# Patient Record
Sex: MALE | Race: WHITE | NOT HISPANIC OR LATINO | URBAN - METROPOLITAN AREA
[De-identification: names, ages, dates, MRNs, and addresses within clinical notes are randomized per-mention and may not be internally consistent; named-entity substitution may affect disease eponyms.]

---

## 2019-03-07 ENCOUNTER — INPATIENT (INPATIENT)
Facility: HOSPITAL | Age: 79
LOS: 11 days | Discharge: NURSING, REHAB OR LONG-TERM CARE | End: 2019-03-19
Attending: HOSPITALIST | Admitting: HOSPITALIST

## 2019-03-07 VITALS
SYSTOLIC BLOOD PRESSURE: 95 MMHG | OXYGEN SATURATION: 98 % | TEMPERATURE: 98 F | DIASTOLIC BLOOD PRESSURE: 52 MMHG | HEART RATE: 86 BPM | RESPIRATION RATE: 20 BRPM | WEIGHT: 210.1 LBS

## 2019-03-07 DIAGNOSIS — Z87.09 PERSONAL HISTORY OF OTHER DISEASES OF THE RESPIRATORY SYSTEM: Chronic | ICD-10-CM

## 2019-03-07 LAB
ALBUMIN SERPL ELPH-MCNC: 3.3 G/DL — LOW (ref 3.5–5.2)
ALP SERPL-CCNC: 53 U/L — SIGNIFICANT CHANGE UP (ref 30–115)
ALT FLD-CCNC: 13 U/L — SIGNIFICANT CHANGE UP (ref 0–41)
ANION GAP SERPL CALC-SCNC: 10 MMOL/L — SIGNIFICANT CHANGE UP (ref 7–14)
APTT BLD: 30.7 SEC — SIGNIFICANT CHANGE UP (ref 27–39.2)
AST SERPL-CCNC: 15 U/L — SIGNIFICANT CHANGE UP (ref 0–41)
BASOPHILS # BLD AUTO: 0.02 K/UL — SIGNIFICANT CHANGE UP (ref 0–0.2)
BASOPHILS NFR BLD AUTO: 0.2 % — SIGNIFICANT CHANGE UP (ref 0–1)
BILIRUB SERPL-MCNC: 0.2 MG/DL — SIGNIFICANT CHANGE UP (ref 0.2–1.2)
BUN SERPL-MCNC: 59 MG/DL — HIGH (ref 10–20)
CALCIUM SERPL-MCNC: 8.6 MG/DL — SIGNIFICANT CHANGE UP (ref 8.5–10.1)
CHLORIDE SERPL-SCNC: 104 MMOL/L — SIGNIFICANT CHANGE UP (ref 98–110)
CO2 SERPL-SCNC: 25 MMOL/L — SIGNIFICANT CHANGE UP (ref 17–32)
CREAT SERPL-MCNC: 2.5 MG/DL — HIGH (ref 0.7–1.5)
EOSINOPHIL # BLD AUTO: 0.15 K/UL — SIGNIFICANT CHANGE UP (ref 0–0.7)
EOSINOPHIL NFR BLD AUTO: 1.2 % — SIGNIFICANT CHANGE UP (ref 0–8)
GLUCOSE SERPL-MCNC: 65 MG/DL — LOW (ref 70–99)
HCT VFR BLD CALC: 36.2 % — LOW (ref 42–52)
HGB BLD-MCNC: 12.2 G/DL — LOW (ref 14–18)
IMM GRANULOCYTES NFR BLD AUTO: 0.4 % — HIGH (ref 0.1–0.3)
INR BLD: 1.12 RATIO — SIGNIFICANT CHANGE UP (ref 0.65–1.3)
LYMPHOCYTES # BLD AUTO: 23.2 % — SIGNIFICANT CHANGE UP (ref 20.5–51.1)
LYMPHOCYTES # BLD AUTO: 3.02 K/UL — SIGNIFICANT CHANGE UP (ref 1.2–3.4)
MCHC RBC-ENTMCNC: 29.6 PG — SIGNIFICANT CHANGE UP (ref 27–31)
MCHC RBC-ENTMCNC: 33.7 G/DL — SIGNIFICANT CHANGE UP (ref 32–37)
MCV RBC AUTO: 87.9 FL — SIGNIFICANT CHANGE UP (ref 80–94)
MONOCYTES # BLD AUTO: 1.04 K/UL — HIGH (ref 0.1–0.6)
MONOCYTES NFR BLD AUTO: 8 % — SIGNIFICANT CHANGE UP (ref 1.7–9.3)
NEUTROPHILS # BLD AUTO: 8.76 K/UL — HIGH (ref 1.4–6.5)
NEUTROPHILS NFR BLD AUTO: 67 % — SIGNIFICANT CHANGE UP (ref 42.2–75.2)
NRBC # BLD: 0 /100 WBCS — SIGNIFICANT CHANGE UP (ref 0–0)
NT-PROBNP SERPL-SCNC: 4829 PG/ML — HIGH (ref 0–300)
PLATELET # BLD AUTO: 276 K/UL — SIGNIFICANT CHANGE UP (ref 130–400)
POTASSIUM SERPL-MCNC: 4.7 MMOL/L — SIGNIFICANT CHANGE UP (ref 3.5–5)
POTASSIUM SERPL-SCNC: 4.7 MMOL/L — SIGNIFICANT CHANGE UP (ref 3.5–5)
PROT SERPL-MCNC: 5.7 G/DL — LOW (ref 6–8)
PROTHROM AB SERPL-ACNC: 12.9 SEC — HIGH (ref 9.95–12.87)
RBC # BLD: 4.12 M/UL — LOW (ref 4.7–6.1)
RBC # FLD: 13.2 % — SIGNIFICANT CHANGE UP (ref 11.5–14.5)
SODIUM SERPL-SCNC: 139 MMOL/L — SIGNIFICANT CHANGE UP (ref 135–146)
TROPONIN T SERPL-MCNC: 0.03 NG/ML — CRITICAL HIGH
WBC # BLD: 13.04 K/UL — HIGH (ref 4.8–10.8)
WBC # FLD AUTO: 13.04 K/UL — HIGH (ref 4.8–10.8)

## 2019-03-07 NOTE — ED ADULT TRIAGE NOTE - CHIEF COMPLAINT QUOTE
pt c/o SOB for weeks, hx copd, 98%SAT on RA, appears sob, denies CP, dry cough, put on oral steroids by pcp.

## 2019-03-08 DIAGNOSIS — J44.9 CHRONIC OBSTRUCTIVE PULMONARY DISEASE, UNSPECIFIED: ICD-10-CM

## 2019-03-08 DIAGNOSIS — I50.9 HEART FAILURE, UNSPECIFIED: ICD-10-CM

## 2019-03-08 DIAGNOSIS — J18.9 PNEUMONIA, UNSPECIFIED ORGANISM: ICD-10-CM

## 2019-03-08 DIAGNOSIS — I48.91 UNSPECIFIED ATRIAL FIBRILLATION: ICD-10-CM

## 2019-03-08 DIAGNOSIS — I10 ESSENTIAL (PRIMARY) HYPERTENSION: ICD-10-CM

## 2019-03-08 DIAGNOSIS — E78.00 PURE HYPERCHOLESTEROLEMIA, UNSPECIFIED: ICD-10-CM

## 2019-03-08 LAB
BASE EXCESS BLDV CALC-SCNC: -3 MMOL/L — LOW (ref -2–2)
CA-I SERPL-SCNC: 1.13 MMOL/L — SIGNIFICANT CHANGE UP (ref 1.12–1.3)
GAS PNL BLDV: 135 MMOL/L — LOW (ref 136–145)
GAS PNL BLDV: SIGNIFICANT CHANGE UP
HCO3 BLDV-SCNC: 23 MMOL/L — SIGNIFICANT CHANGE UP (ref 22–29)
HCT VFR BLDA CALC: 68.8 % — HIGH (ref 34–44)
HGB BLD CALC-MCNC: 22.4 G/DL — CRITICAL HIGH (ref 14–18)
HOROWITZ INDEX BLDV+IHG-RTO: 21 — SIGNIFICANT CHANGE UP
LACTATE BLDV-MCNC: 1.4 MMOL/L — SIGNIFICANT CHANGE UP (ref 0.5–1.6)
NT-PROBNP SERPL-SCNC: 4737 PG/ML — HIGH (ref 0–300)
PCO2 BLDV: 44 MMHG — SIGNIFICANT CHANGE UP (ref 41–51)
PH BLDV: 7.33 — SIGNIFICANT CHANGE UP (ref 7.26–7.43)
PO2 BLDV: 17 MMHG — LOW (ref 20–40)
POTASSIUM BLDV-SCNC: 3.9 MMOL/L — SIGNIFICANT CHANGE UP (ref 3.3–5.6)
SAO2 % BLDV: 29 % — SIGNIFICANT CHANGE UP
TROPONIN T SERPL-MCNC: <0.01 NG/ML — SIGNIFICANT CHANGE UP

## 2019-03-08 RX ORDER — SPIRONOLACTONE 25 MG/1
25 TABLET, FILM COATED ORAL DAILY
Qty: 0 | Refills: 0 | Status: DISCONTINUED | OUTPATIENT
Start: 2019-03-08 | End: 2019-03-08

## 2019-03-08 RX ORDER — AZITHROMYCIN 500 MG/1
500 TABLET, FILM COATED ORAL ONCE
Qty: 0 | Refills: 0 | Status: COMPLETED | OUTPATIENT
Start: 2019-03-08 | End: 2019-03-08

## 2019-03-08 RX ORDER — CEFTRIAXONE 500 MG/1
1 INJECTION, POWDER, FOR SOLUTION INTRAMUSCULAR; INTRAVENOUS EVERY 24 HOURS
Qty: 0 | Refills: 0 | Status: DISCONTINUED | OUTPATIENT
Start: 2019-03-08 | End: 2019-03-09

## 2019-03-08 RX ORDER — ONDANSETRON 8 MG/1
4 TABLET, FILM COATED ORAL EVERY 6 HOURS
Qty: 0 | Refills: 0 | Status: DISCONTINUED | OUTPATIENT
Start: 2019-03-08 | End: 2019-03-09

## 2019-03-08 RX ORDER — CEFTRIAXONE 500 MG/1
1 INJECTION, POWDER, FOR SOLUTION INTRAMUSCULAR; INTRAVENOUS ONCE
Qty: 0 | Refills: 0 | Status: COMPLETED | OUTPATIENT
Start: 2019-03-08 | End: 2019-03-08

## 2019-03-08 RX ORDER — SODIUM CHLORIDE 9 MG/ML
500 INJECTION INTRAMUSCULAR; INTRAVENOUS; SUBCUTANEOUS ONCE
Qty: 0 | Refills: 0 | Status: COMPLETED | OUTPATIENT
Start: 2019-03-08 | End: 2019-03-08

## 2019-03-08 RX ORDER — PANTOPRAZOLE SODIUM 20 MG/1
40 TABLET, DELAYED RELEASE ORAL
Qty: 0 | Refills: 0 | Status: DISCONTINUED | OUTPATIENT
Start: 2019-03-08 | End: 2019-03-11

## 2019-03-08 RX ORDER — LOSARTAN POTASSIUM 100 MG/1
100 TABLET, FILM COATED ORAL DAILY
Qty: 0 | Refills: 0 | Status: DISCONTINUED | OUTPATIENT
Start: 2019-03-08 | End: 2019-03-08

## 2019-03-08 RX ORDER — HEPARIN SODIUM 5000 [USP'U]/ML
5000 INJECTION INTRAVENOUS; SUBCUTANEOUS THREE TIMES A DAY
Qty: 0 | Refills: 0 | Status: DISCONTINUED | OUTPATIENT
Start: 2019-03-08 | End: 2019-03-19

## 2019-03-08 RX ORDER — AZITHROMYCIN 500 MG/1
500 TABLET, FILM COATED ORAL EVERY 24 HOURS
Qty: 0 | Refills: 0 | Status: DISCONTINUED | OUTPATIENT
Start: 2019-03-08 | End: 2019-03-11

## 2019-03-08 RX ORDER — IPRATROPIUM/ALBUTEROL SULFATE 18-103MCG
3 AEROSOL WITH ADAPTER (GRAM) INHALATION EVERY 6 HOURS
Qty: 0 | Refills: 0 | Status: DISCONTINUED | OUTPATIENT
Start: 2019-03-08 | End: 2019-03-12

## 2019-03-08 RX ORDER — HYDROCHLOROTHIAZIDE 25 MG
50 TABLET ORAL DAILY
Qty: 0 | Refills: 0 | Status: DISCONTINUED | OUTPATIENT
Start: 2019-03-08 | End: 2019-03-08

## 2019-03-08 RX ORDER — AMLODIPINE BESYLATE 2.5 MG/1
5 TABLET ORAL DAILY
Qty: 0 | Refills: 0 | Status: DISCONTINUED | OUTPATIENT
Start: 2019-03-08 | End: 2019-03-08

## 2019-03-08 RX ADMIN — PANTOPRAZOLE SODIUM 40 MILLIGRAM(S): 20 TABLET, DELAYED RELEASE ORAL at 10:05

## 2019-03-08 RX ADMIN — SODIUM CHLORIDE 500 MILLILITER(S): 9 INJECTION INTRAMUSCULAR; INTRAVENOUS; SUBCUTANEOUS at 01:01

## 2019-03-08 RX ADMIN — Medication 40 MILLIGRAM(S): at 22:16

## 2019-03-08 RX ADMIN — Medication 40 MILLIGRAM(S): at 15:32

## 2019-03-08 RX ADMIN — Medication 30 MILLILITER(S): at 18:35

## 2019-03-08 RX ADMIN — AZITHROMYCIN 255 MILLIGRAM(S): 500 TABLET, FILM COATED ORAL at 01:01

## 2019-03-08 RX ADMIN — ONDANSETRON 4 MILLIGRAM(S): 8 TABLET, FILM COATED ORAL at 19:42

## 2019-03-08 RX ADMIN — CEFTRIAXONE 100 GRAM(S): 500 INJECTION, POWDER, FOR SOLUTION INTRAMUSCULAR; INTRAVENOUS at 22:17

## 2019-03-08 RX ADMIN — HEPARIN SODIUM 5000 UNIT(S): 5000 INJECTION INTRAVENOUS; SUBCUTANEOUS at 22:16

## 2019-03-08 RX ADMIN — Medication 3 MILLILITER(S): at 07:59

## 2019-03-08 RX ADMIN — HEPARIN SODIUM 5000 UNIT(S): 5000 INJECTION INTRAVENOUS; SUBCUTANEOUS at 15:32

## 2019-03-08 RX ADMIN — AZITHROMYCIN 255 MILLIGRAM(S): 500 TABLET, FILM COATED ORAL at 22:16

## 2019-03-08 RX ADMIN — CEFTRIAXONE 100 GRAM(S): 500 INJECTION, POWDER, FOR SOLUTION INTRAMUSCULAR; INTRAVENOUS at 01:43

## 2019-03-08 RX ADMIN — Medication 3 MILLILITER(S): at 13:30

## 2019-03-08 NOTE — CONSULT NOTE ADULT - ASSESSMENT
Patient with history copd. Now sob   . No overt chf now. He has CKD I/VI, No afib now Need check cxr. Echo. Hold hctz and arb if bp low.

## 2019-03-08 NOTE — ED PROVIDER NOTE - PHYSICAL EXAMINATION
CONSTITUTIONAL: Well-appearing; well-nourished; in no apparent distress.   EYES: PERRL; EOM intact.   CARDIOVASCULAR: Normal S1, S2; no murmurs, rubs, or gallops.   RESPIRATORY: + rales to b/l base of lung. no wheezing and rhonchi. RR - 16. no accessory muscles use.   GI/: Normal bowel sounds; non-distended; non-tender; no palpable organomegaly.   MS: No evidence of trauma or deformity. 1+ pitting edema b/l LE below knee   SKIN: Normal for age and race; warm; dry; good turgor; no apparent lesions or exudate.   NEURO/PSYCH: A & O x 4; grossly unremarkable.

## 2019-03-08 NOTE — PROGRESS NOTE ADULT - SUBJECTIVE AND OBJECTIVE BOX
Hospitalist on call    Informed by RN, patient c/o indigestion after eating.    Plan:   Maalox prn ordered

## 2019-03-08 NOTE — H&P ADULT - NSHPREVIEWOFSYSTEMS_GEN_ALL_CORE
· Review of Systems: Constitutional: no fever, chills, no recent weight loss, change in appetite or malaise  	Eyes: no redness/discharge/pain/vision changes  	ENT: no rhinorrhea/ear pain/sore throat  	Cardiac: No chest pain or edema.  	Respiratory: see HPI  	GI: No nausea, vomiting, diarrhea or abdominal pain.  	: No dysuria, frequency, urgency or hematuria  	MS: no pain to back or extremities, no loss of ROM, no weakness  	Neuro: No headache or weakness. No LOC.  	Skin: No skin rash.  	Endocrine: No history of thyroid disease or diabetes.  Except as documented in the HPI, all other systems are negative.

## 2019-03-08 NOTE — CHART NOTE - NSCHARTNOTEFT_GEN_A_CORE
Asked to see patient because current blood pressure is 83/54. Seen at bedside, interviewed and examined. He is fully awake and oriented and comfortable saying he gets shortness of breath only with exertion. Currently not short of breat and no chest pain, lightheadedness or any complaints. States he is urinating normally. Radial pulses are strong bilaterally. Patient is now getting IV fluids in the form of IV antibiotics (for COPD exacerbation?), will get repeat vital signs soon after they are infused. Of note patient was seen by intensivist yesterday for hypotension, it did respond to their intervention. Asked to see patient because current blood pressure is 83/54. Seen at bedside, interviewed and examined. He is fully awake and oriented and comfortable saying he gets shortness of breath only with exertion. Currently not short of breat and no chest pain, lightheadedness or any complaints. States he is urinating normally. Radial pulses are strong bilaterally. Patient is now getting IV fluids in the form of IV antibiotics (opacity seen on CXR), will get repeat vital signs soon after they are infused. Of note patient was seen by intensivist yesterday for hypotension, it did respond to their intervention. Asked to see patient because current blood pressure is 83/54. Seen at bedside, interviewed and examined. He is fully awake and oriented and comfortable saying he gets shortness of breath only with exertion. Currently not short of breat and no chest pain, lightheadedness or any complaints. States he is urinating normally. Radial pulses are strong bilaterally. Patient is now getting IV fluids in the form of IV antibiotics (opacity seen on CXR), will get repeat vital signs soon after they are infused. Of note patient was seen by intensivist yesterday for hypotension, it did respond to their intervention. Will consider using cefepime

## 2019-03-08 NOTE — ED PROVIDER NOTE - CLINICAL SUMMARY MEDICAL DECISION MAKING FREE TEXT BOX
pt here w new onset a fib. possible left sided pna vs effusion. mild pulm congestion. will admit to tele. pt was evaluated by ICU- Dr. Almanza- not a candidate for 'hard telemetry'. pt admitted in stable conditiion

## 2019-03-08 NOTE — H&P ADULT - HISTORY OF PRESENT ILLNESS
· HPI Objective Statement: 79 yo male hx of HTN/HLD/COPD present c/o coughing and SOB worsening over the past few weeks. reported he was seen by PMD and given prednisone and inhaler. patient reported worsening SOB over the past few days so he came to ED for evaluation. + productive yellow sputum. SOB worsen with exertion. Denies fever/chill/HA/dizziness/chest pain/palpitation/abd pain/n/v/d/ black stool/bloody stool/urinary sxs

## 2019-03-08 NOTE — ED PROVIDER NOTE - OBJECTIVE STATEMENT
77 yo male hx of HTN/HLD/COPD present c/o coughing and SOB worsening over the past few weeks. reported he was seen by PMD and given prednisone and inhaler. patient reported worsening SOB over the past few days so he came to ED for evaluation. + productive yellow sputum. SOB worsen with exertion.  Denies fever/chill/HA/dizziness/chest pain/palpitation/abd pain/n/v/d/ black stool/bloody stool/urinary sxs

## 2019-03-08 NOTE — H&P ADULT - NSHPLABSRESULTS_GEN_ALL_CORE
12.2   13.04 )-----------( 276      ( 07 Mar 2019 22:20 )             36.2     03-07    139  |  104  |  59<H>  ----------------------------<  65<L>  4.7   |  25  |  2.5<H>    Ca    8.6      07 Mar 2019 22:20    TPro  5.7<L>  /  Alb  3.3<L>  /  TBili  0.2  /  DBili  x   /  AST  15  /  ALT  13  /  AlkPhos  53  03-07            PT/INR - ( 07 Mar 2019 22:20 )   PT: 12.90 sec;   INR: 1.12 ratio         PTT - ( 07 Mar 2019 22:20 )  PTT:30.7 sec  Lactate Trend    CARDIAC MARKERS ( 07 Mar 2019 22:20 )  x     / 0.03 ng/mL / x     / x     / x          CAPILLARY BLOOD GLUCOSE

## 2019-03-08 NOTE — ED PROVIDER NOTE - NS ED ROS FT
Constitutional: no fever, chills, no recent weight loss, change in appetite or malaise  Eyes: no redness/discharge/pain/vision changes  ENT: no rhinorrhea/ear pain/sore throat  Cardiac: No chest pain or edema.  Respiratory: see HPI  GI: No nausea, vomiting, diarrhea or abdominal pain.  : No dysuria, frequency, urgency or hematuria  MS: no pain to back or extremities, no loss of ROM, no weakness  Neuro: No headache or weakness. No LOC.  Skin: No skin rash.  Endocrine: No history of thyroid disease or diabetes.  Except as documented in the HPI, all other systems are negative.

## 2019-03-08 NOTE — H&P ADULT - ASSESSMENT
Patient is a 78y old  Male who presents with a chief complaint of hypotension, new onset afib (08 Mar 2019 00:54)                                                                                                                                                                                                                                                                                       HEALTH ISSUES - PROBLEM Dx:  High cholesterol: High cholesterol  Chronic obstructive pulmonary disease, unspecified COPD type: Chronic obstructive pulmonary disease, unspecified COPD type  Hypertension, unspecified type: Hypertension, unspecified type

## 2019-03-08 NOTE — ED PROVIDER NOTE - PROGRESS NOTE DETAILS
dr. story from icu consulted Dr Jackson evaluated patient and approve for low risk tele dr. Jackson from icu consulted

## 2019-03-08 NOTE — CONSULT NOTE ADULT - ASSESSMENT
A/P:     1. CHF - likely systolic   - repeat 2 d echo   - not overloaded on exam   - bp low - but pt mentating well   - can give 200 cc blous   - serial trops   - new onset afib - normal sinus rythim on my exam - repeat ekg  - monitor BP   - can be monitored in tele   - DVT ppx   COPD - nebs for now

## 2019-03-08 NOTE — ED PROVIDER NOTE - ATTENDING CONTRIBUTION TO CARE
I personally evaluated the patient. I reviewed the Resident’s or Physician Assistant’s note (as assigned above), and agree with the findings and plan except as documented in my note.    79 y/o M with hx of HTN presents to the ed w cc of sob associated with ortopnea and LE edema over the past week. No CP. No fevers. No abd pain.     CONSTITUTIONAL: Well-developed; well-nourished; in no acute distress. Sitting up and providing appropriate history and physical examination  SKIN: skin exam is warm and dry, no acute rash.  HEAD: Normocephalic; atraumatic.  EYES: PERRL, 3 mm bilateral, no nystagmus, EOM intact; conjunctiva and sclera clear.  ENT: No nasal discharge; airway clear.  NECK: Supple; non tender.+ full passive ROM in all directions. No JVD  CARD: irregular pulse- new onset a fib   RESP: b/l rales   ABD: soft; non-distended; non-tender. No Rebound, No Gaurding, No signs of peritnitis, No CVA tenderness  EXT: Normal ROM. No clubbing, cyanosis or edema. Dp and Pt Pulses intact. Cap refill less than 3 seconds  NEURO: CN 2-12 intact, normal finger to nose, normal romberg, stable gait, no sensory or motor deficits, Alert, oriented, grossly unremarkable. No Focal deficits. GCS 15. NIH 0  PSYCH: Cooperative, appropriate.

## 2019-03-08 NOTE — ED PROVIDER NOTE - CARE PLAN
Principal Discharge DX:	Atrial fibrillation, new onset  Secondary Diagnosis:	CHF (congestive heart failure)  Secondary Diagnosis:	Pneumonia

## 2019-03-08 NOTE — CONSULT NOTE ADULT - SUBJECTIVE AND OBJECTIVE BOX
CARDIOLOGY CONSULT NOTE     CHIEF COMPLAINT/REASON FOR CONSULT:    HPI:  · HPI Objective Statement: 77 yo male hx of HTN/HLD/COPD present c/o coughing and SOB worsening over the past few weeks. reported he was seen by PMD and given prednisone and inhaler. patient reported worsening SOB over the past few days so he came to ED for evaluation. + productive yellow sputum. SOB worsen with exertion.     PAST MEDICAL & SURGICAL HISTORY:  High cholesterol  COPD (chronic obstructive pulmonary disease)  HTN (hypertension)  H/O tonsillitis      Cardiac Risks:   [ x]HTN, [ ] DM, [ ] Smoking, [ ] FH,  [ x] Lipids        MEDICATIONS:  MEDICATIONS  (STANDING):  ALBUTerol/ipratropium for Nebulization 3 milliLiter(s) Nebulizer every 6 hours  amLODIPine   Tablet 5 milliGRAM(s) Oral daily  azithromycin  IVPB 500 milliGRAM(s) IV Intermittent every 24 hours  cefTRIAXone   IVPB 1 Gram(s) IV Intermittent every 24 hours  heparin  Injectable 5000 Unit(s) SubCutaneous three times a day  hydrochlorothiazide 50 milliGRAM(s) Oral daily  losartan 100 milliGRAM(s) Oral daily  methylPREDNISolone sodium succinate Injectable 40 milliGRAM(s) IV Push every 8 hours  pantoprazole    Tablet 40 milliGRAM(s) Oral before breakfast      FAMILY HISTORY:  No pertinent family history in first degree relatives      SOCIAL HISTORY:      [ ] Marital status  Single  Allergies    No Known Allergies      	    REVIEW OF SYSTEMS:  CONSTITUTIONAL: No fever, weight loss, or fatigue  EYES: No eye pain, visual disturbances, or discharge  ENMT:  No difficulty hearing, tinnitus, vertigo; No sinus or throat pain  NECK: No pain or stiffness  RESPIRATORY: See above  CARDIOVASCULAR: No chest pain, palpitations, passing out, dizziness, or leg swelling  GASTROINTESTINAL: No abdominal or epigastric pain. No nausea, vomiting, or hematemesis; No diarrhea or constipation. No melena or hematochezia.  GENITOURINARY: No dysuria, frequency, hematuria, or incontinence  NEUROLOGICAL: No headaches, memory loss, loss of strength, numbness, or tremors  SKIN: No itching, burning, rashes, or lesions   	      PHYSICAL EXAM:  T(C): 35.9 (03-08-19 @ 05:15), Max: 37.4 (03-08-19 @ 00:20)  HR: 88 (03-08-19 @ 05:15) (71 - 97)  BP: 94/51 (03-08-19 @ 05:15) (82/52 - 127/60)  RR: 16 (03-08-19 @ 05:15) (16 - 20)  SpO2: 99% (03-08-19 @ 01:40) (97% - 99%)  Wt(kg): --  I&O's Summary    07 Mar 2019 07:01  -  08 Mar 2019 07:00  --------------------------------------------------------  IN: 0 mL / OUT: 450 mL / NET: -450 mL        Appearance: Normal	  Psychiatry: A & O x 3, Mood & affect appropriate  HEENT:   Normal oral mucosa, PERRL, EOMI	  Lymphatic: No lymphadenopathy  Cardiovascular: Normal S1 S2,RRR, No JVD, No murmurs  Respiratory: Lungs clear to auscultation  Decreased bs	  Gastrointestinal:  Soft, Non-tender, + BS	  Skin: No rashes, No ecchymoses, No cyanosis	  Neurologic: Non-focal  Extremities: Normal range of motion, No clubbing, cyanosis or edema  Vascular: Peripheral pulses palpable 2+ bilaterally      ECG:  	Not available    	  LABS:	 	    CARDIAC MARKERS:          Serum Pro-Brain Natriuretic Peptide: 4829 pg/mL (03-07 @ 22:20)                            12.2   13.04 )-----------( 276      ( 07 Mar 2019 22:20 )             36.2     03-07    139  |  104  |  59<H>  ----------------------------<  65<L>  4.7   |  25  |  2.5<H>    Ca    8.6      07 Mar 2019 22:20    TPro  5.7<L>  /  Alb  3.3<L>  /  TBili  0.2  /  DBili  x   /  AST  15  /  ALT  13  /  AlkPhos  53  03-07    PT/INR - ( 07 Mar 2019 22:20 )   PT: 12.90 sec;   INR: 1.12 ratio         PTT - ( 07 Mar 2019 22:20 )  PTT:30.7 sec  proBNP: Serum Pro-Brain Natriuretic Peptide: 4829 pg/mL (03-07 @ 22:20)

## 2019-03-08 NOTE — CONSULT NOTE ADULT - SUBJECTIVE AND OBJECTIVE BOX
Patient is a 78y old  Male who presents with a chief complaint of sob x 3 days.      REVIEW OF SYSTEMS  General: feels weak   Skin/Breast: no rash 	  Ophthalmologic: no blurry vision 	  ENMT:	no blurry vision   Respiratory and Thorax: no sob	  Cardiovascular:	 no chest pain   Gastrointestinal:	no dysuria  Genitourinary:	no diarhea     Allergies  No Known Allergies    T(F): 99.4 (03-08-19 @ 00:20), Max: 99.4 (03-08-19 @ 00:20)  HR: 96 (03-08-19 @ 00:20)  BP: 97/51 (03-08-19 @ 00:20)  RR: 20 (03-08-19 @ 00:20)  SpO2: 98% (03-08-19 @ 00:20) (97% - 98%)      PHYSICAL EXAM:  GENERAL: NAD, well-groomed, well-developed  HEAD:  Atraumatic, Normocephalic  EYES: EOMI, PERRLA, conjunctiva and sclera clear  ENMT: No tonsillar erythema, exudates, or enlargement; Moist mucous membranes, Good dentition, No lesions  NECK: Supple, No JVD, Normal thyroid  NERVOUS SYSTEM:  Alert & Oriented X3, Good concentration; Motor Strength 5/5 B/L upper and lower extremities; DTRs 2+ intact and symmetric  CHEST/LUNG: Clear to percussion bilaterally; No rales, rhonchi, wheezing, or rubs  HEART: Regular rate and rhythm; No murmurs, rubs, or gallops  ABDOMEN: Soft, Nontender, Nondistended; Bowel sounds present  EXTREMITIES:  2+ edema     labs  03-07    139  |  104  |  59<H>  ----------------------------<  65<L>  4.7   |  25  |  2.5<H>    Ca    8.6      07 Mar 2019 22:20    TPro  5.7<L>  /  Alb  3.3<L>  /  TBili  0.2  /  DBili  x   /  AST  15  /  ALT  13  /  AlkPhos  53  03-07                          12.2   13.04 )-----------( 276      ( 07 Mar 2019 22:20 )             36.2         PT/INR - ( 07 Mar 2019 22:20 )   PT: 12.90 sec;   INR: 1.12 ratio         PTT - ( 07 Mar 2019 22:20 )  PTT:30.7 sec      Social hx : no smoking , or eton abuse      radiology    azithromycin  IVPB 500 milliGRAM(s) IV Intermittent Once  cefTRIAXone   IVPB 1 Gram(s) IV Intermittent Once  sodium chloride 0.9% Bolus 500 milliLiter(s) IV Bolus once

## 2019-03-08 NOTE — H&P ADULT - PROBLEM SELECTOR PLAN 2
diuretics/ antihypertensivesfluid restrictions CHF, unspecified  diuretics/ antihypertensivesfluid restrictions

## 2019-03-09 LAB
ANION GAP SERPL CALC-SCNC: 9 MMOL/L — SIGNIFICANT CHANGE UP (ref 7–14)
BUN SERPL-MCNC: 62 MG/DL — CRITICAL HIGH (ref 10–20)
CALCIUM SERPL-MCNC: 8.3 MG/DL — LOW (ref 8.5–10.1)
CHLORIDE SERPL-SCNC: 107 MMOL/L — SIGNIFICANT CHANGE UP (ref 98–110)
CO2 SERPL-SCNC: 24 MMOL/L — SIGNIFICANT CHANGE UP (ref 17–32)
CREAT SERPL-MCNC: 1.7 MG/DL — HIGH (ref 0.7–1.5)
GLUCOSE SERPL-MCNC: 166 MG/DL — HIGH (ref 70–99)
HCT VFR BLD CALC: 34.8 % — LOW (ref 42–52)
HCT VFR BLD CALC: 36.9 % — LOW (ref 42–52)
HGB BLD-MCNC: 11.5 G/DL — LOW (ref 14–18)
HGB BLD-MCNC: 12.5 G/DL — LOW (ref 14–18)
LACTATE SERPL-SCNC: 1.2 MMOL/L — SIGNIFICANT CHANGE UP (ref 0.5–2.2)
MAGNESIUM SERPL-MCNC: 1.9 MG/DL — SIGNIFICANT CHANGE UP (ref 1.8–2.4)
MCHC RBC-ENTMCNC: 28.9 PG — SIGNIFICANT CHANGE UP (ref 27–31)
MCHC RBC-ENTMCNC: 29.3 PG — SIGNIFICANT CHANGE UP (ref 27–31)
MCHC RBC-ENTMCNC: 33 G/DL — SIGNIFICANT CHANGE UP (ref 32–37)
MCHC RBC-ENTMCNC: 33.9 G/DL — SIGNIFICANT CHANGE UP (ref 32–37)
MCV RBC AUTO: 86.6 FL — SIGNIFICANT CHANGE UP (ref 80–94)
MCV RBC AUTO: 87.4 FL — SIGNIFICANT CHANGE UP (ref 80–94)
NRBC # BLD: 0 /100 WBCS — SIGNIFICANT CHANGE UP (ref 0–0)
NRBC # BLD: 0 /100 WBCS — SIGNIFICANT CHANGE UP (ref 0–0)
PHOSPHATE SERPL-MCNC: 3.3 MG/DL — SIGNIFICANT CHANGE UP (ref 2.1–4.9)
PLATELET # BLD AUTO: 237 K/UL — SIGNIFICANT CHANGE UP (ref 130–400)
PLATELET # BLD AUTO: 302 K/UL — SIGNIFICANT CHANGE UP (ref 130–400)
POTASSIUM SERPL-MCNC: 5.5 MMOL/L — HIGH (ref 3.5–5)
POTASSIUM SERPL-SCNC: 5.5 MMOL/L — HIGH (ref 3.5–5)
RBC # BLD: 3.98 M/UL — LOW (ref 4.7–6.1)
RBC # BLD: 4.26 M/UL — LOW (ref 4.7–6.1)
RBC # FLD: 13.2 % — SIGNIFICANT CHANGE UP (ref 11.5–14.5)
RBC # FLD: 13.2 % — SIGNIFICANT CHANGE UP (ref 11.5–14.5)
SODIUM SERPL-SCNC: 140 MMOL/L — SIGNIFICANT CHANGE UP (ref 135–146)
T3 SERPL-MCNC: 82 NG/DL — SIGNIFICANT CHANGE UP (ref 80–200)
T4 AB SER-ACNC: 6.1 UG/DL — SIGNIFICANT CHANGE UP (ref 4.6–12)
TSH SERPL-MCNC: 1.13 UIU/ML — SIGNIFICANT CHANGE UP (ref 0.27–4.2)
WBC # BLD: 15.39 K/UL — HIGH (ref 4.8–10.8)
WBC # BLD: 9.55 K/UL — SIGNIFICANT CHANGE UP (ref 4.8–10.8)
WBC # FLD AUTO: 15.39 K/UL — HIGH (ref 4.8–10.8)
WBC # FLD AUTO: 9.55 K/UL — SIGNIFICANT CHANGE UP (ref 4.8–10.8)

## 2019-03-09 RX ORDER — CEFEPIME 1 G/1
1000 INJECTION, POWDER, FOR SOLUTION INTRAMUSCULAR; INTRAVENOUS EVERY 12 HOURS
Qty: 0 | Refills: 0 | Status: DISCONTINUED | OUTPATIENT
Start: 2019-03-09 | End: 2019-03-10

## 2019-03-09 RX ORDER — IOHEXOL 300 MG/ML
30 INJECTION, SOLUTION INTRAVENOUS ONCE
Qty: 0 | Refills: 0 | Status: COMPLETED | OUTPATIENT
Start: 2019-03-09 | End: 2019-03-09

## 2019-03-09 RX ORDER — METOCLOPRAMIDE HCL 10 MG
10 TABLET ORAL ONCE
Qty: 0 | Refills: 0 | Status: COMPLETED | OUTPATIENT
Start: 2019-03-09 | End: 2019-03-09

## 2019-03-09 RX ORDER — ONDANSETRON 8 MG/1
8 TABLET, FILM COATED ORAL EVERY 8 HOURS
Qty: 0 | Refills: 0 | Status: DISCONTINUED | OUTPATIENT
Start: 2019-03-09 | End: 2019-03-19

## 2019-03-09 RX ADMIN — Medication 40 MILLIGRAM(S): at 05:46

## 2019-03-09 RX ADMIN — Medication 10 MILLIGRAM(S): at 14:25

## 2019-03-09 RX ADMIN — CEFEPIME 100 MILLIGRAM(S): 1 INJECTION, POWDER, FOR SOLUTION INTRAMUSCULAR; INTRAVENOUS at 05:53

## 2019-03-09 RX ADMIN — PANTOPRAZOLE SODIUM 40 MILLIGRAM(S): 20 TABLET, DELAYED RELEASE ORAL at 05:46

## 2019-03-09 RX ADMIN — Medication 3 MILLILITER(S): at 08:33

## 2019-03-09 RX ADMIN — Medication 3 MILLILITER(S): at 14:38

## 2019-03-09 RX ADMIN — ONDANSETRON 8 MILLIGRAM(S): 8 TABLET, FILM COATED ORAL at 17:37

## 2019-03-09 RX ADMIN — HEPARIN SODIUM 5000 UNIT(S): 5000 INJECTION INTRAVENOUS; SUBCUTANEOUS at 05:46

## 2019-03-09 RX ADMIN — AZITHROMYCIN 255 MILLIGRAM(S): 500 TABLET, FILM COATED ORAL at 21:27

## 2019-03-09 RX ADMIN — HEPARIN SODIUM 5000 UNIT(S): 5000 INJECTION INTRAVENOUS; SUBCUTANEOUS at 21:27

## 2019-03-09 RX ADMIN — ONDANSETRON 4 MILLIGRAM(S): 8 TABLET, FILM COATED ORAL at 12:28

## 2019-03-09 RX ADMIN — CEFEPIME 100 MILLIGRAM(S): 1 INJECTION, POWDER, FOR SOLUTION INTRAMUSCULAR; INTRAVENOUS at 17:42

## 2019-03-09 RX ADMIN — HEPARIN SODIUM 5000 UNIT(S): 5000 INJECTION INTRAVENOUS; SUBCUTANEOUS at 14:26

## 2019-03-09 RX ADMIN — IOHEXOL 30 MILLILITER(S): 300 INJECTION, SOLUTION INTRAVENOUS at 17:30

## 2019-03-09 NOTE — PROGRESS NOTE ADULT - SUBJECTIVE AND OBJECTIVE BOX
Patient is a 78y old  Male who presents with a chief complaint of hypotension, new onset afib (08 Mar 2019 00:54)      SUBJECTIVE / OVERNIGHT EVENTS:  no cp, sob, fever  vomiting, nausea yesterday, reflux, burning nonradaiting    MEDICATIONS  (STANDING):  ALBUTerol/ipratropium for Nebulization 3 milliLiter(s) Nebulizer every 6 hours  azithromycin  IVPB 500 milliGRAM(s) IV Intermittent every 24 hours  cefepime   IVPB 1000 milliGRAM(s) IV Intermittent every 12 hours  heparin  Injectable 5000 Unit(s) SubCutaneous three times a day  metoclopramide Injectable 10 milliGRAM(s) IV Push once  pantoprazole    Tablet 40 milliGRAM(s) Oral before breakfast    MEDICATIONS  (PRN):  ondansetron Injectable 4 milliGRAM(s) IV Push every 6 hours PRN Nausea and/or Vomiting        CAPILLARY BLOOD GLUCOSE        I&O's Summary    08 Mar 2019 07:01  -  09 Mar 2019 07:00  --------------------------------------------------------  IN: 0 mL / OUT: 450 mL / NET: -450 mL    09 Mar 2019 06:01  -  09 Mar 2019 11:03  --------------------------------------------------------  IN: 0 mL / OUT: 200 mL / NET: -200 mL        PHYSICAL EXAM:  GENERAL: nad, a+o x3  EYES:  NECK:   CHEST/LUNG: minimal exp wheeze  HEART: rrr no m/g/r  ABDOMEN: soft nt nd  EXTREMITIES:    PSYCH:   NEUROLOGY:   SKIN:    LABS:                        11.5   9.55  )-----------( 237      ( 09 Mar 2019 07:00 )             34.8     03-09    140  |  107  |  62<HH>  ----------------------------<  166<H>  5.5<H>   |  24  |  1.7<H>    Ca    8.3<L>      09 Mar 2019 07:00  Phos  3.3     03-09  Mg     1.9     03-09    TPro  5.7<L>  /  Alb  3.3<L>  /  TBili  0.2  /  DBili  x   /  AST  15  /  ALT  13  /  AlkPhos  53  03-07    PT/INR - ( 07 Mar 2019 22:20 )   PT: 12.90 sec;   INR: 1.12 ratio         PTT - ( 07 Mar 2019 22:20 )  PTT:30.7 sec  CARDIAC MARKERS ( 08 Mar 2019 12:12 )  x     / <0.01 ng/mL / x     / x     / x      CARDIAC MARKERS ( 07 Mar 2019 22:20 )  x     / 0.03 ng/mL / x     / x     / x              RADIOLOGY & ADDITIONAL TESTS:    Imaging Personally Reviewed:  Yes    Consultant(s) Notes Reviewed:      Care Discussed with Consultants/Other Providers:    Assessment and Plan   PAST MEDICAL & SURGICAL HISTORY:  High cholesterol  COPD (chronic obstructive pulmonary disease)  HTN (hypertension)  H/O tonsillitis

## 2019-03-09 NOTE — CHART NOTE - NSCHARTNOTEFT_GEN_A_CORE
Blood pressure is better but for now will switch from Rocephin to cefepime pending daytime staff review

## 2019-03-09 NOTE — PROGRESS NOTE ADULT - ASSESSMENT
78M PMHx COPD, HTN, HLD, first degree AVB here with sob, hypotension. L base opacity on cxr.    1. Shortness of breath  suspect due to pna at this point given cxr findings  cont cefepime, azithro  pt with some GI symptoms, check urine legionella  keep o2 >88  d/c steroids  +jvp on exam, f/u tte  2. Hypotension  unclear etiology  sepsis vs. cardiogenic  f/u tte  mental status stable  3. First degree AVB  stable on tele ok to d/c  avoid AV anay blockers  4. COPD  no evidence of exacerbation  duonebs q6  5. HTN  hypotensive  6. HLD  outpt f/u  7. DVT ppx  subq hep    #Progress Note Handoff:  Pending (specify):  Consults_________, Tests________, Test Results_______, Other_________  Family discussion:  Disposition: Home___/SNF___/Other________/Unknown at this time____x____

## 2019-03-10 LAB
ALBUMIN SERPL ELPH-MCNC: 3.4 G/DL — LOW (ref 3.5–5.2)
ALP SERPL-CCNC: 38 U/L — SIGNIFICANT CHANGE UP (ref 30–115)
ALT FLD-CCNC: 12 U/L — SIGNIFICANT CHANGE UP (ref 0–41)
ANION GAP SERPL CALC-SCNC: 12 MMOL/L — SIGNIFICANT CHANGE UP (ref 7–14)
AST SERPL-CCNC: 14 U/L — SIGNIFICANT CHANGE UP (ref 0–41)
BILIRUB SERPL-MCNC: 0.4 MG/DL — SIGNIFICANT CHANGE UP (ref 0.2–1.2)
BUN SERPL-MCNC: 58 MG/DL — HIGH (ref 10–20)
CALCIUM SERPL-MCNC: 9 MG/DL — SIGNIFICANT CHANGE UP (ref 8.5–10.1)
CHLORIDE SERPL-SCNC: 99 MMOL/L — SIGNIFICANT CHANGE UP (ref 98–110)
CO2 SERPL-SCNC: 28 MMOL/L — SIGNIFICANT CHANGE UP (ref 17–32)
CREAT ?TM UR-MCNC: 90 MG/DL — SIGNIFICANT CHANGE UP
CREAT SERPL-MCNC: 1.4 MG/DL — SIGNIFICANT CHANGE UP (ref 0.7–1.5)
GLUCOSE SERPL-MCNC: 135 MG/DL — HIGH (ref 70–99)
HCT VFR BLD CALC: 34.6 % — LOW (ref 42–52)
HGB BLD-MCNC: 11.8 G/DL — LOW (ref 14–18)
MAGNESIUM SERPL-MCNC: 1.9 MG/DL — SIGNIFICANT CHANGE UP (ref 1.8–2.4)
MCHC RBC-ENTMCNC: 29.8 PG — SIGNIFICANT CHANGE UP (ref 27–31)
MCHC RBC-ENTMCNC: 34.1 G/DL — SIGNIFICANT CHANGE UP (ref 32–37)
MCV RBC AUTO: 87.4 FL — SIGNIFICANT CHANGE UP (ref 80–94)
NRBC # BLD: 0 /100 WBCS — SIGNIFICANT CHANGE UP (ref 0–0)
PHOSPHATE SERPL-MCNC: 3.2 MG/DL — SIGNIFICANT CHANGE UP (ref 2.1–4.9)
PLATELET # BLD AUTO: 295 K/UL — SIGNIFICANT CHANGE UP (ref 130–400)
POTASSIUM SERPL-MCNC: 5.1 MMOL/L — HIGH (ref 3.5–5)
POTASSIUM SERPL-SCNC: 5.1 MMOL/L — HIGH (ref 3.5–5)
PROT SERPL-MCNC: 5.7 G/DL — LOW (ref 6–8)
RBC # BLD: 3.96 M/UL — LOW (ref 4.7–6.1)
RBC # FLD: 13.1 % — SIGNIFICANT CHANGE UP (ref 11.5–14.5)
SODIUM SERPL-SCNC: 139 MMOL/L — SIGNIFICANT CHANGE UP (ref 135–146)
WBC # BLD: 18.34 K/UL — HIGH (ref 4.8–10.8)
WBC # FLD AUTO: 18.34 K/UL — HIGH (ref 4.8–10.8)

## 2019-03-10 RX ORDER — BENZOCAINE AND MENTHOL 5; 1 G/100ML; G/100ML
1 LIQUID ORAL
Qty: 0 | Refills: 0 | Status: DISCONTINUED | OUTPATIENT
Start: 2019-03-10 | End: 2019-03-19

## 2019-03-10 RX ORDER — PIPERACILLIN AND TAZOBACTAM 4; .5 G/20ML; G/20ML
3.38 INJECTION, POWDER, LYOPHILIZED, FOR SOLUTION INTRAVENOUS EVERY 8 HOURS
Qty: 0 | Refills: 0 | Status: DISCONTINUED | OUTPATIENT
Start: 2019-03-10 | End: 2019-03-12

## 2019-03-10 RX ADMIN — ONDANSETRON 8 MILLIGRAM(S): 8 TABLET, FILM COATED ORAL at 05:31

## 2019-03-10 RX ADMIN — HEPARIN SODIUM 5000 UNIT(S): 5000 INJECTION INTRAVENOUS; SUBCUTANEOUS at 21:36

## 2019-03-10 RX ADMIN — PIPERACILLIN AND TAZOBACTAM 25 GRAM(S): 4; .5 INJECTION, POWDER, LYOPHILIZED, FOR SOLUTION INTRAVENOUS at 13:51

## 2019-03-10 RX ADMIN — PANTOPRAZOLE SODIUM 40 MILLIGRAM(S): 20 TABLET, DELAYED RELEASE ORAL at 05:32

## 2019-03-10 RX ADMIN — CEFEPIME 100 MILLIGRAM(S): 1 INJECTION, POWDER, FOR SOLUTION INTRAMUSCULAR; INTRAVENOUS at 05:32

## 2019-03-10 RX ADMIN — PIPERACILLIN AND TAZOBACTAM 25 GRAM(S): 4; .5 INJECTION, POWDER, LYOPHILIZED, FOR SOLUTION INTRAVENOUS at 21:35

## 2019-03-10 RX ADMIN — Medication 3 MILLILITER(S): at 13:37

## 2019-03-10 RX ADMIN — AZITHROMYCIN 255 MILLIGRAM(S): 500 TABLET, FILM COATED ORAL at 21:35

## 2019-03-10 RX ADMIN — Medication 3 MILLILITER(S): at 08:24

## 2019-03-10 RX ADMIN — BENZOCAINE AND MENTHOL 1 LOZENGE: 5; 1 LIQUID ORAL at 22:44

## 2019-03-10 RX ADMIN — ONDANSETRON 8 MILLIGRAM(S): 8 TABLET, FILM COATED ORAL at 17:49

## 2019-03-10 RX ADMIN — HEPARIN SODIUM 5000 UNIT(S): 5000 INJECTION INTRAVENOUS; SUBCUTANEOUS at 05:30

## 2019-03-10 NOTE — PROGRESS NOTE ADULT - ASSESSMENT
78M PMHx COPD, HTN, HLD, first degree AVB here with R basilar pneumonia, suspect aspiration. PAULA, now resolved. Now with coffee ground emesis.    1. Pneumonia, suspect gram neg  ct demonstrate R base opacity likely pna  change to zosyn to cover anaerobe  azithro pending urine legionella  hypotension resolved  # Vomiting  concern for coffee ground in appearance  cbc stable however  ct abd without acute pathology  improved from yesterday, will monitor  zofran prn  start clear liquid diet  2. PAULA  resolved  suspected prerenal  3. First degree AVB  avoid AV anay blockers  4. COPD  no evidence of exacerbation  duonebs q6  5. HTN  hold bp meds  6. HLD  outpt f/u  7. DVT ppx  subq hep    #Progress Note Handoff:  Pending (specify):  Consults_________, Tests________, Test Results_______, Other_________  Family discussion:  Disposition: Home___/SNF___/Other________/Unknown at this time____x____

## 2019-03-10 NOTE — PROGRESS NOTE ADULT - SUBJECTIVE AND OBJECTIVE BOX
Patient is a 78y old  Male who presents with a chief complaint of hypotension, new onset afib (08 Mar 2019 00:54)      SUBJECTIVE / OVERNIGHT EVENTS:  no cp, sob, abd pain, fever  no abd pain, + nausea, vomiting, coffee ground    MEDICATIONS  (STANDING):  ALBUTerol/ipratropium for Nebulization 3 milliLiter(s) Nebulizer every 6 hours  azithromycin  IVPB 500 milliGRAM(s) IV Intermittent every 24 hours  heparin  Injectable 5000 Unit(s) SubCutaneous three times a day  pantoprazole    Tablet 40 milliGRAM(s) Oral before breakfast  piperacillin/tazobactam IVPB. 3.375 Gram(s) IV Intermittent every 8 hours    MEDICATIONS  (PRN):  ondansetron Injectable 8 milliGRAM(s) IV Push every 8 hours PRN Nausea and/or Vomiting        CAPILLARY BLOOD GLUCOSE        I&O's Summary    09 Mar 2019 06:01  -  10 Mar 2019 07:00  --------------------------------------------------------  IN: 300 mL / OUT: 500 mL / NET: -200 mL        PHYSICAL EXAM:  GENERAL: nad, a+o x3  EYES:  NECK:   CHEST/LUNG: base crackles  HEART: rrr no m/g/r  ABDOMEN: soft nt nd  EXTREMITIES:    PSYCH:   NEUROLOGY:   SKIN:    LABS:                        11.8   18.34 )-----------( 295      ( 10 Mar 2019 06:29 )             34.6     03-10    139  |  99  |  58<H>  ----------------------------<  135<H>  5.1<H>   |  28  |  1.4    Ca    9.0      10 Mar 2019 06:29  Phos  3.2     03-10  Mg     1.9     03-10    TPro  5.7<L>  /  Alb  3.4<L>  /  TBili  0.4  /  DBili  x   /  AST  14  /  ALT  12  /  AlkPhos  38  03-10      CARDIAC MARKERS ( 08 Mar 2019 12:12 )  x     / <0.01 ng/mL / x     / x     / x              RADIOLOGY & ADDITIONAL TESTS:    Imaging Personally Reviewed:  Yes    Consultant(s) Notes Reviewed:      Care Discussed with Consultants/Other Providers:    Assessment and Plan   PAST MEDICAL & SURGICAL HISTORY:  High cholesterol  COPD (chronic obstructive pulmonary disease)  HTN (hypertension)  H/O tonsillitis

## 2019-03-11 LAB
ANION GAP SERPL CALC-SCNC: 10 MMOL/L — SIGNIFICANT CHANGE UP (ref 7–14)
BUN SERPL-MCNC: 43 MG/DL — HIGH (ref 10–20)
CALCIUM SERPL-MCNC: 8.5 MG/DL — SIGNIFICANT CHANGE UP (ref 8.5–10.1)
CHLORIDE SERPL-SCNC: 95 MMOL/L — LOW (ref 98–110)
CO2 SERPL-SCNC: 31 MMOL/L — SIGNIFICANT CHANGE UP (ref 17–32)
CREAT SERPL-MCNC: 1.7 MG/DL — HIGH (ref 0.7–1.5)
GLUCOSE SERPL-MCNC: 178 MG/DL — HIGH (ref 70–99)
HCT VFR BLD CALC: 40.1 % — LOW (ref 42–52)
HGB BLD-MCNC: 13.2 G/DL — LOW (ref 14–18)
LEGIONELLA AG UR QL: NEGATIVE — SIGNIFICANT CHANGE UP
MAGNESIUM SERPL-MCNC: 1.8 MG/DL — SIGNIFICANT CHANGE UP (ref 1.8–2.4)
MCHC RBC-ENTMCNC: 29 PG — SIGNIFICANT CHANGE UP (ref 27–31)
MCHC RBC-ENTMCNC: 32.9 G/DL — SIGNIFICANT CHANGE UP (ref 32–37)
MCV RBC AUTO: 88.1 FL — SIGNIFICANT CHANGE UP (ref 80–94)
NRBC # BLD: 0 /100 WBCS — SIGNIFICANT CHANGE UP (ref 0–0)
PHOSPHATE SERPL-MCNC: 4.3 MG/DL — SIGNIFICANT CHANGE UP (ref 2.1–4.9)
PLATELET # BLD AUTO: 347 K/UL — SIGNIFICANT CHANGE UP (ref 130–400)
POTASSIUM SERPL-MCNC: 4.9 MMOL/L — SIGNIFICANT CHANGE UP (ref 3.5–5)
POTASSIUM SERPL-SCNC: 4.9 MMOL/L — SIGNIFICANT CHANGE UP (ref 3.5–5)
RBC # BLD: 4.55 M/UL — LOW (ref 4.7–6.1)
RBC # FLD: 13 % — SIGNIFICANT CHANGE UP (ref 11.5–14.5)
SODIUM SERPL-SCNC: 136 MMOL/L — SIGNIFICANT CHANGE UP (ref 135–146)
WBC # BLD: 22.33 K/UL — HIGH (ref 4.8–10.8)
WBC # FLD AUTO: 22.33 K/UL — HIGH (ref 4.8–10.8)

## 2019-03-11 RX ORDER — PANTOPRAZOLE SODIUM 20 MG/1
8 TABLET, DELAYED RELEASE ORAL
Qty: 80 | Refills: 0 | Status: DISCONTINUED | OUTPATIENT
Start: 2019-03-11 | End: 2019-03-15

## 2019-03-11 RX ADMIN — PIPERACILLIN AND TAZOBACTAM 25 GRAM(S): 4; .5 INJECTION, POWDER, LYOPHILIZED, FOR SOLUTION INTRAVENOUS at 16:42

## 2019-03-11 RX ADMIN — Medication 3 MILLILITER(S): at 07:55

## 2019-03-11 RX ADMIN — PANTOPRAZOLE SODIUM 10 MG/HR: 20 TABLET, DELAYED RELEASE ORAL at 15:20

## 2019-03-11 RX ADMIN — Medication 3 MILLILITER(S): at 13:42

## 2019-03-11 RX ADMIN — PIPERACILLIN AND TAZOBACTAM 25 GRAM(S): 4; .5 INJECTION, POWDER, LYOPHILIZED, FOR SOLUTION INTRAVENOUS at 21:04

## 2019-03-11 RX ADMIN — HEPARIN SODIUM 5000 UNIT(S): 5000 INJECTION INTRAVENOUS; SUBCUTANEOUS at 21:06

## 2019-03-11 RX ADMIN — PIPERACILLIN AND TAZOBACTAM 25 GRAM(S): 4; .5 INJECTION, POWDER, LYOPHILIZED, FOR SOLUTION INTRAVENOUS at 06:05

## 2019-03-11 RX ADMIN — Medication 3 MILLILITER(S): at 19:51

## 2019-03-11 NOTE — PROGRESS NOTE ADULT - ASSESSMENT
78M PMHx COPD, HTN, HLD, first degree AVB here with R basilar pneumonia, suspect aspiration. PAULA, now resolved. Now with coffee ground emesis.    # Upper GI bleed  protonix gtt  appreciate GI input, plan for eventual egd  pulm consult to clear for egd per gi  cbc stable, trend daily  1. Pneumonia, suspect gram neg  zosyn day 2/7  wbc elevation noted, trend  d/c azithro, urine legionella neg  2. PAULA  resolved  suspected prerenal  3. First degree AVB  avoid AV anay blockers  4. COPD  no evidence of exacerbation  duonebs q6  5. HTN  hold bp meds  6. HLD  outpt f/u  7. DVT ppx  subq hep    #Progress Note Handoff:  Pending (specify):  Consults_________, Tests________, Test Results_______, Other_________  Family discussion:  Disposition: Home___/SNF___/Other________/Unknown at this time____x____ 78M PMHx COPD, HTN, HLD, first degree AVB here with R basilar pneumonia, suspect aspiration. PAULA, now resolved. Now with coffee ground emesis.    # Upper GI bleed  protonix gtt  appreciate GI input, plan for eventual egd  pulm consult to clear for egd per gi  cbc stable, trend daily  1. Pneumonia, suspect gram neg  zosyn day 2/7  wbc elevation noted, trend  d/c azithro, urine legionella neg  2. PAULA  suspected prerenal  trend scr  3. First degree AVB  avoid AV anay blockers  4. COPD  no evidence of exacerbation  duonebs q6  5. HTN  hold bp meds  6. HLD  outpt f/u  7. DVT ppx  subq hep    #Progress Note Handoff:  Pending (specify):  Consults_________, Tests________, Test Results_______, Other_________  Family discussion:  Disposition: Home___/SNF___/Other________/Unknown at this time____x____ 78M PMHx COPD, HTN, HLD, first degree AVB here with R basilar pneumonia, UNSPECIFIED CHF, suspect aspiration. PAULA, now resolved. Now with coffee ground emesis.    # Upper GI bleed  protonix gtt  appreciate GI input, plan for eventual egd  pulm consult to clear for egd per gi  cbc stable, trend daily  1. Pneumonia, suspect gram neg  zosyn day 2/7  wbc elevation noted, trend  d/c azithro, urine legionella neg  2. PAULA  suspected prerenal  trend scr  3. First degree AVB  avoid AV anay blockers  4. COPD  no evidence of exacerbation  duonebs q6  5. HTN  hold bp meds  6. HLD  outpt f/u  7. DVT ppx  subq hep  8. CHF, unspecified  pending tte    #Progress Note Handoff:  Pending (specify):  Consults_________, Tests________, Test Results_______, Other_________  Family discussion:  Disposition: Home___/SNF___/Other________/Unknown at this time____x____

## 2019-03-11 NOTE — PROVIDER CONTACT NOTE (OTHER) - SITUATION
pt had assisted fall to floor by family members pt denies pain , no sign of distress
Pt has wet lung sounds, PO 82% on 2 lpm, pt 92 % on 4 lpm

## 2019-03-11 NOTE — CONSULT NOTE ADULT - ASSESSMENT
78yMale pmh HTN, HLD, COPD not on home O2 presents for shortness of breath worsening over the past few weeks and new onset A-Fib, patient has +nausea and +coffee ground emesis-multiple episodes all weekend as per patient and nursing team. Patient denies abdominal pain, diarrhea, constipation, blood in stool.    Problem 1-Cofee Ground Emesis   Rec  -Maintain hemodynamic stability  -PPI drip  -Maintain Hemodynamic stability  -Active type and screen  -Will discuss with attending today  -CBC monitor H and H  -Patient will likely need EGD    Problem 2-Bilateral pleural effusions with right lower lobe reticulonodular densities, may be secondary to an infectious etiology.  Rec  -Care as per primary team patient needs lungs to be optimized prior to EGD

## 2019-03-11 NOTE — CONSULT NOTE ADULT - SUBJECTIVE AND OBJECTIVE BOX
Chief complaint/Reason for consult: Coffee Ground Emesis     HPI: HPI Objective Statement: 77 yo male hx of HTN/HLD/COPD present c/o coughing and SOB worsening over the past few weeks. reported he was seen by PMD and given prednisone and inhaler. Patient reported worsening SOB over the past few days so he came to ED for evaluation. + productive yellow sputum. SOB worsen with exertion. Denies fever/chill/HA/dizziness/chest pain/palpitation/abd pain/n/v/d/ black stool/bloody stool/urinary sxs (08 Mar 2019 06:08)    GI Updates- 78yMale pmh HTN, HLD, COPD not on home O2 presents for shortness of breath worsening over the past few weeks and new onset A-Fib, patient has +nausea and +coffee ground emesis-multiple episodes all weekend as per patient and nursing team. Patient denies abdominal pain, diarrhea, constipation, black stools, or blood in stool. Patient had a colonoscopy "a long time ago" reportedly normal.    PMHX/PSHX:  PAST MEDICAL & SURGICAL HISTORY:  High cholesterol  COPD (chronic obstructive pulmonary disease)  HTN (hypertension)  H/O tonsillitis      Family history:  FAMILY HISTORY:  No pertinent family history in first degree relatives    No GI cancers in first or second degree relatives    Social History: No smoking. No alcohol. No illegal drug use.    Allergies:  No Known Allergies      MEDICATIONS:MEDICATIONS  (STANDING):  ALBUTerol/ipratropium for Nebulization 3 milliLiter(s) Nebulizer every 6 hours  azithromycin  IVPB 500 milliGRAM(s) IV Intermittent every 24 hours  heparin  Injectable 5000 Unit(s) SubCutaneous three times a day  pantoprazole    Tablet 40 milliGRAM(s) Oral before breakfast  piperacillin/tazobactam IVPB. 3.375 Gram(s) IV Intermittent every 8 hours    MEDICATIONS  (PRN):  benzocaine 15 mG/menthol 3.6 mG (Sugar-Free) Lozenge 1 Lozenge Oral every 2 hours PRN Sore Throat  ondansetron Injectable 8 milliGRAM(s) IV Push every 8 hours PRN Nausea and/or Vomiting        REVIEW OF SYSTEMS  General:  No weight loss, fevers, or chills.  Eyes:  No reported pain or visual changes  ENT:  No sore throat or runny nose.  NECK: No stiffness or lymphadenopathy  CV:  No chest pain or palpitations.  Resp:  No shortness of breath, cough, wheezing or hemoptysis  GI:  No abdominal pain, +nausea, +vomiting, No dysphagia, diarrhea or constipation. No rectal bleeding, melena, or hematemesis.  Neuro:  No tingling, numbness       VITALS:   T(F): 96.6 (03-11-19 @ 05:46), Max: 97.5 (03-10-19 @ 14:30)  HR: 94 (03-11-19 @ 05:46) (91 - 95)  BP: 111/59 (03-11-19 @ 05:46) (111/59 - 115/56)  RR: 16 (03-11-19 @ 05:46) (16 - 16)  SpO2: 90% (03-10-19 @ 21:01) (90% - 92%)    PHYSICAL EXAM:  GENERAL: AAOx3, no acute distress.  HEAD:  Atraumatic, Normocephalic  EYES: conjunctiva and sclera clear  NECK: Supple, No thyromegaly   CHEST/LUNG: +Rhonchi b/l  HEART: Regular rate and rhythm; normal S1, S2, No murmurs.  ABDOMEN: Soft, nontender, nondistended; Bowel sounds present, no abdominal bruit, masses, or hepatosplenomegaly +umbilical hernia  NEUROLOGY: No asterixis or tremor  SKIN: Intact, no jaundice  Rectal Exam- Patient Refused        LABS:  03-10    139  |  99  |  58<H>  ----------------------------<  135<H>  5.1<H>   |  28  |  1.4    Ca    9.0      10 Mar 2019 06:29  Phos  3.2     03-10  Mg     1.9     03-10    TPro  5.7<L>  /  Alb  3.4<L>  /  TBili  0.4  /  DBili  x   /  AST  14  /  ALT  12  /  AlkPhos  38  03-10                          11.8   18.34 )-----------( 295      ( 10 Mar 2019 06:29 )             34.6     LIVER FUNCTIONS - ( 10 Mar 2019 06:29 )  Alb: 3.4 g/dL / Pro: 5.7 g/dL / ALK PHOS: 38 U/L / ALT: 12 U/L / AST: 14 U/L / GGT: x             IMAGING  < from: CT Abdomen and Pelvis w/ Oral Cont (03.09.19 @ 20:29) >    EXAM:  CT ABDOMEN AND PELVIS OC            PROCEDURE DATE:  03/09/2019            INTERPRETATION:  CLINICAL HISTORY/REASON FOR EXAM: Abdominal pain.    TECHNIQUE: Contiguous axial CT images were obtained from the lower chest   to the pubic symphysis without intravenous contrast. Oral contrast was   administered. Reformatted images in the coronal and sagittal planes were   acquired.    COMPARISON: None.      FINDINGS:    LOWER CHEST: Bilateral pleural effusion with overlying compressive   atelectasis. Right lower lobe reticular nodular densities (series 4,   image 15).    HEPATOBILIARY: Unremarkable.    SPLEEN: Unremarkable.    PANCREAS: Unremarkable.    ADRENAL GLANDS: Unremarkable.    KIDNEYS: No hydronephrosis or nephrolithiasis.    ABDOMINOPELVIC NODES: No lymphadenopathy.    PELVIC ORGANS: Unremarkable.    PERITONEUM/MESENTERY/BOWEL: No bowel obstruction. No ascites or   pneumoperitoneum. Normal appendix.    BONES/SOFT TISSUES: Degenerative changes of the spine. Fat-containing   umbilical hernia. Nonobstructive bowel within a left inguinal hernia.      IMPRESSION:  1.  Bilateral pleural effusions with right lower lobe reticulonodular   densities, may be secondary to an infectious etiology.  2.  No evidence of acute abdominopelvic pathology.              PRASAD FERNÁNDEZ M.D., RESIDENT RADIOLOGIST  This document has been electronically signed.  ROSETTE BAH M.D., ATTENDING RADIOLOGIST  This document has been electronically signed. Mar  9 2019  9:26PM    < end of copied text >

## 2019-03-11 NOTE — PROGRESS NOTE ADULT - SUBJECTIVE AND OBJECTIVE BOX
Patient is a 78y old  Male who presents with a chief complaint of hypotension, new onset afib (08 Mar 2019 00:54)      SUBJECTIVE / OVERNIGHT EVENTS:  no cp, sob, fever  nausea, vomiting, coffee ground, no abd pain    MEDICATIONS  (STANDING):  ALBUTerol/ipratropium for Nebulization 3 milliLiter(s) Nebulizer every 6 hours  heparin  Injectable 5000 Unit(s) SubCutaneous three times a day  pantoprazole Infusion 8 mG/Hr (10 mL/Hr) IV Continuous <Continuous>  piperacillin/tazobactam IVPB. 3.375 Gram(s) IV Intermittent every 8 hours    MEDICATIONS  (PRN):  benzocaine 15 mG/menthol 3.6 mG (Sugar-Free) Lozenge 1 Lozenge Oral every 2 hours PRN Sore Throat  ondansetron Injectable 8 milliGRAM(s) IV Push every 8 hours PRN Nausea and/or Vomiting        CAPILLARY BLOOD GLUCOSE        I&O's Summary    10 Mar 2019 07:01  -  11 Mar 2019 07:00  --------------------------------------------------------  IN: 0 mL / OUT: 460 mL / NET: -460 mL        PHYSICAL EXAM:  GENERAL: nad, a+o x3  EYES:  NECK:   CHEST/LUNG: ctab no w/r/r  HEART: rrr no m/g/r  ABDOMEN: soft nt nd  EXTREMITIES:    PSYCH:   NEUROLOGY:   SKIN:    LABS:                        13.2   22.33 )-----------( 347      ( 11 Mar 2019 12:46 )             40.1     03-11    136  |  95<L>  |  43<H>  ----------------------------<  178<H>  4.9   |  31  |  1.7<H>    Ca    8.5      11 Mar 2019 12:46  Phos  4.3     03-11  Mg     1.8     03-11    TPro  5.7<L>  /  Alb  3.4<L>  /  TBili  0.4  /  DBili  x   /  AST  14  /  ALT  12  /  AlkPhos  38  03-10              RADIOLOGY & ADDITIONAL TESTS:    Imaging Personally Reviewed:  Yes    Consultant(s) Notes Reviewed:      Care Discussed with Consultants/Other Providers:    Assessment and Plan   PAST MEDICAL & SURGICAL HISTORY:  High cholesterol  COPD (chronic obstructive pulmonary disease)  HTN (hypertension)  H/O tonsillitis

## 2019-03-12 LAB
ALLERGY+IMMUNOLOGY DIAG STUDY NOTE: SIGNIFICANT CHANGE UP
ANION GAP SERPL CALC-SCNC: 14 MMOL/L — SIGNIFICANT CHANGE UP (ref 7–14)
ANION GAP SERPL CALC-SCNC: 16 MMOL/L — HIGH (ref 7–14)
BUN SERPL-MCNC: 70 MG/DL — CRITICAL HIGH (ref 10–20)
BUN SERPL-MCNC: 76 MG/DL — CRITICAL HIGH (ref 10–20)
CALCIUM SERPL-MCNC: 8 MG/DL — LOW (ref 8.5–10.1)
CALCIUM SERPL-MCNC: 8.2 MG/DL — LOW (ref 8.5–10.1)
CHLORIDE SERPL-SCNC: 94 MMOL/L — LOW (ref 98–110)
CHLORIDE SERPL-SCNC: 98 MMOL/L — SIGNIFICANT CHANGE UP (ref 98–110)
CO2 SERPL-SCNC: 20 MMOL/L — SIGNIFICANT CHANGE UP (ref 17–32)
CO2 SERPL-SCNC: 24 MMOL/L — SIGNIFICANT CHANGE UP (ref 17–32)
CREAT SERPL-MCNC: 2.4 MG/DL — HIGH (ref 0.7–1.5)
CREAT SERPL-MCNC: 2.8 MG/DL — HIGH (ref 0.7–1.5)
GLUCOSE SERPL-MCNC: 178 MG/DL — HIGH (ref 70–99)
GLUCOSE SERPL-MCNC: 196 MG/DL — HIGH (ref 70–99)
HCT VFR BLD CALC: 38.2 % — LOW (ref 42–52)
HGB BLD-MCNC: 12.5 G/DL — LOW (ref 14–18)
MCHC RBC-ENTMCNC: 28.9 PG — SIGNIFICANT CHANGE UP (ref 27–31)
MCHC RBC-ENTMCNC: 32.7 G/DL — SIGNIFICANT CHANGE UP (ref 32–37)
MCV RBC AUTO: 88.2 FL — SIGNIFICANT CHANGE UP (ref 80–94)
NRBC # BLD: 0 /100 WBCS — SIGNIFICANT CHANGE UP (ref 0–0)
PLATELET # BLD AUTO: 301 K/UL — SIGNIFICANT CHANGE UP (ref 130–400)
POTASSIUM SERPL-MCNC: 4.2 MMOL/L — SIGNIFICANT CHANGE UP (ref 3.5–5)
POTASSIUM SERPL-MCNC: 4.2 MMOL/L — SIGNIFICANT CHANGE UP (ref 3.5–5)
POTASSIUM SERPL-SCNC: 4.2 MMOL/L — SIGNIFICANT CHANGE UP (ref 3.5–5)
POTASSIUM SERPL-SCNC: 4.2 MMOL/L — SIGNIFICANT CHANGE UP (ref 3.5–5)
RBC # BLD: 4.33 M/UL — LOW (ref 4.7–6.1)
RBC # FLD: 13 % — SIGNIFICANT CHANGE UP (ref 11.5–14.5)
SODIUM SERPL-SCNC: 130 MMOL/L — LOW (ref 135–146)
SODIUM SERPL-SCNC: 136 MMOL/L — SIGNIFICANT CHANGE UP (ref 135–146)
TYPE + AB SCN PNL BLD: SIGNIFICANT CHANGE UP
WBC # BLD: 18.71 K/UL — HIGH (ref 4.8–10.8)
WBC # FLD AUTO: 18.71 K/UL — HIGH (ref 4.8–10.8)

## 2019-03-12 RX ORDER — FUROSEMIDE 40 MG
40 TABLET ORAL DAILY
Qty: 0 | Refills: 0 | Status: DISCONTINUED | OUTPATIENT
Start: 2019-03-12 | End: 2019-03-12

## 2019-03-12 RX ORDER — FUROSEMIDE 40 MG
40 TABLET ORAL
Qty: 0 | Refills: 0 | Status: DISCONTINUED | OUTPATIENT
Start: 2019-03-12 | End: 2019-03-16

## 2019-03-12 RX ORDER — SODIUM CHLORIDE 9 MG/ML
1000 INJECTION INTRAMUSCULAR; INTRAVENOUS; SUBCUTANEOUS
Qty: 0 | Refills: 0 | Status: DISCONTINUED | OUTPATIENT
Start: 2019-03-12 | End: 2019-03-12

## 2019-03-12 RX ORDER — PIPERACILLIN AND TAZOBACTAM 4; .5 G/20ML; G/20ML
2.25 INJECTION, POWDER, LYOPHILIZED, FOR SOLUTION INTRAVENOUS EVERY 8 HOURS
Qty: 0 | Refills: 0 | Status: DISCONTINUED | OUTPATIENT
Start: 2019-03-12 | End: 2019-03-15

## 2019-03-12 RX ORDER — IPRATROPIUM/ALBUTEROL SULFATE 18-103MCG
3 AEROSOL WITH ADAPTER (GRAM) INHALATION EVERY 4 HOURS
Qty: 0 | Refills: 0 | Status: DISCONTINUED | OUTPATIENT
Start: 2019-03-12 | End: 2019-03-19

## 2019-03-12 RX ADMIN — HEPARIN SODIUM 5000 UNIT(S): 5000 INJECTION INTRAVENOUS; SUBCUTANEOUS at 15:48

## 2019-03-12 RX ADMIN — PANTOPRAZOLE SODIUM 10 MG/HR: 20 TABLET, DELAYED RELEASE ORAL at 00:20

## 2019-03-12 RX ADMIN — Medication 3 MILLILITER(S): at 15:53

## 2019-03-12 RX ADMIN — PIPERACILLIN AND TAZOBACTAM 25 GRAM(S): 4; .5 INJECTION, POWDER, LYOPHILIZED, FOR SOLUTION INTRAVENOUS at 21:49

## 2019-03-12 RX ADMIN — Medication 3 MILLILITER(S): at 19:56

## 2019-03-12 RX ADMIN — ONDANSETRON 8 MILLIGRAM(S): 8 TABLET, FILM COATED ORAL at 03:08

## 2019-03-12 RX ADMIN — PANTOPRAZOLE SODIUM 10 MG/HR: 20 TABLET, DELAYED RELEASE ORAL at 10:23

## 2019-03-12 RX ADMIN — PANTOPRAZOLE SODIUM 10 MG/HR: 20 TABLET, DELAYED RELEASE ORAL at 21:49

## 2019-03-12 RX ADMIN — Medication 3 MILLILITER(S): at 11:57

## 2019-03-12 RX ADMIN — HEPARIN SODIUM 5000 UNIT(S): 5000 INJECTION INTRAVENOUS; SUBCUTANEOUS at 21:53

## 2019-03-12 RX ADMIN — Medication 3 MILLILITER(S): at 08:05

## 2019-03-12 RX ADMIN — PIPERACILLIN AND TAZOBACTAM 25 GRAM(S): 4; .5 INJECTION, POWDER, LYOPHILIZED, FOR SOLUTION INTRAVENOUS at 06:07

## 2019-03-12 RX ADMIN — Medication 40 MILLIGRAM(S): at 03:20

## 2019-03-12 NOTE — PROGRESS NOTE ADULT - SUBJECTIVE AND OBJECTIVE BOX
78yMale  Being followed for Coffee Ground Emesis   Interval history: Patient notes improvement in emesis. 3 episodes of emesis yesterday and a small episode today. Patient denies abdominal pain, diarrhea, constipation, blood in stool.       PMHX/PSHX:  PAST MEDICAL & SURGICAL HISTORY:  High cholesterol  COPD (chronic obstructive pulmonary disease)  HTN (hypertension)  H/O tonsillitis          Social History: No smoking. No alcohol. No illegal drug use.          MEDICATIONS:  MEDICATIONS  (STANDING):  ALBUTerol/ipratropium for Nebulization 3 milliLiter(s) Nebulizer every 4 hours  furosemide   Injectable 40 milliGRAM(s) IV Push two times a day  heparin  Injectable 5000 Unit(s) SubCutaneous three times a day  pantoprazole Infusion 8 mG/Hr (10 mL/Hr) IV Continuous <Continuous>  piperacillin/tazobactam IVPB. 3.375 Gram(s) IV Intermittent every 8 hours    MEDICATIONS  (PRN):  benzocaine 15 mG/menthol 3.6 mG (Sugar-Free) Lozenge 1 Lozenge Oral every 2 hours PRN Sore Throat  ondansetron Injectable 8 milliGRAM(s) IV Push every 8 hours PRN Nausea and/or Vomiting      Allergies:    No Known Allergies    Intolerances          REVIEW OF SYSTEMS:  General:  No weight loss, fevers, or chills.  Eyes:  No reported pain or visual changes  ENT:  No sore throat or runny nose.  NECK: No stiffness   CV:  No chest pain or palpitations.  Resp:  + shortness of breath, +cough  GI:  No abdominal pain, +nausea, +vomiting, No dysphagia, diarrhea or constipation. No rectal bleeding, melena, +hematemesis.  Neuro:  No tingling, numbness         VITAL SIGNS:   T(F): 98.2 (03-12-19 @ 05:00), Max: 99.9 (03-11-19 @ 22:20)  HR: 105 (03-12-19 @ 10:21) (98 - 113)  BP: 115/65 (03-12-19 @ 05:00) (94/55 - 121/66)  RR: 18 (03-12-19 @ 05:00) (16 - 20)  SpO2: 93% (03-12-19 @ 10:21) (82% - 93%)    PHYSICAL EXAM:  GENERAL: AAOx3, no acute distress.  HEAD:  Atraumatic, Normocephalic  EYES: conjunctiva and sclera clear  NECK: Supple, no JVD or thyromegaly  CHEST/LUNG: +Rhonchi B/L  HEART: Regular rate and rhythm; normal S1, S2, No murmurs.  ABDOMEN: Soft, nontender, nondistended; Bowel sounds present, no abdominal bruit, masses, or hepatosplenomegaly  NEUROLOGY: No asterixis or tremor.   SKIN: Intact, no jaundice            LABS:                        12.5   18.71 )-----------( 301      ( 12 Mar 2019 06:54 )             38.2     03-12    136  |  98  |  70<HH>  ----------------------------<  178<H>  4.2   |  24  |  2.4<H>    Ca    8.2<L>      12 Mar 2019 06:54  Phos  4.3     03-11  Mg     1.8     03-11            IMAGING:  < from: CT Abdomen and Pelvis w/ Oral Cont (03.09.19 @ 20:29) >    EXAM:  CT ABDOMEN AND PELVIS OC            PROCEDURE DATE:  03/09/2019            INTERPRETATION:  CLINICAL HISTORY/REASON FOR EXAM: Abdominal pain.    TECHNIQUE: Contiguous axial CT images were obtained from the lower chest   to the pubic symphysis without intravenous contrast. Oral contrast was   administered. Reformatted images in the coronal and sagittal planes were   acquired.    COMPARISON: None.      FINDINGS:    LOWER CHEST: Bilateral pleural effusion with overlying compressive   atelectasis. Right lower lobe reticular nodular densities (series 4,   image 15).    HEPATOBILIARY: Unremarkable.    SPLEEN: Unremarkable.    PANCREAS: Unremarkable.    ADRENAL GLANDS: Unremarkable.    KIDNEYS: No hydronephrosis or nephrolithiasis.    ABDOMINOPELVIC NODES: No lymphadenopathy.    PELVIC ORGANS: Unremarkable.    PERITONEUM/MESENTERY/BOWEL: No bowel obstruction. No ascites or   pneumoperitoneum. Normal appendix.    BONES/SOFT TISSUES: Degenerative changes of the spine. Fat-containing   umbilical hernia. Nonobstructive bowel within a left inguinal hernia.      IMPRESSION:  1.  Bilateral pleural effusions with right lower lobe reticulonodular   densities, may be secondary to an infectious etiology.  2.  No evidence of acute abdominopelvic pathology.              PRASAD FERNÁNDEZ M.D., RESIDENT RADIOLOGIST  This document has been electronically signed.  ROSETTE BAH M.D., ATTENDING RADIOLOGIST  This document has been electronically signed. Mar  9 2019  9:26PM    < end of copied text >

## 2019-03-12 NOTE — PROGRESS NOTE ADULT - SUBJECTIVE AND OBJECTIVE BOX
Patient is a 78y old  Male who presents with a chief complaint of hypotension, new onset afib (08 Mar 2019 00:54)      SUBJECTIVE / OVERNIGHT EVENTS:  no cp, sob, abd pain, fever  nausea/ vomiting improving, coffee ground, no abd pain, distention    MEDICATIONS  (STANDING):  ALBUTerol/ipratropium for Nebulization 3 milliLiter(s) Nebulizer every 4 hours  furosemide   Injectable 40 milliGRAM(s) IV Push two times a day  heparin  Injectable 5000 Unit(s) SubCutaneous three times a day  pantoprazole Infusion 8 mG/Hr (10 mL/Hr) IV Continuous <Continuous>  piperacillin/tazobactam IVPB. 3.375 Gram(s) IV Intermittent every 8 hours    MEDICATIONS  (PRN):  benzocaine 15 mG/menthol 3.6 mG (Sugar-Free) Lozenge 1 Lozenge Oral every 2 hours PRN Sore Throat  ondansetron Injectable 8 milliGRAM(s) IV Push every 8 hours PRN Nausea and/or Vomiting        CAPILLARY BLOOD GLUCOSE        I&O's Summary      PHYSICAL EXAM:  GENERAL: nad, a+o x3  EYES:  NECK:   CHEST/LUNG: base exp wheeze/ rhonchi  HEART: rrr no m/g/r  ABDOMEN: soft nt nd  EXTREMITIES:    PSYCH:   NEUROLOGY:   SKIN:    LABS:                        12.5   18.71 )-----------( 301      ( 12 Mar 2019 06:54 )             38.2     03-12    136  |  98  |  70<HH>  ----------------------------<  178<H>  4.2   |  24  |  2.4<H>    Ca    8.2<L>      12 Mar 2019 06:54  Phos  4.3     03-11  Mg     1.8     03-11                RADIOLOGY & ADDITIONAL TESTS:    Imaging Personally Reviewed:  Yes    Consultant(s) Notes Reviewed:      Care Discussed with Consultants/Other Providers:    Assessment and Plan   PAST MEDICAL & SURGICAL HISTORY:  High cholesterol  COPD (chronic obstructive pulmonary disease)  HTN (hypertension)  H/O tonsillitis

## 2019-03-12 NOTE — PHYSICAL THERAPY INITIAL EVALUATION ADULT - GENERAL OBSERVATIONS, REHAB EVAL
09:30-09:54 Chart reviewed. Pt encountered semireclined in bed, may be seen by Physical Therapist as confirmed with Nurse. Patient denied unusual pain at rest and ready to get up now but "feels weak"; +O2 via NC; +IV

## 2019-03-12 NOTE — PHYSICAL THERAPY INITIAL EVALUATION ADULT - IMPAIRED TRANSFERS: SIT/STAND, REHAB EVAL
impaired postural control/decreased endurance/narrow base of support/impaired balance/decreased strength

## 2019-03-12 NOTE — PROGRESS NOTE ADULT - ASSESSMENT
78M PMHx COPD, HTN, HLD, first degree AVB here with R basilar pneumonia, HFpEF, suspect aspiration. PAULA, now resolved. Now with coffee ground emesis.    1. Upper GI bleed  protonix gtt  coffee ground emesis improving  plan for egd after pulm consult  2. Pneumonia, suspect aspiration  zosyn day 3/7  PAULA noted, feurea with pre-renal, no evidence of drug induced nephropathy at this time  # HFpEF, acute on chronic  bl pleural effusions concern for volume overload  tte with diastolic dysfunction  lasix 40 iv bid  strict i/o  3. PAULA  worsening now  feurea with prerenal  suspect cardiorenal at this point, lasix as above  4. First degree AVB  avoid AV anay blockers  5. COPD  wheezing, suspect cardiac etiology will monitor closely  duonebs q4  6. HTN  hold bp meds  7. HLD  outpt f/u  8. DVT ppx  subq hep      #Progress Note Handoff:  Pending (specify):  Consults_________, Tests________, Test Results_______, Other_________  Family discussion:  Disposition: Home___/SNF___/Other________/Unknown at this time____x____ 78M PMHx COPD, HTN, HLD, first degree AVB here with SEPSIS ON ADMISSION (tachycardic 96 and leukocytosis 13.04), now resolved, due to R basilar pneumonia, HFpEF, suspect aspiration. PAULA, now resolved. Now with coffee ground emesis.    1. Upper GI bleed  protonix gtt  coffee ground emesis improving  plan for egd after pulm consult  2. Pneumonia, suspect aspiration  zosyn day 3/7  PAULA noted, feurea with pre-renal, no evidence of drug induced nephropathy at this time  # HFpEF, acute on chronic  bl pleural effusions concern for volume overload  tte with diastolic dysfunction  lasix 40 iv bid  strict i/o  3. PAULA  worsening now  feurea with prerenal  suspect cardiorenal at this point, lasix as above  4. First degree AVB  avoid AV anay blockers  5. COPD  wheezing, suspect cardiac etiology will monitor closely  duonebs q4  6. HTN  hold bp meds  7. HLD  outpt f/u  8. DVT ppx  subq hep      #Progress Note Handoff:  Pending (specify):  Consults_________, Tests________, Test Results_______, Other_________  Family discussion:  Disposition: Home___/SNF___/Other________/Unknown at this time____x____

## 2019-03-12 NOTE — PHYSICAL THERAPY INITIAL EVALUATION ADULT - GAIT DEVIATIONS NOTED, PT EVAL
stooped posture, dec heel strike/pushoff/decreased boris/decreased step length/decreased weight-shifting ability

## 2019-03-13 LAB
ANION GAP SERPL CALC-SCNC: 14 MMOL/L — SIGNIFICANT CHANGE UP (ref 7–14)
BUN SERPL-MCNC: 76 MG/DL — CRITICAL HIGH (ref 10–20)
CALCIUM SERPL-MCNC: 7.9 MG/DL — LOW (ref 8.5–10.1)
CHLORIDE SERPL-SCNC: 93 MMOL/L — LOW (ref 98–110)
CO2 SERPL-SCNC: 24 MMOL/L — SIGNIFICANT CHANGE UP (ref 17–32)
CREAT SERPL-MCNC: 2.4 MG/DL — HIGH (ref 0.7–1.5)
CULTURE RESULTS: SIGNIFICANT CHANGE UP
CULTURE RESULTS: SIGNIFICANT CHANGE UP
GLUCOSE SERPL-MCNC: 126 MG/DL — HIGH (ref 70–99)
HCT VFR BLD CALC: 36 % — LOW (ref 42–52)
HGB BLD-MCNC: 12.4 G/DL — LOW (ref 14–18)
MAGNESIUM SERPL-MCNC: 1.9 MG/DL — SIGNIFICANT CHANGE UP (ref 1.8–2.4)
MCHC RBC-ENTMCNC: 29.6 PG — SIGNIFICANT CHANGE UP (ref 27–31)
MCHC RBC-ENTMCNC: 34.4 G/DL — SIGNIFICANT CHANGE UP (ref 32–37)
MCV RBC AUTO: 85.9 FL — SIGNIFICANT CHANGE UP (ref 80–94)
NRBC # BLD: 0 /100 WBCS — SIGNIFICANT CHANGE UP (ref 0–0)
PHOSPHATE SERPL-MCNC: 5.3 MG/DL — HIGH (ref 2.1–4.9)
PLATELET # BLD AUTO: 293 K/UL — SIGNIFICANT CHANGE UP (ref 130–400)
POTASSIUM SERPL-MCNC: 4.1 MMOL/L — SIGNIFICANT CHANGE UP (ref 3.5–5)
POTASSIUM SERPL-SCNC: 4.1 MMOL/L — SIGNIFICANT CHANGE UP (ref 3.5–5)
RBC # BLD: 4.19 M/UL — LOW (ref 4.7–6.1)
RBC # FLD: 12.8 % — SIGNIFICANT CHANGE UP (ref 11.5–14.5)
SODIUM SERPL-SCNC: 131 MMOL/L — LOW (ref 135–146)
SPECIMEN SOURCE: SIGNIFICANT CHANGE UP
SPECIMEN SOURCE: SIGNIFICANT CHANGE UP
WBC # BLD: 14.58 K/UL — HIGH (ref 4.8–10.8)
WBC # FLD AUTO: 14.58 K/UL — HIGH (ref 4.8–10.8)

## 2019-03-13 RX ADMIN — HEPARIN SODIUM 5000 UNIT(S): 5000 INJECTION INTRAVENOUS; SUBCUTANEOUS at 14:05

## 2019-03-13 RX ADMIN — ONDANSETRON 8 MILLIGRAM(S): 8 TABLET, FILM COATED ORAL at 08:46

## 2019-03-13 RX ADMIN — Medication 3 MILLILITER(S): at 19:38

## 2019-03-13 RX ADMIN — HEPARIN SODIUM 5000 UNIT(S): 5000 INJECTION INTRAVENOUS; SUBCUTANEOUS at 06:25

## 2019-03-13 RX ADMIN — PANTOPRAZOLE SODIUM 10 MG/HR: 20 TABLET, DELAYED RELEASE ORAL at 22:12

## 2019-03-13 RX ADMIN — PIPERACILLIN AND TAZOBACTAM 25 GRAM(S): 4; .5 INJECTION, POWDER, LYOPHILIZED, FOR SOLUTION INTRAVENOUS at 14:04

## 2019-03-13 RX ADMIN — PIPERACILLIN AND TAZOBACTAM 25 GRAM(S): 4; .5 INJECTION, POWDER, LYOPHILIZED, FOR SOLUTION INTRAVENOUS at 22:10

## 2019-03-13 RX ADMIN — Medication 60 MILLIGRAM(S): at 17:55

## 2019-03-13 RX ADMIN — PANTOPRAZOLE SODIUM 10 MG/HR: 20 TABLET, DELAYED RELEASE ORAL at 08:40

## 2019-03-13 RX ADMIN — Medication 3 MILLILITER(S): at 16:00

## 2019-03-13 RX ADMIN — Medication 40 MILLIGRAM(S): at 17:54

## 2019-03-13 RX ADMIN — Medication 3 MILLILITER(S): at 08:26

## 2019-03-13 RX ADMIN — PIPERACILLIN AND TAZOBACTAM 25 GRAM(S): 4; .5 INJECTION, POWDER, LYOPHILIZED, FOR SOLUTION INTRAVENOUS at 06:27

## 2019-03-13 RX ADMIN — HEPARIN SODIUM 5000 UNIT(S): 5000 INJECTION INTRAVENOUS; SUBCUTANEOUS at 22:11

## 2019-03-13 RX ADMIN — Medication 40 MILLIGRAM(S): at 06:25

## 2019-03-13 RX ADMIN — Medication 60 MILLIGRAM(S): at 11:18

## 2019-03-13 NOTE — PROGRESS NOTE ADULT - ASSESSMENT
78yMale pmh HTN, HLD, COPD not on home O2 presents for shortness of breath worsening over the past few weeks and new onset A-Fib, patient has +nausea and +coffee ground emesis-multiple episodes all weekend as per patient and nursing team. Patient denies abdominal pain, diarrhea, constipation, blood in stool.    Problem 1-Cofee Ground Emesis resolved for now  Rec  -Maintain hemodynamic stability  -PPI drip  -Maintain Hemodynamic stability  -Active type and screen  -Will discuss with attending today  -CBC monitor H and H    Problem 2-Bilateral pleural effusions with right lower lobe reticulonodular densities, may be secondary to an infectious etiology.  Rec  -Care as per primary team patient needs lungs to be optimized prior to EGD

## 2019-03-13 NOTE — CONSULT NOTE ADULT - ASSESSMENT
· Assessment		  78M PMHx COPD, HTN, HLD, first degree AVB presents for evaluation of SOB, cough. Found to be septic on admisison (tachycardic 96 and leukocytosis 13.04), since resolved, due to R basilar pneumonia,  Course also complicated by PAULA and coffee ground emesis, which has resolved with PPI drip. Pt's respiratory status impeding him from getting EGD right now.  Pt also with gaseous distention with recent BM/flatus yesterday    Case D/W Dr. Verde: · Assessment		  78M PMHx COPD, HTN, HLD, first degree AVB presents for evaluation of SOB, cough. Found to be septic on admisison (tachycardic 96 and leukocytosis 13.04), since resolved, due to R basilar pneumonia,  Course also complicated by PAULA and coffee ground emesis, which has resolved with PPI drip. Pt's respiratory status impeding him from getting EGD right now.  Pt also with gaseous distention with recent BM/flatus yesterday    Case D/W Dr. Verde:  recommend NGt placememt for decompression; repeat KUB in AM; will follow

## 2019-03-13 NOTE — PROGRESS NOTE ADULT - SUBJECTIVE AND OBJECTIVE BOX
78yMale  Being followed for Coffee Ground Emesis   Interval history: Patient feels short of breath, more than when he came in. Patient states emesis has resolved for now. Patient currently denies nausea, vomiting, hematemesis, melena, blood in stool, diarrhea, constipation.      PMHX/PSHX: PAST MEDICAL & SURGICAL HISTORY:  High cholesterol  COPD (chronic obstructive pulmonary disease)  HTN (hypertension)  H/O tonsillitis        Social History: No smoking. No alcohol. No illegal drug use.          MEDICATIONS:  MEDICATIONS  (STANDING):  ALBUTerol/ipratropium for Nebulization 3 milliLiter(s) Nebulizer every 4 hours  furosemide   Injectable 40 milliGRAM(s) IV Push two times a day  heparin  Injectable 5000 Unit(s) SubCutaneous three times a day  methylPREDNISolone sodium succinate Injectable 60 milliGRAM(s) IV Push two times a day  pantoprazole Infusion 8 mG/Hr (10 mL/Hr) IV Continuous <Continuous>  piperacillin/tazobactam IVPB. 2.25 Gram(s) IV Intermittent every 8 hours    MEDICATIONS  (PRN):  benzocaine 15 mG/menthol 3.6 mG (Sugar-Free) Lozenge 1 Lozenge Oral every 2 hours PRN Sore Throat  ondansetron Injectable 8 milliGRAM(s) IV Push every 8 hours PRN Nausea and/or Vomiting        Allergies:    No Known Allergies    Intolerances          REVIEW OF SYSTEMS:  General:  No fevers, or chills.  Eyes:  No reported pain or visual changes  ENT:  No sore throat or runny nose.  NECK: No stiffness   CV:  No chest pain or palpitations.  Resp:  + shortness of breath, + cough  GI:  No abdominal pain, nausea, vomiting, dysphagia, diarrhea or constipation. No rectal bleeding, melena, or hematemesis.  Neuro:  No tingling, numbness       VITAL SIGNS:   T(F): 97.7 (03-13-19 @ 05:00), Max: 97.7 (03-13-19 @ 05:00)  HR: 110 (03-13-19 @ 08:28) (53 - 560)  BP: 102/59 (03-13-19 @ 05:00) (96/57 - 131/66)  RR: 24 (03-13-19 @ 08:28) (18 - 24)  SpO2: 93% (03-13-19 @ 10:22) (92% - 94%)    PHYSICAL EXAM:  GENERAL: AAOx3, no acute distress.  HEAD:  Atraumatic, Normocephalic  EYES: conjunctiva and sclera clear  NECK: Supple, no JVD or thyromegaly  CHEST/LUNG: +Rales B/L  HEART: Regular rate and rhythm; normal S1, S2, No murmurs.  ABDOMEN: Soft, nontender, nondistended; Bowel sounds present, no abdominal bruit, masses, or hepatosplenomegaly  NEUROLOGY: No asterixis or tremor.   SKIN: Intact, no jaundice            LABS:                        12.4   14.58 )-----------( 293      ( 13 Mar 2019 06:42 )             36.0     03-13    131<L>  |  93<L>  |  76<HH>  ----------------------------<  126<H>  4.1   |  24  |  2.4<H>    Ca    7.9<L>      13 Mar 2019 06:42  Phos  5.3     03-13  Mg     1.9     03-13            IMAGING:  < from: CT Abdomen and Pelvis w/ Oral Cont (03.09.19 @ 20:29) >    EXAM:  CT ABDOMEN AND PELVIS OC            PROCEDURE DATE:  03/09/2019            INTERPRETATION:  CLINICAL HISTORY/REASON FOR EXAM: Abdominal pain.    TECHNIQUE: Contiguous axial CT images were obtained from the lower chest   to the pubic symphysis without intravenous contrast. Oral contrast was   administered. Reformatted images in the coronal and sagittal planes were   acquired.    COMPARISON: None.      FINDINGS:    LOWER CHEST: Bilateral pleural effusion with overlying compressive   atelectasis. Right lower lobe reticular nodular densities (series 4,   image 15).    HEPATOBILIARY: Unremarkable.    SPLEEN: Unremarkable.    PANCREAS: Unremarkable.    ADRENAL GLANDS: Unremarkable.    KIDNEYS: No hydronephrosis or nephrolithiasis.    ABDOMINOPELVIC NODES: No lymphadenopathy.    PELVIC ORGANS: Unremarkable.    PERITONEUM/MESENTERY/BOWEL: No bowel obstruction. No ascites or   pneumoperitoneum. Normal appendix.    BONES/SOFT TISSUES: Degenerative changes of the spine. Fat-containing   umbilical hernia. Nonobstructive bowel within a left inguinal hernia.      IMPRESSION:  1.  Bilateral pleural effusions with right lower lobe reticulonodular   densities, may be secondary to an infectious etiology.  2.  No evidence of acute abdominopelvic pathology.              PRASAD FERNÁNDEZ M.D., RESIDENT RADIOLOGIST  This document has been electronically signed.  ROSETTE BAH M.D., ATTENDING RADIOLOGIST  This document has been electronically signed. Mar  9 2019  9:26PM    < end of copied text >

## 2019-03-13 NOTE — PROGRESS NOTE ADULT - ASSESSMENT
78M PMHx COPD, HTN, HLD, first degree AVB presents for evaluation of SOB, cough. Found to be septic on admisison (tachycardic 96 and leukocytosis 13.04), since resolved, due to R basilar pneumonia, HFpEF. Course complicated by PAULA and coffee ground emesis.    Coffee ground emesis  -likely due to UGIB  -c/w protonix  -likely EGD     Pneumonia  -suspected gram negative pneumonia  -c/w zosyn day 4/7    PAULA  -not improving  -will decrease lasix dose  -since abdomen distended will check bladder sono  -if no improvement will obtain renal sono and nephro consult    HFpEF, acute on chronic  -c/w lasix  bl pleural effusions concern for volume overload  tte with diastolic dysfunction  lasix 40 iv bid  strict i/o  3. PAULA  worsening now  feurea with prerenal  suspect cardiorenal at this point, lasix as above  4. First degree AVB  avoid AV anay blockers  5. COPD  wheezing, suspect cardiac etiology will monitor closely  duonebs q4  6. HTN  hold bp meds  7. HLD  outpt f/u  8. DVT ppx  subq hep      #Progress Note Handoff:  Pending (specify):  Consults_________, Tests________, Test Results_______, Other_________  Family discussion:  Disposition: Home___/SNF___/Other________/Unknown at this time____x____ 78M PMHx COPD, HTN, HLD, first degree AVB presents for evaluation of SOB, cough. Found to be septic on admisison (tachycardic 96 and leukocytosis 13.04), since resolved, due to R basilar pneumonia, HFpEF. Course complicated by PAULA and coffee ground emesis.    Coffee ground emesis  -likely due to UGIB  -c/w protonix  -likely EGD     Pneumonia  -suspected gram negative pneumonia  -c/w zosyn day 4/7    PAULA  -not improving  -may be cardio renal  -will decrease lasix dose in AM (decreased from 80 mg yesterday) if no improvement  -since abdomen distended will check bladder sono  -if no improvement will obtain renal sono and nephro consult    HFpEF, acute on chronic  -c/w lasix  -strict i/o    First degree AVB  -asymptomatic    COPD  -c/w nebs  -pulmonary consult appreciated, solumedrol, symbicort started  -duonebs q4    HTN  -now borderline low  -hold bp meds    DVT ppx  Full Code  From home    Progress Note Handoff:  Pending (specify):  EGD  Family discussion:  Disposition: Home___/SNF___/Other________/Unknown at this time____x____ 78M PMHx COPD, HTN, HLD, first degree AVB presents for evaluation of SOB, cough. Found to be septic on admisison (tachycardic 96 and leukocytosis 13.04), since resolved, due to R basilar pneumonia, HFpEF. Course complicated by PAULA and coffee ground emesis.    Coffee ground emesis  -likely due to UGIB  -c/w protonix  -likely EGD     Pneumonia  -suspected gram negative pneumonia  -c/w zosyn day 4/7    PAULA  -not improving  -may be cardio renal  -will decrease lasix dose in AM (decreased from 80 mg yesterday) if no improvement  -since abdomen distended will check bladder sono  -if no improvement will obtain renal sono and nephro consult    Hyponatremia  -check urine studies, osm    HFpEF, acute on chronic  -c/w lasix  -strict i/o    First degree AVB  -asymptomatic    COPD  -c/w nebs  -pulmonary consult appreciated, solumedrol, symbicort started  -duonebs q4    HTN  -now borderline low  -hold bp meds    DVT ppx  Full Code  From home    Progress Note Handoff:  Pending (specify):  EGD  Family discussion:  Disposition: Home___/SNF___/Other________/Unknown at this time____x____

## 2019-03-13 NOTE — CHART NOTE - NSCHARTNOTEFT_GEN_A_CORE
KUB reviewed-large gastric distenstion-appears to have dilated stomach on CT abdomen. Case discussed with surgery, awaiting consult.

## 2019-03-13 NOTE — CONSULT NOTE ADULT - ATTENDING COMMENTS
Pt seen and examined with JOANA Shaw.  Pt is a 77 yo M being treated for "sepsis" and pneumonia with two antibiotics.  He states that he is mildly SOB and on exam he has abundant coarse rhonchi bilaterally.  The patient has a history of COPD and developed new onset AF while here but is not on anticoagulation or anti PLT agents.  We were asked to see him for coffee ground emesis.  His last BM was three days ago and was brown.  He denies melena or hematochezia.  He is normotensive and non tachycardic.  I suspect he has a stress ulcer or gastritis but he has never had an egd, and his last colonoscopy was many years ago.  I would ordinarily recommend egd to evaluate for the source of his C/G emesis but he poses a significant risk for the sedation required for this procedure.  I would attempt to maximize his respiratory status and improve his exam.  Please call a pulmonary consuult to assist with this.  If we cannot improve this, then I would recommend an GI series when stable to evaluate for a gastric mass or ulcer.  In the interim I would recommend pantoprazole drip and transfuse for a Hb of <8.
above noted abdomen soft tender upper abdomen  xrays noted will place ng tube aracelis wass fully examined by me and agree with above

## 2019-03-13 NOTE — CONSULT NOTE ADULT - SUBJECTIVE AND OBJECTIVE BOX
Patient is a 78y old  Male who presents with a chief complaint of hypotension, new onset afib (08 Mar 2019 00:54)      HPI:  · HPI Objective Statement: 79 yo male hx of HTN/HLD/COPD present c/o coughing and SOB worsening over the past few weeks. reported he was seen by PMD and given prednisone and inhaler. patient reported worsening SOB over the past few days so he came to ED for evaluation. + productive yellow sputum. SOB worsen with exertion. Denies fever/chill/HA/dizziness/chest pain/palpitation/abd pain/n/v/d/ black stool/bloody stool/urinary sxs (08 Mar 2019 06:08)  pending EGD     PAST MEDICAL & SURGICAL HISTORY:  High cholesterol  COPD (chronic obstructive pulmonary disease)  HTN (hypertension)  H/O tonsillitis    Allergies    No Known Allergies    Intolerances      Family history : no cardiovscular family history   Home Medications:    Occupation:  AlcfgAdvanceol: Denied  Smoking: ex  Drug Use: Denied  Marital Status:         ROS: as in HPI; All other systems reviewed are negative    ICU Vital Signs Last 24 Hrs  T(C): 36.5 (13 Mar 2019 05:00), Max: 36.5 (13 Mar 2019 05:00)  T(F): 97.7 (13 Mar 2019 05:00), Max: 97.7 (13 Mar 2019 05:00)  HR: 110 (13 Mar 2019 08:28) (53 - 560)  BP: 102/59 (13 Mar 2019 05:00) (96/57 - 131/66)  BP(mean): --  ABP: --  ABP(mean): --  RR: 24 (13 Mar 2019 08:28) (18 - 24)  SpO2: 92% (13 Mar 2019 08:28) (92% - 94%)        Physical Examination:    General: No acute distress.  Alert, oriented, interactive, nonfocal    HEENT: Pupils equal, reactive to light.  Symmetric.    PULM: Clear to auscultation bilaterally, no significant sputum production    CVS: Regular rate and rhythm, no murmurs, rubs, or gallops    ABD: Soft, nondistended, nontender, normoactive bowel sounds, no masses    EXT: No edema, nontender, no clubbing     SKIN: Warm and well perfused, no rashes noted.    Neurology : no motor or sensory deficit     Musculoskeletal : move all extremety     Lymphatic system: no Palpable node               I&O's Detail    12 Mar 2019 07:01  -  13 Mar 2019 07:00  --------------------------------------------------------  IN:    Oral Fluid: 960 mL  Total IN: 960 mL    OUT:    Voided: 150 mL  Total OUT: 150 mL    Total NET: 810 mL      13 Mar 2019 07:01  -  13 Mar 2019 09:49  --------------------------------------------------------  IN:  Total IN: 0 mL    OUT:    Voided: 400 mL  Total OUT: 400 mL    Total NET: -400 mL            LABS:                        12.4   14.58 )-----------( 293      ( 13 Mar 2019 06:42 )             36.0     13 Mar 2019 06:42    131    |  93     |  76     ----------------------------<  126    4.1     |  24     |  2.4      Ca    7.9        13 Mar 2019 06:42  Phos  5.3       13 Mar 2019 06:42  Mg     1.9       13 Mar 2019 06:42            CAPILLARY BLOOD GLUCOSE            Culture        MEDICATIONS  (STANDING):  ALBUTerol/ipratropium for Nebulization 3 milliLiter(s) Nebulizer every 4 hours  furosemide   Injectable 40 milliGRAM(s) IV Push two times a day  heparin  Injectable 5000 Unit(s) SubCutaneous three times a day  pantoprazole Infusion 8 mG/Hr (10 mL/Hr) IV Continuous <Continuous>  piperacillin/tazobactam IVPB. 2.25 Gram(s) IV Intermittent every 8 hours    MEDICATIONS  (PRN):  benzocaine 15 mG/menthol 3.6 mG (Sugar-Free) Lozenge 1 Lozenge Oral every 2 hours PRN Sore Throat  ondansetron Injectable 8 milliGRAM(s) IV Push every 8 hours PRN Nausea and/or Vomiting        RADIOLOGY: ***     CXR:  TLC:  OG:  ET tube:          ECHO:      IMPRESSION:        PLAN:    CNS:    HEENT:    PULMONARY:    CARDIOVASCULAR:    GI: GI prophylaxis.  Feeding     RENAL:    INFECTIOUS DISEASE:    HEMATOLOGICAL:  DVT prophylaxis.    ENDOCRINE:  Follow up FS.  Insulin protocol if needed.    MUSCULOSKELETAL:        CRITICAL CARE TIME SPENT: *** Patient is a 78y old  Male who presents with a chief complaint of hypotension, new onset afib (08 Mar 2019 00:54)      HPI:  · HPI Objective Statement: 79 yo male hx of HTN/HLD/COPD present c/o coughing and SOB worsening over the past few weeks. reported he was seen by PMD and given prednisone and inhaler. patient reported worsening SOB over the past few days so he came to ED for evaluation. + productive yellow sputum. SOB worsen with exertion. Denies fever/chill/HA/dizziness/chest pain/palpitation/abd pain/n/v/d/ black stool/bloody stool/urinary sxs (08 Mar 2019 06:08)  pending EGD    still wheezing , cough     PAST MEDICAL & SURGICAL HISTORY:  High cholesterol  COPD (chronic obstructive pulmonary disease)  HTN (hypertension)  H/O tonsillitis    Allergies    No Known Allergies    Intolerances      Family history : no cardiovscular family history   Home Medications:    Occupation:  Al2NDNATUREol: Denied  Smoking: ex  Drug Use: Denied  Marital Status:         ROS: as in HPI; All other systems reviewed are negative    ICU Vital Signs Last 24 Hrs  T(C): 36.5 (13 Mar 2019 05:00), Max: 36.5 (13 Mar 2019 05:00)  T(F): 97.7 (13 Mar 2019 05:00), Max: 97.7 (13 Mar 2019 05:00)  HR: 110 (13 Mar 2019 08:28) (53 - 560)  BP: 102/59 (13 Mar 2019 05:00) (96/57 - 131/66)  BP(mean): --  ABP: --  ABP(mean): --  RR: 24 (13 Mar 2019 08:28) (18 - 24)  SpO2: 92% (13 Mar 2019 08:28) (92% - 94%)        Physical Examination:    General: No acute distress.  Alert, oriented, interactive, nonfocal    HEENT: Pupils equal, reactive to light.  Symmetric.    PULM: b/l wheeze     CVS: Regular rate and rhythm, no murmurs, rubs, or gallops    ABD: Soft, nondistended, nontender, normoactive bowel sounds, no masses    EXT: No edema, nontender, no clubbing     SKIN: Warm and well perfused, no rashes noted.    Neurology : no motor or sensory deficit     Musculoskeletal : move all extremety     Lymphatic system: no Palpable node               I&O's Detail    12 Mar 2019 07:01  -  13 Mar 2019 07:00  --------------------------------------------------------  IN:    Oral Fluid: 960 mL  Total IN: 960 mL    OUT:    Voided: 150 mL  Total OUT: 150 mL    Total NET: 810 mL      13 Mar 2019 07:01  -  13 Mar 2019 09:49  --------------------------------------------------------  IN:  Total IN: 0 mL    OUT:    Voided: 400 mL  Total OUT: 400 mL    Total NET: -400 mL            LABS:                        12.4   14.58 )-----------( 293      ( 13 Mar 2019 06:42 )             36.0     13 Mar 2019 06:42    131    |  93     |  76     ----------------------------<  126    4.1     |  24     |  2.4      Ca    7.9        13 Mar 2019 06:42  Phos  5.3       13 Mar 2019 06:42  Mg     1.9       13 Mar 2019 06:42            CAPILLARY BLOOD GLUCOSE            Culture        MEDICATIONS  (STANDING):  ALBUTerol/ipratropium for Nebulization 3 milliLiter(s) Nebulizer every 4 hours  furosemide   Injectable 40 milliGRAM(s) IV Push two times a day  heparin  Injectable 5000 Unit(s) SubCutaneous three times a day  pantoprazole Infusion 8 mG/Hr (10 mL/Hr) IV Continuous <Continuous>  piperacillin/tazobactam IVPB. 2.25 Gram(s) IV Intermittent every 8 hours    MEDICATIONS  (PRN):  benzocaine 15 mG/menthol 3.6 mG (Sugar-Free) Lozenge 1 Lozenge Oral every 2 hours PRN Sore Throat  ondansetron Injectable 8 milliGRAM(s) IV Push every 8 hours PRN Nausea and/or Vomiting        RADIOLOGY: ***     CXR: 3/11 left effusion   b/l lower opacity   TLC:  OG:  ET tube:          ECHO:

## 2019-03-13 NOTE — PROGRESS NOTE ADULT - SUBJECTIVE AND OBJECTIVE BOX
CHIEF COMPLAINT:    Patient is a 78y old  Male who presents with a chief complaint of cough, SOB. Found to have sepsis 2/2 right basilar pneumonia, PAULA, coffee ground emesis    INTERVAL HPI/OVERNIGHT EVENTS:    Patient seen and examined at bedside. No acute overnight events occurred.    ROS: No SOB. Reports productive cough, abdominal distension. No abdominal Pain. All other systems are negative.    Vital Signs:    T(F): 97.7 (03-13-19 @ 05:00), Max: 97.7 (03-13-19 @ 05:00)  HR: 110 (03-13-19 @ 08:28) (53 - 560)  BP: 102/59 (03-13-19 @ 05:00) (96/57 - 131/66)  RR: 24 (03-13-19 @ 08:28) (18 - 24)  SpO2: 93% (03-13-19 @ 10:22) (92% - 94%)  I&O's Summary    12 Mar 2019 07:01  -  13 Mar 2019 07:00  --------------------------------------------------------  IN: 960 mL / OUT: 150 mL / NET: 810 mL    13 Mar 2019 07:01  -  13 Mar 2019 10:33  --------------------------------------------------------  IN: 0 mL / OUT: 400 mL / NET: -400 mL      PHYSICAL EXAM:  GENERAL:  NAD  SKIN: No rashes or lesions  HEENT: Atraumatic. Normocephalic. Anicteric  NECK:  No JVD.   PULMONARY: Harsh rhonchi b/l,   CVS: Normal S1, S2. Regular rate and rhythm. No murmurs.  ABDOMEN/GI: Soft, Nontender, +distended; Bowel sounds are present  EXTREMITIES:  No edema B/L LE.  NEUROLOGIC:  No motor deficit.  PSYCH: Alert & oriented x 3, normal affect    Consultant(s) Notes Reviewed:  [x ] YES  [ ] NO  Care Discussed with Consultants/Other Providers [ x] YES  [ ] NO-pulmonary    LABS:                        12.4   14.58 )-----------( 293      ( 13 Mar 2019 06:42 )             36.0     03-13    131<L>  |  93<L>  |  76<HH>  ----------------------------<  126<H>  4.1   |  24  |  2.4<H>    Ca    7.9<L>      13 Mar 2019 06:42  Phos  5.3     03-13  Mg     1.9     03-13        Serum Pro-Brain Natriuretic Peptide: 4737 pg/mL (03-08-19 @ 12:12)  Serum Pro-Brain Natriuretic Peptide: 4829 pg/mL (03-07-19 @ 22:20)    RADIOLOGY & ADDITIONAL TESTS:  Imaging or report Personally Reviewed:  [ x] YES  [ ] NO  right pleural effusion    Medications:  Standing  ALBUTerol/ipratropium for Nebulization 3 milliLiter(s) Nebulizer every 4 hours  furosemide   Injectable 40 milliGRAM(s) IV Push two times a day  heparin  Injectable 5000 Unit(s) SubCutaneous three times a day  pantoprazole Infusion 8 mG/Hr IV Continuous <Continuous>  piperacillin/tazobactam IVPB. 2.25 Gram(s) IV Intermittent every 8 hours    PRN Meds  benzocaine 15 mG/menthol 3.6 mG (Sugar-Free) Lozenge 1 Lozenge Oral every 2 hours PRN  ondansetron Injectable 8 milliGRAM(s) IV Push every 8 hours PRN

## 2019-03-13 NOTE — CONSULT NOTE ADULT - SUBJECTIVE AND OBJECTIVE BOX
NIMO SARMIENTO [id number]  78y Male  5d    HPI:  · HPI Objective Statement: 79 yo male hx of HTN/HLD/COPD present c/o coughing and SOB worsening over the past few weeks. reported he was seen by PMD and given prednisone and inhaler. patient reported worsening SOB over the past few days so he came to ED for evaluation. + productive yellow sputum. SOB worsen with exertion. Denies fever/chill/HA/dizziness/chest pain/palpitation/abd pain/n/v/d/ black stool/bloody stool/urinary sxs (08 Mar 2019 06:08). On the following day, pat states he developed nausea and vomitting (hematemesis), which resolved yesterday (has been on PPI drip.  Also, had some burping the past few days which is resolving.  Surgical consult called because he still has some upper abdominal distention and discomfort, and KUB shows a gas filled stomach (no ileus/SBO).  His last BM and flatus was yesterday; none today (but he feels like he has to go)      PAST MEDICAL & SURGICAL HISTORY:  High cholesterol  COPD (chronic obstructive pulmonary disease)  HTN (hypertension)  H/O tonsillitis  denies abdominal surgeries      MEDICATIONS  (STANDING):  ALBUTerol/ipratropium for Nebulization 3 milliLiter(s) Nebulizer every 4 hours  furosemide   Injectable 40 milliGRAM(s) IV Push two times a day  heparin  Injectable 5000 Unit(s) SubCutaneous three times a day  methylPREDNISolone sodium succinate Injectable 60 milliGRAM(s) IV Push two times a day  pantoprazole Infusion 8 mG/Hr (10 mL/Hr) IV Continuous <Continuous>  piperacillin/tazobactam IVPB. 2.25 Gram(s) IV Intermittent every 8 hours    MEDICATIONS  (PRN):  benzocaine 15 mG/menthol 3.6 mG (Sugar-Free) Lozenge 1 Lozenge Oral every 2 hours PRN Sore Throat  ondansetron Injectable 8 milliGRAM(s) IV Push every 8 hours PRN Nausea and/or Vomiting      Allergies    No Known Allergies    Intolerances        Vital Signs Last 24 Hrs  T(C): 36.4 (13 Mar 2019 13:40), Max: 36.5 (13 Mar 2019 05:00)  T(F): 97.6 (13 Mar 2019 13:40), Max: 97.7 (13 Mar 2019 05:00)  HR: 98 (13 Mar 2019 13:40) (53 - 110)  BP: 136/72 (13 Mar 2019 13:40) (102/59 - 136/72)  BP(mean): --  RR: 18 (13 Mar 2019 13:40) (18 - 24)  SpO2: 93% (13 Mar 2019 10:22) (92% - 94%)    PHYSICAL EXAM:  GENERAL: NAD, well-appearing; NC in place  ABDOMEN: Soft, distended upper abdomen with high pitched BS and mild tympany; nontender;       LABS:                          12.4   14.58 )-----------( 293      ( 13 Mar 2019 06:42 )             36.0         03-13    131<L>  |  93<L>  |  76<HH>  ----------------------------<  126<H>  4.1   |  24  |  2.4<H>      Calcium, Total Serum: 7.9 mg/dL (03-13-19 @ 06:42)      Serum Pro-Brain Natriuretic Peptide: 4737 pg/mL (03-08-19 @ 12:12)  Serum Pro-Brain Natriuretic Peptide: 4829 pg/mL (03-07-19 @ 22:20)    RADIOLOGY & ADDITIONAL STUDIES:    Xray Kidney Ureter Bladder (03.13.19 @ 14:57) >  Findings/  impression: Gaseous distended stomach. No pneumoperitoneum... Lumbar and   lower thoracic spine degenerative changes.     CT Abdomen and Pelvis w/ Oral Cont (03.09.19 @ 20:29) >    IMPRESSION:  1.  Bilateral pleural effusions with right lower lobe reticulonodular   densities, may be secondary to an infectious etiology.  2.  No evidence of acute abdominopelvic pathology. NIMO SARMIENTO [id number]  78y Male  5d    HPI:  · HPI Objective Statement: 79 yo male hx of HTN/HLD/COPD present c/o coughing and SOB worsening over the past few weeks. reported he was seen by PMD and given prednisone and inhaler. patient reported worsening SOB over the past few days so he came to ED for evaluation. + productive yellow sputum. SOB worsen with exertion. Denies fever/chill/HA/dizziness/chest pain/palpitation/abd pain/n/v/d/ black stool/bloody stool/urinary sxs (08 Mar 2019 06:08). On the following day, pat states he developed nausea and vomitting (hematemesis), which resolved yesterday (has been on PPI drip.  Also, had some burping the past few days which is resolving.  Surgical consult called because he still has some upper abdominal distention and discomfort, and KUB shows a gas filled stomach (no ileus/SBO).  His last BM and flatus was yesterday; none today (but he feels like he has to go)      PAST MEDICAL & SURGICAL HISTORY:  High cholesterol  COPD (chronic obstructive pulmonary disease)  HTN (hypertension)  H/O tonsillitis  umbilical hernia  denies abdominal surgeries      MEDICATIONS  (STANDING):  ALBUTerol/ipratropium for Nebulization 3 milliLiter(s) Nebulizer every 4 hours  furosemide   Injectable 40 milliGRAM(s) IV Push two times a day  heparin  Injectable 5000 Unit(s) SubCutaneous three times a day  methylPREDNISolone sodium succinate Injectable 60 milliGRAM(s) IV Push two times a day  pantoprazole Infusion 8 mG/Hr (10 mL/Hr) IV Continuous <Continuous>  piperacillin/tazobactam IVPB. 2.25 Gram(s) IV Intermittent every 8 hours    MEDICATIONS  (PRN):  benzocaine 15 mG/menthol 3.6 mG (Sugar-Free) Lozenge 1 Lozenge Oral every 2 hours PRN Sore Throat  ondansetron Injectable 8 milliGRAM(s) IV Push every 8 hours PRN Nausea and/or Vomiting      Allergies    No Known Allergies    Intolerances        Vital Signs Last 24 Hrs  T(C): 36.4 (13 Mar 2019 13:40), Max: 36.5 (13 Mar 2019 05:00)  T(F): 97.6 (13 Mar 2019 13:40), Max: 97.7 (13 Mar 2019 05:00)  HR: 98 (13 Mar 2019 13:40) (53 - 110)  BP: 136/72 (13 Mar 2019 13:40) (102/59 - 136/72)  BP(mean): --  RR: 18 (13 Mar 2019 13:40) (18 - 24)  SpO2: 93% (13 Mar 2019 10:22) (92% - 94%)    PHYSICAL EXAM:  GENERAL: NAD, well-appearing; NC in place  ABDOMEN: Soft, distended upper abdomen with high pitched BS and mild tympany; nontender; umbilical hernia (soft, nontender, able to reduce but doesn't remain reduced)      LABS:                          12.4   14.58 )-----------( 293      ( 13 Mar 2019 06:42 )             36.0         03-13    131<L>  |  93<L>  |  76<HH>  ----------------------------<  126<H>  4.1   |  24  |  2.4<H>      Calcium, Total Serum: 7.9 mg/dL (03-13-19 @ 06:42)      Serum Pro-Brain Natriuretic Peptide: 4737 pg/mL (03-08-19 @ 12:12)  Serum Pro-Brain Natriuretic Peptide: 4829 pg/mL (03-07-19 @ 22:20)    RADIOLOGY & ADDITIONAL STUDIES:    Xray Kidney Ureter Bladder (03.13.19 @ 14:57) >  Findings/  impression: Gaseous distended stomach. No pneumoperitoneum... Lumbar and   lower thoracic spine degenerative changes.     CT Abdomen and Pelvis w/ Oral Cont (03.09.19 @ 20:29) >    IMPRESSION:  1.  Bilateral pleural effusions with right lower lobe reticulonodular   densities, may be secondary to an infectious etiology.  2.  No evidence of acute abdominopelvic pathology.

## 2019-03-14 LAB
ALBUMIN SERPL ELPH-MCNC: 2.7 G/DL — LOW (ref 3.5–5.2)
ALP SERPL-CCNC: 42 U/L — SIGNIFICANT CHANGE UP (ref 30–115)
ALT FLD-CCNC: 10 U/L — SIGNIFICANT CHANGE UP (ref 0–41)
ANION GAP SERPL CALC-SCNC: 16 MMOL/L — HIGH (ref 7–14)
AST SERPL-CCNC: 14 U/L — SIGNIFICANT CHANGE UP (ref 0–41)
BASOPHILS # BLD AUTO: 0.02 K/UL — SIGNIFICANT CHANGE UP (ref 0–0.2)
BASOPHILS NFR BLD AUTO: 0.2 % — SIGNIFICANT CHANGE UP (ref 0–1)
BILIRUB SERPL-MCNC: 0.4 MG/DL — SIGNIFICANT CHANGE UP (ref 0.2–1.2)
BUN SERPL-MCNC: 69 MG/DL — CRITICAL HIGH (ref 10–20)
CALCIUM SERPL-MCNC: 8.1 MG/DL — LOW (ref 8.5–10.1)
CHLORIDE SERPL-SCNC: 94 MMOL/L — LOW (ref 98–110)
CO2 SERPL-SCNC: 23 MMOL/L — SIGNIFICANT CHANGE UP (ref 17–32)
CREAT SERPL-MCNC: 2.3 MG/DL — HIGH (ref 0.7–1.5)
EOSINOPHIL # BLD AUTO: 0 K/UL — SIGNIFICANT CHANGE UP (ref 0–0.7)
EOSINOPHIL NFR BLD AUTO: 0 % — SIGNIFICANT CHANGE UP (ref 0–8)
GLUCOSE SERPL-MCNC: 165 MG/DL — HIGH (ref 70–99)
HCT VFR BLD CALC: 31.4 % — LOW (ref 42–52)
HGB BLD-MCNC: 10.9 G/DL — LOW (ref 14–18)
IMM GRANULOCYTES NFR BLD AUTO: 0.6 % — HIGH (ref 0.1–0.3)
LYMPHOCYTES # BLD AUTO: 0.87 K/UL — LOW (ref 1.2–3.4)
LYMPHOCYTES # BLD AUTO: 10.3 % — LOW (ref 20.5–51.1)
MCHC RBC-ENTMCNC: 29.2 PG — SIGNIFICANT CHANGE UP (ref 27–31)
MCHC RBC-ENTMCNC: 34.7 G/DL — SIGNIFICANT CHANGE UP (ref 32–37)
MCV RBC AUTO: 84.2 FL — SIGNIFICANT CHANGE UP (ref 80–94)
MONOCYTES # BLD AUTO: 0.37 K/UL — SIGNIFICANT CHANGE UP (ref 0.1–0.6)
MONOCYTES NFR BLD AUTO: 4.4 % — SIGNIFICANT CHANGE UP (ref 1.7–9.3)
NEUTROPHILS # BLD AUTO: 7.16 K/UL — HIGH (ref 1.4–6.5)
NEUTROPHILS NFR BLD AUTO: 84.5 % — HIGH (ref 42.2–75.2)
NRBC # BLD: 0 /100 WBCS — SIGNIFICANT CHANGE UP (ref 0–0)
PLATELET # BLD AUTO: 277 K/UL — SIGNIFICANT CHANGE UP (ref 130–400)
POTASSIUM SERPL-MCNC: 4 MMOL/L — SIGNIFICANT CHANGE UP (ref 3.5–5)
POTASSIUM SERPL-SCNC: 4 MMOL/L — SIGNIFICANT CHANGE UP (ref 3.5–5)
PROT SERPL-MCNC: 5.1 G/DL — LOW (ref 6–8)
RBC # BLD: 3.73 M/UL — LOW (ref 4.7–6.1)
RBC # FLD: 12.6 % — SIGNIFICANT CHANGE UP (ref 11.5–14.5)
SODIUM SERPL-SCNC: 133 MMOL/L — LOW (ref 135–146)
WBC # BLD: 8.47 K/UL — SIGNIFICANT CHANGE UP (ref 4.8–10.8)
WBC # FLD AUTO: 8.47 K/UL — SIGNIFICANT CHANGE UP (ref 4.8–10.8)

## 2019-03-14 RX ORDER — BUDESONIDE AND FORMOTEROL FUMARATE DIHYDRATE 160; 4.5 UG/1; UG/1
2 AEROSOL RESPIRATORY (INHALATION)
Qty: 0 | Refills: 0 | Status: DISCONTINUED | OUTPATIENT
Start: 2019-03-14 | End: 2019-03-19

## 2019-03-14 RX ORDER — BENZOCAINE 10 %
1 GEL (GRAM) MUCOUS MEMBRANE EVERY 4 HOURS
Qty: 0 | Refills: 0 | Status: DISCONTINUED | OUTPATIENT
Start: 2019-03-14 | End: 2019-03-15

## 2019-03-14 RX ADMIN — Medication 40 MILLIGRAM(S): at 18:17

## 2019-03-14 RX ADMIN — Medication 1 SPRAY(S): at 11:31

## 2019-03-14 RX ADMIN — PIPERACILLIN AND TAZOBACTAM 25 GRAM(S): 4; .5 INJECTION, POWDER, LYOPHILIZED, FOR SOLUTION INTRAVENOUS at 21:33

## 2019-03-14 RX ADMIN — PIPERACILLIN AND TAZOBACTAM 25 GRAM(S): 4; .5 INJECTION, POWDER, LYOPHILIZED, FOR SOLUTION INTRAVENOUS at 05:03

## 2019-03-14 RX ADMIN — Medication 60 MILLIGRAM(S): at 05:04

## 2019-03-14 RX ADMIN — Medication 40 MILLIGRAM(S): at 05:06

## 2019-03-14 RX ADMIN — Medication 3 MILLILITER(S): at 15:52

## 2019-03-14 RX ADMIN — HEPARIN SODIUM 5000 UNIT(S): 5000 INJECTION INTRAVENOUS; SUBCUTANEOUS at 14:27

## 2019-03-14 RX ADMIN — HEPARIN SODIUM 5000 UNIT(S): 5000 INJECTION INTRAVENOUS; SUBCUTANEOUS at 05:03

## 2019-03-14 RX ADMIN — Medication 60 MILLIGRAM(S): at 19:42

## 2019-03-14 RX ADMIN — PANTOPRAZOLE SODIUM 10 MG/HR: 20 TABLET, DELAYED RELEASE ORAL at 06:08

## 2019-03-14 RX ADMIN — PIPERACILLIN AND TAZOBACTAM 25 GRAM(S): 4; .5 INJECTION, POWDER, LYOPHILIZED, FOR SOLUTION INTRAVENOUS at 14:27

## 2019-03-14 RX ADMIN — PANTOPRAZOLE SODIUM 10 MG/HR: 20 TABLET, DELAYED RELEASE ORAL at 18:16

## 2019-03-14 NOTE — CHART NOTE - NSCHARTNOTEFT_GEN_A_CORE
CXR post insertion of NGT shows NGT tip at EG junction.  Patient with about 200 mL output since insertioned earlier.     NGT advanced to 65 marking and re-secured.    Will repeat CXR to confirm positioning after NGT advanced. CXR post insertion of NGT shows NGT tip at EG junction.  Patient with about 200 mL output since insertion earlier.     NGT advanced to 65 marking and re-secured.    Will repeat CXR to confirm positioning after NGT advanced.

## 2019-03-14 NOTE — PROGRESS NOTE ADULT - ATTENDING COMMENTS
Pt seen and examined with JOANA Shaw.  Pt had coffee ground emesis since resolved.  His poor pulmonary status has prevented us from performing egd therefore he was treated empirically with a pantoprazole drip which he has responded to.  He current Hb is 12.3, his PLT is 293K. INR is 1.1.  He is tolerating a diet and his abdominal exam is benign.  His pulmonary exam still has coarse rhonchi bilaterally and pulmonary has seen the patient and advised further treatment.  He is currently admitted with COPD and PNA and is being treated dual antibiotics.  Will continue conservative treatment and if and when his lung exam improves consider egd.
Pt seen and examined with JOANA Shaw.  Pulmonary exam still has coarse rhonchi bilaterally making endoscopy with conscious sedation a high risk procedure at present.  Fortunately patient appears to be responding to pantoprazole drip since he had one small amount of coffee ground emesis today and his Hb went up from 11.8 to 12.5 without transfusion.  Pulmonary has been consulted but has not seen patient as yet.  Pt has pneumonia, pleural effusions and "sepsis".
above noted abdomen soft mild tenderness/ tenderness for ng tube placement

## 2019-03-14 NOTE — CHART NOTE - NSCHARTNOTEFT_GEN_A_CORE
Asked by Dr. Verde to place NGT in patient for gastric distention.  Patient explained the plan of NGT and agrees with placement.  I placed a Childress sump NGT without incident into right nare and auscultated air sounds over stomach area with Tomey syringe through NGT and had some return of gastric contents in tube.  NGT secured in place with NGT nose tape.    Will order a stat CXR to confirm positioning of NGT prior to administrating any medications via NGT.  Will order Hurricaine spray every 4 hours PRN throat irritation.   Case d/w Dr. Verde.

## 2019-03-14 NOTE — PROGRESS NOTE ADULT - SUBJECTIVE AND OBJECTIVE BOX
78yMale  Being followed for Coffee Ground Emesis   Interval history: Patient doing well patient notes nausea and vomiting have subsided. Patient still feels short of breath. Patient has an NG tube placed with no output at bedside. Patient notes he is passing flatus.      PMHX/PSHX:  PAST MEDICAL & SURGICAL HISTORY:  High cholesterol  COPD (chronic obstructive pulmonary disease)  HTN (hypertension)  H/O tonsillitis          Social History: No smoking. No alcohol. No illegal drug use.          MEDICATIONS:  MEDICATIONS  (STANDING):  ALBUTerol/ipratropium for Nebulization 3 milliLiter(s) Nebulizer every 4 hours  buDESOnide  80 MICROgram(s)/formoterol 4.5 MICROgram(s) Inhaler 2 Puff(s) Inhalation two times a day  furosemide   Injectable 40 milliGRAM(s) IV Push two times a day  heparin  Injectable 5000 Unit(s) SubCutaneous three times a day  methylPREDNISolone sodium succinate Injectable 60 milliGRAM(s) IV Push two times a day  pantoprazole Infusion 8 mG/Hr (10 mL/Hr) IV Continuous <Continuous>  piperacillin/tazobactam IVPB. 2.25 Gram(s) IV Intermittent every 8 hours    MEDICATIONS  (PRN):  benzocaine 15 mG/menthol 3.6 mG (Sugar-Free) Lozenge 1 Lozenge Oral every 2 hours PRN Sore Throat  benzocaine 20% Spray 1 Spray(s) Topical every 4 hours PRN throat irritation from NGT  ondansetron Injectable 8 milliGRAM(s) IV Push every 8 hours PRN Nausea and/or Vomiting        Allergies:    No Known Allergies    Intolerances        REVIEW OF SYSTEMS:  General:  No weight loss, fevers, or chills.  Eyes:  No reported pain or visual changes  ENT:  No sore throat or runny nose.  NECK: No stiffness   CV:  No chest pain or palpitations.  Resp:  +shortness of breath, +cough  GI:  No abdominal pain, nausea, vomiting, dysphagia, diarrhea or constipation. No rectal bleeding, melena, or hematemesis.  Muscle:  No aches or weakness  Neuro:  No tingling, numbness           VITAL SIGNS:   T(F): 97.1 (03-14-19 @ 05:05), Max: 97.6 (03-13-19 @ 13:40)  HR: 85 (03-14-19 @ 05:05) (85 - 98)  BP: 117/65 (03-14-19 @ 05:05) (99/55 - 136/72)  RR: 16 (03-14-19 @ 05:05) (16 - 18)  SpO2: --    PHYSICAL EXAM:  GENERAL: AAOx3, no acute distress. +NG Tube no output at bedside   HEAD:  Atraumatic, Normocephalic  EYES: conjunctiva and sclera clear  NECK: Supple, no JVD or thyromegaly  CHEST/LUNG: Clear to auscultation bilaterally; No wheeze, rhonchi, or rales  HEART: Regular rate and rhythm; normal S1, S2, No murmurs.  ABDOMEN: Soft, nontender, +distended; Bowel sounds present, no abdominal bruit, masses, or hepatosplenomegaly  NEUROLOGY: No asterixis or tremor.   SKIN: Intact, no jaundice            LABS:                        10.9   8.47  )-----------( 277      ( 14 Mar 2019 06:39 )             31.4     03-14    133<L>  |  94<L>  |  69<HH>  ----------------------------<  165<H>  4.0   |  23  |  2.3<H>    Ca    8.1<L>      14 Mar 2019 06:39  Phos  5.3     03-13  Mg     1.9     03-13    TPro  5.1<L>  /  Alb  2.7<L>  /  TBili  0.4  /  DBili  x   /  AST  14  /  ALT  10  /  AlkPhos  42  03-14    LIVER FUNCTIONS - ( 14 Mar 2019 06:39 )  Alb: 2.7 g/dL / Pro: 5.1 g/dL / ALK PHOS: 42 U/L / ALT: 10 U/L / AST: 14 U/L / GGT: x               IMAGING:  < from: Xray Kidney Ureter Bladder (03.13.19 @ 14:57) >  EXAM:  XR KUB 1 VIEW            PROCEDURE DATE:  03/13/2019            INTERPRETATION:  Clinical History / Reason for exam: Abdominal distention.    2 views.    Correlation: CT abdomen and pelvis March 9, 2019.    Findings/  impression: Gaseous distended stomach. No pneumoperitoneum... Lumbar and   lower thoracic spine degenerative changes.                  BRANDON CHICAS M.D., ATTENDING RADIOLOGIST  This document has been electronically signed. Mar 13 2019  3:05PM        < end of copied text >      < from: CT Abdomen and Pelvis w/ Oral Cont (03.09.19 @ 20:29) >    EXAM:  CT ABDOMEN AND PELVIS OC            PROCEDURE DATE:  03/09/2019            INTERPRETATION:  CLINICAL HISTORY/REASON FOR EXAM: Abdominal pain.    TECHNIQUE: Contiguous axial CT images were obtained from the lower chest   to the pubic symphysis without intravenous contrast. Oral contrast was   administered. Reformatted images in the coronal and sagittal planes were   acquired.    COMPARISON: None.      FINDINGS:    LOWER CHEST: Bilateral pleural effusion with overlying compressive   atelectasis. Right lower lobe reticular nodular densities (series 4,   image 15).    HEPATOBILIARY: Unremarkable.    SPLEEN: Unremarkable.    PANCREAS: Unremarkable.    ADRENAL GLANDS: Unremarkable.    KIDNEYS: No hydronephrosis or nephrolithiasis.    ABDOMINOPELVIC NODES: No lymphadenopathy.    PELVIC ORGANS: Unremarkable.    PERITONEUM/MESENTERY/BOWEL: No bowel obstruction. No ascites or   pneumoperitoneum. Normal appendix.    BONES/SOFT TISSUES: Degenerative changes of the spine. Fat-containing   umbilical hernia. Nonobstructive bowel within a left inguinal hernia.      IMPRESSION:  1.  Bilateral pleural effusions with right lower lobe reticulonodular   densities, may be secondary to an infectious etiology.  2.  No evidence of acute abdominopelvic pathology.              PRASAD FERNÁNDEZ M.D., RESIDENT RADIOLOGIST  This document has been electronically signed.  ROSETTE BAH M.D., ATTENDING RADIOLOGIST  This document has been electronically signed. Mar  9 2019  9:26PM    < end of copied text > 78yMale  Being followed for Coffee Ground Emesis   Interval history: Patient doing well patient notes nausea and vomiting have subsided. Patient still feels short of breath. Patient has an NG tube placed with no output at bedside. Patient notes he is passing flatus.      PMHX/PSHX:  PAST MEDICAL & SURGICAL HISTORY:  High cholesterol  COPD (chronic obstructive pulmonary disease)  HTN (hypertension)  H/O tonsillitis          Social History: No smoking. No alcohol. No illegal drug use.          MEDICATIONS:  MEDICATIONS  (STANDING):  ALBUTerol/ipratropium for Nebulization 3 milliLiter(s) Nebulizer every 4 hours  buDESOnide  80 MICROgram(s)/formoterol 4.5 MICROgram(s) Inhaler 2 Puff(s) Inhalation two times a day  furosemide   Injectable 40 milliGRAM(s) IV Push two times a day  heparin  Injectable 5000 Unit(s) SubCutaneous three times a day  methylPREDNISolone sodium succinate Injectable 60 milliGRAM(s) IV Push two times a day  pantoprazole Infusion 8 mG/Hr (10 mL/Hr) IV Continuous <Continuous>  piperacillin/tazobactam IVPB. 2.25 Gram(s) IV Intermittent every 8 hours    MEDICATIONS  (PRN):  benzocaine 15 mG/menthol 3.6 mG (Sugar-Free) Lozenge 1 Lozenge Oral every 2 hours PRN Sore Throat  benzocaine 20% Spray 1 Spray(s) Topical every 4 hours PRN throat irritation from NGT  ondansetron Injectable 8 milliGRAM(s) IV Push every 8 hours PRN Nausea and/or Vomiting        Allergies:    No Known Allergies    Intolerances        REVIEW OF SYSTEMS:  General:  No weight loss, fevers, or chills.  Eyes:  No reported pain or visual changes  ENT:  No sore throat or runny nose.  NECK: No stiffness   CV:  No chest pain or palpitations.  Resp:  +shortness of breath, +cough  GI:  No abdominal pain, nausea, vomiting, dysphagia, diarrhea or constipation. No rectal bleeding, melena, or hematemesis.  Muscle:  No aches or weakness  Neuro:  No tingling, numbness           VITAL SIGNS:   T(F): 97.1 (03-14-19 @ 05:05), Max: 97.6 (03-13-19 @ 13:40)  HR: 85 (03-14-19 @ 05:05) (85 - 98)  BP: 117/65 (03-14-19 @ 05:05) (99/55 - 136/72)  RR: 16 (03-14-19 @ 05:05) (16 - 18)  SpO2: --    PHYSICAL EXAM:  GENERAL: AAOx3, no acute distress. +NG Tube no output at bedside   HEAD:  Atraumatic, Normocephalic  EYES: conjunctiva and sclera clear  NECK: Supple, no JVD or thyromegaly  CHEST/LUNG: +rhonchi B/L  HEART: Regular rate and rhythm; normal S1, S2, No murmurs.  ABDOMEN: Soft, nontender, +distended; Bowel sounds present, no abdominal bruit, masses, or hepatosplenomegaly  NEUROLOGY: No asterixis or tremor.   SKIN: Intact, no jaundice            LABS:                        10.9   8.47  )-----------( 277      ( 14 Mar 2019 06:39 )             31.4     03-14    133<L>  |  94<L>  |  69<HH>  ----------------------------<  165<H>  4.0   |  23  |  2.3<H>    Ca    8.1<L>      14 Mar 2019 06:39  Phos  5.3     03-13  Mg     1.9     03-13    TPro  5.1<L>  /  Alb  2.7<L>  /  TBili  0.4  /  DBili  x   /  AST  14  /  ALT  10  /  AlkPhos  42  03-14    LIVER FUNCTIONS - ( 14 Mar 2019 06:39 )  Alb: 2.7 g/dL / Pro: 5.1 g/dL / ALK PHOS: 42 U/L / ALT: 10 U/L / AST: 14 U/L / GGT: x               IMAGING:  < from: Xray Kidney Ureter Bladder (03.13.19 @ 14:57) >  EXAM:  XR KUB 1 VIEW            PROCEDURE DATE:  03/13/2019            INTERPRETATION:  Clinical History / Reason for exam: Abdominal distention.    2 views.    Correlation: CT abdomen and pelvis March 9, 2019.    Findings/  impression: Gaseous distended stomach. No pneumoperitoneum... Lumbar and   lower thoracic spine degenerative changes.                  BRANDON CHICAS M.D., ATTENDING RADIOLOGIST  This document has been electronically signed. Mar 13 2019  3:05PM        < end of copied text >      < from: CT Abdomen and Pelvis w/ Oral Cont (03.09.19 @ 20:29) >    EXAM:  CT ABDOMEN AND PELVIS OC            PROCEDURE DATE:  03/09/2019            INTERPRETATION:  CLINICAL HISTORY/REASON FOR EXAM: Abdominal pain.    TECHNIQUE: Contiguous axial CT images were obtained from the lower chest   to the pubic symphysis without intravenous contrast. Oral contrast was   administered. Reformatted images in the coronal and sagittal planes were   acquired.    COMPARISON: None.      FINDINGS:    LOWER CHEST: Bilateral pleural effusion with overlying compressive   atelectasis. Right lower lobe reticular nodular densities (series 4,   image 15).    HEPATOBILIARY: Unremarkable.    SPLEEN: Unremarkable.    PANCREAS: Unremarkable.    ADRENAL GLANDS: Unremarkable.    KIDNEYS: No hydronephrosis or nephrolithiasis.    ABDOMINOPELVIC NODES: No lymphadenopathy.    PELVIC ORGANS: Unremarkable.    PERITONEUM/MESENTERY/BOWEL: No bowel obstruction. No ascites or   pneumoperitoneum. Normal appendix.    BONES/SOFT TISSUES: Degenerative changes of the spine. Fat-containing   umbilical hernia. Nonobstructive bowel within a left inguinal hernia.      IMPRESSION:  1.  Bilateral pleural effusions with right lower lobe reticulonodular   densities, may be secondary to an infectious etiology.  2.  No evidence of acute abdominopelvic pathology.              PRASAD FERNÁNDEZ M.D., RESIDENT RADIOLOGIST  This document has been electronically signed.  ROSETTE BAH M.D., ATTENDING RADIOLOGIST  This document has been electronically signed. Mar  9 2019  9:26PM    < end of copied text > 78yMale  Being followed for Coffee Ground Emesis   Interval history: Patient doing well patient notes nausea and vomiting have subsided. Patient still feels short of breath. Patient has an NG tube placed with 100ccs output at bedside. Patient notes he is passing flatus and having bowel movements.       PMHX/PSHX:  PAST MEDICAL & SURGICAL HISTORY:  High cholesterol  COPD (chronic obstructive pulmonary disease)  HTN (hypertension)  H/O tonsillitis          Social History: No smoking. No alcohol. No illegal drug use.          MEDICATIONS:  MEDICATIONS  (STANDING):  ALBUTerol/ipratropium for Nebulization 3 milliLiter(s) Nebulizer every 4 hours  buDESOnide  80 MICROgram(s)/formoterol 4.5 MICROgram(s) Inhaler 2 Puff(s) Inhalation two times a day  furosemide   Injectable 40 milliGRAM(s) IV Push two times a day  heparin  Injectable 5000 Unit(s) SubCutaneous three times a day  methylPREDNISolone sodium succinate Injectable 60 milliGRAM(s) IV Push two times a day  pantoprazole Infusion 8 mG/Hr (10 mL/Hr) IV Continuous <Continuous>  piperacillin/tazobactam IVPB. 2.25 Gram(s) IV Intermittent every 8 hours    MEDICATIONS  (PRN):  benzocaine 15 mG/menthol 3.6 mG (Sugar-Free) Lozenge 1 Lozenge Oral every 2 hours PRN Sore Throat  benzocaine 20% Spray 1 Spray(s) Topical every 4 hours PRN throat irritation from NGT  ondansetron Injectable 8 milliGRAM(s) IV Push every 8 hours PRN Nausea and/or Vomiting        Allergies:    No Known Allergies    Intolerances        REVIEW OF SYSTEMS:  General:  No weight loss, fevers, or chills.  Eyes:  No reported pain or visual changes  ENT:  No sore throat or runny nose.  NECK: No stiffness   CV:  No chest pain or palpitations.  Resp:  +shortness of breath, +cough  GI:  No abdominal pain, nausea, vomiting, dysphagia, diarrhea or constipation. No rectal bleeding, melena, or hematemesis.  Muscle:  No aches or weakness  Neuro:  No tingling, numbness           VITAL SIGNS:   T(F): 97.1 (03-14-19 @ 05:05), Max: 97.6 (03-13-19 @ 13:40)  HR: 85 (03-14-19 @ 05:05) (85 - 98)  BP: 117/65 (03-14-19 @ 05:05) (99/55 - 136/72)  RR: 16 (03-14-19 @ 05:05) (16 - 18)  SpO2: --    PHYSICAL EXAM:  GENERAL: AAOx3, no acute distress. +NG Tube 100ccs output at bedside   HEAD:  Atraumatic, Normocephalic  EYES: conjunctiva and sclera clear  NECK: Supple, no JVD or thyromegaly  CHEST/LUNG: +rhonchi B/L  HEART: Regular rate and rhythm; normal S1, S2, No murmurs.  ABDOMEN: Soft, nontender, +distended; Bowel sounds present, no abdominal bruit, masses, or hepatosplenomegaly  NEUROLOGY: No asterixis or tremor.   SKIN: Intact, no jaundice            LABS:                        10.9   8.47  )-----------( 277      ( 14 Mar 2019 06:39 )             31.4     03-14    133<L>  |  94<L>  |  69<HH>  ----------------------------<  165<H>  4.0   |  23  |  2.3<H>    Ca    8.1<L>      14 Mar 2019 06:39  Phos  5.3     03-13  Mg     1.9     03-13    TPro  5.1<L>  /  Alb  2.7<L>  /  TBili  0.4  /  DBili  x   /  AST  14  /  ALT  10  /  AlkPhos  42  03-14    LIVER FUNCTIONS - ( 14 Mar 2019 06:39 )  Alb: 2.7 g/dL / Pro: 5.1 g/dL / ALK PHOS: 42 U/L / ALT: 10 U/L / AST: 14 U/L / GGT: x               IMAGING:  < from: Xray Kidney Ureter Bladder (03.13.19 @ 14:57) >  EXAM:  XR KUB 1 VIEW            PROCEDURE DATE:  03/13/2019            INTERPRETATION:  Clinical History / Reason for exam: Abdominal distention.    2 views.    Correlation: CT abdomen and pelvis March 9, 2019.    Findings/  impression: Gaseous distended stomach. No pneumoperitoneum... Lumbar and   lower thoracic spine degenerative changes.                  BRANDON CHICAS M.D., ATTENDING RADIOLOGIST  This document has been electronically signed. Mar 13 2019  3:05PM        < end of copied text >      < from: CT Abdomen and Pelvis w/ Oral Cont (03.09.19 @ 20:29) >    EXAM:  CT ABDOMEN AND PELVIS OC            PROCEDURE DATE:  03/09/2019            INTERPRETATION:  CLINICAL HISTORY/REASON FOR EXAM: Abdominal pain.    TECHNIQUE: Contiguous axial CT images were obtained from the lower chest   to the pubic symphysis without intravenous contrast. Oral contrast was   administered. Reformatted images in the coronal and sagittal planes were   acquired.    COMPARISON: None.      FINDINGS:    LOWER CHEST: Bilateral pleural effusion with overlying compressive   atelectasis. Right lower lobe reticular nodular densities (series 4,   image 15).    HEPATOBILIARY: Unremarkable.    SPLEEN: Unremarkable.    PANCREAS: Unremarkable.    ADRENAL GLANDS: Unremarkable.    KIDNEYS: No hydronephrosis or nephrolithiasis.    ABDOMINOPELVIC NODES: No lymphadenopathy.    PELVIC ORGANS: Unremarkable.    PERITONEUM/MESENTERY/BOWEL: No bowel obstruction. No ascites or   pneumoperitoneum. Normal appendix.    BONES/SOFT TISSUES: Degenerative changes of the spine. Fat-containing   umbilical hernia. Nonobstructive bowel within a left inguinal hernia.      IMPRESSION:  1.  Bilateral pleural effusions with right lower lobe reticulonodular   densities, may be secondary to an infectious etiology.  2.  No evidence of acute abdominopelvic pathology.              PRASAD FERNÁNDEZ M.D., RESIDENT RADIOLOGIST  This document has been electronically signed.  ROSETTE BAH M.D., ATTENDING RADIOLOGIST  This document has been electronically signed. Mar  9 2019  9:26PM    < end of copied text >

## 2019-03-14 NOTE — PROGRESS NOTE ADULT - SUBJECTIVE AND OBJECTIVE BOX
CHIEF COMPLAINT:    Patient is a 78y old  Male who presents with a chief complaint of SOB     INTERVAL HPI/OVERNIGHT EVENTS:    Patient seen and examined at bedside. No acute overnight events occurred.    ROS: Reports feeling lousy. No sob, chest pain. All other systems are negative.    Vital Signs:    T(F): 97.1 (03-14-19 @ 05:05), Max: 97.6 (03-13-19 @ 13:40)  HR: 85 (03-14-19 @ 05:05) (85 - 98)  BP: 117/65 (03-14-19 @ 05:05) (99/55 - 136/72)  RR: 16 (03-14-19 @ 05:05) (16 - 18)  SpO2: --  I&O's Summary    13 Mar 2019 07:01  -  14 Mar 2019 07:00  --------------------------------------------------------  IN: 0 mL / OUT: 750 mL / NET: -750 mL      PHYSICAL EXAM:  GENERAL:  NAD  SKIN: No rashes or lesions  HEENT: Atraumatic. Normocephalic. Anicteric  NECK:  No JVD.   PULMONARY: Clear to ausculation bilaterally. No wheezing. No rales  CVS: Normal S1, S2. Regular rate and rhythm. No murmurs.  ABDOMEN/GI: Abdominal distension.   EXTREMITIES:  No edema B/L LE.  NEUROLOGIC:  No motor deficit.  PSYCH: Alert & oriented x 3, normal affect    LABS:                        10.9   8.47  )-----------( 277      ( 14 Mar 2019 06:39 )             31.4     03-14    133<L>  |  94<L>  |  69<HH>  ----------------------------<  165<H>  4.0   |  23  |  2.3<H>    Ca    8.1<L>      14 Mar 2019 06:39  Phos  5.3     03-13  Mg     1.9     03-13    TPro  5.1<L>  /  Alb  2.7<L>  /  TBili  0.4  /  DBili  x   /  AST  14  /  ALT  10  /  AlkPhos  42  03-14      Serum Pro-Brain Natriuretic Peptide: 4737 pg/mL (03-08-19 @ 12:12)  Serum Pro-Brain Natriuretic Peptide: 4829 pg/mL (03-07-19 @ 22:20)      RADIOLOGY & ADDITIONAL TESTS:  CXR pending    Medications:  Standing  ALBUTerol/ipratropium for Nebulization 3 milliLiter(s) Nebulizer every 4 hours  buDESOnide  80 MICROgram(s)/formoterol 4.5 MICROgram(s) Inhaler 2 Puff(s) Inhalation two times a day  furosemide   Injectable 40 milliGRAM(s) IV Push two times a day  heparin  Injectable 5000 Unit(s) SubCutaneous three times a day  methylPREDNISolone sodium succinate Injectable 60 milliGRAM(s) IV Push two times a day  pantoprazole Infusion 8 mG/Hr IV Continuous <Continuous>  piperacillin/tazobactam IVPB. 2.25 Gram(s) IV Intermittent every 8 hours    PRN Meds  benzocaine 15 mG/menthol 3.6 mG (Sugar-Free) Lozenge 1 Lozenge Oral every 2 hours PRN  benzocaine 20% Spray 1 Spray(s) Topical every 4 hours PRN  ondansetron Injectable 8 milliGRAM(s) IV Push every 8 hours PRN CHIEF COMPLAINT:    Patient is a 78y old  Male who presents with a chief complaint of SOB     INTERVAL HPI/OVERNIGHT EVENTS:    Patient seen and examined at bedside. No acute overnight events occurred.    ROS: Reports feeling lousy. No sob, chest pain. All other systems are negative.    Vital Signs:    T(F): 97.1 (03-14-19 @ 05:05), Max: 97.6 (03-13-19 @ 13:40)  HR: 85 (03-14-19 @ 05:05) (85 - 98)  BP: 117/65 (03-14-19 @ 05:05) (99/55 - 136/72)  RR: 16 (03-14-19 @ 05:05) (16 - 18)  SpO2: --  I&O's Summary    13 Mar 2019 07:01  -  14 Mar 2019 07:00  --------------------------------------------------------  IN: 0 mL / OUT: 750 mL / NET: -750 mL      PHYSICAL EXAM:  GENERAL:  NAD  SKIN: No rashes or lesions  HEENT: Atraumatic. Normocephalic. Anicteric  NECK:  No JVD.   PULMONARY: Harsh rhonchi  CVS: Normal S1, S2. Regular rate and rhythm. No murmurs.  ABDOMEN/GI: Abdominal distension, soft  EXTREMITIES:  No edema B/L LE.  NEUROLOGIC:  No motor deficit.  PSYCH: Alert & oriented x 3, normal affect    LABS:                        10.9   8.47  )-----------( 277      ( 14 Mar 2019 06:39 )             31.4     03-14    133<L>  |  94<L>  |  69<HH>  ----------------------------<  165<H>  4.0   |  23  |  2.3<H>    Ca    8.1<L>      14 Mar 2019 06:39  Phos  5.3     03-13  Mg     1.9     03-13    TPro  5.1<L>  /  Alb  2.7<L>  /  TBili  0.4  /  DBili  x   /  AST  14  /  ALT  10  /  AlkPhos  42  03-14      Serum Pro-Brain Natriuretic Peptide: 4737 pg/mL (03-08-19 @ 12:12)  Serum Pro-Brain Natriuretic Peptide: 4829 pg/mL (03-07-19 @ 22:20)      RADIOLOGY & ADDITIONAL TESTS:  CXR pending    Medications:  Standing  ALBUTerol/ipratropium for Nebulization 3 milliLiter(s) Nebulizer every 4 hours  buDESOnide  80 MICROgram(s)/formoterol 4.5 MICROgram(s) Inhaler 2 Puff(s) Inhalation two times a day  furosemide   Injectable 40 milliGRAM(s) IV Push two times a day  heparin  Injectable 5000 Unit(s) SubCutaneous three times a day  methylPREDNISolone sodium succinate Injectable 60 milliGRAM(s) IV Push two times a day  pantoprazole Infusion 8 mG/Hr IV Continuous <Continuous>  piperacillin/tazobactam IVPB. 2.25 Gram(s) IV Intermittent every 8 hours    PRN Meds  benzocaine 15 mG/menthol 3.6 mG (Sugar-Free) Lozenge 1 Lozenge Oral every 2 hours PRN  benzocaine 20% Spray 1 Spray(s) Topical every 4 hours PRN  ondansetron Injectable 8 milliGRAM(s) IV Push every 8 hours PRN

## 2019-03-14 NOTE — PROGRESS NOTE ADULT - ASSESSMENT
· Assessment		  78M PMHx COPD, HTN, HLD, first degree AVB presents for evaluation of SOB, cough. Found to be septic on admisison (tachycardic 96 and leukocytosis 13.04), since resolved, due to R basilar pneumonia,  Course also complicated by PAULA and coffee ground emesis, which has resolved with PPI drip. Pt's respiratory status impeding him from getting EGD right now.  Pt also with gaseous distention with recent BM/flatus     - NGT placed today; keep to low continuous suction   - obstructive series in AM   - monitor for bowel funtion   - Pt seen by Dr. Verde · Assessment		  78M PMHx COPD, HTN, HLD, first degree AVB presents for evaluation of SOB, cough. Found to be septic on admisison (tachycardic 96 and leukocytosis 13.04), since resolved, due to R basilar pneumonia,  Course also complicated by PAULA and coffee ground emesis, which has resolved with PPI drip. Pt's respiratory status impeding him from getting EGD right now.  Pt also with gaseous distention with recent BM/flatus     - NGT placed today; keep to low continuous suction   - consider CT a/p with PO contrast (unable to give IV contrast due to elevated creatinine)   - obstructive series in AM   - monitor for bowel function   - Pt seen by Dr. Verde

## 2019-03-14 NOTE — PROGRESS NOTE ADULT - SUBJECTIVE AND OBJECTIVE BOX
S;  Pt still with abdominal distention and discomfort. Refused NGT overnight but now agrees  O; Vital Signs Last 24 Hrs  T(C): 36.2 (14 Mar 2019 05:05), Max: 36.4 (13 Mar 2019 13:40)  T(F): 97.1 (14 Mar 2019 05:05), Max: 97.6 (13 Mar 2019 13:40)  HR: 85 (14 Mar 2019 05:05) (85 - 98)  BP: 117/65 (14 Mar 2019 05:05) (99/55 - 136/72)  BP(mean): --  RR: 16 (14 Mar 2019 05:05) (16 - 18)  SpO2: 93% (13 Mar 2019 10:22) (93% - 93%)    EXAM:  abd: soft, distended, mildly tympanic, nontender      Labs:                          10.9   8.47  )-----------( 277      ( 14 Mar 2019 06:39 )             31.4       Auto Neutrophil %: 84.5 % (03-14-19 @ 06:39)  Auto Immature Granulocyte %: 0.6 % (03-14-19 @ 06:39)    03-14    133<L>  |  94<L>  |  69<HH>  ----------------------------<  165<H>  4.0   |  23  |  2.3<H>      Calcium, Total Serum: 8.1 mg/dL (03-14-19 @ 06:39)      LFTs:             5.1  | 0.4  | 14       ------------------[42      ( 14 Mar 2019 06:39 )  2.7  | x    | 10          Lipase:x      Amylase:x

## 2019-03-14 NOTE — PROGRESS NOTE ADULT - SUBJECTIVE AND OBJECTIVE BOX
Patient is a 78y old  Male who presents with a chief complaint of Cough, SOB (13 Mar 2019 10:32)      INTERVAL HISTORY/overnight events feel better still has wheeze but decreased         Vital Signs Last 24 Hrs  T(C): 36.2 (14 Mar 2019 05:05), Max: 36.4 (13 Mar 2019 13:40)  T(F): 97.1 (14 Mar 2019 05:05), Max: 97.6 (13 Mar 2019 13:40)  HR: 85 (14 Mar 2019 05:05) (85 - 98)  BP: 117/65 (14 Mar 2019 05:05) (99/55 - 136/72)  BP(mean): --  RR: 16 (14 Mar 2019 05:05) (16 - 18)  SpO2: 93% (13 Mar 2019 10:22) (93% - 93%)  Daily     Daily   I&O's Summary    13 Mar 2019 07:01  -  14 Mar 2019 07:00  --------------------------------------------------------  IN: 0 mL / OUT: 750 mL / NET: -750 mL        Physical Examination:    General: No acute distress.  Alert, oriented, interactive, nonfocal    HEENT: Pupils equal, reactive to light.  Symmetric.    PULM: mild b/l wheeze     CVS: Regular rate and rhythm, no murmurs, rubs, or gallops    ABD: Soft, nondistended, nontender, normoactive bowel sounds, no masses    EXT: No edema, nontender    SKIN: Warm and well perfused, no rashes noted.    Neurology: no focal deficit     Musculoskeletal : Move all extremety         Lab Results:                        10.9   8.47  )-----------( 277      ( 14 Mar 2019 06:39 )             31.4     13 Mar 2019 06:42    131    |  93     |  76     ----------------------------<  126    4.1     |  24     |  2.4      Ca    7.9        13 Mar 2019 06:42  Phos  5.3       13 Mar 2019 06:42  Mg     1.9       13 Mar 2019 06:42                Microbiology      Medication:  MEDICATIONS  (STANDING):  ALBUTerol/ipratropium for Nebulization 3 milliLiter(s) Nebulizer every 4 hours  furosemide   Injectable 40 milliGRAM(s) IV Push two times a day  heparin  Injectable 5000 Unit(s) SubCutaneous three times a day  methylPREDNISolone sodium succinate Injectable 60 milliGRAM(s) IV Push two times a day  pantoprazole Infusion 8 mG/Hr (10 mL/Hr) IV Continuous <Continuous>  piperacillin/tazobactam IVPB. 2.25 Gram(s) IV Intermittent every 8 hours    MEDICATIONS  (PRN):  benzocaine 15 mG/menthol 3.6 mG (Sugar-Free) Lozenge 1 Lozenge Oral every 2 hours PRN Sore Throat  ondansetron Injectable 8 milliGRAM(s) IV Push every 8 hours PRN Nausea and/or Vomiting        IMAGING STUDIES:

## 2019-03-14 NOTE — PROGRESS NOTE ADULT - ASSESSMENT
78M PMHx COPD, HTN, HLD, first degree AVB presents for evaluation of SOB, cough. Found to be septic on admisison (tachycardic 96 and leukocytosis 13.04), since resolved, due to R basilar pneumonia, HFpEF. Course complicated by PAULA and coffee ground emesis.    Coffee ground emesis  -likely due to UGIB however no further episodes  -unclear plan for EGD  -will d/c protonix ggt start IV as NGT to suction    Pneumonia  -suspected gram negative pneumonia  -c/w zosyn day 5/7    PAULA  -not improving  -may be cardio renal  -renal consult    Hyponatremia  -improving    HFpEF, acute on chronic  -c/w lasix  -strict i/o    First degree AVB  -asymptomatic    COPD  -c/w nebs  -pulmonary consult appreciated, solumedrol, symbicort started  -duonebs q4    HTN  -now borderline low  -hold bp meds    DVT ppx  Full Code  From home    Progress Note Handoff:  Pending (specify):  EGD  Family discussion: discussed at lent with pt and his brother yesterday. They are aware of numerous co morbid conditions and guarded prognosis. Aware he will have prolonged hospital stay.  Disposition: Home___/SNF___/Other________/Unknown at this time____x____ 78M PMHx COPD, HTN, HLD, first degree AVB presents for evaluation of SOB, cough. Found to be septic on admisison (tachycardic 96 and leukocytosis 13.04), since resolved, due to R basilar pneumonia, HFpEF. Course complicated by PAULA and coffee ground emesis.    Coffee ground emesis  -likely due to UGIB however no further episodes  -unclear plan for EGD  -will d/c protonix ggt start IV as NGT to suction    Gastric distension  -NGT to suction  -surgery consult appreciated  -repeat KUB in AM  -CXR for NGT placement pending    Pneumonia  -suspected gram negative pneumonia  -c/w zosyn day 5/7    PAULA  -not improving  -may be cardio renal  -renal consult    Hyponatremia  -improving    HFpEF, acute on chronic  -c/w lasix  -strict i/o    First degree AVB  -asymptomatic    COPD  -c/w nebs  -pulmonary consult appreciated, solumedrol, symbicort started  -duonebs q4    HTN  -now borderline low  -hold bp meds    DVT ppx  Full Code  From home    Progress Note Handoff:  Pending (specify):  EGD  Family discussion: discussed at [facility] with pt and his brother yesterday. They are aware of numerous co morbid conditions and guarded prognosis. Aware he will have prolonged hospital stay.  Disposition: Home___/SNF___/Other________/Unknown at this time____x____

## 2019-03-14 NOTE — PROGRESS NOTE ADULT - ASSESSMENT
Impression: copd exacerbation ??underlying PNA   chf   afib   Gi bleed     plan:solumderol 60 mg Q 12 hrs   on ABX continue   start symbicort 160 mg Q 12 hrs   nebulizer Q 4 hrs and prn    cxr today    keep IS < OS   follow h/h

## 2019-03-14 NOTE — PROGRESS NOTE ADULT - ASSESSMENT
78yMale pmh HTN, HLD, COPD not on home O2 presents for shortness of breath worsening over the past few weeks and new onset A-Fib, patient has +nausea and +coffee ground emesis-multiple episodes all weekend as per patient and nursing team. Patient denies abdominal pain, diarrhea, constipation, blood in stool.    Problem 1-Cofee Ground Emesis resolved for now to discuss if EGD is warranted anymore   Rec  -Maintain hemodynamic stability  -PPI drip  -Maintain Hemodynamic stability  -Active type and screen  -Will discuss with attending today  -CBC monitor H and H    Problem 2-Abdominal Distention  Rec  -Obstructive series   -Continue NG tube to low to intermittent suction     Problem 3Bilateral pleural effusions with right lower lobe reticulonodular densities, may be secondary to an infectious etiology.  Rec  -Care as per primary team patient needs lungs to be optimized prior to EGD 78yMale pmh HTN, HLD, COPD not on home O2 presents for shortness of breath worsening over the past few weeks and new onset A-Fib, patient has +nausea and +coffee ground emesis-multiple episodes all weekend as per patient and nursing team. Patient denies abdominal pain, diarrhea, constipation, blood in stool.    Problem 1-Cofee Ground Emesis resolved for now to discuss if EGD is warranted anymore   Rec  -Maintain hemodynamic stability  -PPI drip to discuss if we can switch to PPI BID  -Maintain Hemodynamic stability  -Active type and screen  -Will discuss with attending today  -CBC monitor H and H    Problem 2-Abdominal Distention  Rec  -Daily Obstructive series   -Continue NG tube to low to intermittent suction     Problem 3Bilateral pleural effusions with right lower lobe reticulonodular densities, may be secondary to an infectious etiology.  Rec  -Care as per primary team patient needs lungs to be optimized prior to EGD 78yMale pmh HTN, HLD, COPD not on home O2 presents for shortness of breath worsening over the past few weeks and new onset A-Fib, patient has +nausea and +coffee ground emesis-multiple episodes all weekend as per patient and nursing team. Patient denies abdominal pain, diarrhea, constipation, blood in stool.    Problem 1-Cofee Ground Emesis resolved for now   Rec  -Maintain hemodynamic stability  -PPI drip to discuss if we can switch to PPI BID  -Maintain Hemodynamic stability  -Active type and screen  -EGD necessity to be determined tomorrow, especially given respiratory status and emesis has resolved we will determine if risks outweigh benefits  -CBC monitor H and H    Problem 2-Abdominal Distention  Rec  -Daily Obstructive series   -Continue NG tube to low to intermittent suction as per surgery    Problem 3-Bilateral pleural effusions with right lower lobe reticulonodular densities, may be secondary to an infectious etiology.  Rec  -Care as per primary team patient needs lungs to be optimized prior to EGD 78yMale pmh HTN, HLD, COPD not on home O2 presents for shortness of breath worsening over the past few weeks and new onset A-Fib, patient has +nausea and +coffee ground emesis-multiple episodes all weekend as per patient and nursing team. Patient denies abdominal pain, diarrhea, constipation, blood in stool.    Problem 1-Cofee Ground Emesis resolved for now   Rec  -Maintain hemodynamic stability  -PPI IV BID  -Maintain Hemodynamic stability  -Active type and screen  -EGD necessity to be determined tomorrow, especially given respiratory status and emesis has resolved we will determine if risks outweigh benefits  -CBC monitor H and H    Problem 2-Abdominal Distention  Rec  -Daily Obstructive series   -Continue NG tube to low to intermittent suction as per surgery    Problem 3-Bilateral pleural effusions with right lower lobe reticulonodular densities, may be secondary to an infectious etiology.  Rec  -Care as per primary team patient needs lungs to be optimized prior to EGD 78yMale pmh HTN, HLD, COPD not on home O2 presents for shortness of breath worsening over the past few weeks and new onset A-Fib, patient has +nausea and +coffee ground emesis-multiple episodes all weekend as per patient and nursing team. Patient denies abdominal pain, diarrhea, constipation, blood in stool.    Problem 1-Cofee Ground Emesis resolved for now   Rec  -Maintain hemodynamic stability  -PPI IV BID  -Maintain Hemodynamic stability  -Active type and screen  -EGD necessity to be determined tomorrow based off further resolution of symptoms, especially given respiratory status and emesis has resolved we will determine if risks outweigh benefits  -CBC monitor H and H    Problem 2-Abdominal Distention  Rec  -Daily Obstructive series   -Continue NG tube to low to intermittent suction as per surgery    Problem 3-Bilateral pleural effusions with right lower lobe reticulonodular densities, may be secondary to an infectious etiology.  Rec  -Care as per primary team patient needs lungs to be optimized prior to EGD

## 2019-03-14 NOTE — CHART NOTE - NSCHARTNOTEFT_GEN_A_CORE
Case D/W GI earlier and with surgical resident, who discussed with Dr. Verde earlier:  pt ahving BM/flatus today, stomach feeling less distended. Will remove NGT at this time. Further management as per GI. Will continue to follow

## 2019-03-15 DIAGNOSIS — J13 PNEUMONIA DUE TO STREPTOCOCCUS PNEUMONIAE: ICD-10-CM

## 2019-03-15 LAB
ANION GAP SERPL CALC-SCNC: 12 MMOL/L — SIGNIFICANT CHANGE UP (ref 7–14)
BASOPHILS # BLD AUTO: 0.02 K/UL — SIGNIFICANT CHANGE UP (ref 0–0.2)
BASOPHILS NFR BLD AUTO: 0.2 % — SIGNIFICANT CHANGE UP (ref 0–1)
BUN SERPL-MCNC: 78 MG/DL — CRITICAL HIGH (ref 10–20)
CALCIUM SERPL-MCNC: 7.9 MG/DL — LOW (ref 8.4–10.5)
CALCIUM SERPL-MCNC: 8.1 MG/DL — LOW (ref 8.5–10.1)
CHLORIDE SERPL-SCNC: 95 MMOL/L — LOW (ref 98–110)
CO2 SERPL-SCNC: 30 MMOL/L — SIGNIFICANT CHANGE UP (ref 17–32)
CREAT SERPL-MCNC: 1.7 MG/DL — HIGH (ref 0.7–1.5)
EOSINOPHIL # BLD AUTO: 0 K/UL — SIGNIFICANT CHANGE UP (ref 0–0.7)
EOSINOPHIL NFR BLD AUTO: 0 % — SIGNIFICANT CHANGE UP (ref 0–8)
GLUCOSE SERPL-MCNC: 162 MG/DL — HIGH (ref 70–99)
HCT VFR BLD CALC: 33 % — LOW (ref 42–52)
HGB BLD-MCNC: 11.1 G/DL — LOW (ref 14–18)
IMM GRANULOCYTES NFR BLD AUTO: 0.6 % — HIGH (ref 0.1–0.3)
LYMPHOCYTES # BLD AUTO: 0.91 K/UL — LOW (ref 1.2–3.4)
LYMPHOCYTES # BLD AUTO: 9.4 % — LOW (ref 20.5–51.1)
MCHC RBC-ENTMCNC: 28.7 PG — SIGNIFICANT CHANGE UP (ref 27–31)
MCHC RBC-ENTMCNC: 33.6 G/DL — SIGNIFICANT CHANGE UP (ref 32–37)
MCV RBC AUTO: 85.3 FL — SIGNIFICANT CHANGE UP (ref 80–94)
MONOCYTES # BLD AUTO: 0.64 K/UL — HIGH (ref 0.1–0.6)
MONOCYTES NFR BLD AUTO: 6.6 % — SIGNIFICANT CHANGE UP (ref 1.7–9.3)
NEUTROPHILS # BLD AUTO: 8.09 K/UL — HIGH (ref 1.4–6.5)
NEUTROPHILS NFR BLD AUTO: 83.2 % — HIGH (ref 42.2–75.2)
NRBC # BLD: 0 /100 WBCS — SIGNIFICANT CHANGE UP (ref 0–0)
PLATELET # BLD AUTO: 311 K/UL — SIGNIFICANT CHANGE UP (ref 130–400)
POTASSIUM SERPL-MCNC: 3.9 MMOL/L — SIGNIFICANT CHANGE UP (ref 3.5–5)
POTASSIUM SERPL-SCNC: 3.9 MMOL/L — SIGNIFICANT CHANGE UP (ref 3.5–5)
PTH-INTACT FLD-MCNC: 71 PG/ML — HIGH (ref 15–65)
RBC # BLD: 3.87 M/UL — LOW (ref 4.7–6.1)
RBC # FLD: 12.5 % — SIGNIFICANT CHANGE UP (ref 11.5–14.5)
SODIUM SERPL-SCNC: 137 MMOL/L — SIGNIFICANT CHANGE UP (ref 135–146)
WBC # BLD: 9.72 K/UL — SIGNIFICANT CHANGE UP (ref 4.8–10.8)
WBC # FLD AUTO: 9.72 K/UL — SIGNIFICANT CHANGE UP (ref 4.8–10.8)

## 2019-03-15 RX ORDER — PANTOPRAZOLE SODIUM 20 MG/1
40 TABLET, DELAYED RELEASE ORAL
Qty: 0 | Refills: 0 | Status: DISCONTINUED | OUTPATIENT
Start: 2019-03-15 | End: 2019-03-19

## 2019-03-15 RX ORDER — CEFPODOXIME PROXETIL 100 MG
200 TABLET ORAL EVERY 12 HOURS
Qty: 0 | Refills: 0 | Status: COMPLETED | OUTPATIENT
Start: 2019-03-15 | End: 2019-03-18

## 2019-03-15 RX ADMIN — Medication 40 MILLIGRAM(S): at 05:08

## 2019-03-15 RX ADMIN — Medication 200 MILLIGRAM(S): at 17:03

## 2019-03-15 RX ADMIN — Medication 40 MILLIGRAM(S): at 17:04

## 2019-03-15 RX ADMIN — Medication 3 MILLILITER(S): at 20:07

## 2019-03-15 RX ADMIN — Medication 60 MILLIGRAM(S): at 05:07

## 2019-03-15 RX ADMIN — PANTOPRAZOLE SODIUM 10 MG/HR: 20 TABLET, DELAYED RELEASE ORAL at 04:30

## 2019-03-15 RX ADMIN — PIPERACILLIN AND TAZOBACTAM 25 GRAM(S): 4; .5 INJECTION, POWDER, LYOPHILIZED, FOR SOLUTION INTRAVENOUS at 05:07

## 2019-03-15 RX ADMIN — Medication 3 MILLILITER(S): at 16:04

## 2019-03-15 RX ADMIN — HEPARIN SODIUM 5000 UNIT(S): 5000 INJECTION INTRAVENOUS; SUBCUTANEOUS at 17:04

## 2019-03-15 RX ADMIN — PANTOPRAZOLE SODIUM 40 MILLIGRAM(S): 20 TABLET, DELAYED RELEASE ORAL at 17:03

## 2019-03-15 RX ADMIN — HEPARIN SODIUM 5000 UNIT(S): 5000 INJECTION INTRAVENOUS; SUBCUTANEOUS at 21:45

## 2019-03-15 RX ADMIN — Medication 60 MILLIGRAM(S): at 17:04

## 2019-03-15 NOTE — PROGRESS NOTE ADULT - ASSESSMENT
Impression: copd exacerbation ??underlying PNA   chf   afib   Gi bleed     plan:switch to prednisone 40 mg and taper   on ABX continue as per ID    symbicort 160 mg Q 12 hrs   nebulizer Q 4 hrs and prn   keep IS < OS   follow cardiology consider increase lasix keep negative balance   daily weight and strict Is and OS   follow h/h Impression: copd exacerbation ??underlying PNA   chf   afib   Gi bleed     plan: switch to prednisone 40 mg and taper   on ABX continue as per ID    symbicort 160 mg Q 12 hrs   nebulizer Q 4 hrs and prn   keep IS < OS   follow renal   follow cardiology consider increase lasix keep negative balance increase to 60 mg q 12hrs he used to be on 80   daily weight and strict Is and OS   follow h/h

## 2019-03-15 NOTE — DIETITIAN INITIAL EVALUATION ADULT. - ENERGY NEEDS
Calories: 2005-2206kcals/day (MSJ A.F 1-1.1) lower A.F due to obesity   Protein: 92-110g (1-1.2g/IBW)   Fluid: 1:1ml/kcal

## 2019-03-15 NOTE — PROGRESS NOTE ADULT - SUBJECTIVE AND OBJECTIVE BOX
S; NGT removed yesterday.  No complaints of abdominal pain.  Patient reporting BM x 2 overnight.  No N/V.  No new complaints.      O: Vital Signs Last 24 Hrs  T(C): 36.2 (15 Mar 2019 05:16), Max: 36.4 (14 Mar 2019 22:02)  T(F): 97.1 (15 Mar 2019 05:16), Max: 97.5 (14 Mar 2019 22:02)  HR: 60 (15 Mar 2019 05:16) (55 - 87)  BP: 115/61 (15 Mar 2019 05:16) (112/62 - 115/61)  RR: 16 (15 Mar 2019 05:16) (16 - 16)    EXAM:  abd: soft, mild distended, no TTP; + umbilical hernia      Labs:                          11.1   9.72  )-----------( 311      ( 15 Mar 2019 06:22 )             33.0       03-15    137  |  95<L>  |  78<HH>  ----------------------------<  162<H>  3.9   |  30  |  1.7<H>    Ca    8.1<L>      15 Mar 2019 06:22    TPro  5.1<L>  /  Alb  2.7<L>  /  TBili  0.4  /  DBili  x   /  AST  14  /  ALT  10  /  AlkPhos  42  03-14          Culture Results:   No growth at 5 days. (03-08 @ 00:40)  Culture Results:   No growth at 5 days. (03-08 @ 00:40)

## 2019-03-15 NOTE — PROGRESS NOTE ADULT - SUBJECTIVE AND OBJECTIVE BOX
78yMale  Being followed for Coffee Ground Emesis   Interval history: Patient doing well, NG tube removed patient passing copious flatus and having bowel movements. Patient denies nausea, vomiting, hematemesis, melena, blood in stool, diarrhea, constipation, abdominal pain.      PMHX/PSHX:  PAST MEDICAL & SURGICAL HISTORY:  High cholesterol  COPD (chronic obstructive pulmonary disease)  HTN (hypertension)  H/O tonsillitis          Social History: No smoking. No alcohol. No illegal drug use.          MEDICATIONS:  cefpodoxime 200 milliGRAM(s) Oral every 12 hours      Allergies:Allergies    No Known Allergies    Intolerances          REVIEW OF SYSTEMS:  General:  No weight loss, fevers, or chills.  Eyes:  No reported pain or visual changes  ENT:  No sore throat or runny nose.  NECK: No stiffness   CV:  No chest pain or palpitations.  Resp:  +shortness of breath, +cough  GI:  No abdominal pain, nausea, vomiting, dysphagia, diarrhea or constipation. No rectal bleeding, melena, or hematemesis.  Neuro:  No tingling, numbness       VITAL SIGNS:   T(F): 97.1 (03-15-19 @ 05:16), Max: 97.5 (03-14-19 @ 22:02)  HR: 60 (03-15-19 @ 05:16) (55 - 87)  BP: 115/61 (03-15-19 @ 05:16) (112/62 - 115/61)  RR: 16 (03-15-19 @ 05:16) (16 - 16)  SpO2: --    PHYSICAL EXAM:  GENERAL: AAOx3, no acute distress.  HEAD:  Atraumatic, Normocephalic  EYES: conjunctiva and sclera clear  NECK: Supple, no JVD or thyromegaly  CHEST/LUNG: +much improved rhonchi  HEART: Regular rate and rhythm; normal S1, S2, No murmurs.  ABDOMEN: Soft, nontender, +distended; Bowel sounds present, no abdominal bruit, masses, or hepatosplenomegaly  NEUROLOGY: No asterixis or tremor.   SKIN: Intact, no jaundice            LABS:                        11.1   9.72  )-----------( 311      ( 15 Mar 2019 06:22 )             33.0     03-15    137  |  95<L>  |  78<HH>  ----------------------------<  162<H>  3.9   |  30  |  1.7<H>    Ca    8.1<L>      15 Mar 2019 06:22    TPro  5.1<L>  /  Alb  2.7<L>  /  TBili  0.4  /  DBili  x   /  AST  14  /  ALT  10  /  AlkPhos  42  03-14    LIVER FUNCTIONS - ( 14 Mar 2019 06:39 )  Alb: 2.7 g/dL / Pro: 5.1 g/dL / ALK PHOS: 42 U/L / ALT: 10 U/L / AST: 14 U/L / GGT: x               IMAGING:  < from: CT Abdomen and Pelvis w/ Oral Cont (03.09.19 @ 20:29) >    EXAM:  CT ABDOMEN AND PELVIS OC            PROCEDURE DATE:  03/09/2019            INTERPRETATION:  CLINICAL HISTORY/REASON FOR EXAM: Abdominal pain.    TECHNIQUE: Contiguous axial CT images were obtained from the lower chest   to the pubic symphysis without intravenous contrast. Oral contrast was   administered. Reformatted images in the coronal and sagittal planes were   acquired.    COMPARISON: None.      FINDINGS:    LOWER CHEST: Bilateral pleural effusion with overlying compressive   atelectasis. Right lower lobe reticular nodular densities (series 4,   image 15).    HEPATOBILIARY: Unremarkable.    SPLEEN: Unremarkable.    PANCREAS: Unremarkable.    ADRENAL GLANDS: Unremarkable.    KIDNEYS: No hydronephrosis or nephrolithiasis.    ABDOMINOPELVIC NODES: No lymphadenopathy.    PELVIC ORGANS: Unremarkable.    PERITONEUM/MESENTERY/BOWEL: No bowel obstruction. No ascites or   pneumoperitoneum. Normal appendix.    BONES/SOFT TISSUES: Degenerative changes of the spine. Fat-containing   umbilical hernia. Nonobstructive bowel within a left inguinal hernia.      IMPRESSION:  1.  Bilateral pleural effusions with right lower lobe reticulonodular   densities, may be secondary to an infectious etiology.  2.  No evidence of acute abdominopelvic pathology.              PRASAD FERNÁNDEZ M.D., RESIDENT RADIOLOGIST  This document has been electronically signed.  ROSETTE BAH M.D., ATTENDING RADIOLOGIST  This document has been electronically signed. Mar  9 2019  9:26PM    < end of copied text >    < from: Xray Kidney Ureter Bladder (03.13.19 @ 14:57) >  EXAM:  XR KUB 1 VIEW            PROCEDURE DATE:  03/13/2019            INTERPRETATION:  Clinical History / Reason for exam: Abdominal distention.    2 views.    Correlation: CT abdomen and pelvis March 9, 2019.    Findings/  impression: Gaseous distended stomach. No pneumoperitoneum... Lumbar and   lower thoracic spine degenerative changes.                  BRANDON CHICAS M.D., ATTENDING RADIOLOGIST  This document has been electronically signed. Mar 13 2019  3:05PM              < end of copied text > 78yMale  Being followed for Coffee Ground Emesis   Interval history: Patient doing well, NG tube removed patient passing copious flatus and having bowel movements. Patient denies nausea, vomiting, hematemesis, melena, blood in stool, diarrhea, constipation, abdominal pain.      PMHX/PSHX:  PAST MEDICAL & SURGICAL HISTORY:  High cholesterol  COPD (chronic obstructive pulmonary disease)  HTN (hypertension)  H/O tonsillitis          Social History: No smoking. No alcohol. No illegal drug use.          MEDICATIONS:  MEDICATIONS  (STANDING):  ALBUTerol/ipratropium for Nebulization 3 milliLiter(s) Nebulizer every 4 hours  buDESOnide  80 MICROgram(s)/formoterol 4.5 MICROgram(s) Inhaler 2 Puff(s) Inhalation two times a day  cefpodoxime 200 milliGRAM(s) Oral every 12 hours  furosemide   Injectable 40 milliGRAM(s) IV Push two times a day  heparin  Injectable 5000 Unit(s) SubCutaneous three times a day  methylPREDNISolone sodium succinate Injectable 60 milliGRAM(s) IV Push two times a day  pantoprazole Infusion 8 mG/Hr (10 mL/Hr) IV Continuous <Continuous>    MEDICATIONS  (PRN):  benzocaine 15 mG/menthol 3.6 mG (Sugar-Free) Lozenge 1 Lozenge Oral every 2 hours PRN Sore Throat  benzocaine 20% Spray 1 Spray(s) Topical every 4 hours PRN throat irritation from NGT  ondansetron Injectable 8 milliGRAM(s) IV Push every 8 hours PRN Nausea and/or Vomiting        Allergies:Allergies    No Known Allergies    Intolerances          REVIEW OF SYSTEMS:  General:  No weight loss, fevers, or chills.  Eyes:  No reported pain or visual changes  ENT:  No sore throat or runny nose.  NECK: No stiffness   CV:  No chest pain or palpitations.  Resp:  +shortness of breath, +cough  GI:  No abdominal pain, nausea, vomiting, dysphagia, diarrhea or constipation. No rectal bleeding, melena, or hematemesis.  Neuro:  No tingling, numbness       VITAL SIGNS:   T(F): 97.1 (03-15-19 @ 05:16), Max: 97.5 (03-14-19 @ 22:02)  HR: 60 (03-15-19 @ 05:16) (55 - 87)  BP: 115/61 (03-15-19 @ 05:16) (112/62 - 115/61)  RR: 16 (03-15-19 @ 05:16) (16 - 16)  SpO2: --    PHYSICAL EXAM:  GENERAL: AAOx3, no acute distress.  HEAD:  Atraumatic, Normocephalic  EYES: conjunctiva and sclera clear  NECK: Supple, no JVD or thyromegaly  CHEST/LUNG: +much improved rhonchi  HEART: Regular rate and rhythm; normal S1, S2, No murmurs.  ABDOMEN: Soft, nontender, +distended; Bowel sounds present, no abdominal bruit, masses, or hepatosplenomegaly  NEUROLOGY: No asterixis or tremor.   SKIN: Intact, no jaundice            LABS:                        11.1   9.72  )-----------( 311      ( 15 Mar 2019 06:22 )             33.0     03-15    137  |  95<L>  |  78<HH>  ----------------------------<  162<H>  3.9   |  30  |  1.7<H>    Ca    8.1<L>      15 Mar 2019 06:22    TPro  5.1<L>  /  Alb  2.7<L>  /  TBili  0.4  /  DBili  x   /  AST  14  /  ALT  10  /  AlkPhos  42  03-14    LIVER FUNCTIONS - ( 14 Mar 2019 06:39 )  Alb: 2.7 g/dL / Pro: 5.1 g/dL / ALK PHOS: 42 U/L / ALT: 10 U/L / AST: 14 U/L / GGT: x               IMAGING:  < from: CT Abdomen and Pelvis w/ Oral Cont (03.09.19 @ 20:29) >    EXAM:  CT ABDOMEN AND PELVIS OC            PROCEDURE DATE:  03/09/2019            INTERPRETATION:  CLINICAL HISTORY/REASON FOR EXAM: Abdominal pain.    TECHNIQUE: Contiguous axial CT images were obtained from the lower chest   to the pubic symphysis without intravenous contrast. Oral contrast was   administered. Reformatted images in the coronal and sagittal planes were   acquired.    COMPARISON: None.      FINDINGS:    LOWER CHEST: Bilateral pleural effusion with overlying compressive   atelectasis. Right lower lobe reticular nodular densities (series 4,   image 15).    HEPATOBILIARY: Unremarkable.    SPLEEN: Unremarkable.    PANCREAS: Unremarkable.    ADRENAL GLANDS: Unremarkable.    KIDNEYS: No hydronephrosis or nephrolithiasis.    ABDOMINOPELVIC NODES: No lymphadenopathy.    PELVIC ORGANS: Unremarkable.    PERITONEUM/MESENTERY/BOWEL: No bowel obstruction. No ascites or   pneumoperitoneum. Normal appendix.    BONES/SOFT TISSUES: Degenerative changes of the spine. Fat-containing   umbilical hernia. Nonobstructive bowel within a left inguinal hernia.      IMPRESSION:  1.  Bilateral pleural effusions with right lower lobe reticulonodular   densities, may be secondary to an infectious etiology.  2.  No evidence of acute abdominopelvic pathology.              PRASAD FERNÁNDEZ M.D., RESIDENT RADIOLOGIST  This document has been electronically signed.  ROSETTE BAH M.D., ATTENDING RADIOLOGIST  This document has been electronically signed. Mar  9 2019  9:26PM    < end of copied text >    < from: Xray Kidney Ureter Bladder (03.13.19 @ 14:57) >  EXAM:  XR KUB 1 VIEW            PROCEDURE DATE:  03/13/2019            INTERPRETATION:  Clinical History / Reason for exam: Abdominal distention.    2 views.    Correlation: CT abdomen and pelvis March 9, 2019.    Findings/  impression: Gaseous distended stomach. No pneumoperitoneum... Lumbar and   lower thoracic spine degenerative changes.                  BRANDON CHICAS M.D., ATTENDING RADIOLOGIST  This document has been electronically signed. Mar 13 2019  3:05PM              < end of copied text > 78yMale  Being followed for Coffee Ground Emesis   Interval history: Patient doing well, NG tube removed patient passing copious flatus and having bowel movements. Patient denies nausea, vomiting, hematemesis, melena, blood in stool, diarrhea, constipation, abdominal pain.    tolerating diet      PMHX/PSHX:  PAST MEDICAL & SURGICAL HISTORY:  High cholesterol  COPD (chronic obstructive pulmonary disease)  HTN (hypertension)  H/O tonsillitis          Social History: No smoking. No alcohol. No illegal drug use.          MEDICATIONS:  MEDICATIONS  (STANDING):  ALBUTerol/ipratropium for Nebulization 3 milliLiter(s) Nebulizer every 4 hours  buDESOnide  80 MICROgram(s)/formoterol 4.5 MICROgram(s) Inhaler 2 Puff(s) Inhalation two times a day  cefpodoxime 200 milliGRAM(s) Oral every 12 hours  furosemide   Injectable 40 milliGRAM(s) IV Push two times a day  heparin  Injectable 5000 Unit(s) SubCutaneous three times a day  methylPREDNISolone sodium succinate Injectable 60 milliGRAM(s) IV Push two times a day  pantoprazole Infusion 8 mG/Hr (10 mL/Hr) IV Continuous <Continuous>    MEDICATIONS  (PRN):  benzocaine 15 mG/menthol 3.6 mG (Sugar-Free) Lozenge 1 Lozenge Oral every 2 hours PRN Sore Throat  benzocaine 20% Spray 1 Spray(s) Topical every 4 hours PRN throat irritation from NGT  ondansetron Injectable 8 milliGRAM(s) IV Push every 8 hours PRN Nausea and/or Vomiting        Allergies:Allergies    No Known Allergies           REVIEW OF SYSTEMS:  General:  No weight loss, fevers, or chills.  Eyes:  No reported pain or visual changes  ENT:  No sore throat or runny nose.  NECK: No stiffness   CV:  No chest pain or palpitations.  Resp:  +shortness of breath, +cough  GI:  No abdominal pain, nausea, vomiting, dysphagia, diarrhea or constipation. No rectal bleeding, melena, or hematemesis.  Neuro:  No tingling, numbness       VITAL SIGNS:   T(F): 97.1 (03-15-19 @ 05:16), Max: 97.5 (03-14-19 @ 22:02)  HR: 60 (03-15-19 @ 05:16) (55 - 87)  BP: 115/61 (03-15-19 @ 05:16) (112/62 - 115/61)  RR: 16 (03-15-19 @ 05:16) (16 - 16)  SpO2: --    PHYSICAL EXAM:  GENERAL: AAOx3, no acute distress.  HEAD:  Atraumatic, Normocephalic  EYES: conjunctiva and sclera clear  NECK: Supple, no JVD or thyromegaly  CHEST/LUNG: +much improved rhonchi  HEART: Regular rate and rhythm; normal S1, S2, No murmurs.  ABDOMEN: Soft, nontender, +distended; Bowel sounds present, no abdominal bruit, masses, or hepatosplenomegaly  NEUROLOGY: No asterixis or tremor.   SKIN: Intact, no jaundice            LABS:                        11.1   9.72  )-----------( 311      ( 15 Mar 2019 06:22 )             33.0     03-15    137  |  95<L>  |  78<HH>  ----------------------------<  162<H>  3.9   |  30  |  1.7<H>    Ca    8.1<L>      15 Mar 2019 06:22    TPro  5.1<L>  /  Alb  2.7<L>  /  TBili  0.4  /  DBili  x   /  AST  14  /  ALT  10  /  AlkPhos  42  03-14    LIVER FUNCTIONS - ( 14 Mar 2019 06:39 )  Alb: 2.7 g/dL / Pro: 5.1 g/dL / ALK PHOS: 42 U/L / ALT: 10 U/L / AST: 14 U/L / GGT: x               IMAGING:  < from: CT Abdomen and Pelvis w/ Oral Cont (03.09.19 @ 20:29) >    EXAM:  CT ABDOMEN AND PELVIS OC            PROCEDURE DATE:  03/09/2019            INTERPRETATION:  CLINICAL HISTORY/REASON FOR EXAM: Abdominal pain.    TECHNIQUE: Contiguous axial CT images were obtained from the lower chest   to the pubic symphysis without intravenous contrast. Oral contrast was   administered. Reformatted images in the coronal and sagittal planes were   acquired.    COMPARISON: None.      FINDINGS:    LOWER CHEST: Bilateral pleural effusion with overlying compressive   atelectasis. Right lower lobe reticular nodular densities (series 4,   image 15).    HEPATOBILIARY: Unremarkable.    SPLEEN: Unremarkable.    PANCREAS: Unremarkable.    ADRENAL GLANDS: Unremarkable.    KIDNEYS: No hydronephrosis or nephrolithiasis.    ABDOMINOPELVIC NODES: No lymphadenopathy.    PELVIC ORGANS: Unremarkable.    PERITONEUM/MESENTERY/BOWEL: No bowel obstruction. No ascites or   pneumoperitoneum. Normal appendix.    BONES/SOFT TISSUES: Degenerative changes of the spine. Fat-containing   umbilical hernia. Nonobstructive bowel within a left inguinal hernia.      IMPRESSION:  1.  Bilateral pleural effusions with right lower lobe reticulonodular   densities, may be secondary to an infectious etiology.  2.  No evidence of acute abdominopelvic pathology.              PRASAD FERNÁNDEZ M.D., RESIDENT RADIOLOGIST  This document has been electronically signed.  ROSETTE BAH M.D., ATTENDING RADIOLOGIST  This document has been electronically signed. Mar  9 2019  9:26PM    < end of copied text >    < from: Xray Kidney Ureter Bladder (03.13.19 @ 14:57) >  EXAM:  XR KUB 1 VIEW            PROCEDURE DATE:  03/13/2019            INTERPRETATION:  Clinical History / Reason for exam: Abdominal distention.    2 views.    Correlation: CT abdomen and pelvis March 9, 2019.    Findings/  impression: Gaseous distended stomach. No pneumoperitoneum... Lumbar and   lower thoracic spine degenerative changes.                  BRANDON CHICAS M.D., ATTENDING RADIOLOGIST  This document has been electronically signed. Mar 13 2019  3:05PM              < end of copied text >

## 2019-03-15 NOTE — PROGRESS NOTE ADULT - ASSESSMENT
78yMale pmh HTN, HLD, COPD not on home O2 presents for shortness of breath worsening over the past few weeks and new onset A-Fib, patient has +nausea and +coffee ground emesis-multiple episodes all weekend as per patient and nursing team. Patient denies abdominal pain, diarrhea, constipation, blood in stool.    Problem 1-Cofee Ground Emesis resolved   Rec  -Maintain hemodynamic stability  -PPI IV BID  -Maintain Hemodynamic stability  -Active type and screen  -EGD risks outweigh benefits considering coffee ground emesis has ceased, and patient is hemodynamically stable we will hold off on EGD  -Follow up as an outpatient at [facility] [phone]  -CBC monitor H and H    Problem 2-Abdominal distention improved  Rec  -Have patient ambulate and out of bed    Problem 3-Bilateral pleural effusions with right lower lobe reticulonodular densities, may be secondary to an infectious etiology.  Rec  -Care as per primary team patient needs lungs to be optimized prior to EGD 78yMale pmh HTN, HLD, COPD not on home O2 presents for shortness of breath worsening over the past few weeks and new onset A-Fib, patient has +nausea and +coffee ground emesis-multiple episodes all weekend as per patient and nursing team. Patient denies abdominal pain, diarrhea, constipation, blood in stool.    Problem 1-Cofee Ground Emesis resolved   Rec  -Maintain hemodynamic stability  -PPI IV BID  -Active type and screen  -EGD risks outweigh benefits considering coffee ground emesis has ceased, hgb never dropped significantlyand patient is hemodynamically stable  -Follow up as an outpatient at [facility] [phone]  -CBC monitor H and H  -diet as tolerated    Problem 2-Abdominal distention improved  Rec  -Have patient ambulate and out of bed

## 2019-03-15 NOTE — DIETITIAN INITIAL EVALUATION ADULT. - PHYSICAL APPEARANCE
obese/BMI 31.6 (6'4, 259#). Rahat score 17. Skin intact. Edema 1+ dependent, left ankle, right ankle.

## 2019-03-15 NOTE — PROGRESS NOTE ADULT - SUBJECTIVE AND OBJECTIVE BOX
CHIEF COMPLAINT:    Patient is a 78y old  Male who presents with a chief complaint of SOB    INTERVAL HPI/OVERNIGHT EVENTS:    Patient seen and examined at bedside. No acute overnight events occurred.    ROS: Abdominal discomfort resolved. No SOB, chest pain. All other systems are negative.    Vital Signs:    T(F): 97.1 (03-15-19 @ 05:16), Max: 97.5 (03-14-19 @ 22:02)  HR: 60 (03-15-19 @ 05:16) (55 - 87)  BP: 115/61 (03-15-19 @ 05:16) (112/62 - 115/61)  RR: 16 (03-15-19 @ 05:16) (16 - 16)    PHYSICAL EXAM:  GENERAL:  NAD  SKIN: No rashes or lesions  HEENT: Atraumatic. Normocephalic. Anicteric  NECK:  No JVD.   PULMONARY: left basilar crackles  CVS: Normal S1, S2. Regular rate and rhythm. No murmurs.  ABDOMEN/GI: Soft, Nontender, Nondistended; Bowel sounds are present  EXTREMITIES:  No edema B/L LE.  NEUROLOGIC:  No motor deficit.  PSYCH: Alert & oriented x 3, normal affect    LABS:                        11.1   9.72  )-----------( 311      ( 15 Mar 2019 06:22 )             33.0     03-15    137  |  95<L>  |  78<HH>  ----------------------------<  162<H>  3.9   |  30  |  1.7<H>    Ca    8.1<L>      15 Mar 2019 06:22    TPro  5.1<L>  /  Alb  2.7<L>  /  TBili  0.4  /  DBili  x   /  AST  14  /  ALT  10  /  AlkPhos  42  03-14      RADIOLOGY & ADDITIONAL TESTS:  No new images    Medications:  Standing  ALBUTerol/ipratropium for Nebulization 3 milliLiter(s) Nebulizer every 4 hours  buDESOnide  80 MICROgram(s)/formoterol 4.5 MICROgram(s) Inhaler 2 Puff(s) Inhalation two times a day  cefpodoxime 200 milliGRAM(s) Oral every 12 hours  furosemide   Injectable 40 milliGRAM(s) IV Push two times a day  heparin  Injectable 5000 Unit(s) SubCutaneous three times a day  methylPREDNISolone sodium succinate Injectable 60 milliGRAM(s) IV Push two times a day  pantoprazole Infusion 8 mG/Hr IV Continuous <Continuous>    PRN Meds  benzocaine 15 mG/menthol 3.6 mG (Sugar-Free) Lozenge 1 Lozenge Oral every 2 hours PRN  benzocaine 20% Spray 1 Spray(s) Topical every 4 hours PRN  ondansetron Injectable 8 milliGRAM(s) IV Push every 8 hours PRN

## 2019-03-15 NOTE — DIETITIAN INITIAL EVALUATION ADULT. - PERTINENT LABORATORY DATA
(3/15) H/H 11.1/33, chloride 95, BUN 78, creat 1.7, glucose 162, calcium 8.1 (3/14) albumin 2.7, calcium 7.9 (3/13) phos 5.3

## 2019-03-15 NOTE — CONSULT NOTE ADULT - ASSESSMENT
Pt with Chato on probably CKD (no old creat) COPD, HTN, admitted with cough, had abdominal distention, Coffee ground emesis - all resolved.    Chato - prerenal, due to dehydration  -no hydro on CT  - creat trending down  - cont gentle IVF if po intake is not food  - strict Is and OS    Gastric distension  -s/p NGT placement-removed yesterday evening  -resolved    Pneumonia  -initially suspected GNR, ID input suggests CAP  -ID note appreciated  -zosyn d/c, vantin day 6/10 started    will sign off  Call prn

## 2019-03-15 NOTE — CONSULT NOTE ADULT - SUBJECTIVE AND OBJECTIVE BOX
NIMO SARMIENTO 78yMalePatient is a 78y old  Male who presents with a chief complaint of SOB (14 Mar 2019 12:02)      Patient has history of:  No Known Allergies      PMH - not relevant    FSH - non smoker    ROS - cough + SOB +     Patient treated with:  piperacillin/tazobactam IVPB. 2.25 Gram(s) IV Intermittent every 8 hour          PHYSICAL EXAM  T(F): 97.1 (03-15-19 @ 05:16), Max: 97.5 (03-14-19 @ 22:02)  HR: 60 (03-15-19 @ 05:16) (55 - 87)  BP: 115/61 (03-15-19 @ 05:16) (112/62 - 115/61)  RR: 16 (03-15-19 @ 05:16) (16 - 16)  SpO2: --  Daily     Daily     awake and alert   HEENT: normal, no nuchal rigidity  Cor: RSR Nl S1 S2  Lungs: few rhonchi   Abdomen: Nontender, Nl BS,   Ext: No phlebitis     LAB & RADIOLOGIC RESULTS:                        10.9   8.47  )-----------( 277      ( 14 Mar 2019 06:39 )             31.4         03-14    133<L>  |  94<L>  |  69<HH>  ----------------------------<  165<H>  4.0   |  23  |  2.3<H>    Ca    8.1<L>      14 Mar 2019 06:39    TPro  5.1<L>  /  Alb  2.7<L>  /  TBili  0.4  /  DBili  x   /  AST  14  /  ALT  10  /  AlkPhos  42  03-14      LIVER FUNCTIONS - ( 14 Mar 2019 06:39 )  Alb: 2.7 g/dL / Pro: 5.1 g/dL / ALK PHOS: 42 U/L / ALT: 10 U/L / AST: 14 U/L / GGT: x             Chart Note-NGT removal PA [RAMIN Mcneil] (03-14-19 @ 19:04)  Chart Note-Event Note Surgery P PA [JEFF Hoyt] (03-14-19 @ 15:07)  Progress Note Adult-Internal Medicine Attending [KARLI Oneal] (03-14-19 @ 12:02)  Progress Note Adult-Gastroenterology PA/Attending [AKIKO Lu] (03-14-19 @ 10:53)  Progress Note Adult-Surgery PA/Attending [RAMIN Mcneil] (03-14-19 @ 10:12)  Chart Note-Event Note Surgery PA [JEFF Mike] (03-14-19 @ 10:05)  Progress Note Adult-Pulmonology Attending [XANDER Erazo] (03-14-19 @ 09:36)  Consult Note Adult-Surgery PA/Attending [RAMIN Mcneil] (03-13-19 @ 18:56)  Chart Note-Event Note Attending [KARLI Oneal] (03-13-19 @ 18:25)  Progress Note Adult-Gastroenterology PA/Attending [CALEB Pineda] (03-13-19 @ 10:40)  Progress Note Adult-Internal Medicine Attending [KARLI Oneal] (03-13-19 @ 10:32)  Consult Note Adult-Pulmonology Attending [XANDER Erazo] (03-13-19 @ 09:48)  Progress Note Adult-Gastroenterology PA/Attending [CALEB Pineda] (03-12-19 @ 12:42)  Progress Note Adult-Internal Medicine Attending [KEVIN Hidalgo] (03-12-19 @ 10:24)  Physical Therapy Initial Evaluation Adult [CALEB Brunson] (03-12-19 @ 09:54)  Provider Contact Note (Other) [JEFF Tidwell] (03-11-19 @ 18:34)  Provider Contact Note (Other) [JEFF Tidwell] (03-11-19 @ 15:35)  Progress Note Adult-Internal Medicine Attending [KEVIN Hidalgo] (03-11-19 @ 13:40)  Consult Note Adult-Gastroenterology PA/Attending [CALEB Pineda] (03-11-19 @ 10:32)  Progress Note Adult-Internal Medicine Attending [KEVIN Hidalgo] (03-10-19 @ 10:19)  Care Coordination Assessment [C. Provider] (03-09-19 @ 11:20)  Progress Note Adult-Internal Medicine Attending [KEVIN Hidalgo] (03-09-19 @ 11:03)  Progress Note Adult-Hospitalist Attending [ALFONSO Moreno] (03-08-19 @ 18:23)  Consult Note Adult-Cardiology Attending [RADHA Ashley] (03-08-19 @ 09:30)  H&P Adult [KEVIN Lorenzana] (03-08-19 @ 06:08)  Patient Profile Adult [AURELIO Riddle] (03-08-19 @ 03:47)  Consult Note Adult-Critical Care Attending [MJ Jackson] (03-08-19 @ 00:54)  ED Provider Note [LISBETH Penny] (03-08-19 @ 00:42)  ED ADULT Nurse Note [DHRUV Almeida] (03-07-19 @ 21:00)  ED ADULT Triage Note [SUSU Wagoner] (03-07-19 @ 20:58)      CT abdomen - R infiltrate

## 2019-03-15 NOTE — PROGRESS NOTE ADULT - SUBJECTIVE AND OBJECTIVE BOX
DIAGNOSIS:   HOSPITAL DAY #:    STATUS POST:    POST OPERATIVE DAY #:     Vital Signs Last 24 Hrs  T(C): 36.6 (15 Mar 2019 21:47), Max: 36.7 (15 Mar 2019 14:30)  T(F): 97.8 (15 Mar 2019 21:47), Max: 98 (15 Mar 2019 14:30)  HR: 102 (15 Mar 2019 21:47) (60 - 102)  BP: 112/58 (15 Mar 2019 21:47) (112/58 - 115/61)  BP(mean): --  RR: 17 (15 Mar 2019 21:47) (16 - 17)  SpO2: 95% (15 Mar 2019 19:42) (94% - 95%)    SUBJECTIVE: Pt seen    Pain: YES  [ ]   NO [ ]   Nausea: [ ] YES [ ] NO           Vomiting: [ ] YES [x ] NO  Flatus: [ ] YES [ ] NO             Bowel Movement: [x ] YES [ ] NO     Void: [ ]YES [ ]No      HELGA DRAINAGE: SIGNIFICANT [ ]   NOT SIGNIFICANT [ ]   NOT APPLICABLE [ ]  YES [ ] NO    General Appearance: Appears well, NAD  Neck: Supple  Chest: Equal expansion bilaterally, equal breath sounds  CV: Pulse regular presently  Abdomen: Soft [x ] YES [ ]NO  DISTENDED [x ] YES  ] NO TENDERNESS [x ]YES [ ]NO mild  INCISIONS: HEALING WELL [ ] YES  [ ] NO ERYTHEMA [ ] YES [ ] NO DRAINAGE [ ] YES  [ ] NO  Extremities: Grossly symmetric, CALF TENDERNESS [ ] YES  [x ] NO      LABS:                        11.1   9.72  )-----------( 311      ( 15 Mar 2019 06:22 )             33.0     03-15    137  |  95<L>  |  78<HH>  ----------------------------<  162<H>  3.9   |  30  |  1.7<H>    Ca    8.1<L>      15 Mar 2019 06:22    TPro  5.1<L>  /  Alb  2.7<L>  /  TBili  0.4  /  DBili  x   /  AST  14  /  ALT  10  /  AlkPhos  42  03-14            ASSESSMENT:     GOOD POST OP COURSE [ ]  YES  [ ] NO  CONDITION IMPROVING  []  YES  [ ]  NO          PLAN:    CONTINUE PRESENT MANAGEMENT  [ ] YES  [ ] NO

## 2019-03-15 NOTE — PROGRESS NOTE ADULT - ASSESSMENT
78M PMHx COPD, HTN, HLD, first degree AVB presents for evaluation of SOB, cough. Found to be septic on admisison (tachycardic 96 and leukocytosis 13.04), since resolved, due to R basilar pneumonia, HFpEF. Course complicated by PAULA and coffee ground emesis.    Coffee ground emesis  -likely due to UGIB however no further episodes  -unclear plan for EGD  -protonix PO BID    Gastric distension  -s/p NGT placement-removed yesterday evening  -resolved    Pneumonia  -initially suspected GNR, ID input suggests CAP  -ID note appreciated  -zosyn d/c, vantin day 6/10 started    PAULA  -improved today  -continue to trend    Pleural Effusions  -xray worsened since 3/11  -lung sounds improving  -appreciate pulmonary input to increase lasix dose, however with PAULA improving and pt clinically improving will c/w lasix 40 mg and increase to 60 mg in AM if unable to wean off o2 or clinically worsening.  -change solumedrol to prednisone    Hyponatremia  -resolved    HFpEF, acute on chronic  -c/w lasix  -strict i/o    First degree AVB  -asymptomatic    COPD  -c/w nebs  -pulmonary consult appreciated, solumedrol, Symbicort started  -duonebs q4    HTN  -now borderline low  -hold bp meds    DVT ppx  Full Code  From home    Progress Note Handoff:  Pending (specify):  EGD  [facility]. They are aware of numerous co morbid conditions and guarded prognosis. Aware he will have prolonged hospital stay.  Disposition: Home___/SNF___/Other________/Unknown at this time____x____

## 2019-03-15 NOTE — CONSULT NOTE ADULT - SUBJECTIVE AND OBJECTIVE BOX
NEPHROLOGY CONSULTATION NOTE    Patient is a 78y Male whom presented to the hospital with SOB, cough.  78M PMHx COPD, HTN, HLD, presents for evaluation of SOB, cough. Found to be septic on admission (tachycardic 96 and leukocytosis 13.04), since resolved, due to R basilar pneumonia, HFpEF. Course complicated by PAULA and coffee ground emesis.Pt also had severe gastric distension, NGT removed yesterday. On regualr diet now.  Seen for PAULA creat 2.4-.1.7  PAST MEDICAL & SURGICAL HISTORY:  High cholesterol  COPD (chronic obstructive pulmonary disease)  HTN (hypertension)  H/O tonsillitis    Allergies:  No Known Allergies    Home Medications Reviewed  Hospital Medications:   MEDICATIONS  (STANDING):  ALBUTerol/ipratropium for Nebulization 3 milliLiter(s) Nebulizer every 4 hours  buDESOnide  80 MICROgram(s)/formoterol 4.5 MICROgram(s) Inhaler 2 Puff(s) Inhalation two times a day  cefpodoxime 200 milliGRAM(s) Oral every 12 hours  furosemide   Injectable 40 milliGRAM(s) IV Push two times a day  heparin  Injectable 5000 Unit(s) SubCutaneous three times a day  pantoprazole    Tablet 40 milliGRAM(s) Oral two times a day  predniSONE   Tablet 60 milliGRAM(s) Oral daily      SOCIAL HISTORY:  Denies ETOH,Smoking,   FAMILY HISTORY:  No pertinent family history in first degree relatives        REVIEW OF SYSTEMS:  CONSTITUTIONAL: No weakness, fevers or chills  RESPIRATORY: No cough, No shortness of breath  CARDIOVASCULAR: No chest pain or palpitations.  GASTROINTESTINAL: No abdominal or epigastric pain. No nausea, vomiting  GENITOURINARY: No dysuria, frequency,   SKIN: No itching, burning, rashes, or lesions   VASCULAR: No bilateral lower extremity edema.   All other review of systems is negative unless indicated above.    VITALS:  T(F): 97.1 (03-15-19 @ 05:16), Max: 97.5 (03-14-19 @ 22:02)  HR: 60 (03-15-19 @ 05:16)  BP: 115/61 (03-15-19 @ 05:16)  RR: 16 (03-15-19 @ 05:16)  SpO2: --        I&O's Detail        PHYSICAL EXAM:  Constitutional: NAD  HEENT: anicteric sclera, MMM  Neck: No JVD  Respiratory: CTAB, no wheezes, rales or rhonchi  Cardiovascular: S1, S2, RRR  Gastrointestinal: BS+, soft, NT/ND  Extremities:  No peripheral edema  Neurological: A/O x 3  Psychiatric: Normal mood, normal affect  : No CVA tenderness. No barth.   Skin: No rashes  Vascular Access:    LABS:  03-15    137  |  95<L>  |  78<HH>  ----------------------------<  162<H>  3.9   |  30  |  1.7<H>    Ca    8.1<L>      15 Mar 2019 06:22    TPro  5.1<L>  /  Alb  2.7<L>  /  TBili  0.4  /  DBili      /  AST  14  /  ALT  10  /  AlkPhos  42  03-14    Creatinine Trend: 1.7 <--, 2.3 <--, 2.4 <--, 2.8 <--, 2.4 <--, 1.7 <--, 1.4 <--, 1.7 <--, 2.5 <--                        11.1   9.72  )-----------( 311      ( 15 Mar 2019 06:22 )             33.0     Urine Studies:    Creatinine, Random Urine: 90 mg/dL (03-10 @ 11:20)      03-14 @ 06:39  7.9  71  --        RADIOLOGY & ADDITIONAL STUDIES:  < from: CT Abdomen and Pelvis w/ Oral Cont (03.09.19 @ 20:29) >  KIDNEYS: No hydronephrosis or nephrolithiasis.    ABDOMINOPELVIC NODES: No lymphadenopathy.    PELVIC ORGANS: Unremarkable.    PERITONEUM/MESENTERY/BOWEL: No bowel obstruction. No ascites or   pneumoperitoneum. Normal appendix.    BONES/SOFT TISSUES: Degenerative changes of the spine. Fat-containing   umbilical hernia. Nonobstructive bowel within a left inguinal hernia.      IMPRESSION:  1.  Bilateral pleural effusions with right lower lobe reticulonodular   densities, may be secondary to an infectious etiology.  2.  No evidence of acute abdominopelvic pathology.      < end of copied text >

## 2019-03-15 NOTE — PROGRESS NOTE ADULT - ASSESSMENT
· Assessment		  78M PMHx COPD, HTN, HLD, first degree AVB presents for evaluation of SOB, cough. Found to be septic on admisison (tachycardic 96 and leukocytosis 13.04), since resolved, due to R basilar pneumonia,  Course also complicated by PAULA and coffee ground emesis, which has resolved with PPI drip. Pt's respiratory status impeding him from getting EGD right now.  Pt also with gaseous distention with recent BM/flatus     -  NGT removed last night.  s/p BM x 2 per patient report  - GI follow up, possible EGD today?  - will d/w team

## 2019-03-15 NOTE — CONSULT NOTE ADULT - NSICDXPROBLEM_GEN_ALL_CORE_FT
PROBLEM DIAGNOSES  Problem: CAP (community acquired pneumonia) due to Pneumococcus  Recommendation: Dc planning   complete 10 days of abx  ambulate aggressively   PO abx  Recall if needed

## 2019-03-15 NOTE — PROGRESS NOTE ADULT - SUBJECTIVE AND OBJECTIVE BOX
Patient is a 78y old  Male who presents with a chief complaint of SOB (14 Mar 2019 12:02)      INTERVAL HISTORY/overnight events  feel better cough decreased       Vital Signs Last 24 Hrs  T(C): 36.2 (15 Mar 2019 05:16), Max: 36.4 (14 Mar 2019 22:02)  T(F): 97.1 (15 Mar 2019 05:16), Max: 97.5 (14 Mar 2019 22:02)  HR: 60 (15 Mar 2019 05:16) (55 - 87)  BP: 115/61 (15 Mar 2019 05:16) (112/62 - 115/61)  BP(mean): --  RR: 16 (15 Mar 2019 05:16) (16 - 16)  SpO2: --  Daily     Daily   I&O's Summary      Physical Examination:    General: No acute distress.  Alert, oriented, interactive, nonfocal    HEENT: Pupils equal, reactive to light.  Symmetric.    PULM: Clear to auscultation bilaterally, no significant sputum production no wheeze   decrease bibasilar     CVS: Regular rate and rhythm, no murmurs, rubs, or gallops    ABD: Soft, nondistended, nontender, normoactive bowel sounds, no masses    EXT: No edema, nontender    SKIN: Warm and well perfused, no rashes noted.    Neurology:    Musculoskeletal : Move all extremety         Lab Results:                        11.1   9.72  )-----------( 311      ( 15 Mar 2019 06:22 )             33.0     15 Mar 2019 06:22    137    |  95     |  78     ----------------------------<  162    3.9     |  30     |  1.7      Ca    8.1        15 Mar 2019 06:22                Microbiology      Medication:  MEDICATIONS  (STANDING):  ALBUTerol/ipratropium for Nebulization 3 milliLiter(s) Nebulizer every 4 hours  buDESOnide  80 MICROgram(s)/formoterol 4.5 MICROgram(s) Inhaler 2 Puff(s) Inhalation two times a day  cefpodoxime 200 milliGRAM(s) Oral every 12 hours  furosemide   Injectable 40 milliGRAM(s) IV Push two times a day  heparin  Injectable 5000 Unit(s) SubCutaneous three times a day  methylPREDNISolone sodium succinate Injectable 60 milliGRAM(s) IV Push two times a day  pantoprazole Infusion 8 mG/Hr (10 mL/Hr) IV Continuous <Continuous>    MEDICATIONS  (PRN):  benzocaine 15 mG/menthol 3.6 mG (Sugar-Free) Lozenge 1 Lozenge Oral every 2 hours PRN Sore Throat  benzocaine 20% Spray 1 Spray(s) Topical every 4 hours PRN throat irritation from NGT  ondansetron Injectable 8 milliGRAM(s) IV Push every 8 hours PRN Nausea and/or Vomiting        IMAGING STUDIES:    worsening b/l effusion

## 2019-03-15 NOTE — DIETITIAN INITIAL EVALUATION ADULT. - OTHER INFO
Reason for assessment: LOS. PMH: HTN, HLD, COPD. Pt presented to ED for coughing + SOB. Pt was found to be septic on admit due to basilar PNA, HFpEF (resolved). Pt is now admitted for PAULA and coffee ground emesis (likely 2/2 UGIB)-Pt had NGT to suction but it has been removed. Hyponatremia (now WNL) noted. Pt had N/V but that has subsided. Pt reports that 6 months ago he use to weigh over 300# but lost weight due to poor PO intake. Pt has no past admit w/ wt documented. Unable to confirm at this time. Last BM was today 3/15. Abdomen noted as mildly distended. NKFA.

## 2019-03-16 LAB
ANION GAP SERPL CALC-SCNC: 16 MMOL/L — HIGH (ref 7–14)
BASOPHILS # BLD AUTO: 0.03 K/UL — SIGNIFICANT CHANGE UP (ref 0–0.2)
BASOPHILS NFR BLD AUTO: 0.2 % — SIGNIFICANT CHANGE UP (ref 0–1)
BUN SERPL-MCNC: 79 MG/DL — CRITICAL HIGH (ref 10–20)
CALCIUM SERPL-MCNC: 8.6 MG/DL — SIGNIFICANT CHANGE UP (ref 8.5–10.1)
CHLORIDE SERPL-SCNC: 95 MMOL/L — LOW (ref 98–110)
CO2 SERPL-SCNC: 27 MMOL/L — SIGNIFICANT CHANGE UP (ref 17–32)
CREAT SERPL-MCNC: 2.2 MG/DL — HIGH (ref 0.7–1.5)
EOSINOPHIL # BLD AUTO: 0 K/UL — SIGNIFICANT CHANGE UP (ref 0–0.7)
EOSINOPHIL NFR BLD AUTO: 0 % — SIGNIFICANT CHANGE UP (ref 0–8)
GLUCOSE SERPL-MCNC: 160 MG/DL — HIGH (ref 70–99)
HCT VFR BLD CALC: 34.4 % — LOW (ref 42–52)
HGB BLD-MCNC: 11.8 G/DL — LOW (ref 14–18)
IMM GRANULOCYTES NFR BLD AUTO: 2.5 % — HIGH (ref 0.1–0.3)
LYMPHOCYTES # BLD AUTO: 1.79 K/UL — SIGNIFICANT CHANGE UP (ref 1.2–3.4)
LYMPHOCYTES # BLD AUTO: 14.6 % — LOW (ref 20.5–51.1)
MCHC RBC-ENTMCNC: 29.4 PG — SIGNIFICANT CHANGE UP (ref 27–31)
MCHC RBC-ENTMCNC: 34.3 G/DL — SIGNIFICANT CHANGE UP (ref 32–37)
MCV RBC AUTO: 85.6 FL — SIGNIFICANT CHANGE UP (ref 80–94)
MONOCYTES # BLD AUTO: 1.46 K/UL — HIGH (ref 0.1–0.6)
MONOCYTES NFR BLD AUTO: 11.9 % — HIGH (ref 1.7–9.3)
NEUTROPHILS # BLD AUTO: 8.7 K/UL — HIGH (ref 1.4–6.5)
NEUTROPHILS NFR BLD AUTO: 70.8 % — SIGNIFICANT CHANGE UP (ref 42.2–75.2)
NRBC # BLD: 0 /100 WBCS — SIGNIFICANT CHANGE UP (ref 0–0)
OSMOLALITY SERPL: 296 MOS/KG — SIGNIFICANT CHANGE UP (ref 289–308)
PLATELET # BLD AUTO: 353 K/UL — SIGNIFICANT CHANGE UP (ref 130–400)
POTASSIUM SERPL-MCNC: 3 MMOL/L — LOW (ref 3.5–5)
POTASSIUM SERPL-SCNC: 3 MMOL/L — LOW (ref 3.5–5)
RBC # BLD: 4.02 M/UL — LOW (ref 4.7–6.1)
RBC # FLD: 12.6 % — SIGNIFICANT CHANGE UP (ref 11.5–14.5)
SODIUM SERPL-SCNC: 138 MMOL/L — SIGNIFICANT CHANGE UP (ref 135–146)
WBC # BLD: 12.29 K/UL — HIGH (ref 4.8–10.8)
WBC # FLD AUTO: 12.29 K/UL — HIGH (ref 4.8–10.8)

## 2019-03-16 RX ORDER — FUROSEMIDE 40 MG
60 TABLET ORAL
Qty: 0 | Refills: 0 | Status: DISCONTINUED | OUTPATIENT
Start: 2019-03-16 | End: 2019-03-18

## 2019-03-16 RX ORDER — POTASSIUM CHLORIDE 20 MEQ
40 PACKET (EA) ORAL EVERY 4 HOURS
Qty: 0 | Refills: 0 | Status: COMPLETED | OUTPATIENT
Start: 2019-03-16 | End: 2019-03-16

## 2019-03-16 RX ADMIN — BUDESONIDE AND FORMOTEROL FUMARATE DIHYDRATE 2 PUFF(S): 160; 4.5 AEROSOL RESPIRATORY (INHALATION) at 21:52

## 2019-03-16 RX ADMIN — Medication 60 MILLIGRAM(S): at 19:27

## 2019-03-16 RX ADMIN — Medication 40 MILLIEQUIVALENT(S): at 14:45

## 2019-03-16 RX ADMIN — Medication 40 MILLIEQUIVALENT(S): at 10:55

## 2019-03-16 RX ADMIN — Medication 3 MILLILITER(S): at 07:30

## 2019-03-16 RX ADMIN — HEPARIN SODIUM 5000 UNIT(S): 5000 INJECTION INTRAVENOUS; SUBCUTANEOUS at 21:51

## 2019-03-16 RX ADMIN — PANTOPRAZOLE SODIUM 40 MILLIGRAM(S): 20 TABLET, DELAYED RELEASE ORAL at 19:37

## 2019-03-16 RX ADMIN — HEPARIN SODIUM 5000 UNIT(S): 5000 INJECTION INTRAVENOUS; SUBCUTANEOUS at 05:56

## 2019-03-16 RX ADMIN — Medication 40 MILLIGRAM(S): at 05:56

## 2019-03-16 RX ADMIN — Medication 200 MILLIGRAM(S): at 05:56

## 2019-03-16 RX ADMIN — Medication 200 MILLIGRAM(S): at 19:26

## 2019-03-16 RX ADMIN — Medication 3 MILLILITER(S): at 13:05

## 2019-03-16 RX ADMIN — HEPARIN SODIUM 5000 UNIT(S): 5000 INJECTION INTRAVENOUS; SUBCUTANEOUS at 14:24

## 2019-03-16 RX ADMIN — Medication 60 MILLIGRAM(S): at 05:56

## 2019-03-16 RX ADMIN — PANTOPRAZOLE SODIUM 40 MILLIGRAM(S): 20 TABLET, DELAYED RELEASE ORAL at 05:56

## 2019-03-16 NOTE — PROGRESS NOTE ADULT - SUBJECTIVE AND OBJECTIVE BOX
CHIEF COMPLAINT:    Patient is a 78y old  Male who presents with a chief complaint of shortness of breath    INTERVAL HPI/OVERNIGHT EVENTS:    Patient seen and examined at bedside. No acute overnight events occurred.    ROS: Reports diarrhea overnight after eating a pint of icecream. All other systems are negative.    Vital Signs:    T(F): 97.3 (19 @ 05:06), Max: 98 (03-15-19 @ 14:30)  HR: 87 (19 @ 05:06) (87 - 102)  BP: 136/55 (19 @ 05:06) (112/58 - 136/55)  RR: 16 (19 @ 05:06) (16 - 17)  SpO2: 95% (19 @ 06:00) (94% - 95%)  I&O's Summary    15 Mar 2019 07:01  -  16 Mar 2019 07:00  --------------------------------------------------------  IN: 0 mL / OUT: 930 mL / NET: -930 mL      Daily     Daily Weight in k.5 (16 Mar 2019 05:06)  CAPILLARY BLOOD GLUCOSE    PHYSICAL EXAM:  GENERAL:  NAD  SKIN: No rashes or lesions  HEENT: Atraumatic. Normocephalic. Anicteric  NECK:  No JVD.   PULMONARY: Clear to ausculation bilaterally. No wheezing. No rales  CVS: Normal S1, S2. Regular rate and rhythm. No murmurs.  ABDOMEN/GI: Soft, Nontender, Nondistended; Bowel sounds are present  EXTREMITIES:  No edema B/L LE.  NEUROLOGIC:  No motor deficit.  PSYCH: Alert & oriented x 3, normal affect    LABS:                        11.8   12.29 )-----------( 353      ( 16 Mar 2019 06:56 )             34.4     -    138  |  95<L>  |  79<HH>  ----------------------------<  160<H>  3.0<L>   |  27  |  2.2<H>    Ca    8.6      16 Mar 2019 06:56      RADIOLOGY & ADDITIONAL TESTS:  CXR pending for today    Medications:  Standing  ALBUTerol/ipratropium for Nebulization 3 milliLiter(s) Nebulizer every 4 hours  buDESOnide  80 MICROgram(s)/formoterol 4.5 MICROgram(s) Inhaler 2 Puff(s) Inhalation two times a day  cefpodoxime 200 milliGRAM(s) Oral every 12 hours  furosemide   Injectable 40 milliGRAM(s) IV Push two times a day  heparin  Injectable 5000 Unit(s) SubCutaneous three times a day  pantoprazole    Tablet 40 milliGRAM(s) Oral two times a day  predniSONE   Tablet 60 milliGRAM(s) Oral daily    PRN Meds  benzocaine 15 mG/menthol 3.6 mG (Sugar-Free) Lozenge 1 Lozenge Oral every 2 hours PRN  ondansetron Injectable 8 milliGRAM(s) IV Push every 8 hours PRN      Case discussed with resident  Care discussed with pt

## 2019-03-16 NOTE — PROGRESS NOTE ADULT - ASSESSMENT
78M PMHx COPD, HTN, HLD, first degree AVB presents for evaluation of SOB, cough. Found to be septic on admisison (tachycardic 96 and leukocytosis 13.04), since resolved, due to R basilar pneumonia, HFpEF. Course complicated by PAULA and coffee ground emesis.    Coffee ground emesis  -resolved  -GI rec appreciated-no acute intervention, f/u outp  -protonix PO BID    Gastric distension  -resolved    Diarrhea  -likely due to pint of icecream  -monitor    Pneumonia  -initially suspected GNR, ID input suggests CAP  - 5 days of zosyn completed changed to vantin day 7/10 of antibiotics    PAULA  -last known normal SCr 2016-it is possible this is pt's new baseline  -renal consult appreciated    Pleural Effusions  -xray worsened since 3/11  -lung sounds improving  -CXR pending-will adjust lasix accordingly  -titrate off o2    Hyponatremia  -resolved    Hypokalemia  -may be due to NGT suction   -replete and follow up in AM  -check Mg    HFpEF, acute on chronic  -c/w lasix  -strict i/o    First degree AVB  -asymptomatic    COPD  -c/w nebs  -pulmonary consult appreciated,  Symbicort started  -c/w prednisone  -duonebs q4    HTN  -BP improving  -may need to add back antihypertensives    Deconditioning  -pt/rehab    DVT ppx  Full Code  From home  Over all guarded prognosis    Progress Note Handoff:  Pending (specify):   Family discussion: plan as outlined above discussed with pt this morning.  Disposition: Home___/SNF___/Other________/Unknown at this time____x____    I may be contacted at [facility] # [phone]

## 2019-03-17 LAB
ANION GAP SERPL CALC-SCNC: 11 MMOL/L — SIGNIFICANT CHANGE UP (ref 7–14)
BASOPHILS # BLD AUTO: 0.07 K/UL — SIGNIFICANT CHANGE UP (ref 0–0.2)
BASOPHILS NFR BLD AUTO: 0.4 % — SIGNIFICANT CHANGE UP (ref 0–1)
BUN SERPL-MCNC: 65 MG/DL — CRITICAL HIGH (ref 10–20)
CALCIUM SERPL-MCNC: 8.5 MG/DL — SIGNIFICANT CHANGE UP (ref 8.5–10.1)
CHLORIDE SERPL-SCNC: 97 MMOL/L — LOW (ref 98–110)
CO2 SERPL-SCNC: 30 MMOL/L — SIGNIFICANT CHANGE UP (ref 17–32)
CREAT SERPL-MCNC: 1.8 MG/DL — HIGH (ref 0.7–1.5)
EOSINOPHIL # BLD AUTO: 0.02 K/UL — SIGNIFICANT CHANGE UP (ref 0–0.7)
EOSINOPHIL NFR BLD AUTO: 0.1 % — SIGNIFICANT CHANGE UP (ref 0–8)
GLUCOSE SERPL-MCNC: 147 MG/DL — HIGH (ref 70–99)
HCT VFR BLD CALC: 33.4 % — LOW (ref 42–52)
HGB BLD-MCNC: 11.5 G/DL — LOW (ref 14–18)
IMM GRANULOCYTES NFR BLD AUTO: 6.5 % — HIGH (ref 0.1–0.3)
LYMPHOCYTES # BLD AUTO: 21.2 % — SIGNIFICANT CHANGE UP (ref 20.5–51.1)
LYMPHOCYTES # BLD AUTO: 3.31 K/UL — SIGNIFICANT CHANGE UP (ref 1.2–3.4)
MAGNESIUM SERPL-MCNC: 1.7 MG/DL — LOW (ref 1.8–2.4)
MCHC RBC-ENTMCNC: 29.1 PG — SIGNIFICANT CHANGE UP (ref 27–31)
MCHC RBC-ENTMCNC: 34.4 G/DL — SIGNIFICANT CHANGE UP (ref 32–37)
MCV RBC AUTO: 84.6 FL — SIGNIFICANT CHANGE UP (ref 80–94)
MONOCYTES # BLD AUTO: 1.39 K/UL — HIGH (ref 0.1–0.6)
MONOCYTES NFR BLD AUTO: 8.9 % — SIGNIFICANT CHANGE UP (ref 1.7–9.3)
NEUTROPHILS # BLD AUTO: 9.78 K/UL — HIGH (ref 1.4–6.5)
NEUTROPHILS NFR BLD AUTO: 62.9 % — SIGNIFICANT CHANGE UP (ref 42.2–75.2)
NRBC # BLD: 0 /100 WBCS — SIGNIFICANT CHANGE UP (ref 0–0)
PLATELET # BLD AUTO: 361 K/UL — SIGNIFICANT CHANGE UP (ref 130–400)
POTASSIUM SERPL-MCNC: 3.7 MMOL/L — SIGNIFICANT CHANGE UP (ref 3.5–5)
POTASSIUM SERPL-SCNC: 3.7 MMOL/L — SIGNIFICANT CHANGE UP (ref 3.5–5)
RBC # BLD: 3.95 M/UL — LOW (ref 4.7–6.1)
RBC # FLD: 12.7 % — SIGNIFICANT CHANGE UP (ref 11.5–14.5)
SODIUM SERPL-SCNC: 138 MMOL/L — SIGNIFICANT CHANGE UP (ref 135–146)
WBC # BLD: 15.59 K/UL — HIGH (ref 4.8–10.8)
WBC # FLD AUTO: 15.59 K/UL — HIGH (ref 4.8–10.8)

## 2019-03-17 RX ORDER — MAGNESIUM SULFATE 500 MG/ML
2 VIAL (ML) INJECTION
Qty: 0 | Refills: 0 | Status: COMPLETED | OUTPATIENT
Start: 2019-03-17 | End: 2019-03-17

## 2019-03-17 RX ADMIN — Medication 60 MILLIGRAM(S): at 18:17

## 2019-03-17 RX ADMIN — HEPARIN SODIUM 5000 UNIT(S): 5000 INJECTION INTRAVENOUS; SUBCUTANEOUS at 05:46

## 2019-03-17 RX ADMIN — Medication 200 MILLIGRAM(S): at 05:44

## 2019-03-17 RX ADMIN — HEPARIN SODIUM 5000 UNIT(S): 5000 INJECTION INTRAVENOUS; SUBCUTANEOUS at 13:53

## 2019-03-17 RX ADMIN — HEPARIN SODIUM 5000 UNIT(S): 5000 INJECTION INTRAVENOUS; SUBCUTANEOUS at 21:46

## 2019-03-17 RX ADMIN — PANTOPRAZOLE SODIUM 40 MILLIGRAM(S): 20 TABLET, DELAYED RELEASE ORAL at 05:44

## 2019-03-17 RX ADMIN — Medication 60 MILLIGRAM(S): at 05:45

## 2019-03-17 RX ADMIN — BUDESONIDE AND FORMOTEROL FUMARATE DIHYDRATE 2 PUFF(S): 160; 4.5 AEROSOL RESPIRATORY (INHALATION) at 21:47

## 2019-03-17 RX ADMIN — Medication 3 MILLILITER(S): at 12:54

## 2019-03-17 RX ADMIN — Medication 50 GRAM(S): at 13:50

## 2019-03-17 RX ADMIN — Medication 50 GRAM(S): at 17:44

## 2019-03-17 RX ADMIN — PANTOPRAZOLE SODIUM 40 MILLIGRAM(S): 20 TABLET, DELAYED RELEASE ORAL at 18:17

## 2019-03-17 RX ADMIN — Medication 3 MILLILITER(S): at 07:38

## 2019-03-17 RX ADMIN — Medication 200 MILLIGRAM(S): at 18:17

## 2019-03-17 NOTE — PROGRESS NOTE ADULT - SUBJECTIVE AND OBJECTIVE BOX
CHIEF COMPLAINT:    Patient is a 78y old  Male who presents with a chief complaint of Shortness of breath (16 Mar 2019 09:36)      INTERVAL HPI/OVERNIGHT EVENTS:    Patient seen and examined at bedside. No acute overnight events occurred.    ROS: No SOB, chest pain. All other systems are negative.    Vital Signs:    T(F): 96.7 (19 @ 04:56), Max: 97 (19 @ 22:05)  HR: 83 (19 @ 04:56) (83 - 102)  BP: 108/61 (19 @ 04:56) (108/61 - 129/75)  RR: 16 (19 @ 04:56) (16 - 16)  SpO2: 93% (19 @ 10:38) (92% - 96%)  I&O's Summary    16 Mar 2019 07:01  -  17 Mar 2019 07:00  --------------------------------------------------------  IN: 0 mL / OUT: 350 mL / NET: -350 mL      Daily     Daily Weight in k.4 (17 Mar 2019 05:56)  CAPILLARY BLOOD GLUCOSE          PHYSICAL EXAM:  GENERAL:  NAD  SKIN: No rashes or lesions  HEENT: Atraumatic. Normocephalic. Anicteric  NECK:  No JVD.   PULMONARY: Diffuse wheezing  CVS: Normal S1, S2. Regular rate and rhythm. No murmurs.  ABDOMEN/GI: Soft, Nontender, Nondistended; Bowel sounds are present  EXTREMITIES:  No edema B/L LE.  NEUROLOGIC:  No motor deficit.  PSYCH: Alert & oriented x 3, normal affect    LABS:                        11.5   15.59 )-----------( 361      ( 17 Mar 2019 06:53 )             33.4         138  |  97<L>  |  65<HH>  ----------------------------<  147<H>  3.7   |  30  |  1.8<H>    Ca    8.5      17 Mar 2019 06:53  Mg     1.7     03-17      RADIOLOGY & ADDITIONAL TESTS:  No new imaging    Medications:  Standing  ALBUTerol/ipratropium for Nebulization 3 milliLiter(s) Nebulizer every 4 hours  buDESOnide  80 MICROgram(s)/formoterol 4.5 MICROgram(s) Inhaler 2 Puff(s) Inhalation two times a day  cefpodoxime 200 milliGRAM(s) Oral every 12 hours  furosemide   Injectable 60 milliGRAM(s) IV Push two times a day  heparin  Injectable 5000 Unit(s) SubCutaneous three times a day  pantoprazole    Tablet 40 milliGRAM(s) Oral two times a day  predniSONE   Tablet 60 milliGRAM(s) Oral daily    PRN Meds  benzocaine 15 mG/menthol 3.6 mG (Sugar-Free) Lozenge 1 Lozenge Oral every 2 hours PRN  ondansetron Injectable 8 milliGRAM(s) IV Push every 8 hours PRN

## 2019-03-17 NOTE — PROGRESS NOTE ADULT - SUBJECTIVE AND OBJECTIVE BOX
DIAGNOSIS:   HOSPITAL DAY #:    STATUS POST:    POST OPERATIVE DAY #:     Vital Signs Last 24 Hrs  T(C): 36.5 (17 Mar 2019 22:13), Max: 36.5 (17 Mar 2019 22:13)  T(F): 97.7 (17 Mar 2019 22:13), Max: 97.7 (17 Mar 2019 22:13)  HR: 80 (17 Mar 2019 22:13) (80 - 102)  BP: 112/58 (17 Mar 2019 22:13) (106/63 - 112/58)  BP(mean): --  RR: 16 (17 Mar 2019 22:13) (16 - 16)  SpO2: 96% (17 Mar 2019 18:25) (92% - 96%)    SUBJECTIVE: Pt seen    Pain: YES  [ ]   NO [ ]   Nausea: [ ] YES [x ] NO           Vomiting: [ ] YES [x ] NO  Flatus: [x ] YES [ ] NO             Bowel Movement: [ ] YES [ ] NO     Void: [ ]YES [ ]No      HELGA DRAINAGE: SIGNIFICANT [ ]   NOT SIGNIFICANT [ ]   NOT APPLICABLE [ ]  YES [ ] NO    General Appearance: Appears well, NAD  Neck: Supple  Chest: Equal expansion bilaterally, equal breath sounds  CV: Pulse regular presently  Abdomen: Soft [x ] YES [ ]NO  DISTENDED [ ] YES [x ] NO TENDERNESS [ ]YES [x ]NO  INCISIONS: HEALING WELL [ ] YES  [ ] NO ERYTHEMA [ ] YES [ ] NO DRAINAGE [ ] YES  [ ] NO  Extremities: Grossly symmetric, CALF TENDERNESS [ ] YES  [x ] NO      LABS:                        11.5   15.59 )-----------( 361      ( 17 Mar 2019 06:53 )             33.4     03-17    138  |  97<L>  |  65<HH>  ----------------------------<  147<H>  3.7   |  30  |  1.8<H>    Ca    8.5      17 Mar 2019 06:53  Mg     1.7     03-17              ASSESSMENT:     GOOD POST OP COURSE [ ]  YES  [ ] NO  CONDITION IMPROVING  []  YES  [ ]  NO          PLAN:    CONTINUE PRESENT MANAGEMENT  [ ] YES  [ ] NO

## 2019-03-17 NOTE — PROGRESS NOTE ADULT - ASSESSMENT
78M PMHx COPD, HTN, HLD, first degree AVB presents for evaluation of SOB, cough. Found to be septic on admisison (tachycardic 96 and leukocytosis 13.04), since resolved, due to R basilar pneumonia, HFpEF. Course complicated by PAULA and coffee ground emesis.    Coffee ground emesis  -resolved  -GI rec appreciated-no acute intervention, f/u outp  -protonix PO BID    Gastric distension  -resolved    Diarrhea  -resolved    Pneumonia  -initially suspected GNR, ID input suggests CAP  - 5 days of zosyn completed changed to vantin day 8/10 of antibiotics    PAULA  -resolving  -last known normal SCr 2016-it is possible this is pt's new baseline  -renal consult appreciated  -will call pt's PMD Dr. Brian Kiser ([facility]) in am for baseline    Pleural Effusions  -xray worsened since 3/11  -on lasix 60 mg. Will check CXR in AM and change to PO depending on findings  -pt wheezing today (COPD vs cardiac wheeze)    Hyponatremia  -resolved    Hypokalemia  -may be due to NGT suction   -replete and follow up in AM  -check Mg    HFpEF, acute on chronic  -c/w lasix  -strict i/o    First degree AVB  -asymptomatic    COPD  -c/w nebs  -pulmonary consult appreciated,  Symbicort started  -c/w prednisone-increase dose as wheezing worsening  -nata q4  -pt states his PMD has been suggesting home oxygen for many months but pt was declining  -will try to taper o2 but will test ambulatory pulse ox to assess for home o2    Deconditioning  -pt/rehab    DVT ppx  Full Code  From home  Over all guarded prognosis    Will start planning for discharge-needs physiatry consult, transition to PO lasix possibly in am, and ambulatory pulse ox    Progress Note Handoff:  Pending (specify):   Family discussion: plan as outlined above discussed with pt this morning.  Disposition: Home___/SNF___/Other________/Unknown at this time____x____    I may be contacted at [facility] # [phone]

## 2019-03-18 LAB
ANION GAP SERPL CALC-SCNC: 11 MMOL/L — SIGNIFICANT CHANGE UP (ref 7–14)
BASOPHILS # BLD AUTO: 0.12 K/UL — SIGNIFICANT CHANGE UP (ref 0–0.2)
BASOPHILS NFR BLD AUTO: 0.6 % — SIGNIFICANT CHANGE UP (ref 0–1)
BUN SERPL-MCNC: 68 MG/DL — CRITICAL HIGH (ref 10–20)
CALCIUM SERPL-MCNC: 8.7 MG/DL — SIGNIFICANT CHANGE UP (ref 8.5–10.1)
CHLORIDE SERPL-SCNC: 96 MMOL/L — LOW (ref 98–110)
CO2 SERPL-SCNC: 29 MMOL/L — SIGNIFICANT CHANGE UP (ref 17–32)
CREAT SERPL-MCNC: 1.9 MG/DL — HIGH (ref 0.7–1.5)
EOSINOPHIL # BLD AUTO: 0.12 K/UL — SIGNIFICANT CHANGE UP (ref 0–0.7)
EOSINOPHIL NFR BLD AUTO: 0.6 % — SIGNIFICANT CHANGE UP (ref 0–8)
GLUCOSE SERPL-MCNC: 107 MG/DL — HIGH (ref 70–99)
HCT VFR BLD CALC: 33.7 % — LOW (ref 42–52)
HGB BLD-MCNC: 11.4 G/DL — LOW (ref 14–18)
IMM GRANULOCYTES NFR BLD AUTO: 9.9 % — HIGH (ref 0.1–0.3)
LYMPHOCYTES # BLD AUTO: 20.8 % — SIGNIFICANT CHANGE UP (ref 20.5–51.1)
LYMPHOCYTES # BLD AUTO: 3.91 K/UL — HIGH (ref 1.2–3.4)
MAGNESIUM SERPL-MCNC: 2.2 MG/DL — SIGNIFICANT CHANGE UP (ref 1.8–2.4)
MCHC RBC-ENTMCNC: 29 PG — SIGNIFICANT CHANGE UP (ref 27–31)
MCHC RBC-ENTMCNC: 33.8 G/DL — SIGNIFICANT CHANGE UP (ref 32–37)
MCV RBC AUTO: 85.8 FL — SIGNIFICANT CHANGE UP (ref 80–94)
MONOCYTES # BLD AUTO: 1.4 K/UL — HIGH (ref 0.1–0.6)
MONOCYTES NFR BLD AUTO: 7.4 % — SIGNIFICANT CHANGE UP (ref 1.7–9.3)
NEUTROPHILS # BLD AUTO: 11.39 K/UL — HIGH (ref 1.4–6.5)
NEUTROPHILS NFR BLD AUTO: 60.7 % — SIGNIFICANT CHANGE UP (ref 42.2–75.2)
NRBC # BLD: 0 /100 WBCS — SIGNIFICANT CHANGE UP (ref 0–0)
PLATELET # BLD AUTO: 391 K/UL — SIGNIFICANT CHANGE UP (ref 130–400)
POTASSIUM SERPL-MCNC: 3.9 MMOL/L — SIGNIFICANT CHANGE UP (ref 3.5–5)
POTASSIUM SERPL-SCNC: 3.9 MMOL/L — SIGNIFICANT CHANGE UP (ref 3.5–5)
RBC # BLD: 3.93 M/UL — LOW (ref 4.7–6.1)
RBC # FLD: 12.9 % — SIGNIFICANT CHANGE UP (ref 11.5–14.5)
SODIUM SERPL-SCNC: 136 MMOL/L — SIGNIFICANT CHANGE UP (ref 135–146)
WBC # BLD: 18.81 K/UL — HIGH (ref 4.8–10.8)
WBC # FLD AUTO: 18.81 K/UL — HIGH (ref 4.8–10.8)

## 2019-03-18 RX ORDER — FUROSEMIDE 40 MG
60 TABLET ORAL DAILY
Qty: 0 | Refills: 0 | Status: DISCONTINUED | OUTPATIENT
Start: 2019-03-18 | End: 2019-03-19

## 2019-03-18 RX ADMIN — Medication 60 MILLIGRAM(S): at 05:26

## 2019-03-18 RX ADMIN — Medication 200 MILLIGRAM(S): at 05:26

## 2019-03-18 RX ADMIN — Medication 3 MILLILITER(S): at 08:12

## 2019-03-18 RX ADMIN — PANTOPRAZOLE SODIUM 40 MILLIGRAM(S): 20 TABLET, DELAYED RELEASE ORAL at 18:08

## 2019-03-18 RX ADMIN — Medication 3 MILLILITER(S): at 12:10

## 2019-03-18 RX ADMIN — BUDESONIDE AND FORMOTEROL FUMARATE DIHYDRATE 2 PUFF(S): 160; 4.5 AEROSOL RESPIRATORY (INHALATION) at 12:10

## 2019-03-18 RX ADMIN — Medication 3 MILLILITER(S): at 19:29

## 2019-03-18 RX ADMIN — HEPARIN SODIUM 5000 UNIT(S): 5000 INJECTION INTRAVENOUS; SUBCUTANEOUS at 05:26

## 2019-03-18 RX ADMIN — HEPARIN SODIUM 5000 UNIT(S): 5000 INJECTION INTRAVENOUS; SUBCUTANEOUS at 14:26

## 2019-03-18 RX ADMIN — BUDESONIDE AND FORMOTEROL FUMARATE DIHYDRATE 2 PUFF(S): 160; 4.5 AEROSOL RESPIRATORY (INHALATION) at 21:47

## 2019-03-18 RX ADMIN — PANTOPRAZOLE SODIUM 40 MILLIGRAM(S): 20 TABLET, DELAYED RELEASE ORAL at 05:26

## 2019-03-18 RX ADMIN — Medication 3 MILLILITER(S): at 16:54

## 2019-03-18 NOTE — CONSULT NOTE ADULT - CONSULT REQUESTED DATE/TIME
15-Mar-2019 06:47
08-Mar-2019 00:54
08-Mar-2019 09:30
11-Mar-2019 10:32
13-Mar-2019 09:49
15-Mar-2019 12:52
13-Mar-2019 19:17
18-Mar-2019 18:26

## 2019-03-18 NOTE — PROGRESS NOTE ADULT - ASSESSMENT
78M PMHx COPD, HTN, HLD, first degree AVB presents for evaluation of SOB, cough. Found to be septic on admisison (tachycardic 96 and leukocytosis 13.04), since resolved, due to R basilar pneumonia, HFpEF. Course complicated by PAULA and coffee ground emesis.    Coffee ground emesis  -resolved  -GI rec appreciated-no acute intervention, f/u outp  -protonix PO BID    Gastric distension  -resolved    Diarrhea  -resolved    Pneumonia  -initially suspected GNR, ID input suggests CAP  - 5 days of zosyn completed changed to vantin day 9/10 of antibiotics    PAULA  -resolving  -bmp pending for today    Pleural Effusions  -resolved  -lasix changed to PO today    Hyponatremia, hypokalemia, hypomagnesemia  -resolved prior  -bmp pending for today    HFpEF, acute on chronic  -c/w lasix  -strict i/o    First degree AVB  -asymptomatic    COPD  -c/w nebs  -pulmonary consult appreciated,  Symbicort started  -c/w prednisone-increase dose as wheezing worsening  -duonebs q4  -pt states his PMD has been suggesting home oxygen for many months but pt was declining  -will try to taper o2 but will test ambulatory pulse ox to assess for home o2    Deconditioning  -pt/rehab    DVT ppx  Full Code  From home  Over all guarded prognosis    Will start planning for discharge-pending physiatry consult, placement, and response to PO lasix  Progress Note Handoff:  Pending (specify):   Family discussion: plan as outlined above discussed with pt this morning.  Disposition: Home___/SNF___/Other________/Unknown at this time____x____    I may be contacted at [facility] # [phone]

## 2019-03-18 NOTE — CONSULT NOTE ADULT - SUBJECTIVE AND OBJECTIVE BOX
HPI:  · HPI Objective Statement: 77 yo male hx of HTN/HLD/COPD present c/o coughing and SOB worsening over the past few weeks. reported he was seen by PMD and given prednisone and inhaler. patient reported worsening SOB over the past few days so he came to ED for evaluation. + productive yellow sputum. SOB worsen with exertion. Denies fever/chill/HA/dizziness/chest pain/palpitation/abd pain/n/v/d/ black stool/bloody stool/urinary sxs (08 Mar 2019 06:08)      PAST MEDICAL & SURGICAL HISTORY:  High cholesterol  COPD (chronic obstructive pulmonary disease)  HTN (hypertension)  H/O tonsillitis      Hospital Course:  treated for pneumonia, CHF and COPD. Slowly improving. amb. with PT today. Lives alone.  TODAY'S SUBJECTIVE & REVIEW OF SYMPTOMS:     Constitutional WNL   Cardio WNL   Resp WNL   GI WNL  Heme WNL  Endo WNL  Skin WNL  MSK WNL  Neuro WNL  Cognitive WNL  Psych WNL      MEDICATIONS  (STANDING):  ALBUTerol/ipratropium for Nebulization 3 milliLiter(s) Nebulizer every 4 hours  buDESOnide  80 MICROgram(s)/formoterol 4.5 MICROgram(s) Inhaler 2 Puff(s) Inhalation two times a day  furosemide    Tablet 60 milliGRAM(s) Oral daily  heparin  Injectable 5000 Unit(s) SubCutaneous three times a day  pantoprazole    Tablet 40 milliGRAM(s) Oral two times a day  predniSONE   Tablet 60 milliGRAM(s) Oral daily    MEDICATIONS  (PRN):  benzocaine 15 mG/menthol 3.6 mG (Sugar-Free) Lozenge 1 Lozenge Oral every 2 hours PRN Sore Throat  ondansetron Injectable 8 milliGRAM(s) IV Push every 8 hours PRN Nausea and/or Vomiting      FAMILY HISTORY:  No pertinent family history in first degree relatives      Allergies    No Known Allergies    Intolerances        SOCIAL HISTORY:    [  ] Etoh  [  ] Smoking  [  ] Substance abuse     Home Environment:  [x  ] Home Alone  [  ] Lives with Family  [  ] Home Health Aid    Dwelling:  [  ] Apartment  [  ] Private House  [  ] Adult Home  [  ] Skilled Nursing Facility      [  ] Short Term  [  ] Long Term  [x  ] Stairs-steps to enter and to basement     Elevator [  ]    FUNCTIONAL STATUS PTA: (Check all that apply)  Ambulation: [ x  ]Independent    [  ] Dependent     [  ] Non-Ambulatory  Assistive Device: [  ] SA Cane  [  ]  Q Cane  [  ] Walker  [  ]  Wheelchair  no ad at baseline but very easy dyspnea on exertion  ADL : [  ] Independent  [  ]  Dependent       Vital Signs Last 24 Hrs  T(C): 36.7 (18 Mar 2019 14:31), Max: 36.7 (18 Mar 2019 14:31)  T(F): 98 (18 Mar 2019 14:31), Max: 98 (18 Mar 2019 14:31)  HR: 95 (18 Mar 2019 14:31) (80 - 102)  BP: 111/57 (18 Mar 2019 14:31) (111/57 - 121/67)  BP(mean): --  RR: 16 (18 Mar 2019 14:31) (16 - 16)  SpO2: 96% (18 Mar 2019 13:20) (93% - 96%)      PHYSICAL EXAM: Alert & Oriented X3  GENERAL: NAD, well-groomed, well-developed  HEAD:  Atraumatic, Normocephalic  EYES: EOMI, PERRLA, conjunctiva and sclera clear  NECK: Supple, No JVD, Normal thyroid  CHEST/LUNG: Clear to percussion bilaterally; No rales, rhonchi, wheezing, or rubs  HEART: Regular rate and rhythm; No murmurs, rubs, or gallops  ABDOMEN: Soft, Nontender, Nondistended; Bowel sounds present  EXTREMITIES:  2+ Peripheral Pulses, No clubbing, cyanosis, or edema    NERVOUS SYSTEM:  Cranial Nerves 2-12 intact [  ] Abnormal  [  ]  ROM: WFL all extremities [  ]  Abnormal [  ]  Motor Strength: WFL all extremities  [  ]  Abnormal [x]5-/5  Sensation: intact to light touch [  ] Abnormal [  ]  Reflexes: Symmetric [  ]  Abnormal [  ]    FUNCTIONAL STATUS:  Bed Mobility: Independent [  ]  Supervision [  ]  Needs Assistance [  ]  N/A [  ]  Transfers: Independent [  ]  Supervision [  ]  Needs Assistance [  ]  N/A [  ]   Ambulation: Independent [  ]  Supervision [  ]  Needs Assistance [  ]  N/A [  ] amb with walker 5 ft twice with walker with cg assist  ADL: Independent [  ] Requires Assistance [  ] N/A [  ]      LABS:                        11.4   18.81 )-----------( 391      ( 18 Mar 2019 07:28 )             33.7     03-18    136  |  96<L>  |  68<HH>  ----------------------------<  107<H>  3.9   |  29  |  1.9<H>    Ca    8.7      18 Mar 2019 07:28  Mg     2.2     03-18            RADIOLOGY & ADDITIONAL STUDIES:    Assesment:

## 2019-03-18 NOTE — CONSULT NOTE ADULT - PROVIDER SPECIALTY LIST ADULT
Cardiology
Critical Care
Gastroenterology
Nephrology
Pulmonology
Surgery
Infectious Disease
Physiatry

## 2019-03-18 NOTE — CONSULT NOTE ADULT - CONSULT REASON
CAP
N/V, stomach distention on KUB
Coffee Ground Emesis
PAULA
abnormal cxr
hypotension, new onset afib
sob
debility

## 2019-03-18 NOTE — CONSULT NOTE ADULT - ASSESSMENT
IMPRESSION: Rehab of debility, pneumonia, CHF, COPD    PRECAUTIONS: [x  ] Cardiac  [ x ] Respiratory  [  ] Seizures [  ] Contact Isolation  [  ] Droplet Isolation  [  ] Other    Weight Bearing Status:     RECOMMENDATION:  Pursed lip breathing with amb; may need O2 with amb.    Out of Bed to Chair     DVT/Decubiti Prophylaxis    REHAB PLAN:     [xx   ] Bedside P/T 3-5 times a week   [   ]   Bedside O/T  2-3 times a week             [   ] No Rehab Therapy Indicated                   [   ]  Speech Therapy   Conditioning/ROM                                    ADL  Bed Mobility                                               Conditioning/ROM  Transfers                                                     Bed Mobility  Sitting /Standing Balance                         Transfers                                        Gait Training                                               Sitting/Standing Balance  Stair Training [   ]Applicable                    Home equipment Eval                                                                        Splinting  [   ] Only      GOALS:   ADL   [x   ]   Independent                    Transfers  [ x  ] Independent                          Ambulation  [x   ] Independent     [ x   ] With device                            [   ]  CG                                                         [   ]  CG                                                                  [   ] CG                            [    ] Min A                                                   [   ] Min A                                                              [   ] Min  A          DISCHARGE PLAN:   [   ]  Good candidate for Intensive Rehabilitation/Hospital based-[facility]                                             Will tolerate 3hrs Intensive Rehab Daily                                       [ xx   ]  Short Term Rehab in Skilled Nursing Facility is needed                                       [    ]  Home with Outpatient or  services                                         [    ]  Possible Candidate for Intensive Hospital based Rehab

## 2019-03-18 NOTE — PROGRESS NOTE ADULT - SUBJECTIVE AND OBJECTIVE BOX
CHIEF COMPLAINT:    Patient is a 78y old  Male who presents with a chief complaint of Shortness of breath.      INTERVAL HPI/OVERNIGHT EVENTS:    Patient seen and examined at bedside. No acute overnight events occurred.    ROS: All other systems are negative.    Vital Signs:    T(F): 96.8 (03-18-19 @ 05:00), Max: 97.7 (03-17-19 @ 22:13)  HR: 83 (03-18-19 @ 05:00) (80 - 102)  BP: 121/67 (03-18-19 @ 05:00) (106/63 - 121/67)  RR: 16 (03-18-19 @ 05:36) (16 - 16)  SpO2: 93% (03-18-19 @ 05:36) (92% - 96%)  I&O's Summary    17 Mar 2019 07:01  -  18 Mar 2019 07:00  --------------------------------------------------------  IN: 0 mL / OUT: 1100 mL / NET: -1100 mL      PHYSICAL EXAM:  GENERAL:  NAD  SKIN: No rashes or lesions  HEENT: Atraumatic. Normocephalic. Anicteric  NECK:  No JVD.   PULMONARY: Clear to ausculation bilaterally. No wheezing. No rales  CVS: Normal S1, S2. Regular rate and rhythm. No murmurs.  ABDOMEN/GI: Soft, Nontender, Nondistended; Bowel sounds are present  EXTREMITIES:  No edema B/L LE.  NEUROLOGIC:  No motor deficit.  PSYCH: Alert & oriented x 3, normal affect      LABS:                        11.5   15.59 )-----------( 361      ( 17 Mar 2019 06:53 )             33.4     03-17    138  |  97<L>  |  65<HH>  ----------------------------<  147<H>  3.7   |  30  |  1.8<H>    Ca    8.5      17 Mar 2019 06:53  Mg     1.7     03-17                RADIOLOGY & ADDITIONAL TESTS:  Imaging or report Personally Reviewed:  [ ] YES  [ ] NO    EKG reviewed independently    Medications:  Standing  ALBUTerol/ipratropium for Nebulization 3 milliLiter(s) Nebulizer every 4 hours  buDESOnide  80 MICROgram(s)/formoterol 4.5 MICROgram(s) Inhaler 2 Puff(s) Inhalation two times a day  furosemide   Injectable 60 milliGRAM(s) IV Push two times a day  heparin  Injectable 5000 Unit(s) SubCutaneous three times a day  pantoprazole    Tablet 40 milliGRAM(s) Oral two times a day  predniSONE   Tablet 60 milliGRAM(s) Oral daily    PRN Meds  benzocaine 15 mG/menthol 3.6 mG (Sugar-Free) Lozenge 1 Lozenge Oral every 2 hours PRN  ondansetron Injectable 8 milliGRAM(s) IV Push every 8 hours PRN      Case discussed with resident  Care discussed with pt

## 2019-03-19 VITALS
RESPIRATION RATE: 16 BRPM | SYSTOLIC BLOOD PRESSURE: 93 MMHG | TEMPERATURE: 98 F | DIASTOLIC BLOOD PRESSURE: 50 MMHG | HEART RATE: 104 BPM

## 2019-03-19 LAB
ANION GAP SERPL CALC-SCNC: 11 MMOL/L — SIGNIFICANT CHANGE UP (ref 7–14)
BUN SERPL-MCNC: 63 MG/DL — CRITICAL HIGH (ref 10–20)
CALCIUM SERPL-MCNC: 8.2 MG/DL — LOW (ref 8.5–10.1)
CHLORIDE SERPL-SCNC: 100 MMOL/L — SIGNIFICANT CHANGE UP (ref 98–110)
CO2 SERPL-SCNC: 25 MMOL/L — SIGNIFICANT CHANGE UP (ref 17–32)
CREAT SERPL-MCNC: 1.7 MG/DL — HIGH (ref 0.7–1.5)
GLUCOSE SERPL-MCNC: 146 MG/DL — HIGH (ref 70–99)
MAGNESIUM SERPL-MCNC: 1.9 MG/DL — SIGNIFICANT CHANGE UP (ref 1.8–2.4)
POTASSIUM SERPL-MCNC: 3.9 MMOL/L — SIGNIFICANT CHANGE UP (ref 3.5–5)
POTASSIUM SERPL-SCNC: 3.9 MMOL/L — SIGNIFICANT CHANGE UP (ref 3.5–5)
SODIUM SERPL-SCNC: 136 MMOL/L — SIGNIFICANT CHANGE UP (ref 135–146)

## 2019-03-19 RX ORDER — NYSTATIN CREAM 100000 [USP'U]/G
1 CREAM TOPICAL
Qty: 0 | Refills: 0 | Status: DISCONTINUED | OUTPATIENT
Start: 2019-03-19 | End: 2019-03-19

## 2019-03-19 RX ORDER — IPRATROPIUM/ALBUTEROL SULFATE 18-103MCG
3 AEROSOL WITH ADAPTER (GRAM) INHALATION
Qty: 0 | Refills: 0 | DISCHARGE
Start: 2019-03-19

## 2019-03-19 RX ORDER — BUDESONIDE AND FORMOTEROL FUMARATE DIHYDRATE 160; 4.5 UG/1; UG/1
2 AEROSOL RESPIRATORY (INHALATION)
Qty: 0 | Refills: 0 | DISCHARGE
Start: 2019-03-19

## 2019-03-19 RX ORDER — FUROSEMIDE 40 MG
3 TABLET ORAL
Qty: 0 | Refills: 0 | DISCHARGE
Start: 2019-03-19

## 2019-03-19 RX ORDER — PANTOPRAZOLE SODIUM 20 MG/1
1 TABLET, DELAYED RELEASE ORAL
Qty: 0 | Refills: 0 | DISCHARGE
Start: 2019-03-19

## 2019-03-19 RX ORDER — FUROSEMIDE 40 MG
2 TABLET ORAL
Qty: 0 | Refills: 0 | DISCHARGE
Start: 2019-03-19

## 2019-03-19 RX ADMIN — Medication 3 MILLILITER(S): at 16:06

## 2019-03-19 RX ADMIN — HEPARIN SODIUM 5000 UNIT(S): 5000 INJECTION INTRAVENOUS; SUBCUTANEOUS at 12:56

## 2019-03-19 RX ADMIN — PANTOPRAZOLE SODIUM 40 MILLIGRAM(S): 20 TABLET, DELAYED RELEASE ORAL at 05:20

## 2019-03-19 RX ADMIN — Medication 3 MILLILITER(S): at 12:51

## 2019-03-19 RX ADMIN — Medication 60 MILLIGRAM(S): at 05:21

## 2019-03-19 RX ADMIN — Medication 3 MILLILITER(S): at 07:34

## 2019-03-19 RX ADMIN — BUDESONIDE AND FORMOTEROL FUMARATE DIHYDRATE 2 PUFF(S): 160; 4.5 AEROSOL RESPIRATORY (INHALATION) at 08:41

## 2019-03-19 RX ADMIN — Medication 60 MILLIGRAM(S): at 05:22

## 2019-03-19 RX ADMIN — NYSTATIN CREAM 1 APPLICATION(S): 100000 CREAM TOPICAL at 08:41

## 2019-03-19 NOTE — PROGRESS NOTE ADULT - ASSESSMENT
78M PMHx COPD, HTN, HLD, first degree AVB presents for evaluation of SOB, cough. Found to be septic on admisison (tachycardic 96 and leukocytosis 13.04), since resolved, due to R basilar pneumonia, HFpEF. Course complicated by PAULA and coffee ground emesis.    Coffee ground emesis  -resolved  -GI rec appreciated-no acute intervention, f/u outpt  -protonix PO BID    Gastric distension  -resolved    Diarrhea  -resolved    Pneumonia  -initially suspected GNR, ID input suggests CAP  - 5 days of zosyn completed changed to vantin day 10/10 of antibiotics    PAULA  -resolving  -continue to require outpt renal monitoring.    Pleural Effusions  -resolved  -tolerated PO lasix    Hyponatremia, hypokalemia, hypomagnesemia  -resolved    HFpEF, acute on chronic  -c/w lasix  -strict i/o    First degree AVB  -asymptomatic    COPD  -c/w nebs  -c/w symbicort  -start prednisone taper  -RA ambulator pulse ox 95%, does not qualify for o2    Deconditioning  -pt/rehab    DVT ppx  Full Code  From home      Progress Note Handoff:  Pending (specify):   Family discussion: plan as outlined above discussed with pt this morning, discussed with brother via phone today plan for discharge  Disposition: Home___/SNF___/Other________/Unknown at this time____x____    Medically stable for d/c to SNF    I may be contacted at [facility] # [phone]

## 2019-03-19 NOTE — PROGRESS NOTE ADULT - PROVIDER SPECIALTY LIST ADULT
Gastroenterology
Hospitalist
Internal Medicine
Pulmonology
Pulmonology
Surgery

## 2019-03-19 NOTE — DISCHARGE NOTE NURSING/CASE MANAGEMENT/SOCIAL WORK - NSDCPEPT PROEDHF_GEN_ALL_CORE
Report signs and symptoms to primary care provider/Call primary care provider for follow up after discharge/Monitor weight daily/Activities as tolerated/Low salt diet

## 2019-03-19 NOTE — PROGRESS NOTE ADULT - SUBJECTIVE AND OBJECTIVE BOX
CHIEF COMPLAINT:    Patient is a 78y old  Male who presents with a chief complaint of debility, pneumonia    INTERVAL HPI/OVERNIGHT EVENTS:    Patient seen and examined at bedside. No acute overnight events occurred.    ROS: Denies SOB at rest, chest pain, abdominal pain. All other systems are negative.    Vital Signs:    T(F): 96 (03-19-19 @ 06:00), Max: 98 (03-18-19 @ 14:31)  HR: 81 (03-19-19 @ 06:00) (81 - 102)  BP: 114/56 (03-19-19 @ 06:00) (111/57 - 114/56)  RR: 16 (03-19-19 @ 06:00) (16 - 16)  SpO2: 98% (03-19-19 @ 09:14) (96% - 98%)  I&O's Summary    18 Mar 2019 07:01  -  19 Mar 2019 07:00  --------------------------------------------------------  IN: 240 mL / OUT: 1800 mL / NET: -1560 mL    PHYSICAL EXAM:  GENERAL:  NAD  SKIN: No rashes or lesions  HEENT: Atraumatic. Normocephalic. Anicteric  NECK:  No JVD.   PULMONARY: Clear to ausculation bilaterally. No wheezing. No rales  CVS: Normal S1, S2. Regular rate and rhythm. No murmurs.  ABDOMEN/GI: Soft, Nontender, Nondistended; Bowel sounds are present  EXTREMITIES:  No edema B/L LE.  NEUROLOGIC:  No motor deficit.  PSYCH: Alert & oriented x 3, normal affect    LABS:                        11.4   18.81 )-----------( 391      ( 18 Mar 2019 07:28 )             33.7     03-19    136  |  100  |  63<HH>  ----------------------------<  146<H>  3.9   |  25  |  1.7<H>    Ca    8.2<L>      19 Mar 2019 06:49  Mg     1.9     03-19    RADIOLOGY & ADDITIONAL TESTS:  No new imaging    Medications:  Standing  ALBUTerol/ipratropium for Nebulization 3 milliLiter(s) Nebulizer every 4 hours  buDESOnide  80 MICROgram(s)/formoterol 4.5 MICROgram(s) Inhaler 2 Puff(s) Inhalation two times a day  furosemide    Tablet 60 milliGRAM(s) Oral daily  heparin  Injectable 5000 Unit(s) SubCutaneous three times a day  nystatin Powder 1 Application(s) Topical two times a day  pantoprazole    Tablet 40 milliGRAM(s) Oral two times a day  predniSONE   Tablet 60 milliGRAM(s) Oral daily    PRN Meds  benzocaine 15 mG/menthol 3.6 mG (Sugar-Free) Lozenge 1 Lozenge Oral every 2 hours PRN  ondansetron Injectable 8 milliGRAM(s) IV Push every 8 hours PRN

## 2019-03-19 NOTE — DISCHARGE NOTE PROVIDER - NSDCCPCAREPLAN_GEN_ALL_CORE_FT
PRINCIPAL DISCHARGE DIAGNOSIS  Diagnosis: Pneumonia  Assessment and Plan of Treatment:       SECONDARY DISCHARGE DIAGNOSES  Diagnosis: CHF (congestive heart failure)  Assessment and Plan of Treatment:

## 2019-03-19 NOTE — DISCHARGE NOTE PROVIDER - CARE PROVIDER_API CALL
Pasquale Erazo)  Internal Medicine; Pulmonary Disease  [address], [address]  [place] [address]  Phone: [phone]  Fax: [phone]  Follow Up Time:     Miki Renee)  Gastroenterology; Internal Medicine  [address] [place] [address]  Phone: [phone]  Fax: [phone]  Follow Up Time:

## 2019-03-19 NOTE — DISCHARGE NOTE NURSING/CASE MANAGEMENT/SOCIAL WORK - NSDCDPATPORTLINK_GEN_ALL_CORE
You can access the [facility] Patient Portal, offered by [facility], by registering with the following website: [email or web address]

## 2019-03-19 NOTE — DISCHARGE NOTE PROVIDER - HOSPITAL COURSE
78M PMHx COPD, HTN, HLD, first degree AVB presents for evaluation of SOB, cough. Found to be septic on admisison (tachycardic 96 and leukocytosis 13.04), since resolved, due to R basilar pneumonia, HFpEF. Course complicated by PAULA and coffee ground emesis.        Coffee ground emesis    -resolved    -GI rec appreciated-no acute intervention, f/u outpt    -protonix PO BID        Gastric distension    -resolved        Diarrhea    -resolved        Pneumonia    -initially suspected GNR, ID input suggests CAP    - 5 days of zosyn completed changed to vantin day 10/10 of antibiotics        PAULA    -resolving    -continue to require outpt renal monitoring.        Pleural Effusions    -resolved    -tolerated PO lasix        Hyponatremia, hypokalemia, hypomagnesemia    -resolved        HFpEF, acute on chronic    -c/w lasix    -strict i/o        First degree AVB    -asymptomatic        COPD    -c/w nebs    -c/w symbicort    -start prednisone taper    -RA ambulator pulse ox 95%, does not qualify for o2        Deconditioning    -pt/rehab        DVT ppx    Full Code    From home            Progress Note Handoff:    Pending (specify):     Family discussion: plan as outlined above discussed with pt this morning, discussed with brother via phone today plan for discharge    Disposition: Home___/SNF___/Other________/Unknown at this time____x____        Medically stable for d/c to SNF        I may be contacted at [facility] # [phone]

## 2019-03-25 DIAGNOSIS — E87.6 HYPOKALEMIA: ICD-10-CM

## 2019-03-25 DIAGNOSIS — A41.9 SEPSIS, UNSPECIFIED ORGANISM: ICD-10-CM

## 2019-03-25 DIAGNOSIS — E87.1 HYPO-OSMOLALITY AND HYPONATREMIA: ICD-10-CM

## 2019-03-25 DIAGNOSIS — K52.1 TOXIC GASTROENTERITIS AND COLITIS: ICD-10-CM

## 2019-03-25 DIAGNOSIS — I44.0 ATRIOVENTRICULAR BLOCK, FIRST DEGREE: ICD-10-CM

## 2019-03-25 DIAGNOSIS — J44.1 CHRONIC OBSTRUCTIVE PULMONARY DISEASE WITH (ACUTE) EXACERBATION: ICD-10-CM

## 2019-03-25 DIAGNOSIS — E78.5 HYPERLIPIDEMIA, UNSPECIFIED: ICD-10-CM

## 2019-03-25 DIAGNOSIS — K92.0 HEMATEMESIS: ICD-10-CM

## 2019-03-25 DIAGNOSIS — N17.9 ACUTE KIDNEY FAILURE, UNSPECIFIED: ICD-10-CM

## 2019-03-25 DIAGNOSIS — K92.2 GASTROINTESTINAL HEMORRHAGE, UNSPECIFIED: ICD-10-CM

## 2019-03-25 DIAGNOSIS — I13.0 HYPERTENSIVE HEART AND CHRONIC KIDNEY DISEASE WITH HEART FAILURE AND STAGE 1 THROUGH STAGE 4 CHRONIC KIDNEY DISEASE, OR UNSPECIFIED CHRONIC KIDNEY DISEASE: ICD-10-CM

## 2019-03-25 DIAGNOSIS — I48.0 PAROXYSMAL ATRIAL FIBRILLATION: ICD-10-CM

## 2019-03-25 DIAGNOSIS — J18.1 LOBAR PNEUMONIA, UNSPECIFIED ORGANISM: ICD-10-CM

## 2019-03-25 DIAGNOSIS — I50.33 ACUTE ON CHRONIC DIASTOLIC (CONGESTIVE) HEART FAILURE: ICD-10-CM

## 2019-03-25 DIAGNOSIS — E83.42 HYPOMAGNESEMIA: ICD-10-CM

## 2019-03-25 DIAGNOSIS — R06.02 SHORTNESS OF BREATH: ICD-10-CM

## 2019-03-25 DIAGNOSIS — N18.4 CHRONIC KIDNEY DISEASE, STAGE 4 (SEVERE): ICD-10-CM

## 2019-03-25 DIAGNOSIS — R14.0 ABDOMINAL DISTENSION (GASEOUS): ICD-10-CM

## 2019-04-13 ENCOUNTER — OUTPATIENT (OUTPATIENT)
Dept: OUTPATIENT SERVICES | Facility: HOSPITAL | Age: 79
LOS: 1 days | Discharge: HOME | End: 2019-04-13

## 2019-04-13 DIAGNOSIS — Z87.09 PERSONAL HISTORY OF OTHER DISEASES OF THE RESPIRATORY SYSTEM: Chronic | ICD-10-CM

## 2019-04-13 DIAGNOSIS — I50.9 HEART FAILURE, UNSPECIFIED: ICD-10-CM

## 2019-04-13 PROBLEM — I10 ESSENTIAL (PRIMARY) HYPERTENSION: Chronic | Status: ACTIVE | Noted: 2019-03-07

## 2019-04-13 PROBLEM — J44.9 CHRONIC OBSTRUCTIVE PULMONARY DISEASE, UNSPECIFIED: Chronic | Status: ACTIVE | Noted: 2019-03-07

## 2019-04-13 PROBLEM — E78.00 PURE HYPERCHOLESTEROLEMIA, UNSPECIFIED: Chronic | Status: ACTIVE | Noted: 2019-03-07

## 2019-04-18 ENCOUNTER — OUTPATIENT (OUTPATIENT)
Dept: OUTPATIENT SERVICES | Facility: HOSPITAL | Age: 79
LOS: 1 days | Discharge: HOME | End: 2019-04-18

## 2019-04-18 DIAGNOSIS — R79.9 ABNORMAL FINDING OF BLOOD CHEMISTRY, UNSPECIFIED: ICD-10-CM

## 2019-04-18 DIAGNOSIS — Z87.09 PERSONAL HISTORY OF OTHER DISEASES OF THE RESPIRATORY SYSTEM: Chronic | ICD-10-CM

## 2019-04-29 ENCOUNTER — OUTPATIENT (OUTPATIENT)
Dept: OUTPATIENT SERVICES | Facility: HOSPITAL | Age: 79
LOS: 1 days | Discharge: HOME | End: 2019-04-29

## 2019-04-29 DIAGNOSIS — R79.9 ABNORMAL FINDING OF BLOOD CHEMISTRY, UNSPECIFIED: ICD-10-CM

## 2019-04-29 DIAGNOSIS — Z87.09 PERSONAL HISTORY OF OTHER DISEASES OF THE RESPIRATORY SYSTEM: Chronic | ICD-10-CM

## 2019-05-02 ENCOUNTER — OUTPATIENT (OUTPATIENT)
Dept: OUTPATIENT SERVICES | Facility: HOSPITAL | Age: 79
LOS: 1 days | Discharge: HOME | End: 2019-05-02

## 2019-05-02 DIAGNOSIS — R79.9 ABNORMAL FINDING OF BLOOD CHEMISTRY, UNSPECIFIED: ICD-10-CM

## 2019-05-02 DIAGNOSIS — E78.6 LIPOPROTEIN DEFICIENCY: ICD-10-CM

## 2019-05-02 DIAGNOSIS — Z87.09 PERSONAL HISTORY OF OTHER DISEASES OF THE RESPIRATORY SYSTEM: Chronic | ICD-10-CM

## 2019-05-06 ENCOUNTER — OUTPATIENT (OUTPATIENT)
Dept: OUTPATIENT SERVICES | Facility: HOSPITAL | Age: 79
LOS: 1 days | Discharge: HOME | End: 2019-05-06

## 2019-05-06 DIAGNOSIS — R79.9 ABNORMAL FINDING OF BLOOD CHEMISTRY, UNSPECIFIED: ICD-10-CM

## 2019-05-06 DIAGNOSIS — Z87.09 PERSONAL HISTORY OF OTHER DISEASES OF THE RESPIRATORY SYSTEM: Chronic | ICD-10-CM

## 2019-05-22 ENCOUNTER — INPATIENT (INPATIENT)
Facility: HOSPITAL | Age: 79
LOS: 2 days | Discharge: NURSING, REHAB OR LONG-TERM CARE | End: 2019-05-25
Attending: INTERNAL MEDICINE | Admitting: INTERNAL MEDICINE
Payer: MEDICARE

## 2019-05-22 VITALS
WEIGHT: 250 LBS | HEIGHT: 76 IN | SYSTOLIC BLOOD PRESSURE: 128 MMHG | OXYGEN SATURATION: 96 % | RESPIRATION RATE: 18 BRPM | DIASTOLIC BLOOD PRESSURE: 76 MMHG | HEART RATE: 97 BPM | TEMPERATURE: 99 F

## 2019-05-22 DIAGNOSIS — Z87.09 PERSONAL HISTORY OF OTHER DISEASES OF THE RESPIRATORY SYSTEM: Chronic | ICD-10-CM

## 2019-05-22 LAB
ALBUMIN SERPL ELPH-MCNC: 3.2 G/DL — LOW (ref 3.5–5.2)
ALP SERPL-CCNC: 48 U/L — SIGNIFICANT CHANGE UP (ref 30–115)
ALT FLD-CCNC: 12 U/L — SIGNIFICANT CHANGE UP (ref 0–41)
ANION GAP SERPL CALC-SCNC: 8 MMOL/L — SIGNIFICANT CHANGE UP (ref 7–14)
APPEARANCE UR: CLEAR — SIGNIFICANT CHANGE UP
APTT BLD: 28 SEC — SIGNIFICANT CHANGE UP (ref 27–39.2)
AST SERPL-CCNC: 24 U/L — SIGNIFICANT CHANGE UP (ref 0–41)
BACTERIA # UR AUTO: ABNORMAL
BILIRUB SERPL-MCNC: 0.5 MG/DL — SIGNIFICANT CHANGE UP (ref 0.2–1.2)
BILIRUB UR-MCNC: NEGATIVE — SIGNIFICANT CHANGE UP
BUN SERPL-MCNC: 24 MG/DL — HIGH (ref 10–20)
CALCIUM SERPL-MCNC: 9 MG/DL — SIGNIFICANT CHANGE UP (ref 8.5–10.1)
CHLORIDE SERPL-SCNC: 99 MMOL/L — SIGNIFICANT CHANGE UP (ref 98–110)
CO2 SERPL-SCNC: 30 MMOL/L — SIGNIFICANT CHANGE UP (ref 17–32)
COD CRY URNS QL: NEGATIVE — SIGNIFICANT CHANGE UP
COLOR SPEC: YELLOW — SIGNIFICANT CHANGE UP
CREAT SERPL-MCNC: 1.1 MG/DL — SIGNIFICANT CHANGE UP (ref 0.7–1.5)
DIFF PNL FLD: ABNORMAL
EPI CELLS # UR: NEGATIVE — SIGNIFICANT CHANGE UP
GLUCOSE SERPL-MCNC: 134 MG/DL — HIGH (ref 70–99)
GLUCOSE UR QL: NEGATIVE MG/DL — SIGNIFICANT CHANGE UP
GRAN CASTS # UR COMP ASSIST: NEGATIVE — SIGNIFICANT CHANGE UP
HCT VFR BLD CALC: 34.5 % — LOW (ref 42–52)
HGB BLD-MCNC: 11.1 G/DL — LOW (ref 14–18)
HYALINE CASTS # UR AUTO: NEGATIVE — SIGNIFICANT CHANGE UP
INR BLD: 1.21 RATIO — SIGNIFICANT CHANGE UP (ref 0.65–1.3)
KETONES UR-MCNC: NEGATIVE — SIGNIFICANT CHANGE UP
LEUKOCYTE ESTERASE UR-ACNC: NEGATIVE — SIGNIFICANT CHANGE UP
LIDOCAIN IGE QN: 32 U/L — SIGNIFICANT CHANGE UP (ref 7–60)
MAGNESIUM SERPL-MCNC: 1.6 MG/DL — LOW (ref 1.8–2.4)
MCHC RBC-ENTMCNC: 27.5 PG — SIGNIFICANT CHANGE UP (ref 27–31)
MCHC RBC-ENTMCNC: 32.2 G/DL — SIGNIFICANT CHANGE UP (ref 32–37)
MCV RBC AUTO: 85.4 FL — SIGNIFICANT CHANGE UP (ref 80–94)
NITRITE UR-MCNC: NEGATIVE — SIGNIFICANT CHANGE UP
NRBC # BLD: 0 /100 WBCS — SIGNIFICANT CHANGE UP (ref 0–0)
PH UR: 5.5 — SIGNIFICANT CHANGE UP (ref 5–8)
PLATELET # BLD AUTO: 329 K/UL — SIGNIFICANT CHANGE UP (ref 130–400)
POTASSIUM SERPL-MCNC: 4.6 MMOL/L — SIGNIFICANT CHANGE UP (ref 3.5–5)
POTASSIUM SERPL-SCNC: 4.6 MMOL/L — SIGNIFICANT CHANGE UP (ref 3.5–5)
PROT SERPL-MCNC: 6.2 G/DL — SIGNIFICANT CHANGE UP (ref 6–8)
PROT UR-MCNC: 100 MG/DL
PROTHROM AB SERPL-ACNC: 13.9 SEC — HIGH (ref 9.95–12.87)
RBC # BLD: 4.04 M/UL — LOW (ref 4.7–6.1)
RBC # FLD: 13.6 % — SIGNIFICANT CHANGE UP (ref 11.5–14.5)
RBC CASTS # UR COMP ASSIST: ABNORMAL /HPF
SODIUM SERPL-SCNC: 137 MMOL/L — SIGNIFICANT CHANGE UP (ref 135–146)
SP GR SPEC: 1.02 — SIGNIFICANT CHANGE UP (ref 1.01–1.03)
TRI-PHOS CRY UR QL COMP ASSIST: NEGATIVE — SIGNIFICANT CHANGE UP
TROPONIN T SERPL-MCNC: 0.02 NG/ML — HIGH
URATE CRY FLD QL MICRO: NEGATIVE — SIGNIFICANT CHANGE UP
UROBILINOGEN FLD QL: 0.2 MG/DL — SIGNIFICANT CHANGE UP (ref 0.2–0.2)
WBC # BLD: 15.64 K/UL — HIGH (ref 4.8–10.8)
WBC # FLD AUTO: 15.64 K/UL — HIGH (ref 4.8–10.8)
WBC UR QL: SIGNIFICANT CHANGE UP /HPF

## 2019-05-22 PROCEDURE — 71250 CT THORAX DX C-: CPT | Mod: 26

## 2019-05-22 PROCEDURE — 70450 CT HEAD/BRAIN W/O DYE: CPT | Mod: 26

## 2019-05-22 PROCEDURE — 71045 X-RAY EXAM CHEST 1 VIEW: CPT | Mod: 26

## 2019-05-22 PROCEDURE — 72100 X-RAY EXAM L-S SPINE 2/3 VWS: CPT | Mod: 26

## 2019-05-22 PROCEDURE — 99285 EMERGENCY DEPT VISIT HI MDM: CPT

## 2019-05-22 RX ORDER — ACETAMINOPHEN 500 MG
650 TABLET ORAL EVERY 6 HOURS
Refills: 0 | Status: DISCONTINUED | OUTPATIENT
Start: 2019-05-22 | End: 2019-05-23

## 2019-05-22 RX ORDER — POLYETHYLENE GLYCOL 3350 17 G/17G
17 POWDER, FOR SOLUTION ORAL DAILY
Refills: 0 | Status: DISCONTINUED | OUTPATIENT
Start: 2019-05-22 | End: 2019-05-25

## 2019-05-22 RX ORDER — ASPIRIN/CALCIUM CARB/MAGNESIUM 324 MG
81 TABLET ORAL DAILY
Refills: 0 | Status: DISCONTINUED | OUTPATIENT
Start: 2019-05-22 | End: 2019-05-25

## 2019-05-22 RX ORDER — DOCUSATE SODIUM 100 MG
100 CAPSULE ORAL
Refills: 0 | Status: DISCONTINUED | OUTPATIENT
Start: 2019-05-22 | End: 2019-05-25

## 2019-05-22 RX ORDER — MAGNESIUM SULFATE 500 MG/ML
2 VIAL (ML) INJECTION ONCE
Refills: 0 | Status: COMPLETED | OUTPATIENT
Start: 2019-05-22 | End: 2019-05-22

## 2019-05-22 RX ORDER — PANTOPRAZOLE SODIUM 20 MG/1
40 TABLET, DELAYED RELEASE ORAL
Refills: 0 | Status: DISCONTINUED | OUTPATIENT
Start: 2019-05-22 | End: 2019-05-25

## 2019-05-22 RX ORDER — FUROSEMIDE 40 MG
40 TABLET ORAL DAILY
Refills: 0 | Status: DISCONTINUED | OUTPATIENT
Start: 2019-05-22 | End: 2019-05-25

## 2019-05-22 RX ORDER — BUDESONIDE AND FORMOTEROL FUMARATE DIHYDRATE 160; 4.5 UG/1; UG/1
2 AEROSOL RESPIRATORY (INHALATION)
Refills: 0 | Status: DISCONTINUED | OUTPATIENT
Start: 2019-05-22 | End: 2019-05-25

## 2019-05-22 RX ORDER — CHLORHEXIDINE GLUCONATE 213 G/1000ML
1 SOLUTION TOPICAL
Refills: 0 | Status: DISCONTINUED | OUTPATIENT
Start: 2019-05-22 | End: 2019-05-25

## 2019-05-22 RX ORDER — IPRATROPIUM/ALBUTEROL SULFATE 18-103MCG
3 AEROSOL WITH ADAPTER (GRAM) INHALATION EVERY 4 HOURS
Refills: 0 | Status: DISCONTINUED | OUTPATIENT
Start: 2019-05-22 | End: 2019-05-22

## 2019-05-22 RX ORDER — IPRATROPIUM/ALBUTEROL SULFATE 18-103MCG
3 AEROSOL WITH ADAPTER (GRAM) INHALATION EVERY 6 HOURS
Refills: 0 | Status: DISCONTINUED | OUTPATIENT
Start: 2019-05-22 | End: 2019-05-25

## 2019-05-22 RX ORDER — GABAPENTIN 400 MG/1
100 CAPSULE ORAL AT BEDTIME
Refills: 0 | Status: DISCONTINUED | OUTPATIENT
Start: 2019-05-22 | End: 2019-05-25

## 2019-05-22 RX ORDER — ATORVASTATIN CALCIUM 80 MG/1
80 TABLET, FILM COATED ORAL AT BEDTIME
Refills: 0 | Status: DISCONTINUED | OUTPATIENT
Start: 2019-05-22 | End: 2019-05-25

## 2019-05-22 RX ORDER — MONTELUKAST 4 MG/1
10 TABLET, CHEWABLE ORAL AT BEDTIME
Refills: 0 | Status: DISCONTINUED | OUTPATIENT
Start: 2019-05-22 | End: 2019-05-25

## 2019-05-22 RX ORDER — ENOXAPARIN SODIUM 100 MG/ML
40 INJECTION SUBCUTANEOUS DAILY
Refills: 0 | Status: DISCONTINUED | OUTPATIENT
Start: 2019-05-22 | End: 2019-05-25

## 2019-05-22 RX ADMIN — MONTELUKAST 10 MILLIGRAM(S): 4 TABLET, CHEWABLE ORAL at 21:50

## 2019-05-22 RX ADMIN — PANTOPRAZOLE SODIUM 40 MILLIGRAM(S): 20 TABLET, DELAYED RELEASE ORAL at 17:41

## 2019-05-22 RX ADMIN — Medication 3 MILLILITER(S): at 18:49

## 2019-05-22 RX ADMIN — Medication 650 MILLIGRAM(S): at 17:45

## 2019-05-22 RX ADMIN — ENOXAPARIN SODIUM 40 MILLIGRAM(S): 100 INJECTION SUBCUTANEOUS at 16:43

## 2019-05-22 RX ADMIN — Medication 50 GRAM(S): at 16:42

## 2019-05-22 RX ADMIN — Medication 100 MILLIGRAM(S): at 17:43

## 2019-05-22 RX ADMIN — Medication 650 MILLIGRAM(S): at 18:20

## 2019-05-22 RX ADMIN — ATORVASTATIN CALCIUM 80 MILLIGRAM(S): 80 TABLET, FILM COATED ORAL at 21:50

## 2019-05-22 RX ADMIN — BUDESONIDE AND FORMOTEROL FUMARATE DIHYDRATE 2 PUFF(S): 160; 4.5 AEROSOL RESPIRATORY (INHALATION) at 21:49

## 2019-05-22 RX ADMIN — GABAPENTIN 100 MILLIGRAM(S): 400 CAPSULE ORAL at 21:50

## 2019-05-22 RX ADMIN — Medication 81 MILLIGRAM(S): at 17:41

## 2019-05-22 NOTE — H&P ADULT - NSICDXPASTMEDICALHX_GEN_ALL_CORE_FT
PAST MEDICAL HISTORY:  PAULA (acute kidney injury)     Chronic diastolic heart failure     Coffee ground emesis     COPD (chronic obstructive pulmonary disease)     High cholesterol     HTN (hypertension)     Mild anemia

## 2019-05-22 NOTE — ED PROVIDER NOTE - OBJECTIVE STATEMENT
Patient is a 77 yo m who presents with generalized weakness, with inability to walk over the past several  days he denies any fevers or chills he denies any chest pain or shortness of breath, he has been recently discharged from rehab, home to his brothers house where he is unable to ambulate at this time. he denies any vomiting, brbpr, coffee ground emesis. he is also c/o of a burning "bed sore" pain that has been worsening

## 2019-05-22 NOTE — H&P ADULT - NSHPPHYSICALEXAM_GEN_ALL_CORE
VITALS:  T(F): 98 (05-22-19 @ 12:17), Max: 98.9 (05-22-19 @ 07:23)  HR: 87 (05-22-19 @ 12:17) (87 - 97)  BP: 111/61 (05-22-19 @ 12:17) (111/61 - 128/76)  RR: 18 (05-22-19 @ 12:17) (18 - 18)  SpO2: 96% (05-22-19 @ 12:17) (96% - 96%)      PHYSICAL EXAM:  GENERAL: NAD, well-developed  HEAD:  Atraumatic, Normocephalic  EYES: EOMI, PERRLA, conjunctiva and sclera clear  ENMT: Moist mucous membranes  NECK: Supple, Normal thyroid  NERVOUS SYSTEM:  Alert & Oriented x 3, ?? Speech slurred, Motor Strength 5/5 B/L upper. Refuses to move the lower extremities due to Pain.   CHEST/LUNG: Decreased AE bilaterally at the bases; No rales, rhonchi, wheezing, or rubs  HEART: Regular rate and rhythm; No murmurs, rubs, or gallops  ABDOMEN: Soft, Nontender, Nondistended; Bowel sounds present  EXTREMITIES:  2+ Peripheral Pulses, No clubbing, cyanosis, + Bipedal edema  LYMPH: No cervical lymphadenopathy noted  SKIN: No rashes or lesions VITALS:  T(F): 98 (05-22-19 @ 12:17), Max: 98.9 (05-22-19 @ 07:23)  HR: 87 (05-22-19 @ 12:17) (87 - 97)  BP: 111/61 (05-22-19 @ 12:17) (111/61 - 128/76)  RR: 18 (05-22-19 @ 12:17) (18 - 18)  SpO2: 96% (05-22-19 @ 12:17) (96% - 96%)      PHYSICAL EXAM:  GENERAL: NAD, well-developed  HEAD:  Atraumatic, Normocephalic  EYES: EOMI, PERRLA, conjunctiva and sclera clear  ENMT: Moist mucous membranes  NECK: Supple, Normal thyroid  NERVOUS SYSTEM:  Alert & Oriented x 3, ?? Speech slurred, Motor Strength 5/5 B/L upper. Refuses to move the lower extremities due to Pain. LE held in flexion.  CHEST/LUNG: Decreased AE bilaterally at the bases; No rales, rhonchi, wheezing, or rubs  HEART: Regular rate and rhythm; No murmurs, rubs, or gallops  ABDOMEN: Soft, Nontender, Nondistended; Bowel sounds present  EXTREMITIES:  2+ Peripheral Pulses, No clubbing, cyanosis, + Bipedal edema  LYMPH: No cervical lymphadenopathy noted  SKIN: Scab wound on the left foot and Sacral decubitus ulcer stage 2 documented.

## 2019-05-22 NOTE — H&P ADULT - NSHPLABSRESULTS_GEN_ALL_CORE
11.1   15.64 )-----------( 329      ( 22 May 2019 08:30 )             34.5           137  |  99  |  24<H>  ----------------------------<  134<H>  4.6   |  30  |  1.1    Ca    9.0      22 May 2019 08:30  Mg     1.6         TPro  6.2  /  Alb  3.2<L>  /  TBili  0.5  /  DBili  x   /  AST  24  /  ALT  12  /  AlkPhos  48        CARDIAC MARKERS ( 22 May 2019 08:30 )  x     / 0.02 ng/mL / x     / x     / x        X-ray Chest 1 View AP/PA (19 @ 08:41)     Increased bibasilar density is unchanged since 3/18/2019. No new   opacities.      EC19  Ventricular Rate 94 BPM    Atrial Rate 94 BPM    P-R Interval 270 ms    QRS Duration 86 ms    Q-T Interval 338 ms    QTC Calculation(Bezet) 422 ms    P Axis 48 degrees    R Axis -27 degrees    T Axis 33 degrees    Diagnosis Line Sinus rhythm with 1st degree A-V block with Fusion complexes  Low voltage QRS  Cannot rule out Anteroseptal infarct , age undetermined  Abnormal ECG

## 2019-05-22 NOTE — H&P ADULT - ASSESSMENT
79 yo Male hx of HTN, HLD, chronic Diastolic HF, first degree AVB presents, COPD not on home oxygen presented to the ED with complaints of generalized weakness & inability to ambulate. He was discharged from here on 3/19 to [facility] after being admitted on 3/8 for Rt Basilar Pneumonia with Sepsis complicated by PAULA and coffee ground emesis. He was discharged form [facility] on 4/7/19 and has been home with his Brother Gabe . He was able to take a few steps with a cane aftre his discharge from Rehab until the past 2 weeks when he started complaining of dyspnea and lightheadedness on ambulation. he was seen by the visiting Nurse who started him on Prednisone and Augmentin for COPD exacerbation. His respiratory status has improved but Patient has for the past 4-5 days refused to ambulate. He mentions having severe bilateral Knee and foot pain limiting his movements. He denied any further complaints.      Assessment & Plan:    1. Ambulatory Dysfunction:  Due to Pain in the Bilateral LE & Back pain.  Follow up Venous Duplex & X-ray of the foot and Knees.  Started on Tylenol 650mg q6h ATC & Gabapentin qhs.  PT evaluation ordered. Will need Placement.  May need further evaluation of the Lumbar spine.       2. COPD without acute exacerbation:  Continue Symbicort and Albuterol prn.  Completed course of Prednisone a few days ago.  Did not complete Augmentin. (Hold for now)      3. Magnesium Deficiency:  Replaced. Monitor Mag and other electrolytes.      4. h/o Coffee ground emesis:  Continue Protonix.  No endoscopy done. Out patient follow up recommended.      5. Mild Normocytic Anemia:  Hgb stable.  Monitor. Goal >7g/dl.      6. Chronic Diastolic HF & Chronic Pleural Effusion & Bipedal edema:  ECHO 3/2019: LVEF >55% Mild MR & TR. Mild Pulm HTN.  Continue Lasix & metolazone.      7. Bibasilar infiltrate: Stable.  Prior Imaging showed Bilateral pleural effusion with Compression Atelectasis.  Treated for Pneumonia on last Admission.  Mild Leukocytosis due to Recent Steroid use.  Monitor CBC and Clinically.      8. Generalized weakness:  Due to Deconditioning.   r/o infectious etiology.  PT rehab.      Prophylaxis: Lovenox  Code status: Full    Progress Note Handoff:  Pending consults: PT  Pending Tests: X-ray US and Blood work.  Significant Test results: CBC and Mag  Other issues: None  Family/Patient discussion: Plan of care discussed with patient and Brother Gabe.  Disposition: Unknown at this time.      Attending: Dr. Marcela Chacon. Spectra 1503. 77 yo Male hx of HTN, HLD, chronic Diastolic HF, first degree AVB presents, COPD not on home oxygen presented to the ED with complaints of generalized weakness & inability to ambulate. He was discharged from here on 3/19 to [facility] after being admitted on 3/8 for Rt Basilar Pneumonia with Sepsis complicated by PAULA and coffee ground emesis. He was discharged form [facility] on 4/7/19 and has been home with his Brother Gabe . He was able to take a few steps with a cane aftre his discharge from Rehab until the past 2 weeks when he started complaining of dyspnea and lightheadedness on ambulation. he was seen by the visiting Nurse who started him on Prednisone and Augmentin for COPD exacerbation. His respiratory status has improved but Patient has for the past 4-5 days refused to ambulate. He mentions having severe bilateral Knee and foot pain limiting his movements. He denied any further complaints.      Assessment & Plan:    1. Ambulatory Dysfunction:  Patient refusing LE movement due to Pain in the Bilateral LE & Back pain.  (Hips and knees held in Flexion)  Follow up Venous Duplex & X-ray of the foot and Knees.   Started on Tylenol 650mg q6h ATC & Gabapentin qhs.  PT evaluation ordered. Will need Placement.  May need further evaluation of the Lumbar spine.       2. COPD without acute exacerbation:  Continue Symbicort and Albuterol prn.  Completed course of Prednisone a few days ago.  Did not complete Augmentin. (Hold for now)      3. Magnesium Deficiency:  Replaced. Monitor Mag and other electrolytes.      4. h/o Coffee ground emesis:  Continue Protonix.  No endoscopy done. Out patient follow up recommended.      5. Mild Normocytic Anemia:  Hgb stable.  Monitor. Goal >7g/dl.      6. Chronic Diastolic HF & Chronic Pleural Effusion & Bipedal edema:  ECHO 3/2019: LVEF >55% Mild MR & TR. Mild Pulm HTN.  Continue Lasix & metolazone.      7. Bibasilar infiltrate: Stable.  Prior Imaging showed Bilateral pleural effusion with Compression Atelectasis.  Treated for Pneumonia on last Admission.  Mild Leukocytosis due to Recent Steroid use.  Monitor CBC and Clinically.      8. Generalized weakness:  Due to Deconditioning.   r/o infectious etiology.  PT rehab.      Prophylaxis: Lovenox  Code status: Full    Progress Note Handoff:  Pending consults: PT  Pending Tests: X-ray US and Blood work.  Significant Test results: CBC and Mag  Other issues: None  Family/Patient discussion: Plan of care discussed with patient and Brother Gabe.  Disposition: Unknown at this time.      Attending: Dr. Marcela Chacon. Spectra 1503. 77 yo Male hx of HTN, HLD, chronic Diastolic HF, first degree AVB presents, COPD not on home oxygen presented to the ED with complaints of generalized weakness & inability to ambulate. He was discharged from here on 3/19 to [facility] after being admitted on 3/8 for Rt Basilar Pneumonia with Sepsis complicated by PAULA and coffee ground emesis. He was discharged form [facility] on 4/7/19 and has been home with his Brother Gabe . He was able to take a few steps with a cane after his discharge from Rehab until the past 2 weeks when he started complaining of dyspnea and lightheadedness on ambulation. H was seen by the visiting Nurse who started him on Prednisone and Augmentin for COPD exacerbation. His respiratory status has improved but Patient has for the past 4-5 days refused to ambulate. He mentions having severe bilateral Knee and foot pain limiting his movements. He denied any further complaints.      Assessment & Plan:    1. Ambulatory Dysfunction:  Patient refusing LE movement due to Pain in the Bilateral LE & Back pain.  (Hips and knees held in Flexion)  Follow up Venous Duplex & X-ray of the foot and Knees.   Started on Tylenol 650mg q6h ATC & Gabapentin qhs.  PT evaluation ordered. Will need Placement.  May need further evaluation of the Lumbar spine.       2. COPD without acute exacerbation:  Continue Symbicort and Albuterol prn.  Completed course of Prednisone a few days ago.  Did not complete Augmentin. (Hold for now)      3. Magnesium Deficiency:  Replaced. Monitor Mag and other electrolytes.      4. h/o Coffee ground emesis:  Continue Protonix.  No endoscopy done. Out patient follow up recommended.      5. Mild Normocytic Anemia:  Hgb stable.  Monitor. Goal >7g/dl.      6. Chronic Diastolic HF & Chronic Pleural Effusion & Bipedal edema:  ECHO 3/2019: LVEF >55% Mild MR & TR. Mild Pulm HTN.  Continue Lasix & metolazone.      7. Bibasilar infiltrate: Stable.  Prior Imaging showed Bilateral pleural effusion with Compression Atelectasis.  Treated for Pneumonia on last Admission.  Mild Leukocytosis due to Recent Steroid use.  Monitor CBC and Clinically.      8. Generalized weakness:  Due to Deconditioning.   r/o infectious etiology.  PT rehab.      9. Slurred speech:  r/o CVA.  Follow up head CT.        Prophylaxis: Lovenox  Code status: Full    Progress Note Handoff:  Pending consults: PT  Pending Tests: X-ray, US and Blood work & Head CT.  Significant Test results: CBC and Mag  Other issues: None  Family/Patient discussion: Plan of care discussed with patient and Brother Gabe.  Disposition: Unknown at this time.      Attending: Dr. Marcela Chacon. Spectra 1503. 79 yo Male hx of HTN, HLD, chronic Diastolic HF, first degree AVB presents, COPD not on home oxygen presented to the ED with complaints of generalized weakness & inability to ambulate. He was discharged from here on 3/19 to [facility] after being admitted on 3/8 for Rt Basilar Pneumonia with Sepsis complicated by PAULA and coffee ground emesis. He was discharged form [facility] on 4/7/19 and has been home with his Brother Gabe . He was able to take a few steps with a cane after his discharge from Rehab until the past 2 weeks when he started complaining of dyspnea and lightheadedness on ambulation. H was seen by the visiting Nurse who started him on Prednisone and Augmentin for COPD exacerbation. His respiratory status has improved but Patient has for the past 4-5 days refused to ambulate. He mentions having severe bilateral Knee and foot pain limiting his movements. He denied any further complaints.      Assessment & Plan:    1. Ambulatory Dysfunction:  Patient refusing LE movement due to Pain in the Bilateral LE & Back pain.  Follow up Venous Duplex & X-ray of the foot and Knees.   Started on Tylenol 650mg q6h ATC & Gabapentin qhs.  PT evaluation ordered. Will need Placement.  May need further evaluation of the Lumbar spine.       2. COPD without acute exacerbation:  Continue Symbicort and Albuterol prn.  Completed course of Prednisone a few days ago.  Did not complete Augmentin. (Hold for now)      3. Magnesium Deficiency:  Replaced. Monitor Mag and other electrolytes.      4. h/o Coffee ground emesis:  Continue Protonix.  No endoscopy done. Out patient follow up recommended.      5. Mild Normocytic Anemia:  Hgb stable.  Monitor. Goal >7g/dl.      6. Chronic Diastolic HF & Chronic Pleural Effusion & Bipedal edema:  ECHO 3/2019: LVEF >55% Mild MR & TR. Mild Pulm HTN.  Continue Lasix & metolazone.      7. Bibasilar infiltrate: Stable.  Prior Imaging showed Bilateral pleural effusion with Compression Atelectasis.  Treated for Pneumonia on last Admission.  Mild Leukocytosis due to Recent Steroid use.  Monitor CBC and Clinically.      8. Generalized weakness:  Due to Deconditioning.   r/o infectious etiology.  PT rehab.      9. Slurred speech:  r/o CVA.  Follow up head CT.        Prophylaxis: Lovenox  Code status: Full    Progress Note Handoff:  Pending consults: PT  Pending Tests: X-ray, US and Blood work & Head CT.  Significant Test results: CBC and Mag  Other issues: None  Family/Patient discussion: Plan of care discussed with patient and Brother Gabe.  Disposition: Unknown at this time.      Attending: Dr. Marcela Chacon. Spectra 1503. 79 yo Male hx of HTN, HLD, chronic Diastolic HF, first degree AVB presents, COPD not on home oxygen presented to the ED with complaints of generalized weakness & inability to ambulate. He was discharged from here on 3/19 to [facility] after being admitted on 3/8 for Rt Basilar Pneumonia with Sepsis complicated by PAULA and coffee ground emesis. He was discharged form [facility] on 4/7/19 and has been home with his Brother Gabe . He was able to take a few steps with a cane after his discharge from Rehab until the past 2 weeks when he started complaining of dyspnea and lightheadedness on ambulation. H was seen by the visiting Nurse who started him on Prednisone and Augmentin for COPD exacerbation. His respiratory status has improved but Patient has for the past 4-5 days refused to ambulate. He mentions having severe bilateral Knee and foot pain limiting his movements. He denied any further complaints.      Assessment & Plan:    1. Ambulatory Dysfunction:  Patient refusing LE movement due to Pain in the Bilateral LE & Back pain.  Follow up X-ray of the Lumbar spine, foot and Knees.   Started on Tylenol 650mg q6h ATC & Gabapentin qhs.  PT evaluation ordered. Will need Placement.      2. COPD without acute exacerbation:  Continue Symbicort and Albuterol prn.  Completed course of Prednisone a few days ago.  Complete course of ABx. Yet to complete Augmentin course.   Switched to Levaquin.       3. Magnesium Deficiency:  Replaced. Monitor Mag and other electrolytes.      4. h/o Coffee ground emesis:  Continue Protonix.  No endoscopy done. Out patient follow up recommended.      5. Mild Normocytic Anemia:  Hgb stable.  Monitor. Goal >7g/dl.      6. Chronic Diastolic HF & Chronic Pleural Effusion & Bipedal edema:  ECHO 3/2019: LVEF >55% Mild MR & TR. Mild Pulm HTN.  Continue Lasix & metolazone.  Refused Venous Duplex.      7. Bibasilar infiltrate:   Prior Imaging showed Bilateral pleural effusion with Compression Atelectasis.  Treated for Pneumonia in March with unchanged X-ray findings.   Follow up CT chest. Continue Levaquin.  Mild Leukocytosis due to Recent Steroid use.  Monitor CBC and Clinically.      8. Generalized weakness:  Due to Deconditioning.   PT rehab.      9. Slurred speech:  r/o CVA.  Follow up head CT.        Prophylaxis: Lovenox  Code status: Full    Progress Note Handoff:  Pending consults: PT  Pending Tests: X-ray, and Blood work & Head CT & Chest.  Significant Test results: CBC and Mag  Other issues: None  Family/Patient discussion: Plan of care discussed with patient and Brother Gabe.  Disposition: Unknown at this time.      Attending: Dr. Marcela Chacon. Spectra 1503.

## 2019-05-22 NOTE — H&P ADULT - NSHPREVIEWOFSYSTEMS_GEN_ALL_CORE
CONSTITUTIONAL: No fever, weight loss, + fatigue  EYES: No eye pain, visual disturbances, or discharge  ENMT:  No difficulty hearing, tinnitus, vertigo; No sinus or throat pain  NECK: No pain or stiffness  BREASTS: No pain, masses, or nipple discharge  RESPIRATORY: No cough, wheezing, chills or hemoptysis; No shortness of breath  CARDIOVASCULAR: No chest pain, palpitations, dizziness, or leg swelling  GASTROINTESTINAL: No abdominal or epigastric pain. No nausea, vomiting, or hematemesis; No diarrhea or constipation. No melena or hematochezia.  GENITOURINARY: No dysuria, frequency, hematuria, or incontinence  NEUROLOGICAL: No headaches, memory loss, loss of strength, numbness, or tremors ? slurred speech.  SKIN: Sacral and heel ulcers.  LYMPH NODES: No enlarged glands  ENDOCRINE: No heat or cold intolerance; No hair loss  MUSCULOSKELETAL: + Bilateral Knee and foot pain, + Back Pain.   PSYCHIATRIC: No depression, anxiety, mood swings, or difficulty sleeping  HEME/LYMPH: No easy bruising, or bleeding gums  ALLERGY AND IMMUNOLOGIC: No hives or eczema

## 2019-05-22 NOTE — ED PROVIDER NOTE - CARE PLAN
Principal Discharge DX:	Weakness  Secondary Diagnosis:	Inability to ambulate due to multiple joints  Secondary Diagnosis:	Sacral decubitus ulcer, stage II

## 2019-05-22 NOTE — ED PROVIDER NOTE - CLINICAL SUMMARY MEDICAL DECISION MAKING FREE TEXT BOX
Patient unable to ambulate secondary to generalized weakness I obtained labs cxr and will admit for admission to rehab

## 2019-05-22 NOTE — ED ADULT NURSE NOTE - NSIMPLEMENTINTERV_GEN_ALL_ED
Implemented All Fall with Harm Risk Interventions:  Advance to call system. Call bell, personal items and telephone within reach. Instruct patient to call for assistance. Room bathroom lighting operational. Non-slip footwear when patient is off stretcher. Physically safe environment: no spills, clutter or unnecessary equipment. Stretcher in lowest position, wheels locked, appropriate side rails in place. Provide visual cue, wrist band, yellow gown, etc. Monitor gait and stability. Monitor for mental status changes and reorient to person, place, and time. Review medications for side effects contributing to fall risk. Reinforce activity limits and safety measures with patient and family. Provide visual clues: red socks.

## 2019-05-22 NOTE — H&P ADULT - HISTORY OF PRESENT ILLNESS
79 yo Male hx of HTN, HLD, chronic Diastolic HF, first degree AVB presents, COPD not on home oxygen presented to the ED with complaints of generalized weakness & inability to ambulate. He was discharged from here on 3/19 to [facility] after being admitted on 3/8 for Rt Basilar Pneumonia with Sepsis complicated by PAULA and coffee ground emesis. He was discharged form [facility] on 4/7/19 and has been home with his Brother Gabe . He was able to take a few steps with a cane aftre his discharge from Rehab until the past 2 weeks when he started complaining of dyspnea and lightheadedness on ambulation. he was seen by the visiting Nurse who started him on Prednisone and Augmentin for COPD exacerbation. His respiratory status has improved but Patient has for the past 4-5 days refused to ambulate. He mentions having severe bilateral Knee and foot pain limiting his movements. He denied any further complaints. 79 yo Male hx of HTN, HLD, chronic Diastolic HF, first degree AVB presents, COPD not on home oxygen presented to the ED with complaints of generalized weakness & inability to ambulate. He was discharged from here on 3/19 to [facility] after being admitted on 3/8 for Rt Basilar Pneumonia with Sepsis complicated by PAULA and coffee ground emesis. He was discharged form [facility] on 4/7/19 and has been home with his Brother Gabe . He was able to take a few steps with a cane after his discharge from Rehab until the past 2 weeks when he started complaining of dyspnea and lightheadedness on ambulation. He was seen by the visiting Nurse who started him on Prednisone and Augmentin for COPD exacerbation. His respiratory status has improved but Patient has for the past 4-5 days refused to ambulate. He mentions having severe bilateral Knee and foot pain limiting his movements. He denied any further complaints.

## 2019-05-23 LAB
ALBUMIN SERPL ELPH-MCNC: 2.7 G/DL — LOW (ref 3.5–5.2)
ALP SERPL-CCNC: 41 U/L — SIGNIFICANT CHANGE UP (ref 30–115)
ALT FLD-CCNC: 15 U/L — SIGNIFICANT CHANGE UP (ref 0–41)
ANION GAP SERPL CALC-SCNC: 10 MMOL/L — SIGNIFICANT CHANGE UP (ref 7–14)
AST SERPL-CCNC: 46 U/L — HIGH (ref 0–41)
BASOPHILS # BLD AUTO: 0.02 K/UL — SIGNIFICANT CHANGE UP (ref 0–0.2)
BASOPHILS NFR BLD AUTO: 0.2 % — SIGNIFICANT CHANGE UP (ref 0–1)
BILIRUB SERPL-MCNC: 0.6 MG/DL — SIGNIFICANT CHANGE UP (ref 0.2–1.2)
BUN SERPL-MCNC: 24 MG/DL — HIGH (ref 10–20)
CALCIUM SERPL-MCNC: 8.4 MG/DL — LOW (ref 8.5–10.1)
CHLORIDE SERPL-SCNC: 98 MMOL/L — SIGNIFICANT CHANGE UP (ref 98–110)
CO2 SERPL-SCNC: 29 MMOL/L — SIGNIFICANT CHANGE UP (ref 17–32)
CREAT SERPL-MCNC: 1.2 MG/DL — SIGNIFICANT CHANGE UP (ref 0.7–1.5)
CULTURE RESULTS: NO GROWTH — SIGNIFICANT CHANGE UP
EOSINOPHIL # BLD AUTO: 0.17 K/UL — SIGNIFICANT CHANGE UP (ref 0–0.7)
EOSINOPHIL NFR BLD AUTO: 1.3 % — SIGNIFICANT CHANGE UP (ref 0–8)
GLUCOSE SERPL-MCNC: 156 MG/DL — HIGH (ref 70–99)
HCT VFR BLD CALC: 33.9 % — LOW (ref 42–52)
HGB BLD-MCNC: 10.7 G/DL — LOW (ref 14–18)
IMM GRANULOCYTES NFR BLD AUTO: 0.5 % — HIGH (ref 0.1–0.3)
LYMPHOCYTES # BLD AUTO: 19.4 % — LOW (ref 20.5–51.1)
LYMPHOCYTES # BLD AUTO: 2.55 K/UL — SIGNIFICANT CHANGE UP (ref 1.2–3.4)
MAGNESIUM SERPL-MCNC: 1.9 MG/DL — SIGNIFICANT CHANGE UP (ref 1.8–2.4)
MCHC RBC-ENTMCNC: 26.9 PG — LOW (ref 27–31)
MCHC RBC-ENTMCNC: 31.6 G/DL — LOW (ref 32–37)
MCV RBC AUTO: 85.2 FL — SIGNIFICANT CHANGE UP (ref 80–94)
MONOCYTES # BLD AUTO: 1.11 K/UL — HIGH (ref 0.1–0.6)
MONOCYTES NFR BLD AUTO: 8.4 % — SIGNIFICANT CHANGE UP (ref 1.7–9.3)
NEUTROPHILS # BLD AUTO: 9.25 K/UL — HIGH (ref 1.4–6.5)
NEUTROPHILS NFR BLD AUTO: 70.2 % — SIGNIFICANT CHANGE UP (ref 42.2–75.2)
NRBC # BLD: 0 /100 WBCS — SIGNIFICANT CHANGE UP (ref 0–0)
PHOSPHATE SERPL-MCNC: 3.7 MG/DL — SIGNIFICANT CHANGE UP (ref 2.1–4.9)
PLATELET # BLD AUTO: 286 K/UL — SIGNIFICANT CHANGE UP (ref 130–400)
POTASSIUM SERPL-MCNC: 4.1 MMOL/L — SIGNIFICANT CHANGE UP (ref 3.5–5)
POTASSIUM SERPL-SCNC: 4.1 MMOL/L — SIGNIFICANT CHANGE UP (ref 3.5–5)
PROT SERPL-MCNC: 5.2 G/DL — LOW (ref 6–8)
RBC # BLD: 3.98 M/UL — LOW (ref 4.7–6.1)
RBC # FLD: 13.8 % — SIGNIFICANT CHANGE UP (ref 11.5–14.5)
SODIUM SERPL-SCNC: 137 MMOL/L — SIGNIFICANT CHANGE UP (ref 135–146)
SPECIMEN SOURCE: SIGNIFICANT CHANGE UP
WBC # BLD: 13.16 K/UL — HIGH (ref 4.8–10.8)
WBC # FLD AUTO: 13.16 K/UL — HIGH (ref 4.8–10.8)

## 2019-05-23 RX ORDER — ACETAMINOPHEN 500 MG
650 TABLET ORAL EVERY 6 HOURS
Refills: 0 | Status: DISCONTINUED | OUTPATIENT
Start: 2019-05-23 | End: 2019-05-25

## 2019-05-23 RX ORDER — IBUPROFEN 200 MG
400 TABLET ORAL ONCE
Refills: 0 | Status: COMPLETED | OUTPATIENT
Start: 2019-05-23 | End: 2019-05-23

## 2019-05-23 RX ORDER — ACETAMINOPHEN 500 MG
650 TABLET ORAL ONCE
Refills: 0 | Status: DISCONTINUED | OUTPATIENT
Start: 2019-05-23 | End: 2019-05-23

## 2019-05-23 RX ADMIN — BUDESONIDE AND FORMOTEROL FUMARATE DIHYDRATE 2 PUFF(S): 160; 4.5 AEROSOL RESPIRATORY (INHALATION) at 07:53

## 2019-05-23 RX ADMIN — Medication 40 MILLIGRAM(S): at 05:55

## 2019-05-23 RX ADMIN — Medication 650 MILLIGRAM(S): at 05:55

## 2019-05-23 RX ADMIN — BUDESONIDE AND FORMOTEROL FUMARATE DIHYDRATE 2 PUFF(S): 160; 4.5 AEROSOL RESPIRATORY (INHALATION) at 20:09

## 2019-05-23 RX ADMIN — Medication 81 MILLIGRAM(S): at 15:10

## 2019-05-23 RX ADMIN — Medication 650 MILLIGRAM(S): at 20:08

## 2019-05-23 RX ADMIN — PANTOPRAZOLE SODIUM 40 MILLIGRAM(S): 20 TABLET, DELAYED RELEASE ORAL at 05:56

## 2019-05-23 RX ADMIN — Medication 650 MILLIGRAM(S): at 00:36

## 2019-05-23 RX ADMIN — Medication 650 MILLIGRAM(S): at 22:04

## 2019-05-23 RX ADMIN — Medication 100 MILLIGRAM(S): at 17:00

## 2019-05-23 RX ADMIN — Medication 650 MILLIGRAM(S): at 11:45

## 2019-05-23 RX ADMIN — Medication 650 MILLIGRAM(S): at 01:04

## 2019-05-23 RX ADMIN — Medication 650 MILLIGRAM(S): at 07:00

## 2019-05-23 RX ADMIN — Medication 650 MILLIGRAM(S): at 12:20

## 2019-05-23 RX ADMIN — Medication 400 MILLIGRAM(S): at 23:15

## 2019-05-23 RX ADMIN — Medication 3 MILLILITER(S): at 20:02

## 2019-05-23 RX ADMIN — GABAPENTIN 100 MILLIGRAM(S): 400 CAPSULE ORAL at 21:58

## 2019-05-23 RX ADMIN — MONTELUKAST 10 MILLIGRAM(S): 4 TABLET, CHEWABLE ORAL at 21:57

## 2019-05-23 RX ADMIN — Medication 3 MILLILITER(S): at 13:09

## 2019-05-23 RX ADMIN — ENOXAPARIN SODIUM 40 MILLIGRAM(S): 100 INJECTION SUBCUTANEOUS at 11:34

## 2019-05-23 RX ADMIN — ATORVASTATIN CALCIUM 80 MILLIGRAM(S): 80 TABLET, FILM COATED ORAL at 21:58

## 2019-05-23 RX ADMIN — Medication 3 MILLILITER(S): at 07:33

## 2019-05-23 RX ADMIN — CHLORHEXIDINE GLUCONATE 1 APPLICATION(S): 213 SOLUTION TOPICAL at 07:54

## 2019-05-23 RX ADMIN — Medication 400 MILLIGRAM(S): at 22:40

## 2019-05-23 NOTE — PROGRESS NOTE ADULT - SUBJECTIVE AND OBJECTIVE BOX
Progress Note:  Provider Speciality                            Hospitalist      GUILLERMINANIMO MARTIN MRN-[id number] 78y Male     CHIEF PRESENTING COMPLAINT:  Patient is a 78y old  Male who presents with a chief complaint of Inability to ambulate (23 May 2019 08:15)        SUBJECTIVE:  Patient was seen and examined at bedside. Reports improvement in  presenting complaint. No significant overnight events reported.     HISTORY OF PRESENTING ILLNESS:  HPI:  79 yo Male hx of HTN, HLD, chronic Diastolic HF, first degree AVB presents, COPD not on home oxygen presented to the ED with complaints of generalized weakness & inability to ambulate. He was discharged from here on 3/19 to [facility] after being admitted on 3/8 for Rt Basilar Pneumonia with Sepsis complicated by PAULA and coffee ground emesis. He was discharged form [facility] on 19 and has been home with his Brother Gabe . He was able to take a few steps with a cane after his discharge from Rehab until the past 2 weeks when he started complaining of dyspnea and lightheadedness on ambulation. He was seen by the visiting Nurse who started him on Prednisone and Augmentin for COPD exacerbation. His respiratory status has improved but Patient has for the past 4-5 days refused to ambulate. He mentions having severe bilateral Knee and foot pain limiting his movements. He denied any further complaints. (22 May 2019 12:17)      PAST MEDICAL & SURGICAL HISTORY:  PAST MEDICAL & SURGICAL HISTORY:  PAULA (acute kidney injury)  Chronic diastolic heart failure  Coffee ground emesis  Mild anemia  High cholesterol  COPD (chronic obstructive pulmonary disease)  HTN (hypertension)  No significant past surgical history      VITAL SIGNS:  Vital Signs Last 24 Hrs  T(C): 35.8 (23 May 2019 05:39), Max: 36.4 (22 May 2019 21:08)  T(F): 96.5 (23 May 2019 05:39), Max: 97.6 (22 May 2019 21:08)  HR: 87 (23 May 2019 05:39) (86 - 105)  BP: 106/59 (23 May 2019 05:39) (99/57 - 125/58)  BP(mean): --  RR: 18 (23 May 2019 05:39) (18 - 18)  SpO2: 94% (22 May 2019 16:08) (94% - 94%)    PHYSICAL EXAMINATION:  Not in acute distress  General: No pallor, or icterus, afebrile  HEENT:  ANGÉLICA, EOMI, no JVD, no Bruit.  Heart: S1+S2 audible, no murmur  Lungs: bilateral  fair air entry, no wheezing, no crepitations.  Abdomen: Soft, non-tender, non-distended , no  rigidity or guarding.  CNS: AAOx3, CN  grossly intact.  Extremities:  No edema          REVIEW OF SYSTEMS:  Patient denies any headache, any vision complaints, runny nose, fever, chills, sore throat. Denies chest pain, shortness of breath, palpitation. Denies nausea, vomiting, abdominal pain, diarrhoea, Denies urinary burning, urgency, frequency, dysuria. Denies weakness in any part of the body or numbness.   At least 10 systems were reviewed in ROS. All systems reviewed  are within normal limits except for the complaints as described in Subjective.    CONSULTS:  Consultant(s) Notes Reviewed by me.   Care Discussed with Consultants/Other Providers where required.        MEDICATIONS:  MEDICATIONS  (STANDING):  ALBUTerol/ipratropium for Nebulization 3 milliLiter(s) Nebulizer every 6 hours  aspirin enteric coated 81 milliGRAM(s) Oral daily  atorvastatin 80 milliGRAM(s) Oral at bedtime  buDESOnide  80 MICROgram(s)/formoterol 4.5 MICROgram(s) Inhaler 2 Puff(s) Inhalation two times a day  chlorhexidine 4% Liquid 1 Application(s) Topical <User Schedule>  docusate sodium 100 milliGRAM(s) Oral two times a day  enoxaparin Injectable 40 milliGRAM(s) SubCutaneous daily  furosemide    Tablet 40 milliGRAM(s) Oral daily  gabapentin 100 milliGRAM(s) Oral at bedtime  levoFLOXacin IVPB      levoFLOXacin IVPB 500 milliGRAM(s) IV Intermittent every 24 hours  metolazone 2.5 milliGRAM(s) Oral <User Schedule>  montelukast 10 milliGRAM(s) Oral at bedtime  pantoprazole    Tablet 40 milliGRAM(s) Oral before breakfast    MEDICATIONS  (PRN):  acetaminophen   Tablet .. 650 milliGRAM(s) Oral every 6 hours PRN Mild Pain (1 - 3), Moderate Pain (4 - 6)  polyethylene glycol 3350 17 Gram(s) Oral daily PRN Constipation      LABOROTORY DATA/MICROBIOLOGY/I & O's:                        107   16 )-----------( 286      ( 23 May 2019 10:01 )             33.9     05-23    137  |  98  |  24<H>  ----------------------------<  156<H>  4.1   |  29  |  1.2    Ca    8.4<L>      23 May 2019 10:01  Phos  3.7     -  Mg     1.9         TPro  5.2<L>  /  Alb  2.7<L>  /  TBili  0.6  /  DBili  x   /  AST  46<H>  /  ALT  15  /  AlkPhos  41  05-23    PT/INR - ( 22 May 2019 08:30 )   PT: 13.90 sec;   INR: 1.21 ratio         PTT - ( 22 May 2019 08:30 )  PTT:28.0 sec  Urinalysis Basic - ( 22 May 2019 16:14 )    Color: Yellow / Appearance: Clear / S.025 / pH: x  Gluc: x / Ketone: Negative  / Bili: Negative / Urobili: 0.2 mg/dL   Blood: x / Protein: 100 mg/dL / Nitrite: Negative   Leuk Esterase: Negative / RBC: 6-10 /HPF / WBC 3-5 /HPF   Sq Epi: x / Non Sq Epi: Negative / Bacteria: Moderate      CAPILLARY BLOOD GLUCOSE            Urinalysis Basic - ( 22 May 2019 16:14 )    Color: Yellow / Appearance: Clear / S.025 / pH: x  Gluc: x / Ketone: Negative  / Bili: Negative / Urobili: 0.2 mg/dL   Blood: x / Protein: 100 mg/dL / Nitrite: Negative   Leuk Esterase: Negative / RBC: 6-10 /HPF / WBC 3-5 /HPF   Sq Epi: x / Non Sq Epi: Negative / Bacteria: Moderate                    ASSESSMENT:    79 yo Male hx of HTN, HLD, chronic Diastolic HF, first degree AVB presents, COPD not on home oxygen presented to the ED with complaints of generalized     weakness & inability to ambulate.          Assessment:  Chronic obstructive pulmonary disease exacerbation   Ambulatory Dysfunction:        Associated Active Comorbid Conditions:  Dyslipidemia   Hypertension   Heart failure with preserved ejection fraction -stable   Chronic obstructive pulmonary disease    Magnesium Deficiency    PLAN:    CTH is non-significant for acute changes . XR L/s multilevel degenerative changes of the visualized thoracolumbar spine.No fracture  Tylenol 650mg q6h ATC & Gabapentin qhs.  PT evaluation ordered. Will need Placement.      COPD without acute exacerbation:  Continue Symbicort and Albuterol prn.  Recently completed augmenting No antibiotics for now    Hypomagnesemia -Replaced. Monitor Mag and other electrolytes.  Mild Normocytic Anemia:Hgb stable.      Chronic Diastolic HF & Chronic Pleural Effusion & Bipedal edema:  ECHO 3/2019: LVEF >55% Mild MR & TR. Mild Pulm HTN.  Continue Lasix & metolazone.  Refused Venous Duplex.    Generalized weakness:Due to Deconditioning.   PT rehab.      Prophylaxis: Lovenox  Code status:Pt is full code.     Progress Note Handoff:    Family/Patient discussion: Plan of care discussed with patient   Disposition: Possibly STR Progress Note:  Provider Speciality                            Hospitalist      GUILLERMINANIMO MARTIN MRN-[id number] 78y Male     CHIEF PRESENTING COMPLAINT:  Patient is a 78y old  Male who presents with a chief complaint of Inability to ambulate (23 May 2019 08:15)        SUBJECTIVE:  Patient was seen and examined at bedside. Reports improvement in  presenting complaint. No significant overnight events reported.     HISTORY OF PRESENTING ILLNESS:  HPI:  79 yo Male hx of HTN, HLD, chronic Diastolic HF, first degree AVB presents, COPD not on home oxygen presented to the ED with complaints of generalized weakness & inability to ambulate. He was discharged from here on 3/19 to [facility] after being admitted on 3/8 for Rt Basilar Pneumonia with Sepsis complicated by PAULA and coffee ground emesis. He was discharged form [facility] on 19 and has been home with his Brother Gabe . He was able to take a few steps with a cane after his discharge from Rehab until the past 2 weeks when he started complaining of dyspnea and lightheadedness on ambulation. He was seen by the visiting Nurse who started him on Prednisone and Augmentin for COPD exacerbation. His respiratory status has improved but Patient has for the past 4-5 days refused to ambulate. He mentions having severe bilateral Knee and foot pain limiting his movements. He denied any further complaints. (22 May 2019 12:17)      PAST MEDICAL & SURGICAL HISTORY:  PAST MEDICAL & SURGICAL HISTORY:  PAULA (acute kidney injury)  Chronic diastolic heart failure  Coffee ground emesis  Mild anemia  High cholesterol  COPD (chronic obstructive pulmonary disease)  HTN (hypertension)  No significant past surgical history      VITAL SIGNS:  Vital Signs Last 24 Hrs  T(C): 35.8 (23 May 2019 05:39), Max: 36.4 (22 May 2019 21:08)  T(F): 96.5 (23 May 2019 05:39), Max: 97.6 (22 May 2019 21:08)  HR: 87 (23 May 2019 05:39) (86 - 105)  BP: 106/59 (23 May 2019 05:39) (99/57 - 125/58)  BP(mean): --  RR: 18 (23 May 2019 05:39) (18 - 18)  SpO2: 94% (22 May 2019 16:08) (94% - 94%)    PHYSICAL EXAMINATION:  Not in acute distress  General: No pallor, or icterus, afebrile  HEENT:  ANGÉLICA, EOMI, no JVD, no Bruit.  Heart: S1+S2 audible, no murmur  Lungs: bilateral  fair air entry, no wheezing, no crepitations.  Abdomen: Soft, non-tender, non-distended , no  rigidity or guarding.  CNS: AAOx3, CN  grossly intact.  Extremities:  No edema          REVIEW OF SYSTEMS:  Patient denies any headache, any vision complaints, runny nose, fever, chills, sore throat. Denies chest pain, shortness of breath, palpitation. Denies nausea, vomiting, abdominal pain, diarrhoea, Denies urinary burning, urgency, frequency, dysuria. Denies weakness in any part of the body or numbness.   At least 10 systems were reviewed in ROS. All systems reviewed  are within normal limits except for the complaints as described in Subjective.    CONSULTS:  Consultant(s) Notes Reviewed by me.   Care Discussed with Consultants/Other Providers where required.        MEDICATIONS:  MEDICATIONS  (STANDING):  ALBUTerol/ipratropium for Nebulization 3 milliLiter(s) Nebulizer every 6 hours  aspirin enteric coated 81 milliGRAM(s) Oral daily  atorvastatin 80 milliGRAM(s) Oral at bedtime  buDESOnide  80 MICROgram(s)/formoterol 4.5 MICROgram(s) Inhaler 2 Puff(s) Inhalation two times a day  chlorhexidine 4% Liquid 1 Application(s) Topical <User Schedule>  docusate sodium 100 milliGRAM(s) Oral two times a day  enoxaparin Injectable 40 milliGRAM(s) SubCutaneous daily  furosemide    Tablet 40 milliGRAM(s) Oral daily  gabapentin 100 milliGRAM(s) Oral at bedtime  levoFLOXacin IVPB      levoFLOXacin IVPB 500 milliGRAM(s) IV Intermittent every 24 hours  metolazone 2.5 milliGRAM(s) Oral <User Schedule>  montelukast 10 milliGRAM(s) Oral at bedtime  pantoprazole    Tablet 40 milliGRAM(s) Oral before breakfast    MEDICATIONS  (PRN):  acetaminophen   Tablet .. 650 milliGRAM(s) Oral every 6 hours PRN Mild Pain (1 - 3), Moderate Pain (4 - 6)  polyethylene glycol 3350 17 Gram(s) Oral daily PRN Constipation      LABOROTORY DATA/MICROBIOLOGY/I & O's:                        107   16 )-----------( 286      ( 23 May 2019 10:01 )             33.9     05-23    137  |  98  |  24<H>  ----------------------------<  156<H>  4.1   |  29  |  1.2    Ca    8.4<L>      23 May 2019 10:01  Phos  3.7     -  Mg     1.9         TPro  5.2<L>  /  Alb  2.7<L>  /  TBili  0.6  /  DBili  x   /  AST  46<H>  /  ALT  15  /  AlkPhos  41  05-23    PT/INR - ( 22 May 2019 08:30 )   PT: 13.90 sec;   INR: 1.21 ratio         PTT - ( 22 May 2019 08:30 )  PTT:28.0 sec  Urinalysis Basic - ( 22 May 2019 16:14 )    Color: Yellow / Appearance: Clear / S.025 / pH: x  Gluc: x / Ketone: Negative  / Bili: Negative / Urobili: 0.2 mg/dL   Blood: x / Protein: 100 mg/dL / Nitrite: Negative   Leuk Esterase: Negative / RBC: 6-10 /HPF / WBC 3-5 /HPF   Sq Epi: x / Non Sq Epi: Negative / Bacteria: Moderate      CAPILLARY BLOOD GLUCOSE            Urinalysis Basic - ( 22 May 2019 16:14 )    Color: Yellow / Appearance: Clear / S.025 / pH: x  Gluc: x / Ketone: Negative  / Bili: Negative / Urobili: 0.2 mg/dL   Blood: x / Protein: 100 mg/dL / Nitrite: Negative   Leuk Esterase: Negative / RBC: 6-10 /HPF / WBC 3-5 /HPF   Sq Epi: x / Non Sq Epi: Negative / Bacteria: Moderate                    ASSESSMENT:    79 yo Male hx of HTN, HLD, chronic Diastolic HF, first degree AVB presents, COPD not on home oxygen presented to the ED with complaints of generalized     weakness & inability to ambulate.          Assessment:  Chronic obstructive pulmonary disease exacerbation   Ambulatory Dysfunction:        Associated Active Comorbid Conditions:  Dyslipidemia   Hypertension   Heart failure with preserved ejection fraction -stable   Chronic obstructive pulmonary disease    Magnesium Deficiency    PLAN:    CTH is non-significant for acute changes . XR L/s multilevel degenerative changes of the visualized thoracolumbar spine.No fracture  Tylenol 650mg q6h ATC & Gabapentin qhs.  PT evaluation ordered. Will need Placement.      COPD without acute exacerbation:  Continue Symbicort and Albuterol prn.  Empiric coevrage with levofloxacin    Hypomagnesemia -Replaced. Monitor Mag and other electrolytes.  Mild Normocytic Anemia:Hgb stable.      Chronic Diastolic HF & Chronic Pleural Effusion & Bipedal edema:  ECHO 3/2019: LVEF >55% Mild MR & TR. Mild Pulm HTN.  Continue Lasix & metolazone.  Refused Venous Duplex.    Generalized weakness:Due to Deconditioning.   PT rehab.      Prophylaxis: Lovenox  Code status:Pt is full code.     Progress Note Handoff:    Family/Patient discussion: Plan of care discussed with patient   Disposition: Possibly STR

## 2019-05-23 NOTE — PHYSICAL THERAPY INITIAL EVALUATION ADULT - RANGE OF MOTION EXAMINATION, REHAB EVAL
BLE limited mobility 2* pain, bilateral knees lack~15 extension/bilateral upper extremity ROM was WFL (within functional limits)

## 2019-05-23 NOTE — CONSULT NOTE ADULT - ASSESSMENT
IMPRESSION: Rehab of 77 y/o m  rehab  for  gd  debility    PRECAUTIONS: [  ] Cardiac  [  ] Respiratory  [  ] Seizures [  ] Contact Isolation  [  ] Droplet Isolation  [ FALL ] Other    Weight Bearing Status:     RECOMMENDATION:    Out of Bed to Chair     DVT/Decubiti Prophylaxis    REHAB PLAN:     [ x  ] Bedside P/T 3-5 times a week   [   ]   Bedside O/T  2-3 times a week             [   ] No Rehab Therapy Indicated                   [   ]  Speech Therapy   Conditioning/ROM                                    ADL  Bed Mobility                                               Conditioning/ROM  Transfers                                                     Bed Mobility  Sitting /Standing Balance                         Transfers                                        Gait Training                                               Sitting/Standing Balance  Stair Training [   ]Applicable                    Home equipment Eval                                                                        Splinting  [   ] Only      GOALS:   ADL   [  x]   Independent                    Transfers  [x   ] Independent                          Ambulation  [   x] Independent     [  x  ] With device                            [ x  ]  CG                                                         [ x  ]  CG                                                                  [   ] CG                            [    ] Min A                                                   [   ] Min A                                                              [   ] Min  A          DISCHARGE PLAN:   [   ]  Good candidate for Intensive Rehabilitation/Hospital based-[facility]A [facility]                                             Will tolerate 3hrs Intensive Rehab Daily                                       [x x ]  Short Term Rehab in Skilled Nursing Facility                                       [    ]  Home with Outpatient or VN services                                         [    ]  Possible Candidate for Intensive Hospital based Rehab

## 2019-05-23 NOTE — CONSULT NOTE ADULT - SUBJECTIVE AND OBJECTIVE BOX
HPI:  77 yo Male hx of HTN, HLD, chronic Diastolic HF, first degree AVB presents, COPD not on home oxygen presented to the ED with complaints of generalized weakness & inability to ambulate. He was discharged from here on 3/19 to [facility] after being admitted on 3/8 for Rt Basilar Pneumonia with Sepsis complicated by PAULA and coffee ground emesis. He was discharged form [facility] on 19 and has been home with his Brother . He was able to take a few steps with a cane after his discharge from Rehab until the past 2 weeks when he started complaining of dyspnea and lightheadedness on ambulation. He was seen by the visiting Nurse who started him on Prednisone and Augmentin for COPD exacerbation. His respiratory status has improved but Patient has for the past 4-5 days refused to ambulate. He mentions having severe bilateral Knee and foot pain limiting his movements. He denied any further complaints.     PTN  REFERRED TO ACUTE  REHAB  FOR  EVAL AND  TX   PAST MEDICAL & SURGICAL HISTORY:  PAULA (acute kidney injury)  Chronic diastolic heart failure  Coffee ground emesis  Mild anemia  High cholesterol  COPD (chronic obstructive pulmonary disease)  HTN (hypertension)  No significant past surgical history      Hospital Course:    TODAY'S SUBJECTIVE & REVIEW OF SYMPTOMS:     Constitutional WNL   Cardio WNL   Resp WNL   GI WNL  Heme WNL  Endo WNL  Skin WNL  MSK WNL  Neuro WNL  Cognitive WNL  Psych WNL      MEDICATIONS  (STANDING):  acetaminophen   Tablet .. 650 milliGRAM(s) Oral every 6 hours  ALBUTerol/ipratropium for Nebulization 3 milliLiter(s) Nebulizer every 6 hours  aspirin enteric coated 81 milliGRAM(s) Oral daily  atorvastatin 80 milliGRAM(s) Oral at bedtime  buDESOnide  80 MICROgram(s)/formoterol 4.5 MICROgram(s) Inhaler 2 Puff(s) Inhalation two times a day  chlorhexidine 4% Liquid 1 Application(s) Topical <User Schedule>  docusate sodium 100 milliGRAM(s) Oral two times a day  enoxaparin Injectable 40 milliGRAM(s) SubCutaneous daily  furosemide    Tablet 40 milliGRAM(s) Oral daily  gabapentin 100 milliGRAM(s) Oral at bedtime  levoFLOXacin IVPB      levoFLOXacin IVPB 500 milliGRAM(s) IV Intermittent every 24 hours  metolazone 2.5 milliGRAM(s) Oral <User Schedule>  montelukast 10 milliGRAM(s) Oral at bedtime  pantoprazole    Tablet 40 milliGRAM(s) Oral before breakfast    MEDICATIONS  (PRN):  polyethylene glycol 3350 17 Gram(s) Oral daily PRN Constipation      FAMILY HISTORY:  No pertinent family history in first degree relatives      Allergies    No Known Allergies    Intolerances        SOCIAL HISTORY:    [  ] Etoh  [  ] Smoking  [  ] Substance abuse     Home Environment:  [  ] Home Alone  [  x] Lives with Family brother  [  ] Home Health Aid    Dwelling:  [  ] Apartment  [ x ] Private House  [  ] Adult Home  [  ] Skilled Nursing Facility      [  ] Short Term  [  ] Long Term  [  x] Stairs       Elevator [  ]    FUNCTIONAL STATUS PTA: (Check all that apply)  Ambulation: [  x ]Independent    [  ] Dependent     [  ] Non-Ambulatory  Assistive Device: [x  ] SA Cane  [  ]  Q Cane  [  x] Walker  [  ]  Wheelchair  ADL : [x  ] Independent  [  ]  Dependent       Vital Signs Last 24 Hrs  T(C): 35.8 (23 May 2019 05:39), Max: 36.7 (22 May 2019 12:17)  T(F): 96.5 (23 May 2019 05:39), Max: 98 (22 May 2019 12:17)  HR: 87 (23 May 2019 05:39) (86 - 106)  BP: 106/59 (23 May 2019 05:39) (91/50 - 125/58)  BP(mean): --  RR: 18 (23 May 2019 05:39) (18 - 18)  SpO2: 94% (22 May 2019 16:08) (94% - 96%)      PHYSICAL EXAM: Alert & Oriented X3  GENERAL: NAD, well-groomed, well-developed  HEAD:  Atraumatic, Normocephalic  EYES: EOMI, PERRLA, conjunctiva and sclera clear  NECK: Supple, No JVD, Normal thyroid  CHEST/LUNG: Clear to percussion bilaterally; No rales, rhonchi, wheezing, or rubs  HEART: Regular rate and rhythm; No murmurs, rubs, or gallops  ABDOMEN: Soft, Nontender, Nondistended; Bowel sounds present  EXTREMITIES:  2+ Peripheral Pulses, No clubbing, cyanosis, or edema    NERVOUS SYSTEM:  Cranial Nerves 2-12 intact [x  ] Abnormal  [  ]  ROM: WFL all extremities [  ]  Abnormal [x  ]  Motor Strength: WFL all extremities  [  ]  Abnormal [  4/5  ue 3/5  le]  Sensation: intact to light touch [  ] Abnormal [ x]  Reflexes: Symmetric [  ]  Abnormal [x  ]    FUNCTIONAL STATUS:  Bed Mobility: Independent [  ]  Supervision [  ]  Needs Assistance [ x ]  N/A [  ]  Transfers: Independent [  ]  Supervision [  ]  Needs Assistance [ x ]  N/A [  ]   Ambulation: Independent [  ]  Supervision [  ]  Needs Assistance [ x ]  N/A [  ]  ADL: Independent [  ] Requires Assistance [  ] N/A [ x]  SEE PT/OT IE NOTES    LABS:                        11.1   15.64 )-----------( 329      ( 22 May 2019 08:30 )             34.5         137  |  99  |  24<H>  ----------------------------<  134<H>  4.6   |  30  |  1.1    Ca    9.0      22 May 2019 08:30  Mg     1.6         TPro  6.2  /  Alb  3.2<L>  /  TBili  0.5  /  DBili  x   /  AST  24  /  ALT  12  /  AlkPhos  48  -22    PT/INR - ( 22 May 2019 08:30 )   PT: 13.90 sec;   INR: 1.21 ratio         PTT - ( 22 May 2019 08:30 )  PTT:28.0 sec  Urinalysis Basic - ( 22 May 2019 16:14 )    Color: Yellow / Appearance: Clear / S.025 / pH: x  Gluc: x / Ketone: Negative  / Bili: Negative / Urobili: 0.2 mg/dL   Blood: x / Protein: 100 mg/dL / Nitrite: Negative   Leuk Esterase: Negative / RBC: 6-10 /HPF / WBC 3-5 /HPF   Sq Epi: x / Non Sq Epi: Negative / Bacteria: Moderate        RADIOLOGY & ADDITIONAL STUDIES:    Assesment:

## 2019-05-24 LAB
ANION GAP SERPL CALC-SCNC: 10 MMOL/L — SIGNIFICANT CHANGE UP (ref 7–14)
BUN SERPL-MCNC: 28 MG/DL — HIGH (ref 10–20)
CALCIUM SERPL-MCNC: 8.2 MG/DL — LOW (ref 8.5–10.1)
CHLORIDE SERPL-SCNC: 98 MMOL/L — SIGNIFICANT CHANGE UP (ref 98–110)
CO2 SERPL-SCNC: 28 MMOL/L — SIGNIFICANT CHANGE UP (ref 17–32)
CREAT SERPL-MCNC: 1.2 MG/DL — SIGNIFICANT CHANGE UP (ref 0.7–1.5)
GLUCOSE SERPL-MCNC: 117 MG/DL — HIGH (ref 70–99)
HCT VFR BLD CALC: 32.6 % — LOW (ref 42–52)
HGB BLD-MCNC: 10.4 G/DL — LOW (ref 14–18)
MCHC RBC-ENTMCNC: 27 PG — SIGNIFICANT CHANGE UP (ref 27–31)
MCHC RBC-ENTMCNC: 31.9 G/DL — LOW (ref 32–37)
MCV RBC AUTO: 84.7 FL — SIGNIFICANT CHANGE UP (ref 80–94)
NRBC # BLD: 0 /100 WBCS — SIGNIFICANT CHANGE UP (ref 0–0)
PLATELET # BLD AUTO: 250 K/UL — SIGNIFICANT CHANGE UP (ref 130–400)
POTASSIUM SERPL-MCNC: 3.7 MMOL/L — SIGNIFICANT CHANGE UP (ref 3.5–5)
POTASSIUM SERPL-SCNC: 3.7 MMOL/L — SIGNIFICANT CHANGE UP (ref 3.5–5)
RBC # BLD: 3.85 M/UL — LOW (ref 4.7–6.1)
RBC # FLD: 13.7 % — SIGNIFICANT CHANGE UP (ref 11.5–14.5)
SODIUM SERPL-SCNC: 136 MMOL/L — SIGNIFICANT CHANGE UP (ref 135–146)
WBC # BLD: 11.82 K/UL — HIGH (ref 4.8–10.8)
WBC # FLD AUTO: 11.82 K/UL — HIGH (ref 4.8–10.8)

## 2019-05-24 RX ADMIN — Medication 3 MILLILITER(S): at 19:54

## 2019-05-24 RX ADMIN — Medication 40 MILLIGRAM(S): at 05:41

## 2019-05-24 RX ADMIN — ATORVASTATIN CALCIUM 80 MILLIGRAM(S): 80 TABLET, FILM COATED ORAL at 21:51

## 2019-05-24 RX ADMIN — Medication 100 MILLIGRAM(S): at 05:41

## 2019-05-24 RX ADMIN — BUDESONIDE AND FORMOTEROL FUMARATE DIHYDRATE 2 PUFF(S): 160; 4.5 AEROSOL RESPIRATORY (INHALATION) at 23:47

## 2019-05-24 RX ADMIN — Medication 3 MILLILITER(S): at 13:14

## 2019-05-24 RX ADMIN — MONTELUKAST 10 MILLIGRAM(S): 4 TABLET, CHEWABLE ORAL at 21:51

## 2019-05-24 RX ADMIN — PANTOPRAZOLE SODIUM 40 MILLIGRAM(S): 20 TABLET, DELAYED RELEASE ORAL at 05:41

## 2019-05-24 RX ADMIN — Medication 100 MILLIGRAM(S): at 18:02

## 2019-05-24 RX ADMIN — Medication 3 MILLILITER(S): at 08:35

## 2019-05-24 RX ADMIN — GABAPENTIN 100 MILLIGRAM(S): 400 CAPSULE ORAL at 21:51

## 2019-05-24 RX ADMIN — BUDESONIDE AND FORMOTEROL FUMARATE DIHYDRATE 2 PUFF(S): 160; 4.5 AEROSOL RESPIRATORY (INHALATION) at 08:42

## 2019-05-24 RX ADMIN — Medication 81 MILLIGRAM(S): at 11:37

## 2019-05-24 RX ADMIN — ENOXAPARIN SODIUM 40 MILLIGRAM(S): 100 INJECTION SUBCUTANEOUS at 11:37

## 2019-05-24 NOTE — DIETITIAN INITIAL EVALUATION ADULT. - OTHER INFO
Reason for RD assessment: Pressure ulcer stage II or greater. Pertinent Medical Information: p/w inability to ambulate. Ambulatory Dysfunction noted. Chronic obstructive pulmonary disease exacerbation. Chronic Diastolic HF & Chronic Pleural Effusion & Bipedal edema.

## 2019-05-24 NOTE — DIETITIAN INITIAL EVALUATION ADULT. - PHYSICAL APPEARANCE
Two actual wts noted - 96.4 kg on 5/22 (which results in BMI 25.9) vs. 111.3 kg on 5/22 (which results in BMI 29.9). Alert. 3+ edema (B/L foot). Last BM 5/22. No chewing/swallowing difficulty reported. Skin: Stage II decubitus to sacrum noted.

## 2019-05-24 NOTE — PROGRESS NOTE ADULT - SUBJECTIVE AND OBJECTIVE BOX
Progress Note:  Provider Speciality                            Hospitalist      NIMO SARMIENTO [id number] 78y Male     CHIEF PRESENTING COMPLAINT:  Patient is a 78y old  Male who presents with a chief complaint of Inability to ambulate (23 May 2019 13:59)        SUBJECTIVE:  Patient was seen and examined at bedside. Reports improvement in  presenting complaint. No significant overnight events reported.     HISTORY OF PRESENTING ILLNESS:  HPI:  79 yo Male hx of HTN, HLD, chronic Diastolic HF, first degree AVB presents, COPD not on home oxygen presented to the ED with complaints of generalized weakness & inability to ambulate. He was discharged from here on 3/19 to [facility] after being admitted on 3/8 for Rt Basilar Pneumonia with Sepsis complicated by PAULA and coffee ground emesis. He was discharged form [facility] on 19 and has been home with his Brother Gabe . He was able to take a few steps with a cane after his discharge from Rehab until the past 2 weeks when he started complaining of dyspnea and lightheadedness on ambulation. He was seen by the visiting Nurse who started him on Prednisone and Augmentin for COPD exacerbation. His respiratory status has improved but Patient has for the past 4-5 days refused to ambulate. He mentions having severe bilateral Knee and foot pain limiting his movements. He denied any further complaints. (22 May 2019 12:17)      PAST MEDICAL & SURGICAL HISTORY:  PAST MEDICAL & SURGICAL HISTORY:  PAULA (acute kidney injury)  Chronic diastolic heart failure  Coffee ground emesis  Mild anemia  High cholesterol  COPD (chronic obstructive pulmonary disease)  HTN (hypertension)  No significant past surgical history      VITAL SIGNS:  Vital Signs Last 24 Hrs  T(C): 37.4 (24 May 2019 05:04), Max: 39.2 (23 May 2019 19:25)  T(F): 99.3 (24 May 2019 05:04), Max: 102.5 (23 May 2019 19:25)  HR: 85 (24 May 2019 05:04) (85 - 98)  BP: 103/58 (24 May 2019 05:04) (101/58 - 112/56)  BP(mean): --  RR: 16 (24 May 2019 05:04) (16 - 18)  SpO2: 99% (24 May 2019 08:47) (99% - 99%)    PHYSICAL EXAMINATION:  Not in acute distress  General: No pallor, or icterus, afebrile  HEENT:  ANGÉLICA, EOMI, no JVD, no Bruit.  Heart: S1+S2 audible, no murmur  Lungs: bilateral  fair air entry, no wheezing, no crepitations.  Abdomen: Soft, non-tender, non-distended , no  rigidity or guarding.  CNS: AAOx3, CN  grossly intact.  Extremities:  No edema          REVIEW OF SYSTEMS:  Patient denies any headache, any vision complaints, runny nose, fever, chills, sore throat. Denies chest pain, shortness of breath, palpitation. Denies nausea, vomiting, abdominal pain, diarrhoea, Denies urinary burning, urgency, frequency, dysuria. Denies weakness in any part of the body or numbness.   At least 10 systems were reviewed in ROS. All systems reviewed  are within normal limits except for the complaints as described in Subjective.    CONSULTS:  Consultant(s) Notes Reviewed by me.   Care Discussed with Consultants/Other Providers where required.        MEDICATIONS:  MEDICATIONS  (STANDING):  ALBUTerol/ipratropium for Nebulization 3 milliLiter(s) Nebulizer every 6 hours  aspirin enteric coated 81 milliGRAM(s) Oral daily  atorvastatin 80 milliGRAM(s) Oral at bedtime  buDESOnide  80 MICROgram(s)/formoterol 4.5 MICROgram(s) Inhaler 2 Puff(s) Inhalation two times a day  chlorhexidine 4% Liquid 1 Application(s) Topical <User Schedule>  docusate sodium 100 milliGRAM(s) Oral two times a day  enoxaparin Injectable 40 milliGRAM(s) SubCutaneous daily  furosemide    Tablet 40 milliGRAM(s) Oral daily  gabapentin 100 milliGRAM(s) Oral at bedtime  levoFLOXacin IVPB      levoFLOXacin IVPB 500 milliGRAM(s) IV Intermittent every 24 hours  metolazone 2.5 milliGRAM(s) Oral <User Schedule>  montelukast 10 milliGRAM(s) Oral at bedtime  pantoprazole    Tablet 40 milliGRAM(s) Oral before breakfast    MEDICATIONS  (PRN):  acetaminophen   Tablet .. 650 milliGRAM(s) Oral every 6 hours PRN Mild Pain (1 - 3), Moderate Pain (4 - 6)  polyethylene glycol 3350 17 Gram(s) Oral daily PRN Constipation      LABOROTORY DATA/MICROBIOLOGY/I & O's:                        1082 )-----------( 250      ( 24 May 2019 07:29 )             32.6     05-24    136  |  98  |  28<H>  ----------------------------<  117<H>  3.7   |  28  |  1.2    Ca    8.2<L>      24 May 2019 07:29  Phos  3.7     05-23  Mg     1.9     05-23    TPro  5.2<L>  /  Alb  2.7<L>  /  TBili  0.6  /  DBili  x   /  AST  46<H>  /  ALT  15  /  AlkPhos  41  05-23      Urinalysis Basic - ( 22 May 2019 16:14 )    Color: Yellow / Appearance: Clear / S.025 / pH: x  Gluc: x / Ketone: Negative  / Bili: Negative / Urobili: 0.2 mg/dL   Blood: x / Protein: 100 mg/dL / Nitrite: Negative   Leuk Esterase: Negative / RBC: 6-10 /HPF / WBC 3-5 /HPF   Sq Epi: x / Non Sq Epi: Negative / Bacteria: Moderate      CAPILLARY BLOOD GLUCOSE            Urinalysis Basic - ( 22 May 2019 16:14 )    Color: Yellow / Appearance: Clear / S.025 / pH: x  Gluc: x / Ketone: Negative  / Bili: Negative / Urobili: 0.2 mg/dL   Blood: x / Protein: 100 mg/dL / Nitrite: Negative   Leuk Esterase: Negative / RBC: 6-10 /HPF / WBC 3-5 /HPF   Sq Epi: x / Non Sq Epi: Negative / Bacteria: Moderate                    ASSESSMENT:    79 yo Male hx of HTN, HLD, chronic Diastolic HF, first degree AVB presents, COPD not on home oxygen presented to the ED with complaints of generalized     weakness & inability to ambulate.          Assessment:  Chronic obstructive pulmonary disease exacerbation   Ambulatory Dysfunction:        Associated Active Comorbid Conditions:  Dyslipidemia   Hypertension   Heart failure with preserved ejection fraction -stable   Chronic obstructive pulmonary disease   Magnesium Deficiency    PLAN:    CTH is non-significant for acute changes . XR L/s multilevel degenerative changes of the visualized thoracolumbar spine. No fracture  Tylenol 650mg q6h ATC & Gabapentin qhs.  PT evaluation ordered. Will need Placement.      COPD without acute exacerbation:  Continue Symbicort and Albuterol prn.  Empiric coverage with levofloxacin    Hypomagnesemia -Replaced. Monitor Mag and other electrolytes.  Mild Normocytic Anemia:Hgb stable.      Chronic Diastolic HF & Chronic Pleural Effusion & Bipedal edema:  ECHO 3/2019: LVEF >55% Mild MR & TR. Mild Pulm HTN.  Continue Lasix & metolazone.  Refused Venous Duplex.    Generalized weakness:Due to Deconditioning.   PT rehab.      Prophylaxis: Lovenox  Code status:Pt is full code.     Progress Note Handoff:    Family/Patient discussion: Plan of care discussed with patient   Disposition: Possibly STR

## 2019-05-24 NOTE — DIETITIAN INITIAL EVALUATION ADULT. - NS FNS WEIGHT USED FOR CALC
Ht: 193.04 cm. Wt: 96.4 kg (5/22 wt, lowest wt this admit). IBW: 91.8 kg (will assess needs using IBW as difficulty to confirm ABW at this time).

## 2019-05-24 NOTE — DIETITIAN INITIAL EVALUATION ADULT. - SOURCE
Fair appetite & po intake PTP. NKFA. Takes MVI at home. Regular diet, avoids salty foods. Reports h/o wt loss however unable to confirm wt trends at this time.

## 2019-05-24 NOTE — DIETITIAN INITIAL EVALUATION ADULT. - DIET TYPE
DASH/TLC (sodium and cholesterol restricted diet)/reportedly tolerating diet order with % po intake at this time.

## 2019-05-24 NOTE — DIETITIAN INITIAL EVALUATION ADULT. - MD RECOMMEND
Recommendation: (1) Order Prosource Gelatein Plus q12hrs (pt with good po intake, however current diet order will not meet pt's estimated nutrient needs given stage II pressure ulcer). (2) Order MVI (without minerals given h/o PAULA) q24hrs. (3) Check HgbA1C.

## 2019-05-24 NOTE — DIETITIAN INITIAL EVALUATION ADULT. - ENERGY NEEDS
Energy: 2754 kcal/day (30 kcal/kg IBW) - will not use 30-35 kcal/kg range so as to avoid over-estimated needs.  Protein: 110-128 g/day (1.2-1.4 g/kg IBW)  Fluids: 6949-1742 mL/day (15-20 mL/kg IBW in setting of fluid shifts) or per LIP

## 2019-05-25 VITALS — OXYGEN SATURATION: 97 %

## 2019-05-25 RX ORDER — ASPIRIN/CALCIUM CARB/MAGNESIUM 324 MG
1 TABLET ORAL
Qty: 0 | Refills: 0 | DISCHARGE
Start: 2019-05-25

## 2019-05-25 RX ORDER — GABAPENTIN 400 MG/1
1 CAPSULE ORAL
Qty: 0 | Refills: 0 | DISCHARGE
Start: 2019-05-25

## 2019-05-25 RX ORDER — ACETAMINOPHEN 500 MG
2 TABLET ORAL
Qty: 0 | Refills: 0 | DISCHARGE
Start: 2019-05-25

## 2019-05-25 RX ORDER — POLYETHYLENE GLYCOL 3350 17 G/17G
17 POWDER, FOR SOLUTION ORAL
Qty: 0 | Refills: 0 | DISCHARGE
Start: 2019-05-25

## 2019-05-25 RX ORDER — DOCUSATE SODIUM 100 MG
1 CAPSULE ORAL
Qty: 0 | Refills: 0 | DISCHARGE
Start: 2019-05-25

## 2019-05-25 RX ADMIN — Medication 40 MILLIGRAM(S): at 05:43

## 2019-05-25 RX ADMIN — Medication 100 MILLIGRAM(S): at 05:43

## 2019-05-25 RX ADMIN — BUDESONIDE AND FORMOTEROL FUMARATE DIHYDRATE 2 PUFF(S): 160; 4.5 AEROSOL RESPIRATORY (INHALATION) at 07:45

## 2019-05-25 RX ADMIN — Medication 81 MILLIGRAM(S): at 11:19

## 2019-05-25 RX ADMIN — Medication 3 MILLILITER(S): at 07:47

## 2019-05-25 RX ADMIN — CHLORHEXIDINE GLUCONATE 1 APPLICATION(S): 213 SOLUTION TOPICAL at 05:45

## 2019-05-25 RX ADMIN — PANTOPRAZOLE SODIUM 40 MILLIGRAM(S): 20 TABLET, DELAYED RELEASE ORAL at 06:17

## 2019-05-25 RX ADMIN — ENOXAPARIN SODIUM 40 MILLIGRAM(S): 100 INJECTION SUBCUTANEOUS at 11:19

## 2019-05-25 NOTE — DISCHARGE NOTE NURSING/CASE MANAGEMENT/SOCIAL WORK - NSDCDPATPORTLINK_GEN_ALL_CORE
You can access the [facility] Patient Portal, offered by [facility], by registering with the following website: [email or web address]

## 2019-05-25 NOTE — DISCHARGE NOTE PROVIDER - HOSPITAL COURSE
79 yo Male hx of HTN, HLD, chronic Diastolic HF, first degree AVB presents, COPD not on home oxygen presented to the ED with complaints of generalized weakness & inability to     ambulate. He was discharged  on 3/19 to [facility] after being admitted on 3/8 for Rt Basilar Pneumonia with Sepsis complicated by PAULA and coffee ground emesis. He was discharged form [facility] on 4/7/19 and has been home with his Brother. He was able to take a few steps with a cane after his discharge from Rehab until the past 2 weeks when he started complaining of dyspnea and lightheadedness on ambulation. He was seen by the visiting Nurse who started him on Prednisone and Augmentin for COPD exacerbation. His respiratory status has improved but Patient has for the past 4-5 days refused to ambulate. He mentions having severe bilateral Knee and foot pain limiting his movements. He denied any further complaints. His imaging studies did not show any evidence for fracture. CXR did not demonstrate any new evidence for Pneumonia . HIs CHF seems to be euvolemic. No     Chronic obstructive pulmonary disease exacerbation was noted. no infectious etiology was found , hence no antibiotics were needed. Physical therapy saw him and recommneded NH     because of ambulatory dysfunction. Will be sent back to NH. 79 yo Male hx of HTN, HLD, chronic Diastolic HF, first degree AVB presents, COPD not on home oxygen presented to the ED with complaints of generalized weakness & inability to     ambulate. He was discharged  on 3/19 to [facility] after being admitted on 3/8 for Rt Basilar Pneumonia with Sepsis complicated by PAULA and coffee ground emesis. He was discharged form [facility] on 4/7/19 and has been home with his Brother. He was able to take a few steps with a cane after his discharge from Rehab until the past 2 weeks when he started complaining of dyspnea and lightheadedness on ambulation. He was seen by the visiting Nurse who started him on Prednisone and Augmentin for COPD exacerbation. His respiratory status has improved but Patient has for the past 4-5 days refused to ambulate. He mentions having severe bilateral Knee and foot pain limiting his movements. He denied any further complaints. His imaging studies did not show any evidence for fracture. CXR did not demonstrate any new evidence for Pneumonia . HIs CHF seems to be euvolemic. No     Chronic obstructive pulmonary disease exacerbation was noted. no infectious etiology was found , hence no antibiotics were needed. Physical therapy saw him and recommneded NH     because of ambulatory dysfunction. Will be sent back to NH.     Attending Attestation:    Patient was seen independently. Latest vital signs and labs were reviewed.  Patient is medically stable for discharge to home. About 32 mins spent on discharge disposition

## 2019-05-25 NOTE — DISCHARGE NOTE PROVIDER - NSDCCPCAREPLAN_GEN_ALL_CORE_FT
PRINCIPAL DISCHARGE DIAGNOSIS  Diagnosis: Weakness  Assessment and Plan of Treatment: Difficulty ambulation improved but still persists. Needs rehab

## 2019-05-27 ENCOUNTER — OUTPATIENT (OUTPATIENT)
Dept: OUTPATIENT SERVICES | Facility: HOSPITAL | Age: 79
LOS: 1 days | Discharge: HOME | End: 2019-05-27

## 2019-05-27 DIAGNOSIS — R79.9 ABNORMAL FINDING OF BLOOD CHEMISTRY, UNSPECIFIED: ICD-10-CM

## 2019-05-27 PROBLEM — N17.9 ACUTE KIDNEY FAILURE, UNSPECIFIED: Chronic | Status: ACTIVE | Noted: 2019-05-22

## 2019-05-27 PROBLEM — I50.32 CHRONIC DIASTOLIC (CONGESTIVE) HEART FAILURE: Chronic | Status: ACTIVE | Noted: 2019-05-22

## 2019-05-27 PROBLEM — K92.0 HEMATEMESIS: Chronic | Status: ACTIVE | Noted: 2019-05-22

## 2019-05-27 PROBLEM — D64.9 ANEMIA, UNSPECIFIED: Chronic | Status: ACTIVE | Noted: 2019-05-22

## 2019-05-27 LAB
CULTURE RESULTS: SIGNIFICANT CHANGE UP
CULTURE RESULTS: SIGNIFICANT CHANGE UP
SPECIMEN SOURCE: SIGNIFICANT CHANGE UP
SPECIMEN SOURCE: SIGNIFICANT CHANGE UP

## 2019-05-28 ENCOUNTER — OUTPATIENT (OUTPATIENT)
Dept: OUTPATIENT SERVICES | Facility: HOSPITAL | Age: 79
LOS: 1 days | Discharge: HOME | End: 2019-05-28

## 2019-05-28 DIAGNOSIS — R79.9 ABNORMAL FINDING OF BLOOD CHEMISTRY, UNSPECIFIED: ICD-10-CM

## 2019-05-30 DIAGNOSIS — Z79.51 LONG TERM (CURRENT) USE OF INHALED STEROIDS: ICD-10-CM

## 2019-05-30 DIAGNOSIS — R53.1 WEAKNESS: ICD-10-CM

## 2019-05-30 DIAGNOSIS — J44.9 CHRONIC OBSTRUCTIVE PULMONARY DISEASE, UNSPECIFIED: ICD-10-CM

## 2019-05-30 DIAGNOSIS — M25.562 PAIN IN LEFT KNEE: ICD-10-CM

## 2019-05-30 DIAGNOSIS — M25.571 PAIN IN RIGHT ANKLE AND JOINTS OF RIGHT FOOT: ICD-10-CM

## 2019-05-30 DIAGNOSIS — I11.0 HYPERTENSIVE HEART DISEASE WITH HEART FAILURE: ICD-10-CM

## 2019-05-30 DIAGNOSIS — E78.00 PURE HYPERCHOLESTEROLEMIA, UNSPECIFIED: ICD-10-CM

## 2019-05-30 DIAGNOSIS — J98.11 ATELECTASIS: ICD-10-CM

## 2019-05-30 DIAGNOSIS — L89.152 PRESSURE ULCER OF SACRAL REGION, STAGE 2: ICD-10-CM

## 2019-05-30 DIAGNOSIS — I27.20 PULMONARY HYPERTENSION, UNSPECIFIED: ICD-10-CM

## 2019-05-30 DIAGNOSIS — D64.9 ANEMIA, UNSPECIFIED: ICD-10-CM

## 2019-05-30 DIAGNOSIS — I50.32 CHRONIC DIASTOLIC (CONGESTIVE) HEART FAILURE: ICD-10-CM

## 2019-05-30 DIAGNOSIS — M25.572 PAIN IN LEFT ANKLE AND JOINTS OF LEFT FOOT: ICD-10-CM

## 2019-05-30 DIAGNOSIS — R47.81 SLURRED SPEECH: ICD-10-CM

## 2019-05-30 DIAGNOSIS — E61.2 MAGNESIUM DEFICIENCY: ICD-10-CM

## 2019-05-30 DIAGNOSIS — Z87.891 PERSONAL HISTORY OF NICOTINE DEPENDENCE: ICD-10-CM

## 2019-05-30 DIAGNOSIS — R26.2 DIFFICULTY IN WALKING, NOT ELSEWHERE CLASSIFIED: ICD-10-CM

## 2019-05-30 DIAGNOSIS — I08.1 RHEUMATIC DISORDERS OF BOTH MITRAL AND TRICUSPID VALVES: ICD-10-CM

## 2019-05-30 DIAGNOSIS — I44.0 ATRIOVENTRICULAR BLOCK, FIRST DEGREE: ICD-10-CM

## 2019-05-30 DIAGNOSIS — M25.561 PAIN IN RIGHT KNEE: ICD-10-CM

## 2019-05-31 ENCOUNTER — OUTPATIENT (OUTPATIENT)
Dept: OUTPATIENT SERVICES | Facility: HOSPITAL | Age: 79
LOS: 1 days | Discharge: HOME | End: 2019-05-31

## 2019-05-31 DIAGNOSIS — R79.9 ABNORMAL FINDING OF BLOOD CHEMISTRY, UNSPECIFIED: ICD-10-CM

## 2019-06-07 ENCOUNTER — OUTPATIENT (OUTPATIENT)
Dept: OUTPATIENT SERVICES | Facility: HOSPITAL | Age: 79
LOS: 1 days | Discharge: HOME | End: 2019-06-07

## 2019-06-07 DIAGNOSIS — D64.9 ANEMIA, UNSPECIFIED: ICD-10-CM

## 2019-06-12 ENCOUNTER — OUTPATIENT (OUTPATIENT)
Dept: OUTPATIENT SERVICES | Facility: HOSPITAL | Age: 79
LOS: 1 days | Discharge: HOME | End: 2019-06-12

## 2019-06-12 DIAGNOSIS — R79.9 ABNORMAL FINDING OF BLOOD CHEMISTRY, UNSPECIFIED: ICD-10-CM

## 2019-07-02 ENCOUNTER — OUTPATIENT (OUTPATIENT)
Dept: OUTPATIENT SERVICES | Facility: HOSPITAL | Age: 79
LOS: 1 days | Discharge: HOME | End: 2019-07-02

## 2019-07-02 DIAGNOSIS — Z79.899 OTHER LONG TERM (CURRENT) DRUG THERAPY: ICD-10-CM

## 2019-07-02 DIAGNOSIS — R53.83 OTHER FATIGUE: ICD-10-CM

## 2019-07-02 DIAGNOSIS — R74.8 ABNORMAL LEVELS OF OTHER SERUM ENZYMES: ICD-10-CM

## 2019-07-04 ENCOUNTER — OUTPATIENT (OUTPATIENT)
Dept: OUTPATIENT SERVICES | Facility: HOSPITAL | Age: 79
LOS: 1 days | Discharge: HOME | End: 2019-07-04

## 2019-07-04 DIAGNOSIS — R79.9 ABNORMAL FINDING OF BLOOD CHEMISTRY, UNSPECIFIED: ICD-10-CM

## 2019-07-22 ENCOUNTER — OUTPATIENT (OUTPATIENT)
Dept: OUTPATIENT SERVICES | Facility: HOSPITAL | Age: 79
LOS: 1 days | Discharge: HOME | End: 2019-07-22

## 2019-07-22 DIAGNOSIS — E78.5 HYPERLIPIDEMIA, UNSPECIFIED: ICD-10-CM

## 2019-07-22 DIAGNOSIS — I10 ESSENTIAL (PRIMARY) HYPERTENSION: ICD-10-CM

## 2019-07-22 DIAGNOSIS — D64.9 ANEMIA, UNSPECIFIED: ICD-10-CM

## 2019-08-05 ENCOUNTER — OUTPATIENT (OUTPATIENT)
Dept: OUTPATIENT SERVICES | Facility: HOSPITAL | Age: 79
LOS: 1 days | Discharge: HOME | End: 2019-08-05

## 2019-08-05 DIAGNOSIS — E53.9 VITAMIN B DEFICIENCY, UNSPECIFIED: ICD-10-CM

## 2019-08-05 DIAGNOSIS — D64.9 ANEMIA, UNSPECIFIED: ICD-10-CM

## 2019-08-23 NOTE — ED PROVIDER NOTE - EKG #1 DATE/TIME
Subjective   Hilda Lemus is a 68 y.o. male.     Chief Complaint   Patient presents with   • Follow-up     6mo        History of Present Illness     he says his bp has been stable witout cp or ha--he feels good since vascualr procedure--he is tolerating statin witout myalgia s..his gerd symptoms are stable wituout dysphgia      Current Outpatient Medications:   •  aspirin 81 MG chewable tablet, Chew 81 mg Daily., Disp: , Rfl:   •  esomeprazole (nexIUM) 20 MG capsule, Take 20 mg by mouth Every Morning Before Breakfast., Disp: , Rfl:   •  lovastatin (MEVACOR) 10 MG tablet, Take 1 tablet by mouth Every Night., Disp: 90 tablet, Rfl: 1  •  metoprolol tartrate (LOPRESSOR) 25 MG tablet, TAKE 1/2 (ONE-HALF) TABLET BY MOUTH TWICE DAILY, Disp: 90 tablet, Rfl: 3  •  naproxen sodium (ALEVE) 220 MG tablet, Take 220 mg by mouth 2 (Two) Times a Day As Needed., Disp: , Rfl:   No Known Allergies    Past Medical History:   Diagnosis Date   • Acid reflux    • Arthritis     bilateral knees   • Colon polyp    • Diverticulitis    • Heat stroke    • Hypertension    • PONV (postoperative nausea and vomiting)    • TGA (transient global amnesia)    • Transient global amnesia      Past Surgical History:   Procedure Laterality Date   • COLONOSCOPY  05/06/2013    hyperplastic polyp, internal hemorrhoids, diverticulosis   • COLONOSCOPY  11/12/2007    diminutive cecal polyp, diverticulosis   • COLONOSCOPY N/A 6/26/2018    Procedure: COLONOSCOPY WITH ANESTHESIA;  Surgeon: Connor Zapata MD;  Location: [facility] ENDOSCOPY;  Service: Gastroenterology   • KNEE SURGERY     • MUSCLE REPAIR      right arm   • TOOTH EXTRACTION         Review of Systems   Constitutional: Negative.    HENT: Negative.    Eyes: Negative.    Respiratory: Negative.    Cardiovascular: Negative.    Gastrointestinal: Negative.    Endocrine: Negative.    Genitourinary: Negative.    Musculoskeletal: Negative.    Skin: Negative.    Allergic/Immunologic: Negative.    Neurological:  "Negative.    Hematological: Negative.    Psychiatric/Behavioral: Negative.        Objective  /76   Pulse 70   Temp 98.5 °F (36.9 °C)   Resp 16   Ht 180.3 cm (70.98\")   Wt 111 kg (245 lb)   SpO2 96%   BMI 34.19 kg/m²   Physical Exam   Constitutional: He is oriented to person, place, and time. He appears well-developed and well-nourished.   HENT:   Head: Normocephalic and atraumatic.   Right Ear: External ear normal.   Left Ear: External ear normal.   Nose: Nose normal.   Mouth/Throat: Oropharynx is clear and moist.   Eyes: Conjunctivae and EOM are normal. Pupils are equal, round, and reactive to light.   Neck: Normal range of motion. Neck supple.   Cardiovascular: Normal rate, regular rhythm, normal heart sounds and intact distal pulses.   Pulmonary/Chest: Effort normal and breath sounds normal.   Abdominal: Soft. Bowel sounds are normal.   Musculoskeletal: Normal range of motion.   Neurological: He is alert and oriented to person, place, and time.   Skin: Skin is warm. Capillary refill takes less than 2 seconds.   Psychiatric: He has a normal mood and affect. His behavior is normal. Judgment and thought content normal.   Nursing note and vitals reviewed.      Assessment/Plan   Hilda was seen today for follow-up.    Diagnoses and all orders for this visit:    Essential hypertension  -     PSA Screen  -     Comprehensive metabolic panel  -     Lipid Panel With / Chol / HDL Ratio    Mixed hyperlipidemia  -     Comprehensive metabolic panel  -     Lipid Panel With / Chol / HDL Ratio    Peripheral vascular disease (CMS/HCC)    Gastroesophageal reflux disease without esophagitis    Encounter for screening for malignant neoplasm of prostate   -     PSA Screen      Patient's Body mass index is 34.19 kg/m². BMI is above normal parameters. Recommendations include: nutrition counseling.  He says colon is up to date         Orders Placed This Encounter   Procedures   • PSA Screen   • Comprehensive metabolic panel "   • Lipid Panel With / Chol / HDL Ratio     Current outpatient and discharge medications have been reconciled for the patient.  Reviewed by: Oniel Reed MD  Follow up: 6 month(s)   08-Mar-2019 02:05

## 2020-07-16 NOTE — PHYSICAL THERAPY INITIAL EVALUATION ADULT - ASSISTIVE DEVICE FOR TRANSFER: SIT/STAND, REHAB EVAL
You have received a viral test for COVID-19. Below is education on quarantine per the CDC guidelines. For any symptoms, seek care from your PCP, call [phone] to establish care with a doctor, or go directly to an urgent care or the emergency room. Test results will take 2-7 days and will be sent to you in your [employer] account. If you test positive, you will be contacted via phone. If you test negative, the ONLY communication will be through [address] GO TO [employer] AND SIGN UP FOR [employer]  (LOWER LEFT OF THE HOME PAGE)  No test is 100%. If you have symptoms, you should follow the guidance of quarantine as previously stated. You can still be contagious if you have symptoms. Your [facility] will reach out to you if you have a positive result. They will provide you with a return to work date and note. If you were tested for a pre-op, then you should remain in quarantine until your procedure. How do I know if I need to be in quarantine? If you live in a community where COVID-19 is or might be spreading (currently, that is virtually everywhere in the [place])  Be alert for symptoms. Watch for fever, cough, shortness of breath, or other symptoms of COVID-19.  Take your temperature if symptoms develop.  Practice social distancing. Maintain 6 feet of distance from others and stay out of crowded places.  Follow CDC guidance if symptoms develop. If you feel healthy but:   Recently had close contact with a person with COVID-19 you need to Quarantine:   Stay home until 14 days after your last exposure.  Check your temperature twice a day and watch for symptoms of COVID-19.  If possible, stay away from people who are at higher-risk for getting very sick from COVID-19.   Stay Home and Monitor Your Health if you:   Have been diagnosed with COVID-19, or   Are waiting for test results, or   Have cough, fever, or shortness of breath, or symptoms of COVID-19      When You Can
rolling walker

## 2022-06-28 NOTE — ED ADULT NURSE NOTE - NS TRANSFER EYEGLASSES PAIRS
Group Topic: BH Check-in/Symptom Rating    Date: 6/28/2022  Start Time:  9:00 AM  End Time: 10:00 AM  Facilitators: Kelly Lopez LPC    Focus: Check-In  Number in attendance: 5    Goals: Increase symptom awareness and management   Handouts: AODA Symptom Check In Form   Method: Group  Attendance: Present  Mood/Affect: Appropriate  Behavior/Socialization: Appropriate to group  Participation: Active  Overall Patient Response to Group: Appropriate to topic  Ability to Apply Content to Group: 5           Pt. Out w/ Jeffrey @ 9:30am and did not return.  Patient/Resident Check-in Self report    Last self reported use: 06/15/2022  Substance(s) of choice: Crack, heroin  Number of hours of sleep: 7  Difficulty falling Asleep: No  Difficulty staying asleep:No  Current dreams about consuming substances or night terrors: No    Patient reported appetite as: good  Number of meals in the last 24 hours: 4    My Mood is: \"good\"    Depressive symptoms: Yes  If yes, (0=none, 10= worst) 4    Anxiety symptoms: Yes  If yes, (0=none, 10= worst) 4    Cravings: Yes  If yes, (0=none, 10= worst) 2    Attended a recovery meeting last night and/or this morning: Yes  Number of recovery meetings attended since Sunday: 1    Taking medications as prescribed: No    Feelings of hopelessness or helplessness: No  Thoughts of wising you were dead or would harm self: No  Thoughts of harming someone else: No    Recovery goal/task for the day: \"Attend C.A. Power hour @6pm.\"    Preferred Level of Care: AODA (alcohol and other drug abuse) Intensive Outpatient Program (AODA IOP);, Outpatient AODA (alcohol nad other drug abuse) treatment; and Community Resources and support groups  Aftercare Concerns:        1 pair

## 2022-07-17 NOTE — PATIENT PROFILE ADULT - NSPROPTRIGHTBILLOFRIGHTS_GEN_A_NUR
patient
I personally performed the service described in the documentation recorded by the scribe in my presence, and it accurately and completely records my words and actions.

## 2022-11-29 NOTE — CONSULT NOTE ADULT - ASSESSMENT
Left voicemail for Kavitha (wife) to call back regarding appointment.    Impression: copd exacerbation ??underlying PNA   chf   afib   Gi bleed     plan: start solumderol 60 mg Q 12 hrs   on ABX continue   start symbicort 160 mg Q 12 hrs   nebulizer Q 4 hrs and prn   repeat cxr geronimo   keep IS < OS   follow h/h   case discussed with hispitalist

## 2022-12-23 NOTE — ED ADULT TRIAGE NOTE - AS HEIGHT TYPE
[Never] : Never
[No] : In the past 12 months have you used drugs other than those required for medical reasons? No
[No falls in past year] : Patient reported no falls in the past year
[0] : 2) Feeling down, depressed, or hopeless: Not at all (0)
[Independent] : using telephone
[Some assistance needed] : managing finances
[With Patient/Caregiver] : , with patient/caregiver
[Reviewed no changes] : Reviewed, no changes
[Audit-CScore] : 0
[AdvancecareDate] : 09/22
stated
Parent

## 2023-09-15 ENCOUNTER — INPATIENT (INPATIENT)
Facility: HOSPITAL | Age: 83
LOS: 39 days | Discharge: HOME | DRG: 853 | End: 2023-10-25
Attending: INTERNAL MEDICINE | Admitting: HOSPITALIST
Payer: MEDICARE

## 2023-09-15 VITALS
OXYGEN SATURATION: 97 % | DIASTOLIC BLOOD PRESSURE: 63 MMHG | TEMPERATURE: 98 F | RESPIRATION RATE: 19 BRPM | HEART RATE: 88 BPM | SYSTOLIC BLOOD PRESSURE: 114 MMHG

## 2023-09-15 DIAGNOSIS — R41.82 ALTERED MENTAL STATUS, UNSPECIFIED: ICD-10-CM

## 2023-09-15 LAB
ALBUMIN SERPL ELPH-MCNC: 2.9 G/DL — LOW (ref 3.5–5.2)
ALP SERPL-CCNC: 90 U/L — SIGNIFICANT CHANGE UP (ref 30–115)
ALT FLD-CCNC: 9 U/L — SIGNIFICANT CHANGE UP (ref 0–41)
ANION GAP SERPL CALC-SCNC: 21 MMOL/L — HIGH (ref 7–14)
APTT BLD: 33.4 SEC — SIGNIFICANT CHANGE UP (ref 27–39.2)
AST SERPL-CCNC: 23 U/L — SIGNIFICANT CHANGE UP (ref 0–41)
BASOPHILS # BLD AUTO: 0.05 K/UL — SIGNIFICANT CHANGE UP (ref 0–0.2)
BASOPHILS NFR BLD AUTO: 0.4 % — SIGNIFICANT CHANGE UP (ref 0–1)
BILIRUB SERPL-MCNC: 0.3 MG/DL — SIGNIFICANT CHANGE UP (ref 0.2–1.2)
BUN SERPL-MCNC: 106 MG/DL — CRITICAL HIGH (ref 10–20)
CALCIUM SERPL-MCNC: 8.5 MG/DL — SIGNIFICANT CHANGE UP (ref 8.4–10.4)
CHLORIDE SERPL-SCNC: 100 MMOL/L — SIGNIFICANT CHANGE UP (ref 98–110)
CK MB CFR SERPL CALC: 9.4 NG/ML — HIGH (ref 0.6–6.3)
CK SERPL-CCNC: 238 U/L — HIGH (ref 0–225)
CO2 SERPL-SCNC: 14 MMOL/L — LOW (ref 17–32)
CREAT SERPL-MCNC: 6 MG/DL — CRITICAL HIGH (ref 0.7–1.5)
EGFR: 9 ML/MIN/1.73M2 — LOW
EOSINOPHIL # BLD AUTO: 0.16 K/UL — SIGNIFICANT CHANGE UP (ref 0–0.7)
EOSINOPHIL NFR BLD AUTO: 1.4 % — SIGNIFICANT CHANGE UP (ref 0–8)
GLUCOSE SERPL-MCNC: 127 MG/DL — HIGH (ref 70–99)
HCT VFR BLD CALC: 31.1 % — LOW (ref 42–52)
HGB BLD-MCNC: 9.6 G/DL — LOW (ref 14–18)
IMM GRANULOCYTES NFR BLD AUTO: 0.4 % — HIGH (ref 0.1–0.3)
INR BLD: 1.59 RATIO — HIGH (ref 0.65–1.3)
LYMPHOCYTES # BLD AUTO: 1.32 K/UL — SIGNIFICANT CHANGE UP (ref 1.2–3.4)
LYMPHOCYTES # BLD AUTO: 11.7 % — LOW (ref 20.5–51.1)
MCHC RBC-ENTMCNC: 24.4 PG — LOW (ref 27–31)
MCHC RBC-ENTMCNC: 30.9 G/DL — LOW (ref 32–37)
MCV RBC AUTO: 79.1 FL — LOW (ref 80–94)
MONOCYTES # BLD AUTO: 0.83 K/UL — HIGH (ref 0.1–0.6)
MONOCYTES NFR BLD AUTO: 7.3 % — SIGNIFICANT CHANGE UP (ref 1.7–9.3)
NEUTROPHILS # BLD AUTO: 8.9 K/UL — HIGH (ref 1.4–6.5)
NEUTROPHILS NFR BLD AUTO: 78.8 % — HIGH (ref 42.2–75.2)
NRBC # BLD: 0 /100 WBCS — SIGNIFICANT CHANGE UP (ref 0–0)
PLATELET # BLD AUTO: 298 K/UL — SIGNIFICANT CHANGE UP (ref 130–400)
PMV BLD: 9.8 FL — SIGNIFICANT CHANGE UP (ref 7.4–10.4)
POTASSIUM SERPL-MCNC: 4.5 MMOL/L — SIGNIFICANT CHANGE UP (ref 3.5–5)
POTASSIUM SERPL-SCNC: 4.5 MMOL/L — SIGNIFICANT CHANGE UP (ref 3.5–5)
PROT SERPL-MCNC: 6.3 G/DL — SIGNIFICANT CHANGE UP (ref 6–8)
PROTHROM AB SERPL-ACNC: 18.4 SEC — HIGH (ref 9.95–12.87)
RBC # BLD: 3.93 M/UL — LOW (ref 4.7–6.1)
RBC # FLD: 18 % — HIGH (ref 11.5–14.5)
SODIUM SERPL-SCNC: 135 MMOL/L — SIGNIFICANT CHANGE UP (ref 135–146)
TROPONIN T SERPL-MCNC: 0.09 NG/ML — CRITICAL HIGH
TROPONIN T SERPL-MCNC: 0.1 NG/ML — CRITICAL HIGH
WBC # BLD: 11.3 K/UL — HIGH (ref 4.8–10.8)
WBC # FLD AUTO: 11.3 K/UL — HIGH (ref 4.8–10.8)

## 2023-09-15 PROCEDURE — C1750: CPT

## 2023-09-15 PROCEDURE — 85027 COMPLETE CBC AUTOMATED: CPT

## 2023-09-15 PROCEDURE — 83735 ASSAY OF MAGNESIUM: CPT

## 2023-09-15 PROCEDURE — 80053 COMPREHEN METABOLIC PANEL: CPT

## 2023-09-15 PROCEDURE — 36558 INSERT TUNNELED CV CATH: CPT

## 2023-09-15 PROCEDURE — 85025 COMPLETE CBC W/AUTO DIFF WBC: CPT

## 2023-09-15 PROCEDURE — 84484 ASSAY OF TROPONIN QUANT: CPT

## 2023-09-15 PROCEDURE — P9059: CPT

## 2023-09-15 PROCEDURE — 82570 ASSAY OF URINE CREATININE: CPT

## 2023-09-15 PROCEDURE — 76937 US GUIDE VASCULAR ACCESS: CPT

## 2023-09-15 PROCEDURE — 76705 ECHO EXAM OF ABDOMEN: CPT

## 2023-09-15 PROCEDURE — 71250 CT THORAX DX C-: CPT

## 2023-09-15 PROCEDURE — 84295 ASSAY OF SERUM SODIUM: CPT

## 2023-09-15 PROCEDURE — 71045 X-RAY EXAM CHEST 1 VIEW: CPT | Mod: 26

## 2023-09-15 PROCEDURE — 87070 CULTURE OTHR SPECIMN AEROBIC: CPT

## 2023-09-15 PROCEDURE — 87340 HEPATITIS B SURFACE AG IA: CPT

## 2023-09-15 PROCEDURE — 86901 BLOOD TYPING SEROLOGIC RH(D): CPT

## 2023-09-15 PROCEDURE — 99285 EMERGENCY DEPT VISIT HI MDM: CPT | Mod: GC

## 2023-09-15 PROCEDURE — 94640 AIRWAY INHALATION TREATMENT: CPT

## 2023-09-15 PROCEDURE — 0225U NFCT DS DNA&RNA 21 SARSCOV2: CPT

## 2023-09-15 PROCEDURE — P9040: CPT

## 2023-09-15 PROCEDURE — 82553 CREATINE MB FRACTION: CPT

## 2023-09-15 PROCEDURE — 87040 BLOOD CULTURE FOR BACTERIA: CPT

## 2023-09-15 PROCEDURE — 82550 ASSAY OF CK (CPK): CPT

## 2023-09-15 PROCEDURE — 74018 RADEX ABDOMEN 1 VIEW: CPT

## 2023-09-15 PROCEDURE — P9016: CPT

## 2023-09-15 PROCEDURE — 87641 MR-STAPH DNA AMP PROBE: CPT

## 2023-09-15 PROCEDURE — 90935 HEMODIALYSIS ONE EVALUATION: CPT

## 2023-09-15 PROCEDURE — P9100: CPT

## 2023-09-15 PROCEDURE — 83605 ASSAY OF LACTIC ACID: CPT

## 2023-09-15 PROCEDURE — 87075 CULTR BACTERIA EXCEPT BLOOD: CPT

## 2023-09-15 PROCEDURE — 82728 ASSAY OF FERRITIN: CPT

## 2023-09-15 PROCEDURE — 82962 GLUCOSE BLOOD TEST: CPT

## 2023-09-15 PROCEDURE — 83036 HEMOGLOBIN GLYCOSYLATED A1C: CPT

## 2023-09-15 PROCEDURE — 74176 CT ABD & PELVIS W/O CONTRAST: CPT

## 2023-09-15 PROCEDURE — 85018 HEMOGLOBIN: CPT

## 2023-09-15 PROCEDURE — 86704 HEP B CORE ANTIBODY TOTAL: CPT

## 2023-09-15 PROCEDURE — 99223 1ST HOSP IP/OBS HIGH 75: CPT

## 2023-09-15 PROCEDURE — 71045 X-RAY EXAM CHEST 1 VIEW: CPT

## 2023-09-15 PROCEDURE — 92611 MOTION FLUOROSCOPY/SWALLOW: CPT | Mod: GN

## 2023-09-15 PROCEDURE — 87077 CULTURE AEROBIC IDENTIFY: CPT

## 2023-09-15 PROCEDURE — 70450 CT HEAD/BRAIN W/O DYE: CPT

## 2023-09-15 PROCEDURE — 84100 ASSAY OF PHOSPHORUS: CPT

## 2023-09-15 PROCEDURE — 73700 CT LOWER EXTREMITY W/O DYE: CPT | Mod: LT

## 2023-09-15 PROCEDURE — 94002 VENT MGMT INPAT INIT DAY: CPT

## 2023-09-15 PROCEDURE — 97110 THERAPEUTIC EXERCISES: CPT | Mod: GP

## 2023-09-15 PROCEDURE — 87635 SARS-COV-2 COVID-19 AMP PRB: CPT

## 2023-09-15 PROCEDURE — 94760 N-INVAS EAR/PLS OXIMETRY 1: CPT

## 2023-09-15 PROCEDURE — 80202 ASSAY OF VANCOMYCIN: CPT

## 2023-09-15 PROCEDURE — 85014 HEMATOCRIT: CPT

## 2023-09-15 PROCEDURE — 74230 X-RAY XM SWLNG FUNCJ C+: CPT

## 2023-09-15 PROCEDURE — 86140 C-REACTIVE PROTEIN: CPT

## 2023-09-15 PROCEDURE — 81001 URINALYSIS AUTO W/SCOPE: CPT

## 2023-09-15 PROCEDURE — C1880: CPT

## 2023-09-15 PROCEDURE — 77001 FLUOROGUIDE FOR VEIN DEVICE: CPT

## 2023-09-15 PROCEDURE — 93970 EXTREMITY STUDY: CPT

## 2023-09-15 PROCEDURE — C9113: CPT

## 2023-09-15 PROCEDURE — 97163 PT EVAL HIGH COMPLEX 45 MIN: CPT | Mod: GP

## 2023-09-15 PROCEDURE — 83540 ASSAY OF IRON: CPT

## 2023-09-15 PROCEDURE — 86022 PLATELET ANTIBODIES: CPT

## 2023-09-15 PROCEDURE — 93306 TTE W/DOPPLER COMPLETE: CPT

## 2023-09-15 PROCEDURE — 92526 ORAL FUNCTION THERAPY: CPT | Mod: GN

## 2023-09-15 PROCEDURE — P9037: CPT

## 2023-09-15 PROCEDURE — 86706 HEP B SURFACE ANTIBODY: CPT

## 2023-09-15 PROCEDURE — 85652 RBC SED RATE AUTOMATED: CPT

## 2023-09-15 PROCEDURE — 74174 CTA ABD&PLVS W/CONTRAST: CPT

## 2023-09-15 PROCEDURE — 86900 BLOOD TYPING SEROLOGIC ABO: CPT

## 2023-09-15 PROCEDURE — 97530 THERAPEUTIC ACTIVITIES: CPT | Mod: GP

## 2023-09-15 PROCEDURE — 84540 ASSAY OF URINE/UREA-N: CPT

## 2023-09-15 PROCEDURE — 82330 ASSAY OF CALCIUM: CPT

## 2023-09-15 PROCEDURE — 86850 RBC ANTIBODY SCREEN: CPT

## 2023-09-15 PROCEDURE — 85610 PROTHROMBIN TIME: CPT

## 2023-09-15 PROCEDURE — 76775 US EXAM ABDO BACK WALL LIM: CPT | Mod: 26

## 2023-09-15 PROCEDURE — 84132 ASSAY OF SERUM POTASSIUM: CPT

## 2023-09-15 PROCEDURE — 83935 ASSAY OF URINE OSMOLALITY: CPT

## 2023-09-15 PROCEDURE — 93971 EXTREMITY STUDY: CPT | Mod: LT

## 2023-09-15 PROCEDURE — 80048 BASIC METABOLIC PNL TOTAL CA: CPT

## 2023-09-15 PROCEDURE — 95819 EEG AWAKE AND ASLEEP: CPT

## 2023-09-15 PROCEDURE — 36430 TRANSFUSION BLD/BLD COMPNT: CPT

## 2023-09-15 PROCEDURE — 37191 INS ENDOVAS VENA CAVA FILTR: CPT

## 2023-09-15 PROCEDURE — 83550 IRON BINDING TEST: CPT

## 2023-09-15 PROCEDURE — 82010 KETONE BODYS QUAN: CPT

## 2023-09-15 PROCEDURE — 87640 STAPH A DNA AMP PROBE: CPT

## 2023-09-15 PROCEDURE — 70450 CT HEAD/BRAIN W/O DYE: CPT | Mod: 26,MH

## 2023-09-15 PROCEDURE — 86803 HEPATITIS C AB TEST: CPT

## 2023-09-15 PROCEDURE — 82803 BLOOD GASES ANY COMBINATION: CPT

## 2023-09-15 PROCEDURE — 84133 ASSAY OF URINE POTASSIUM: CPT

## 2023-09-15 PROCEDURE — 85730 THROMBOPLASTIN TIME PARTIAL: CPT

## 2023-09-15 PROCEDURE — C1769: CPT

## 2023-09-15 PROCEDURE — 87449 NOS EACH ORGANISM AG IA: CPT

## 2023-09-15 PROCEDURE — 36415 COLL VENOUS BLD VENIPUNCTURE: CPT

## 2023-09-15 PROCEDURE — 76775 US EXAM ABDO BACK WALL LIM: CPT

## 2023-09-15 PROCEDURE — 87186 SC STD MICRODIL/AGAR DIL: CPT

## 2023-09-15 PROCEDURE — 86923 COMPATIBILITY TEST ELECTRIC: CPT

## 2023-09-15 PROCEDURE — 84300 ASSAY OF URINE SODIUM: CPT

## 2023-09-15 PROCEDURE — 85379 FIBRIN DEGRADATION QUANT: CPT

## 2023-09-15 PROCEDURE — 92610 EVALUATE SWALLOWING FUNCTION: CPT | Mod: GN

## 2023-09-15 PROCEDURE — 93005 ELECTROCARDIOGRAM TRACING: CPT

## 2023-09-15 PROCEDURE — 84145 PROCALCITONIN (PCT): CPT

## 2023-09-15 RX ORDER — VANCOMYCIN HCL 1 G
1000 VIAL (EA) INTRAVENOUS ONCE
Refills: 0 | Status: COMPLETED | OUTPATIENT
Start: 2023-09-15 | End: 2023-09-15

## 2023-09-15 RX ORDER — SODIUM CHLORIDE 9 MG/ML
1000 INJECTION, SOLUTION INTRAVENOUS
Refills: 0 | Status: DISCONTINUED | OUTPATIENT
Start: 2023-09-15 | End: 2023-09-16

## 2023-09-15 RX ORDER — CEFEPIME 1 G/1
2000 INJECTION, POWDER, FOR SOLUTION INTRAMUSCULAR; INTRAVENOUS ONCE
Refills: 0 | Status: COMPLETED | OUTPATIENT
Start: 2023-09-15 | End: 2023-09-15

## 2023-09-15 RX ORDER — SODIUM CHLORIDE 9 MG/ML
1000 INJECTION, SOLUTION INTRAVENOUS ONCE
Refills: 0 | Status: COMPLETED | OUTPATIENT
Start: 2023-09-15 | End: 2023-09-15

## 2023-09-15 RX ORDER — DEXTROSE 50 % IN WATER 50 %
25 SYRINGE (ML) INTRAVENOUS ONCE
Refills: 0 | Status: DISCONTINUED | OUTPATIENT
Start: 2023-09-15 | End: 2023-10-25

## 2023-09-15 RX ORDER — SODIUM CHLORIDE 9 MG/ML
500 INJECTION, SOLUTION INTRAVENOUS ONCE
Refills: 0 | Status: COMPLETED | OUTPATIENT
Start: 2023-09-15 | End: 2023-09-15

## 2023-09-15 RX ORDER — SODIUM CHLORIDE 9 MG/ML
1000 INJECTION, SOLUTION INTRAVENOUS
Refills: 0 | Status: DISCONTINUED | OUTPATIENT
Start: 2023-09-15 | End: 2023-10-25

## 2023-09-15 RX ORDER — SACUBITRIL AND VALSARTAN 24; 26 MG/1; MG/1
1 TABLET, FILM COATED ORAL
Refills: 0 | DISCHARGE

## 2023-09-15 RX ORDER — FLUTICASONE FUROATE AND VILANTEROL TRIFENATATE 100; 25 UG/1; UG/1
1 POWDER RESPIRATORY (INHALATION)
Refills: 0 | DISCHARGE

## 2023-09-15 RX ORDER — HALOPERIDOL DECANOATE 100 MG/ML
5 INJECTION INTRAMUSCULAR ONCE
Refills: 0 | Status: COMPLETED | OUTPATIENT
Start: 2023-09-15 | End: 2023-09-15

## 2023-09-15 RX ORDER — CEFEPIME 1 G/1
INJECTION, POWDER, FOR SOLUTION INTRAMUSCULAR; INTRAVENOUS
Refills: 0 | Status: COMPLETED | OUTPATIENT
Start: 2023-09-15 | End: 2023-09-15

## 2023-09-15 RX ORDER — GLUCAGON INJECTION, SOLUTION 0.5 MG/.1ML
1 INJECTION, SOLUTION SUBCUTANEOUS ONCE
Refills: 0 | Status: DISCONTINUED | OUTPATIENT
Start: 2023-09-15 | End: 2023-10-25

## 2023-09-15 RX ORDER — CEFEPIME 1 G/1
2000 INJECTION, POWDER, FOR SOLUTION INTRAMUSCULAR; INTRAVENOUS EVERY 8 HOURS
Refills: 0 | Status: COMPLETED | OUTPATIENT
Start: 2023-09-15 | End: 2023-09-15

## 2023-09-15 RX ORDER — EMPAGLIFLOZIN 10 MG/1
1 TABLET, FILM COATED ORAL
Refills: 0 | DISCHARGE

## 2023-09-15 RX ORDER — METOPROLOL TARTRATE 50 MG
1 TABLET ORAL
Refills: 0 | DISCHARGE

## 2023-09-15 RX ORDER — ALBUTEROL 90 UG/1
1 AEROSOL, METERED ORAL EVERY 6 HOURS
Refills: 0 | Status: DISCONTINUED | OUTPATIENT
Start: 2023-09-15 | End: 2023-10-25

## 2023-09-15 RX ORDER — SODIUM BICARBONATE 1 MEQ/ML
650 SYRINGE (ML) INTRAVENOUS EVERY 8 HOURS
Refills: 0 | Status: DISCONTINUED | OUTPATIENT
Start: 2023-09-15 | End: 2023-09-18

## 2023-09-15 RX ORDER — OXYCODONE AND ACETAMINOPHEN 5; 325 MG/1; MG/1
1 TABLET ORAL
Refills: 0 | DISCHARGE

## 2023-09-15 RX ORDER — DEXTROSE 50 % IN WATER 50 %
15 SYRINGE (ML) INTRAVENOUS ONCE
Refills: 0 | Status: DISCONTINUED | OUTPATIENT
Start: 2023-09-15 | End: 2023-10-25

## 2023-09-15 RX ORDER — ROSUVASTATIN CALCIUM 5 MG/1
1 TABLET ORAL
Qty: 0 | Refills: 0 | DISCHARGE

## 2023-09-15 RX ORDER — RIVAROXABAN 15 MG-20MG
1 KIT ORAL
Refills: 0 | DISCHARGE

## 2023-09-15 RX ORDER — MONTELUKAST 4 MG/1
1 TABLET, CHEWABLE ORAL
Qty: 0 | Refills: 0 | DISCHARGE

## 2023-09-15 RX ORDER — DEXTROSE 50 % IN WATER 50 %
12.5 SYRINGE (ML) INTRAVENOUS ONCE
Refills: 0 | Status: DISCONTINUED | OUTPATIENT
Start: 2023-09-15 | End: 2023-10-25

## 2023-09-15 RX ORDER — DIPHENHYDRAMINE HCL 50 MG
50 CAPSULE ORAL ONCE
Refills: 0 | Status: COMPLETED | OUTPATIENT
Start: 2023-09-15 | End: 2023-09-15

## 2023-09-15 RX ORDER — METOLAZONE 5 MG/1
1 TABLET ORAL
Qty: 0 | Refills: 0 | DISCHARGE

## 2023-09-15 RX ORDER — DIPHENHYDRAMINE HCL 50 MG
50 CAPSULE ORAL ONCE
Refills: 0 | Status: DISCONTINUED | OUTPATIENT
Start: 2023-09-15 | End: 2023-09-15

## 2023-09-15 RX ORDER — HEPARIN SODIUM 5000 [USP'U]/ML
5000 INJECTION INTRAVENOUS; SUBCUTANEOUS EVERY 8 HOURS
Refills: 0 | Status: DISCONTINUED | OUTPATIENT
Start: 2023-09-15 | End: 2023-09-20

## 2023-09-15 RX ORDER — INSULIN LISPRO 100/ML
VIAL (ML) SUBCUTANEOUS
Refills: 0 | Status: DISCONTINUED | OUTPATIENT
Start: 2023-09-15 | End: 2023-10-17

## 2023-09-15 RX ORDER — FUROSEMIDE 40 MG
1 TABLET ORAL
Refills: 0 | DISCHARGE

## 2023-09-15 RX ORDER — ALBUTEROL 90 UG/1
1 AEROSOL, METERED ORAL
Refills: 0 | DISCHARGE

## 2023-09-15 RX ADMIN — Medication 650 MILLIGRAM(S): at 23:36

## 2023-09-15 RX ADMIN — CEFEPIME 100 MILLIGRAM(S): 1 INJECTION, POWDER, FOR SOLUTION INTRAMUSCULAR; INTRAVENOUS at 16:12

## 2023-09-15 RX ADMIN — SODIUM CHLORIDE 1000 MILLILITER(S): 9 INJECTION, SOLUTION INTRAVENOUS at 13:10

## 2023-09-15 RX ADMIN — CEFEPIME 2000 MILLIGRAM(S): 1 INJECTION, POWDER, FOR SOLUTION INTRAMUSCULAR; INTRAVENOUS at 16:17

## 2023-09-15 RX ADMIN — Medication 250 MILLIGRAM(S): at 16:12

## 2023-09-15 RX ADMIN — Medication 1 MILLIGRAM(S): at 13:09

## 2023-09-15 RX ADMIN — Medication 1000 MILLIGRAM(S): at 16:17

## 2023-09-15 RX ADMIN — Medication 50 MILLIGRAM(S): at 13:09

## 2023-09-15 RX ADMIN — SODIUM CHLORIDE 1000 MILLILITER(S): 9 INJECTION, SOLUTION INTRAVENOUS at 22:30

## 2023-09-15 RX ADMIN — SODIUM CHLORIDE 1000 MILLILITER(S): 9 INJECTION, SOLUTION INTRAVENOUS at 18:01

## 2023-09-15 RX ADMIN — HALOPERIDOL DECANOATE 5 MILLIGRAM(S): 100 INJECTION INTRAMUSCULAR at 11:59

## 2023-09-15 RX ADMIN — CEFEPIME 100 MILLIGRAM(S): 1 INJECTION, POWDER, FOR SOLUTION INTRAMUSCULAR; INTRAVENOUS at 21:29

## 2023-09-15 NOTE — CONSULT NOTE ADULT - SUBJECTIVE AND OBJECTIVE BOX
Pt is a 82yo Male with PMH of HTN, HLD, COPD, Anemia, CHF, PAULA, Osteomyelitis comes from [facility] for AMS  x 2 days. Urology called for difficult CIC attempted by ED team x urine sample , Phimosis.   Pt. seen and examined at bedside in NAD, appears confused unable to provide medical history all medical history obtained from medical records.   In sterile technique attempted CIC, 16 Mayfield catheter and 18 Coude Mayfield catheter - unsuccessful, unable to pass catheter through prostatic urethra.        PAST MEDICAL & SURGICAL HISTORY:  HTN (hypertension)  COPD (chronic obstructive pulmonary disease)  High cholesterol  Mild anemia  Coffee ground emesis  Chronic diastolic heart failure  PAULA (acute kidney injury)      No significant past surgical history      Home Medications:  acetaminophen 325 mg oral tablet: 2 tab(s) orally every 6 hours, As needed, Mild Pain (1 - 3), Moderate Pain (4 - 6) (25 May 2019 09:25)  aspirin 81 mg oral delayed release tablet: 1 tab(s) orally once a day (25 May 2019 09:25)  budesonide-formoterol 80 mcg-4.5 mcg/inh inhalation aerosol: 2 puff(s) inhaled 2 times a day (22 May 2019 12:52)  docusate sodium 100 mg oral capsule: 1 cap(s) orally 2 times a day (25 May 2019 09:25)  furosemide 20 mg oral tablet: 2 tab(s) orally once a day (22 May 2019 12:52)  gabapentin 100 mg oral capsule: 1 cap(s) orally once a day (at bedtime) (25 May 2019 09:25)  ipratropium-albuterol 0.5 mg-2.5 mg/3 mLinhalation solution: 3 milliliter(s) inhaled every 4 hours, As Needed (25 May 2019 09:25)  metOLazone 2.5 mg oral tablet: 1 tab(s) orally once a day On Mon, Wed &amp; Sat (22 May 2019 12:52)  montelukast 10 mg oral tablet: 1 tab(s) orally once a day (at bedtime) (22 May 2019 12:52)  pantoprazole 40 mg oral delayed release tablet: 1 tab(s) orally 2 times a day (22 May 2019 12:52)  polyethylene glycol 3350 oral powder for reconstitution: 17 gram(s) orally once a day, As needed, Constipation (25 May 2019 09:25)  rosuvastatin 20 mg oral tablet: 1 tab(s) orally once a day (22 May 2019 12:52)  Tessalon Perles 100 mg oral capsule: 1 cap(s) orally 3 times a day (22 May 2019 12:52)      MEDICATIONS  (STANDING):  cefepime   IVPB 2000 milliGRAM(s) IV Intermittent every 8 hours  cefepime   IVPB      lactated ringers Bolus 1000 milliLiter(s) IV Bolus once     Allergies: NKDA       SOCIAL HISTORY: No illicit drug use    FAMILY HISTORY:  No pertinent family history in first degree relatives      REVIEW OF SYSTEMS   [x ] Due to altered mental status/intubation, subjective information were not able to be obtained from patient. History was obtained, to the extent possible, from review of the chart and collateral sources of information.    Vital Signs Last 24 Hrs  T(C): 36.7 (15 Sep 2023 11:15), Max: 36.7 (15 Sep 2023 10:06)  T(F): 98 (15 Sep 2023 11:15), Max: 98.1 (15 Sep 2023 10:06)  HR: 71 (15 Sep 2023 11:15) (71 - 88)  BP: 116/64 (15 Sep 2023 11:15) (114/63 - 116/64)  RR: 18 (15 Sep 2023 11:15) (18 - 19)  SpO2: 97% (15 Sep 2023 11:15) (97% - 97%)    Parameters below as of 15 Sep 2023 11:15  Patient On (Oxygen Delivery Method): room air        PHYSICAL EXAM:  GEN: elderly gentlemen in NAD, awake, confused   SKIN: non diaphoretic.  RESP: Non-labored breathing. No use of accessory muscles.  ABDO: Abd. Soft, ND, not palpable bladder, mild suprapubic tenderness  BACK: No CVAT B/L  :  Non circumcised male. B/L descended testicles x 2. No lesions or palpable masses noted B/L. No meatal discharge.    Extremities: YEE       LABS:                        9.6    11.30 )-----------( 298      ( 15 Sep 2023 14:35 )             31.1     09-15    135  |  100  |  106<HH>  ----------------------------<  127<H>  4.5   |  14<L>  |  6.0<HH>    Ca    8.5      15 Sep 2023 14:35    TPro  6.3  /  Alb  2.9<L>  /  TBili  0.3  /  DBili  x   /  AST  23  /  ALT  9   /  AlkPhos  90  09-15    PT/INR - ( 15 Sep 2023 14:35 )   PT: 18.40 sec;   INR: 1.59 ratio         PTT - ( 15 Sep 2023 14:35 )  PTT:33.4 sec

## 2023-09-15 NOTE — PATIENT PROFILE ADULT - VISION (WITH CORRECTIVE LENSES IF THE PATIENT USUALLY WEARS THEM):
Problem: Pain  Goal: Verbalizes/displays adequate comfort level or baseline comfort level  Outcome: Progressing   Denies pain. Problem: Safety - Adult  Goal: Free from fall injury  Outcome: Progressing  Calls out appropriately. Bed locked in lowest position. Strong steady gait. Call light/belongings within reach. Normal vision: sees adequately in most situations; can see medication labels, newsprint

## 2023-09-15 NOTE — CONSULT NOTE ADULT - ASSESSMENT
Pt is a 84yo Male with PMH of HTN, HLD, COPD, Anemia, CHF, PAULA, Osteomyelitis comes from [facility] for AMS  x 2 days. Urology called for difficult CIC attempted by ED team x urine sample , Phimosis.   Pt. seen and examined at bedside in NAD, appears confused unable to provide medical history all medical history obtained from medical records.   In sterile technique attempted CIC, 16 Mayfield catheter and 18 Coude Mayfield catheter - unsuccessful, unable to pass catheter through prostatic urethra.     A: Difficult CIC, Urology called for difficult CIC attempted by ED team x urine sample , Phimosis. No Phimosis on PE   In sterile technique attempted CIC, 16 Mayfield catheter and 18 Coude Mayfield catheter - unsuccessful, unable to pass catheter through prostatic urethra.     Plan:  On PE bladder is not palpable   Please place [place] Condom Catheter to catch UO  Obtain RBUS to evaluate kidneys and bladder   Start Flomax if not contraindicated.   Will d/w  attending.

## 2023-09-15 NOTE — ED PROVIDER NOTE - CLINICAL SUMMARY MEDICAL DECISION MAKING FREE TEXT BOX
Patient with altered mental status, sent from nursing home for evaluation.  Patient has already been started on cefepime and Vanco by midline at the nursing home.  Patient found to be dehydrated, and PAULA, will continue empiric treatment for sepsis, however unable to obtain urine sample at this time as difficult to place a catheter even with urology consult.  Patient to be admitted to medicine for further evaluation and management.  Any ordered labs and EKG were reviewed.  Any imaging was ordered and reviewed by me.  Appropriate medications for patient's presenting complaints were ordered and effects were reassessed.  Patient's records (prior hospital, ED visit, and/or nursing home notes if available) were reviewed.  Additional history was obtained from EMS, family, and/or PCP (where available).  Escalation to admission/observation was considered.  ) Patient requires inpatient hospitalization - monitored setting.

## 2023-09-15 NOTE — H&P ADULT - BIRTH SEX
08/02/2011       Lab Results   Component Value Date    WBC 4.5 (L) 08/02/2013    HGB 12.1 08/02/2013    HCT 37.1 08/02/2013    MCV 88.0 08/02/2013     08/02/2013         Health Maintenance   Topic Date Due    HIV screen  05/13/1981    Shingles Vaccine (1 of 2) 05/13/2016    Cervical cancer screen  10/02/2017    Flu vaccine (1) 09/01/2019    A1C test (Diabetic or Prediabetic)  07/23/2020    Breast cancer screen  09/16/2021    Lipid screen  07/23/2024    DTaP/Tdap/Td vaccine (2 - Td) 01/09/2028    Colon cancer screen colonoscopy  06/27/2028    Hepatitis A vaccine  Aged Out    Hepatitis B vaccine  Aged Out    Hib vaccine  Aged Out    Meningococcal (ACWY) vaccine  Aged Out    Pneumococcal 0-64 years Vaccine  Aged [employer] History   Administered Date(s) Administered    Tdap (Boostrix, Adacel) 01/09/2018               Review of Systems   Constitutional: Negative for chills and fever. HENT: Negative. Respiratory: Negative for cough and shortness of breath. Cardiovascular: Negative for chest pain and leg swelling. Gastrointestinal: Negative for abdominal pain and nausea. Musculoskeletal: Positive for arthralgias. Skin: Negative for rash. Neurological: Negative for dizziness, light-headedness and headaches. Psychiatric/Behavioral: Negative. Objective:   Physical Exam  Constitutional:       General: She is not in acute distress. Eyes:      Pupils: Pupils are equal, round, and reactive to light. Neck:      Musculoskeletal: Normal range of motion and neck supple. Cardiovascular:      Rate and Rhythm: Normal rate and regular rhythm. Heart sounds: No murmur. Pulmonary:      Effort: Pulmonary effort is normal.      Breath sounds: Normal breath sounds. No wheezing. Abdominal:      General: Bowel sounds are normal. There is no distension. Palpations: Abdomen is soft. Tenderness: There is no abdominal tenderness.    Musculoskeletal:      Right Male

## 2023-09-15 NOTE — H&P ADULT - ASSESSMENT
84 yo m ho DM, COPD, anemia, lymphedemia, afib on xarelto, HFrEF, DM coming in from nursing home for AMS x 2 days 82 yo m ho DM, COPD, anemia,  afib on xarelto, HFrEF, DM coming in from nursing home for AMS x 2 days      #Toxic-metabolic encephalopathy unclear etiology  #Hx of LLE OM  #PAULA, HAGMA  - as per ED notes, altered for past 2 days  - Cr 6 (baseline ~1.2), CO2 14, AG 21  - LR 60cc/hr  - sodium bicarb 650 q12  - strict I's/O's  - [place] condom cath placed by urology in ED, f/u UA/UCx  - continue cefepime/vanc (renally dosed)   - nephro consult        #HFrEF  #Afib on xarelto  - hold home metoprolol and xarelto given hypotension/PAULA  - avoid overload    #DM  - hold home empagliflozin  - FS q6  - ISS for now; can add basal/bolus PRN      DVT ppx: heparin subq  GI ppx: N/A  Diet: renal  Full code   84 yo m ho DM, COPD, anemia,  afib on xarelto, HFrEF, DM coming in from nursing home for AMS x 2 days      #Toxic-metabolic encephalopathy unclear etiology  #Hx of LLE OM  #PAULA, HAGMA  - as per ED notes, altered for past 2 days  - Cr 6 (baseline ~1.2), CO2 14, AG 21  - LR 60cc/hr  - sodium bicarb 650 q12  - strict I's/O's  - [place] condom cath placed by urology in ED, f/u UA/UCx  - cefepime 1q q8 until 10/5/23 and vanc 1q24 until 9/23/23 for LLE cellulitis/OM as  - CTAP NC for sacral wound  - CT non cont LLE  - continue cefepime/vanc (renally dosed)  - urine lytes   - nephro consult        #HFrEF (echo 8/2023 EF 35-45%)  #Afib on xarelto  - hold home metoprolol and xarelto given hypotension/PAULA  - avoid overload    #DM  - hold home empagliflozin  - FS q6  - ISS for now; can add basal/bolus PRN      DVT ppx: heparin subq  GI ppx: N/A  Diet: renal  Full code    *Pts brother is Gabe [phone]*

## 2023-09-15 NOTE — H&P ADULT - ATTENDING COMMENTS
*In the event of discrepancy, my note supersedes the resident note.    Date seen: 9/15/23     Agree with HPI above.   Labs and radiology as above.   ROS: unable to obtain due to AMS     PHYSICAL EXAM:  GENERAL: NAD, lying in bed comfortably  HEAD:  Atraumatic, Normocephalic  EYES: EOMI, PERRLA, conjunctiva and sclera clear  ENT: Moist mucous membranes  NECK: Supple, No JVD  CHEST/LUNG: Clear to auscultation bilaterally; No rales, rhonchi, wheezing, or rubs. Unlabored respirations  HEART: Regular rate and rhythm; No murmurs, rubs, or gallops  ABDOMEN: Bowel sounds present; Soft, Nontender, Nondistended. No hepatomegally  EXTREMITIES:  2+ Peripheral Pulses, brisk capillary refill. No clubbing, cyanosis, or edema  NERVOUS SYSTEM:  Alert & Oriented X1, speech clear. No deficits   MSK: FROM all 4 extremities, full and equal strength  SKIN: unable to find LLE medial wound; possible resolving cellulitis of LLE     Assessment/Plan:  82 yo m ho DM, COPD, anemia, paroxysmal afib on xarelto, HFrEF, DM coming in from nursing home for AMS x 2 days    #AMS secondary to metabolic encephalopathy - unclear etiology  #acute on chronic hypotension - unclear etiology  #Hx of LLE cellulitis and osteomyelitis  #hx of sacral DTI   #PAULA  - Cr 6 (baseline ~1.2)  - patient was hypotensive on floor systolic 60s, improved to systolic 114 with 500ml IVF bolus   - patient appears to normally run low (mid-high 90s, low 100s systolic); DDx of AMS and hypotension could be SIRS/sepsis due to previous LLE cellulitis and OM but no fevers while here   - give IV fluid boluses as needed; can do gentle hydration   - start po sodium bicarb   - strict I's/O's; get urine lytes; get renal u/s; [place] condom cath placed by urology in ED, f/u UA/UCx and bcx; CXR does appear to show consolidation concerning for pna   - cefepime 1q q8 until 10/5/23 and vanc 1q24 via PICC until 9/23/23 for LLE cellulitis/OM as per [facility]ger which was the discharge plan from [facility]   - CT non con LLE and CT a/p non con to evaluate sacral DTI  - continue cefepime/vanc (renally dosed)  - ID consult  - nephro consult     #HFrEF  #paroxysmal Afib  - echo 8/14/2023 EF 35-40% from [facility] admission   - consider obtaining TTE while here   - hold home metoprolol due to hypotension  - hold xarelto due to PAULA; can start heparin subq for now or start low dose eliquis     #DM  - hold home empagliflozin  - FS q6  - ISS for now; can add basal/bolus PRN    DVT ppx: heparin subq  Code status: full code as per Eger   Dispo: f/u cultures, monitor BP, ID and nephro consult, imaging of left leg and sacrum *In the event of discrepancy, my note supersedes the resident note.    Date seen: 9/15/23     Agree with HPI above.   Labs and radiology as above.   ROS: unable to obtain due to AMS     PHYSICAL EXAM:  GENERAL: NAD, lying in bed comfortably  HEAD:  Atraumatic, Normocephalic  EYES: EOMI, PERRLA, conjunctiva and sclera clear  ENT: Moist mucous membranes  NECK: Supple, No JVD  CHEST/LUNG: Clear to auscultation bilaterally; No rales, rhonchi, wheezing, or rubs. Unlabored respirations  HEART: Regular rate and rhythm; No murmurs, rubs, or gallops  ABDOMEN: Bowel sounds present; Soft, Nontender, Nondistended. No hepatomegally  EXTREMITIES:  2+ Peripheral Pulses, brisk capillary refill. No clubbing, cyanosis, or edema  NERVOUS SYSTEM:  Alert & Oriented X1, speech clear. No deficits   MSK: FROM all 4 extremities, full and equal strength  SKIN: unable to find LLE medial wound; possible resolving cellulitis of LLE     Assessment/Plan:  84 yo m ho DM, COPD, anemia, paroxysmal afib on xarelto, HFrEF, DM coming in from nursing home for AMS x 2 days    #AMS secondary to metabolic encephalopathy - unclear etiology  #acute on chronic hypotension - unclear etiology  #Hx of LLE cellulitis and osteomyelitis  #hx of sacral DTI   #PAULA  - CT head negative   - Cr 6 (baseline ~1.2)  - patient was hypotensive on floor systolic 60s, improved to systolic 114 with 500ml IVF bolus   - patient appears to normally run low (mid-high 90s, low 100s systolic); DDx of AMS and hypotension could be SIRS/sepsis due to previous LLE cellulitis and OM but no fevers while here   - give IV fluid boluses as needed; can do gentle hydration   - start po sodium bicarb   - strict I's/O's; get urine lytes; get renal u/s; [place] condom cath placed by urology in ED, f/u UA/UCx and bcx; CXR does appear to show consolidation concerning for pna   - cefepime 1q q8 until 10/5/23 and vanc 1q24 via PICC until 9/23/23 for LLE cellulitis/OM as per Eger which was the discharge plan from [facility]   - CT non con LLE and CT a/p non con to evaluate sacral DTI  - continue cefepime/vanc (renally dosed)  - ID consult  - nephro consult     #HFrEF  #paroxysmal Afib  - echo 8/14/2023 EF 35-40% from [facility] admission   - consider obtaining TTE while here   - hold home metoprolol due to hypotension  - hold xarelto due to PAULA; can start heparin subq for now or start low dose eliquis     #DM  - hold home empagliflozin  - FS q6  - ISS for now; can add basal/bolus PRN    DVT ppx: heparin subq  Code status: full code as per Eger   Dispo: f/u cultures, monitor BP, ID and nephro consult, imaging of left leg and sacrum

## 2023-09-15 NOTE — H&P ADULT - HISTORY OF PRESENT ILLNESS
82 yo m ho DM, COPD, anemia, lymphedemia, afib on xarelto, CHF (unclear what type), coming in from nursing home for AMS x 2 days. Unable to gather story from pt as he is altered and lethargic.  As per NH was becoming more agitated x 2 days, was hypotensive yesterday and started on cefepime and vancomycin. Brought in to the ED for AMS.      In the ED, vitals wnl. Labs showing wbc 11.30, Hb 9.6, MCV 79.1, INR 1.59, CO2 14, AG 21, Cr 6 (~baseline 1.2), , trops 0.09. CTH wnl, RBUS with no hydro, poor visualization of left kidney 82 yo m ho DM, COPD, anemia, lymphedemia, afib on xarelto, HFrEF, DM coming in from nursing home for AMS x 2 days. Unable to gather story from pt as he is altered and lethargic.  As per NH was becoming more agitated x 2 days, was hypotensive yesterday and started on cefepime and vancomycin. Brought in to the ED for AMS.      In the ED, vitals wnl. Labs showing wbc 11.30, Hb 9.6, MCV 79.1, INR 1.59, CO2 14, AG 21, Cr 6 (~baseline 1.2), , trops 0.09. CTH wnl, RBUS with no hydro, poor visualization of left kidney 84 yo m ho DM, COPD, anemia, lymphedemia, afib on xarelto, HFrEF, DM coming in from nursing home for AMS x 2 days. Unable to gather story from pt as he is altered and lethargic.  As per NH was becoming more agitated x 2 days, was hypotensive yesterday and started on cefepime and vancomycin. Brought in to the ED for AMS. PICC line in placed for cefepime/vanc for recent LLE OM.       In the ED, vitals wnl. Labs showing wbc 11.30, Hb 9.6, MCV 79.1, INR 1.59, CO2 14, AG 21, Cr 6 (~baseline 1.2), , trops 0.09. CTH wnl, RBUS with no hydro, poor visualization of left kidney 82 yo m ho DM, COPD, anemia, lymphedemia, afib on xarelto, HFrEF, DM coming in from nursing home for AMS x 2 days. Unable to gather story from pt as he is altered and lethargic.  As per NH was becoming more agitated x 2 days, was hypotensive yesterday and started on cefepime and vancomycin. Brought in to the ED for AMS. PICC line in place for cefepime 1q q8 until 10/5/23 and vanc 1q24 until 9/23/23 for LLE cellulitis/OM  (was at [facility] last month for LLE thick wound cellulitis/OM and sacral DTI as per call with [facility])      In the ED, vitals wnl. Labs showing wbc 11.30, Hb 9.6, MCV 79.1, INR 1.59, CO2 14, AG 21, Cr 6 (~baseline 1.2), , trops 0.09. CTH wnl, RBUS with no hydro, poor visualization of left kidney       84 yo m ho DM, COPD, anemia, lymphedemia, afib on xarelto, HFrEF, DM coming in from nursing home for AMS x 2 days. Unable to gather story from pt as he is altered and lethargic.  As per NH was becoming more agitated x 2 days, was hypotensive yesterday and started on cefepime and vancomycin. Brought in to the ED for AMS. PICC line in place for cefepime 1q q8 until 10/5/23 and vanc 1q24 until 9/23/23 for LLE cellulitis/OM  (was at [facility] last month for LLE thick wound cellulitis/OM and sacral DTI as per call with [facility] as per collateral from NH DEISY Barry)      In the ED, vitals wnl. Labs showing wbc 11.30, Hb 9.6, MCV 79.1, INR 1.59, CO2 14, AG 21, Cr 6 (~baseline 1.2), , trops 0.09. CTH wnl, RBUS with no hydro, poor visualization of left kidney

## 2023-09-15 NOTE — H&P ADULT - TIME BILLING
Patient seen at bedside, time spent evaluating and treating the patient's acute illness as well as time spent reviewing labs, radiology, discussing with patient and/or patient's family and discussing the case with a multidisciplinary team.    Spent time calling [facility] to obtain collateral on patient's HPI, pmhx, and past hospitalization.

## 2023-09-15 NOTE — PATIENT PROFILE ADULT - FALL HARM RISK - HARM RISK INTERVENTIONS
Assistance with ambulation/Assistance OOB with selected safe patient handling equipment/Communicate Risk of Fall with Harm to all staff/Orthostatic vital signs/Reinforce activity limits and safety measures with patient and family/Tailored Fall Risk Interventions/Visual Cue: Yellow wristband and red socks/Bed in lowest position, wheels locked, appropriate side rails in place/Call bell, personal items and telephone in reach/Instruct patient to call for assistance before getting out of bed or chair/Non-slip footwear when patient is out of bed/[place] to call system/Physically safe environment - no spills, clutter or unnecessary equipment/Purposeful Proactive Rounding/Room/bathroom lighting operational, light cord in reach

## 2023-09-15 NOTE — ED ADULT NURSE NOTE - NSFALLHARMRISKINTERV_ED_ALL_ED

## 2023-09-15 NOTE — H&P ADULT - NSHPPHYSICALEXAM_GEN_ALL_CORE
GENERAL: confused appearing  HEENT: Normocephalic, atraumatic  PULMONARY: Clear to auscultation bilaterally. No rales, ronchi, or wheezing.   CARDIOVASCULAR: Regular rate and rhythm, S1-S2, no murmurs   GASTROINTESTINAL: Soft, non-tender, non-distended, no guarding.   SKIN/EXTREMITIES: No LE edema b/l  NEUROLOGIC: AAOX1?

## 2023-09-15 NOTE — ED PROVIDER NOTE - PHYSICAL EXAMINATION
CONSTITUTIONAL: NAD  SKIN: Warm dry  HEAD: NCAT  EYES: NL inspection  ENT: MMM  NECK: Supple; non tender.  CARD: RRR  RESP: CTAB  ABD: S/NT no R/G  EXT: no pedal edema  NEURO: Grossly unremarkable  PSYCH: AOx0, agitated, verbalizes "no" when proceeding with workup

## 2023-09-15 NOTE — ED PROVIDER NOTE - OBJECTIVE STATEMENT
84 yo m ho DM, COPD, anemia, lymphedemia, afib on xarelto coming in from nursing home for AMS x 2 days. As per NH was becoming more agitated x 2 days, was hypotensive yday and started on cefepime and vancomycin. Brought in to the ED for AMS. Patient poor historian, has LT midline in place

## 2023-09-15 NOTE — ED ADULT NURSE NOTE - OBJECTIVE STATEMENT
83 year old male, presenting to ED from SNF, c/o AMS. Pt c/o AMS x2 days, inpatient for osteomyelitis. Pt noted w/ R upper arm PICC line. No s/s of n/v/d/fevers/chills. pt alert, confused/restless.     Fall precautions implemented, BA in place, red socks utilized. Pt by RN station.

## 2023-09-15 NOTE — ED PROVIDER NOTE - ATTENDING CONTRIBUTION TO CARE
83-year-old male with past medical history of A-fib on Xarelto, diabetes, COPD not on home O2, anemia, sent in from nursing home for increasing agitation for the last 2 days.  As per nursing home records patient was hypotensive yesterday so was started on cefepime and vancomycin.  Patient is unable to give a history.  Exam: nad, ncat, perrl, eomi, dry mm, rrr, ctab, abd soft, nt, nd alert but not oriented, not following instructions, nonsensical speech.  Impression: Patient with altered mental status, on Xarelto for A-fib, will check labs, UA, CT head, chest x-ray

## 2023-09-15 NOTE — H&P ADULT - NSHPLABSRESULTS_GEN_ALL_CORE
09-15    135  |  100  |  106<HH>  ----------------------------<  127<H>  4.5   |  14<L>  |  6.0<HH>    Ca    8.5      15 Sep 2023 14:35    TPro  6.3  /  Alb  2.9<L>  /  TBili  0.3  /  DBili  x   /  AST  23  /  ALT  9   /  AlkPhos  90  09-15                        9.6    11.30 )-----------( 298      ( 15 Sep 2023 14:35 )             31.1

## 2023-09-15 NOTE — ED PROVIDER NOTE - PROGRESS NOTE DETAILS
SS RNs attempted to straight cath for urine - unable to. Consulted Urology - urology unable to place straight cath/barth at this time. Rec official US bladder/renal and will follow upon admission   S/p POCUS bladder- patient not retaining.

## 2023-09-16 LAB
A1C WITH ESTIMATED AVERAGE GLUCOSE RESULT: 6.4 % — HIGH (ref 4–5.6)
ALBUMIN SERPL ELPH-MCNC: 2.9 G/DL — LOW (ref 3.5–5.2)
ALP SERPL-CCNC: 84 U/L — SIGNIFICANT CHANGE UP (ref 30–115)
ALT FLD-CCNC: 11 U/L — SIGNIFICANT CHANGE UP (ref 0–41)
ANION GAP SERPL CALC-SCNC: 23 MMOL/L — HIGH (ref 7–14)
AST SERPL-CCNC: 32 U/L — SIGNIFICANT CHANGE UP (ref 0–41)
B-OH-BUTYR SERPL-SCNC: 0.5 MMOL/L — HIGH
BILIRUB SERPL-MCNC: 0.3 MG/DL — SIGNIFICANT CHANGE UP (ref 0.2–1.2)
BUN SERPL-MCNC: 109 MG/DL — CRITICAL HIGH (ref 10–20)
CALCIUM SERPL-MCNC: 8.2 MG/DL — LOW (ref 8.4–10.5)
CHLORIDE SERPL-SCNC: 103 MMOL/L — SIGNIFICANT CHANGE UP (ref 98–110)
CO2 SERPL-SCNC: 10 MMOL/L — LOW (ref 17–32)
CREAT SERPL-MCNC: 6.3 MG/DL — CRITICAL HIGH (ref 0.7–1.5)
CRP SERPL-MCNC: 52.5 MG/L — HIGH
EGFR: 8 ML/MIN/1.73M2 — LOW
ERYTHROCYTE [SEDIMENTATION RATE] IN BLOOD: 60 MM/HR — HIGH (ref 0–10)
ESTIMATED AVERAGE GLUCOSE: 137 MG/DL — HIGH (ref 68–114)
GLUCOSE BLDC GLUCOMTR-MCNC: 106 MG/DL — HIGH (ref 70–99)
GLUCOSE BLDC GLUCOMTR-MCNC: 154 MG/DL — HIGH (ref 70–99)
GLUCOSE BLDC GLUCOMTR-MCNC: 172 MG/DL — HIGH (ref 70–99)
GLUCOSE SERPL-MCNC: 145 MG/DL — HIGH (ref 70–99)
HCT VFR BLD CALC: 28.3 % — LOW (ref 42–52)
HGB BLD-MCNC: 8.7 G/DL — LOW (ref 14–18)
MAGNESIUM SERPL-MCNC: 2 MG/DL — SIGNIFICANT CHANGE UP (ref 1.8–2.4)
MCHC RBC-ENTMCNC: 24 PG — LOW (ref 27–31)
MCHC RBC-ENTMCNC: 30.7 G/DL — LOW (ref 32–37)
MCV RBC AUTO: 78.2 FL — LOW (ref 80–94)
NRBC # BLD: 0 /100 WBCS — SIGNIFICANT CHANGE UP (ref 0–0)
PLATELET # BLD AUTO: 279 K/UL — SIGNIFICANT CHANGE UP (ref 130–400)
PMV BLD: 10 FL — SIGNIFICANT CHANGE UP (ref 7.4–10.4)
POTASSIUM SERPL-MCNC: 4.5 MMOL/L — SIGNIFICANT CHANGE UP (ref 3.5–5)
POTASSIUM SERPL-SCNC: 4.5 MMOL/L — SIGNIFICANT CHANGE UP (ref 3.5–5)
PROT SERPL-MCNC: 6 G/DL — SIGNIFICANT CHANGE UP (ref 6–8)
RBC # BLD: 3.62 M/UL — LOW (ref 4.7–6.1)
RBC # FLD: 18.5 % — HIGH (ref 11.5–14.5)
SODIUM SERPL-SCNC: 136 MMOL/L — SIGNIFICANT CHANGE UP (ref 135–146)
WBC # BLD: 10.49 K/UL — SIGNIFICANT CHANGE UP (ref 4.8–10.8)
WBC # FLD AUTO: 10.49 K/UL — SIGNIFICANT CHANGE UP (ref 4.8–10.8)

## 2023-09-16 PROCEDURE — 99233 SBSQ HOSP IP/OBS HIGH 50: CPT

## 2023-09-16 PROCEDURE — 74176 CT ABD & PELVIS W/O CONTRAST: CPT | Mod: 26

## 2023-09-16 PROCEDURE — 99221 1ST HOSP IP/OBS SF/LOW 40: CPT

## 2023-09-16 PROCEDURE — 73700 CT LOWER EXTREMITY W/O DYE: CPT | Mod: 26,LT

## 2023-09-16 RX ORDER — VANCOMYCIN HCL 1 G
1500 VIAL (EA) INTRAVENOUS
Refills: 0 | Status: DISCONTINUED | OUTPATIENT
Start: 2023-09-16 | End: 2023-09-16

## 2023-09-16 RX ORDER — CEFTRIAXONE 500 MG/1
2000 INJECTION, POWDER, FOR SOLUTION INTRAMUSCULAR; INTRAVENOUS EVERY 24 HOURS
Refills: 0 | Status: DISCONTINUED | OUTPATIENT
Start: 2023-09-16 | End: 2023-09-21

## 2023-09-16 RX ORDER — VANCOMYCIN HCL 1 G
250 VIAL (EA) INTRAVENOUS EVERY 24 HOURS
Refills: 0 | Status: DISCONTINUED | OUTPATIENT
Start: 2023-09-16 | End: 2023-09-16

## 2023-09-16 RX ORDER — SODIUM BICARBONATE 1 MEQ/ML
0.12 SYRINGE (ML) INTRAVENOUS
Qty: 150 | Refills: 0 | Status: DISCONTINUED | OUTPATIENT
Start: 2023-09-16 | End: 2023-09-18

## 2023-09-16 RX ORDER — SODIUM CHLORIDE 9 MG/ML
750 INJECTION INTRAMUSCULAR; INTRAVENOUS; SUBCUTANEOUS ONCE
Refills: 0 | Status: COMPLETED | OUTPATIENT
Start: 2023-09-16 | End: 2023-09-16

## 2023-09-16 RX ORDER — CEFEPIME 1 G/1
500 INJECTION, POWDER, FOR SOLUTION INTRAMUSCULAR; INTRAVENOUS EVERY 24 HOURS
Refills: 0 | Status: DISCONTINUED | OUTPATIENT
Start: 2023-09-16 | End: 2023-09-16

## 2023-09-16 RX ADMIN — Medication 75 MEQ/KG/HR: at 22:41

## 2023-09-16 RX ADMIN — CEFTRIAXONE 100 MILLIGRAM(S): 500 INJECTION, POWDER, FOR SOLUTION INTRAMUSCULAR; INTRAVENOUS at 18:47

## 2023-09-16 RX ADMIN — Medication 300 MILLIGRAM(S): at 05:21

## 2023-09-16 RX ADMIN — HEPARIN SODIUM 5000 UNIT(S): 5000 INJECTION INTRAVENOUS; SUBCUTANEOUS at 05:21

## 2023-09-16 RX ADMIN — HEPARIN SODIUM 5000 UNIT(S): 5000 INJECTION INTRAVENOUS; SUBCUTANEOUS at 13:01

## 2023-09-16 RX ADMIN — CEFEPIME 100 MILLIGRAM(S): 1 INJECTION, POWDER, FOR SOLUTION INTRAMUSCULAR; INTRAVENOUS at 05:21

## 2023-09-16 RX ADMIN — Medication 1: at 08:02

## 2023-09-16 RX ADMIN — Medication 650 MILLIGRAM(S): at 13:05

## 2023-09-16 RX ADMIN — Medication 1: at 13:01

## 2023-09-16 RX ADMIN — SODIUM CHLORIDE 60 MILLILITER(S): 9 INJECTION, SOLUTION INTRAVENOUS at 07:56

## 2023-09-16 RX ADMIN — SODIUM CHLORIDE 750 MILLILITER(S): 9 INJECTION INTRAMUSCULAR; INTRAVENOUS; SUBCUTANEOUS at 18:47

## 2023-09-16 RX ADMIN — Medication 650 MILLIGRAM(S): at 21:40

## 2023-09-16 RX ADMIN — HEPARIN SODIUM 5000 UNIT(S): 5000 INJECTION INTRAVENOUS; SUBCUTANEOUS at 21:40

## 2023-09-16 NOTE — CONSULT NOTE ADULT - ATTENDING COMMENTS
# PAULA/ ATN/ HAGMA/ encephalopathy  agree with IVF as above   please review my follow up note from 9/17

## 2023-09-16 NOTE — PHYSICAL THERAPY INITIAL EVALUATION ADULT - GENERAL OBSERVATIONS, REHAB EVAL
Patient was seen from 10:40-10:55 for PT IE. Patient was rec'd in semi reclined in bed, +IV fluid, +barth catheter, NAD.

## 2023-09-16 NOTE — CONSULT NOTE ADULT - SUBJECTIVE AND OBJECTIVE BOX
NEPHROLOGY CONSULTATION NOTE    82 yo m ho DM, COPD, anemia, paroxysmal afib on xarelto, HFrEF, DM coming in from nursing home for AMS x 2 days. Found to have toxic metabolic encephalopathy with PAULA.  patient seen at bedside, awake not alert or oriented.  lethargic and confused, not answering questions appropriately.  VS borderline BP.    PAST MEDICAL & SURGICAL HISTORY:  HTN (hypertension)      COPD (chronic obstructive pulmonary disease)      High cholesterol      Mild anemia      Coffee ground emesis      Chronic diastolic heart failure      PAULA (acute kidney injury)      No significant past surgical history        Allergies:  No Known Allergies    Home Medications Reviewed  Hospital Medications:   MEDICATIONS  (STANDING):  cefepime   IVPB 500 milliGRAM(s) IV Intermittent every 24 hours  dextrose 5%. 1000 milliLiter(s) (50 mL/Hr) IV Continuous <Continuous>  dextrose 5%. 1000 milliLiter(s) (100 mL/Hr) IV Continuous <Continuous>  dextrose 50% Injectable 25 Gram(s) IV Push once  dextrose 50% Injectable 25 Gram(s) IV Push once  dextrose 50% Injectable 12.5 Gram(s) IV Push once  glucagon  Injectable 1 milliGRAM(s) IntraMuscular once  heparin   Injectable 5000 Unit(s) SubCutaneous every 8 hours  insulin lispro (ADMELOG) corrective regimen sliding scale   SubCutaneous three times a day before meals  lactated ringers. 1000 milliLiter(s) (60 mL/Hr) IV Continuous <Continuous>  sodium bicarbonate 650 milliGRAM(s) Oral every 8 hours  vancomycin  IVPB 1500 milliGRAM(s) IV Intermittent every 48 hours    SOCIAL HISTORY:  Denies ETOH,Smoking,   FAMILY HISTORY:  No pertinent family history in first degree relatives        REVIEW OF SYSTEMS:  CONSTITUTIONAL: altered, confused   EYES/ENT: No visual changes;  No vertigo or throat pain   NECK: No pain or stiffness  RESPIRATORY: No cough, wheezing, hemoptysis; No shortness of breath  CARDIOVASCULAR: No chest pain or palpitations.  GASTROINTESTINAL: No abdominal or epigastric pain. No nausea, vomiting, or hematemesis; No diarrhea or constipation. No melena or hematochezia.  GENITOURINARY: No dysuria, frequency, foamy urine, urinary urgency, incontinence or hematuria  NEUROLOGICAL: No numbness or weakness  SKIN: No itching, burning, rashes, or lesions   VASCULAR: No bilateral lower extremity edema.   All other review of systems is negative unless indicated above.    VITALS:  Vital Signs Last 24 Hrs  T(C): 36.1 (16 Sep 2023 14:55), Max: 36.2 (15 Sep 2023 22:38)  T(F): 96.9 (16 Sep 2023 14:55), Max: 97.2 (15 Sep 2023 22:38)  HR: 80 (16 Sep 2023 14:55) (72 - 99)  BP: 97/54 (16 Sep 2023 14:55) (67/38 - 116/64)  BP(mean): --  RR: 18 (16 Sep 2023 14:55) (18 - 18)  SpO2: 96% (16 Sep 2023 14:55) (96% - 99%)    Parameters below as of 15 Sep 2023 22:38  Patient On (Oxygen Delivery Method): room air        09-15 @ 07:01  -  09-16 @ 07:00  --------------------------------------------------------  IN: 60 mL / OUT: 0 mL / NET: 60 mL    09-16 @ 07:01  -  09-16 @ 15:28  --------------------------------------------------------  IN: 480 mL / OUT: 0 mL / NET: 480 mL        Weight (kg): 97.2 (09-16 @ 00:31)  PHYSICAL EXAM:  Constitutional: lethargic   HEENT: anicteric sclera, oropharynx clear, MMM  Neck: No JVD  Respiratory: CTAB, no wheezes, rales or rhonchi  Cardiovascular: S1, S2, RRR  Gastrointestinal: BS+, soft, NT/ND  Extremities: No cyanosis or clubbing. No peripheral edema  Neurological: A/O x 1 not answering question appropriately   Psychiatric: Normal mood, normal affect  : No CVA tenderness. No barth.   Skin: No rashes    LABS:  09-16    136  |  103  |  109<HH>  ----------------------------<  145<H>  4.5   |  10<L>  |  6.3<HH>    Ca    8.2<L>      16 Sep 2023 07:18  Mg     2.0     09-16    TPro  6.0  /  Alb  2.9<L>  /  TBili  0.3  /  DBili      /  AST  32  /  ALT  11  /  AlkPhos  84  09-16    Creatinine Trend: 6.3 <--, 6.0 <--                        8.7    10.49 )-----------( 279      ( 16 Sep 2023 07:18 )             28.3     Urine Studies:  Urinalysis Basic - ( 16 Sep 2023 07:18 )    Color:  / Appearance:  / SG:  / pH:   Gluc: 145 mg/dL / Ketone:   / Bili:  / Urobili:    Blood:  / Protein:  / Nitrite:    Leuk Esterase:  / RBC:  / WBC    Sq Epi:  / Non Sq Epi:  / Bacteria:

## 2023-09-16 NOTE — CONSULT NOTE ADULT - ASSESSMENT
82 yo m ho DM, COPD, anemia, paroxysmal afib on xarelto, HFrEF, DM coming in from nursing home for AMS x 2 days. Found to have toxic metabolic encephalopathy with PAULA.    Assessment and plan  PAULA/ toxic metabolic encephalopathy/ HAGMA/ HFrEF  PAULA unclear given limited hx  possible ATN iso hypotension and possible sepsis  other possible causes prerenal due to decrease po intake ,lasxi and entresto  HAGMA likely secondary to uremia, however check VBG lactate BHB and CK levels   no folley inserted ? oliguric  please attach condom catheter and document accurate I/Os  no hydro on imaging  send please UA and urine studies Urine Na, Urea and creat  switch IVF to bicarb drip at 75 cc/hr x 12 hrs while monitoring UO and repeat BMP afterwards   TTE  f/u cultures and procal  monitor vanco levels  no acute indications for RRT as of now  might need it depending on UO and electrolytes  consider critical care eval for possible SDU transfer  will follow

## 2023-09-16 NOTE — PROGRESS NOTE ADULT - SUBJECTIVE AND OBJECTIVE BOX
NIMO SARMIENTO  83y, Male    Allergy: No Known Allergies    CHIEF COMPLAINT: AMS (16 Sep 2023 15:28)    HPI:  84 yo m ho DM, COPD, anemia, lymphedemia, afib on xarelto, HFrEF, DM coming in from nursing home for AMS x 2 days. Unable to gather story from pt as he is altered and lethargic.  As per NH was becoming more agitated x 2 days, was hypotensive yesterday and started on cefepime and vancomycin. Brought in to the ED for AMS. PICC line in place for cefepime 1q q8 until 10/5/23 and vanc 1q24 until 9/23/23 for LLE cellulitis/OM  (was at [facility] last month for LLE thick wound cellulitis/OM and sacral DTI as per call with [facility] as per collateral from [facility])    In the ED, vitals wnl. Labs showing wbc 11.30, Hb 9.6, MCV 79.1, INR 1.59, CO2 14, AG 21, Cr 6 (~baseline 1.2), , trops 0.09. CTH wnl, RBUS with no hydro, poor visualization of left kidney   (15 Sep 2023 22:12)    FAMILY HISTORY:  No pertinent family history in first degree relatives    PAST MEDICAL & SURGICAL HISTORY:    HTN (hypertension)  COPD (chronic obstructive pulmonary disease)  High cholesterol  Mild anemia  Coffee ground emesis  Chronic diastolic heart failure  PAULA (acute kidney injury)    No significant past surgical history    SOCIAL HISTORY: UNKNOWN    Home Medications: per [facility]   acetaminophen 325 mg oral tablet: 2 tab(s) orally every 6 hours, As needed, Mild Pain (1 - 3), Moderate Pain (4 - 6) (25 May 2019 09:25)  Albuterol (Eqv-ProAir HFA) 90 mcg/inh inhalation aerosol: 1 puff(s) inhaled every 6 hours as needed for  shortness of breath and/or wheezing (15 Sep 2023 22:56)  Breo Ellipta 100 mcg-25 mcg/inh inhalation powder: 1 puff(s) inhaled once a day (15 Sep 2023 22:56)  empagliflozin 10 mg oral tablet: 1 tab(s) orally once a day (15 Sep 2023 22:56)  Entresto 24 mg-26 mg oral tablet: 1 tab(s) orally 2 times a day (15 Sep 2023 22:57)  Lasix 20 mg oral tablet: 1 tab(s) orally once a day (15 Sep 2023 22:57)  Metoprolol Succinate ER 25 mg oral tablet, extended release: 1 tab(s) orally once a day (15 Sep 2023 22:57)  Percocet 5 mg-325 mg oral tablet: 1 tab(s) orally every 8 hours as needed for  severe pain (15 Sep 2023 22:57)  Xarelto 20 mg oral tablet: 1 tab(s) orally once a day (15 Sep 2023 22:57)    ROS: UNABLE TO OBTAIN PT WITH CONFUSION NOT SPEAKING BUT AROUSABLE     VITALS:  T(F): 96.9, Max: 97.2 (09-15-23 @ 22:38)  HR: 80  BP: 97/54  RR: 18Vital Signs Last 24 Hrs  T(C): 36.1 (16 Sep 2023 14:55), Max: 36.2 (15 Sep 2023 22:38)  T(F): 96.9 (16 Sep 2023 14:55), Max: 97.2 (15 Sep 2023 22:38)  HR: 80 (16 Sep 2023 14:55) (80 - 99)  BP: 97/54 (16 Sep 2023 14:55) (67/38 - 114/71)  RR: 18 (16 Sep 2023 14:55) (18 - 18)  SpO2: 96% (16 Sep 2023 14:55) (96% - 98%)    Parameters below as of 15 Sep 2023 22:38  Patient On (Oxygen Delivery Method): room air      PHYSICAL EXAM:  Gen: NAD, RESPONDS TO TOUCH AND VOICE BUT DOES NOT SPEAK   NCAT  NECK: SUPPLE AND NO LAD   MOUTH: DRY MUCUS MEMBRANES   CV: NL S1/2   LUNGS: CTA B/L   Abdomen: Soft, NTND+ BS present  Ext: Warm, well perfused no CCE  Neuro: non focal, awake, CN II-XII intact   Skin: no rash, no erythema  MS: Arousable to touch and voice but no verbal response    TESTS & MEASUREMENTS:                        8.7    10.49 )-----------( 279      ( 16 Sep 2023 07:18 )             28.3     09-16    136  |  103  |  109<HH>  ----------------------------<  145<H>  4.5   |  10<L>  |  6.3<HH>    Ca    8.2<L>      16 Sep 2023 07:18    Mg     2.0     09-16    TPro  6.0  /  Alb  2.9<L>  /  TBili  0.3  /  DBili  x   /  AST  32  /  ALT  11  /  AlkPhos  84  09-16    LIVER FUNCTIONS - ( 16 Sep 2023 07:18 )  Alb: 2.9 g/dL / Pro: 6.0 g/dL / ALK PHOS: 84 U/L / ALT: 11 U/L / AST: 32 U/L / GGT: x           Urinalysis Basic - ( 16 Sep 2023 07:18 )    Color: x / Appearance: x / SG: x / pH: x  Gluc: 145 mg/dL / Ketone: x  / Bili: x / Urobili: x   Blood: x / Protein: x / Nitrite: x   Leuk Esterase: x / RBC: x / WBC x   Sq Epi: x / Non Sq Epi: x / Bacteria: x    QRS axis to [] ° and NSR at a rate of [] BPM. There was no atrial enlargement. There was no ventricular hypertrophy. There were no ST-T changes and all intervals were normal.      RADIOLOGY & ADDITIONAL TESTS:  I have personally reviewed the last Chest xray    PROCEDURE DATE:  09/15/2023    INTERPRETATION:  STUDY INDICATION: Sepsis    TECHNIQUE:  Portable frontal view of the chest obtained.    COMPARISON: 5/22/2019    FINDINGS/  IMPRESSION:    Right PICC terminating over the distal superior vena cava.    Left basal pleural-parenchymal opacity. No pneumothorax.    Stable cardiomediastinal silhouette.    Unchanged osseous structures.    --- End of Report ---    PRASAD MUNOZ MD; Attending Radiologist  This document has been electronically signed. Sep 15 2023  2:40PM (09-15-23 @ 13:20)      CARDIOLOGY TESTING  12 Lead ECG:   Ventricular Rate 81 BPM    Atrial Rate 208 BPM    QRS Duration 96 ms    Q-T Interval 388 ms    QTC Calculation(Bazett) 450 ms    R Axis 171 degrees    T Axis 108 degrees    Diagnosis Line Atrial fibrillation  Incomplete right bundle branch block  Possible Right ventricular hypertrophy  Possible Anterolateral infarct , age undetermined  Abnormal ECG    Confirmed by BERE FULLER MD ([id number]) on 9/15/2023 3:14:03 PM (09-15-23 @ 14:36)    MEDICATIONS  (STANDING):  cefTRIAXone   IVPB 2000 milliGRAM(s) IV Intermittent every 24 hours  dextrose 5%. 1000 milliLiter(s) (100 mL/Hr) IV Continuous <Continuous>  dextrose 50% Injectable 25 Gram(s) IV Push once  dextrose 50% Injectable 25 Gram(s) IV Push once  dextrose 50% Injectable 12.5 Gram(s) IV Push once  glucagon  Injectable 1 milliGRAM(s) IntraMuscular once  heparin   Injectable 5000 Unit(s) SubCutaneous every 8 hours  insulin lispro (ADMELOG) corrective regimen sliding scale   SubCutaneous three times a day before meals  sodium bicarbonate 650 milliGRAM(s) Oral every 8 hours  sodium bicarbonate  Infusion 0.116 mEq/kG/Hr (75 mL/Hr) IV Continuous <Continuous>  sodium chloride 0.9% Bolus 750 milliLiter(s) IV Bolus once    MEDICATIONS  (PRN):  albuterol    90 MICROgram(s) HFA Inhaler 1 Puff(s) Inhalation every 6 hours PRN for shortness of breath and/or wheezing  dextrose Oral Gel 15 Gram(s) Oral once PRN Blood Glucose LESS THAN 70 milliGRAM(s)/deciliter      ANTIBIOTICS:  cefTRIAXone   IVPB 2000 milliGRAM(s) IV Intermittent every 24 hours    All available historical data has been reviewed    ASSESSMENT  83y M admitted with Altered mental status

## 2023-09-16 NOTE — PROGRESS NOTE ADULT - ASSESSMENT
83M DM2, COPD, Anemia, Paroxysmal Afib on xarelto, HFrEF, sent to the Hospital by [facility] for AMS. Pt was recently admitted to [facility] from [facility] for OM needing IV Abx Cefepime/Vancomycin at [facility]. Pt has been having worsening in MS over the last 2 days per NH records. Pt currently responds to touch and voice but does not speak. Tracks eyes.     #Metabolic Encephalopathy   #Suspected Severe Sepsis (Hypotention/AMS/PAULA/Oliguric)   #PAULA vs CKD w/ Progression   #HAGMA 2/2 PAULA   #Oliguric Renal Failure moving towards Anuria  #Left Foot OM w/ surrounding Cellulitis   #Sacral DTI / Stage 1 POA     - CT head negative   - Cr 6 (baseline ~1.2) trending up now oliguric vs anuric   - Patient is hypotensive SBP 60's s/p Fluid challenge and Bladder scan about 152cc in bladder  - Continue with IVF Challenge NS bolus 750cc over 1 hr as hypotensive  - Start Bicarb drip 75cc/hr as ordered   - f/u Urine Lytes / Osmos   - f/u Pan cultures   - d/c all other fluids   - d/c cefepime and vanco  - f/u vanco random level   - start ceftriaxone 2g IV qd  - f/u Xray Lt Foot ?OM   - f/u ESR/CRP/Pro-yesy and Blood cxs and urine studies   - ID/Nephro consults      #Compensated HFrEF  #Paroxysmal Afib  - echo 8/14/2023 EF 35-40% from [facility] admission    - hold home metoprolol and all other BP meds due to hypotension  - hold xarelto due to PAULA     #DM2  - hold home empagliflozin  - FS q6  - f/u BHB     Guarded Prognosis   - reassess for improvement if not may need SDU    Pending: infectious w/up / crp / esr / urine studies / resolution of hypotension - may need levophed and SDU care if not improving / barth anuric/oliguric / f/u labs / blood cxs   Family: will reach out for [facility] discussion   Dispo: acute      83M DM2, COPD, Anemia, Paroxysmal Afib on xarelto, HFrEF, sent to the Hospital by [facility] for AMS. Pt was recently admitted to [facility] from [facility] for OM needing IV Abx Cefepime/Vancomycin at [facility]. Pt has been having worsening in MS over the last 2 days per NH records. Pt currently responds to touch and voice but does not speak. Tracks eyes.     #Metabolic Encephalopathy   #Suspected Severe Sepsis (Hypotention/AMS/PAULA/Oliguric)   #PAULA vs CKD w/ Progression   #HAGMA 2/2 PAULA   #Oliguric Renal Failure moving towards Anuria  #Left Foot OM w/ surrounding Cellulitis   #Sacral DTI / Stage 1 POA     - CT head negative   - Cr 6 (baseline ~1.2) trending up now oliguric vs anuric   - Patient is hypotensive SBP 60's s/p Fluid challenge and Bladder scan about 152cc in bladder  - Continue with IVF Challenge NS bolus 750cc over 1 hr as hypotensive  - Start Bicarb drip 75cc/hr as ordered   - NEED TO PLACE BARTH IF RN UNABLE THEN  SHOULD PLACE BARTH -> pt anuric vs oliguria with worsening renal failure / need to monitor kidney function   - f/u Urine Lytes / Osmos   - f/u Pan cultures   - d/c all other fluids   - d/c cefepime and vanco  - f/u vanco random level   - start ceftriaxone 2g IV qd  - f/u Xray Lt Foot ?OM   - f/u ESR/CRP/Pro-yesy and Blood cxs and urine studies   - ID/Nephro consults      #Compensated HFrEF  #Paroxysmal Afib  - echo 8/14/2023 EF 35-40% from [facility] admission    - hold home metoprolol and all other BP meds due to hypotension  - hold xarelto due to PAULA     #DM2  - hold home empagliflozin  - FS q6  - f/u BHB     Guarded Prognosis   - reassess for improvement if not may need SDU    Pending: infectious w/up / crp / esr / urine studies / resolution of hypotension - may need levophed and SDU care if not improving / barth anuric/oliguric / f/u labs / blood cxs   Family: will reach out for C discussion   Dispo: acute

## 2023-09-16 NOTE — CONSULT NOTE ADULT - ASSESSMENT
ASSESSMENT  84 yo m ho DM, COPD, anemia, lymphedemia, afib on xarelto, HFrEF, DM coming in from nursing home for AMS x 2 days. Unable to gather story from pt as he is altered and lethargic.    IMPRESSION  #Toxic Metabolic Encepahlopathy  #PAULA/Uremia - while on vancomycin/cefepime  - Renal US without hydro    #LLE Cellulitis/OM  - planned for vancomycin until 9/23/2023 and cefepime 10/5/2023 via PICC    #DM II  #Lymphedema  #Abx allergy: No Known Allergies    Lines: PICC line in RUE from previous hospitalization     RECOMMENDATIONS  - check left foot X ray   - please obtain discharge summary from [facility] to help clarify antibiotic course as left lower extremity and sacral ulcer does not appear grossly infected   - received vancomycin 9/15 and 9/16 -- hold on further dosing   - change cefepime to ceftriaxone 2g daily   - follow-up blood cx -- if positive, remove PICC line   - trend creatinine     Please call or message on Microsoft Teams if with any questions.  Spectra [phone]

## 2023-09-16 NOTE — CONSULT NOTE ADULT - SUBJECTIVE AND OBJECTIVE BOX
NIMO SARMIENTO  83y, Male  Allergy: No Known Allergies      CHIEF COMPLAINT: AMS (15 Sep 2023 22:12)      LOS  1d    HPI:  84 yo m ho DM, COPD, anemia, lymphedemia, afib on xarelto, HFrEF, DM coming in from nursing home for AMS x 2 days. Unable to gather story from pt as he is altered and lethargic.  As per NH was becoming more agitated x 2 days, was hypotensive yesterday and started on cefepime and vancomycin. Brought in to the ED for AMS. PICC line in place for cefepime 1q q8 until 10/5/23 and vanc 1q24 until 9/23/23 for LLE cellulitis/OM  (was at [facility] last month for LLE thick wound cellulitis/OM and sacral DTI as per call with [facility] as per collateral from [facility])      In the ED, vitals wnl. Labs showing wbc 11.30, Hb 9.6, MCV 79.1, INR 1.59, CO2 14, AG 21, Cr 6 (~baseline 1.2), , trops 0.09. CTH wnl, RBUS with no hydro, poor visualization of left kidney       (15 Sep 2023 22:12)      INFECTIOUS DISEASE HISTORY:  History as above.  Presents for AMS  Has been on antibiotics for LLE cellulitis/OM and sacral DTI  WBC 11.3 on admission  Found to have Cr of 6  Lethargic and limited historian     PAST MEDICAL & SURGICAL HISTORY:  HTN (hypertension)      COPD (chronic obstructive pulmonary disease)      High cholesterol      Mild anemia      Coffee ground emesis      Chronic diastolic heart failure      PAULA (acute kidney injury)      No significant past surgical history          FAMILY HISTORY  No pertinent family history in first degree relatives        SOCIAL HISTORY  Social History:        ROS  unable to obtain history secondary to patient's mental status and/or sedation    VITALS:  T(F): 97.2, Max: 98.1 (09-15-23 @ 10:06)  HR: 99  BP: 114/71  RR: 18Vital Signs Last 24 Hrs  T(C): 36.2 (15 Sep 2023 22:38), Max: 36.7 (15 Sep 2023 10:06)  T(F): 97.2 (15 Sep 2023 22:38), Max: 98.1 (15 Sep 2023 10:06)  HR: 99 (15 Sep 2023 22:38) (71 - 99)  BP: 114/71 (15 Sep 2023 22:38) (67/38 - 116/64)  BP(mean): --  RR: 18 (15 Sep 2023 22:38) (18 - 19)  SpO2: 96% (15 Sep 2023 22:38) (96% - 99%)    Parameters below as of 15 Sep 2023 22:38  Patient On (Oxygen Delivery Method): room air        PHYSICAL EXAM:  Gen: NAD, resting in bed  HEENT: Normocephalic, atraumatic  Neck: supple, no lymphadenopathy  CV: Regular rate & regular rhythm  Lungs: decreased BS at bases, no fremitus  Abdomen: Soft, BS present  Ext: Warm, well perfused  Neuro: non focal, awake  Skin: Left heel DTI - no cellulitis in left leg   Sacral Ulcer without gross drainage/erythema   Lines: no phlebitis    TESTS & MEASUREMENTS:                        9.6    11.30 )-----------( 298      ( 15 Sep 2023 14:35 )             31.1    09-15    135  |  100  |  106<HH>  ----------------------------<  127<H>  4.5   |  14<L>  |  6.0<HH>    Ca    8.5      15 Sep 2023 14:35    TPro  6.3  /  Alb  2.9<L>  /  TBili  0.3  /  DBili  x   /  AST  23  /  ALT  9   /  AlkPhos  90  09-15      LIVER FUNCTIONS - ( 15 Sep 2023 14:35 )  Alb: 2.9 g/dL / Pro: 6.3 g/dL / ALK PHOS: 90 U/L / ALT: 9 U/L / AST: 23 U/L / GGT: x          Urinalysis Basic - ( 15 Sep 2023 14:35 )    Color: x / Appearance: x / SG: x / pH: x  Gluc: 127 mg/dL / Ketone: x  / Bili: x / Urobili: x  Blood: x / Protein: x / Nitrite: x  Leuk Esterase: x / RBC: x / WBC x  Sq Epi: x / Non Sq Epi: x / Bacteria: x              INFECTIOUS DISEASES TESTING      RADIOLOGY & ADDITIONAL TESTS:  I have personally reviewed the last Chest xray  CXR  Xray Chest 1 View- PORTABLE-Urgent:  ACC: [id number] EXAM:  XR CHEST PORTABLE URGENT 1V   ORDERED BY: DEANNA LIM    PROCEDURE DATE:  09/15/2023          INTERPRETATION:  STUDY INDICATION: Sepsis    TECHNIQUE:  Portable frontal view of the chest obtained.    COMPARISON: 5/22/2019    FINDINGS/  IMPRESSION:    Right PICC terminating over the distal superior vena cava.    Left basal pleural-parenchymal opacity. No pneumothorax.    Stable cardiomediastinal silhouette.    Unchanged osseous structures.    --- End of Report ---            PRASAD MUNOZ MD; Attending Radiologist  This document has been electronically signed. Sep 15 2023  2:40PM (09-15-23 @ 13:20)      CT      CARDIOLOGY TESTING  12 Lead ECG:  Ventricular Rate 81 BPM    Atrial Rate 208 BPM    QRS Duration 96 ms    Q-T Interval 388 ms    QTC Calculation(Bazett) 450 ms    R Axis 171 degrees    T Axis 108 degrees    Diagnosis Line Atrial fibrillation  Incomplete right bundle branch block  Possible Right ventricular hypertrophy  Possible Anterolateral infarct , age undetermined  Abnormal ECG    Confirmed by BERE FULLER MD ([id number]) on 9/15/2023 3:14:03 PM (09-15-23 @ 14:36)      MEDICATIONS  cefepime   IVPB 500 IV Intermittent every 24 hours  dextrose 5%. 1000 IV Continuous <Continuous>  dextrose 5%. 1000 IV Continuous <Continuous>  dextrose 50% Injectable 25 IV Push once  dextrose 50% Injectable 12.5 IV Push once  dextrose 50% Injectable 25 IV Push once  glucagon  Injectable 1 IntraMuscular once  heparin   Injectable 5000 SubCutaneous every 8 hours  insulin lispro (ADMELOG) corrective regimen sliding scale  SubCutaneous three times a day before meals  lactated ringers. 1000 IV Continuous <Continuous>  sodium bicarbonate 650 Oral every 8 hours  vancomycin  IVPB 1500 IV Intermittent every 48 hours      Weight  Weight (kg): 97.2 (09-16-23 @ 00:31)    ANTIBIOTICS:  cefepime   IVPB 500 milliGRAM(s) IV Intermittent every 24 hours  vancomycin  IVPB 1500 milliGRAM(s) IV Intermittent every 48 hours      ALLERGIES:  No Known Allergies

## 2023-09-16 NOTE — PROGRESS NOTE ADULT - ASSESSMENT
Pt is a 83y Male admitted with AMS -  following for phimosis, unable to CIC.    PLAN:  -RBUS w/o hydro, random bladder volume 152ccs  -No clinical signs of retention on exam, towel covering groin is wet with urine  -Serial bladder scans  -No acute urologic intervention at this point in time  -Follow-up outpatient with   -Please recall urology with any questions or concerns  -Patient seen and examined with Dr. Diaz

## 2023-09-16 NOTE — PHYSICAL THERAPY INITIAL EVALUATION ADULT - PERTINENT HX OF CURRENT PROBLEM, REHAB EVAL
82 yo m ho DM, COPD, anemia, lymphedemia, afib on xarelto, HFrEF, DM coming in from nursing home for AMS x 2 days. Unable to gather story from pt as he is altered and lethargic.  As per NH was becoming more agitated x 2 days, was hypotensive yesterday and started on cefepime and vancomycin. Brought in to the ED for AMS. PICC line in place for cefepime 1q q8 until 10/5/23 and vanc 1q24 until 9/23/23 for LLE cellulitis/OM  (was at [facility] last month for LLE thick wound cellulitis/OM and sacral DTI as per call with [facility] as per collateral from [facility])

## 2023-09-16 NOTE — PHYSICAL THERAPY INITIAL EVALUATION ADULT - LEVEL OF INDEPENDENCE: SUPINE/SIT, REHAB EVAL
[No Device Needed] : Patient doesn't use a device for ambulation Patient was leaning back upon sitting at the EOB and required max assist from the PT to maintain sitting balance. PT returned the patient back to the bed./dependent (less than 25% patients effort)

## 2023-09-16 NOTE — PROGRESS NOTE ADULT - SUBJECTIVE AND OBJECTIVE BOX
UROLOGY DAILY PROGRESS NOTE    Pt is a 83y Male admitted with AMS -  following for phimosis, unable to CIC. Patient seen and examined at bedside with Dr. Diaz, unable to obtain history from pt.     MEDICATIONS  (STANDING):  cefepime   IVPB 500 milliGRAM(s) IV Intermittent every 24 hours  dextrose 5%. 1000 milliLiter(s) (50 mL/Hr) IV Continuous <Continuous>  dextrose 5%. 1000 milliLiter(s) (100 mL/Hr) IV Continuous <Continuous>  dextrose 50% Injectable 25 Gram(s) IV Push once  dextrose 50% Injectable 25 Gram(s) IV Push once  dextrose 50% Injectable 12.5 Gram(s) IV Push once  glucagon  Injectable 1 milliGRAM(s) IntraMuscular once  heparin   Injectable 5000 Unit(s) SubCutaneous every 8 hours  insulin lispro (ADMELOG) corrective regimen sliding scale   SubCutaneous three times a day before meals  lactated ringers. 1000 milliLiter(s) (60 mL/Hr) IV Continuous <Continuous>  sodium bicarbonate 650 milliGRAM(s) Oral every 8 hours  vancomycin  IVPB 1500 milliGRAM(s) IV Intermittent every 48 hours    MEDICATIONS  (PRN):  albuterol    90 MICROgram(s) HFA Inhaler 1 Puff(s) Inhalation every 6 hours PRN for shortness of breath and/or wheezing  dextrose Oral Gel 15 Gram(s) Oral once PRN Blood Glucose LESS THAN 70 milliGRAM(s)/deciliter    REVIEW OF SYSTEMS   [ ] Due to altered mental status/intubation, subjective information were not able to be obtained from patient. History was obtained, to the extent possible, from review of the chart and collateral sources of information.    Vital Signs Last 24 Hrs  T(C): 35.6 (16 Sep 2023 05:00), Max: 36.2 (15 Sep 2023 22:38)  T(F): 96 (16 Sep 2023 05:00), Max: 97.2 (15 Sep 2023 22:38)  HR: 98 (16 Sep 2023 05:00) (72 - 99)  BP: 95/54 (16 Sep 2023 05:00) (67/38 - 116/64)  RR: 18 (16 Sep 2023 05:00) (18 - 18)  SpO2: 98% (16 Sep 2023 05:00) (96% - 99%)    Parameters below as of 15 Sep 2023 22:38  Patient On (Oxygen Delivery Method): room air    PHYSICAL EXAM:  GEN: NAD, lethargic, rousable with tactile stimuli.  SKIN: Non diaphoretic.  RESP: Non-labored breathing. No use of accessory muscles.  CARDIO: +S1/S2  ABDO: Soft, NT/ND, no palpable bladder, no suprapubic tenderness.  : Noncircumcised male. B/L descended testicles x 2. Towel covering groin wet with urine.    I&O's Summary  15 Sep 2023 07:01  -  16 Sep 2023 07:00  --------------------------------------------------------  IN: 60 mL / OUT: 0 mL / NET: 60 mL    16 Sep 2023 07:01  -  16 Sep 2023 11:55  --------------------------------------------------------  IN: 300 mL / OUT: 0 mL / NET: 300 mL    LABS:             8.7    10.49 )-----------( 279      ( 16 Sep 2023 07:18 )             28.3     09-16  136  |  103  |  109<HH>  ----------------------------<  145<H>  4.5   |  10<L>  |  6.3<HH>    Ca    8.2<L>      16 Sep 2023 07:18  Mg     2.0     09-16    TPro  6.0  /  Alb  2.9<L>  /  TBili  0.3  /  DBili  x   /  AST  32  /  ALT  11  /  AlkPhos  84  09-16    PT/INR - ( 15 Sep 2023 14:35 )   PT: 18.40 sec;   INR: 1.59 ratio    PTT - ( 15 Sep 2023 14:35 )  PTT:33.4 sec    RADIOLOGY & ADDITIONAL STUDIES:  < from: US Renal (09.15.23 @ 19:38) >  FINDINGS:    Right kidney: 9.3 cm. No hydronephrosis or calculi.    Left kidney: Poorly visualized measuring approximately 9 cm. No gross   hydronephrosis.    Urinary bladder: Bladder volume of approximately 152 cc with internal   debris. Nonvisualization of the right ureteral jet. Left ureteral jet is   visualized.    IMPRESSION:    1.  Poor visualization of the left kidney. No gross hydronephrosis.  2.  Debris in the bladder, correlate with urinalysis.  < end of copied text >

## 2023-09-17 LAB
ANION GAP SERPL CALC-SCNC: 19 MMOL/L — HIGH (ref 7–14)
ANION GAP SERPL CALC-SCNC: 20 MMOL/L — HIGH (ref 7–14)
B-OH-BUTYR SERPL-SCNC: 0.3 MMOL/L — SIGNIFICANT CHANGE UP
BUN SERPL-MCNC: 107 MG/DL — CRITICAL HIGH (ref 10–20)
BUN SERPL-MCNC: 119 MG/DL — CRITICAL HIGH (ref 10–20)
CALCIUM SERPL-MCNC: 7.9 MG/DL — LOW (ref 8.4–10.5)
CALCIUM SERPL-MCNC: 8.1 MG/DL — LOW (ref 8.4–10.5)
CHLORIDE SERPL-SCNC: 101 MMOL/L — SIGNIFICANT CHANGE UP (ref 98–110)
CHLORIDE SERPL-SCNC: 102 MMOL/L — SIGNIFICANT CHANGE UP (ref 98–110)
CO2 SERPL-SCNC: 14 MMOL/L — LOW (ref 17–32)
CO2 SERPL-SCNC: 18 MMOL/L — SIGNIFICANT CHANGE UP (ref 17–32)
CREAT SERPL-MCNC: 6.7 MG/DL — CRITICAL HIGH (ref 0.7–1.5)
CREAT SERPL-MCNC: 7 MG/DL — CRITICAL HIGH (ref 0.7–1.5)
CRP SERPL-MCNC: 72.4 MG/L — HIGH
EGFR: 7 ML/MIN/1.73M2 — LOW
EGFR: 8 ML/MIN/1.73M2 — LOW
GLUCOSE BLDC GLUCOMTR-MCNC: 108 MG/DL — HIGH (ref 70–99)
GLUCOSE BLDC GLUCOMTR-MCNC: 114 MG/DL — HIGH (ref 70–99)
GLUCOSE BLDC GLUCOMTR-MCNC: 139 MG/DL — HIGH (ref 70–99)
GLUCOSE BLDC GLUCOMTR-MCNC: 150 MG/DL — HIGH (ref 70–99)
GLUCOSE BLDC GLUCOMTR-MCNC: 156 MG/DL — HIGH (ref 70–99)
GLUCOSE SERPL-MCNC: 105 MG/DL — HIGH (ref 70–99)
GLUCOSE SERPL-MCNC: 121 MG/DL — HIGH (ref 70–99)
HCT VFR BLD CALC: 28.8 % — LOW (ref 42–52)
HGB BLD-MCNC: 9.1 G/DL — LOW (ref 14–18)
LACTATE SERPL-SCNC: 2.3 MMOL/L — HIGH (ref 0.7–2)
MCHC RBC-ENTMCNC: 24.1 PG — LOW (ref 27–31)
MCHC RBC-ENTMCNC: 31.6 G/DL — LOW (ref 32–37)
MCV RBC AUTO: 76.2 FL — LOW (ref 80–94)
NRBC # BLD: 0 /100 WBCS — SIGNIFICANT CHANGE UP (ref 0–0)
PLATELET # BLD AUTO: 317 K/UL — SIGNIFICANT CHANGE UP (ref 130–400)
PMV BLD: 9.8 FL — SIGNIFICANT CHANGE UP (ref 7.4–10.4)
POTASSIUM SERPL-MCNC: 4 MMOL/L — SIGNIFICANT CHANGE UP (ref 3.5–5)
POTASSIUM SERPL-MCNC: 4 MMOL/L — SIGNIFICANT CHANGE UP (ref 3.5–5)
POTASSIUM SERPL-SCNC: 4 MMOL/L — SIGNIFICANT CHANGE UP (ref 3.5–5)
POTASSIUM SERPL-SCNC: 4 MMOL/L — SIGNIFICANT CHANGE UP (ref 3.5–5)
RBC # BLD: 3.78 M/UL — LOW (ref 4.7–6.1)
RBC # FLD: 18.7 % — HIGH (ref 11.5–14.5)
SODIUM SERPL-SCNC: 135 MMOL/L — SIGNIFICANT CHANGE UP (ref 135–146)
SODIUM SERPL-SCNC: 139 MMOL/L — SIGNIFICANT CHANGE UP (ref 135–146)
VANCOMYCIN FLD-MCNC: 60.4 UG/ML — HIGH (ref 5–10)
WBC # BLD: 11.47 K/UL — HIGH (ref 4.8–10.8)
WBC # FLD AUTO: 11.47 K/UL — HIGH (ref 4.8–10.8)

## 2023-09-17 PROCEDURE — 99233 SBSQ HOSP IP/OBS HIGH 50: CPT

## 2023-09-17 PROCEDURE — 51702 INSERT TEMP BLADDER CATH: CPT

## 2023-09-17 RX ADMIN — Medication 75 MEQ/KG/HR: at 17:42

## 2023-09-17 RX ADMIN — Medication 1: at 08:36

## 2023-09-17 RX ADMIN — CEFTRIAXONE 100 MILLIGRAM(S): 500 INJECTION, POWDER, FOR SOLUTION INTRAMUSCULAR; INTRAVENOUS at 17:42

## 2023-09-17 RX ADMIN — Medication 650 MILLIGRAM(S): at 13:58

## 2023-09-17 RX ADMIN — Medication 650 MILLIGRAM(S): at 05:07

## 2023-09-17 RX ADMIN — HEPARIN SODIUM 5000 UNIT(S): 5000 INJECTION INTRAVENOUS; SUBCUTANEOUS at 05:08

## 2023-09-17 RX ADMIN — Medication 650 MILLIGRAM(S): at 21:06

## 2023-09-17 RX ADMIN — HEPARIN SODIUM 5000 UNIT(S): 5000 INJECTION INTRAVENOUS; SUBCUTANEOUS at 21:06

## 2023-09-17 RX ADMIN — HEPARIN SODIUM 5000 UNIT(S): 5000 INJECTION INTRAVENOUS; SUBCUTANEOUS at 13:58

## 2023-09-17 NOTE — CONSULT NOTE ADULT - SUBJECTIVE AND OBJECTIVE BOX
Podiatry Consult Note    Subjective:  NIMO SARMIENTO is a 83 yr/o male who was seen bedside today for a left heel ulcer (17 Sep 2023 10:25). Patient unable to give story due to his altered mental status. No active drainage.     HPI:  82 yo m ho DM, COPD, anemia, lymphedemia, afib on xarelto, HFrEF, DM coming in from nursing home for AMS x 2 days. Unable to gather story from pt as he is altered and lethargic.  As per NH was becoming more agitated x 2 days, was hypotensive yesterday and started on cefepime and vancomycin. Brought in to the ED for AMS. PICC line in place for cefepime 1q q8 until 10/5/23 and vanc 1q24 until 9/23/23 for LLE cellulitis/OM  (was at [facility] last month for LLE thick wound cellulitis/OM and sacral DTI as per call with [facility] as per collateral from [facility])      In the ED, vitals wnl. Labs showing wbc 11.30, Hb 9.6, MCV 79.1, INR 1.59, CO2 14, AG 21, Cr 6 (~baseline 1.2), , trops 0.09. CTH wnl, RBUS with no hydro, poor visualization of left kidney      PAST MEDICAL & SURGICAL HISTORY:  HTN (hypertension)  COPD (chronic obstructive pulmonary disease)  High cholesterol  Mild anemia  Coffee ground emesis  Chronic diastolic heart failure  PAULA (acute kidney injury)  No significant past surgical history        Review of Systems:  [X] Ten point review of systems is otherwise negative except as noted     Objective:  Vital Signs Last 24 Hrs  T(C): 36.1 (17 Sep 2023 04:47), Max: 36.8 (16 Sep 2023 21:00)  T(F): 97 (17 Sep 2023 04:47), Max: 98.3 (16 Sep 2023 21:00)  HR: 70 (17 Sep 2023 04:47) (70 - 80)  BP: 116/57 (17 Sep 2023 04:47) (97/54 - 121/68)  BP(mean): --  RR: 18 (17 Sep 2023 04:47) (18 - 18)  SpO2: 98% (17 Sep 2023 04:47) (94% - 98%)    Parameters below as of 16 Sep 2023 21:00  Patient On (Oxygen Delivery Method): room air                            9.1    11.47 )-----------( 317      ( 17 Sep 2023 08:20 )             28.8                 09-17    139  |  102  |  107<HH>  ----------------------------<  121<H>  4.0   |  18  |  7.0<HH>    Ca    8.1<L>      17 Sep 2023 08:20  Mg     2.0     09-16    TPro  6.0  /  Alb  2.9<L>  /  TBili  0.3  /  DBili  x   /  AST  32  /  ALT  11  /  AlkPhos  84  09-16        Physical Exam - Lower Extremity Focused:   Derm: Blacked discoloration/necrotic tissue noted to the L heel. No bogginess or fluctuance noted. No active drainage.   Vascular: DP and PT Pulses Diminished; Foot is Warm to Warm to the touch; Capillary Refill Time < 3 Seconds;    Neuro: Protective Sensation Diminished / Moderately Neuropathic   MSK: Pain On Palpation at Wound Site and with lifting of the left lower extremity.     Assessment:  E Heel Ulcer    Plan:  Chart reviewed and Patient evaluated. All Questions and Concerns Addressed and Answered  CT L foot; pending results  Local Wound Care; Wound Flushed w/ NS; Dressing with betadine, dsd, abd, kerlix secured with tape.   Weight Bearing Status; WBAT   Recommend; Local wound care; offloading boots bilaterally  Continue abx per ID recs  No surgical intervention at this time; pending CT results. Local wound care. Offloading boots.  Discussed Plan w/ Dr. Brown    Podiatry

## 2023-09-17 NOTE — PROGRESS NOTE ADULT - ASSESSMENT
84 yo m ho DM, COPD, anemia, paroxysmal afib on xarelto, HFrEF, DM coming in from nursing home for AMS x 2 days. Found to have toxic metabolic encephalopathy with PAULA.      PAULA/ toxic metabolic encephalopathy/ HAGMA/ HFrEF  PAULA unclear given limited hx  possible ATN iso hypotension and possible sepsis  on ceftriaxone cont   other possible causes prerenal due to decrease po intake ,lasix  and entresto  HAGMA likely secondary to uremia, however check VBG lactate BHB and CK levels   barth please   document accurate I/Os  no hydro on imaging  send please UA and urine studies Urine Na, Urea and creat  IVF  bicarb drip at 75 cc/hr x 12 hrs again   TTE  f/u cultures and procal  no acute indications for RRT as of now but may need it soon   consider critical care eval for possible SDU transfer  will follow

## 2023-09-17 NOTE — PROGRESS NOTE ADULT - SUBJECTIVE AND OBJECTIVE BOX
NIMO SARMIENTO  83y, Male    Allergy: No Known Allergies    CHIEF COMPLAINT: AMS (16 Sep 2023 15:28)    HPI:  82 yo m ho DM, COPD, anemia, lymphedemia, afib on xarelto, HFrEF, DM coming in from nursing home for AMS x 2 days. Unable to gather story from pt as he is altered and lethargic.  As per NH was becoming more agitated x 2 days, was hypotensive yesterday and started on cefepime and vancomycin. Brought in to the ED for AMS. PICC line in place for cefepime 1q q8 until 10/5/23 and vanc 1q24 until 9/23/23 for LLE cellulitis/OM  (was at [facility] last month for LLE thick wound cellulitis/OM and sacral DTI as per call with [facility] as per collateral from [facility])    In the ED, vitals wnl. Labs showing wbc 11.30, Hb 9.6, MCV 79.1, INR 1.59, CO2 14, AG 21, Cr 6 (~baseline 1.2), , trops 0.09. CTH wnl, RBUS with no hydro, poor visualization of left kidney  (15 Sep 2023 22:12)    FAMILY HISTORY:  No pertinent family history in first degree relatives    PAST MEDICAL & SURGICAL HISTORY:    HTN (hypertension)  COPD (chronic obstructive pulmonary disease)  High cholesterol  Mild anemia  Coffee ground emesis  Chronic diastolic heart failure  PAULA (acute kidney injury)    No significant past surgical history    SOCIAL HISTORY: UNKNOWN    Home Medications: per [facility]   acetaminophen 325 mg oral tablet: 2 tab(s) orally every 6 hours, As needed, Mild Pain (1 - 3), Moderate Pain (4 - 6) (25 May 2019 09:25)  Albuterol (Eqv-ProAir HFA) 90 mcg/inh inhalation aerosol: 1 puff(s) inhaled every 6 hours as needed for  shortness of breath and/or wheezing (15 Sep 2023 22:56)  Breo Ellipta 100 mcg-25 mcg/inh inhalation powder: 1 puff(s) inhaled once a day (15 Sep 2023 22:56)  empagliflozin 10 mg oral tablet: 1 tab(s) orally once a day (15 Sep 2023 22:56)  Entresto 24 mg-26 mg oral tablet: 1 tab(s) orally 2 times a day (15 Sep 2023 22:57)  Lasix 20 mg oral tablet: 1 tab(s) orally once a day (15 Sep 2023 22:57)  Metoprolol Succinate ER 25 mg oral tablet, extended release: 1 tab(s) orally once a day (15 Sep 2023 22:57)  Percocet 5 mg-325 mg oral tablet: 1 tab(s) orally every 8 hours as needed for  severe pain (15 Sep 2023 22:57)  Xarelto 20 mg oral tablet: 1 tab(s) orally once a day (15 Sep 2023 22:57)    ROS: Pt is much more awake today. But still poor historian.     VITALS:  T(C): 36.1 (17 Sep 2023 04:47), Max: 36.8 (16 Sep 2023 21:00)  T(F): 97 (17 Sep 2023 04:47), Max: 98.3 (16 Sep 2023 21:00)  HR: 70 (17 Sep 2023 04:47) (70 - 80)  BP: 116/57 (17 Sep 2023 04:47) (97/54 - 121/68)  RR: 18 (17 Sep 2023 04:47) (18 - 18)  SpO2: 98% (17 Sep 2023 04:47) (94% - 98%)    Parameters below as of 16 Sep 2023 21:00  Patient On (Oxygen Delivery Method): room air      PHYSICAL EXAM:  Gen: NAD   NECK: SUPPLE AND NO LAD   MOUTH: DRY MUCUS MEMBRANES   CV: NL S1/2   LUNGS: CTA B/L   Abdomen: Soft, NTND+ BS present  Ext: Warm, well perfused no CCE  Neuro: non focal, awake, CN II-XII intact   Skin: no rash, no erythema  MS: Awake and Alert today     TESTS & MEASUREMENTS:                        9.1    11.47 )-----------( 317      ( 17 Sep 2023 08:20 )             28.8     09-17    139  |  102  |  107<HH>  ----------------------------<  121<H>  4.0   |  18  |  7.0<HH>    Ca    8.1<L>      17 Sep 2023 08:20  Mg     2.0     09-16    TPro  6.0  /  Alb  2.9<L>  /  TBili  0.3  /  DBili  x   /  AST  32  /  ALT  11  /  AlkPhos  84  09-16                          8.7    10.49 )-----------( 279      ( 16 Sep 2023 07:18 )             28.3     09-16    136  |  103  |  109<HH>  ----------------------------<  145<H>  4.5   |  10<L>  |  6.3<HH>    Ca    8.2<L>      16 Sep 2023 07:18    Mg     2.0     09-16    TPro  6.0  /  Alb  2.9<L>  /  TBili  0.3  /  DBili  x   /  AST  32  /  ALT  11  /  AlkPhos  84  09-16    LIVER FUNCTIONS - ( 16 Sep 2023 07:18 )  Alb: 2.9 g/dL / Pro: 6.0 g/dL / ALK PHOS: 84 U/L / ALT: 11 U/L / AST: 32 U/L / GGT: x           Urinalysis Basic - ( 16 Sep 2023 07:18 )    Color: x / Appearance: x / SG: x / pH: x  Gluc: 145 mg/dL / Ketone: x  / Bili: x / Urobili: x   Blood: x / Protein: x / Nitrite: x   Leuk Esterase: x / RBC: x / WBC x   Sq Epi: x / Non Sq Epi: x / Bacteria: x    QRS axis to [] ° and NSR at a rate of [] BPM. There was no atrial enlargement. There was no ventricular hypertrophy. There were no ST-T changes and all intervals were normal.      RADIOLOGY & ADDITIONAL TESTS:  I have personally reviewed the last Chest xray    PROCEDURE DATE:  09/15/2023    INTERPRETATION:  STUDY INDICATION: Sepsis    TECHNIQUE:  Portable frontal view of the chest obtained.    COMPARISON: 5/22/2019    FINDINGS/  IMPRESSION:    Right PICC terminating over the distal superior vena cava.    Left basal pleural-parenchymal opacity. No pneumothorax.    Stable cardiomediastinal silhouette.    Unchanged osseous structures.    --- End of Report ---    PRASAD MUNOZ MD; Attending Radiologist  This document has been electronically signed. Sep 15 2023  2:40PM (09-15-23 @ 13:20)      CARDIOLOGY TESTING  12 Lead ECG:   Ventricular Rate 81 BPM    Atrial Rate 208 BPM    QRS Duration 96 ms    Q-T Interval 388 ms    QTC Calculation(Bazett) 450 ms    R Axis 171 degrees    T Axis 108 degrees    Diagnosis Line Atrial fibrillation  Incomplete right bundle branch block  Possible Right ventricular hypertrophy  Possible Anterolateral infarct , age undetermined  Abnormal ECG    Confirmed by BERE FULLER MD ([id number]) on 9/15/2023 3:14:03 PM (09-15-23 @ 14:36)    MEDICATIONS  (STANDING):  cefTRIAXone   IVPB 2000 milliGRAM(s) IV Intermittent every 24 hours  dextrose 5%. 1000 milliLiter(s) (100 mL/Hr) IV Continuous <Continuous>  dextrose 50% Injectable 25 Gram(s) IV Push once  dextrose 50% Injectable 25 Gram(s) IV Push once  dextrose 50% Injectable 12.5 Gram(s) IV Push once  glucagon  Injectable 1 milliGRAM(s) IntraMuscular once  heparin   Injectable 5000 Unit(s) SubCutaneous every 8 hours  insulin lispro (ADMELOG) corrective regimen sliding scale   SubCutaneous three times a day before meals  sodium bicarbonate 650 milliGRAM(s) Oral every 8 hours  sodium bicarbonate  Infusion 0.116 mEq/kG/Hr (75 mL/Hr) IV Continuous <Continuous>    MEDICATIONS  (PRN):  albuterol    90 MICROgram(s) HFA Inhaler 1 Puff(s) Inhalation every 6 hours PRN for shortness of breath and/or wheezing  dextrose Oral Gel 15 Gram(s) Oral once PRN Blood Glucose LESS THAN 70 milliGRAM(s)/deciliter      ANTIBIOTICS:  cefTRIAXone   IVPB 2000 milliGRAM(s) IV Intermittent every 24 hours    All available historical data has been reviewed    ASSESSMENT  83y M admitted with Altered mental status

## 2023-09-17 NOTE — PROGRESS NOTE ADULT - SUBJECTIVE AND OBJECTIVE BOX
24H events:    Patient is a 83y old Male who presents with a chief complaint of AMS (16 Sep 2023 18:21)    Primary diagnosis of Altered mental status      Day 1:  Day 2:  Day 3:     Today is hospital day 2d. This morning patient was seen and examined at bedside, resting comfortably in bed.    No acute or major events overnight.    Code Status:    Family communication:  Contact date:  Name of person contacted:  Relationship to patient:  Communication details:  What matters most:    PAST MEDICAL & SURGICAL HISTORY  HTN (hypertension)    COPD (chronic obstructive pulmonary disease)    High cholesterol    Mild anemia    Coffee ground emesis    Chronic diastolic heart failure    PAULA (acute kidney injury)    No significant past surgical history      SOCIAL HISTORY:  Social History:      ALLERGIES:  No Known Allergies    MEDICATIONS:  STANDING MEDICATIONS  cefTRIAXone   IVPB 2000 milliGRAM(s) IV Intermittent every 24 hours  dextrose 5%. 1000 milliLiter(s) IV Continuous <Continuous>  dextrose 50% Injectable 25 Gram(s) IV Push once  dextrose 50% Injectable 25 Gram(s) IV Push once  dextrose 50% Injectable 12.5 Gram(s) IV Push once  glucagon  Injectable 1 milliGRAM(s) IntraMuscular once  heparin   Injectable 5000 Unit(s) SubCutaneous every 8 hours  insulin lispro (ADMELOG) corrective regimen sliding scale   SubCutaneous three times a day before meals  sodium bicarbonate 650 milliGRAM(s) Oral every 8 hours  sodium bicarbonate  Infusion 0.116 mEq/kG/Hr IV Continuous <Continuous>    PRN MEDICATIONS  albuterol    90 MICROgram(s) HFA Inhaler 1 Puff(s) Inhalation every 6 hours PRN  dextrose Oral Gel 15 Gram(s) Oral once PRN    VITALS:   T(F): 98.3  HR: 78  BP: 121/68  RR: 18  SpO2: 94%    PHYSICAL EXAM:    PHYSICAL EXAM:  Gen: NAD, resting in bed  HEENT: Normocephalic, atraumatic  Neck: supple, no lymphadenopathy  CV: Regular rate & regular rhythm  Lungs: decreased BS at bases, no fremitus  Abdomen: Soft, BS present  Ext: Warm, well perfused  Neuro: non focal, awake  Skin: Left heel DTI - no cellulitis in left leg   Sacral Ulcer without gross drainage/erythema   Lines: no phlebitis    LABS:                        8.7    10.49 )-----------( 279      ( 16 Sep 2023 07:18 )             28.3     09-16    135  |  101  |  119<HH>  ----------------------------<  105<H>  4.0   |  14<L>  |  6.7<HH>    Ca    7.9<L>      16 Sep 2023 22:13  Mg     2.0     09-16    TPro  6.0  /  Alb  2.9<L>  /  TBili  0.3  /  DBili  x   /  AST  32  /  ALT  11  /  AlkPhos  84  09-16    PT/INR - ( 15 Sep 2023 14:35 )   PT: 18.40 sec;   INR: 1.59 ratio         PTT - ( 15 Sep 2023 14:35 )  PTT:33.4 sec  Urinalysis Basic - ( 16 Sep 2023 22:13 )    Color: x / Appearance: x / SG: x / pH: x  Gluc: 105 mg/dL / Ketone: x  / Bili: x / Urobili: x   Blood: x / Protein: x / Nitrite: x   Leuk Esterase: x / RBC: x / WBC x   Sq Epi: x / Non Sq Epi: x / Bacteria: x        Sedimentation Rate, Erythrocyte: 60 mm/Hr *H* (09-16-23 @ 07:18)      CARDIAC MARKERS ( 15 Sep 2023 20:56 )  x     / 0.10 ng/mL / 238 U/L / x     / 9.4 ng/mL  CARDIAC MARKERS ( 15 Sep 2023 14:35 )  x     / 0.09 ng/mL / x     / x     / x          RADIOLOGY:

## 2023-09-17 NOTE — PROGRESS NOTE ADULT - SUBJECTIVE AND OBJECTIVE BOX
UROLOGY DAILY PROGRESS NOTE    Pt is a 83y Male admitted with AMS, renal failure --  following for barth placement. Patient seen and examined at bedside with Dr. Diaz.    MEDICATIONS  (STANDING):  cefTRIAXone   IVPB 2000 milliGRAM(s) IV Intermittent every 24 hours  dextrose 5%. 1000 milliLiter(s) (100 mL/Hr) IV Continuous <Continuous>  dextrose 50% Injectable 25 Gram(s) IV Push once  dextrose 50% Injectable 12.5 Gram(s) IV Push once  dextrose 50% Injectable 25 Gram(s) IV Push once  glucagon  Injectable 1 milliGRAM(s) IntraMuscular once  heparin   Injectable 5000 Unit(s) SubCutaneous every 8 hours  insulin lispro (ADMELOG) corrective regimen sliding scale   SubCutaneous three times a day before meals  sodium bicarbonate 650 milliGRAM(s) Oral every 8 hours  sodium bicarbonate  Infusion 0.116 mEq/kG/Hr (75 mL/Hr) IV Continuous <Continuous>    MEDICATIONS  (PRN):  albuterol    90 MICROgram(s) HFA Inhaler 1 Puff(s) Inhalation every 6 hours PRN for shortness of breath and/or wheezing  dextrose Oral Gel 15 Gram(s) Oral once PRN Blood Glucose LESS THAN 70 milliGRAM(s)/deciliter    REVIEW OF SYSTEMS   [ ] Due to altered mental status/intubation, subjective information were not able to be obtained from patient. History was obtained, to the extent possible, from review of the chart and collateral sources of information.    Vital Signs Last 24 Hrs  T(C): 36.1 (17 Sep 2023 04:47), Max: 36.8 (16 Sep 2023 21:00)  T(F): 97 (17 Sep 2023 04:47), Max: 98.3 (16 Sep 2023 21:00)  HR: 70 (17 Sep 2023 04:47) (70 - 80)  BP: 116/57 (17 Sep 2023 04:47) (97/54 - 121/68)  RR: 18 (17 Sep 2023 04:47) (18 - 18)  SpO2: 98% (17 Sep 2023 04:47) (94% - 98%)    Parameters below as of 16 Sep 2023 21:00  Patient On (Oxygen Delivery Method): room air    PHYSICAL EXAM:  GEN: NAD, lethargic, awake and alert x1.  SKIN: Non diaphoretic.  RESP: Non-labored breathing. No use of accessory muscles.  CARDIO: +S1/S2  ABDO: Soft, NT/ND, no palpable bladder, no suprapubic tenderness.  : Noncircumcised male. B/L descended testicles x 2. Diaper wet.    I&O's Summary  16 Sep 2023 07:01  -  17 Sep 2023 07:00  --------------------------------------------------------  IN: 1520 mL / OUT: 0 mL / NET: 1520 mL    LABS:             9.1    11.47 )-----------( 317      ( 17 Sep 2023 08:20 )             28.8     09-17  139  |  102  |  107<HH>  ----------------------------<  121<H>  4.0   |  18  |  7.0<HH>    Ca    8.1<L>      17 Sep 2023 08:20  Mg     2.0     09-16    TPro  6.0  /  Alb  2.9<L>  /  TBili  0.3  /  DBili  x   /  AST  32  /  ALT  11  /  AlkPhos  84  09-16    PT/INR - ( 15 Sep 2023 14:35 )   PT: 18.40 sec;   INR: 1.59 ratio    PTT - ( 15 Sep 2023 14:35 )  PTT:33.4 sec

## 2023-09-17 NOTE — PROGRESS NOTE ADULT - ASSESSMENT
Pt is a 83y Male admitted with AMS, renal failure --  following for barth placement.    PLAN:  -s/p 14Fr coude barth placement by Dr. Diaz, drained 120ccs yellow urine -- consider soft restraints while barth is in place to avoid traumatic self-barth removal  -TOV when medically stable   -Send UA, UCx  -Trend Cr  -If any questions or concerns, please call  at [phone]

## 2023-09-17 NOTE — PROGRESS NOTE ADULT - ASSESSMENT
83M DM2, COPD, Anemia, Paroxysmal Afib on xarelto, HFrEF, sent to the Hospital by [facility] for AMS. Pt was recently admitted to [facility] from [facility] for OM needing IV Abx Cefepime/Vancomycin at [facility]. Pt has been having worsening in MS over the last 2 days per NH records. Pt currently responds to touch and voice but does not speak. Tracks eyes.     #Metabolic Encephalopathy   #Suspected Severe Sepsis (Hypotention/AMS/PAULA/Oliguric)   #PAULA vs CKD w/ Progression   #HAGMA 2/2 PAULA   #Oliguric Renal Failure moving towards Anuria  #Left Foot OM w/ surrounding Cellulitis   #Sacral DTI / Stage 1 POA     - CT head negative   - Cr 6 (baseline ~1.2) trending up now oliguric vs anuric   - Patient is hypotensive SBP 60's s/p Fluid challenge and Bladder scan about 152cc in bladder  - Continue with IVF Challenge NS bolus 750cc over 1 hr as hypotensive  - Start Bicarb drip 75cc/hr as ordered   - NEED TO PLACE BARTH IF RN UNABLE THEN  SHOULD PLACE BARTH -> pt anuric vs oliguria with worsening renal failure / need to monitor kidney function   - f/u Urine Lytes / Osmos   - f/u Pan cultures   - d/c all other fluids   - d/c cefepime and vanco  - f/u vanco random level   - start ceftriaxone 2g IV qd  - f/u Xray Lt Foot ?OM   - f/u ESR/CRP/Pro-yesy and Blood cxs and urine studies   - ID/Nephro consults      #Compensated HFrEF  #Paroxysmal Afib  - echo 8/14/2023 EF 35-40% from [facility] admission    - hold home metoprolol and all other BP meds due to hypotension  - hold xarelto due to PAULA     #DM2  - hold home empagliflozin  - FS q6  - f/u BHB     Guarded Prognosis   - reassess for improvement if not may need SDU    Pending: infectious w/up / crp / esr / urine studies / resolution of hypotension - may need levophed and SDU care if not improving / barth anuric/oliguric / f/u labs / blood cxs   Family: will reach out for C discussion   Dispo: acute

## 2023-09-17 NOTE — PROGRESS NOTE ADULT - SUBJECTIVE AND OBJECTIVE BOX
Nephrology progress note    THIS IS AN INCOMPLETE NOTE . FULL NOTE TO FOLLOW SHORTLY    Patient is seen and examined, events over the last 24 h noted .    Allergies:  No Known Allergies    Hospital Medications:   MEDICATIONS  (STANDING):  cefTRIAXone   IVPB 2000 milliGRAM(s) IV Intermittent every 24 hours  dextrose 5%. 1000 milliLiter(s) (100 mL/Hr) IV Continuous <Continuous>  dextrose 50% Injectable 25 Gram(s) IV Push once  dextrose 50% Injectable 25 Gram(s) IV Push once  dextrose 50% Injectable 12.5 Gram(s) IV Push once  glucagon  Injectable 1 milliGRAM(s) IntraMuscular once  heparin   Injectable 5000 Unit(s) SubCutaneous every 8 hours  insulin lispro (ADMELOG) corrective regimen sliding scale   SubCutaneous three times a day before meals  sodium bicarbonate 650 milliGRAM(s) Oral every 8 hours  sodium bicarbonate  Infusion 0.116 mEq/kG/Hr (75 mL/Hr) IV Continuous <Continuous>        VITALS:  T(F): 97 (09-17-23 @ 04:47), Max: 98.3 (09-16-23 @ 21:00)  HR: 70 (09-17-23 @ 04:47)  BP: 116/57 (09-17-23 @ 04:47)  RR: 18 (09-17-23 @ 04:47)  SpO2: 98% (09-17-23 @ 04:47)  Wt(kg): --    09-15 @ 07:01  -  09-16 @ 07:00  --------------------------------------------------------  IN: 60 mL / OUT: 0 mL / NET: 60 mL    09-16 @ 07:01  -  09-17 @ 07:00  --------------------------------------------------------  IN: 1520 mL / OUT: 0 mL / NET: 1520 mL          PHYSICAL EXAM:  Constitutional: NAD  HEENT: anicteric sclera, oropharynx clear, MMM  Neck: No JVD  Respiratory: CTAB, no wheezes, rales or rhonchi  Cardiovascular: S1, S2, RRR  Gastrointestinal: BS+, soft, NT/ND  Extremities: No cyanosis or clubbing. No peripheral edema  :  No barth.   Skin: No rashes    LABS:  09-16    135  |  101  |  119<HH>  ----------------------------<  105<H>  4.0   |  14<L>  |  6.7<HH>    Ca    7.9<L>      16 Sep 2023 22:13  Mg     2.0     09-16    TPro  6.0  /  Alb  2.9<L>  /  TBili  0.3  /  DBili      /  AST  32  /  ALT  11  /  AlkPhos  84  09-16                          8.7    10.49 )-----------( 279      ( 16 Sep 2023 07:18 )             28.3       Urine Studies:  Urinalysis Basic - ( 16 Sep 2023 22:13 )    Color:  / Appearance:  / SG:  / pH:   Gluc: 105 mg/dL / Ketone:   / Bili:  / Urobili:    Blood:  / Protein:  / Nitrite:    Leuk Esterase:  / RBC:  / WBC    Sq Epi:  / Non Sq Epi:  / Bacteria:                   RADIOLOGY & ADDITIONAL STUDIES:   Nephrology progress note    Patient is seen and examined, events over the last 24 h noted .  Lying in bed   lethargic     Allergies:  No Known Allergies    Hospital Medications:   MEDICATIONS  (STANDING):  cefTRIAXone   IVPB 2000 milliGRAM(s) IV Intermittent every 24 hours  glucagon  Injectable 1 milliGRAM(s) IntraMuscular once  heparin   Injectable 5000 Unit(s) SubCutaneous every 8 hours  insulin lispro (ADMELOG) corrective regimen sliding scale   SubCutaneous three times a day before meals  sodium bicarbonate 650 milliGRAM(s) Oral every 8 hours  sodium bicarbonate  Infusion 0.116 mEq/kG/Hr (75 mL/Hr) IV Continuous <Continuous>        VITALS:  T(F): 97 (09-17-23 @ 04:47), Max: 98.3 (09-16-23 @ 21:00)  HR: 70 (09-17-23 @ 04:47)  BP: 116/57 (09-17-23 @ 04:47)  RR: 18 (09-17-23 @ 04:47)  SpO2: 98% (09-17-23 @ 04:47)      09-15 @ 07:01  -  09-16 @ 07:00  --------------------------------------------------------  IN: 60 mL / OUT: 0 mL / NET: 60 mL    09-16 @ 07:01  -  09-17 @ 07:00  --------------------------------------------------------  IN: 1520 mL / OUT: 0 mL / NET: 1520 mL          PHYSICAL EXAM:  Constitutional: NAD/ lethargic   Respiratory: CTAB,  Cardiovascular: S1, S2, RRR  Gastrointestinal: BS+, soft, NT/ND  Extremities: No cyanosis or clubbing. No peripheral edema  :  No barth.   Skin: No rashes    LABS:  09-17    139  |  102  |  107<HH>  ----------------------------<  121<H>  4.0   |  18  |  7.0<HH>    Ca    8.1<L>      17 Sep 2023 08:20  Mg     2.0     09-16    TPro  6.0  /  Alb  2.9<L>  /  TBili  0.3  /  DBili  x   /  AST  32  /  ALT  11  /  AlkPhos  84  09-16    Creatine Kinase, Serum: 238 U/L (09.15.23 @ 20:56)      09-16    135  |  101  |  119<HH>  ----------------------------<  105<H>  4.0   |  14<L>  |  6.7<HH>    Ca    7.9<L>      16 Sep 2023 22:13  Mg     2.0     09-16    TPro  6.0  /  Alb  2.9<L>  /  TBili  0.3  /  DBili      /  AST  32  /  ALT  11  /  AlkPhos  84  09-16                          8.7    10.49 )-----------( 279      ( 16 Sep 2023 07:18 )             28.3       Urine Studies:  Urinalysis Basic - ( 16 Sep 2023 22:13 )    Color:  / Appearance:  / SG:  / pH:   Gluc: 105 mg/dL / Ketone:   / Bili:  / Urobili:    Blood:  / Protein:  / Nitrite:    Leuk Esterase:  / RBC:  / WBC    Sq Epi:  / Non Sq Epi:  / Bacteria:         RADIOLOGY & ADDITIONAL STUDIES:  < from: US Renal (09.15.23 @ 19:38) >    IMPRESSION:    1.  Poor visualization of the left kidney. No gross hydronephrosis.  2.  Debris in the bladder, correlate with urinalysis.    < end of copied text >

## 2023-09-17 NOTE — PROGRESS NOTE ADULT - ASSESSMENT
83M DM2, COPD, Anemia, Paroxysmal Afib on xarelto, HFrEF, sent to the Hospital by [facility] for AMS. Pt was recently admitted to [facility] from [facility] for OM needing IV Abx Cefepime/Vancomycin at [facility]. Pt has been having worsening in MS over the last 2 days per NH records. Pt currently responds to touch and voice but does not speak. Tracks eyes.     #Metabolic Encephalopathy   #Suspected Severe Sepsis (Hypotention/AMS/PAULA/Oliguric)   #PAULA vs CKD w/ Progression   #HAGMA 2/2 PAULA   #Oliguric Renal Failure moving towards Anuria  #Left Foot OM w/ surrounding Cellulitis   #Sacral DTI / Stage 1 POA     - CT head negative   - Cr 6 (baseline ~1.2) trending up now oliguric vs anuric   - Patient is hypotensive SBP 60's s/p Fluid challenge and Bladder scan about 152cc in bladder  - Continue with IVF Challenge NS bolus 750cc over 1 hr as hypotensive  - c/w Bicarb drip 75cc/hr as ordered   - NEED TO PLACE BARTH IF RN UNABLE THEN  SHOULD PLACE BARTH -> pt anuric vs oliguria with worsening renal failure / need to monitor kidney function   - f/u Pan cultures reports   - d/c'd cefepime and vancomycin   - Vancomycin random level 60.4   - c/w Ceftriaxone 2g IV qd  - f/u ESR/CRP/Pro-yesy and Blood cxs and urine studies   - f/u TTE ordered   - Consults Reviewed: ID/Nephro/Podiatry     #Compensated HFrEF  #Paroxysmal Afib  - echo 8/14/2023 EF 35-40% from [facility] admission    - hold home metoprolol and all other BP meds due to hypotension  - hold xarelto due to PAULA     #DM2  - hold home empagliflozin  - FS q6  - Neg BHB     Poor Prognosis     Pending: infectious w/up / crp / esr / urine studies / barth w/ U/O monitoring / f/u labs / blood cxs / TTE  Family unreachable / will try again for GOC / May need Palliative care consultation   Dispo: acute

## 2023-09-18 DIAGNOSIS — Z51.5 ENCOUNTER FOR PALLIATIVE CARE: ICD-10-CM

## 2023-09-18 DIAGNOSIS — Z71.89 OTHER SPECIFIED COUNSELING: ICD-10-CM

## 2023-09-18 DIAGNOSIS — N17.9 ACUTE KIDNEY FAILURE, UNSPECIFIED: ICD-10-CM

## 2023-09-18 DIAGNOSIS — A41.9 SEPSIS, UNSPECIFIED ORGANISM: ICD-10-CM

## 2023-09-18 DIAGNOSIS — E87.20 ACIDOSIS, UNSPECIFIED: ICD-10-CM

## 2023-09-18 LAB
ALBUMIN SERPL ELPH-MCNC: 3 G/DL — LOW (ref 3.5–5.2)
ALP SERPL-CCNC: 83 U/L — SIGNIFICANT CHANGE UP (ref 30–115)
ALT FLD-CCNC: 13 U/L — SIGNIFICANT CHANGE UP (ref 0–41)
ANION GAP SERPL CALC-SCNC: 20 MMOL/L — HIGH (ref 7–14)
APPEARANCE UR: CLEAR — SIGNIFICANT CHANGE UP
AST SERPL-CCNC: 28 U/L — SIGNIFICANT CHANGE UP (ref 0–41)
BILIRUB SERPL-MCNC: 0.3 MG/DL — SIGNIFICANT CHANGE UP (ref 0.2–1.2)
BILIRUB UR-MCNC: NEGATIVE — SIGNIFICANT CHANGE UP
BUN SERPL-MCNC: 111 MG/DL — CRITICAL HIGH (ref 10–20)
CALCIUM SERPL-MCNC: 8 MG/DL — LOW (ref 8.4–10.5)
CHLORIDE SERPL-SCNC: 100 MMOL/L — SIGNIFICANT CHANGE UP (ref 98–110)
CO2 SERPL-SCNC: 21 MMOL/L — SIGNIFICANT CHANGE UP (ref 17–32)
COLOR SPEC: YELLOW — SIGNIFICANT CHANGE UP
CREAT ?TM UR-MCNC: 42 MG/DL — SIGNIFICANT CHANGE UP
CREAT SERPL-MCNC: 7.1 MG/DL — CRITICAL HIGH (ref 0.7–1.5)
DIFF PNL FLD: ABNORMAL
EGFR: 7 ML/MIN/1.73M2 — LOW
GLUCOSE BLDC GLUCOMTR-MCNC: 131 MG/DL — HIGH (ref 70–99)
GLUCOSE BLDC GLUCOMTR-MCNC: 136 MG/DL — HIGH (ref 70–99)
GLUCOSE BLDC GLUCOMTR-MCNC: 141 MG/DL — HIGH (ref 70–99)
GLUCOSE SERPL-MCNC: 118 MG/DL — HIGH (ref 70–99)
GLUCOSE UR QL: 100 MG/DL
HCT VFR BLD CALC: 27.1 % — LOW (ref 42–52)
HGB BLD-MCNC: 8.6 G/DL — LOW (ref 14–18)
KETONES UR-MCNC: NEGATIVE MG/DL — SIGNIFICANT CHANGE UP
LEUKOCYTE ESTERASE UR-ACNC: ABNORMAL
MCHC RBC-ENTMCNC: 24 PG — LOW (ref 27–31)
MCHC RBC-ENTMCNC: 31.7 G/DL — LOW (ref 32–37)
MCV RBC AUTO: 75.5 FL — LOW (ref 80–94)
NITRITE UR-MCNC: NEGATIVE — SIGNIFICANT CHANGE UP
NRBC # BLD: 0 /100 WBCS — SIGNIFICANT CHANGE UP (ref 0–0)
OSMOLALITY UR: 299 MOS/KG — SIGNIFICANT CHANGE UP (ref 50–1200)
PH UR: 6.5 — SIGNIFICANT CHANGE UP (ref 5–8)
PLATELET # BLD AUTO: 318 K/UL — SIGNIFICANT CHANGE UP (ref 130–400)
PMV BLD: 9.9 FL — SIGNIFICANT CHANGE UP (ref 7.4–10.4)
POTASSIUM SERPL-MCNC: 3.2 MMOL/L — LOW (ref 3.5–5)
POTASSIUM SERPL-SCNC: 3.2 MMOL/L — LOW (ref 3.5–5)
POTASSIUM UR-SCNC: 24 MMOL/L — SIGNIFICANT CHANGE UP
PROCALCITONIN SERPL-MCNC: 0.49 NG/ML — HIGH (ref 0.02–0.1)
PROT SERPL-MCNC: 5.7 G/DL — LOW (ref 6–8)
PROT UR-MCNC: 100 MG/DL
RBC # BLD: 3.59 M/UL — LOW (ref 4.7–6.1)
RBC # FLD: 19 % — HIGH (ref 11.5–14.5)
SODIUM SERPL-SCNC: 141 MMOL/L — SIGNIFICANT CHANGE UP (ref 135–146)
SODIUM UR-SCNC: 78 MMOL/L — SIGNIFICANT CHANGE UP
SP GR SPEC: 1.01 — SIGNIFICANT CHANGE UP (ref 1–1.03)
UROBILINOGEN FLD QL: 0.2 MG/DL — SIGNIFICANT CHANGE UP (ref 0.2–1)
UUN UR-MCNC: 256 MG/DL — SIGNIFICANT CHANGE UP
WBC # BLD: 9.99 K/UL — SIGNIFICANT CHANGE UP (ref 4.8–10.8)
WBC # FLD AUTO: 9.99 K/UL — SIGNIFICANT CHANGE UP (ref 4.8–10.8)

## 2023-09-18 PROCEDURE — 99233 SBSQ HOSP IP/OBS HIGH 50: CPT

## 2023-09-18 PROCEDURE — 93306 TTE W/DOPPLER COMPLETE: CPT | Mod: 26

## 2023-09-18 PROCEDURE — 99223 1ST HOSP IP/OBS HIGH 75: CPT

## 2023-09-18 RX ORDER — POTASSIUM CHLORIDE 20 MEQ
40 PACKET (EA) ORAL ONCE
Refills: 0 | Status: COMPLETED | OUTPATIENT
Start: 2023-09-18 | End: 2023-09-18

## 2023-09-18 RX ORDER — SODIUM BICARBONATE 1 MEQ/ML
1300 SYRINGE (ML) INTRAVENOUS EVERY 8 HOURS
Refills: 0 | Status: DISCONTINUED | OUTPATIENT
Start: 2023-09-18 | End: 2023-09-28

## 2023-09-18 RX ORDER — CHLORHEXIDINE GLUCONATE 213 G/1000ML
1 SOLUTION TOPICAL
Refills: 0 | Status: DISCONTINUED | OUTPATIENT
Start: 2023-09-18 | End: 2023-10-25

## 2023-09-18 RX ORDER — MUPIROCIN 20 MG/G
1 OINTMENT TOPICAL
Refills: 0 | Status: COMPLETED | OUTPATIENT
Start: 2023-09-18 | End: 2023-09-23

## 2023-09-18 RX ADMIN — CHLORHEXIDINE GLUCONATE 1 APPLICATION(S): 213 SOLUTION TOPICAL at 13:34

## 2023-09-18 RX ADMIN — HEPARIN SODIUM 5000 UNIT(S): 5000 INJECTION INTRAVENOUS; SUBCUTANEOUS at 05:03

## 2023-09-18 RX ADMIN — Medication 40 MILLIEQUIVALENT(S): at 21:47

## 2023-09-18 RX ADMIN — Medication 1300 MILLIGRAM(S): at 21:45

## 2023-09-18 RX ADMIN — CEFTRIAXONE 100 MILLIGRAM(S): 500 INJECTION, POWDER, FOR SOLUTION INTRAMUSCULAR; INTRAVENOUS at 17:26

## 2023-09-18 RX ADMIN — HEPARIN SODIUM 5000 UNIT(S): 5000 INJECTION INTRAVENOUS; SUBCUTANEOUS at 21:45

## 2023-09-18 RX ADMIN — MUPIROCIN 1 APPLICATION(S): 20 OINTMENT TOPICAL at 17:27

## 2023-09-18 RX ADMIN — HEPARIN SODIUM 5000 UNIT(S): 5000 INJECTION INTRAVENOUS; SUBCUTANEOUS at 13:27

## 2023-09-18 RX ADMIN — Medication 1300 MILLIGRAM(S): at 13:27

## 2023-09-18 RX ADMIN — Medication 650 MILLIGRAM(S): at 05:03

## 2023-09-18 NOTE — CONSULT NOTE ADULT - PROBLEM SELECTOR RECOMMENDATION 5
- will follow  ______________  Evin Garcia MD  Palliative Medicine  [facility] [place]   of Geriatric and Palliative Medicine  [phone]

## 2023-09-18 NOTE — CONSULT NOTE ADULT - SUBJECTIVE AND OBJECTIVE BOX
Full consult to follow shortly. Please call [phone] with any acute concerns.   ______________  Evin Garcia MD  Palliative Medicine  [facility]   of Geriatric and Palliative Medicine  [phone] HPI: 83M with PMH including COPD, DM, anemia, lymphedema, AF on xarelto, HFrEF, DM, here from SNF with AMS, from suspected severe sepsis, started on IV vanco and IV cefepime. Patient also with worsening PAULA on CKD progressing towards anuria, and HAGMA. Palliative care called for GOC.    PERTINENT PM/SXH:   HTN (hypertension)    COPD (chronic obstructive pulmonary disease)    High cholesterol    Mild anemia    Coffee ground emesis    Chronic diastolic heart failure    Akinesia    PAULA (acute kidney injury)      H/O tonsillitis    No significant past surgical history      FAMILY HISTORY:  No pertinent family history in first degree relatives      ITEMS NOT CHECKED ARE NOT PRESENT    SOCIAL HISTORY:   Significant other/partner[ ]  Children[ ]  Samaritan/Spirituality:  Substance hx:  [ ]   Tobacco hx:  [ ]   Alcohol hx: [ ]   Living Situation: [ ]Home  [X ]Long term care  [ ]Rehab [ ]Other  Home Services: [ ] HHA [ ] Visting RN [ ] Hospice  Occupation:  Home Opioid hx:  [ ] Y [ ] N [ ] I-Stop Reference No:    ADVANCE DIRECTIVES:    [X ] Full Code [ ] DNR  MOLST  [ ]  Living Will  [ ]   DECISION MAKER(s):  [ ] Health Care Proxy(s)  [ ] Surrogate(s)  [ ] Guardian           Name(s): Phone Number(s): brother [phone]    BASELINE (I)ADL(s) (prior to admission):  Coeymans: [ ]Total  [ ] Moderate [ ]Dependent  Palliative Performance Status Version 2:         %    http://npcrc.org/files/news/palliative_performance_scale_ppsv2.pdf    Allergies    No Known Allergies    Intolerances    MEDICATIONS  (STANDING):  cefTRIAXone   IVPB 2000 milliGRAM(s) IV Intermittent every 24 hours  chlorhexidine 2% Cloths 1 Application(s) Topical <User Schedule>  dextrose 5%. 1000 milliLiter(s) (100 mL/Hr) IV Continuous <Continuous>  dextrose 50% Injectable 25 Gram(s) IV Push once  dextrose 50% Injectable 25 Gram(s) IV Push once  dextrose 50% Injectable 12.5 Gram(s) IV Push once  glucagon  Injectable 1 milliGRAM(s) IntraMuscular once  heparin   Injectable 5000 Unit(s) SubCutaneous every 8 hours  insulin lispro (ADMELOG) corrective regimen sliding scale   SubCutaneous three times a day before meals  mupirocin 2% Ointment 1 Application(s) Both Nostrils two times a day  sodium bicarbonate 1300 milliGRAM(s) Oral every 8 hours    MEDICATIONS  (PRN):  albuterol    90 MICROgram(s) HFA Inhaler 1 Puff(s) Inhalation every 6 hours PRN for shortness of breath and/or wheezing  dextrose Oral Gel 15 Gram(s) Oral once PRN Blood Glucose LESS THAN 70 milliGRAM(s)/deciliter      PRESENT SYMPTOMS: [X ]Unable to obtain due to poor mentation   Source if other than patient:  [ ]Family   [ ]Team     Pain: [ ]yes [ ]no  QOL impact -   Location -                    Aggravating factors -  Quality -  Radiation -  Timing-  Severity (0-10 scale):  Minimal acceptable level (0-10 scale):     CPOT:    https://www.[facility].org/getattachment/[email or web address]/Critical-Care-Pain-Observation-Tool-(CPOT)    PAIN AD Score: 0  http://geriatrictoolkit.[facility]/cog/painad.pdf (press ctrl +  left click to view)    Dyspnea:                           [ ]None[ ]Mild [ ]Moderate [ ]Severe     Respiratory Distress Observation Scale (RDOS): 0  A score of 0 to 2 signifies little or no respiratory distress, 3 signifies mild distress, scores 4 to 6 indicate moderate distress, and scores greater than 7 signify severe distress  https://www.[facility].ca/sites/default/files/PDFS/115253-jqsrxsamfbv-xomyzzax-pknogxraidq-kisdk.pdf    Anxiety:                             [ ]None[ ]Mild [ ]Moderate [ ]Severe   Fatigue:                             [ ]None[ ]Mild [ ]Moderate [ ]Severe   Nausea:                             [ ]None[ ]Mild [ ]Moderate [ ]Severe   Loss of appetite:              [ ]None[ ]Mild [ ]Moderate [ ]Severe   Constipation:                    [ ]None[ ]Mild [ ]Moderate [ ]Severe    Other Symptoms:  [ ]All other review of systems negative     Palliative Performance Status Version 2:         %    http://npcrc.org/files/news/palliative_performance_scale_ppsv2.pdf  PHYSICAL EXAM:  Vital Signs Last 24 Hrs  T(C): 36.6 (18 Sep 2023 13:33), Max: 37.3 (18 Sep 2023 05:16)  T(F): 97.9 (18 Sep 2023 13:33), Max: 99.1 (18 Sep 2023 05:16)  HR: 89 (18 Sep 2023 13:33) (64 - 113)  BP: 105/60 (18 Sep 2023 13:33) (105/60 - 117/59)  BP(mean): --  RR: 18 (18 Sep 2023 13:33) (18 - 18)  SpO2: 96% (18 Sep 2023 13:33) (96% - 98%)    Parameters below as of 18 Sep 2023 05:16  Patient On (Oxygen Delivery Method): room air     I&O's Summary    17 Sep 2023 07:01  -  18 Sep 2023 07:00  --------------------------------------------------------  IN: 0 mL / OUT: 800 mL / NET: -800 mL    18 Sep 2023 07:01  -  18 Sep 2023 17:03  --------------------------------------------------------  IN: 0 mL / OUT: 500 mL / NET: -500 mL        GENERAL:  [X ] No acute distress [ ]Lethargic  [ ]Unarousable  [ ]Verbal  [ ]Non-Verbal [ ]Cachexia    BEHAVIORAL/PSYCH:  [ ]Alert and Oriented x  [ ] Anxiety [ ] Delirium [ ] Agitation [X ] Calm   EYES: [X ] No scleral icterus [ ] Scleral icterus [ ] Closed  ENMT:  [ ]Dry mouth  [ ]No external oral lesions [X ] No external ear or nose lesions  CARDIOVASCULAR:  [ ]Regular [ ]Irregular [ ]Tachy [ ]Not Tachy  [ ]Jin [ ] Edema [ ] No edema  PULMONARY:  [ ]Tachypnea  [ ]Audible excessive secretions [X ] No labored breathing [ ] labored breathing  GASTROINTESTINAL: [ ]Soft  [ ]Distended  [ X]Not distended [ ]Non tender [ ]Tender  MUSCULOSKELETAL: [ ]No clubbing [ ] clubbing  [X ] No cyanosis [ ] cyanosis  NEUROLOGIC: [ ]No focal deficits  [ ]Follows commands  [ ]Does not follow commands  [X ]Cognitive impairment  [ ]Dysphagia  [ ]Dysarthria  [ ]Paresis   SKIN: [ ] Jaundiced [X ] Non-jaundiced [ ]Rash [ ]No Rash [ ] Warm [ ] Dry  MISC/LINES: [ ] ET tube [ ] Trach [ ]NGT/OGT [ ]PEG [ ][place]    CRITICAL CARE:  [ ] Shock Present  [ ]Septic [ ]Cardiogenic [ ]Neurologic [ ]Hypovolemic  [ ]  Vasopressors [ ]  Inotropes   [ ]Respiratory failure present [ ]Mechanical ventilation [ ]Non-invasive ventilatory support [ ]High flow  [ ]Acute  [ ]Chronic [ ]Hypoxic  [ ]Hypercarbic [ ]Other  [ ]Other organ failure     LABS: reviewed by me                        9.1    11.47 )-----------( 317      ( 17 Sep 2023 08:20 )             28.8   -    139  |  102  |  107<HH>  ----------------------------<  121<H>  4.0   |  18  |  7.0<HH>    Ca    8.1<L>      17 Sep 2023 08:20        Urinalysis Basic - ( 18 Sep 2023 11:15 )    Color: Yellow / Appearance: Clear / S.010 / pH: x  Gluc: x / Ketone: Negative mg/dL  / Bili: Negative / Urobili: 0.2 mg/dL   Blood: x / Protein: 100 mg/dL / Nitrite: Negative   Leuk Esterase: Small / RBC: 53 /HPF / WBC 12 /HPF   Sq Epi: x / Non Sq Epi: 1 /HPF / Bacteria: Negative /HPF      RADIOLOGY & ADDITIONAL STUDIES: reviewed by me  CT A/P 23  Destructive vertebral body changes of L3-L4 compatible with   osteomyelitis. No definite drainable collection on this noncontrast exam.    No evidence for sacral osteomyelitis.    Distended gallbladder with stones. No biliary dilation. Given significant   artifact over this area, recommend abdominal ultrasound for further   evaluation.    Small/trace bilateral effusions as described.    New left adrenal nodule versus periadrenal lymph node as described. This   can be followed as an outpatient with an MRI.  PROTEIN CALORIE MALNUTRITION PRESENT: [ ]mild [ ]moderate [ ]severe [ ]underweight [ ]morbid obesity  https://www.andeal.org/vault/2440/web/files/ONC/Table_Clinical%20Characteristics%20to%20Document%20Malnutrition-White%20JV%20et%20al%2020.pdf      Weight (kg): 97.2 (23 @ 00:31)    [ ]PPSV2 < or = to 30% [ ]significant weight loss  [ ]poor nutritional intake  [ ]anasarca      [ ]Artificial Nutrition          Palliative Care Spiritual/Emotional Screening Tool Question  Severity (0-4):                    OR                    [X ] Unable to determine/NA  Score of 2 or greater indicates recommendation of Chaplaincy referral  Chaplaincy Referral: [ ] Yes [ ] Refused [ ] Following     Caregiver Hebo:  [ ] Yes [ ] No    OR    [x ] Unable to determine. Will assess at later time if appropriate.  Social Work Referral [ ]  Patient and Family Centered Care Referral [ ]    Anticipatory Grief Present: [ ] Yes [ ] No    OR     [ x] Unable to determine. Will assess at later time if appropriate.  Social Work Referral [ ]  Patient and Family Centered Care Referral [ ]    REFERRALS:   [ ]Chaplaincy  [ ]Hospice  [ ]Child Life  [ ]Social Work  [ ]Case management [ ]Holistic Therapy     Palliative care education provided to patient and/or family    Goals of Care Document:     ______________  Evin Garcia MD  Palliative Medicine  [facility]   of Geriatric and Palliative Medicine  [phone]

## 2023-09-18 NOTE — PROGRESS NOTE ADULT - ASSESSMENT
82 yo m ho DM, COPD, anemia, paroxysmal afib on xarelto, HFrEF, DM coming in from nursing home for AMS x 2 days. Found to have toxic metabolic encephalopathy with PAULA.    PAULA/ toxic metabolic encephalopathy/ HAGMA/ HFrEF  possible ATN iso hypotension and possible sepsis/ vanco toxicity / last level 60   no hydro on imaging  vanco on hold   check UO / s.p barth   check ph level   cr trending up / check repeat today   send please UA and urine studies Urine Na, Urea and creat  d/c bicarbonate drip / increase sodium bicarbonate to  1300 q 8   start midodrine 5 q 8   no acute indication for RRT today   need to discuss GOC   will follow

## 2023-09-18 NOTE — PROGRESS NOTE ADULT - ASSESSMENT
83M DM2, COPD, Anemia, Paroxysmal Afib on xarelto, HFrEF, sent to the Hospital by [facility] for AMS. Pt was recently admitted to [facility] from [facility] for OM needing IV Abx Cefepime/Vancomycin at [facility] Pt has been having worsening in MS over the last 2 days per NH records. Pt currently responds to touch and voice but does not speak. Tracks eyes.     #Metabolic Encephalopathy   #Suspected Severe Sepsis (Hypotention/AMS/PAULA/Oliguric)   #PAULA vs CKD w/ Progression   #HAGMA 2/2 PAULA   #Oliguric Renal Failure moving towards Anuria  #Left Foot OM w/ surrounding Cellulitis   #Sacral DTI / Stage 1 POA - off load dressing / turning q2-4hrs     - CT Head Negative   - Cr 6 (baseline ~1.2) trending up now oliguric vs anuric   - Patient is hypotensive SBP 60's s/p Fluid challenge and Bladder scan about 152cc in bladder  - Continue with IVF Challenge NS bolus 750cc over 1 hr as hypotensive  - c/w Bicarb drip 75cc/hr as ordered   - NEED TO PLACE [place] IF RN UNABLE THEN  SHOULD PLACE [place] -> pt anuric vs oliguria with worsening renal failure / need to monitor kidney function   - d/c'd cefepime and vancomycin   - Vancomycin random level 60.4   - c/w Ceftriaxone 2g IV qd  - f/u ESR/CRP/Pro-yesy and Blood cxs and urine studies   - f/u TTE ordered   - Consults Reviewed: ID/Nephro/Podiatry   - Blood cxs NEG  - f/u UA if ++ check Ucxs     #Compensated HFrEF  #Paroxysmal Afib  - echo 8/14/2023 EF 35-40% from [facility] admission    - hold home metoprolol and all other BP meds due to hypotension  - hold xarelto due to PAULA     #DM2  - hold home empagliflozin  - FS q6  - Neg BHB   - CAPILLARY BLOOD GLUCOSE   POCT Blood Glucose.: 136 mg/dL (18 Sep 2023 13:18)  POCT Blood Glucose.: 141 mg/dL (18 Sep 2023 08:12)  POCT Blood Glucose.: 150 mg/dL (17 Sep 2023 21:36)  POCT Blood Glucose.: 139 mg/dL (17 Sep 2023 17:18)    Poor Prognosis - f/u Palliative Care for further GOC   - As of now Brother says wants FULL CODE and including HD    Pending: f/u TTE / f/u U/O Keep [place] for now   Family Brother is HCP case and plan discussed wants FULL CODE and HD if needed   Dispo: acute

## 2023-09-18 NOTE — PROGRESS NOTE ADULT - SUBJECTIVE AND OBJECTIVE BOX
NIMO SARMIENTO  83y, Male    Allergy: No Known Allergies    CHIEF COMPLAINT: AMS (16 Sep 2023 15:28)    HPI:  84 yo m ho DM, COPD, anemia, lymphedemia, afib on xarelto, HFrEF, DM coming in from nursing home for AMS x 2 days. Unable to gather story from pt as he is altered and lethargic.  As per NH was becoming more agitated x 2 days, was hypotensive yesterday and started on cefepime and vancomycin. Brought in to the ED for AMS. PICC line in place for cefepime 1q q8 until 10/5/23 and vanc 1q24 until 9/23/23 for LLE cellulitis/OM  (was at [facility] last month for LLE thick wound cellulitis/OM and sacral DTI as per call with [facility] as per collateral from NH DEISY Barry)    In the ED, vitals wnl. Labs showing wbc 11.30, Hb 9.6, MCV 79.1, INR 1.59, CO2 14, AG 21, Cr 6 (~baseline 1.2), , trops 0.09. CTH wnl, RBUS with no hydro, poor visualization of left kidney  (15 Sep 2023 22:12)    FAMILY HISTORY:  No pertinent family history in first degree relatives    PAST MEDICAL & SURGICAL HISTORY:    HTN (hypertension)  COPD (chronic obstructive pulmonary disease)  High cholesterol  Mild anemia  Coffee ground emesis  Chronic diastolic heart failure  PAULA (acute kidney injury)    No significant past surgical history    SOCIAL HISTORY: UNKNOWN    Home Medications: per [facility]   acetaminophen 325 mg oral tablet: 2 tab(s) orally every 6 hours, As needed, Mild Pain (1 - 3), Moderate Pain (4 - 6) (25 May 2019 09:25)  Albuterol (Eqv-ProAir HFA) 90 mcg/inh inhalation aerosol: 1 puff(s) inhaled every 6 hours as needed for  shortness of breath and/or wheezing (15 Sep 2023 22:56)  Breo Ellipta 100 mcg-25 mcg/inh inhalation powder: 1 puff(s) inhaled once a day (15 Sep 2023 22:56)  empagliflozin 10 mg oral tablet: 1 tab(s) orally once a day (15 Sep 2023 22:56)  Entresto 24 mg-26 mg oral tablet: 1 tab(s) orally 2 times a day (15 Sep 2023 22:57)  Lasix 20 mg oral tablet: 1 tab(s) orally once a day (15 Sep 2023 22:57)  Metoprolol Succinate ER 25 mg oral tablet, extended release: 1 tab(s) orally once a day (15 Sep 2023 22:57)  Percocet 5 mg-325 mg oral tablet: 1 tab(s) orally every 8 hours as needed for  severe pain (15 Sep 2023 22:57)  Xarelto 20 mg oral tablet: 1 tab(s) orally once a day (15 Sep 2023 22:57)    ROS: Pt is much more awake today. But still poor historian.     VITALS:    T(C): 36.6 (18 Sep 2023 13:33), Max: 37.3 (18 Sep 2023 05:16)  T(F): 97.9 (18 Sep 2023 13:33), Max: 99.1 (18 Sep 2023 05:16)  HR: 89 (18 Sep 2023 13:33) (64 - 113)  BP: 105/60 (18 Sep 2023 13:33) (105/60 - 117/59)  RR: 18 (18 Sep 2023 13:33) (18 - 18)  SpO2: 96% (18 Sep 2023 13:33) (96% - 98%)    Parameters below as of 18 Sep 2023 05:16  Patient On (Oxygen Delivery Method): room air      PHYSICAL EXAM:  Gen: NAD   NECK: SUPPLE AND NO LAD   MOUTH: DRY MUCUS MEMBRANES   CV: NL S1/2   LUNGS: CTA B/L   Abdomen: Soft, NTND+ BS present  Ext: Warm, well perfused no CCE  Neuro: non focal, awake, CN II-XII intact   Skin: no rash, no erythema  MS: Awake and Alert today     TESTS & MEASUREMENTS:                        9.1    11.47 )---------( 317      ( 17 Sep 2023 08:20 )             28.8     09-17    139  |  102  |  107<HH>  ----------------------------<  121<H>  4.0   |  18  |  7.0<HH>    Ca    8.1<L>      17 Sep 2023 08:20    Mg     2.0     09-16    TPro  6.0  /  Alb  2.9<L>  /  TBili  0.3  /  DBili  x   /  AST  32  /  ALT  11  /  AlkPhos  84  09-16                          8.7    10.49 )-----------( 279      ( 16 Sep 2023 07:18 )             28.3     09-16    136  |  103  |  109<HH>  ----------------------------<  145<H>  4.5   |  10<L>  |  6.3<HH>    Ca    8.2<L>      16 Sep 2023 07:18    Mg     2.0     09-16    TPro  6.0  /  Alb  2.9<L>  /  TBili  0.3  /  DBili  x   /  AST  32  /  ALT  11  /  AlkPhos  84  09-16    LIVER FUNCTIONS - ( 16 Sep 2023 07:18 )  Alb: 2.9 g/dL / Pro: 6.0 g/dL / ALK PHOS: 84 U/L / ALT: 11 U/L / AST: 32 U/L / GGT: x           Urinalysis Basic - ( 16 Sep 2023 07:18 )    Color: x / Appearance: x / SG: x / pH: x  Gluc: 145 mg/dL / Ketone: x  / Bili: x / Urobili: x   Blood: x / Protein: x / Nitrite: x   Leuk Esterase: x / RBC: x / WBC x   Sq Epi: x / Non Sq Epi: x / Bacteria: x    QRS axis to [] ° and NSR at a rate of [] BPM. There was no atrial enlargement. There was no ventricular hypertrophy. There were no ST-T changes and all intervals were normal.      RADIOLOGY & ADDITIONAL TESTS:  I have personally reviewed the last Chest xray    PROCEDURE DATE:  09/15/2023    INTERPRETATION:  STUDY INDICATION: Sepsis    TECHNIQUE:  Portable frontal view of the chest obtained.    COMPARISON: 5/22/2019    FINDINGS/  IMPRESSION:    Right PICC terminating over the distal superior vena cava.    Left basal pleural-parenchymal opacity. No pneumothorax.    Stable cardiomediastinal silhouette.    Unchanged osseous structures.    --- End of Report ---    PRASAD MUNOZ MD; Attending Radiologist  This document has been electronically signed. Sep 15 2023  2:40PM (09-15-23 @ 13:20)      CARDIOLOGY TESTING  12 Lead ECG:   Ventricular Rate 81 BPM    Atrial Rate 208 BPM    QRS Duration 96 ms    Q-T Interval 388 ms    QTC Calculation(Bazett) 450 ms    R Axis 171 degrees    T Axis 108 degrees    Diagnosis Line Atrial fibrillation  Incomplete right bundle branch block  Possible Right ventricular hypertrophy  Possible Anterolateral infarct , age undetermined  Abnormal ECG    Confirmed by BERE FULLER MD ([id number]) on 9/15/2023 3:14:03 PM (09-15-23 @ 14:36)    MEDICATIONS  (STANDING):  cefTRIAXone   IVPB 2000 milliGRAM(s) IV Intermittent every 24 hours  dextrose 5%. 1000 milliLiter(s) (100 mL/Hr) IV Continuous <Continuous>  dextrose 50% Injectable 25 Gram(s) IV Push once  dextrose 50% Injectable 25 Gram(s) IV Push once  dextrose 50% Injectable 12.5 Gram(s) IV Push once  glucagon  Injectable 1 milliGRAM(s) IntraMuscular once  heparin   Injectable 5000 Unit(s) SubCutaneous every 8 hours  insulin lispro (ADMELOG) corrective regimen sliding scale   SubCutaneous three times a day before meals  sodium bicarbonate 650 milliGRAM(s) Oral every 8 hours  sodium bicarbonate  Infusion 0.116 mEq/kG/Hr (75 mL/Hr) IV Continuous <Continuous>    MEDICATIONS  (PRN):  albuterol    90 MICROgram(s) HFA Inhaler 1 Puff(s) Inhalation every 6 hours PRN for shortness of breath and/or wheezing  dextrose Oral Gel 15 Gram(s) Oral once PRN Blood Glucose LESS THAN 70 milliGRAM(s)/deciliter      ANTIBIOTICS:  cefTRIAXone   IVPB 2000 milliGRAM(s) IV Intermittent every 24 hours    All available historical data has been reviewed    ASSESSMENT  83y M admitted with Altered mental status

## 2023-09-18 NOTE — CONSULT NOTE ADULT - PROBLEM SELECTOR RECOMMENDATION 2
- worsening SCr  - possible sepsis-related ATN  - no acute indication for RRT per renal  - monitor Scr

## 2023-09-18 NOTE — PROGRESS NOTE ADULT - SUBJECTIVE AND OBJECTIVE BOX
seen and examined  24 hevents noted   no distress         PAST HISTORY  --------------------------------------------------------------------------------  No significant changes to PMH, PSH, FHx, SHx, unless otherwise noted    ALLERGIES & MEDICATIONS  --------------------------------------------------------------------------------  Allergies    No Known Allergies    Intolerances      Standing Inpatient Medications  cefTRIAXone   IVPB 2000 milliGRAM(s) IV Intermittent every 24 hours  dextrose 5%. 1000 milliLiter(s) IV Continuous <Continuous>  dextrose 50% Injectable 25 Gram(s) IV Push once  dextrose 50% Injectable 25 Gram(s) IV Push once  dextrose 50% Injectable 12.5 Gram(s) IV Push once  glucagon  Injectable 1 milliGRAM(s) IntraMuscular once  heparin   Injectable 5000 Unit(s) SubCutaneous every 8 hours  insulin lispro (ADMELOG) corrective regimen sliding scale   SubCutaneous three times a day before meals  sodium bicarbonate 650 milliGRAM(s) Oral every 8 hours  sodium bicarbonate  Infusion 0.116 mEq/kG/Hr IV Continuous <Continuous>    PRN Inpatient Medications  albuterol    90 MICROgram(s) HFA Inhaler 1 Puff(s) Inhalation every 6 hours PRN  dextrose Oral Gel 15 Gram(s) Oral once PRN        VITALS/PHYSICAL EXAM  --------------------------------------------------------------------------------  T(C): 37.3 (09-18-23 @ 05:16), Max: 37.3 (09-18-23 @ 05:16)  HR: 64 (09-18-23 @ 05:16) (62 - 113)  BP: 111/50 (09-18-23 @ 05:16) (107/56 - 117/59)  RR: 18 (09-18-23 @ 05:16) (18 - 18)  SpO2: 97% (09-18-23 @ 05:16) (97% - 98%)  Wt(kg): --        09-16-23 @ 07:01  -  09-17-23 @ 07:00  --------------------------------------------------------  IN: 1520 mL / OUT: 0 mL / NET: 1520 mL    09-17-23 @ 07:01  -  09-18-23 @ 06:44  --------------------------------------------------------  IN: 0 mL / OUT: 800 mL / NET: -800 mL      Physical Exam:  	Gen: NAD  	Pulm: decrease BS  B/L  	CV:  S1S2; no rub  	Abd: +distended  	LE: dressing       LABS/STUDIES  --------------------------------------------------------------------------------              9.1    11.47 >-----------<  317      [09-17-23 @ 08:20]              28.8     139  |  102  |  107  ----------------------------<  121      [09-17-23 @ 08:20]  4.0   |  18  |  7.0        Ca     8.1     [09-17-23 @ 08:20]      Mg     2.0     [09-16-23 @ 07:18]    TPro  6.0  /  Alb  2.9  /  TBili  0.3  /  DBili  x   /  AST  32  /  ALT  11  /  AlkPhos  84  [09-16-23 @ 07:18]      Creatinine Trend:  SCr 7.0 [09-17 @ 08:20]  SCr 6.7 [09-16 @ 22:13]  SCr 6.3 [09-16 @ 07:18]  SCr 6.0 [09-15 @ 14:35]    Urinalysis - [09-17-23 @ 08:20]      Color  / Appearance  / SG  / pH       Gluc 121 / Ketone   / Bili  / Urobili        Blood  / Protein  / Leuk Est  / Nitrite       RBC  / WBC  / Hyaline  / Gran  / Sq Epi  / Non Sq Epi  / Bacteria

## 2023-09-18 NOTE — PROGRESS NOTE ADULT - ASSESSMENT
83M DM2, COPD, Anemia, Paroxysmal Afib on xarelto, HFrEF, sent to the Hospital by [facility] for AMS. Pt was recently admitted to [facility] from [facility] for OM needing IV Abx Cefepime/Vancomycin at [facility]. Pt has been having worsening in MS over the last 2 days per NH records. Pt currently responds to touch and voice but does not speak. Tracks eyes.     #Metabolic Encephalopathy   #Suspected Severe Sepsis (Hypotention/AMS/PAULA/Oliguric)   #PAULA vs CKD w/ Progression   #HAGMA 2/2 PAULA   #Oliguric Renal Failure moving towards Anuria  #Left Foot OM w/ surrounding Cellulitis   #Sacral DTI / Stage 1 POA     - CT head negative   - Creatinine trending up ( 6.0>6.3>6.7>7.0)   - Patient is hypotensive SBP 60's s/p Fluid challenge and Bladder scan about 152cc in bladder  - Continue with IVF Challenge NS bolus 750cc over 1 hr as hypotensive  - Start Bicarb drip 75cc/hr as ordered   - NEED TO PLACE BARTH IF RN UNABLE THEN  SHOULD PLACE BARTH -> pt anuric vs oliguria with worsening renal failure / need to monitor kidney function   - f/u Urine Lytes / Osmos   - f/u Pan cultures   - d/c all other fluids   - d/c cefepime and vanco  - f/u vanco random level   - start ceftriaxone 2g IV qd  - f/u Xray Lt Foot ?OM   - f/u ESR/CRP/Pro-yesy and Blood cxs and urine studies   - ID/Nephro consults      #Compensated HFrEF  #Paroxysmal Afib  - echo 8/14/2023 EF 35-40% from [facility] admission    - hold home metoprolol and all other BP meds due to hypotension  - hold xarelto due to PAULA     #DM2  - hold home empagliflozin  - FS q6  - f/u BHB     Guarded Prognosis   - reassess for improvement if not may need SDU    Pending: infectious w/up / crp / esr / urine studies / resolution of hypotension - may need levophed and SDU care if not improving / barth anuric/oliguric / f/u labs / blood cxs   Family: will reac 83M DM2, COPD, Anemia, Paroxysmal Afib on xarelto, HFrEF, sent to the Hospital by [facility] for AMS. Pt was recently admitted to [facility] from [facility] for OM needing IV Abx Cefepime/Vancomycin at [facility]. Pt has been having worsening in MS over the last 2 days per NH records. Pt currently responds to touch and voice but does not speak. Tracks eyes.     #Metabolic Encephalopathy   #Suspected Severe Sepsis (Hypotention/AMS/PAULA/Oliguric)   #PAULA vs CKD w/ Progression   #HAGMA 2/2 PAULA   #Oliguric Renal Failure moving towards Anuria  #Left Foot OM w/ surrounding Cellulitis   #Sacral DTI / Stage 1 POA     - CT head negative   - Creatinine trending up ( 6.0>6.3>6.7>7.0)   - Bicarbonate drip stopped and 1300 meq sodium bicarbonate 3 times daily added   - f/u Urine Lytes / Osmos   - f/u Pan cultures     - d/c cefepime and vanco  - f/u vanco random level       - f/u ESR/CRP/Pro-yesy and Blood cxs and urine studies       #Compensated HFrEF  #Paroxysmal Afib  - echo 8/14/2023 EF 35-40% from [facility] admission    - hold home metoprolol and all other BP meds due to hypotension  - hold xarelto due to PAULA     #DM2  - hold home empagliflozin  - FS q6  - f/u BHB     Guarded Prognosis   - reassess for improvement if not may need SDU    Pending: infectious w/up / crp / esr / urine studies / resolution of hypotension - may need levophed and SDU care if not improving / barth anuric/oliguric / f/u labs / blood cxs   Family: will reac 83M DM2, COPD, Anemia, Paroxysmal Afib on xarelto, HFrEF, sent to the Hospital by [facility] for AMS. Pt was recently admitted to [facility] from [facility] for OM needing IV Abx Cefepime/Vancomycin at [facility]. Pt has been having worsening in MS over the last 2 days per NH records. Pt currently responds to touch and voice but does not speak. Tracks eyes.     #Metabolic Encephalopathy   #Suspected Severe Sepsis (Hypotention/AMS/PAULA/Oliguric)   #PAULA vs CKD w/ Progression   #HAGMA 2/2 PAULA   #Oliguric Renal Failure moving towards Anuria  #History of Left Foot OM w/ surrounding Cellulitis   #Sacral DTI / Stage 1 POA     - CT head negative   - Creatinine trending up ( 6.0>6.3>6.7>7.0)   - Bicarbonate drip stopped and 1300 meq sodium bicarbonate 3 times daily added   - Urine lytes and osmol normal   - d/c cefepime and vanco  - f/u vanco random level tomorrow   - ESR and CRP increased   - Blood culture negative   - Follow up on UA   -last ouptut of 800 ml via foleys    #Left heel ulcers  - CT scan leg : IMPRESSION:  No CT evidence of osteomyelitis or necrotizing infection. No drainable   collection seen, within the limitations of a noncontrast exam.  - follow up with podiatry      #Compensated HFrEF  #Paroxysmal Afib  - echo 8/14/2023 EF 35-40% from [facility] admission    - hold home metoprolol and all other BP meds due to hypotension  - hold xarelto due to PAULA     #DM2  - hold home empagliflozin  - FS q6  - Betahydro butyrate negative     Guarded Prognosis   - reassess for improvement if not may need SDU  - Palliative consult in     Pending: UA, cbc,cmp, vanco random   Monitor BP and Urine output   Family:   Talked with his brother about GOC, according to him, patients wish was to be full code before.   will follow up  Updated on the patient's condition and about the poor prognosis

## 2023-09-18 NOTE — SWALLOW BEDSIDE ASSESSMENT ADULT - SLP PERTINENT HISTORY OF CURRENT PROBLEM
Pt is an 84 y/o M w/ PMHx: DM2, COPD, anemia, paroxysmal Afib on Xarelto, HFrEF, sent to the hospital by [facility] for AMS. Pt recently admitted to [facility] from [facility] for OM needing IV Abx Cefepime/Vancomycin at [facility]. Pt has been having worsening in MS over the last 2 days per NH records. Pt is an 82 y/o M w/ PMHx: DM2, COPD, anemia, paroxysmal Afib on Xarelto, HFrEF, sent to the hospital by [facility] for AMS. Pt recently admitted to [facility] from [facility] for OM needing IV Abx. Pt has been having worsening mental status over the last 2 days per NH records. Pt is being treated for metabolic encephalopathy, suspected severe sepsis, PAULA vs. CKD, HAGMA. CTH (-).

## 2023-09-18 NOTE — CONSULT NOTE ADULT - ASSESSMENT
83M with PMH including COPD, DM, anemia, lymphedema, AF on xarelto, HFrEF, DM, here from SNF with AMS, from suspected severe sepsis, started on IV vanco and IV cefepime. Patient also with worsening PAULA on CKD progressing towards anuria, and HAGMA. Palliative care called for GOC.

## 2023-09-18 NOTE — CONSULT NOTE ADULT - PROBLEM SELECTOR RECOMMENDATION 9
- s/p IV cefepime  - IV vanco, monitor levels  - c/w IV ceftriaxone, high risk, monitor WBC  - bcx NGTD

## 2023-09-18 NOTE — PROGRESS NOTE ADULT - SUBJECTIVE AND OBJECTIVE BOX
24H events:    Patient is a 83y old Male who presents with a chief complaint of AMS (18 Sep 2023 06:44)    Primary diagnosis of Altered mental status    Today is hospital day 3d. This morning patient was seen and examined at bedside, resting comfortably in bed.    No acute or major events overnight.    Code Status: FULL     Family communication:  Contact date: 09/18/2023  Name of person contacted: Gabe Peterson   Relationship to patient: Brother   Communication details: [facility]   What matters most: Full code     PAST MEDICAL & SURGICAL HISTORY  HTN (hypertension)    COPD (chronic obstructive pulmonary disease)    High cholesterol    Mild anemia    Coffee ground emesis    Chronic diastolic heart failure    PAULA (acute kidney injury)    No significant past surgical history      SOCIAL HISTORY:  Social History:      ALLERGIES:  No Known Allergies    MEDICATIONS:  STANDING MEDICATIONS  cefTRIAXone   IVPB 2000 milliGRAM(s) IV Intermittent every 24 hours  dextrose 5%. 1000 milliLiter(s) IV Continuous <Continuous>  dextrose 50% Injectable 25 Gram(s) IV Push once  dextrose 50% Injectable 12.5 Gram(s) IV Push once  dextrose 50% Injectable 25 Gram(s) IV Push once  glucagon  Injectable 1 milliGRAM(s) IntraMuscular once  heparin   Injectable 5000 Unit(s) SubCutaneous every 8 hours  insulin lispro (ADMELOG) corrective regimen sliding scale   SubCutaneous three times a day before meals  sodium bicarbonate 1300 milliGRAM(s) Oral every 8 hours    PRN MEDICATIONS  albuterol    90 MICROgram(s) HFA Inhaler 1 Puff(s) Inhalation every 6 hours PRN  dextrose Oral Gel 15 Gram(s) Oral once PRN    VITALS:   T(F): 99.1  HR: 64  BP: 111/50  RR: 18  SpO2: 97%    PHYSICAL EXAM:  Gen: NAD, lying in bed with legs slighlty bent   HEENT: Normocephalic, atraumatic  Neck: supple, no lymphadenopathy  CV: Irregular ; S1S2 normal , no murmur  Lungs: decreased BS at bases, no fremitus  Abdomen: Soft, BS present  Ext: Warm, well perfused  Neuro: non focal, awake  Skin: Left heel DTI - no cellulitis in left leg   Sacral Ulcer without gross drainage/erythema   Lines: no phlebitis    ( x ) Indwelling [place] Catheter:   Date insterted:  09/17/2023  Reason (  ) Critical illness     (  ) urinary retention    ( x ) Accurate Ins/Outs Monitoring     (  ) CMO patient    LABS:                        9.1    11.47 )-----------( 317      ( 17 Sep 2023 08:20 )             28.8     09-17    139  |  102  |  107<HH>  ----------------------------<  121<H>  4.0   |  18  |  7.0<HH>    Ca    8.1<L>      17 Sep 2023 08:20        Urinalysis Basic - ( 17 Sep 2023 08:20 )    Color: x / Appearance: x / SG: x / pH: x  Gluc: 121 mg/dL / Ketone: x  / Bili: x / Urobili: x   Blood: x / Protein: x / Nitrite: x   Leuk Esterase: x / RBC: x / WBC x   Sq Epi: x / Non Sq Epi: x / Bacteria: x            Culture - Blood (collected 16 Sep 2023 01:41)  Source: .Blood None  Preliminary Report (18 Sep 2023 11:01):    No growth at 48 Hours          RADIOLOGY:  < from: US Renal (09.15.23 @ 19:38) >  MPRESSION:    1.  Poor visualization of the left kidney. No gross hydronephrosis.  2.  Debris in the bladder, correlate with urinalysis.      < from: CT Head No Cont (09.15.23 @ 15:35) >  IMPRESSION:    No evidence of acute intracranial hemorrhage or acute territorial infarct.    No evidence of mass or midline shift.

## 2023-09-19 DIAGNOSIS — G93.41 METABOLIC ENCEPHALOPATHY: ICD-10-CM

## 2023-09-19 LAB
ALBUMIN SERPL ELPH-MCNC: 2.9 G/DL — LOW (ref 3.5–5.2)
ALP SERPL-CCNC: 86 U/L — SIGNIFICANT CHANGE UP (ref 30–115)
ALT FLD-CCNC: 14 U/L — SIGNIFICANT CHANGE UP (ref 0–41)
ANION GAP SERPL CALC-SCNC: 22 MMOL/L — HIGH (ref 7–14)
AST SERPL-CCNC: 28 U/L — SIGNIFICANT CHANGE UP (ref 0–41)
BILIRUB SERPL-MCNC: 0.3 MG/DL — SIGNIFICANT CHANGE UP (ref 0.2–1.2)
BUN SERPL-MCNC: 111 MG/DL — CRITICAL HIGH (ref 10–20)
CALCIUM SERPL-MCNC: 8.1 MG/DL — LOW (ref 8.4–10.5)
CHLORIDE SERPL-SCNC: 101 MMOL/L — SIGNIFICANT CHANGE UP (ref 98–110)
CO2 SERPL-SCNC: 21 MMOL/L — SIGNIFICANT CHANGE UP (ref 17–32)
CREAT SERPL-MCNC: 7.4 MG/DL — CRITICAL HIGH (ref 0.7–1.5)
EGFR: 7 ML/MIN/1.73M2 — LOW
GLUCOSE BLDC GLUCOMTR-MCNC: 119 MG/DL — HIGH (ref 70–99)
GLUCOSE BLDC GLUCOMTR-MCNC: 119 MG/DL — HIGH (ref 70–99)
GLUCOSE BLDC GLUCOMTR-MCNC: 125 MG/DL — HIGH (ref 70–99)
GLUCOSE BLDC GLUCOMTR-MCNC: 147 MG/DL — HIGH (ref 70–99)
GLUCOSE SERPL-MCNC: 110 MG/DL — HIGH (ref 70–99)
HCT VFR BLD CALC: 28.6 % — LOW (ref 42–52)
HGB BLD-MCNC: 9.2 G/DL — LOW (ref 14–18)
MCHC RBC-ENTMCNC: 24.3 PG — LOW (ref 27–31)
MCHC RBC-ENTMCNC: 32.2 G/DL — SIGNIFICANT CHANGE UP (ref 32–37)
MCV RBC AUTO: 75.5 FL — LOW (ref 80–94)
NRBC # BLD: 0 /100 WBCS — SIGNIFICANT CHANGE UP (ref 0–0)
PLATELET # BLD AUTO: 362 K/UL — SIGNIFICANT CHANGE UP (ref 130–400)
PMV BLD: 10.1 FL — SIGNIFICANT CHANGE UP (ref 7.4–10.4)
POTASSIUM SERPL-MCNC: 3.3 MMOL/L — LOW (ref 3.5–5)
POTASSIUM SERPL-SCNC: 3.3 MMOL/L — LOW (ref 3.5–5)
PROT SERPL-MCNC: 6 G/DL — SIGNIFICANT CHANGE UP (ref 6–8)
RBC # BLD: 3.79 M/UL — LOW (ref 4.7–6.1)
RBC # FLD: 19.2 % — HIGH (ref 11.5–14.5)
SODIUM SERPL-SCNC: 144 MMOL/L — SIGNIFICANT CHANGE UP (ref 135–146)
VANCOMYCIN FLD-MCNC: 50.8 UG/ML — HIGH (ref 5–10)
WBC # BLD: 11.44 K/UL — HIGH (ref 4.8–10.8)
WBC # FLD AUTO: 11.44 K/UL — HIGH (ref 4.8–10.8)

## 2023-09-19 PROCEDURE — 99497 ADVNCD CARE PLAN 30 MIN: CPT

## 2023-09-19 PROCEDURE — 99233 SBSQ HOSP IP/OBS HIGH 50: CPT

## 2023-09-19 PROCEDURE — 71045 X-RAY EXAM CHEST 1 VIEW: CPT | Mod: 26

## 2023-09-19 RX ORDER — POTASSIUM CHLORIDE 20 MEQ
40 PACKET (EA) ORAL ONCE
Refills: 0 | Status: COMPLETED | OUTPATIENT
Start: 2023-09-19 | End: 2023-09-19

## 2023-09-19 RX ADMIN — HEPARIN SODIUM 5000 UNIT(S): 5000 INJECTION INTRAVENOUS; SUBCUTANEOUS at 13:04

## 2023-09-19 RX ADMIN — Medication 1300 MILLIGRAM(S): at 05:05

## 2023-09-19 RX ADMIN — MUPIROCIN 1 APPLICATION(S): 20 OINTMENT TOPICAL at 17:43

## 2023-09-19 RX ADMIN — MUPIROCIN 1 APPLICATION(S): 20 OINTMENT TOPICAL at 05:52

## 2023-09-19 RX ADMIN — HEPARIN SODIUM 5000 UNIT(S): 5000 INJECTION INTRAVENOUS; SUBCUTANEOUS at 22:18

## 2023-09-19 RX ADMIN — Medication 1300 MILLIGRAM(S): at 22:18

## 2023-09-19 RX ADMIN — CEFTRIAXONE 100 MILLIGRAM(S): 500 INJECTION, POWDER, FOR SOLUTION INTRAMUSCULAR; INTRAVENOUS at 17:42

## 2023-09-19 RX ADMIN — HEPARIN SODIUM 5000 UNIT(S): 5000 INJECTION INTRAVENOUS; SUBCUTANEOUS at 05:05

## 2023-09-19 RX ADMIN — CHLORHEXIDINE GLUCONATE 1 APPLICATION(S): 213 SOLUTION TOPICAL at 05:06

## 2023-09-19 NOTE — PROGRESS NOTE ADULT - ASSESSMENT
83M DM2, COPD, Anemia, Paroxysmal Afib on xarelto, HFrEF, sent to the Hospital by [facility] for AMS. Pt was recently admitted to [facility] from [facility] for OM needing IV Abx Cefepime/Vancomycin at [facility]. Pt has been having worsening in MS over the last 2 days per NH records. Pt currently responds to touch and voice but does not speak. Tracks eyes.     #Toxic Metabolic Encephalopathy  2/2 Multiple Problems as below:   #Severe Sepsis Ruled Out (Hypotention/AMS/PAULA/Oliguric)   #PAULA vs CKD w/ Progression   #HAGMA 2/2 Severe PAULA   #Vancomycin Toxicity Level 60.4 POA  #Cefepime Toxicity (dose needed adjustment to kidney function)   #Oliguric Renal Failure moving towards Anuria  #Left Foot OM w/ surrounding Cellulitis   #Sacral DTI / Stage 1 POA - off load dressing / turning q2-4hrs     - CT Head Negative   - Cr 6 (baseline ~1.2) trending up now oliguric vs anuric   - Patient is hypotensive SBP 60's s/p Fluid challenge and Bladder scan about 152cc in bladder  - Continue with IVF Challenge NS bolus 750cc over 1 hr as hypotensive  - d/c Bicarb drip 75cc/hr as ordered   - NEED TO PLACE [place] IF RN UNABLE THEN  SHOULD PLACE MAN[place] -> pt anuric vs oliguria with worsening renal failure / need to monitor kidney function   - d/c'd cefepime and vancomycin   - Vancomycin random level 60.4   - c/w Ceftriaxone 2g IV qd  - f/u Procal   - f/u TTE ordered   - Consults Reviewed: ID/Nephro/Podiatry   - Blood cxs NEG  - UA NEG     #Compensated HFrEF  #Paroxysmal Afib  - echo 8/14/2023 EF 35-40% from [facility] admission    - hold home metoprolol and all other BP meds due to hypotension  - hold xarelto due to PAULA     #DM2  - hold home empagliflozin  - FS q6  - Neg BHB   - NOT EATING SINCE ADMISSION   --> Need Place NGT and start NGT feeds w/ Dietary   --> Monitor electrolytes daily and renal function   CAPILLARY BLOOD GLUCOSE  POCT Blood Glucose.: 125 mg/dL (19 Sep 2023 11:29)  POCT Blood Glucose.: 119 mg/dL (19 Sep 2023 07:44)  POCT Blood Glucose.: 131 mg/dL (18 Sep 2023 17:07)    GOC -> Discussed with Brother Gabe Lemons. States pt was very high functioning and this is not his usual mental status.   Gabe wants FULL CODE and Identified himself as a HCP. He can be reached at [phone]  Gabe states wants Dialysis to be given if ther pt needs it. he has hope he will improve. He wants to be updated daily     Poor Prognosis - Need Palliative Care for further GOC discussion this week for f/u on clinical status   - As of now Brother says wants FULL CODE and wants HD if his brother needs it.     Pending: f/u TTE / f/u U/O Keep [place] for now   Family Brother is HCP case and plan discussed wants FULL CODE and HD if needed   Dispo: acute

## 2023-09-19 NOTE — PROGRESS NOTE ADULT - ASSESSMENT
82 yo m ho DM, COPD, anemia, paroxysmal afib on xarelto, HFrEF, DM coming in from nursing home for AMS x 2 days. Found to have toxic metabolic encephalopathy with PAULA.    PAULA/ toxic metabolic encephalopathy/ HAGMA/ HFrEF  possible ATN iso hypotension and possible sepsis/ vanco toxicity / last level 60   no hydro on imaging  vanco on hold   non oliguric   check phosphorus  level   cr trending up / non oliguric c/w vanco toxicity   cont  sodium bicarbonate to  1300 q 8   no acute indication for RRT today   discussed with primary team   will follow

## 2023-09-19 NOTE — PROGRESS NOTE ADULT - ASSESSMENT
83M DM2, COPD, Anemia, Paroxysmal Afib on xarelto, HFrEF, sent to the Hospital by [facility] for AMS. Pt was recently admitted to [facility] from [facility] for OM needing IV Abx Cefepime/Vancomycin at [facility]. Pt has been having worsening in MS over the last 2 days per NH records. Pt currently responds to touch and voice but does not speak. Tracks eyes.     #Metabolic Encephalopathy   #Suspected Severe Sepsis (Hypotention/AMS/PAULA/Oliguric)   #PAULA vs CKD w/ Progression   #HAGMA 2/2 PAULA   #Oliguric Renal Failure moving towards Anuria  #History of Left Foot OM w/ surrounding Cellulitis   #Sacral DTI / Stage 1 POA     - CT head negative   - Creatinine trending up ( 6.0>6.3>6.7>7.0>7.1)   - Bicarbonate drip stopped and 1300 meq sodium bicarbonate 3 times daily added   - Urine lytes and osmol normal   - d/c ed cefepime and vanco  - vanco random level 50(increased)   - ESR and CRP increased   - Blood culture negative   - UA negative   - Per Nephrology, patient is not in volume overloaded condition without electrolyte abnormlaity, No need for RRT for now  - F/u with Nephro     #Left heel ulcers  - CT scan leg : IMPRESSION:  No CT evidence of osteomyelitis or necrotizing infection. No drainable   collection seen, within the limitations of a noncontrast exam.  - NO acute surgical intervention required as per Podaitry      #Compensated HFrEF  #Paroxysmal Afib  - echo 8/14/2023 EF 35-40% from [facility] admission    - hold home metoprolol and all other BP meds due to hypotension  - hold xarelto due to PAULA     #DM2  - hold home empagliflozin  - FS q6  - Betahydro butyrate negative     Guarded Prognosis   - reassess for improvement if not may need SDU       Monitor BP and Urine output   Family:   Talked with his brother about GOC, according to him, patients wish was to be full code before.   will follow up  Updated on the patient's condition and about the poor prognosis.

## 2023-09-19 NOTE — PROGRESS NOTE ADULT - ASSESSMENT
83M with PMH including COPD, DM, anemia, lymphedema, AF on xarelto, HFrEF, DM, here from SNF with AMS, from suspected severe sepsis, started on IV vanco and IV cefepime. Patient also with worsening PAULA on CKD progressing towards anuria, and HAGMA. Palliative care called for GOC.   83M with PMH including COPD, DM, anemia, lymphedema, AF on xarelto, HFrEF, DM, here from SNF with AMS, from suspected severe sepsis, started on IV vanco and IV cefepime. Patient also with worsening PAULA on CKD progressing towards anuria, and HAGMA. Palliative care called for GOC.      Palliative care spoke w brother as pt unable to speak and cannot follow commands (? r/t AMS GFR 7) Patient's brother does not want further palliative care follow up - spoke to medical resident

## 2023-09-19 NOTE — PROGRESS NOTE ADULT - SUBJECTIVE AND OBJECTIVE BOX
NIMO SARMIENTO    83y, Male    Allergy: No Known Allergies    CHIEF COMPLAINT: AMS (16 Sep 2023 15:28)    HPI:  82 yo m ho DM, COPD, anemia, Lymphedemia, afib on xarelto, HFrEF, DM coming in from nursing home for AMS x 2 days. Unable to gather story from pt as he is altered and lethargic.  As per NH was becoming more agitated x 2 days, was hypotensive yesterday and started on cefepime and vancomycin. Brought in to the ED for AMS. PICC line in place for cefepime 1q q8 until 10/5/23 and vanc 1q24 until 9/23/23 for LLE cellulitis/OM  (was at [facility] last month for LLE thick wound cellulitis/OM and sacral DTI as per call with [facility] as per collateral from NH DEISY Barry)    In the ED, vitals wnl. Labs showing wbc 11.30, Hb 9.6, MCV 79.1, INR 1.59, CO2 14, AG 21, Cr 6 (~baseline 1.2), , trops 0.09. CTH wnl, RBUS with no hydro, poor visualization of left kidney  (15 Sep 2023 22:12)    FAMILY HISTORY:  No pertinent family history in first degree relatives    PAST MEDICAL & SURGICAL HISTORY:    HTN (hypertension)  COPD (chronic obstructive pulmonary disease)  High cholesterol  Mild anemia  Coffee ground emesis  Chronic diastolic heart failure  PAULA (acute kidney injury)    No significant past surgical history    SOCIAL HISTORY: UNKNOWN    Home Medications: per [facility]   acetaminophen 325 mg oral tablet: 2 tab(s) orally every 6 hours, As needed, Mild Pain (1 - 3), Moderate Pain (4 - 6) (25 May 2019 09:25)  Albuterol (Eqv-ProAir HFA) 90 mcg/inh inhalation aerosol: 1 puff(s) inhaled every 6 hours as needed for  shortness of breath and/or wheezing (15 Sep 2023 22:56)  Breo Ellipta 100 mcg-25 mcg/inh inhalation powder: 1 puff(s) inhaled once a day (15 Sep 2023 22:56)  empagliflozin 10 mg oral tablet: 1 tab(s) orally once a day (15 Sep 2023 22:56)  Entresto 24 mg-26 mg oral tablet: 1 tab(s) orally 2 times a day (15 Sep 2023 22:57)  Lasix 20 mg oral tablet: 1 tab(s) orally once a day (15 Sep 2023 22:57)  Metoprolol Succinate ER 25 mg oral tablet, extended release: 1 tab(s) orally once a day (15 Sep 2023 22:57)  Percocet 5 mg-325 mg oral tablet: 1 tab(s) orally every 8 hours as needed for  severe pain (15 Sep 2023 22:57)  Xarelto 20 mg oral tablet: 1 tab(s) orally once a day (15 Sep 2023 22:57)    ROS: Pt is much more awake today. But still poor historian.     VITALS:    T(C): 36 (19 Sep 2023 13:15), Max: 37.1 (18 Sep 2023 20:00)  T(F): 96.8 (19 Sep 2023 13:15), Max: 98.7 (18 Sep 2023 20:00)  HR: 85 (19 Sep 2023 13:15) (65 - 91)  BP: 138/60 (19 Sep 2023 13:15) (102/58 - 138/60)  BP(mean): 87 (18 Sep 2023 20:00) (87 - 87)  RR: 18 (19 Sep 2023 13:15) (18 - 19)  SpO2: 100% (19 Sep 2023 13:15) (96% - 100%)    Parameters below as of 19 Sep 2023 05:04  Patient On (Oxygen Delivery Method): room air      PHYSICAL EXAM:  Gen: NAD   NECK: SUPPLE / Awake Alert / Tracks to voice and touch   MOUTH: DRY MUCUS MEMBRANES   CV: NL S1/2, No M/R/R   LUNGS: Clear Bilateral   Abdomen: Soft, NTND+ BS present  Ext: Warm, well perfused no CCE  Neuro: non focal, awake, CN II-XII intact   Skin: no rash, no erythema  MS: Awake and Alert today improved from over the weekend MS     TESTS & MEASUREMENTS:                        9.2    11.44 )-----------( 362      ( 19 Sep 2023 06:56 )             28.6     09-19    144  |  101  |  111<HH>  ----------------------------<  110<H>  3.3<L>   |  21  |  7.4<HH>    Ca    8.1<L>      19 Sep 2023 06:56    TPro  6.0  /  Alb  2.9<L>  /  TBili  0.3  /  DBili  x   /  AST  28  /  ALT  14  /  AlkPhos  86  09-19                        9.1    11.47 )---------( 317      ( 17 Sep 2023 08:20 )             28.8     09-17    139  |  102  |  107<HH>  ----------------------------<  121<H>  4.0   |  18  |  7.0<HH>    Ca    8.1<L>      17 Sep 2023 08:20    Mg     2.0     09-16    TPro  6.0  /  Alb  2.9<L>  /  TBili  0.3  /  DBili  x   /  AST  32  /  ALT  11  /  AlkPhos  84  09-16                          8.7    10.49 )-----------( 279      ( 16 Sep 2023 07:18 )             28.3     09-16    136  |  103  |  109<HH>  ----------------------------<  145<H>  4.5   |  10<L>  |  6.3<HH>    Ca    8.2<L>      16 Sep 2023 07:18    Mg     2.0     09-16    TPro  6.0  /  Alb  2.9<L>  /  TBili  0.3  /  DBili  x   /  AST  32  /  ALT  11  /  AlkPhos  84  09-16    LIVER FUNCTIONS - ( 16 Sep 2023 07:18 )  Alb: 2.9 g/dL / Pro: 6.0 g/dL / ALK PHOS: 84 U/L / ALT: 11 U/L / AST: 32 U/L / GGT: x           Urinalysis Basic - ( 16 Sep 2023 07:18 )    Color: x / Appearance: x / SG: x / pH: x  Gluc: 145 mg/dL / Ketone: x  / Bili: x / Urobili: x   Blood: x / Protein: x / Nitrite: x   Leuk Esterase: x / RBC: x / WBC x   Sq Epi: x / Non Sq Epi: x / Bacteria: x    QRS axis to [] ° and NSR at a rate of [] BPM. There was no atrial enlargement. There was no ventricular hypertrophy. There were no ST-T changes and all intervals were normal.    RADIOLOGY & ADDITIONAL TESTS:  I have personally reviewed the last Chest xray    PROCEDURE DATE:  09/15/2023    INTERPRETATION:  STUDY INDICATION: Sepsis    TECHNIQUE:  Portable frontal view of the chest obtained.    COMPARISON: 5/22/2019    IMPRESSION:    Right PICC terminating over the distal superior vena cava.    Left basal pleural-parenchymal opacity. No pneumothorax.    Stable cardiomediastinal silhouette.    Unchanged osseous structures.    --- End of Report ---    PRASAD MUNOZ MD; Attending Radiologist  This document has been electronically signed. Sep 15 2023  2:40PM (09-15-23 @ 13:20)      CARDIOLOGY TESTING  12 Lead ECG:   Ventricular Rate 81 BPM    Atrial Rate 208 BPM    QRS Duration 96 ms    Q-T Interval 388 ms    QTC Calculation(Bazett) 450 ms    R Axis 171 degrees    T Axis 108 degrees    Diagnosis Line Atrial fibrillation  Incomplete right bundle branch block  Possible Right ventricular hypertrophy  Possible Anterolateral infarct , age undetermined  Abnormal ECG    Confirmed by BERE FULLER MD ([id number]) on 9/15/2023 3:14:03 PM (09-15-23 @ 14:36)    MEDICATIONS  (STANDING):  cefTRIAXone   IVPB 2000 milliGRAM(s) IV Intermittent every 24 hours  dextrose 5%. 1000 milliLiter(s) (100 mL/Hr) IV Continuous <Continuous>  dextrose 50% Injectable 25 Gram(s) IV Push once  dextrose 50% Injectable 25 Gram(s) IV Push once  dextrose 50% Injectable 12.5 Gram(s) IV Push once  glucagon  Injectable 1 milliGRAM(s) IntraMuscular once  heparin   Injectable 5000 Unit(s) SubCutaneous every 8 hours  insulin lispro (ADMELOG) corrective regimen sliding scale   SubCutaneous three times a day before meals  sodium bicarbonate 650 milliGRAM(s) Oral every 8 hours  sodium bicarbonate  Infusion 0.116 mEq/kG/Hr (75 mL/Hr) IV Continuous <Continuous>    MEDICATIONS  (PRN):  albuterol    90 MICROgram(s) HFA Inhaler 1 Puff(s) Inhalation every 6 hours PRN for shortness of breath and/or wheezing  dextrose Oral Gel 15 Gram(s) Oral once PRN Blood Glucose LESS THAN 70 milliGRAM(s)/deciliter      ANTIBIOTICS:  cefTRIAXone   IVPB 2000 milliGRAM(s) IV Intermittent every 24 hours    All available historical data has been reviewed    ASSESSMENT  83y M admitted with Altered mental status

## 2023-09-19 NOTE — PROGRESS NOTE ADULT - CONVERSATION DETAILS
Role of palliative care explained. Brother Gabe said he has some idea of his brother's condition. Role of palliative care explained. Brother Gabe said he has some idea of his brother's medical condition. Feels his brother's medical issue is straightforward, as he was told pt was dehydrated and that fluids and a quick dialysis should fix the problem. He said his brother and him discussed code status his brother wants full code.     He said he would definitely agree to hemodialysis. Main concern is medical updated from the primary team. He does not want further palliative care follow up. He thinks goals of care discussions are " premature ' Support provided and attempted to explained that palliative care is for support and education. He said he did not want further education understands full code.

## 2023-09-19 NOTE — PROGRESS NOTE ADULT - PROBLEM SELECTOR PLAN 4
Full Code  - brother Gabe is next of kin, pt unable to participate in GOC discussion  - Patient's brother feels that his brother has a good prognosis and wants full aggressive medical care  - palliative care will sign off

## 2023-09-19 NOTE — PROGRESS NOTE ADULT - SUBJECTIVE AND OBJECTIVE BOX
HPI: 83M with PMH including COPD, DM, anemia, lymphedema, AF on xarelto, HFrEF, DM, here from SNF with AMS, from suspected severe sepsis, started on IV vanco and IV cefepime. Patient also with worsening PAULA on CKD progressing towards anuria, and HAGMA. Palliative care called for GOC.          ADVANCE DIRECTIVES:    [X ] Full Code [ ] DNR  MOLST  [ ]  Living Will  [ ]   DECISION MAKER(s):  [ ] Health Care Proxy(s)  [ ] Surrogate(s)  [ ] Guardian           Name(s): Phone Number(s):  [phone]    BASELINE (I)ADL(s) (prior to admission):  [place]: [ ]Total  [ ] Moderate [ ]Dependent    Allergies    No Known Allergies    Intolerances  MEDICATIONS  (STANDING):  cefTRIAXone   IVPB 2000 milliGRAM(s) IV Intermittent every 24 hours  chlorhexidine 2% Cloths 1 Application(s) Topical <User Schedule>  dextrose 5%. 1000 milliLiter(s) (100 mL/Hr) IV Continuous <Continuous>  dextrose 50% Injectable 25 Gram(s) IV Push once  dextrose 50% Injectable 25 Gram(s) IV Push once  dextrose 50% Injectable 12.5 Gram(s) IV Push once  glucagon  Injectable 1 milliGRAM(s) IntraMuscular once  heparin   Injectable 5000 Unit(s) SubCutaneous every 8 hours  insulin lispro (ADMELOG) corrective regimen sliding scale   SubCutaneous three times a day before meals  mupirocin 2% Ointment 1 Application(s) Both Nostrils two times a day  potassium chloride   Powder 40 milliEquivalent(s) Oral once  sodium bicarbonate 1300 milliGRAM(s) Oral every 8 hours    MEDICATIONS  (PRN):  albuterol    90 MICROgram(s) HFA Inhaler 1 Puff(s) Inhalation every 6 hours PRN for shortness of breath and/or wheezing  dextrose Oral Gel 15 Gram(s) Oral once PRN Blood Glucose LESS THAN 70 milliGRAM(s)/deciliter    PRESENT SYMPTOMS: [x ]Unable to obtain due to poor mentation   Source if other than patient:  [ ]Family   [ ]Team       PAIN AD Score: 2  [email or web address] (press ctrl +  left click to view)      Respiratory Distress Observation Scale (RDOS): 0  A score of 0 to 2 signifies little or no respiratory distress, 3 signifies mild distress, scores 4 to 6 indicate moderate distress, and scores greater than 7 signify severe distress  [email or web address]        PHYSICAL EXAM:  Vital Signs Last 24 Hrs  T(C): 36 (19 Sep 2023 13:15), Max: 37.1 (18 Sep 2023 20:00)  T(F): 96.8 (19 Sep 2023 13:15), Max: 98.7 (18 Sep 2023 20:00)  HR: 85 (19 Sep 2023 13:15) (65 - 91)  BP: 138/60 (19 Sep 2023 13:15) (102/58 - 138/60)  BP(mean): 87 (18 Sep 2023 20:00) (87 - 87)  RR: 18 (19 Sep 2023 13:15) (18 - 19)  SpO2: 100% (19 Sep 2023 13:15) (96% - 100%)    Parameters below as of 19 Sep 2023 05:04  Patient On (Oxygen Delivery Method): room air     I&O's Summary    18 Sep 2023 07:01  -  19 Sep 2023 07:00  --------------------------------------------------------  IN: 0 mL / OUT: 1600 mL / NET: -1600 mL        GENERAL:  [X ] No acute distress [ ]Lethargic  [ ]Unarousable  [ ]Verbal  [ ]Non-Verbal [ ]Cachexia    BEHAVIORAL/PSYCH:  [ ]Alert and Oriented x  [ ] Anxiety [ ] Delirium [ ] Agitation [X ] Calm   EYES: [X ] No scleral icterus [ ] Scleral icterus [ ] Closed  ENMT:  [ ]Dry mouth  [ ]No external oral lesions [X ] No external ear or nose lesions  CARDIOVASCULAR:  [ ]Regular [ ]Irregular [ ]Tachy [ ]Not Tachy  [ ]Jin [ ] Edema [ ] No edema  PULMONARY:  [ ]Tachypnea  [ ]Audible excessive secretions [X ] No labored breathing [ ] labored breathing  GASTROINTESTINAL: [ ]Soft  [ ]Distended  [ X]Not distended [ ]Non tender [ ]Tender  MUSCULOSKELETAL: [ ]No clubbing [ ] clubbing  [X ] No cyanosis [ ] cyanosis  NEUROLOGIC: [ ]No focal deficits  [ ]Follows commands  [ ]Does not follow commands  [X ]Cognitive impairment  [ ]Dysphagia  [ ]Dysarthria  [ ]Paresis   SKIN: [ ] Jaundiced [X ] Non-jaundiced [ ]Rash [ ]No Rash [ ] Warm [ ] Dry  MISC/LINES: [ ] ET tube [ ] Trach [ ]NGT/OGT [ ]PEG [ ][place]      LABS: reviewed by me                        9.2    11.44 )-----------( 362      ( 19 Sep 2023 06:56 )             28.6   09-19    144  |  101  |  111<HH>  ----------------------------<  110<H>  3.3<L>   |  21  |  7.4<HH>    Ca    8.1<L>      19 Sep 2023 06:56    TPro  6.0  /  Alb  2.9<L>  /  TBili  0.3  /  DBili  x   /  AST  28  /  ALT  14  /  AlkPhos  86  09-19      Urinalysis Basic - ( 19 Sep 2023 06:56 )    Color: x / Appearance: x / SG: x / pH: x  Gluc: 110 mg/dL / Ketone: x  / Bili: x / Urobili: x   Blood: x / Protein: x / Nitrite: x   Leuk Esterase: x / RBC: x / WBC x   Sq Epi: x / Non Sq Epi: x / Bacteria: x      RADIOLOGY & ADDITIONAL STUDIES: reviewed by me    EKG: reviewed by me          Palliative Care Spiritual/Emotional Screening Tool Question  Severity (0-4):                    OR                    [X ] Unable to determine/NA  Score of 2 or greater indicates recommendation of Chaplaincy referral  Chaplaincy Referral: [ ] Yes [ ] Refused [ ] Following     Caregiver [place]:  [ ] Yes [ ] No    OR    [x ] Unable to determine. Will assess at later time if appropriate.  Social Work Referral [ ]  Patient and Family Centered Care Referral [ ]    Anticipatory Grief Present: [ ] Yes [ ] No    OR     [ x] Unable to determine. Will assess at later time if appropriate.  Social Work Referral [ ]  Patient and Family Centered Care Referral [ ]    Patient discussed with primary medical team MD  Palliative care education provided to patient and/or family

## 2023-09-19 NOTE — PROGRESS NOTE ADULT - SUBJECTIVE AND OBJECTIVE BOX
Nephrology progress note    THIS IS AN INCOMPLETE NOTE . FULL NOTE TO FOLLOW SHORTLY    Patient is seen and examined, events over the last 24 h noted .    Allergies:  No Known Allergies    Hospital Medications:   MEDICATIONS  (STANDING):  cefTRIAXone   IVPB 2000 milliGRAM(s) IV Intermittent every 24 hours  chlorhexidine 2% Cloths 1 Application(s) Topical <User Schedule>  dextrose 5%. 1000 milliLiter(s) (100 mL/Hr) IV Continuous <Continuous>  dextrose 50% Injectable 25 Gram(s) IV Push once  dextrose 50% Injectable 25 Gram(s) IV Push once  dextrose 50% Injectable 12.5 Gram(s) IV Push once  glucagon  Injectable 1 milliGRAM(s) IntraMuscular once  heparin   Injectable 5000 Unit(s) SubCutaneous every 8 hours  insulin lispro (ADMELOG) corrective regimen sliding scale   SubCutaneous three times a day before meals  mupirocin 2% Ointment 1 Application(s) Both Nostrils two times a day  sodium bicarbonate 1300 milliGRAM(s) Oral every 8 hours        VITALS:  T(F): 96.9 (09-19-23 @ 05:04), Max: 98.7 (09-18-23 @ 20:00)  HR: 65 (09-19-23 @ 05:04)  BP: 102/58 (09-19-23 @ 05:04)  RR: 19 (09-19-23 @ 05:04)  SpO2: 96% (09-19-23 @ 05:04)  Wt(kg): --    09-17 @ 07:01  -  09-18 @ 07:00  --------------------------------------------------------  IN: 0 mL / OUT: 800 mL / NET: -800 mL    09-18 @ 07:01  -  09-19 @ 07:00  --------------------------------------------------------  IN: 0 mL / OUT: 1600 mL / NET: -1600 mL          PHYSICAL EXAM:  Constitutional: NAD  HEENT: anicteric sclera, oropharynx clear, MMM  Neck: No JVD  Respiratory: CTAB, no wheezes, rales or rhonchi  Cardiovascular: S1, S2, RRR  Gastrointestinal: BS+, soft, NT/ND  Extremities: No cyanosis or clubbing. No peripheral edema  :  No barth.   Skin: No rashes    LABS:  09-19    144  |  101  |  111<HH>  ----------------------------<  110<H>  3.3<L>   |  21  |  7.4<HH>  Creatinine Trend: 7.4<--, 7.1<--, 7.0<--, 6.7<--, 6.3<--, 6.0<--  Ca    8.1<L>      19 Sep 2023 06:56    TPro  6.0  /  Alb  2.9<L>  /  TBili  0.3  /  DBili      /  AST  28  /  ALT  14  /  AlkPhos  86  09-19                          9.2    11.44 )-----------( 362      ( 19 Sep 2023 06:56 )             28.6       Urine Studies:  Urinalysis Basic - ( 19 Sep 2023 06:56 )    Color:  / Appearance:  / SG:  / pH:   Gluc: 110 mg/dL / Ketone:   / Bili:  / Urobili:    Blood:  / Protein:  / Nitrite:    Leuk Esterase:  / RBC:  / WBC    Sq Epi:  / Non Sq Epi:  / Bacteria:       Sodium, Random Urine: 78.0 mmoL/L (09-18 @ 11:15)  Creatinine, Random Urine: 42 mg/dL (09-18 @ 11:15)  Osmolality, Random Urine: 299 mos/kg (09-18 @ 11:15)  Potassium, Random Urine: 24 mmol/L (09-18 @ 11:15)              RADIOLOGY & ADDITIONAL STUDIES:   Nephrology progress note    Patient is seen and examined, events over the last 24 h noted .  More awake and communicative today     Allergies:  No Known Allergies    Hospital Medications:   MEDICATIONS  (STANDING):  cefTRIAXone   IVPB 2000 milliGRAM(s) IV Intermittent every 24 hours  glucagon  Injectable 1 milliGRAM(s) IntraMuscular once  heparin   Injectable 5000 Unit(s) SubCutaneous every 8 hours  insulin lispro (ADMELOG) corrective regimen sliding scale   SubCutaneous three times a day before meals  mupirocin 2% Ointment 1 Application(s) Both Nostrils two times a day  sodium bicarbonate 1300 milliGRAM(s) Oral every 8 hours        VITALS:  T(F): 96.9 (09-19-23 @ 05:04), Max: 98.7 (09-18-23 @ 20:00)  HR: 65 (09-19-23 @ 05:04)  BP: 102/58 (09-19-23 @ 05:04)  RR: 19 (09-19-23 @ 05:04)  SpO2: 96% (09-19-23 @ 05:04)      09-17 @ 07:01  -  09-18 @ 07:00  --------------------------------------------------------  IN: 0 mL / OUT: 800 mL / NET: -800 mL    09-18 @ 07:01  -  09-19 @ 07:00  --------------------------------------------------------  IN: 0 mL / OUT: 1600 mL / NET: -1600 mL          PHYSICAL EXAM:  Constitutional: NAD  Respiratory: CTAB,  Cardiovascular: S1, S2, RRR  Gastrointestinal: BS+, soft, NT/ND  Extremities: No cyanosis or clubbing. No peripheral edema  :  No barth.   Skin: No rashes    LABS:  09-19    144  |  101  |  111<HH>  ----------------------------<  110<H>  3.3<L>   |  21  |  7.4<HH>    Creatinine Trend: 7.4<--, 7.1<--, 7.0<--, 6.7<--, 6.3<--, 6.0<--    Ca    8.1<L>      19 Sep 2023 06:56    TPro  6.0  /  Alb  2.9<L>  /  TBili  0.3  /  DBili      /  AST  28  /  ALT  14  /  AlkPhos  86  09-19                          9.2    11.44 )-----------( 362      ( 19 Sep 2023 06:56 )             28.6       Urine Studies:  Urinalysis Basic - ( 19 Sep 2023 06:56 )    Color:  / Appearance:  / SG:  / pH:   Gluc: 110 mg/dL / Ketone:   / Bili:  / Urobili:    Blood:  / Protein:  / Nitrite:    Leuk Esterase:  / RBC:  / WBC    Sq Epi:  / Non Sq Epi:  / Bacteria:       Sodium, Random Urine: 78.0 mmoL/L (09-18 @ 11:15)  Creatinine, Random Urine: 42 mg/dL (09-18 @ 11:15)  Osmolality, Random Urine: 299 mos/kg (09-18 @ 11:15)  Potassium, Random Urine: 24 mmol/L (09-18 @ 11:15)              RADIOLOGY & ADDITIONAL STUDIES:

## 2023-09-19 NOTE — PROGRESS NOTE ADULT - SUBJECTIVE AND OBJECTIVE BOX
24H events:    Patient is a 83y old Male who presents with a chief complaint of AMS (19 Sep 2023 09:23)    Primary diagnosis of Altered mental status      Day 1:  Day 2:  Day 3:     Today is hospital day 4d. This morning patient was seen and examined at bedside, resting comfortably in bed.    No acute or major events overnight.    Code Status:    Family communication:  Contact date:  Name of person contacted:  Relationship to patient:  Communication details:  What matters most:    PAST MEDICAL & SURGICAL HISTORY  HTN (hypertension)    COPD (chronic obstructive pulmonary disease)    High cholesterol    Mild anemia    Coffee ground emesis    Chronic diastolic heart failure    PAULA (acute kidney injury)    No significant past surgical history      SOCIAL HISTORY:  Social History:      ALLERGIES:  No Known Allergies    MEDICATIONS:  STANDING MEDICATIONS  cefTRIAXone   IVPB 2000 milliGRAM(s) IV Intermittent every 24 hours  chlorhexidine 2% Cloths 1 Application(s) Topical <User Schedule>  dextrose 5%. 1000 milliLiter(s) IV Continuous <Continuous>  dextrose 50% Injectable 25 Gram(s) IV Push once  dextrose 50% Injectable 25 Gram(s) IV Push once  dextrose 50% Injectable 12.5 Gram(s) IV Push once  glucagon  Injectable 1 milliGRAM(s) IntraMuscular once  heparin   Injectable 5000 Unit(s) SubCutaneous every 8 hours  insulin lispro (ADMELOG) corrective regimen sliding scale   SubCutaneous three times a day before meals  mupirocin 2% Ointment 1 Application(s) Both Nostrils two times a day  sodium bicarbonate 1300 milliGRAM(s) Oral every 8 hours    PRN MEDICATIONS  albuterol    90 MICROgram(s) HFA Inhaler 1 Puff(s) Inhalation every 6 hours PRN  dextrose Oral Gel 15 Gram(s) Oral once PRN    VITALS:   T(F): 96.9  HR: 65  BP: 102/58  RR: 19  SpO2: 96%    PHYSICAL EXAM:  Gen: NAD, lying in bed with legs slighlty bent ,   HEENT: Normocephalic, atraumatic  Neck: supple, no lymphadenopathy  CV: Irregular ; S1S2 normal , no murmur  Lungs: decreased BS at bases, no fremitus  Abdomen: Soft, BS present  Ext: Warm, well perfused  Neuro: non focal, awake, ANOx 1  Skin: Left heel DTI - no cellulitis in left leg   Sacral Ulcer without gross drainage/erythema   Lines: no phlebitis    ( x ) Indwelling Mayfield Catheter:   Date insterted:  09/17/2023  Reason (  ) Critical illness     (  ) urinary retention    (  ) Accurate Ins/Outs Monitoring     (  ) CMO patient        LABS:                        9.2    11.44 )-----------( 362      ( 19 Sep 2023 06:56 )             28.6     09-19    144  |  101  |  111<HH>  ----------------------------<  110<H>  3.3<L>   |  21  |  7.4<HH>    Ca    8.1<L>      19 Sep 2023 06:56    TPro  6.0  /  Alb  2.9<L>  /  TBili  0.3  /  DBili  x   /  AST  28  /  ALT  14  /  AlkPhos  86  09-19      Urinalysis Basic - ( 19 Sep 2023 06:56 )    Color: x / Appearance: x / SG: x / pH: x  Gluc: 110 mg/dL / Ketone: x  / Bili: x / Urobili: x   Blood: x / Protein: x / Nitrite: x   Leuk Esterase: x / RBC: x / WBC x   Sq Epi: x / Non Sq Epi: x / Bacteria: x            Culture - Blood (collected 17 Sep 2023 08:20)  Source: .Blood None  Preliminary Report (18 Sep 2023 15:01):    No growth at 24 hours          RADIOLOGY:  < from: US Renal (09.15.23 @ 19:38) >  MPRESSION:    1.  Poor visualization of the left kidney. No gross hydronephrosis.  2.  Debris in the bladder, correlate with urinalysis.      < from: CT Head No Cont (09.15.23 @ 15:35) >  IMPRESSION:    No evidence of acute intracranial hemorrhage or acute territorial infarct.    No evidence of mass or midline shift.

## 2023-09-20 LAB
ALBUMIN SERPL ELPH-MCNC: 3.1 G/DL — LOW (ref 3.5–5.2)
ALP SERPL-CCNC: 84 U/L — SIGNIFICANT CHANGE UP (ref 30–115)
ALT FLD-CCNC: 14 U/L — SIGNIFICANT CHANGE UP (ref 0–41)
ANION GAP SERPL CALC-SCNC: 21 MMOL/L — HIGH (ref 7–14)
APTT BLD: 32.9 SEC — SIGNIFICANT CHANGE UP (ref 27–39.2)
AST SERPL-CCNC: 23 U/L — SIGNIFICANT CHANGE UP (ref 0–41)
BILIRUB SERPL-MCNC: 0.2 MG/DL — SIGNIFICANT CHANGE UP (ref 0.2–1.2)
BUN SERPL-MCNC: 130 MG/DL — CRITICAL HIGH (ref 10–20)
CALCIUM SERPL-MCNC: 8.3 MG/DL — LOW (ref 8.4–10.5)
CHLORIDE SERPL-SCNC: 103 MMOL/L — SIGNIFICANT CHANGE UP (ref 98–110)
CO2 SERPL-SCNC: 23 MMOL/L — SIGNIFICANT CHANGE UP (ref 17–32)
CREAT SERPL-MCNC: 7.4 MG/DL — CRITICAL HIGH (ref 0.7–1.5)
EGFR: 7 ML/MIN/1.73M2 — LOW
GLUCOSE BLDC GLUCOMTR-MCNC: 110 MG/DL — HIGH (ref 70–99)
GLUCOSE BLDC GLUCOMTR-MCNC: 127 MG/DL — HIGH (ref 70–99)
GLUCOSE BLDC GLUCOMTR-MCNC: 138 MG/DL — HIGH (ref 70–99)
GLUCOSE BLDC GLUCOMTR-MCNC: 194 MG/DL — HIGH (ref 70–99)
GLUCOSE SERPL-MCNC: 113 MG/DL — HIGH (ref 70–99)
HCT VFR BLD CALC: 29.3 % — LOW (ref 42–52)
HCT VFR BLD CALC: 30.1 % — LOW (ref 42–52)
HGB BLD-MCNC: 9 G/DL — LOW (ref 14–18)
HGB BLD-MCNC: 9.4 G/DL — LOW (ref 14–18)
MAGNESIUM SERPL-MCNC: 2 MG/DL — SIGNIFICANT CHANGE UP (ref 1.8–2.4)
MCHC RBC-ENTMCNC: 23.9 PG — LOW (ref 27–31)
MCHC RBC-ENTMCNC: 24.2 PG — LOW (ref 27–31)
MCHC RBC-ENTMCNC: 30.7 G/DL — LOW (ref 32–37)
MCHC RBC-ENTMCNC: 31.2 G/DL — LOW (ref 32–37)
MCV RBC AUTO: 77.4 FL — LOW (ref 80–94)
MCV RBC AUTO: 77.9 FL — LOW (ref 80–94)
NRBC # BLD: 0 /100 WBCS — SIGNIFICANT CHANGE UP (ref 0–0)
NRBC # BLD: 0 /100 WBCS — SIGNIFICANT CHANGE UP (ref 0–0)
PLATELET # BLD AUTO: 325 K/UL — SIGNIFICANT CHANGE UP (ref 130–400)
PLATELET # BLD AUTO: 365 K/UL — SIGNIFICANT CHANGE UP (ref 130–400)
PMV BLD: 10 FL — SIGNIFICANT CHANGE UP (ref 7.4–10.4)
PMV BLD: 10.1 FL — SIGNIFICANT CHANGE UP (ref 7.4–10.4)
POTASSIUM SERPL-MCNC: 3.5 MMOL/L — SIGNIFICANT CHANGE UP (ref 3.5–5)
POTASSIUM SERPL-SCNC: 3.5 MMOL/L — SIGNIFICANT CHANGE UP (ref 3.5–5)
PROT SERPL-MCNC: 5.9 G/DL — LOW (ref 6–8)
RBC # BLD: 3.76 M/UL — LOW (ref 4.7–6.1)
RBC # BLD: 3.89 M/UL — LOW (ref 4.7–6.1)
RBC # FLD: 19.7 % — HIGH (ref 11.5–14.5)
RBC # FLD: 19.9 % — HIGH (ref 11.5–14.5)
SODIUM SERPL-SCNC: 147 MMOL/L — HIGH (ref 135–146)
WBC # BLD: 11.37 K/UL — HIGH (ref 4.8–10.8)
WBC # BLD: 11.88 K/UL — HIGH (ref 4.8–10.8)
WBC # FLD AUTO: 11.37 K/UL — HIGH (ref 4.8–10.8)
WBC # FLD AUTO: 11.88 K/UL — HIGH (ref 4.8–10.8)

## 2023-09-20 PROCEDURE — 99232 SBSQ HOSP IP/OBS MODERATE 35: CPT

## 2023-09-20 PROCEDURE — 74018 RADEX ABDOMEN 1 VIEW: CPT | Mod: 26

## 2023-09-20 RX ORDER — HEPARIN SODIUM 5000 [USP'U]/ML
INJECTION INTRAVENOUS; SUBCUTANEOUS
Qty: 25000 | Refills: 0 | Status: DISCONTINUED | OUTPATIENT
Start: 2023-09-20 | End: 2023-09-22

## 2023-09-20 RX ORDER — HEPARIN SODIUM 5000 [USP'U]/ML
4000 INJECTION INTRAVENOUS; SUBCUTANEOUS EVERY 6 HOURS
Refills: 0 | Status: DISCONTINUED | OUTPATIENT
Start: 2023-09-20 | End: 2023-09-23

## 2023-09-20 RX ORDER — HEPARIN SODIUM 5000 [USP'U]/ML
8000 INJECTION INTRAVENOUS; SUBCUTANEOUS EVERY 6 HOURS
Refills: 0 | Status: DISCONTINUED | OUTPATIENT
Start: 2023-09-20 | End: 2023-09-23

## 2023-09-20 RX ORDER — SODIUM CHLORIDE 9 MG/ML
1000 INJECTION, SOLUTION INTRAVENOUS
Refills: 0 | Status: DISCONTINUED | OUTPATIENT
Start: 2023-09-20 | End: 2023-09-20

## 2023-09-20 RX ORDER — HEPARIN SODIUM 5000 [USP'U]/ML
8000 INJECTION INTRAVENOUS; SUBCUTANEOUS ONCE
Refills: 0 | Status: COMPLETED | OUTPATIENT
Start: 2023-09-20 | End: 2023-09-20

## 2023-09-20 RX ORDER — SODIUM CHLORIDE 9 MG/ML
1000 INJECTION, SOLUTION INTRAVENOUS
Refills: 0 | Status: DISCONTINUED | OUTPATIENT
Start: 2023-09-20 | End: 2023-09-23

## 2023-09-20 RX ADMIN — MUPIROCIN 1 APPLICATION(S): 20 OINTMENT TOPICAL at 06:36

## 2023-09-20 RX ADMIN — SODIUM CHLORIDE 70 MILLILITER(S): 9 INJECTION, SOLUTION INTRAVENOUS at 13:45

## 2023-09-20 RX ADMIN — SODIUM CHLORIDE 75 MILLILITER(S): 9 INJECTION, SOLUTION INTRAVENOUS at 16:11

## 2023-09-20 RX ADMIN — HEPARIN SODIUM 5000 UNIT(S): 5000 INJECTION INTRAVENOUS; SUBCUTANEOUS at 06:36

## 2023-09-20 RX ADMIN — CHLORHEXIDINE GLUCONATE 1 APPLICATION(S): 213 SOLUTION TOPICAL at 06:37

## 2023-09-20 RX ADMIN — HEPARIN SODIUM 8000 UNIT(S): 5000 INJECTION INTRAVENOUS; SUBCUTANEOUS at 19:01

## 2023-09-20 RX ADMIN — HEPARIN SODIUM 5000 UNIT(S): 5000 INJECTION INTRAVENOUS; SUBCUTANEOUS at 13:45

## 2023-09-20 RX ADMIN — Medication 1300 MILLIGRAM(S): at 21:44

## 2023-09-20 RX ADMIN — MUPIROCIN 1 APPLICATION(S): 20 OINTMENT TOPICAL at 17:24

## 2023-09-20 RX ADMIN — CEFTRIAXONE 100 MILLIGRAM(S): 500 INJECTION, POWDER, FOR SOLUTION INTRAMUSCULAR; INTRAVENOUS at 17:24

## 2023-09-20 RX ADMIN — HEPARIN SODIUM 1800 UNIT(S)/HR: 5000 INJECTION INTRAVENOUS; SUBCUTANEOUS at 19:03

## 2023-09-20 NOTE — PROGRESS NOTE ADULT - SUBJECTIVE AND OBJECTIVE BOX
24H events:    Patient is a 83y old Male who presents with a chief complaint of AMS (20 Sep 2023 09:39)    Primary diagnosis of Altered mental status      Today is hospital day 5d. This morning patient was seen and examined at bedside, resting comfortably in bed.    No acute or major events overnight.    Code Status:    Family communication:  Contact date:  Name of person contacted:  Relationship to patient:  Communication details:  What matters most:    PAST MEDICAL & SURGICAL HISTORY  HTN (hypertension)    COPD (chronic obstructive pulmonary disease)    High cholesterol    Mild anemia    Coffee ground emesis    Chronic diastolic heart failure    PAULA (acute kidney injury)    No significant past surgical history      SOCIAL HISTORY:  Social History:      ALLERGIES:  No Known Allergies    MEDICATIONS:  STANDING MEDICATIONS  cefTRIAXone   IVPB 2000 milliGRAM(s) IV Intermittent every 24 hours  chlorhexidine 2% Cloths 1 Application(s) Topical <User Schedule>  dextrose 5%. 1000 milliLiter(s) IV Continuous <Continuous>  dextrose 50% Injectable 25 Gram(s) IV Push once  dextrose 50% Injectable 25 Gram(s) IV Push once  dextrose 50% Injectable 12.5 Gram(s) IV Push once  glucagon  Injectable 1 milliGRAM(s) IntraMuscular once  heparin   Injectable 8000 Unit(s) IV Push once  heparin  Infusion.  Unit(s)/Hr IV Continuous <Continuous>  insulin lispro (ADMELOG) corrective regimen sliding scale   SubCutaneous three times a day before meals  lactated ringers. 1000 milliLiter(s) IV Continuous <Continuous>  mupirocin 2% Ointment 1 Application(s) Both Nostrils two times a day  sodium bicarbonate 1300 milliGRAM(s) Oral every 8 hours    PRN MEDICATIONS  albuterol    90 MICROgram(s) HFA Inhaler 1 Puff(s) Inhalation every 6 hours PRN  dextrose Oral Gel 15 Gram(s) Oral once PRN  heparin   Injectable 4000 Unit(s) IV Push every 6 hours PRN  heparin   Injectable 8000 Unit(s) IV Push every 6 hours PRN    VITALS:   T(F): 97.8  HR: 92  BP: 122/66  RR: 18  SpO2: 96%    PHYSICAL EXAM:  Gen: NAD, lying in bed with legs slighlty bent , ANO times 1  HEENT: Normocephalic, atraumatic  Neck: supple, no lymphadenopathy  CV: Irregular ; S1S2 normal , no murmur  Lungs: decreased BS at bases, no fremitus  Abdomen: Soft, BS present  Ext: Warm, well perfused  Neuro: non focal, awake, ANOx 1  Skin: Left heel DTI - no cellulitis in left leg   Sacral Ulcer without gross drainage/erythema   Lines: no phlebitis    ( x ) Indwelling Mayfield Catheter:   Date insterted:  09/17/2023  Reason (  ) Critical illness     (  ) urinary retention    (  ) Accurate Ins/Outs Monitoring     (  ) CMO patient          LABS:                        9.0    11.88 )-----------( 365      ( 20 Sep 2023 06:17 )             29.3     09-20    147<H>  |  103  |  130<HH>  ----------------------------<  113<H>  3.5   |  23  |  7.4<HH>    Ca    8.3<L>      20 Sep 2023 06:17  Mg     2.0     09-20    TPro  5.9<L>  /  Alb  3.1<L>  /  TBili  0.2  /  DBili  x   /  AST  23  /  ALT  14  /  AlkPhos  84  09-20      Urinalysis Basic - ( 20 Sep 2023 06:17 )    Color: x / Appearance: x / SG: x / pH: x  Gluc: 113 mg/dL / Ketone: x  / Bili: x / Urobili: x   Blood: x / Protein: x / Nitrite: x   Leuk Esterase: x / RBC: x / WBC x   Sq Epi: x / Non Sq Epi: x / Bacteria: x                RADIOLOGY:    < from: US Renal (09.15.23 @ 19:38) >  MPRESSION:    1.  Poor visualization of the left kidney. No gross hydronephrosis.  2.  Debris in the bladder, correlate with urinalysis.      < from: CT Head No Cont (09.15.23 @ 15:35) >  IMPRESSION:    No evidence of acute intracranial hemorrhage or acute territorial infarct.    No evidence of mass or midline shift.

## 2023-09-20 NOTE — PROGRESS NOTE ADULT - ASSESSMENT
83M DM2, COPD, Anemia, Paroxysmal Afib on xarelto, HFrEF, sent to the Hospital by [facility] for AMS. Pt was recently admitted to [facility] from [facility] for OM needing IV Abx Cefepime/Vancomycin at [facility]. Pt has been having worsening in MS over the last 2 days per NH records. Pt currently responds to touch and voice but does not speak. Tracks eyes.     #Metabolic Encephalopathy   #Suspected Severe Sepsis (Hypotention/AMS/PAULA/Oliguric)   #PAULA vs CKD w/ Progression   #HAGMA 2/2 PAULA   #Oliguric Renal Failure moving towards Anuria  #History of Left Foot OM w/ surrounding Cellulitis   #Sacral DTI / Stage 1 POA     - CT head negative   - Creatinine trending up ( 6.0>6.3>6.7>7.0>7.1)   - Bicarbonate drip stopped and 1300 meq sodium bicarbonate 3 times daily added   - Urine lytes and osmol normal   - d/c ed cefepime and vanco  - vanco random level 50(increased)   - ESR and CRP increased   - Blood culture negative   - UA negative   - Per Nephrology, patient is not in volume overloaded condition without electrolyte abnormlaity, No need for RRT for now  - F/u with Nephro   - Under LR at 75cc/hr     #Left heel ulcers  - CT scan leg : IMPRESSION:  No CT evidence of osteomyelitis or necrotizing infection. No drainable   collection seen, within the limitations of a noncontrast exam.  - NO acute surgical intervention required as per Podiatry      #Compensated HFrEF  #Paroxysmal Afib  - echo 8/14/2023 EF 35-40% from [facility] admission    -ECHO: < from: TTE Echo Complete w/o Contrast w/ Doppler (09.18.23 @ 11:27) >  Summary:   1. Technically difficult study.   2. Left ventricular ejection fraction, by visual estimation, is >55%.   3. Normal global left ventricular systolic function.   4. The left ventricular diastolic function could not be assessed in this   study.   5. The aortic valve was not well visualized; appears calcified with   grossly normal opening.   6. Mild mitral regurgitation.   7. Mild tricuspid regurgitation.      - hold home metoprolol and all other BP meds due to hypotension  - hold xarelto due to PAULA   -under heparin full anticoagulation     #DM2  - hold home empagliflozin  - FS q6  - Betahydro butyrate negative     Guarded Prognosis   - reassess for improvement if not may need SDU    DVT : heparin full anticoagulation   Diet: bite sized and soft , 1x1 feed    Monitor BP and Urine output     Family:   Talked with his brother about GOC, according to him, patients wish was to be full code before.   will follow up  Updated on the patient's condition and about the poor prognosis.

## 2023-09-20 NOTE — PROGRESS NOTE ADULT - ASSESSMENT
82 yo m ho DM, COPD, anemia, paroxysmal afib on xarelto, HFrEF, DM coming in from nursing home for AMS x 2 days. Found to have toxic metabolic encephalopathy with PAULA.    PAULA/ toxic metabolic encephalopathy/ HAGMA/ HFrEF  possible ATN iso hypotension and possible sepsis/ vanco toxicity / last level 60   no hydro on imaging/ oliguric today   restart LR at 75 cc per hour   vanco on hold   check phosphorus  level   cr trending up / non oliguric c/w vanco toxicity   cont  sodium bicarbonate to  1300 q 8   no acute indication for RRT still will follow labs and UOP  discussed with primary team   will follow

## 2023-09-20 NOTE — SWALLOW BEDSIDE ASSESSMENT ADULT - COMMENTS
pt aversive to further PO trials despite encouragement. Pt seen by SLP service this admission, pt aversive to PO trials at time of evaluation.

## 2023-09-20 NOTE — PROGRESS NOTE ADULT - SUBJECTIVE AND OBJECTIVE BOX
Patient is a 83y old  Male who presents with a chief complaint of AMS (20 Sep 2023 09:39)      OVERNIGHT EVENTS: no major events reported  per house staff no reported fever, chills, syncope, seizure, headache, cough, SOB, abd pain, N/V/D, urinary symptoms, legs swelling,   SUBJECTIVE / INTERVAL HPI: Patient seen and examined at bedside.     VITAL SIGNS:  Vital Signs Last 24 Hrs  T(C): 36.6 (20 Sep 2023 13:04), Max: 36.6 (19 Sep 2023 20:26)  T(F): 97.8 (20 Sep 2023 13:04), Max: 97.9 (19 Sep 2023 20:26)  HR: 92 (20 Sep 2023 13:04) (60 - 94)  BP: 122/66 (20 Sep 2023 13:04) (100/53 - 122/66)  BP(mean): --  RR: 18 (20 Sep 2023 13:04) (18 - 19)  SpO2: 96% (20 Sep 2023 13:04) (96% - 100%)    Parameters below as of 20 Sep 2023 05:01  Patient On (Oxygen Delivery Method): room air        PHYSICAL EXAM:    General: old male chronically look ill   HEENT: NC/AT; PERRL, clear conjunctiva  Neck: supple  Cardiovascular: +S1/S2; RRR  Respiratory: CTA b/l; no W/R/R  Gastrointestinal: soft, NT/ND; +BSx4  Extremities: WWP; 2+ peripheral pulses; no edema, left foot dressing   Neurological: alert and awake, confused; no focal deficits    MEDICATIONS:  MEDICATIONS  (STANDING):  cefTRIAXone   IVPB 2000 milliGRAM(s) IV Intermittent every 24 hours  chlorhexidine 2% Cloths 1 Application(s) Topical <User Schedule>  dextrose 5%. 1000 milliLiter(s) (100 mL/Hr) IV Continuous <Continuous>  dextrose 50% Injectable 25 Gram(s) IV Push once  dextrose 50% Injectable 25 Gram(s) IV Push once  dextrose 50% Injectable 12.5 Gram(s) IV Push once  glucagon  Injectable 1 milliGRAM(s) IntraMuscular once  heparin   Injectable 5000 Unit(s) SubCutaneous every 8 hours  insulin lispro (ADMELOG) corrective regimen sliding scale   SubCutaneous three times a day before meals  lactated ringers. 1000 milliLiter(s) (70 mL/Hr) IV Continuous <Continuous>  mupirocin 2% Ointment 1 Application(s) Both Nostrils two times a day  sodium bicarbonate 1300 milliGRAM(s) Oral every 8 hours    MEDICATIONS  (PRN):  albuterol    90 MICROgram(s) HFA Inhaler 1 Puff(s) Inhalation every 6 hours PRN for shortness of breath and/or wheezing  dextrose Oral Gel 15 Gram(s) Oral once PRN Blood Glucose LESS THAN 70 milliGRAM(s)/deciliter      ALLERGIES:  Allergies    No Known Allergies    Intolerances        LABS:                        9.0    11.88 )-----------( 365      ( 20 Sep 2023 06:17 )             29.3     09-20    147<H>  |  103  |  130<HH>  ----------------------------<  113<H>  3.5   |  23  |  7.4<HH>    Ca    8.3<L>      20 Sep 2023 06:17  Mg     2.0     09-20    TPro  5.9<L>  /  Alb  3.1<L>  /  TBili  0.2  /  DBili  x   /  AST  23  /  ALT  14  /  AlkPhos  84  09-20      Urinalysis Basic - ( 20 Sep 2023 06:17 )    Color: x / Appearance: x / SG: x / pH: x  Gluc: 113 mg/dL / Ketone: x  / Bili: x / Urobili: x   Blood: x / Protein: x / Nitrite: x   Leuk Esterase: x / RBC: x / WBC x   Sq Epi: x / Non Sq Epi: x / Bacteria: x      CAPILLARY BLOOD GLUCOSE      POCT Blood Glucose.: 194 mg/dL (20 Sep 2023 11:31)      RADIOLOGY & ADDITIONAL TESTS: Reviewed.    ASSESSMENT:    PLAN:  Patient is a 83y old  Male who presents with a chief complaint of AMS (20 Sep 2023 09:39)      OVERNIGHT EVENTS: no major events reported  per house staff no reported fever, chills, syncope, seizure, headache, cough, SOB, abd pain, N/V/D, urinary symptoms, legs swelling,   SUBJECTIVE / INTERVAL HPI: Patient seen and examined at bedside.     VITAL SIGNS:  Vital Signs Last 24 Hrs  T(C): 36.6 (20 Sep 2023 13:04), Max: 36.6 (19 Sep 2023 20:26)  T(F): 97.8 (20 Sep 2023 13:04), Max: 97.9 (19 Sep 2023 20:26)  HR: 92 (20 Sep 2023 13:04) (60 - 94)  BP: 122/66 (20 Sep 2023 13:04) (100/53 - 122/66)  BP(mean): --  RR: 18 (20 Sep 2023 13:04) (18 - 19)  SpO2: 96% (20 Sep 2023 13:04) (96% - 100%)    Parameters below as of 20 Sep 2023 05:01  Patient On (Oxygen Delivery Method): room air        PHYSICAL EXAM:    General: old male chronically look ill   HEENT: NC/AT; PERRL, clear conjunctiva  Neck: supple  Cardiovascular: +S1/S2; RRR  Respiratory: CTA b/l; no W/R/R  Gastrointestinal: soft, NT/ND; +BSx4  Extremities: WWP; 2+ peripheral pulses; no edema, left foot dressing   Neurological: drowsy but  awake, confused, oriented x 0-1, no focal deficits    MEDICATIONS:  MEDICATIONS  (STANDING):  cefTRIAXone   IVPB 2000 milliGRAM(s) IV Intermittent every 24 hours  chlorhexidine 2% Cloths 1 Application(s) Topical <User Schedule>  dextrose 5%. 1000 milliLiter(s) (100 mL/Hr) IV Continuous <Continuous>  dextrose 50% Injectable 25 Gram(s) IV Push once  dextrose 50% Injectable 25 Gram(s) IV Push once  dextrose 50% Injectable 12.5 Gram(s) IV Push once  glucagon  Injectable 1 milliGRAM(s) IntraMuscular once  heparin   Injectable 5000 Unit(s) SubCutaneous every 8 hours  insulin lispro (ADMELOG) corrective regimen sliding scale   SubCutaneous three times a day before meals  lactated ringers. 1000 milliLiter(s) (70 mL/Hr) IV Continuous <Continuous>  mupirocin 2% Ointment 1 Application(s) Both Nostrils two times a day  sodium bicarbonate 1300 milliGRAM(s) Oral every 8 hours    MEDICATIONS  (PRN):  albuterol    90 MICROgram(s) HFA Inhaler 1 Puff(s) Inhalation every 6 hours PRN for shortness of breath and/or wheezing  dextrose Oral Gel 15 Gram(s) Oral once PRN Blood Glucose LESS THAN 70 milliGRAM(s)/deciliter      ALLERGIES:  Allergies    No Known Allergies    Intolerances        LABS:                        9.0    11.88 )-----------( 365      ( 20 Sep 2023 06:17 )             29.3     09-20    147<H>  |  103  |  130<HH>  ----------------------------<  113<H>  3.5   |  23  |  7.4<HH>    Ca    8.3<L>      20 Sep 2023 06:17  Mg     2.0     09-20    TPro  5.9<L>  /  Alb  3.1<L>  /  TBili  0.2  /  DBili  x   /  AST  23  /  ALT  14  /  AlkPhos  84  09-20      Urinalysis Basic - ( 20 Sep 2023 06:17 )    Color: x / Appearance: x / SG: x / pH: x  Gluc: 113 mg/dL / Ketone: x  / Bili: x / Urobili: x   Blood: x / Protein: x / Nitrite: x   Leuk Esterase: x / RBC: x / WBC x   Sq Epi: x / Non Sq Epi: x / Bacteria: x      CAPILLARY BLOOD GLUCOSE      POCT Blood Glucose.: 194 mg/dL (20 Sep 2023 11:31)      RADIOLOGY & ADDITIONAL TESTS: Reviewed.    ASSESSMENT:    PLAN:

## 2023-09-20 NOTE — SWALLOW BEDSIDE ASSESSMENT ADULT - SLP PERTINENT HISTORY OF CURRENT PROBLEM
Pt is an 84 y/o M w/ PMHx: DM2, COPD, anemia, paroxysmal Afib on Xarelto, HFrEF, sent to the hospital by [facility] for AMS. Pt recently admitted to [facility] from [facility] for OM needing IV Abx. Pt has been having worsening mental status over the last 2 days per NH records. Pt is being treated for metabolic encephalopathy, suspected severe sepsis, PAULA vs. CKD, HAGMA. CTH (-). CXR 9/19-> retrocardiac opacification, atelectatic. Pt is an 82 y/o M w/ PMHx: DM2, COPD, anemia, paroxysmal Afib on Xarelto, HFrEF, sent to the hospital by [facility] for AMS. Pt recently admitted to [facility] from [facility] for OM needing IV Abx. Pt has been having worsening mental status over the last 2 days per NH records. Pt is being treated for metabolic encephalopathy, suspected severe sepsis, PAULA vs. CKD, HAGMA. CTH (-). CXR 9/19-> retrocardiac opacification, atelectatic. Pt seen by palliative, family requesting full code and aggressive medical tx.

## 2023-09-20 NOTE — PROGRESS NOTE ADULT - SUBJECTIVE AND OBJECTIVE BOX
Nephrology progress note    THIS IS AN INCOMPLETE NOTE . FULL NOTE TO FOLLOW SHORTLY    Patient is seen and examined, events over the last 24 h noted .    Allergies:  No Known Allergies    Hospital Medications:   MEDICATIONS  (STANDING):  cefTRIAXone   IVPB 2000 milliGRAM(s) IV Intermittent every 24 hours  chlorhexidine 2% Cloths 1 Application(s) Topical <User Schedule>  dextrose 5%. 1000 milliLiter(s) (100 mL/Hr) IV Continuous <Continuous>  dextrose 50% Injectable 25 Gram(s) IV Push once  dextrose 50% Injectable 25 Gram(s) IV Push once  dextrose 50% Injectable 12.5 Gram(s) IV Push once  glucagon  Injectable 1 milliGRAM(s) IntraMuscular once  heparin   Injectable 5000 Unit(s) SubCutaneous every 8 hours  insulin lispro (ADMELOG) corrective regimen sliding scale   SubCutaneous three times a day before meals  mupirocin 2% Ointment 1 Application(s) Both Nostrils two times a day  sodium bicarbonate 1300 milliGRAM(s) Oral every 8 hours        VITALS:  T(F): 97.6 (09-20-23 @ 05:01), Max: 97.9 (09-19-23 @ 20:26)  HR: 94 (09-20-23 @ 05:01)  BP: 111/56 (09-20-23 @ 05:01)  RR: 19 (09-20-23 @ 05:01)  SpO2: 96% (09-20-23 @ 05:01)  Wt(kg): --    09-18 @ 07:01  -  09-19 @ 07:00  --------------------------------------------------------  IN: 0 mL / OUT: 1600 mL / NET: -1600 mL    09-19 @ 07:01  -  09-20 @ 07:00  --------------------------------------------------------  IN: 0 mL / OUT: 600 mL / NET: -600 mL          PHYSICAL EXAM:  Constitutional: NAD  HEENT: anicteric sclera, oropharynx clear, MMM  Neck: No JVD  Respiratory: CTAB, no wheezes, rales or rhonchi  Cardiovascular: S1, S2, RRR  Gastrointestinal: BS+, soft, NT/ND  Extremities: No cyanosis or clubbing. No peripheral edema  :  No barth.   Skin: No rashes    LABS:  09-20    147<H>  |  103  |  130<HH>  ----------------------------<  113<H>  3.5   |  23  |  7.4<HH>    Ca    8.3<L>      20 Sep 2023 06:17  Mg     2.0     09-20    TPro  5.9<L>  /  Alb  3.1<L>  /  TBili  0.2  /  DBili      /  AST  23  /  ALT  14  /  AlkPhos  84  09-20                          9.0    11.88 )-----------( 365      ( 20 Sep 2023 06:17 )             29.3       Urine Studies:  Urinalysis Basic - ( 20 Sep 2023 06:17 )    Color:  / Appearance:  / SG:  / pH:   Gluc: 113 mg/dL / Ketone:   / Bili:  / Urobili:    Blood:  / Protein:  / Nitrite:    Leuk Esterase:  / RBC:  / WBC    Sq Epi:  / Non Sq Epi:  / Bacteria:       Sodium, Random Urine: 78.0 mmoL/L (09-18 @ 11:15)  Creatinine, Random Urine: 42 mg/dL (09-18 @ 11:15)  Osmolality, Random Urine: 299 mos/kg (09-18 @ 11:15)  Potassium, Random Urine: 24 mmol/L (09-18 @ 11:15)              RADIOLOGY & ADDITIONAL STUDIES:   Nephrology progress note    Patient is seen and examined, events over the last 24 h noted .  Lying in bed comfortable     Allergies:  No Known Allergies    Hospital Medications:   MEDICATIONS  (STANDING):  cefTRIAXone   IVPB 2000 milliGRAM(s) IV Intermittent every 24 hours  chlorhexidine 2% Cloths 1 Application(s) Topical <User Schedule>  glucagon  Injectable 1 milliGRAM(s) IntraMuscular once  heparin   Injectable 5000 Unit(s) SubCutaneous every 8 hours  insulin lispro (ADMELOG) corrective regimen sliding scale   SubCutaneous three times a day before meals  mupirocin 2% Ointment 1 Application(s) Both Nostrils two times a day  sodium bicarbonate 1300 milliGRAM(s) Oral every 8 hours        VITALS:  T(F): 97.6 (09-20-23 @ 05:01), Max: 97.9 (09-19-23 @ 20:26)  HR: 94 (09-20-23 @ 05:01)  BP: 111/56 (09-20-23 @ 05:01)  RR: 19 (09-20-23 @ 05:01)  SpO2: 96% (09-20-23 @ 05:01)      09-18 @ 07:01  -  09-19 @ 07:00  --------------------------------------------------------  IN: 0 mL / OUT: 1600 mL / NET: -1600 mL    09-19 @ 07:01  -  09-20 @ 07:00  --------------------------------------------------------  IN: 0 mL / OUT: 600 mL / NET: -600 mL          PHYSICAL EXAM:  Constitutional: NAD  Respiratory: CTAB,   Cardiovascular: S1, S2, RRR  Gastrointestinal: BS+, soft, NT/ND  Extremities: No cyanosis or clubbing. No peripheral edema  :  No barth.   Skin: No rashes    LABS:  09-20    147<H>  |  103  |  130<HH>  ----------------------------<  113<H>  3.5   |  23  |  7.4<HH>    Ca    8.3<L>      20 Sep 2023 06:17  Mg     2.0     09-20    TPro  5.9<L>  /  Alb  3.1<L>  /  TBili  0.2  /  DBili      /  AST  23  /  ALT  14  /  AlkPhos  84  09-20                          9.0    11.88 )-----------( 365      ( 20 Sep 2023 06:17 )             29.3       Urine Studies:  Urinalysis Basic - ( 20 Sep 2023 06:17 )    Color:  / Appearance:  / SG:  / pH:   Gluc: 113 mg/dL / Ketone:   / Bili:  / Urobili:    Blood:  / Protein:  / Nitrite:    Leuk Esterase:  / RBC:  / WBC    Sq Epi:  / Non Sq Epi:  / Bacteria:       Sodium, Random Urine: 78.0 mmoL/L (09-18 @ 11:15)  Creatinine, Random Urine: 42 mg/dL (09-18 @ 11:15)  Osmolality, Random Urine: 299 mos/kg (09-18 @ 11:15)  Potassium, Random Urine: 24 mmol/L (09-18 @ 11:15)              RADIOLOGY & ADDITIONAL STUDIES:

## 2023-09-20 NOTE — PROGRESS NOTE ADULT - ASSESSMENT
83M DM2, COPD, Anemia, Paroxysmal Afib on xarelto, HFrEF, sent to the Hospital by [facility] for AMS. Pt was recently admitted to [facility] from [facility] for OM   needing IV Abx Cefepime/Vancomycin at [facility]. Pt has been having worsening in MS over the last 2 days per NH records. Pt currently responds to touch and voice but   does not speak. Tracks eyes.     #Toxic Metabolic Encephalopathy multifactorial   #Severe Sepsis Ruled Out (Hypotention/AMS/PAULA/Oliguric)   #PAULA vs CKD w/ Progression   #HAGMA 2/2 Severe PAULA   #Vancomycin Toxicity Level 60.4 POA  #Cefepime Toxicity (dose needed adjustment to kidney function)   #Oliguric Renal Failure moving towards Anuria  #Left Foot OM w/ surrounding Cellulitis   #Sacral DTI / Stage 1 POA - off load dressing / turning q2-4hrs    83M DM2, COPD, Anemia, Paroxysmal Afib on xarelto, HFrEF, sent to the Hospital by [facility] for AMS. Pt was recently admitted to [facility] from [facility] for OM   needing IV Abx Cefepime/Vancomycin at [facility]. Pt has been having worsening in MS over the last 2 days per NH records. Pt currently responds to touch and voice but   does not speak. Tracks eyes.     #Toxic Metabolic Encephalopathy multifactorial   #Severe Sepsis ( Hypotention /AMS /PAULA / Oliguric )   #non oliguric PAULA on CKD2,, nash ATN from hypotension, also Vancomycin Toxicity   #HAGMA    #Left Foot OM w/ surrounding Cellulitis   #Sacral DTI / Stage 1 POA - off load dressing / turning q2-4hrs   #HFrEF  #Paroxysmal Afib  #DM2    Plans:    - cont w bicarb PO  - strict I/Os, might need barth  - keep off Vancomycin  - nephro onboard, no indication for RRT, restart IVF LR   - diet per S/S, poor po intake  - 1:! feeding, aspiration precaution,   - no active infection on CT LE, ID f/u for cont Rocephin or not   - resume AC w heparin drip, monitor PTT per normogram   - palliative following for GOC, per brother, full code, apply all aggressive measures if needed   - Brother Gabe Lemons. can be reached at [phone]    very poor prognosis     83M DM2, COPD, Anemia, Paroxysmal Afib on xarelto, HFrEF, sent to the Hospital by [facility] for AMS. Pt was recently admitted to [facility] from [facility] for OM   needing IV Abx Cefepime/Vancomycin at [facility]. Pt has been having worsening in MS over the last 2 days per NH records. Pt currently responds to touch and voice but   does not speak. Tracks eyes.     #Toxic Metabolic Encephalopathy multifactorial   #Severe Sepsis ( Hypotention /AMS /PAULA / Oliguric )   #non oliguric PAULA on CKD2,, nash ATN from hypotension, also Vancomycin Toxicity   #HAGMA    #Left Foot OM w/ surrounding Cellulitis   #Sacral DTI / Stage 1 POA - off load dressing / turning q2-4hrs   #HFrEF  #Paroxysmal Afib  #DM2    Plans:    - cont w bicarb PO  - strict I/Os, might need barth  - keep off Vancomycin  - nephro onboard, no indication for RRT, restart IVF LR   - diet per S/S, poor po intake  - 1:! feeding, aspiration precaution,   - no active infection on CT LE, ID f/u for cont Rocephin or not   - resume AC w heparin drip, monitor PTT per normogram   - palliative following for GOC, per brother, full code, apply all aggressive measures if needed   - updated his Brother Gabe Lemons on 9/20. can be reached at [phone]    very poor prognosis

## 2023-09-21 LAB
ALBUMIN SERPL ELPH-MCNC: 2.9 G/DL — LOW (ref 3.5–5.2)
ALP SERPL-CCNC: 81 U/L — SIGNIFICANT CHANGE UP (ref 30–115)
ALT FLD-CCNC: 12 U/L — SIGNIFICANT CHANGE UP (ref 0–41)
ANION GAP SERPL CALC-SCNC: 20 MMOL/L — HIGH (ref 7–14)
APTT BLD: 100.4 SEC — CRITICAL HIGH (ref 27–39.2)
APTT BLD: >200 SEC — CRITICAL HIGH (ref 27–39.2)
APTT BLD: >200 SEC — CRITICAL HIGH (ref 27–39.2)
AST SERPL-CCNC: 21 U/L — SIGNIFICANT CHANGE UP (ref 0–41)
BILIRUB SERPL-MCNC: 0.2 MG/DL — SIGNIFICANT CHANGE UP (ref 0.2–1.2)
BUN SERPL-MCNC: 127 MG/DL — CRITICAL HIGH (ref 10–20)
CALCIUM SERPL-MCNC: 8 MG/DL — LOW (ref 8.4–10.5)
CHLORIDE SERPL-SCNC: 102 MMOL/L — SIGNIFICANT CHANGE UP (ref 98–110)
CO2 SERPL-SCNC: 23 MMOL/L — SIGNIFICANT CHANGE UP (ref 17–32)
CREAT SERPL-MCNC: 8.2 MG/DL — CRITICAL HIGH (ref 0.7–1.5)
CULTURE RESULTS: SIGNIFICANT CHANGE UP
EGFR: 6 ML/MIN/1.73M2 — LOW
GLUCOSE BLDC GLUCOMTR-MCNC: 120 MG/DL — HIGH (ref 70–99)
GLUCOSE BLDC GLUCOMTR-MCNC: 123 MG/DL — HIGH (ref 70–99)
GLUCOSE BLDC GLUCOMTR-MCNC: 94 MG/DL — SIGNIFICANT CHANGE UP (ref 70–99)
GLUCOSE BLDC GLUCOMTR-MCNC: 94 MG/DL — SIGNIFICANT CHANGE UP (ref 70–99)
GLUCOSE SERPL-MCNC: 118 MG/DL — HIGH (ref 70–99)
HCT VFR BLD CALC: 28.1 % — LOW (ref 42–52)
HGB BLD-MCNC: 8.6 G/DL — LOW (ref 14–18)
MAGNESIUM SERPL-MCNC: 1.9 MG/DL — SIGNIFICANT CHANGE UP (ref 1.8–2.4)
MCHC RBC-ENTMCNC: 24.1 PG — LOW (ref 27–31)
MCHC RBC-ENTMCNC: 30.6 G/DL — LOW (ref 32–37)
MCV RBC AUTO: 78.7 FL — LOW (ref 80–94)
NRBC # BLD: 0 /100 WBCS — SIGNIFICANT CHANGE UP (ref 0–0)
PHOSPHATE SERPL-MCNC: 7.4 MG/DL — HIGH (ref 2.1–4.9)
PLATELET # BLD AUTO: 326 K/UL — SIGNIFICANT CHANGE UP (ref 130–400)
PMV BLD: 9.5 FL — SIGNIFICANT CHANGE UP (ref 7.4–10.4)
POTASSIUM SERPL-MCNC: 3.2 MMOL/L — LOW (ref 3.5–5)
POTASSIUM SERPL-SCNC: 3.2 MMOL/L — LOW (ref 3.5–5)
PROT SERPL-MCNC: 5.8 G/DL — LOW (ref 6–8)
RBC # BLD: 3.57 M/UL — LOW (ref 4.7–6.1)
RBC # FLD: 19.9 % — HIGH (ref 11.5–14.5)
SODIUM SERPL-SCNC: 145 MMOL/L — SIGNIFICANT CHANGE UP (ref 135–146)
SPECIMEN SOURCE: SIGNIFICANT CHANGE UP
WBC # BLD: 11.34 K/UL — HIGH (ref 4.8–10.8)
WBC # FLD AUTO: 11.34 K/UL — HIGH (ref 4.8–10.8)

## 2023-09-21 PROCEDURE — 99232 SBSQ HOSP IP/OBS MODERATE 35: CPT

## 2023-09-21 RX ADMIN — Medication 1300 MILLIGRAM(S): at 21:57

## 2023-09-21 RX ADMIN — CHLORHEXIDINE GLUCONATE 1 APPLICATION(S): 213 SOLUTION TOPICAL at 05:33

## 2023-09-21 RX ADMIN — HEPARIN SODIUM 1500 UNIT(S)/HR: 5000 INJECTION INTRAVENOUS; SUBCUTANEOUS at 12:11

## 2023-09-21 RX ADMIN — MUPIROCIN 1 APPLICATION(S): 20 OINTMENT TOPICAL at 05:33

## 2023-09-21 RX ADMIN — HEPARIN SODIUM 0 UNIT(S)/HR: 5000 INJECTION INTRAVENOUS; SUBCUTANEOUS at 11:10

## 2023-09-21 RX ADMIN — SODIUM CHLORIDE 75 MILLILITER(S): 9 INJECTION, SOLUTION INTRAVENOUS at 05:32

## 2023-09-21 RX ADMIN — Medication 1300 MILLIGRAM(S): at 05:31

## 2023-09-21 RX ADMIN — HEPARIN SODIUM 1300 UNIT(S)/HR: 5000 INJECTION INTRAVENOUS; SUBCUTANEOUS at 18:56

## 2023-09-21 RX ADMIN — HEPARIN SODIUM 1500 UNIT(S)/HR: 5000 INJECTION INTRAVENOUS; SUBCUTANEOUS at 13:24

## 2023-09-21 RX ADMIN — MUPIROCIN 1 APPLICATION(S): 20 OINTMENT TOPICAL at 17:42

## 2023-09-21 NOTE — SWALLOW BEDSIDE ASSESSMENT ADULT - SLP GENERAL OBSERVATIONS
pt received in bed awake more alert w/o c/o pain. +room air pt received in bed awake more alert w/o c/o pain. +room air +staff report pt declining to drink thickened liquids, tolerated sips of unthickened water

## 2023-09-21 NOTE — PROGRESS NOTE ADULT - SUBJECTIVE AND OBJECTIVE BOX
seen and examined  24 h events noted   no distress         PAST HISTORY  --------------------------------------------------------------------------------  No significant changes to PMH, PSH, FHx, SHx, unless otherwise noted    ALLERGIES & MEDICATIONS  --------------------------------------------------------------------------------  Allergies    No Known Allergies    Intolerances      Standing Inpatient Medications  cefTRIAXone   IVPB 2000 milliGRAM(s) IV Intermittent every 24 hours  chlorhexidine 2% Cloths 1 Application(s) Topical <User Schedule>  dextrose 5%. 1000 milliLiter(s) IV Continuous <Continuous>  dextrose 50% Injectable 25 Gram(s) IV Push once  dextrose 50% Injectable 25 Gram(s) IV Push once  dextrose 50% Injectable 12.5 Gram(s) IV Push once  glucagon  Injectable 1 milliGRAM(s) IntraMuscular once  heparin  Infusion.  Unit(s)/Hr IV Continuous <Continuous>  insulin lispro (ADMELOG) corrective regimen sliding scale   SubCutaneous three times a day before meals  lactated ringers. 1000 milliLiter(s) IV Continuous <Continuous>  mupirocin 2% Ointment 1 Application(s) Both Nostrils two times a day  sodium bicarbonate 1300 milliGRAM(s) Oral every 8 hours    PRN Inpatient Medications  albuterol    90 MICROgram(s) HFA Inhaler 1 Puff(s) Inhalation every 6 hours PRN  dextrose Oral Gel 15 Gram(s) Oral once PRN  heparin   Injectable 4000 Unit(s) IV Push every 6 hours PRN  heparin   Injectable 8000 Unit(s) IV Push every 6 hours PRN          VITALS/PHYSICAL EXAM  --------------------------------------------------------------------------------  T(C): 36.4 (09-21-23 @ 05:02), Max: 36.8 (09-20-23 @ 20:58)  HR: 76 (09-21-23 @ 05:02) (76 - 99)  BP: 117/55 (09-21-23 @ 05:02) (113/71 - 122/66)  RR: 19 (09-21-23 @ 05:02) (18 - 19)  SpO2: 94% (09-21-23 @ 05:02) (94% - 98%)  Wt(kg): --        09-20-23 @ 07:01  -  09-21-23 @ 07:00  --------------------------------------------------------  IN: 545 mL / OUT: 650 mL / NET: -105 mL      Physical Exam:  	Gen: NAD,  	Pulm: decrease BS  B/L  	CV:  S1S2; no rub  	Abd: +distended  	LE: dressing       LABS/STUDIES  --------------------------------------------------------------------------------              9.4    11.37 >-----------<  325      [09-20-23 @ 16:49]              30.1     147  |  103  |  130  ----------------------------<  113      [09-20-23 @ 06:17]  3.5   |  23  |  7.4        Ca     8.3     [09-20-23 @ 06:17]      Mg     2.0     [09-20-23 @ 06:17]    TPro  5.9  /  Alb  3.1  /  TBili  0.2  /  DBili  x   /  AST  23  /  ALT  14  /  AlkPhos  84  [09-20-23 @ 06:17]      PTT: >200.0      [09-21-23 @ 00:48]      Creatinine Trend:  SCr 7.4 [09-20 @ 06:17]  SCr 7.4 [09-19 @ 06:56]  SCr 7.1 [09-18 @ 17:02]  SCr 7.0 [09-17 @ 08:20]  SCr 6.7 [09-16 @ 22:13]    Urinalysis - [09-20-23 @ 06:17]      Color  / Appearance  / SG  / pH       Gluc 113 / Ketone   / Bili  / Urobili        Blood  / Protein  / Leuk Est  / Nitrite       RBC  / WBC  / Hyaline  / Gran  / Sq Epi  / Non Sq Epi  / Bacteria     Urine Creatinine 42      [09-18-23 @ 11:15]  Urine Sodium 78.0      [09-18-23 @ 11:15]  Urine Urea Nitrogen 256      [09-18-23 @ 11:15]  Urine Potassium 24      [09-18-23 @ 11:15]  Urine Osmolality 299      [09-18-23 @ 11:15]

## 2023-09-21 NOTE — PROGRESS NOTE ADULT - ASSESSMENT
83M DM2, COPD, Anemia, Paroxysmal Afib on xarelto, HFrEF, sent to the Hospital by [facility] for AMS. Pt was recently admitted to [facility] from [facility] for OM   needing IV Abx Cefepime/Vancomycin at [facility]. Pt has been having worsening in MS over the last 2 days per NH records. Pt currently responds to touch and voice but   does not speak. Tracks eyes.     #Toxic Metabolic Encephalopathy multifactorial   #Severe Sepsis ( Hypotention /AMS /PAULA / Oliguric )   #non oliguric PAULA on CKD2,, nash ATN from hypotension, also Vancomycin Toxicity   #HAGMA    #Left Foot OM w/ surrounding Cellulitis   #Sacral DTI / Stage 1 POA - off load dressing / turning q2-4hrs   #HFrEF  #Paroxysmal Afib  #DM2    Plans:    - cont w bicarb PO  - strict I/Os, might need barth  - keep off Vancomycin  - nephro onboard, no indication for RRT, on IVF LR 70cc/hr  - diet per S/S, poor po intake  - 1:! feeding, aspiration precaution,   - no active infection on CT LE, ID f/u for cont Rocephin or not   - resume AC w heparin drip, monitor PTT per normogram   - palliative following for GOC, per brother, full code, apply all aggressive measures if needed   - updated his Brother Gabe Lemons on 9/20. can be reached at [phone]    very poor prognosis   83M DM2, COPD, Anemia, Paroxysmal Afib on xarelto, HFrEF, sent to the Hospital by [facility] for AMS. Pt was recently admitted to [facility] from [facility] for OM   needing IV Abx Cefepime/Vancomycin at [facility]. Pt has been having worsening in MS over the last 2 days per NH records. Pt currently responds to touch and voice but   does not speak. Tracks eyes.     #Toxic Metabolic Encephalopathy multifactorial   #Severe Sepsis ( Hypotention /AMS /PAULA / Oliguric )   #non oliguric PAULA on CKD2,, nash ATN from hypotension, also Vancomycin Toxicity   #HAGMA    #Left Foot OM w/ surrounding Cellulitis   #Sacral DTI / Stage 1 POA - off load dressing / turning q2-4hrs   #HFrEF  #Paroxysmal Afib  #DM2    Plans:    - cont w bicarb PO  - strict I/Os, has barth  - keep off Vancomycin  - nephro onboard ofr possible need RRT, Cr still up, on IVF LR 70cc/hr  - diet per S/S, poor po intake  - 1:! feeding, aspiration precaution,   - no active infection on CT LE, ID f/u for cont Rocephin or not   - resume AC w heparin drip, monitor PTT per normogram   - palliative following for GOC, per brother, full code, apply all aggressive measures if needed   - updated his Brother Gabe Lemons on 9/20. can be reached at [phone]    very poor prognosis   83M DM2, COPD, Anemia, Paroxysmal Afib on xarelto, HFrEF, sent to the Hospital by [facility] for AMS. Pt was recently admitted to [facility] from [facility] for OM   needing IV Abx Cefepime/Vancomycin at [facility] Pt has been having worsening in MS over the last 2 days per NH records. Pt currently responds to touch and voice but   does not speak. Tracks eyes.     #Toxic Metabolic Encephalopathy multifactorial   #Severe Sepsis ( Hypotention /AMS /PAULA / Oliguric )   #non oliguric PAULA on CKD2,, nash ATN from hypotension, also Vancomycin Toxicity   #HAGMA    #Left Foot OM w/ surrounding Cellulitis   #Sacral DTI / Stage 1 POA - off load dressing / turning q2-4hrs   #HFrEF  #Paroxysmal Afib  #DM2    Plans:    - cont w bicarb PO  - strict I/Os, has barth  - keep off Vancomycin  - nephro onboard ofr possible need RRT, Cr still up, on IVF LR 70cc/hr  - diet per S/S, poor po intake  - 1:1 feeding, aspiration precaution,   - no active infection on CT LE, ID f/u for cont Rocephin or not   - resume AC w heparin drip, monitor PTT per normogram   - palliative following for GOC, per brother, full code, apply all aggressive measures if needed   - updated his Brother Gabe Lemons on 9/20. can be reached at [phone]    very poor prognosis

## 2023-09-21 NOTE — PROGRESS NOTE ADULT - ASSESSMENT
ASSESSMENT  82 yo m ho DM, COPD, anemia, lymphedemia, afib on xarelto, HFrEF, DM coming in from nursing home for AMS x 2 days. Unable to gather story from pt as he is altered and lethargic.    IMPRESSION  #Toxic Metabolic Encepahlopathy  #PAULA/Uremia - while on vancomycin/cefepime  - Renal US without hydro    #LLE Cellulitis/OM  - planned for vancomycin until 9/23/2023 and cefepime 10/5/2023 via PICC  - CT Left Lower extremity without evidence of OM     #DM II  #Lymphedema  #Abx allergy: No Known Allergies    Lines: PICC line in RUE from previous hospitalization     RECOMMENDATIONS  - no clear convncing signs of infection of LLE and sacrum  - stop all antibiotics and monitor   - recall as needed    Please call or message on [employer] Teams if with any questions.  Spectra [phone]

## 2023-09-21 NOTE — PROGRESS NOTE ADULT - SUBJECTIVE AND OBJECTIVE BOX
Patient is a 83y old  Male who presents with a chief complaint of AMS (20 Sep 2023 15:51)      OVERNIGHT EVENTS: no major events, no reported denies fever, chills, syncope, seizure, headache, cough, SOB, abd pain, N/V/D, urinary symptoms, legs swelling,     SUBJECTIVE / INTERVAL HPI: Patient seen and examined at bedside.     VITAL SIGNS:  Vital Signs Last 24 Hrs  T(C): 36.4 (21 Sep 2023 05:02), Max: 36.8 (20 Sep 2023 20:58)  T(F): 97.6 (21 Sep 2023 05:02), Max: 98.2 (20 Sep 2023 20:58)  HR: 76 (21 Sep 2023 05:02) (76 - 99)  BP: 117/55 (21 Sep 2023 05:02) (113/71 - 122/66)  BP(mean): --  RR: 19 (21 Sep 2023 05:02) (18 - 19)  SpO2: 94% (21 Sep 2023 05:02) (94% - 98%)    Parameters below as of 21 Sep 2023 05:02  Patient On (Oxygen Delivery Method): room air        PHYSICAL EXAM:    General: old male chronically look ill   HEENT: NC/AT; PERRL, clear conjunctiva  Neck: supple  Cardiovascular: +S1/S2; RRR  Respiratory: CTA b/l; no W/R/R  Gastrointestinal: soft, NT/ND; +BSx4  Extremities: WWP; 2+ peripheral pulses; no edema, left foot dressing     MEDICATIONS:  MEDICATIONS  (STANDING):  cefTRIAXone   IVPB 2000 milliGRAM(s) IV Intermittent every 24 hours  chlorhexidine 2% Cloths 1 Application(s) Topical <User Schedule>  dextrose 5%. 1000 milliLiter(s) (100 mL/Hr) IV Continuous <Continuous>  dextrose 50% Injectable 25 Gram(s) IV Push once  dextrose 50% Injectable 25 Gram(s) IV Push once  dextrose 50% Injectable 12.5 Gram(s) IV Push once  glucagon  Injectable 1 milliGRAM(s) IntraMuscular once  heparin  Infusion.  Unit(s)/Hr (18 mL/Hr) IV Continuous <Continuous>  insulin lispro (ADMELOG) corrective regimen sliding scale   SubCutaneous three times a day before meals  lactated ringers. 1000 milliLiter(s) (75 mL/Hr) IV Continuous <Continuous>  mupirocin 2% Ointment 1 Application(s) Both Nostrils two times a day  sodium bicarbonate 1300 milliGRAM(s) Oral every 8 hours    MEDICATIONS  (PRN):  albuterol    90 MICROgram(s) HFA Inhaler 1 Puff(s) Inhalation every 6 hours PRN for shortness of breath and/or wheezing  dextrose Oral Gel 15 Gram(s) Oral once PRN Blood Glucose LESS THAN 70 milliGRAM(s)/deciliter  heparin   Injectable 4000 Unit(s) IV Push every 6 hours PRN For aPTT between 40 - 57  heparin   Injectable 8000 Unit(s) IV Push every 6 hours PRN For aPTT less than 40      ALLERGIES:  Allergies    No Known Allergies    Intolerances        LABS:                        9.4    11.37 )-----------( 325      ( 20 Sep 2023 16:49 )             30.1     09-20    147<H>  |  103  |  130<HH>  ----------------------------<  113<H>  3.5   |  23  |  7.4<HH>    Ca    8.3<L>      20 Sep 2023 06:17  Mg     2.0     09-20    TPro  5.9<L>  /  Alb  3.1<L>  /  TBili  0.2  /  DBili  x   /  AST  23  /  ALT  14  /  AlkPhos  84  09-20    PTT - ( 21 Sep 2023 00:48 )  PTT:>200.0 sec  Urinalysis Basic - ( 20 Sep 2023 06:17 )    Color: x / Appearance: x / SG: x / pH: x  Gluc: 113 mg/dL / Ketone: x  / Bili: x / Urobili: x   Blood: x / Protein: x / Nitrite: x   Leuk Esterase: x / RBC: x / WBC x   Sq Epi: x / Non Sq Epi: x / Bacteria: x      CAPILLARY BLOOD GLUCOSE      POCT Blood Glucose.: 127 mg/dL (20 Sep 2023 20:50)      RADIOLOGY & ADDITIONAL TESTS: Reviewed.    ASSESSMENT:    PLAN:  Patient is a 83y old  Male who presents with a chief complaint of AMS (20 Sep 2023 15:51)      OVERNIGHT EVENTS: no major events, more awake and oriented today, no reported denies fever, chills, syncope, seizure, headache, cough, SOB, abd pain, N/V/D, urinary symptoms, legs swelling,     SUBJECTIVE / INTERVAL HPI: Patient seen and examined at bedside.     VITAL SIGNS:  Vital Signs Last 24 Hrs  T(C): 36.4 (21 Sep 2023 05:02), Max: 36.8 (20 Sep 2023 20:58)  T(F): 97.6 (21 Sep 2023 05:02), Max: 98.2 (20 Sep 2023 20:58)  HR: 76 (21 Sep 2023 05:02) (76 - 99)  BP: 117/55 (21 Sep 2023 05:02) (113/71 - 122/66)  BP(mean): --  RR: 19 (21 Sep 2023 05:02) (18 - 19)  SpO2: 94% (21 Sep 2023 05:02) (94% - 98%)    Parameters below as of 21 Sep 2023 05:02  Patient On (Oxygen Delivery Method): room air        PHYSICAL EXAM:    General: old male chronically look ill   HEENT: NC/AT; PERRL, clear conjunctiva  Neck: supple  Cardiovascular: +S1/S2; RRR  Respiratory: CTA b/l; no W/R/R  Gastrointestinal: soft, NT/ND; +BSx4, barth w clear urine in the bag  Extremities: WWP; 2+ peripheral pulses; no edema, left foot dressing   CNS: more awake comaring to yesterday, alert, oriented x1-2, no focal deficits       MEDICATIONS:  MEDICATIONS  (STANDING):  cefTRIAXone   IVPB 2000 milliGRAM(s) IV Intermittent every 24 hours  chlorhexidine 2% Cloths 1 Application(s) Topical <User Schedule>  dextrose 5%. 1000 milliLiter(s) (100 mL/Hr) IV Continuous <Continuous>  dextrose 50% Injectable 25 Gram(s) IV Push once  dextrose 50% Injectable 25 Gram(s) IV Push once  dextrose 50% Injectable 12.5 Gram(s) IV Push once  glucagon  Injectable 1 milliGRAM(s) IntraMuscular once  heparin  Infusion.  Unit(s)/Hr (18 mL/Hr) IV Continuous <Continuous>  insulin lispro (ADMELOG) corrective regimen sliding scale   SubCutaneous three times a day before meals  lactated ringers. 1000 milliLiter(s) (75 mL/Hr) IV Continuous <Continuous>  mupirocin 2% Ointment 1 Application(s) Both Nostrils two times a day  sodium bicarbonate 1300 milliGRAM(s) Oral every 8 hours    MEDICATIONS  (PRN):  albuterol    90 MICROgram(s) HFA Inhaler 1 Puff(s) Inhalation every 6 hours PRN for shortness of breath and/or wheezing  dextrose Oral Gel 15 Gram(s) Oral once PRN Blood Glucose LESS THAN 70 milliGRAM(s)/deciliter  heparin   Injectable 4000 Unit(s) IV Push every 6 hours PRN For aPTT between 40 - 57  heparin   Injectable 8000 Unit(s) IV Push every 6 hours PRN For aPTT less than 40      ALLERGIES:  Allergies    No Known Allergies    Intolerances        LABS:                        9.4    11.37 )-----------( 325      ( 20 Sep 2023 16:49 )             30.1     09-20    147<H>  |  103  |  130<HH>  ----------------------------<  113<H>  3.5   |  23  |  7.4<HH>    Ca    8.3<L>      20 Sep 2023 06:17  Mg     2.0     09-20    TPro  5.9<L>  /  Alb  3.1<L>  /  TBili  0.2  /  DBili  x   /  AST  23  /  ALT  14  /  AlkPhos  84  09-20    PTT - ( 21 Sep 2023 00:48 )  PTT:>200.0 sec  Urinalysis Basic - ( 20 Sep 2023 06:17 )    Color: x / Appearance: x / SG: x / pH: x  Gluc: 113 mg/dL / Ketone: x  / Bili: x / Urobili: x   Blood: x / Protein: x / Nitrite: x   Leuk Esterase: x / RBC: x / WBC x   Sq Epi: x / Non Sq Epi: x / Bacteria: x      CAPILLARY BLOOD GLUCOSE      POCT Blood Glucose.: 127 mg/dL (20 Sep 2023 20:50)      RADIOLOGY & ADDITIONAL TESTS: Reviewed.    ASSESSMENT:    PLAN:

## 2023-09-21 NOTE — PROGRESS NOTE ADULT - ASSESSMENT
82 yo m ho DM, COPD, anemia, paroxysmal afib on xarelto, HFrEF, DM coming in from nursing home for AMS x 2 days. Found to have toxic metabolic encephalopathy with PAULA.  PAULA/ toxic metabolic encephalopathy/ HAGMA/ HFrEF  possible ATN iso hypotension and possible sepsis/ vanco toxicity / last level 60   no hydro on imaging/   UO noted   LR at 75 cc per hour   vanco on hold   check phosphorus  level    follow labs today   cont  sodium bicarbonate to  1300 q 8    if no improvement will start HD   will follow

## 2023-09-21 NOTE — PROGRESS NOTE ADULT - ASSESSMENT
83M DM2, COPD, Anemia, Paroxysmal Afib on xarelto, HFrEF, sent to the Hospital by [facility] for AMS. Pt was recently admitted to [facility] from [facility] for OM needing IV Abx Cefepime/Vancomycin at [facility]. Pt has been having worsening in MS over the last 2 days per NH records. Pt currently responds to touch and voice but does not speak. Tracks eyes.     #Metabolic Encephalopathy   #Suspected Severe Sepsis (Hypotention/AMS/PAULA/Oliguric)   #PAULA vs CKD w/ Progression   #HAGMA 2/2 PAULA   #Oliguric Renal Failure moving towards Anuria  #History of Left Foot OM w/ surrounding Cellulitis   #Sacral DTI / Stage 1 POA     - CT head negative   - Creatinine trending up ( 6.0>6.3>6.7>7.0>7.1>8.4)   - Bicarbonate drip stopped and 1300 meq sodium bicarbonate 3 times daily added   - On fluids LR 75ml/hr  - Phosphorus 7.4   - Urine lytes and osmol normal   - d/c ed cefepime and vanco  - vanco random level 50(increased)   - ESR and CRP increased   - Blood culture negative   - UA negative   - Per Nephrology, patient is not in volume overloaded condition without electrolyte abnormlaity, No need for RRT for now  - F/u with Nephro       #Left heel ulcers  - CT scan leg : IMPRESSION:  No CT evidence of osteomyelitis or necrotizing infection. No drainable   collection seen, within the limitations of a noncontrast exam.  - NO acute surgical intervention required as per Podiatry      #Compensated HFrEF  #Paroxysmal Afib  - echo 8/14/2023 EF 35-40% from [facility] admission    -ECHO: < from: TTE Echo Complete w/o Contrast w/ Doppler (09.18.23 @ 11:27) >  Summary:   1. Technically difficult study.   2. Left ventricular ejection fraction, by visual estimation, is >55%.   3. Normal global left ventricular systolic function.   4. The left ventricular diastolic function could not be assessed in this   study.   5. The aortic valve was not well visualized; appears calcified with   grossly normal opening.   6. Mild mitral regurgitation.   7. Mild tricuspid regurgitation.      - hold home metoprolol and all other BP meds due to hypotension  - hold xarelto due to PAULA   - under heparin full anticoagulation   - ptt >200, adjusting the heparin dose     #DM2  - hold home empagliflozin  - FS q6  - Betahydro butyrate negative     Guarded Prognosis   - reassess for improvement if not may need SDU    DVT : heparin full anticoagulation ; patient aptt >200 ; holded heparin   Diet: bite sized and soft , 1x1 feed    Monitor BP and Urine output     Family:   Talked with his brother about GOC, according to him, patients wish was to be full code before.   will follow up  Updated on the patient's condition and about the poor prognosis.       83M DM2, COPD, Anemia, Paroxysmal Afib on xarelto, HFrEF, sent to the Hospital by [facility] for AMS. Pt was recently admitted to [facility] from [facility] for OM needing IV Abx Cefepime/Vancomycin at [facility]. Pt has been having worsening in MS over the last 2 days per NH records. Pt currently responds to touch and voice but does not speak. Tracks eyes.     #Metabolic Encephalopathy   #Suspected Severe Sepsis (Hypotention/AMS/PAULA/Oliguric)   #PAULA vs CKD w/ Progression   #HAGMA 2/2 PAULA   #Oliguric Renal Failure moving towards Anuria  #History of Left Foot OM w/ surrounding Cellulitis   #Sacral DTI / Stage 1 POA     - CT head negative   - Creatinine trending up ( 6.0>6.3>6.7>7.0>7.1>8.4)   - Bicarbonate drip stopped and 1300 meq sodium bicarbonate 3 times daily added   - On fluids LR 75ml/hr  - Phosphorus 7.4   - Urine lytes and osmol normal   - d/c ed cefepime and vanco  - vanco random level 50(increased)   - ESR and CRP increased   - Blood culture negative   - UA negative   - Per Nephrology, patient is not in volume overloaded condition without electrolyte abnormlaity, No need for RRT for now  - F/u with Nephro       #Left heel ulcers  - CT scan leg : IMPRESSION:  No CT evidence of osteomyelitis or necrotizing infection. No drainable   collection seen, within the limitations of a noncontrast exam.  - NO acute surgical intervention required as per Podiatry      #Compensated HFrEF  #Paroxysmal Afib  - echo 8/14/2023 EF 35-40% from [facility] admission    -ECHO: < from: TTE Echo Complete w/o Contrast w/ Doppler (09.18.23 @ 11:27) >  Summary:   1. Technically difficult study.   2. Left ventricular ejection fraction, by visual estimation, is >55%.   3. Normal global left ventricular systolic function.   4. The left ventricular diastolic function could not be assessed in this   study.   5. The aortic valve was not well visualized; appears calcified with   grossly normal opening.   6. Mild mitral regurgitation.   7. Mild tricuspid regurgitation.      - hold home metoprolol and all other BP meds due to hypotension  - hold xarelto due to PAULA   - under heparin full anticoagulation   - ptt >200, adjusting the heparin dose     #DM2  - hold home empagliflozin  - FS q6  - Betahydro butyrate negative     Guarded Prognosis   - reassess for improvement if not may need SDU    DVT : heparin full anticoagulation ; patient aptt >200 ; holded heparin   Diet: bite sized and soft , 1x1 feed    Monitor BP and Urine output     Family:   Talked with his brother about GOC, according to him, patients wish was to be full code before.   will follow up  Updated on the patient's condition and about the poor prognosis.

## 2023-09-21 NOTE — SWALLOW BEDSIDE ASSESSMENT ADULT - ASR SWALLOW REFERRAL
consider dietician c/s if poor po intake persists/Registered Dietitian
consider dietician c/s if poor po intake persists/Registered Dietitian

## 2023-09-21 NOTE — PROGRESS NOTE ADULT - SUBJECTIVE AND OBJECTIVE BOX
NIMO SARMIENTO  83y, Male  Allergy: No Known Allergies      LOS  6d    CHIEF COMPLAINT: AMS (21 Sep 2023 11:22)      INTERVAL EVENTS/HPI  - No acute events overnight  - T(F): , Max: 98.2 (09-20-23 @ 20:58)  - Denies any worsening symptoms  - Tolerating medication  - WBC Count: 11.34 (09-21-23 @ 08:45)  WBC Count: 11.37 (09-20-23 @ 16:49)     - Creatinine: 8.2 (09-21-23 @ 08:45)  Creatinine: 7.4 (09-20-23 @ 06:17)       ROS  General: Denies rigors, nightsweats  HEENT: Denies headache, rhinorrhea, sore throat, eye pain  CV: Denies CP, palpitations  PULM: Denies wheezing, hemoptysis  GI: Denies hematemesis, hematochezia, melena  : Denies discharge, hematuria  MSK: Denies arthralgias, myalgias  SKIN: Denies rash, lesions  NEURO: Denies paresthesias, weakness  PSYCH: Denies depression, anxiety    VITALS:  T(F): 97.1, Max: 98.2 (09-20-23 @ 20:58)  HR: 70  BP: 109/56  RR: 17Vital Signs Last 24 Hrs  T(C): 36.2 (21 Sep 2023 13:00), Max: 36.8 (20 Sep 2023 20:58)  T(F): 97.1 (21 Sep 2023 13:00), Max: 98.2 (20 Sep 2023 20:58)  HR: 70 (21 Sep 2023 13:00) (70 - 99)  BP: 109/56 (21 Sep 2023 13:00) (109/56 - 117/55)  BP(mean): --  RR: 17 (21 Sep 2023 13:00) (17 - 19)  SpO2: 98% (21 Sep 2023 13:00) (94% - 98%)    Parameters below as of 21 Sep 2023 05:02  Patient On (Oxygen Delivery Method): room air        PHYSICAL EXAM:  Gen: NAD, resting in bed  HEENT: Normocephalic, atraumatic  Neck: supple, no lymphadenopathy  CV: Regular rate & regular rhythm  Lungs: decreased BS at bases, no fremitus  Abdomen: Soft, BS present  Ext: Warm, well perfused  Neuro: non focal, awake  Skin: Stage IV sacral ulcer without erythema, drainage - left heel DTI  Lines: no phlebitis    FH: Non-contributory  Social Hx: Non-contributory    TESTS & MEASUREMENTS:                        8.6    11.34 )-----------( 326      ( 21 Sep 2023 08:45 )             28.1     09-21    145  |  102  |  127<HH>  ----------------------------<  118<H>  3.2<L>   |  23  |  8.2<HH>    Ca    8.0<L>      21 Sep 2023 08:45  Phos  7.4     09-21  Mg     1.9     09-21    TPro  5.8<L>  /  Alb  2.9<L>  /  TBili  0.2  /  DBili  x   /  AST  21  /  ALT  12  /  AlkPhos  81  09-21      LIVER FUNCTIONS - ( 21 Sep 2023 08:45 )  Alb: 2.9 g/dL / Pro: 5.8 g/dL / ALK PHOS: 81 U/L / ALT: 12 U/L / AST: 21 U/L / GGT: x           Urinalysis Basic - ( 21 Sep 2023 08:45 )    Color: x / Appearance: x / SG: x / pH: x  Gluc: 118 mg/dL / Ketone: x  / Bili: x / Urobili: x   Blood: x / Protein: x / Nitrite: x   Leuk Esterase: x / RBC: x / WBC x   Sq Epi: x / Non Sq Epi: x / Bacteria: x        Culture - Blood (collected 09-17-23 @ 08:20)  Source: .Blood None  Preliminary Report (09-21-23 @ 15:01):    No growth at 4 days    Culture - Blood (collected 09-16-23 @ 01:41)  Source: .Blood None  Final Report (09-21-23 @ 11:00):    No growth at 5 days        Lactate, Blood: 2.3 mmol/L (09-17-23 @ 08:20)      INFECTIOUS DISEASES TESTING  Procalcitonin, Serum: 0.49 (09-17-23 @ 08:20)      INFLAMMATORY MARKERS  C-Reactive Protein, Serum: 72.4 mg/L (09-17-23 @ 08:20)  Sedimentation Rate, Erythrocyte: 60 mm/Hr (09-16-23 @ 07:18)  C-Reactive Protein, Serum: 52.5 mg/L (09-16-23 @ 07:18)      RADIOLOGY & ADDITIONAL TESTS:  I have personally reviewed the last available Chest xray  CXR  Xray Chest 1 View- PORTABLE-Urgent:   ACC: [id number] EXAM:  XR CHEST PORTABLE URGENT 1V   ORDERED BY: BRE MOSELEY     PROCEDURE DATE:  09/19/2023          INTERPRETATION:  Clinical History / Reason for exam: Catheter placement    Comparison : Chest radiograph 4 days prior.    Technique/Positioning: Frontal portable, low lung volumes.    Findings:    Support devices: Enteric catheter extends below the hemidiaphragm    Cardiac/mediastinum/hilum: Cardiomegaly    Lung parenchyma/Pleura: Retrocardiac opacity    Skeleton/soft tissues: Unchanged    Impression:    Retrocardiac opacification, atelectatic.    Support devices as described.    --- End of Report ---            DONNIE REID MD; Attending Interventional Radiologist  This document has been electronically signed. Sep 20 2023  7:59AM (09-19-23 @ 17:22)      CT      CARDIOLOGY TESTING  12 Lead ECG:   Ventricular Rate 81 BPM    Atrial Rate 208 BPM    QRS Duration 96 ms    Q-T Interval 388 ms    QTC Calculation(Bazett) 450 ms    R Axis 171 degrees    T Axis 108 degrees    Diagnosis Line Atrial fibrillation  Incomplete right bundle branch block  Possible Right ventricular hypertrophy  Possible Anterolateral infarct , age undetermined  Abnormal ECG    Confirmed by BERE FULLER MD ([id number]) on 9/15/2023 3:14:03 PM (09-15-23 @ 14:36)      MEDICATIONS  chlorhexidine 2% Cloths 1 Topical <User Schedule>  dextrose 5%. 1000 IV Continuous <Continuous>  dextrose 50% Injectable 25 IV Push once  dextrose 50% Injectable 25 IV Push once  dextrose 50% Injectable 12.5 IV Push once  glucagon  Injectable 1 IntraMuscular once  heparin  Infusion.  IV Continuous <Continuous>  insulin lispro (ADMELOG) corrective regimen sliding scale  SubCutaneous three times a day before meals  lactated ringers. 1000 IV Continuous <Continuous>  mupirocin 2% Ointment 1 Both Nostrils two times a day  sodium bicarbonate 1300 Oral every 8 hours      WEIGHT  Weight (kg): 97.2 (09-16-23 @ 00:31)  Creatinine: 8.2 mg/dL (09-21-23 @ 08:45)      ANTIBIOTICS:      All available historical records have been reviewed

## 2023-09-21 NOTE — SWALLOW BEDSIDE ASSESSMENT ADULT - SLP PERTINENT HISTORY OF CURRENT PROBLEM
Pt is an 82 y/o M w/ PMHx: DM2, COPD, anemia, paroxysmal Afib on Xarelto, HFrEF, sent to the hospital by [facility] for AMS. Pt recently admitted to [facility] from [facility] for OM needing IV Abx. Pt has been having worsening mental status over the last 2 days per NH records. Pt is being treated for metabolic encephalopathy, suspected severe sepsis, PAULA vs. CKD, HAGMA. CTH (-). CXR 9/19-> retrocardiac opacification, atelectatic. Pt seen by palliative, family requesting full code and aggressive medical tx.

## 2023-09-21 NOTE — PROGRESS NOTE ADULT - SUBJECTIVE AND OBJECTIVE BOX
24H events:    Patient is a 83y old Male who presents with a chief complaint of AMS (21 Sep 2023 07:56)    Primary diagnosis of Altered mental status      Day 1:  Day 2:  Day 3:     Today is hospital day 6d. This morning patient was seen and examined at bedside, resting comfortably in bed.    No acute or major events overnight.    Code Status:    Family communication:  Contact date:  Name of person contacted:  Relationship to patient:  Communication details:  What matters most:    PAST MEDICAL & SURGICAL HISTORY  HTN (hypertension)    COPD (chronic obstructive pulmonary disease)    High cholesterol    Mild anemia    Coffee ground emesis    Chronic diastolic heart failure    PAULA (acute kidney injury)    No significant past surgical history      SOCIAL HISTORY:  Social History:      ALLERGIES:  No Known Allergies    MEDICATIONS:  STANDING MEDICATIONS  cefTRIAXone   IVPB 2000 milliGRAM(s) IV Intermittent every 24 hours  chlorhexidine 2% Cloths 1 Application(s) Topical <User Schedule>  dextrose 5%. 1000 milliLiter(s) IV Continuous <Continuous>  dextrose 50% Injectable 25 Gram(s) IV Push once  dextrose 50% Injectable 25 Gram(s) IV Push once  dextrose 50% Injectable 12.5 Gram(s) IV Push once  glucagon  Injectable 1 milliGRAM(s) IntraMuscular once  heparin  Infusion.  Unit(s)/Hr IV Continuous <Continuous>  insulin lispro (ADMELOG) corrective regimen sliding scale   SubCutaneous three times a day before meals  lactated ringers. 1000 milliLiter(s) IV Continuous <Continuous>  mupirocin 2% Ointment 1 Application(s) Both Nostrils two times a day  sodium bicarbonate 1300 milliGRAM(s) Oral every 8 hours    PRN MEDICATIONS  albuterol    90 MICROgram(s) HFA Inhaler 1 Puff(s) Inhalation every 6 hours PRN  dextrose Oral Gel 15 Gram(s) Oral once PRN  heparin   Injectable 4000 Unit(s) IV Push every 6 hours PRN  heparin   Injectable 8000 Unit(s) IV Push every 6 hours PRN    VITALS:   T(F): 97.6  HR: 76  BP: 117/55  RR: 19  SpO2: 94%    PHYSICAL EXAM:  Gen: NAD, lying in bed with legs slighlty bent , ANO times 1( patient looks better than yesterday)  HEENT: Normocephalic, atraumatic  Neck: supple, no lymphadenopathy  CV: Irregular ; S1S2 normal , no murmur  Lungs: mild crackles bilateral, decreased BS at bases, no fremitus  Abdomen: Soft, BS present  Ext: Warm, well perfused  Neuro: non focal, awake, ANOx 1  Skin: Left heel DTI - no cellulitis in left leg   Sacral Ulcer without gross drainage/erythema   Lines: no phlebitis    ( x ) Indwelling [place] Catheter:   Date insterted:  09/17/2023  Reason (  ) Critical illness     (  ) urinary retention    (  ) Accurate Ins/Outs Monitoring     (  ) CMO patient      LABS:                        8.6    11.34 )-----------( 326      ( 21 Sep 2023 08:45 )             28.1     09-21    145  |  102  |  127<HH>  ----------------------------<  118<H>  3.2<L>   |  23  |  8.2<HH>    Ca    8.0<L>      21 Sep 2023 08:45  Phos  7.4     09-21  Mg     1.9     09-21    TPro  5.8<L>  /  Alb  2.9<L>  /  TBili  0.2  /  DBili  x   /  AST  21  /  ALT  12  /  AlkPhos  81  09-21    PTT - ( 21 Sep 2023 08:45 )  PTT:>200.0 sec  Urinalysis Basic - ( 21 Sep 2023 08:45 )    Color: x / Appearance: x / SG: x / pH: x  Gluc: 118 mg/dL / Ketone: x  / Bili: x / Urobili: x   Blood: x / Protein: x / Nitrite: x   Leuk Esterase: x / RBC: x / WBC x   Sq Epi: x / Non Sq Epi: x / Bacteria: x                RADIOLOGY:    < from: US Renal (09.15.23 @ 19:38) >  MPRESSION:    1.  Poor visualization of the left kidney. No gross hydronephrosis.  2.  Debris in the bladder, correlate with urinalysis.      < from: CT Head No Cont (09.15.23 @ 15:35) >  IMPRESSION:    No evidence of acute intracranial hemorrhage or acute territorial infarct.    No evidence of mass or midline shift.       24H events:    Patient is a 83y old Male who presents with a chief complaint of AMS (21 Sep 2023 07:56)    Primary diagnosis of Altered mental status      Today is hospital day 6d. This morning patient was seen and examined at bedside, resting comfortably in bed.    No acute or major events overnight.    Code Status:    Family communication:  Contact date:  Name of person contacted:  Relationship to patient:  Communication details:  What matters most:    PAST MEDICAL & SURGICAL HISTORY  HTN (hypertension)    COPD (chronic obstructive pulmonary disease)    High cholesterol    Mild anemia    Coffee ground emesis    Chronic diastolic heart failure    PAULA (acute kidney injury)    No significant past surgical history      SOCIAL HISTORY:  Social History:      ALLERGIES:  No Known Allergies    MEDICATIONS:  STANDING MEDICATIONS  cefTRIAXone   IVPB 2000 milliGRAM(s) IV Intermittent every 24 hours  chlorhexidine 2% Cloths 1 Application(s) Topical <User Schedule>  dextrose 5%. 1000 milliLiter(s) IV Continuous <Continuous>  dextrose 50% Injectable 25 Gram(s) IV Push once  dextrose 50% Injectable 25 Gram(s) IV Push once  dextrose 50% Injectable 12.5 Gram(s) IV Push once  glucagon  Injectable 1 milliGRAM(s) IntraMuscular once  heparin  Infusion.  Unit(s)/Hr IV Continuous <Continuous>  insulin lispro (ADMELOG) corrective regimen sliding scale   SubCutaneous three times a day before meals  lactated ringers. 1000 milliLiter(s) IV Continuous <Continuous>  mupirocin 2% Ointment 1 Application(s) Both Nostrils two times a day  sodium bicarbonate 1300 milliGRAM(s) Oral every 8 hours    PRN MEDICATIONS  albuterol    90 MICROgram(s) HFA Inhaler 1 Puff(s) Inhalation every 6 hours PRN  dextrose Oral Gel 15 Gram(s) Oral once PRN  heparin   Injectable 4000 Unit(s) IV Push every 6 hours PRN  heparin   Injectable 8000 Unit(s) IV Push every 6 hours PRN    VITALS:   T(F): 97.6  HR: 76  BP: 117/55  RR: 19  SpO2: 94%    PHYSICAL EXAM:  Gen: NAD, lying in bed with legs slighlty bent , ANO times 1( patient looks better than yesterday)  HEENT: Normocephalic, atraumatic  Neck: supple, no lymphadenopathy  CV: Irregular ; S1S2 normal , no murmur  Lungs: mild crackles bilateral, decreased BS at bases, no fremitus  Abdomen: Soft, BS present  Ext: Warm, well perfused  Neuro: non focal, awake, ANOx 1  Skin: Left heel DTI - no cellulitis in left leg   Sacral Ulcer without gross drainage/erythema   Lines: no phlebitis    ( x ) Indwelling [place] Catheter:   Date insterted:  09/17/2023  Reason (  ) Critical illness     (  ) urinary retention    (  ) Accurate Ins/Outs Monitoring     (  ) CMO patient      LABS:                        8.6    11.34 )-----------( 326      ( 21 Sep 2023 08:45 )             28.1     09-21    145  |  102  |  127<HH>  ----------------------------<  118<H>  3.2<L>   |  23  |  8.2<HH>    Ca    8.0<L>      21 Sep 2023 08:45  Phos  7.4     09-21  Mg     1.9     09-21    TPro  5.8<L>  /  Alb  2.9<L>  /  TBili  0.2  /  DBili  x   /  AST  21  /  ALT  12  /  AlkPhos  81  09-21    PTT - ( 21 Sep 2023 08:45 )  PTT:>200.0 sec  Urinalysis Basic - ( 21 Sep 2023 08:45 )    Color: x / Appearance: x / SG: x / pH: x  Gluc: 118 mg/dL / Ketone: x  / Bili: x / Urobili: x   Blood: x / Protein: x / Nitrite: x   Leuk Esterase: x / RBC: x / WBC x   Sq Epi: x / Non Sq Epi: x / Bacteria: x                RADIOLOGY:    < from: US Renal (09.15.23 @ 19:38) >  MPRESSION:    1.  Poor visualization of the left kidney. No gross hydronephrosis.  2.  Debris in the bladder, correlate with urinalysis.      < from: CT Head No Cont (09.15.23 @ 15:35) >  IMPRESSION:    No evidence of acute intracranial hemorrhage or acute territorial infarct.    No evidence of mass or midline shift.

## 2023-09-22 LAB
ANION GAP SERPL CALC-SCNC: 20 MMOL/L — HIGH (ref 7–14)
APTT BLD: 45.7 SEC — HIGH (ref 27–39.2)
APTT BLD: 69.4 SEC — HIGH (ref 27–39.2)
APTT BLD: 71.8 SEC — CRITICAL HIGH (ref 27–39.2)
BUN SERPL-MCNC: 122 MG/DL — CRITICAL HIGH (ref 10–20)
CALCIUM SERPL-MCNC: 8.1 MG/DL — LOW (ref 8.4–10.5)
CHLORIDE SERPL-SCNC: 104 MMOL/L — SIGNIFICANT CHANGE UP (ref 98–110)
CO2 SERPL-SCNC: 21 MMOL/L — SIGNIFICANT CHANGE UP (ref 17–32)
CREAT SERPL-MCNC: 7.6 MG/DL — CRITICAL HIGH (ref 0.7–1.5)
CULTURE RESULTS: SIGNIFICANT CHANGE UP
EGFR: 7 ML/MIN/1.73M2 — LOW
GLUCOSE BLDC GLUCOMTR-MCNC: 106 MG/DL — HIGH (ref 70–99)
GLUCOSE BLDC GLUCOMTR-MCNC: 136 MG/DL — HIGH (ref 70–99)
GLUCOSE BLDC GLUCOMTR-MCNC: 74 MG/DL — SIGNIFICANT CHANGE UP (ref 70–99)
GLUCOSE BLDC GLUCOMTR-MCNC: 95 MG/DL — SIGNIFICANT CHANGE UP (ref 70–99)
GLUCOSE BLDC GLUCOMTR-MCNC: 95 MG/DL — SIGNIFICANT CHANGE UP (ref 70–99)
GLUCOSE SERPL-MCNC: 105 MG/DL — HIGH (ref 70–99)
HCT VFR BLD CALC: 32.7 % — LOW (ref 42–52)
HGB BLD-MCNC: 10 G/DL — LOW (ref 14–18)
MCHC RBC-ENTMCNC: 24.3 PG — LOW (ref 27–31)
MCHC RBC-ENTMCNC: 30.6 G/DL — LOW (ref 32–37)
MCV RBC AUTO: 79.4 FL — LOW (ref 80–94)
NRBC # BLD: 0 /100 WBCS — SIGNIFICANT CHANGE UP (ref 0–0)
PLATELET # BLD AUTO: 343 K/UL — SIGNIFICANT CHANGE UP (ref 130–400)
PMV BLD: 10.1 FL — SIGNIFICANT CHANGE UP (ref 7.4–10.4)
POTASSIUM SERPL-MCNC: 3.4 MMOL/L — LOW (ref 3.5–5)
POTASSIUM SERPL-SCNC: 3.4 MMOL/L — LOW (ref 3.5–5)
RBC # BLD: 4.12 M/UL — LOW (ref 4.7–6.1)
RBC # FLD: 19.7 % — HIGH (ref 11.5–14.5)
SODIUM SERPL-SCNC: 145 MMOL/L — SIGNIFICANT CHANGE UP (ref 135–146)
SPECIMEN SOURCE: SIGNIFICANT CHANGE UP
VANCOMYCIN TROUGH SERPL-MCNC: 46.2 UG/ML — HIGH (ref 5–10)
WBC # BLD: 10.44 K/UL — SIGNIFICANT CHANGE UP (ref 4.8–10.8)
WBC # FLD AUTO: 10.44 K/UL — SIGNIFICANT CHANGE UP (ref 4.8–10.8)

## 2023-09-22 PROCEDURE — 99233 SBSQ HOSP IP/OBS HIGH 50: CPT

## 2023-09-22 RX ORDER — HEPARIN SODIUM 5000 [USP'U]/ML
1200 INJECTION INTRAVENOUS; SUBCUTANEOUS
Qty: 25000 | Refills: 0 | Status: DISCONTINUED | OUTPATIENT
Start: 2023-09-22 | End: 2023-09-23

## 2023-09-22 RX ADMIN — MUPIROCIN 1 APPLICATION(S): 20 OINTMENT TOPICAL at 05:04

## 2023-09-22 RX ADMIN — MUPIROCIN 1 APPLICATION(S): 20 OINTMENT TOPICAL at 17:31

## 2023-09-22 RX ADMIN — Medication 1300 MILLIGRAM(S): at 21:00

## 2023-09-22 RX ADMIN — SODIUM CHLORIDE 75 MILLILITER(S): 9 INJECTION, SOLUTION INTRAVENOUS at 07:14

## 2023-09-22 RX ADMIN — HEPARIN SODIUM 1200 UNIT(S)/HR: 5000 INJECTION INTRAVENOUS; SUBCUTANEOUS at 11:42

## 2023-09-22 RX ADMIN — CHLORHEXIDINE GLUCONATE 1 APPLICATION(S): 213 SOLUTION TOPICAL at 05:07

## 2023-09-22 RX ADMIN — SODIUM CHLORIDE 75 MILLILITER(S): 9 INJECTION, SOLUTION INTRAVENOUS at 21:00

## 2023-09-22 RX ADMIN — HEPARIN SODIUM 1500 UNIT(S)/HR: 5000 INJECTION INTRAVENOUS; SUBCUTANEOUS at 10:32

## 2023-09-22 RX ADMIN — HEPARIN SODIUM 1200 UNIT(S)/HR: 5000 INJECTION INTRAVENOUS; SUBCUTANEOUS at 13:11

## 2023-09-22 RX ADMIN — HEPARIN SODIUM 1300 UNIT(S)/HR: 5000 INJECTION INTRAVENOUS; SUBCUTANEOUS at 01:46

## 2023-09-22 RX ADMIN — Medication 1300 MILLIGRAM(S): at 05:04

## 2023-09-22 RX ADMIN — Medication 1300 MILLIGRAM(S): at 13:12

## 2023-09-22 RX ADMIN — HEPARIN SODIUM 1200 UNIT(S)/HR: 5000 INJECTION INTRAVENOUS; SUBCUTANEOUS at 18:47

## 2023-09-22 NOTE — PROGRESS NOTE ADULT - ATTENDING COMMENTS
seen this morning and discussed with resident and     more alert and focused, brief coherent conversation    plan as outlined

## 2023-09-22 NOTE — PROGRESS NOTE ADULT - SUBJECTIVE AND OBJECTIVE BOX
Nephrology progress note    THIS IS AN INCOMPLETE NOTE . FULL NOTE TO FOLLOW SHORTLY    Patient is seen and examined, events over the last 24 h noted .    Allergies:  No Known Allergies    Hospital Medications:   MEDICATIONS  (STANDING):  chlorhexidine 2% Cloths 1 Application(s) Topical <User Schedule>  dextrose 5%. 1000 milliLiter(s) (100 mL/Hr) IV Continuous <Continuous>  dextrose 50% Injectable 25 Gram(s) IV Push once  dextrose 50% Injectable 25 Gram(s) IV Push once  dextrose 50% Injectable 12.5 Gram(s) IV Push once  glucagon  Injectable 1 milliGRAM(s) IntraMuscular once  heparin  Infusion.  Unit(s)/Hr (18 mL/Hr) IV Continuous <Continuous>  insulin lispro (ADMELOG) corrective regimen sliding scale   SubCutaneous three times a day before meals  lactated ringers. 1000 milliLiter(s) (75 mL/Hr) IV Continuous <Continuous>  mupirocin 2% Ointment 1 Application(s) Both Nostrils two times a day  sodium bicarbonate 1300 milliGRAM(s) Oral every 8 hours        VITALS:  T(F): 97.6 (09-22-23 @ 05:25), Max: 97.6 (09-22-23 @ 05:25)  HR: 80 (09-22-23 @ 05:25)  BP: 120/77 (09-22-23 @ 05:25)  RR: 18 (09-22-23 @ 05:25)  SpO2: 94% (09-22-23 @ 05:25)  Wt(kg): --    09-20 @ 07:01  -  09-21 @ 07:00  --------------------------------------------------------  IN: 545 mL / OUT: 650 mL / NET: -105 mL    09-21 @ 07:01  -  09-22 @ 07:00  --------------------------------------------------------  IN: 340 mL / OUT: 1225 mL / NET: -885 mL          PHYSICAL EXAM:  Constitutional: NAD  HEENT: anicteric sclera, oropharynx clear, MMM  Neck: No JVD  Respiratory: CTAB, no wheezes, rales or rhonchi  Cardiovascular: S1, S2, RRR  Gastrointestinal: BS+, soft, NT/ND  Extremities: No cyanosis or clubbing. No peripheral edema  :  No barth.   Skin: No rashes    LABS:  09-22    145  |  104  |  122<HH>  ----------------------------<  105<H>  3.4<L>   |  21  |  7.6<HH>    Creatinine Trend: 7.6<--, 8.2<--, 7.4<--, 7.4<--, 7.1<--, 7.0<--    Ca    8.1<L>      22 Sep 2023 07:42  Phos  7.4     09-21  Mg     1.9     09-21    TPro  5.8<L>  /  Alb  2.9<L>  /  TBili  0.2  /  DBili      /  AST  21  /  ALT  12  /  AlkPhos  81  09-21                          10.0   10.44 )-----------( 343      ( 22 Sep 2023 07:42 )             32.7       Urine Studies:  Urinalysis Basic - ( 22 Sep 2023 07:42 )    Color:  / Appearance:  / SG:  / pH:   Gluc: 105 mg/dL / Ketone:   / Bili:  / Urobili:    Blood:  / Protein:  / Nitrite:    Leuk Esterase:  / RBC:  / WBC    Sq Epi:  / Non Sq Epi:  / Bacteria:       Sodium, Random Urine: 78.0 mmoL/L (09-18 @ 11:15)  Creatinine, Random Urine: 42 mg/dL (09-18 @ 11:15)  Osmolality, Random Urine: 299 mos/kg (09-18 @ 11:15)  Potassium, Random Urine: 24 mmol/L (09-18 @ 11:15)              RADIOLOGY & ADDITIONAL STUDIES:   Nephrology progress note    Patient is seen and examined, events over the last 24 h noted .  lying in bed   barth in place     Allergies:  No Known Allergies    Hospital Medications:   MEDICATIONS  (STANDING):  dextrose 5%. 1000 milliLiter(s) (100 mL/Hr) IV Continuous <Continuous>  glucagon  Injectable 1 milliGRAM(s) IntraMuscular once  heparin  Infusion.  Unit(s)/Hr (18 mL/Hr) IV Continuous <Continuous>  insulin lispro (ADMELOG) corrective regimen sliding scale   SubCutaneous three times a day before meals  lactated ringers. 1000 milliLiter(s) (75 mL/Hr) IV Continuous <Continuous>  mupirocin 2% Ointment 1 Application(s) Both Nostrils two times a day  sodium bicarbonate 1300 milliGRAM(s) Oral every 8 hours        VITALS:  T(F): 97.6 (09-22-23 @ 05:25), Max: 97.6 (09-22-23 @ 05:25)  HR: 80 (09-22-23 @ 05:25)  BP: 120/77 (09-22-23 @ 05:25)  RR: 18 (09-22-23 @ 05:25)  SpO2: 94% (09-22-23 @ 05:25)      09-20 @ 07:01  -  09-21 @ 07:00  --------------------------------------------------------  IN: 545 mL / OUT: 650 mL / NET: -105 mL    09-21 @ 07:01  -  09-22 @ 07:00  --------------------------------------------------------  IN: 340 mL / OUT: 1225 mL / NET: -885 mL          PHYSICAL EXAM:  Constitutional: NAD  Respiratory: CTAB,   Cardiovascular: S1, S2, RRR  Gastrointestinal: BS+, soft, NT/ND  Extremities: No cyanosis or clubbing. No peripheral edema  :   barth.   Skin: No rashes    LABS:  09-22    145  |  104  |  122<HH>  ----------------------------<  105<H>  3.4<L>   |  21  |  7.6<HH>    Creatinine Trend: 7.6<--, 8.2<--, 7.4<--, 7.4<--, 7.1<--, 7.0<--    Ca    8.1<L>      22 Sep 2023 07:42  Phos  7.4     09-21  Mg     1.9     09-21    TPro  5.8<L>  /  Alb  2.9<L>  /  TBili  0.2  /  DBili      /  AST  21  /  ALT  12  /  AlkPhos  81  09-21                          10.0   10.44 )-----------( 343      ( 22 Sep 2023 07:42 )             32.7       Urine Studies:  Urinalysis Basic - ( 22 Sep 2023 07:42 )    Color:  / Appearance:  / SG:  / pH:   Gluc: 105 mg/dL / Ketone:   / Bili:  / Urobili:    Blood:  / Protein:  / Nitrite:    Leuk Esterase:  / RBC:  / WBC    Sq Epi:  / Non Sq Epi:  / Bacteria:       Sodium, Random Urine: 78.0 mmoL/L (09-18 @ 11:15)  Creatinine, Random Urine: 42 mg/dL (09-18 @ 11:15)  Osmolality, Random Urine: 299 mos/kg (09-18 @ 11:15)  Potassium, Random Urine: 24 mmol/L (09-18 @ 11:15)              RADIOLOGY & ADDITIONAL STUDIES:

## 2023-09-22 NOTE — PROGRESS NOTE ADULT - ASSESSMENT
83M DM2, COPD, Anemia, Paroxysmal Afib on xarelto, HFrEF, sent to the Hospital by [facility] for AMS. Pt was recently admitted to [facility] from [facility] for OM needing IV Abx Cefepime/Vancomycin at [facility]. Pt has been having worsening in MS over the last 2 days per NH records. Pt currently responds to touch and voice but does not speak. Tracks eyes.     #Metabolic Encephalopathy   #Suspected Severe Sepsis (Hypotention/AMS/PAULA/Oliguric)   #PAULA vs CKD w/ Progression   #HAGMA 2/2 PAULA   #Oliguric Renal Failure moving towards Anuria  #History of Left Foot OM w/ surrounding Cellulitis   #Sacral DTI / Stage 1 POA     - CT head negative   - Creatinine trending up ( 6.0>6.3>6.7>7.0>7.1>8.4>7.6)   - 1300 meq sodium bicarbonate 3 times daily added   - On fluids LR 75ml/hr  - Phosphorus 7.4   - Urine lytes and osmol normal   - d/c ed cefepime and vanco  - vanco random level 50(increased)   - ESR and CRP increased   - Blood culture negative   - UA negative   - Per Nephrology, No need for RRT for now  - F/u with Nephro       #Left heel ulcers  - CT scan leg : IMPRESSION:  No CT evidence of osteomyelitis or necrotizing infection. No drainable   collection seen, within the limitations of a noncontrast exam.  - NO acute surgical intervention required as per Podiatry      #Compensated HFrEF  #Paroxysmal Afib  - echo 8/14/2023 EF 35-40% from [facility] admission    -ECHO: < from: TTE Echo Complete w/o Contrast w/ Doppler (09.18.23 @ 11:27) >  Summary:   1. Technically difficult study.   2. Left ventricular ejection fraction, by visual estimation, is >55%.   3. Normal global left ventricular systolic function.   4. The left ventricular diastolic function could not be assessed in this   study.   5. The aortic valve was not well visualized; appears calcified with   grossly normal opening.   6. Mild mitral regurgitation.   7. Mild tricuspid regurgitation.      - hold home metoprolol and all other BP meds due to hypotension  - hold xarelto due to PAULA   - under heparin full anticoagulation , adjustment ongoing   -     #DM2  - hold home empagliflozin  - FS q6  - Betahydro butyrate negative     Guarded Prognosis   - reassess for improvement if not may need SDU    DVT : heparin full anticoagulation ; under adjustment  Diet: bite sized and soft , 1x1 feed    Monitor BP and Urine output   PT eval     Family:   Talked with his brother about GOC, according to him, patients wish was to be full code before.   will follow up  Updated on the patient's condition and about the poor prognosis.  83M DM2, COPD, Anemia, Paroxysmal Afib on xarelto, HFrEF, sent to the Hospital by [facility] for AMS. Pt was recently admitted to [facility] from [facility] for OM needing IV Abx Cefepime/Vancomycin at [facility]. Pt has been having worsening in MS over the last 2 days per NH records. Pt currently responds to touch and voice but does not speak. Tracks eyes.     #Metabolic Encephalopathy   #Suspected Severe Sepsis (Hypotention/AMS/PAULA/Oliguric)   #PAULA vs CKD 4/5  w/ Progression   #HAGMA 2/2 PAULA   #Oliguric Renal Failure moving towards Anuria  #History of Left Foot OM w/ surrounding Cellulitis   #Sacral DTI / Stage 1 POA     - CT head negative   - Creatinine trending up ( 6.0>6.3>6.7>7.0>7.1>8.4>7.6)   - 1300 meq sodium bicarbonate 3 times daily added   - On fluids LR 75ml/hr  - Phosphorus 7.4   - Urine lytes and osmol normal   - d/c ed cefepime and vanco  - vanco random level 50(increased)   - ESR and CRP increased   - Blood culture negative   - UA negative   - Per Nephrology, No need for RRT for now  - F/u with Nephro       #Left heel ulcers  - CT scan leg : IMPRESSION:  No CT evidence of osteomyelitis or necrotizing infection. No drainable   collection seen, within the limitations of a noncontrast exam.  - NO acute surgical intervention required as per Podiatry      #Compensated HFrEF  #Paroxysmal Afib  - echo 8/14/2023 EF 35-40% from [facility] admission    -ECHO: < from: TTE Echo Complete w/o Contrast w/ Doppler (09.18.23 @ 11:27) >  Summary:   1. Technically difficult study.   2. Left ventricular ejection fraction, by visual estimation, is >55%.   3. Normal global left ventricular systolic function.   4. The left ventricular diastolic function could not be assessed in this   study.   5. The aortic valve was not well visualized; appears calcified with   grossly normal opening.   6. Mild mitral regurgitation.   7. Mild tricuspid regurgitation.      - hold home metoprolol and all other BP meds due to hypotension  - hold xarelto due to PAULA   - under heparin full anticoagulation , adjustment ongoing   -     #DM2  - hold home empagliflozin  - FS q6  - Betahydro butyrate negative     Guarded Prognosis   - reassess for improvement if not may need SDU    DVT : heparin full anticoagulation ; under adjustment  Diet: bite sized and soft , 1x1 feed    Monitor BP and Urine output   PT eval     Family:   Talked with his brother about GOC, according to him, patients wish was to be full code before.   will follow up  Updated on the patient's condition and about the poor prognosis.

## 2023-09-22 NOTE — PROGRESS NOTE ADULT - ASSESSMENT
84 yo m ho DM, COPD, anemia, paroxysmal afib on xarelto, HFrEF, DM coming in from nursing home for AMS x 2 days. Found to have toxic metabolic encephalopathy with PAULA.  PAULA/ toxic metabolic encephalopathy/ HAGMA/ HFrEF  possible ATN iso hypotension and possible sepsis/ vanco toxicity / last level 60   no hydro on imaging  UO noted   cont LR at 75 cc per hour   vanco on hold   check phosphorus  level   creat better   cont  sodium bicarbonate to  1300 q 8   no HD for now   will follow

## 2023-09-22 NOTE — PROGRESS NOTE ADULT - SUBJECTIVE AND OBJECTIVE BOX
24H events:    Patient is a 83y old Male who presents with a chief complaint of AMS (22 Sep 2023 09:10)    Primary diagnosis of Altered mental status    Today is hospital day 7d. This morning patient was seen and examined at bedside, resting comfortably in bed.    No acute or major events overnight.    Code Status:    Family communication:  Contact date:  Name of person contacted:  Relationship to patient:  Communication details:  What matters most:    PAST MEDICAL & SURGICAL HISTORY  HTN (hypertension)    COPD (chronic obstructive pulmonary disease)    High cholesterol    Mild anemia    Coffee ground emesis    Chronic diastolic heart failure    PAULA (acute kidney injury)    No significant past surgical history      SOCIAL HISTORY:  Social History:      ALLERGIES:  No Known Allergies    MEDICATIONS:  STANDING MEDICATIONS  chlorhexidine 2% Cloths 1 Application(s) Topical <User Schedule>  dextrose 5%. 1000 milliLiter(s) IV Continuous <Continuous>  dextrose 50% Injectable 25 Gram(s) IV Push once  dextrose 50% Injectable 12.5 Gram(s) IV Push once  dextrose 50% Injectable 25 Gram(s) IV Push once  glucagon  Injectable 1 milliGRAM(s) IntraMuscular once  heparin  Infusion.  Unit(s)/Hr IV Continuous <Continuous>  insulin lispro (ADMELOG) corrective regimen sliding scale   SubCutaneous three times a day before meals  lactated ringers. 1000 milliLiter(s) IV Continuous <Continuous>  mupirocin 2% Ointment 1 Application(s) Both Nostrils two times a day  sodium bicarbonate 1300 milliGRAM(s) Oral every 8 hours    PRN MEDICATIONS  albuterol    90 MICROgram(s) HFA Inhaler 1 Puff(s) Inhalation every 6 hours PRN  dextrose Oral Gel 15 Gram(s) Oral once PRN  heparin   Injectable 4000 Unit(s) IV Push every 6 hours PRN  heparin   Injectable 8000 Unit(s) IV Push every 6 hours PRN    VITALS:   T(F): 97.6  HR: 80  BP: 120/77  RR: 18  SpO2: 94%    PHYSICAL EXAM:  Gen: NAD, lying in bed with legs slighlty bent , ANO times 1( patient looks better than yesterday)  HEENT: Normocephalic, atraumatic  Neck: supple, no lymphadenopathy  CV: Irregular ; S1S2 normal , no murmur  Lungs: mild crackles bilateral, decreased BS at bases, no fremitus  Abdomen: Soft, BS present  Ext: Warm, well perfused  Neuro: non focal, awake, ANOx 1  Skin: Left heel DTI - no cellulitis in left leg   Sacral Ulcer without gross drainage/erythema   Lines: no phlebitis    ( x ) Indwelling [place] Catheter:   Date insterted:  09/17/2023  Reason (  ) Critical illness     (  ) urinary retention    (  ) Accurate Ins/Outs Monitoring     (  ) CMO     LABS:                        10.0   10.44 )-----------( 343      ( 22 Sep 2023 07:42 )             32.7     09-22    145  |  104  |  122<HH>  ----------------------------<  105<H>  3.4<L>   |  21  |  7.6<HH>    Ca    8.1<L>      22 Sep 2023 07:42  Phos  7.4     09-21  Mg     1.9     09-21    TPro  5.8<L>  /  Alb  2.9<L>  /  TBili  0.2  /  DBili  x   /  AST  21  /  ALT  12  /  AlkPhos  81  09-21    PTT - ( 22 Sep 2023 07:37 )  PTT:45.7 sec  Urinalysis Basic - ( 22 Sep 2023 07:42 )    Color: x / Appearance: x / SG: x / pH: x  Gluc: 105 mg/dL / Ketone: x  / Bili: x / Urobili: x   Blood: x / Protein: x / Nitrite: x   Leuk Esterase: x / RBC: x / WBC x   Sq Epi: x / Non Sq Epi: x / Bacteria: x                RADIOLOGY:  < from: US Renal (09.15.23 @ 19:38) >  MPRESSION:    1.  Poor visualization of the left kidney. No gross hydronephrosis.  2.  Debris in the bladder, correlate with urinalysis.      < from: CT Head No Cont (09.15.23 @ 15:35) >  IMPRESSION:    No evidence of acute intracranial hemorrhage or acute territorial infarct.    No evidence of mass or midline shift.

## 2023-09-23 LAB
ALBUMIN SERPL ELPH-MCNC: 2.8 G/DL — LOW (ref 3.5–5.2)
ALP SERPL-CCNC: 70 U/L — SIGNIFICANT CHANGE UP (ref 30–115)
ALT FLD-CCNC: 10 U/L — SIGNIFICANT CHANGE UP (ref 0–41)
ANION GAP SERPL CALC-SCNC: 22 MMOL/L — HIGH (ref 7–14)
APTT BLD: 76.7 SEC — CRITICAL HIGH (ref 27–39.2)
AST SERPL-CCNC: 20 U/L — SIGNIFICANT CHANGE UP (ref 0–41)
BILIRUB SERPL-MCNC: 0.3 MG/DL — SIGNIFICANT CHANGE UP (ref 0.2–1.2)
BUN SERPL-MCNC: 132 MG/DL — CRITICAL HIGH (ref 10–20)
CALCIUM SERPL-MCNC: 7.7 MG/DL — LOW (ref 8.4–10.5)
CHLORIDE SERPL-SCNC: 104 MMOL/L — SIGNIFICANT CHANGE UP (ref 98–110)
CO2 SERPL-SCNC: 19 MMOL/L — SIGNIFICANT CHANGE UP (ref 17–32)
CREAT SERPL-MCNC: 7.1 MG/DL — CRITICAL HIGH (ref 0.7–1.5)
EGFR: 7 ML/MIN/1.73M2 — LOW
GLUCOSE BLDC GLUCOMTR-MCNC: 114 MG/DL — HIGH (ref 70–99)
GLUCOSE BLDC GLUCOMTR-MCNC: 148 MG/DL — HIGH (ref 70–99)
GLUCOSE BLDC GLUCOMTR-MCNC: 94 MG/DL — SIGNIFICANT CHANGE UP (ref 70–99)
GLUCOSE SERPL-MCNC: 138 MG/DL — HIGH (ref 70–99)
HCT VFR BLD CALC: 23.3 % — LOW (ref 42–52)
HCT VFR BLD CALC: 26.1 % — LOW (ref 42–52)
HGB BLD-MCNC: 7.3 G/DL — LOW (ref 14–18)
HGB BLD-MCNC: 8.1 G/DL — LOW (ref 14–18)
MAGNESIUM SERPL-MCNC: 1.7 MG/DL — LOW (ref 1.8–2.4)
MCHC RBC-ENTMCNC: 24.3 PG — LOW (ref 27–31)
MCHC RBC-ENTMCNC: 24.4 PG — LOW (ref 27–31)
MCHC RBC-ENTMCNC: 31 G/DL — LOW (ref 32–37)
MCHC RBC-ENTMCNC: 31.3 G/DL — LOW (ref 32–37)
MCV RBC AUTO: 77.4 FL — LOW (ref 80–94)
MCV RBC AUTO: 78.6 FL — LOW (ref 80–94)
NRBC # BLD: 0 /100 WBCS — SIGNIFICANT CHANGE UP (ref 0–0)
NRBC # BLD: 0 /100 WBCS — SIGNIFICANT CHANGE UP (ref 0–0)
PLATELET # BLD AUTO: 329 K/UL — SIGNIFICANT CHANGE UP (ref 130–400)
PLATELET # BLD AUTO: 349 K/UL — SIGNIFICANT CHANGE UP (ref 130–400)
PMV BLD: 10.1 FL — SIGNIFICANT CHANGE UP (ref 7.4–10.4)
PMV BLD: 10.2 FL — SIGNIFICANT CHANGE UP (ref 7.4–10.4)
POTASSIUM SERPL-MCNC: 3.5 MMOL/L — SIGNIFICANT CHANGE UP (ref 3.5–5)
POTASSIUM SERPL-SCNC: 3.5 MMOL/L — SIGNIFICANT CHANGE UP (ref 3.5–5)
PROT SERPL-MCNC: 5.2 G/DL — LOW (ref 6–8)
RBC # BLD: 3.01 M/UL — LOW (ref 4.7–6.1)
RBC # BLD: 3.32 M/UL — LOW (ref 4.7–6.1)
RBC # FLD: 19.8 % — HIGH (ref 11.5–14.5)
RBC # FLD: 19.9 % — HIGH (ref 11.5–14.5)
SODIUM SERPL-SCNC: 145 MMOL/L — SIGNIFICANT CHANGE UP (ref 135–146)
WBC # BLD: 12.89 K/UL — HIGH (ref 4.8–10.8)
WBC # BLD: 13.55 K/UL — HIGH (ref 4.8–10.8)
WBC # FLD AUTO: 12.89 K/UL — HIGH (ref 4.8–10.8)
WBC # FLD AUTO: 13.55 K/UL — HIGH (ref 4.8–10.8)

## 2023-09-23 PROCEDURE — 71045 X-RAY EXAM CHEST 1 VIEW: CPT | Mod: 26

## 2023-09-23 PROCEDURE — 74174 CTA ABD&PLVS W/CONTRAST: CPT | Mod: 26

## 2023-09-23 PROCEDURE — 99233 SBSQ HOSP IP/OBS HIGH 50: CPT | Mod: GC

## 2023-09-23 RX ORDER — PANTOPRAZOLE SODIUM 20 MG/1
40 TABLET, DELAYED RELEASE ORAL
Refills: 0 | Status: DISCONTINUED | OUTPATIENT
Start: 2023-09-23 | End: 2023-09-23

## 2023-09-23 RX ORDER — SODIUM CHLORIDE 9 MG/ML
250 INJECTION, SOLUTION INTRAVENOUS ONCE
Refills: 0 | Status: DISCONTINUED | OUTPATIENT
Start: 2023-09-23 | End: 2023-09-23

## 2023-09-23 RX ORDER — MIDAZOLAM HYDROCHLORIDE 1 MG/ML
2 INJECTION, SOLUTION INTRAMUSCULAR; INTRAVENOUS ONCE
Refills: 0 | Status: DISCONTINUED | OUTPATIENT
Start: 2023-09-23 | End: 2023-09-24

## 2023-09-23 RX ORDER — PANTOPRAZOLE SODIUM 20 MG/1
8 TABLET, DELAYED RELEASE ORAL
Qty: 80 | Refills: 0 | Status: DISCONTINUED | OUTPATIENT
Start: 2023-09-23 | End: 2023-09-25

## 2023-09-23 RX ORDER — MIDODRINE HYDROCHLORIDE 2.5 MG/1
10 TABLET ORAL THREE TIMES A DAY
Refills: 0 | Status: DISCONTINUED | OUTPATIENT
Start: 2023-09-23 | End: 2023-09-24

## 2023-09-23 RX ORDER — PROTAMINE SULFATE 10 MG/ML
36 AMPUL (ML) INTRAVENOUS ONCE
Refills: 0 | Status: COMPLETED | OUTPATIENT
Start: 2023-09-23 | End: 2023-09-23

## 2023-09-23 RX ORDER — FENTANYL CITRATE 50 UG/ML
0.5 INJECTION INTRAVENOUS
Qty: 2500 | Refills: 0 | Status: DISCONTINUED | OUTPATIENT
Start: 2023-09-23 | End: 2023-09-24

## 2023-09-23 RX ORDER — DEXMEDETOMIDINE HYDROCHLORIDE IN 0.9% SODIUM CHLORIDE 4 UG/ML
0.05 INJECTION INTRAVENOUS
Qty: 200 | Refills: 0 | Status: DISCONTINUED | OUTPATIENT
Start: 2023-09-23 | End: 2023-09-23

## 2023-09-23 RX ORDER — DEXMEDETOMIDINE HYDROCHLORIDE IN 0.9% SODIUM CHLORIDE 4 UG/ML
0.05 INJECTION INTRAVENOUS
Qty: 400 | Refills: 0 | Status: DISCONTINUED | OUTPATIENT
Start: 2023-09-23 | End: 2023-09-24

## 2023-09-23 RX ORDER — ACETAMINOPHEN 500 MG
650 TABLET ORAL EVERY 6 HOURS
Refills: 0 | Status: DISCONTINUED | OUTPATIENT
Start: 2023-09-23 | End: 2023-09-24

## 2023-09-23 RX ORDER — FENTANYL CITRATE 50 UG/ML
50 INJECTION INTRAVENOUS
Refills: 0 | Status: DISCONTINUED | OUTPATIENT
Start: 2023-09-23 | End: 2023-09-23

## 2023-09-23 RX ORDER — NOREPINEPHRINE BITARTRATE/D5W 8 MG/250ML
0.05 PLASTIC BAG, INJECTION (ML) INTRAVENOUS
Qty: 8 | Refills: 0 | Status: DISCONTINUED | OUTPATIENT
Start: 2023-09-23 | End: 2023-09-26

## 2023-09-23 RX ORDER — PANTOPRAZOLE SODIUM 20 MG/1
8 TABLET, DELAYED RELEASE ORAL
Qty: 80 | Refills: 0 | Status: DISCONTINUED | OUTPATIENT
Start: 2023-09-23 | End: 2023-09-23

## 2023-09-23 RX ORDER — SODIUM CHLORIDE 9 MG/ML
250 INJECTION INTRAMUSCULAR; INTRAVENOUS; SUBCUTANEOUS ONCE
Refills: 0 | Status: COMPLETED | OUTPATIENT
Start: 2023-09-23 | End: 2023-09-24

## 2023-09-23 RX ORDER — FENTANYL CITRATE 50 UG/ML
0.5 INJECTION INTRAVENOUS
Qty: 2500 | Refills: 0 | Status: DISCONTINUED | OUTPATIENT
Start: 2023-09-23 | End: 2023-09-23

## 2023-09-23 RX ORDER — SODIUM CHLORIDE 9 MG/ML
1000 INJECTION, SOLUTION INTRAVENOUS
Refills: 0 | Status: DISCONTINUED | OUTPATIENT
Start: 2023-09-23 | End: 2023-09-23

## 2023-09-23 RX ORDER — PROTAMINE SULFATE 10 MG/ML
288 AMPUL (ML) INTRAVENOUS ONCE
Refills: 0 | Status: DISCONTINUED | OUTPATIENT
Start: 2023-09-23 | End: 2023-09-23

## 2023-09-23 RX ORDER — CALCIUM ACETATE 667 MG
667 TABLET ORAL
Refills: 0 | Status: DISCONTINUED | OUTPATIENT
Start: 2023-09-23 | End: 2023-10-02

## 2023-09-23 RX ADMIN — MIDODRINE HYDROCHLORIDE 10 MILLIGRAM(S): 2.5 TABLET ORAL at 14:31

## 2023-09-23 RX ADMIN — SODIUM CHLORIDE 75 MILLILITER(S): 9 INJECTION, SOLUTION INTRAVENOUS at 09:22

## 2023-09-23 RX ADMIN — SODIUM CHLORIDE 75 MILLILITER(S): 9 INJECTION, SOLUTION INTRAVENOUS at 19:40

## 2023-09-23 RX ADMIN — FENTANYL CITRATE 50 MICROGRAM(S): 50 INJECTION INTRAVENOUS at 18:50

## 2023-09-23 RX ADMIN — FENTANYL CITRATE 50 MICROGRAM(S): 50 INJECTION INTRAVENOUS at 19:30

## 2023-09-23 RX ADMIN — DEXMEDETOMIDINE HYDROCHLORIDE IN 0.9% SODIUM CHLORIDE 1.22 MICROGRAM(S)/KG/HR: 4 INJECTION INTRAVENOUS at 23:16

## 2023-09-23 RX ADMIN — Medication 1300 MILLIGRAM(S): at 05:00

## 2023-09-23 RX ADMIN — Medication 667 MILLIGRAM(S): at 14:18

## 2023-09-23 RX ADMIN — Medication 9.11 MICROGRAM(S)/KG/MIN: at 22:43

## 2023-09-23 RX ADMIN — Medication 321.6 MILLIGRAM(S): at 18:02

## 2023-09-23 RX ADMIN — Medication 1300 MILLIGRAM(S): at 14:18

## 2023-09-23 RX ADMIN — HEPARIN SODIUM 1200 UNIT(S)/HR: 5000 INJECTION INTRAVENOUS; SUBCUTANEOUS at 02:28

## 2023-09-23 RX ADMIN — PANTOPRAZOLE SODIUM 40 MILLIGRAM(S): 20 TABLET, DELAYED RELEASE ORAL at 14:18

## 2023-09-23 RX ADMIN — MUPIROCIN 1 APPLICATION(S): 20 OINTMENT TOPICAL at 05:00

## 2023-09-23 RX ADMIN — PANTOPRAZOLE SODIUM 10 MG/HR: 20 TABLET, DELAYED RELEASE ORAL at 18:02

## 2023-09-23 RX ADMIN — Medication 650 MILLIGRAM(S): at 11:56

## 2023-09-23 RX ADMIN — CHLORHEXIDINE GLUCONATE 1 APPLICATION(S): 213 SOLUTION TOPICAL at 05:01

## 2023-09-23 RX ADMIN — FENTANYL CITRATE 50 MICROGRAM(S): 50 INJECTION INTRAVENOUS at 19:00

## 2023-09-23 NOTE — CONSULT NOTE ADULT - ASSESSMENT
Between the first and second portions of the duodenum, there is active   arterial extravasation and venous pooling reflecting active GI bleed.    plan:  EGD today 84 yo m ho DM, COPD, anemia, lymphedemia, afib on xarelto, HFrEF, DM coming in from nursing home for AMS x 2 days. Unable to gather story from pt as he is altered and lethargic.  As per NH was becoming more agitated x 2 days, was hypotensive yesterday and started on cefepime and vancomycin. Brought in to the ED for AMS. PICC line in place for cefepime 1q q8 until 10/5/23 and vanc 1q24 until 9/23/23 for LLE cellulitis/OM. admitted with PAULA. GI is called for evaluation of hematochezia.     # Hemorraghic shock 2/2 UGIB:  - hypotensive, on levo  - labs reviewed  - CTA reviewed: Between the first and second portions of the duodenum, there is active arterial extravasation and venous pooling reflecting active GI bleed.  - was on heparin, reversed by primary team    plan:  - NPO  - PPI infusion  - wean levo  - transfuse blood to MAP over 65  - emergent EGD    discussed with primary team on the floor, ICU team, attending on call  discussed with dmitri jean baptiste at bedside who consented for procedure

## 2023-09-23 NOTE — PROGRESS NOTE ADULT - SUBJECTIVE AND OBJECTIVE BOX
24H events:    Patient is a 83y old Male who presents with a chief complaint of AMS (23 Sep 2023 08:16)    Primary diagnosis of Altered mental status    Today is hospital day 8d. This morning patient was seen and examined at bedside, resting comfortably in bed.    No acute or major events overnight.    Code Status: full     Family communication:  Contact date:  Name of person contacted:  Relationship to patient:  Communication details:  What matters most:    PAST MEDICAL & SURGICAL HISTORY  HTN (hypertension)    COPD (chronic obstructive pulmonary disease)    High cholesterol    Mild anemia    Coffee ground emesis    Chronic diastolic heart failure    PAULA (acute kidney injury)    No significant past surgical history      SOCIAL HISTORY:  Social History:      ALLERGIES:  No Known Allergies    MEDICATIONS:  STANDING MEDICATIONS  acetaminophen     Tablet .. 650 milliGRAM(s) Oral every 6 hours  chlorhexidine 2% Cloths 1 Application(s) Topical <User Schedule>  dextrose 5%. 1000 milliLiter(s) IV Continuous <Continuous>  dextrose 50% Injectable 25 Gram(s) IV Push once  dextrose 50% Injectable 12.5 Gram(s) IV Push once  dextrose 50% Injectable 25 Gram(s) IV Push once  glucagon  Injectable 1 milliGRAM(s) IntraMuscular once  heparin  Infusion. 1200 Unit(s)/Hr IV Continuous <Continuous>  insulin lispro (ADMELOG) corrective regimen sliding scale   SubCutaneous three times a day before meals  lactated ringers. 1000 milliLiter(s) IV Continuous <Continuous>  sodium bicarbonate 1300 milliGRAM(s) Oral every 8 hours    PRN MEDICATIONS  albuterol    90 MICROgram(s) HFA Inhaler 1 Puff(s) Inhalation every 6 hours PRN  dextrose Oral Gel 15 Gram(s) Oral once PRN  heparin   Injectable 8000 Unit(s) IV Push every 6 hours PRN  heparin   Injectable 4000 Unit(s) IV Push every 6 hours PRN    VITALS:   T(F): 96  HR: 91  BP: 122/60  RR: 17  SpO2: 97%    PHYSICAL EXAM:  Gen: NAD, lying in bed with legs slighlty bent , ANO times 1( patient looks better than yesterday)  HEENT: Normocephalic, atraumatic  Neck: supple, no lymphadenopathy  CV: Irregular ; S1S2 normal , no murmur  Lungs: mild crackles bilateral, decreased BS at bases, no fremitus  Abdomen: Soft, BS present  Ext: Warm, well perfused  Neuro: non focal, awake, ANOx 1  Skin: Left heel DTI - no cellulitis in left leg   Sacral Ulcer without gross drainage/erythema   Lines: no phlebitis    ( x ) Indwelling Mayfield Catheter:   Date insterted:  09/17/2023  Reason (  ) Critical illness     (  ) urinary retention    (  ) Accurate Ins/Outs Monitoring     (  ) CMO   LABS:                        10.0   10.44 )-----------( 343      ( 22 Sep 2023 07:42 )             32.7     09-22    145  |  104  |  122<HH>  ----------------------------<  105<H>  3.4<L>   |  21  |  7.6<HH>    Ca    8.1<L>      22 Sep 2023 07:42      PTT - ( 23 Sep 2023 00:25 )  PTT:76.7 sec  Urinalysis Basic - ( 22 Sep 2023 07:42 )    Color: x / Appearance: x / SG: x / pH: x  Gluc: 105 mg/dL / Ketone: x  / Bili: x / Urobili: x   Blood: x / Protein: x / Nitrite: x   Leuk Esterase: x / RBC: x / WBC x   Sq Epi: x / Non Sq Epi: x / Bacteria: x                           24H events:    Patient is a 83y old Male who presents with a chief complaint of AMS (23 Sep 2023 08:16)    Primary diagnosis of Altered mental status    Today is hospital day 8d. This morning patient was seen and examined at bedside, resting comfortably in bed.    No acute or major events overnight.    Code Status: full     Family communication:  Contact date:  Name of person contacted:  Relationship to patient:  Communication details:  What matters most:    PAST MEDICAL & SURGICAL HISTORY  HTN (hypertension)    COPD (chronic obstructive pulmonary disease)    High cholesterol    Mild anemia    Coffee ground emesis    Chronic diastolic heart failure    PAULA (acute kidney injury)    No significant past surgical history      SOCIAL HISTORY:  Social History:      ALLERGIES:  No Known Allergies    MEDICATIONS:  STANDING MEDICATIONS  acetaminophen     Tablet .. 650 milliGRAM(s) Oral every 6 hours  chlorhexidine 2% Cloths 1 Application(s) Topical <User Schedule>  dextrose 5%. 1000 milliLiter(s) IV Continuous <Continuous>  dextrose 50% Injectable 25 Gram(s) IV Push once  dextrose 50% Injectable 12.5 Gram(s) IV Push once  dextrose 50% Injectable 25 Gram(s) IV Push once  glucagon  Injectable 1 milliGRAM(s) IntraMuscular once  heparin  Infusion. 1200 Unit(s)/Hr IV Continuous <Continuous>  insulin lispro (ADMELOG) corrective regimen sliding scale   SubCutaneous three times a day before meals  lactated ringers. 1000 milliLiter(s) IV Continuous <Continuous>  sodium bicarbonate 1300 milliGRAM(s) Oral every 8 hours    PRN MEDICATIONS  albuterol    90 MICROgram(s) HFA Inhaler 1 Puff(s) Inhalation every 6 hours PRN  dextrose Oral Gel 15 Gram(s) Oral once PRN  heparin   Injectable 8000 Unit(s) IV Push every 6 hours PRN  heparin   Injectable 4000 Unit(s) IV Push every 6 hours PRN    VITALS:   T(F): 96  HR: 91  BP: 122/60  RR: 17  SpO2: 97%    PHYSICAL EXAM:  Gen: NAD, lying in bed with legs slighlty bent , ANO times 1( patient looks better than yesterday)  HEENT: Normocephalic, atraumatic  Neck: supple, no lymphadenopathy  CV: Irregular ; S1S2 normal , no murmur  Lungs: mild crackles bilateral, decreased BS at bases, no fremitus  Abdomen: Soft, BS present  Ext: Warm, well perfused  Neuro: non focal, awake, ANOx 1  Skin: Left heel DTI - no cellulitis in left leg   Sacral Ulcer without gross drainage/erythema   Lines: no phlebitis    Pressure sores: Grade 2 presssure sores on bilateral buttock     ( x ) Indwelling Mayfield Catheter:   Date insterted:  09/17/2023  Reason (  ) Critical illness     (  ) urinary retention    (  ) Accurate Ins/Outs Monitoring     (  ) CMO   LABS:                        10.0   10.44 )-----------( 343      ( 22 Sep 2023 07:42 )             32.7     09-22    145  |  104  |  122<HH>  ----------------------------<  105<H>  3.4<L>   |  21  |  7.6<HH>    Ca    8.1<L>      22 Sep 2023 07:42      PTT - ( 23 Sep 2023 00:25 )  PTT:76.7 sec  Urinalysis Basic - ( 22 Sep 2023 07:42 )    Color: x / Appearance: x / SG: x / pH: x  Gluc: 105 mg/dL / Ketone: x  / Bili: x / Urobili: x   Blood: x / Protein: x / Nitrite: x   Leuk Esterase: x / RBC: x / WBC x   Sq Epi: x / Non Sq Epi: x / Bacteria: x                           24H events:    Patient is a 83y old Male who presents with a chief complaint of AMS (23 Sep 2023 08:16)    Primary diagnosis of Altered mental status    Today is hospital day 8d. This morning patient was seen and examined at bedside, resting comfortably in bed.    No acute or major events overnight.    Code Status: full     Family communication:  Contact date:  Name of person contacted:  Relationship to patient:  Communication details:  What matters most:    PAST MEDICAL & SURGICAL HISTORY  HTN (hypertension)    COPD (chronic obstructive pulmonary disease)    High cholesterol    Mild anemia    Coffee ground emesis    Chronic diastolic heart failure    PAULA (acute kidney injury)    No significant past surgical history      SOCIAL HISTORY:  Social History:      ALLERGIES:  No Known Allergies    MEDICATIONS:  STANDING MEDICATIONS  acetaminophen     Tablet .. 650 milliGRAM(s) Oral every 6 hours  chlorhexidine 2% Cloths 1 Application(s) Topical <User Schedule>  dextrose 5%. 1000 milliLiter(s) IV Continuous <Continuous>  dextrose 50% Injectable 25 Gram(s) IV Push once  dextrose 50% Injectable 12.5 Gram(s) IV Push once  dextrose 50% Injectable 25 Gram(s) IV Push once  glucagon  Injectable 1 milliGRAM(s) IntraMuscular once  heparin  Infusion. 1200 Unit(s)/Hr IV Continuous <Continuous>  insulin lispro (ADMELOG) corrective regimen sliding scale   SubCutaneous three times a day before meals  lactated ringers. 1000 milliLiter(s) IV Continuous <Continuous>  sodium bicarbonate 1300 milliGRAM(s) Oral every 8 hours    PRN MEDICATIONS  albuterol    90 MICROgram(s) HFA Inhaler 1 Puff(s) Inhalation every 6 hours PRN  dextrose Oral Gel 15 Gram(s) Oral once PRN  heparin   Injectable 8000 Unit(s) IV Push every 6 hours PRN  heparin   Injectable 4000 Unit(s) IV Push every 6 hours PRN    VITALS:   T(F): 96  HR: 91  BP: 122/60  RR: 17  SpO2: 97%    PHYSICAL EXAM:  Gen: NAD, lying in bed with legs slighlty bent , ANO times 1( patient looks better than yesterday)  HEENT: Normocephalic, atraumatic  Neck: supple, no lymphadenopathy  CV: Irregular ; S1S2 normal , no murmur  Lungs: mild crackles bilateral, decreased BS at bases, no fremitus  Abdomen: Soft, BS present  Ext: Warm, well perfused  Neuro: non focal, awake, ANOx 1  Skin: Left heel DTI - no cellulitis in left leg , bilat buttock stage 2 pressure ulcers   Sacral Ulcer without gross drainage/erythema   Lines: no tenderness     Pressure sores: Grade 2 presssure sores on bilateral buttock     ( x ) Indwelling Mayfield Catheter:   Date insterted:  09/17/2023  Reason (  ) Critical illness     (  ) urinary retention    (  ) Accurate Ins/Outs Monitoring     (  ) CMO   LABS:                        10.0   10.44 )-----------( 343      ( 22 Sep 2023 07:42 )             32.7     09-22    145  |  104  |  122<HH>  ----------------------------<  105<H>  3.4<L>   |  21  |  7.6<HH>    Ca    8.1<L>      22 Sep 2023 07:42      PTT - ( 23 Sep 2023 00:25 )  PTT:76.7 sec  Urinalysis Basic - ( 22 Sep 2023 07:42 )    Color: x / Appearance: x / SG: x / pH: x  Gluc: 105 mg/dL / Ketone: x  / Bili: x / Urobili: x   Blood: x / Protein: x / Nitrite: x   Leuk Esterase: x / RBC: x / WBC x   Sq Epi: x / Non Sq Epi: x / Bacteria: x

## 2023-09-23 NOTE — CHART NOTE - NSCHARTNOTEFT_GEN_A_CORE
ANESTHESIA to ICU NOTE      __x__ Intubated  TV: 450   Rate: 16      FiO2: 100%    ____ Patent Airway    ____ Full return of protective reflexes    ____ Full recovery from anesthesia / sedation to baseline status    Vitals:  HR 78  /52  RR 16 vented  O2sat. 100%  Temp: 36.5C      Mental Status:  ____ Awake   _____ Alert   _____ Drowsy   ___x__ Sedated    Nausea/Vomiting: ____ Yes, See Post - Op Orders      __x__ No    Pain Scale (0-10): _____    Treatment: ___x_ None    ____ See Post - Op/PCA Orders    Post - Operative Fluids:   ____ Oral   ____ See Post - Op Orders    Plan:  Discharge to:   ____Home       _____Floor      ___x__Critical Care    _____ Other:_________________    Comments: called for EGD. Pt's condition is deemed unstable at the time and procedure provided at the bedside in ICU. Pt was intubated pre-procedure and remains intubated post. No anesthesia complications. Pt's condition is guarded, but stable in ICU. Full report is given to ICU RN and ICU team.

## 2023-09-23 NOTE — CHART NOTE - NSCHARTNOTEFT_GEN_A_CORE
Called by the team in regard to the patient having an episode of hematochezia with hemodynamic instability  Hemoglobin dropped from 10 yesterday to 7.3 most recently  Patient is on heparin infusion  And MARISSA reported to be fresh blood by primary team  Primary team reported hemodynamic instability with tachycardia and hypotension    Recommendation  - N.p.o.  - Transfuse 1 unit of blood  - Hold heparin  - Target MAP over 65  - ICU consult  - Recommend CT angio GI bleed protocol given hemodynamic instability per guidelines (primary team will need to discuss the risk of kidney injury given ATN and the possibility of need of dialysis with the patient and next of kin)  - Recommend IR evaluation given hemodynamic instability    We will follow

## 2023-09-23 NOTE — PHARMACOTHERAPY INTERVENTION NOTE - COMMENTS
Spoke to MD.  Recommended to order 36mg of Protamine based on 3,600 units of Heparin infusion patient received in the last 3 hours. Spoke to resident. MD originally ordered 288mg of protamine.  Recommended to order 36mg of protamine based on the following calculations:    Heparin infusion discontinued at 3: 43pm    Rate of heparin drip: 1200units/hour    Total heparin given last 3 hours: 3,600 units    1 mg of protamine neutralizes ~100 units of heparin, leading to 36mg dose of protamine in 50ml of D5W.

## 2023-09-23 NOTE — PROGRESS NOTE ADULT - SUBJECTIVE AND OBJECTIVE BOX
seen and examined  24 h events noted   no distress   lying comfortable         PAST HISTORY  --------------------------------------------------------------------------------  No significant changes to PMH, PSH, FHx, SHx, unless otherwise noted    ALLERGIES & MEDICATIONS  --------------------------------------------------------------------------------  Allergies    No Known Allergies    Intolerances      Standing Inpatient Medications  chlorhexidine 2% Cloths 1 Application(s) Topical <User Schedule>  dextrose 5%. 1000 milliLiter(s) IV Continuous <Continuous>  dextrose 50% Injectable 25 Gram(s) IV Push once  dextrose 50% Injectable 12.5 Gram(s) IV Push once  dextrose 50% Injectable 25 Gram(s) IV Push once  glucagon  Injectable 1 milliGRAM(s) IntraMuscular once  heparin  Infusion. 1200 Unit(s)/Hr IV Continuous <Continuous>  insulin lispro (ADMELOG) corrective regimen sliding scale   SubCutaneous three times a day before meals  lactated ringers. 1000 milliLiter(s) IV Continuous <Continuous>  sodium bicarbonate 1300 milliGRAM(s) Oral every 8 hours    PRN Inpatient Medications  albuterol    90 MICROgram(s) HFA Inhaler 1 Puff(s) Inhalation every 6 hours PRN  dextrose Oral Gel 15 Gram(s) Oral once PRN  heparin   Injectable 4000 Unit(s) IV Push every 6 hours PRN  heparin   Injectable 8000 Unit(s) IV Push every 6 hours PRN          VITALS/PHYSICAL EXAM  --------------------------------------------------------------------------------  T(C): 35.6 (09-23-23 @ 05:00), Max: 36.7 (09-22-23 @ 13:20)  HR: 91 (09-23-23 @ 05:00) (75 - 91)  BP: 122/60 (09-23-23 @ 05:00) (105/52 - 122/60)  RR: 17 (09-23-23 @ 05:00) (16 - 17)  SpO2: 97% (09-23-23 @ 05:00) (97% - 100%)  Wt(kg): --        09-22-23 @ 07:01  -  09-23-23 @ 07:00  --------------------------------------------------------  IN: 594 mL / OUT: 700 mL / NET: -106 mL      Physical Exam:  	Gen: NAD  	Pulm: decrease BS  B/L  	CV: S1S2; no rub  	Abd:  soft,/nondistended  	LE: dressing   	    LABS/STUDIES  --------------------------------------------------------------------------------              10.0   10.44 >-----------<  343      [09-22-23 @ 07:42]              32.7     145  |  104  |  122  ----------------------------<  105      [09-22-23 @ 07:42]  3.4   |  21  |  7.6        Ca     8.1     [09-22-23 @ 07:42]      Mg     1.9     [09-21-23 @ 08:45]      Phos  7.4     [09-21-23 @ 08:45]    TPro  5.8  /  Alb  2.9  /  TBili  0.2  /  DBili  x   /  AST  21  /  ALT  12  /  AlkPhos  81  [09-21-23 @ 08:45]      PTT: 76.7       [09-23-23 @ 00:25]    Creatinine Trend:  SCr 7.6 [09-22 @ 07:42]  SCr 8.2 [09-21 @ 08:45]  SCr 7.4 [09-20 @ 06:17]  SCr 7.4 [09-19 @ 06:56]  SCr 7.1 [09-18 @ 17:02]    Urinalysis - [09-22-23 @ 07:42]      Color  / Appearance  / SG  / pH       Gluc 105 / Ketone   / Bili  / Urobili        Blood  / Protein  / Leuk Est  / Nitrite       RBC  / WBC  / Hyaline  / Gran  / Sq Epi  / Non Sq Epi  / Bacteria     Urine Creatinine 42      [09-18-23 @ 11:15]  Urine Sodium 78.0      [09-18-23 @ 11:15]  Urine Urea Nitrogen 256      [09-18-23 @ 11:15]  Urine Potassium 24      [09-18-23 @ 11:15]  Urine Osmolality 299      [09-18-23 @ 11:15]

## 2023-09-23 NOTE — PROGRESS NOTE ADULT - ASSESSMENT
83M DM2, COPD, Anemia, Paroxysmal Afib on xarelto, HFrEF, sent to the [facility] by [facility] for AMS. Pt was recently admitted to [facility] from [facility] for OM needing IV Abx Cefepime/Vancomycin at [facility]. Pt has been having worsening in MS over the last 2 days per NH records. Pt currently responds to touch and voice but does not speak. Tracks eyes.     #Metabolic Encephalopathy   #Suspected Severe Sepsis (Hypotention/AMS/PAULA/Oliguric)   #PAULA vs CKD 4/5  w/ Progression   #HAGMA 2/2 PAULA   #Oliguric Renal Failure moving towards Anuria  #History of Left Foot OM w/ surrounding Cellulitis   #Sacral DTI / Stage 1 POA     - CT head negative   - Creatinine trending up ( 6.0>6.3>6.7>7.0>7.1>8.4>7.6)   - 1300 meq sodium bicarbonate 3 times daily added   - On fluids LR 75ml/hr  - Phosphorus 7.4   - Urine lytes and osmol normal   - d/c ed cefepime and vanco  - vanco random level 50(increased)   - ESR and CRP increased   - Blood culture negative   - UA negative   - Per Nephrology, No need for RRT for now  - started on phoslo 2/2/2  - F/u with Nephro       #Left heel ulcers  - CT scan leg : IMPRESSION:  No CT evidence of osteomyelitis or necrotizing infection. No drainable   collection seen, within the limitations of a noncontrast exam.  - NO acute surgical intervention required as per Podiatry      #Compensated HFrEF  #Paroxysmal Afib  - echo 8/14/2023 EF 35-40% from [facility] admission    -ECHO: < from: TTE Echo Complete w/o Contrast w/ Doppler (09.18.23 @ 11:27) >  Summary:   1. Technically difficult study.   2. Left ventricular ejection fraction, by visual estimation, is >55%.   3. Normal global left ventricular systolic function.   4. The left ventricular diastolic function could not be assessed in this   study.   5. The aortic valve was not well visualized; appears calcified with   grossly normal opening.   6. Mild mitral regurgitation.   7. Mild tricuspid regurgitation.      - hold home metoprolol and all other BP meds due to hypotension  - hold xarelto due to PAULA   - under heparin full anticoagulation , adjustment ongoing   -     #DM2  - hold home empagliflozin  - FS q6  - Betahydro butyrate negative     Guarded Prognosis   - reassess for improvement if not may need SDU    DVT : heparin full anticoagulation ; under adjustment  Diet: bite sized and soft , 1x1 feed    Monitor BP and Urine output   PT eval     Family:   Talked with his brother about GOC, according to him, patients wish was to be full code before.   will follow up  Updated on the patient's condition and about the poor prognosis.

## 2023-09-23 NOTE — PROGRESS NOTE ADULT - ASSESSMENT
82 yo m ho DM, COPD, anemia, paroxysmal afib on xarelto, HFrEF, DM coming in from nursing home for AMS x 2 days. Found to have toxic metabolic encephalopathy with PAULA.  PAULA/ toxic metabolic encephalopathy/ HAGMA/ HFrEF  possible ATN iso hypotension and possible sepsis/ vanco toxicity / last level 60   no hydro on imaging   non oliguric   cont LR at 75 cc per hour   k phosphorus  level noted / phoslo 2/2/2  creat was trending down/ check repeat today   cont  sodium bicarbonate to  1300 q 8   no acute indication for RRT   if bp remains on the low side/ start midodrine   will follow

## 2023-09-23 NOTE — CHART NOTE - NSCHARTNOTEFT_GEN_A_CORE
Transfer Note    Transfer from:     Transfer to: (  ) Medicine    (  ) Telemetry     (  ) RCU       ( x ) CEU    (  ) VENT                        (  ) Palliative    (  ) Stroke Unit    (  ) MICU    (  ) CCU    Signout given to:     ----------------------------------------------------------------------------------------------------------  HPI / ICU COURSE:    HPI:  82 yo m ho DM, COPD, anemia, lymphedemia, afib on xarelto, HFrEF, DM coming in from nursing home for AMS x 2 days. Unable to gather story from pt as he is altered and lethargic.  As per NH was becoming more agitated x 2 days, was hypotensive yesterday and started on cefepime and vancomycin. Brought in to the ED for AMS. PICC line in place for cefepime 1q q8 until 10/5/23 and vanc 1q24 until 9/23/23 for LLE cellulitis/OM  (was at [facility] last month for LLE thick wound cellulitis/OM and sacral DTI as per call with [facility] as per collateral from NH DEISY Barry)             ACTIVE ISSUE:   Patient was seen by nephro for possible ATN with creatinine of 7.4 , not requiring hemodialysis   patient has a fib and was on full anticoagulation (Heparin) , today patient has large dark red bowel movement .  vitals : hypotension 70/35 mg , pulse 100, spo2 92   IV fluids started   Midodrine started   MARISSA : positive for dark blood, looks actively bleeding     CBC stat show hb of 7.3, type and screen sent   transfuse 1 unit of blood       --------------------------------------------------------------------------------------------------------  PMH/PSH:  PAST MEDICAL & SURGICAL HISTORY:  HTN (hypertension)    COPD (chronic obstructive pulmonary disease)    High cholesterol    Mild anemia    Coffee ground emesis    Chronic diastolic heart failure    PAULA (acute kidney injury)    No significant past surgical history        -------------------------------------------------------------------------------------------------------  PHYSICAL EXAM:  General: NAD  HEENT: Atraumatic  Respiratory: CTAB  Cardiac: irregularly irregular     Abdomen: soft, tender, non-distended  Extremities: warm and well-perfused  Neuro: A+Ox0. No FND    Vital Signs Last 24 Hrs  T(C): 35.6 (23 Sep 2023 05:00), Max: 36.4 (22 Sep 2023 21:30)  T(F): 96 (23 Sep 2023 05:00), Max: 97.5 (22 Sep 2023 21:30)  HR: 91 (23 Sep 2023 05:00) (75 - 91)  BP: 122/60 (23 Sep 2023 05:00) (108/62 - 122/60)  BP(mean): --  RR: 17 (23 Sep 2023 05:00) (17 - 17)  SpO2: 94% (23 Sep 2023 13:25) (94% - 97%)        I&O's Summary    22 Sep 2023 07:01  -  23 Sep 2023 07:00  --------------------------------------------------------  IN: 594 mL / OUT: 700 mL / NET: -106 mL        --------------------------------------------------------------------------------------------------------  LABS:                               7.3    13.55 )-----------( 349      ( 23 Sep 2023 14:12 )             23.3       09-23    145  |  104  |  132<HH>  ----------------------------<  138<H>  3.5   |  19  |  7.1<HH>    Ca    7.7<L>      23 Sep 2023 12:05  Mg     1.7     09-23    TPro  5.2<L>  /  Alb  2.8<L>  /  TBili  0.3  /  DBili  x   /  AST  20  /  ALT  10  /  AlkPhos  70  09-23      PTT - ( 23 Sep 2023 00:25 )  PTT:76.7 sec        CULTURE RESULTS:                09-18-23 @ 11:15  Specimen Source: --  Method Type: --  Gram Stain - RRL: --  Gram Stain - Wound: --  Bacteria: Negative  Culture Results: --      Specimen Source:   Method Type:   Gram Stain:   Culture Results: Culture Results:   No growth at 5 days (09-17-23 @ 08:20)    Bacteria: Bacteria: Negative /HPF (09-18-23 @ 11:15)      ASSESSMENT & PLAN:       83M DM2, COPD, Anemia, Paroxysmal Afib on xarelto, HFrEF, sent to the Hospital by [facility] for AMS. Pt was recently admitted to [facility] from [facility] for OM needing IV Abx Cefepime/Vancomycin at [facility]. Pt has been having worsening in MS over the last 2 days per NH records. Pt currently responds to touch and voice but does not speak. Tracks eyes.     #Metabolic Encephalopathy   #Suspected Severe Sepsis (Hypotention/AMS/PAULA/Oliguric)   #PAULA vs CKD 4/5  w/ Progression   #HAGMA 2/2 PAULA   #Oliguric Renal Failure moving towards Anuria  #History of Left Foot OM w/ surrounding Cellulitis   #Sacral DTI / Stage 1 POA     - CT head negative   - Creatinine trending up ( 6.0>6.3>6.7>7.0>7.1>8.4>7.6)   - 1300 meq sodium bicarbonate 3 times daily added   - On fluids LR 75ml/hr  - Phosphorus 7.4   - Urine lytes and osmol normal   - d/c ed cefepime and vanco  - vanco random level 50(increased)   - ESR and CRP increased   - Blood culture negative   - UA negative   - Per Nephrology, No need for RRT for now  - started on phoslo 2/2/2  - F/u with Nephro     # Hematochezia :   - dark red large bowel movement  - MARISSA positive; shows likely active bleed    - resusicitated with iv fluids, 40 mg protonix, holded heparin  - cbc 7.3  - transfuse 1 unit prbc  - trend H/H   - monitor blood pressure       #Left heel ulcers  - CT scan leg : IMPRESSION:  No CT evidence of osteomyelitis or necrotizing infection. No drainable   collection seen, within the limitations of a noncontrast exam.  - NO acute surgical intervention required as per Podiatry      #Compensated HFrEF  #Paroxysmal Afib  - echo 8/14/2023 EF 35-40% from [facility] admission    -ECHO: < from: TTE Echo Complete w/o Contrast w/ Doppler (09.18.23 @ 11:27) >  Summary:   1. Technically difficult study.   2. Left ventricular ejection fraction, by visual estimation, is >55%.   3. Normal global left ventricular systolic function.   4. The left ventricular diastolic function could not be assessed in this   study.   5. The aortic valve was not well visualized; appears calcified with   grossly normal opening.   6. Mild mitral regurgitation.   7. Mild tricuspid regurgitation.      - hold home metoprolol and all other BP meds due to hypotension  - hold xarelto due to PAULA   - under heparin full anticoagulation , adjustment ongoing   -     #DM2  - hold home empagliflozin  - FS q6  - Betahydro butyrate negative     Guarded Prognosis   - reassess for improvement if not may need SDU    DVT : heparin full anticoagulation ; under adjustment  Diet: bite sized and soft , 1x1 feed    Monitor BP and Urine output   PT eval     Family:   Talked with his brother about GOC, according to him, patients wish was to be full code before.   will follow up  Updated on the patient's condition and about the poor prognosis.

## 2023-09-23 NOTE — PROGRESS NOTE ADULT - ATTENDING COMMENTS
83M DM2, COPD, Anemia, Paroxysmal Afib on xarelto, HFrEF, sent to the Hospital by [facility] for AMS. Pt was recently admitted to [facility] from [facility] for OM needing IV Abx Cefepime/Vancomycin at [facility]. Pt has been having worsening in MS over the last 2 days per NH records. Pt currently responds to touch and voice but does not speak. Tracks eyes.     # metabolic encephalopathy, resolved  today he is awake and alert and oriented, feels tired    # renal failure , with acidosis, HAGMA,   creatinine trend  7.6 (09-22-23 @ 07:42)  8.2 (09-21-23 @ 08:45)  7.4 (09-20-23 @ 06:17)  7.4 (09-19-23 @ 06:56)  7.1 (09-18-23 @ 17:02)    cont bicarb  no acute need for dialysis   monitor k and bicarb level     fu nephro     # DM   POCT Blood Glucose.: 148 mg/dL (09-23-23 @ 11:40)  POCT Blood Glucose.: 94 mg/dL (09-23-23 @ 07:58)  POCT Blood Glucose.: 106 mg/dL (09-22-23 @ 20:58)  POCT Blood Glucose.: 136 mg/dL (09-22-23 @ 16:46)    cont current management     # pressure ulcer heel and buttock , LLE Cellulitis/OM  per ID antibiotics stopped  dc picc     # COPD,   stable no wheezing    # Anemia, stable, check iron, b12 folate   Hemoglobin: 10.0 g/dL (09-22-23 @ 07:42)  Hemoglobin: 8.6 g/dL (09-21-23 @ 08:45)  Hemoglobin: 9.4 g/dL (09-20-23 @ 16:49)  Hemoglobin: 9.0 g/dL (09-20-23 @ 06:17)    # Paroxysmal Afib on xarelto, HFpEF,   rate controlled, cont ac   < from: TTE Echo Complete w/o Contrast w/ Doppler (09.18.23 @ 11:27) >     1. Technically difficult study.   2. Left ventricular ejection fraction, by visual estimation, is >55%.   3. Normal global left ventricular systolic function.   4. The left ventricular diastolic function could not be assessed in this   study.   5. The aortic valve was not well visualized; appears calcified with   grossly normal opening.   6. Mild mitral regurgitation.   7. Mild tricuspid regurgitation.    < end of copied text >    #Progress Note Handoff  Pending : cr to improve,   Family discussion: dw pt   Disposition: SNF   time spent : 35 min 83M DM2, COPD, Anemia, Paroxysmal Afib on xarelto, HFrEF, sent to the Hospital by [facility] for AMS. Pt was recently admitted to [facility] from [facility] for OM needing IV Abx Cefepime/Vancomycin at [facility]. Pt has been having worsening in MS over the last 2 days per NH records. Pt currently responds to touch and voice but does not speak. Tracks eyes.     pt  developed acute gib today     # metabolic encephalopathy, resolved  today he is awake and alert and oriented, feels tired    # renal failure , with acidosis, HAGMA,   creatinine trend  7.6 (09-22-23 @ 07:42)  8.2 (09-21-23 @ 08:45)  7.4 (09-20-23 @ 06:17)  7.4 (09-19-23 @ 06:56)  7.1 (09-18-23 @ 17:02)    cont bicarb  no acute need for dialysis   monitor k and bicarb level     fu nephro     # DM   POCT Blood Glucose.: 148 mg/dL (09-23-23 @ 11:40)  POCT Blood Glucose.: 94 mg/dL (09-23-23 @ 07:58)  POCT Blood Glucose.: 106 mg/dL (09-22-23 @ 20:58)  POCT Blood Glucose.: 136 mg/dL (09-22-23 @ 16:46)    cont current management     # pressure ulcer heel and buttock , LLE Cellulitis/OM  per ID antibiotics stopped  dc picc     # COPD,   stable     # anemia   pt developed acute GIB today   pt is upgraded to stepdown   tranfuse pt   Hemoglobin: 7.3 g/dL (09-23-23 @ 14:12)  Hemoglobin: 8.1 g/dL (09-23-23 @ 12:05)  Hemoglobin: 10.0 g/dL (09-22-23 @ 07:42)  Hemoglobin: 8.6 g/dL (09-21-23 @ 08:45)  Hemoglobin: 9.4 g/dL (09-20-23 @ 16:49)    # Paroxysmal Afib on xarelto, HFpEF,   rate controlled, cont ac   < from: TTE Echo Complete w/o Contrast w/ Doppler (09.18.23 @ 11:27) >     1. Technically difficult study.   2. Left ventricular ejection fraction, by visual estimation, is >55%.   3. Normal global left ventricular systolic function.   4. The left ventricular diastolic function could not be assessed in this   study.   5. The aortic valve was not well visualized; appears calcified with   grossly normal opening.   6. Mild mitral regurgitation.   7. Mild tricuspid regurgitation.    < end of copied text >    #Progress Note Handoff  Pending : cr to improve,   Family discussion: maría pt   Disposition: SNF 83M DM2, COPD, Anemia, Paroxysmal Afib on xarelto, HFrEF, sent to the Hospital by [facility] for AMS. Pt was recently admitted to [facility] [place] from [facility] for OM needing IV Abx Cefepime/Vancomycin at [facility]. Pt has been having worsening in MS over the last 2 days per NH records. Pt currently responds to touch and voice but does not speak. Tracks eyes.     pt  developed acute gib today     # metabolic encephalopathy, resolved  today he is awake and alert and oriented, feels tired    # renal failure , with acidosis, HAGMA,   creatinine trend  7.6 (09-22-23 @ 07:42)  8.2 (09-21-23 @ 08:45)  7.4 (09-20-23 @ 06:17)  7.4 (09-19-23 @ 06:56)  7.1 (09-18-23 @ 17:02)    cont bicarb  no acute need for dialysis   monitor k and bicarb level     fu nephro     # DM   POCT Blood Glucose.: 148 mg/dL (09-23-23 @ 11:40)  POCT Blood Glucose.: 94 mg/dL (09-23-23 @ 07:58)  POCT Blood Glucose.: 106 mg/dL (09-22-23 @ 20:58)  POCT Blood Glucose.: 136 mg/dL (09-22-23 @ 16:46)    cont current management     # pressure ulcer heel and buttock , LLE Cellulitis/OM  per ID antibiotics stopped  dc picc     # COPD,   stable     # anemia   pt developed acute GIB today   pt is upgraded to stepdown   tranfuse pt   Hemoglobin: 7.3 g/dL (09-23-23 @ 14:12)  Hemoglobin: 8.1 g/dL (09-23-23 @ 12:05)  Hemoglobin: 10.0 g/dL (09-22-23 @ 07:42)  Hemoglobin: 8.6 g/dL (09-21-23 @ 08:45)  Hemoglobin: 9.4 g/dL (09-20-23 @ 16:49)    # Paroxysmal Afib on xarelto, HFpEF,   rate controlled, cont ac   < from: TTE Echo Complete w/o Contrast w/ Doppler (09.18.23 @ 11:27) >     1. Technically difficult study.   2. Left ventricular ejection fraction, by visual estimation, is >55%.   3. Normal global left ventricular systolic function.   4. The left ventricular diastolic function could not be assessed in this   study.   5. The aortic valve was not well visualized; appears calcified with   grossly normal opening.   6. Mild mitral regurgitation.   7. Mild tricuspid regurgitation.    < end of copied text >    #Progress Note Handoff  Pending : cta   Family discussion: dw pt   Disposition: SNF when stable   time spent 50 min   pt is getting upgraded to SDU 83M DM2, COPD, Anemia, Paroxysmal Afib on xarelto, HFrEF, sent to the Hospital by [facility] for AMS. Pt was recently admitted to [facility] from [facility] for OM needing IV Abx Cefepime/Vancomycin at [facility]. Pt has been having worsening in MS over the last 2 days per NH records. Pt currently responds to touch and voice but does not speak. Tracks eyes.     pt  developed acute gib today     # acute GIB   # anemia  reverse ac  transfuse   upgrade pt   cta stat  family is aware of risk for worsening renal failure and possiblity of need for HD      Hemoglobin: 7.3 g/dL (09-23-23 @ 14:12)  Hemoglobin: 8.1 g/dL (09-23-23 @ 12:05)  Hemoglobin: 10.0 g/dL (09-22-23 @ 07:42)  Hemoglobin: 8.6 g/dL (09-21-23 @ 08:45)  Hemoglobin: 9.4 g/dL (09-20-23 @ 16:49)    # metabolic encephalopathy, resolved  today he is awake and alert and oriented, feels tired    # renal failure , with acidosis, HAGMA,   creatinine trend  7.6 (09-22-23 @ 07:42)  8.2 (09-21-23 @ 08:45)  7.4 (09-20-23 @ 06:17)  7.4 (09-19-23 @ 06:56)  7.1 (09-18-23 @ 17:02)    cont bicarb  no acute need for dialysis   monitor k and bicarb level     fu nephro     # DM   POCT Blood Glucose.: 148 mg/dL (09-23-23 @ 11:40)  POCT Blood Glucose.: 94 mg/dL (09-23-23 @ 07:58)  POCT Blood Glucose.: 106 mg/dL (09-22-23 @ 20:58)  POCT Blood Glucose.: 136 mg/dL (09-22-23 @ 16:46)    cont current management     # pressure ulcer heel and buttock , LLE Cellulitis/OM  per ID antibiotics stopped  dc picc     # COPD,   stable       # Paroxysmal Afib on xarelto, HFpEF,   rate controlled, cont ac   < from: TTE Echo Complete w/o Contrast w/ Doppler (09.18.23 @ 11:27) >     1. Technically difficult study.   2. Left ventricular ejection fraction, by visual estimation, is >55%.   3. Normal global left ventricular systolic function.   4. The left ventricular diastolic function could not be assessed in this   study.   5. The aortic valve was not well visualized; appears calcified with   grossly normal opening.   6. Mild mitral regurgitation.   7. Mild tricuspid regurgitation.    < end of copied text >    #Progress Note Handoff  Pending : cta , transfuse   Family discussion: dw pt   Disposition: acute   time spent 50 min   pt is getting upgraded

## 2023-09-23 NOTE — PRE-ANESTHESIA EVALUATION ADULT - NSANTHADDINFOFT_GEN_ALL_CORE
Discussed risks and benefits of anesthesia including but not limited to the risk of sore throat, N/V, damage to mouth, teeth and lips, stroke, MI and even death.  Patient demonstrates understanding, all questions answered. The patient brother wishes to proceed with planned treatment.    Pt too unstable to transfer to OR.  It was decided with GI and anesthesia team a bedside procedure would be more appropriated.    I discussed with pt brother that pt is very unstable and that we would intubate the pt in MICU and proceed with EGD at the bedside with our assistance.

## 2023-09-23 NOTE — CHART NOTE - NSCHARTNOTEFT_GEN_A_CORE
After discussion with anesthesia team decision was made to performed EGD at bedside in ICU as patient was unstable for transfer to OR  patient was intubated and A line was placed by Anesthesia team  EGD performed at bedside in ICU    s/p EGD:  Normal mucosa in the whole esophagus.  Erythema in the stomach compatible with non-erosive gastritis.  Blood and clots in the fundus and the antrum limited exam. .  2 cm actively bleeding duodenal ulcer with spurting vessel (Aaron classification 1a) was noted in the duodenal sweep on the base. 10 cc of 1/60100 epinephrine was injected in circumferential pattern successfully. A 11/6 OVESCO clip was successfully deployed at the vessel for the purposes of hemostasis. There was no evidence of active bleeding at the end of procedure.    plan:  After discussion with a decision was made by Anesthesia to keep patient intubated post procedure  Keep NPO    PPI infusion     Monitor H/H     Active type and screen    Transfuse for Hb >8   Expect Melena next 2-3 days    will follow After discussion with anesthesia team decision was made to performed EGD at bedside in ICU as patient was unstable for transfer to OR  patient was intubated and A line was placed by Anesthesia team  EGD performed at bedside in ICU    s/p EGD:  Normal mucosa in the whole esophagus.  Erythema in the stomach compatible with non-erosive gastritis.  Blood and clots in the fundus and the antrum limited exam. .  2 cm actively bleeding duodenal ulcer with spurting vessel (Aaron classification 1a) was noted in the duodenal sweep on the base. 10 cc of 1/45523 epinephrine was injected in circumferential pattern successfully. A 11/6 OVESCO clip was successfully deployed at the vessel for the purposes of hemostasis. There was no evidence of active bleeding at the end of procedure.    plan:  After discussion with Anesthesia, a decision was made by  to keep patient intubated post procedure  Keep NPO    PPI infusion     Monitor H/H     Active type and screen    Transfuse for Hb >8   Expect Melena next 2-3 days    will follow

## 2023-09-23 NOTE — CONSULT NOTE ADULT - SUBJECTIVE AND OBJECTIVE BOX
Gastroenterology Consultation:    Patient is a 83y old  Male who presents with a chief complaint of AMS (23 Sep 2023 11:40)        Admitted on: 09-15-23      HPI:  84 yo m ho DM, COPD, anemia, lymphedemia, afib on xarelto, HFrEF, DM coming in from nursing home for AMS x 2 days. Unable to gather story from pt as he is altered and lethargic.  As per NH was becoming more agitated x 2 days, was hypotensive yesterday and started on cefepime and vancomycin. Brought in to the ED for AMS. PICC line in place for cefepime 1q q8 until 10/5/23 and vanc 1q24 until 9/23/23 for LLE cellulitis/OM  (was at [facility] last month for LLE thick wound cellulitis/OM and sacral DTI as per call with [facility] as per collateral from [facility])      In the ED, vitals wnl. Labs showing wbc 11.30, Hb 9.6, MCV 79.1, INR 1.59, CO2 14, AG 21, Cr 6 (~baseline 1.2), , trops 0.09. CTH wnl, RBUS with no hydro, poor visualization of left kidney       (15 Sep 2023 22:12)        Prior EGD:    Prior Colonoscopy:      PAST MEDICAL & SURGICAL HISTORY:  HTN (hypertension)      COPD (chronic obstructive pulmonary disease)      High cholesterol      Mild anemia      Coffee ground emesis      Chronic diastolic heart failure      PAULA (acute kidney injury)      No significant past surgical history            FAMILY HISTORY:  No pertinent family history in first degree relatives        Social History:  Tobacco:  Alcohol:  Drugs:    Home Medications:  acetaminophen 325 mg oral tablet: 2 tab(s) orally every 6 hours, As needed, Mild Pain (1 - 3), Moderate Pain (4 - 6) (25 May 2019 09:25)  Albuterol (Eqv-ProAir HFA) 90 mcg/inh inhalation aerosol: 1 puff(s) inhaled every 6 hours as needed for  shortness of breath and/or wheezing (15 Sep 2023 22:56)  Breo Ellipta 100 mcg-25 mcg/inh inhalation powder: 1 puff(s) inhaled once a day (15 Sep 2023 22:56)  empagliflozin 10 mg oral tablet: 1 tab(s) orally once a day (15 Sep 2023 22:56)  Entresto 24 mg-26 mg oral tablet: 1 tab(s) orally 2 times a day (15 Sep 2023 22:57)  Lasix 20 mg oral tablet: 1 tab(s) orally once a day (15 Sep 2023 22:57)  Metoprolol Succinate ER 25 mg oral tablet, extended release: 1 tab(s) orally once a day (15 Sep 2023 22:57)  Percocet 5 mg-325 mg oral tablet: 1 tab(s) orally every 8 hours as needed for  severe pain (15 Sep 2023 22:57)  Xarelto 20 mg oral tablet: 1 tab(s) orally once a day (15 Sep 2023 22:57)        MEDICATIONS  (STANDING):  acetaminophen     Tablet .. 650 milliGRAM(s) Oral every 6 hours  calcium acetate 667 milliGRAM(s) Oral three times a day with meals  chlorhexidine 2% Cloths 1 Application(s) Topical <User Schedule>  dextrose 5% + sodium chloride 0.9%. 1000 milliLiter(s) (75 mL/Hr) IV Continuous <Continuous>  dextrose 5%. 1000 milliLiter(s) (100 mL/Hr) IV Continuous <Continuous>  dextrose 50% Injectable 25 Gram(s) IV Push once  dextrose 50% Injectable 25 Gram(s) IV Push once  dextrose 50% Injectable 12.5 Gram(s) IV Push once  glucagon  Injectable 1 milliGRAM(s) IntraMuscular once  insulin lispro (ADMELOG) corrective regimen sliding scale   SubCutaneous three times a day before meals  midodrine. 10 milliGRAM(s) Oral three times a day  pantoprazole  Injectable 40 milliGRAM(s) IV Push two times a day  protamine  IVPB 36 milliGRAM(s) IV Intermittent once  sodium bicarbonate 1300 milliGRAM(s) Oral every 8 hours    MEDICATIONS  (PRN):  albuterol    90 MICROgram(s) HFA Inhaler 1 Puff(s) Inhalation every 6 hours PRN for shortness of breath and/or wheezing  dextrose Oral Gel 15 Gram(s) Oral once PRN Blood Glucose LESS THAN 70 milliGRAM(s)/deciliter      Allergies  No Known Allergies      Review of Systems:   Constitutional:  No Fever, No Chills  ENT/Mouth:  No Hearing Changes,  No Difficulty Swallowing  Eyes:  No Eye Pain, No Vision Changes  Cardiovascular:  No Chest Pain, No Palpitations  Respiratory:  No Cough, No Dyspnea  Gastrointestinal:  As described in HPI  Musculoskeletal:  No Joint Swelling, No Back Pain  Skin:  No Skin Lesions, No Jaundice  Neuro:  No Syncope, No Dizziness  Heme/Lymph:  No Bruising, No Bleeding.          Physical Examination:  T(C): 35.6 (09-23-23 @ 05:00), Max: 36.4 (09-22-23 @ 21:30)  HR: 91 (09-23-23 @ 05:00) (75 - 91)  BP: 122/60 (09-23-23 @ 05:00) (108/62 - 122/60)  RR: 17 (09-23-23 @ 05:00) (17 - 17)  SpO2: 94% (09-23-23 @ 13:25) (94% - 97%)      09-21-23 @ 07:01  -  09-22-23 @ 07:00  --------------------------------------------------------  IN: 340 mL / OUT: 1225 mL / NET: -885 mL    09-22-23 @ 07:01  -  09-23-23 @ 07:00  --------------------------------------------------------  IN: 594 mL / OUT: 700 mL / NET: -106 mL    09-23-23 @ 07:01  -  09-23-23 @ 17:14  --------------------------------------------------------  IN: 0 mL / OUT: 200 mL / NET: -200 mL          GENERAL: AAOx3, no acute distress.  HEAD:  Atraumatic, Normocephalic  EYES: conjunctiva and sclera clear  NECK: Supple, no JVD or thyromegaly  CHEST/LUNG: Clear to auscultation bilaterally; No wheeze, rhonchi, or rales  HEART: Regular rate and rhythm; normal S1, S2, No murmurs.  ABDOMEN: Soft, nontender, nondistended; Bowel sounds present  NEUROLOGY: No asterixis or tremor.   SKIN: Intact, no jaundice        Data:                        7.3    13.55 )-----------( 349      ( 23 Sep 2023 14:12 )             23.3     Hgb Trend:  7.3  09-23-23 @ 14:12  8.1  09-23-23 @ 12:05  10.0  09-22-23 @ 07:42  8.6  09-21-23 @ 08:45        09-23    145  |  104  |  132<HH>  ----------------------------<  138<H>  3.5   |  19  |  7.1<HH>    Ca    7.7<L>      23 Sep 2023 12:05  Mg     1.7     09-23    TPro  5.2<L>  /  Alb  2.8<L>  /  TBili  0.3  /  DBili  x   /  AST  20  /  ALT  10  /  AlkPhos  70  09-23    Liver panel trend:  TBili 0.3   /   AST 20   /   ALT 10   /   AlkP 70   /   Tptn 5.2   /   Alb 2.8    /   DBili --      09-23  TBili 0.2   /   AST 21   /   ALT 12   /   AlkP 81   /   Tptn 5.8   /   Alb 2.9    /   DBili --      09-21  TBili 0.2   /   AST 23   /   ALT 14   /   AlkP 84   /   Tptn 5.9   /   Alb 3.1    /   DBili --      09-20  TBili 0.3   /   AST 28   /   ALT 14   /   AlkP 86   /   Tptn 6.0   /   Alb 2.9    /   DBili --      09-19  TBili 0.3   /   AST 28   /   ALT 13   /   AlkP 83   /   Tptn 5.7   /   Alb 3.0    /   DBili --      09-18  TBili 0.3   /   AST 32   /   ALT 11   /   AlkP 84   /   Tptn 6.0   /   Alb 2.9    /   DBili --      09-16  TBili 0.3   /   AST 23   /   ALT 9   /   AlkP 90   /   Tptn 6.3   /   Alb 2.9    /   DBili --      09-15      PTT - ( 23 Sep 2023 00:25 )  PTT:76.7 sec        Radiology:  CT Angio Abdomen and Pelvis w/ IV Cont:   ACC: [id number] EXAM:  CT ANGIO ABD PELV (W)AW IC   ORDERED BY: BRE MOSELEY     PROCEDURE DATE:  09/23/2023          INTERPRETATION:  CTA abdomen and pelvis with and without IV contrast    CLINICAL HISTORY/REASON FOR EXAM: Melena, active bleeding.    TECHNIQUE: CTA abdomen and pelvis with and without IV contrast   (gastrointestinal bleeding protocol). Contiguous axial CT images of the   abdomen and pelvis were obtained before and after the administration of   intravenous contrast. Arterial and venous phase images obtained. Oral   contrast was not administered. Reformatted images in the coronal and   sagittal planes were acquired. 3-D/MIP images obtained.    COMPARISON: CT abdomen and pelvis 9/16/2023.      FINDINGS:    LOWER CHEST: Small bilateral pleural effusions, right greater than left,   slightly increased since prior.    HEPATOBILIARY: Cholelithiasis.    SPLEEN: Unremarkable.    PANCREAS: Unremarkable.    ADRENAL GLANDS: Redemonstrated 1.4 x 1 cm left adrenal nodule versus   periadrenal lymph node (6/225).    KIDNEYS: Symmetric pattern of renal enhancement. No hydronephrosis   bilaterally.    ABDOMINOPELVIC NODES: No lymphadenopathy.    PELVIC ORGANS: Small foci of air within the urinary bladder, likely due   to Mayfield catheter placement. Prostatomegaly.    PERITONEUM/MESENTERY/BOWEL: Between the first and second portions of the   duodenum, there is gastrointestinal arterial extravasation with   subsequent venous pooling reflecting active intestinal bleed. Small   hiatal hernia. No bowel obstruction. No pneumoperitoneum.    BONES/SOFT TISSUES: Destructive bony endplate changes at L3-L4.   Degenerative osseous changes.    OTHER: Atherosclerotic calcifications.      IMPRESSION:      Between the first and second portions of the duodenum, there is active   arterial extravasation and venous pooling reflecting active GI bleed.    Destructive L3-L4 vertebral body changes compatible with   discitis/osteomyelitis, unchanged.    Small bilateral pleural effusions, slightlyincreased since prior.    Redemonstrated left adrenal nodule versus periadrenal lymph node. Can be   followed up with outpatient MRI.    Communication: The summary of above findings were discussed with readback   confirmation with Dr. Norman by resident Olga Martinez MD on 9/23/2023   at 4:49 PM.    --- End of Report ---          OLGA MARTINEZ MD; Resident Radiologist  This document has been electronically signed.  ARRON HOYOS MD; Attending Radiologist  This document has been electronically signed. Sep 23 2023  5:11PM (09-23-23 @ 16:37)       Gastroenterology Consultation:    Patient is a 83y old  Male who presents with a chief complaint of AMS (23 Sep 2023 11:40)    Admitted on: 09-15-23    HPI: 82 yo m ho DM, COPD, anemia, lymphedemia, afib on xarelto, HFrEF, DM coming in from nursing home for AMS x 2 days. Unable to gather story from pt as he is altered and lethargic.  As per NH was becoming more agitated x 2 days, was hypotensive yesterday and started on cefepime and vancomycin. Brought in to the ED for AMS. PICC line in place for cefepime 1q q8 until 10/5/23 and vanc 1q24 until 9/23/23 for LLE cellulitis/OM  (was at [facility] last month for LLE thick wound cellulitis/OM and sacral DTI as per call with [facility] as per collateral from [facility]).  admitted with PAULA.      Prior EGD:    Prior Colonoscopy:      PAST MEDICAL & SURGICAL HISTORY:  HTN (hypertension)      COPD (chronic obstructive pulmonary disease)      High cholesterol      Mild anemia      Coffee ground emesis      Chronic diastolic heart failure      PAULA (acute kidney injury)      No significant past surgical history            FAMILY HISTORY:  No pertinent family history in first degree relatives        Social History:  Tobacco:  Alcohol:  Drugs:    Home Medications:  acetaminophen 325 mg oral tablet: 2 tab(s) orally every 6 hours, As needed, Mild Pain (1 - 3), Moderate Pain (4 - 6) (25 May 2019 09:25)  Albuterol (Eqv-ProAir HFA) 90 mcg/inh inhalation aerosol: 1 puff(s) inhaled every 6 hours as needed for  shortness of breath and/or wheezing (15 Sep 2023 22:56)  Breo Ellipta 100 mcg-25 mcg/inh inhalation powder: 1 puff(s) inhaled once a day (15 Sep 2023 22:56)  empagliflozin 10 mg oral tablet: 1 tab(s) orally once a day (15 Sep 2023 22:56)  Entresto 24 mg-26 mg oral tablet: 1 tab(s) orally 2 times a day (15 Sep 2023 22:57)  Lasix 20 mg oral tablet: 1 tab(s) orally once a day (15 Sep 2023 22:57)  Metoprolol Succinate ER 25 mg oral tablet, extended release: 1 tab(s) orally once a day (15 Sep 2023 22:57)  Percocet 5 mg-325 mg oral tablet: 1 tab(s) orally every 8 hours as needed for  severe pain (15 Sep 2023 22:57)  Xarelto 20 mg oral tablet: 1 tab(s) orally once a day (15 Sep 2023 22:57)        MEDICATIONS  (STANDING):  acetaminophen     Tablet .. 650 milliGRAM(s) Oral every 6 hours  calcium acetate 667 milliGRAM(s) Oral three times a day with meals  chlorhexidine 2% Cloths 1 Application(s) Topical <User Schedule>  dextrose 5% + sodium chloride 0.9%. 1000 milliLiter(s) (75 mL/Hr) IV Continuous <Continuous>  dextrose 5%. 1000 milliLiter(s) (100 mL/Hr) IV Continuous <Continuous>  dextrose 50% Injectable 25 Gram(s) IV Push once  dextrose 50% Injectable 25 Gram(s) IV Push once  dextrose 50% Injectable 12.5 Gram(s) IV Push once  glucagon  Injectable 1 milliGRAM(s) IntraMuscular once  insulin lispro (ADMELOG) corrective regimen sliding scale   SubCutaneous three times a day before meals  midodrine. 10 milliGRAM(s) Oral three times a day  pantoprazole  Injectable 40 milliGRAM(s) IV Push two times a day  protamine  IVPB 36 milliGRAM(s) IV Intermittent once  sodium bicarbonate 1300 milliGRAM(s) Oral every 8 hours    MEDICATIONS  (PRN):  albuterol    90 MICROgram(s) HFA Inhaler 1 Puff(s) Inhalation every 6 hours PRN for shortness of breath and/or wheezing  dextrose Oral Gel 15 Gram(s) Oral once PRN Blood Glucose LESS THAN 70 milliGRAM(s)/deciliter      Allergies  No Known Allergies      Review of Systems:   Constitutional:  No Fever, No Chills  ENT/Mouth:  No Hearing Changes,  No Difficulty Swallowing  Eyes:  No Eye Pain, No Vision Changes  Cardiovascular:  No Chest Pain, No Palpitations  Respiratory:  No Cough, No Dyspnea  Gastrointestinal:  As described in HPI  Musculoskeletal:  No Joint Swelling, No Back Pain  Skin:  No Skin Lesions, No Jaundice  Neuro:  No Syncope, No Dizziness  Heme/Lymph:  No Bruising, No Bleeding.          Physical Examination:  T(C): 35.6 (09-23-23 @ 05:00), Max: 36.4 (09-22-23 @ 21:30)  HR: 91 (09-23-23 @ 05:00) (75 - 91)  BP: 122/60 (09-23-23 @ 05:00) (108/62 - 122/60)  RR: 17 (09-23-23 @ 05:00) (17 - 17)  SpO2: 94% (09-23-23 @ 13:25) (94% - 97%)      09-21-23 @ 07:01  -  09-22-23 @ 07:00  --------------------------------------------------------  IN: 340 mL / OUT: 1225 mL / NET: -885 mL    09-22-23 @ 07:01  -  09-23-23 @ 07:00  --------------------------------------------------------  IN: 594 mL / OUT: 700 mL / NET: -106 mL    09-23-23 @ 07:01  -  09-23-23 @ 17:14  --------------------------------------------------------  IN: 0 mL / OUT: 200 mL / NET: -200 mL          GENERAL: AAOx3, no acute distress.  HEAD:  Atraumatic, Normocephalic  EYES: conjunctiva and sclera clear  NECK: Supple, no JVD or thyromegaly  CHEST/LUNG: Clear to auscultation bilaterally; No wheeze, rhonchi, or rales  HEART: Regular rate and rhythm; normal S1, S2, No murmurs.  ABDOMEN: Soft, nontender, nondistended; Bowel sounds present  NEUROLOGY: No asterixis or tremor.   SKIN: Intact, no jaundice        Data:                        7.3    13.55 )-----------( 349      ( 23 Sep 2023 14:12 )             23.3     Hgb Trend:  7.3  09-23-23 @ 14:12  8.1  09-23-23 @ 12:05  10.0  09-22-23 @ 07:42  8.6  09-21-23 @ 08:45        09-23    145  |  104  |  132<HH>  ----------------------------<  138<H>  3.5   |  19  |  7.1<HH>    Ca    7.7<L>      23 Sep 2023 12:05  Mg     1.7     09-23    TPro  5.2<L>  /  Alb  2.8<L>  /  TBili  0.3  /  DBili  x   /  AST  20  /  ALT  10  /  AlkPhos  70  09-23    Liver panel trend:  TBili 0.3   /   AST 20   /   ALT 10   /   AlkP 70   /   Tptn 5.2   /   Alb 2.8    /   DBili --      09-23  TBili 0.2   /   AST 21   /   ALT 12   /   AlkP 81   /   Tptn 5.8   /   Alb 2.9    /   DBili --      09-21  TBili 0.2   /   AST 23   /   ALT 14   /   AlkP 84   /   Tptn 5.9   /   Alb 3.1    /   DBili --      09-20  TBili 0.3   /   AST 28   /   ALT 14   /   AlkP 86   /   Tptn 6.0   /   Alb 2.9    /   DBili --      09-19  TBili 0.3   /   AST 28   /   ALT 13   /   AlkP 83   /   Tptn 5.7   /   Alb 3.0    /   DBili --      09-18  TBili 0.3   /   AST 32   /   ALT 11   /   AlkP 84   /   Tptn 6.0   /   Alb 2.9    /   DBili --      09-16  TBili 0.3   /   AST 23   /   ALT 9   /   AlkP 90   /   Tptn 6.3   /   Alb 2.9    /   DBili --      09-15      PTT - ( 23 Sep 2023 00:25 )  PTT:76.7 sec        Radiology:  CT Angio Abdomen and Pelvis w/ IV Cont:   ACC: [id number] EXAM:  CT ANGIO ABD PELV (W)AW IC   ORDERED BY: BRE MOSELEY     PROCEDURE DATE:  09/23/2023          INTERPRETATION:  CTA abdomen and pelvis with and without IV contrast    CLINICAL HISTORY/REASON FOR EXAM: Melena, active bleeding.    TECHNIQUE: CTA abdomen and pelvis with and without IV contrast   (gastrointestinal bleeding protocol). Contiguous axial CT images of the   abdomen and pelvis were obtained before and after the administration of   intravenous contrast. Arterial and venous phase images obtained. Oral   contrast was not administered. Reformatted images in the coronal and   sagittal planes were acquired. 3-D/MIP images obtained.    COMPARISON: CT abdomen and pelvis 9/16/2023.      FINDINGS:    LOWER CHEST: Small bilateral pleural effusions, right greater than left,   slightly increased since prior.    HEPATOBILIARY: Cholelithiasis.    SPLEEN: Unremarkable.    PANCREAS: Unremarkable.    ADRENAL GLANDS: Redemonstrated 1.4 x 1 cm left adrenal nodule versus   periadrenal lymph node (6/225).    KIDNEYS: Symmetric pattern of renal enhancement. No hydronephrosis   bilaterally.    ABDOMINOPELVIC NODES: No lymphadenopathy.    PELVIC ORGANS: Small foci of air within the urinary bladder, likely due   to Mayfield catheter placement. Prostatomegaly.    PERITONEUM/MESENTERY/BOWEL: Between the first and second portions of the   duodenum, there is gastrointestinal arterial extravasation with   subsequent venous pooling reflecting active intestinal bleed. Small   hiatal hernia. No bowel obstruction. No pneumoperitoneum.    BONES/SOFT TISSUES: Destructive bony endplate changes at L3-L4.   Degenerative osseous changes.    OTHER: Atherosclerotic calcifications.      IMPRESSION:      Between the first and second portions of the duodenum, there is active   arterial extravasation and venous pooling reflecting active GI bleed.    Destructive L3-L4 vertebral body changes compatible with   discitis/osteomyelitis, unchanged.    Small bilateral pleural effusions, slightlyincreased since prior.    Redemonstrated left adrenal nodule versus periadrenal lymph node. Can be   followed up with outpatient MRI.    Communication: The summary of above findings were discussed with readback   confirmation with Dr. Norman by resident Olga Martinez MD on 9/23/2023   at 4:49 PM.    --- End of Report ---          OLGA MARTINEZ MD; Resident Radiologist  This document has been electronically signed.  ARRON HOYOS MD; Attending Radiologist  This document has been electronically signed. Sep 23 2023  5:11PM (09-23-23 @ 16:37)       Gastroenterology Consultation:    Patient is a 83y old  Male who presents with a chief complaint of AMS (23 Sep 2023 11:40)    Admitted on: 09-15-23    HPI: 82 yo m ho DM, COPD, anemia, lymphedemia, afib on xarelto, HFrEF, DM coming in from nursing home for AMS x 2 days. Unable to gather story from pt as he is altered and lethargic.  As per NH was becoming more agitated x 2 days, was hypotensive yesterday and started on cefepime and vancomycin. Brought in to the ED for AMS. PICC line in place for cefepime 1q q8 until 10/5/23 and vanc 1q24 until 9/23/23 for LLE cellulitis/OM. admitted with PAULA. GI is called for evaluation of hematochezia.            PAST MEDICAL & SURGICAL HISTORY:  HTN (hypertension)      COPD (chronic obstructive pulmonary disease)      High cholesterol      Mild anemia      Coffee ground emesis      Chronic diastolic heart failure      PAULA (acute kidney injury)      No significant past surgical history            FAMILY HISTORY:  No pertinent family history in first degree relatives        Social History:  Tobacco: reported negative  Alcohol: reported negative  Drugs: reported negative     Home Medications:  acetaminophen 325 mg oral tablet: 2 tab(s) orally every 6 hours, As needed, Mild Pain (1 - 3), Moderate Pain (4 - 6) (25 May 2019 09:25)  Albuterol (Eqv-ProAir HFA) 90 mcg/inh inhalation aerosol: 1 puff(s) inhaled every 6 hours as needed for  shortness of breath and/or wheezing (15 Sep 2023 22:56)  Breo Ellipta 100 mcg-25 mcg/inh inhalation powder: 1 puff(s) inhaled once a day (15 Sep 2023 22:56)  empagliflozin 10 mg oral tablet: 1 tab(s) orally once a day (15 Sep 2023 22:56)  Entresto 24 mg-26 mg oral tablet: 1 tab(s) orally 2 times a day (15 Sep 2023 22:57)  Lasix 20 mg oral tablet: 1 tab(s) orally once a day (15 Sep 2023 22:57)  Metoprolol Succinate ER 25 mg oral tablet, extended release: 1 tab(s) orally once a day (15 Sep 2023 22:57)  Percocet 5 mg-325 mg oral tablet: 1 tab(s) orally every 8 hours as needed for  severe pain (15 Sep 2023 22:57)  Xarelto 20 mg oral tablet: 1 tab(s) orally once a day (15 Sep 2023 22:57)        MEDICATIONS  (STANDING):  acetaminophen     Tablet .. 650 milliGRAM(s) Oral every 6 hours  calcium acetate 667 milliGRAM(s) Oral three times a day with meals  chlorhexidine 2% Cloths 1 Application(s) Topical <User Schedule>  dextrose 5% + sodium chloride 0.9%. 1000 milliLiter(s) (75 mL/Hr) IV Continuous <Continuous>  dextrose 5%. 1000 milliLiter(s) (100 mL/Hr) IV Continuous <Continuous>  dextrose 50% Injectable 25 Gram(s) IV Push once  dextrose 50% Injectable 25 Gram(s) IV Push once  dextrose 50% Injectable 12.5 Gram(s) IV Push once  glucagon  Injectable 1 milliGRAM(s) IntraMuscular once  insulin lispro (ADMELOG) corrective regimen sliding scale   SubCutaneous three times a day before meals  midodrine. 10 milliGRAM(s) Oral three times a day  pantoprazole  Injectable 40 milliGRAM(s) IV Push two times a day  protamine  IVPB 36 milliGRAM(s) IV Intermittent once  sodium bicarbonate 1300 milliGRAM(s) Oral every 8 hours    MEDICATIONS  (PRN):  albuterol    90 MICROgram(s) HFA Inhaler 1 Puff(s) Inhalation every 6 hours PRN for shortness of breath and/or wheezing  dextrose Oral Gel 15 Gram(s) Oral once PRN Blood Glucose LESS THAN 70 milliGRAM(s)/deciliter      Allergies  No Known Allergies      Review of Systems:   Constitutional:  No Fever, No Chills  ENT/Mouth:  No Hearing Changes,  No Difficulty Swallowing  Eyes:  No Eye Pain, No Vision Changes  Cardiovascular:  No Chest Pain, No Palpitations  Respiratory:  No Cough, No Dyspnea  Gastrointestinal:  As described in HPI  Musculoskeletal:  No Joint Swelling, No Back Pain  Skin:  No Skin Lesions, No Jaundice  Neuro:  No Syncope, No Dizziness  Heme/Lymph:  No Bruising, No Bleeding.          Physical Examination:  T(C): 35.6 (09-23-23 @ 05:00), Max: 36.4 (09-22-23 @ 21:30)  HR: 91 (09-23-23 @ 05:00) (75 - 91)  BP: 122/60 (09-23-23 @ 05:00) (108/62 - 122/60)  RR: 17 (09-23-23 @ 05:00) (17 - 17)  SpO2: 94% (09-23-23 @ 13:25) (94% - 97%)      09-21-23 @ 07:01  -  09-22-23 @ 07:00  --------------------------------------------------------  IN: 340 mL / OUT: 1225 mL / NET: -885 mL    09-22-23 @ 07:01  -  09-23-23 @ 07:00  --------------------------------------------------------  IN: 594 mL / OUT: 700 mL / NET: -106 mL    09-23-23 @ 07:01  -  09-23-23 @ 17:14  --------------------------------------------------------  IN: 0 mL / OUT: 200 mL / NET: -200 mL          GENERAL: AAOx1, no acute distress.  HEAD:  Atraumatic, Normocephalic  EYES: conjunctiva and sclera clear  NECK: Supple, no JVD or thyromegaly  CHEST/LUNG: Clear to auscultation bilaterally; No wheeze, rhonchi, or rales  HEART: Regular rate and rhythm; normal S1, S2, No murmurs.  ABDOMEN: Soft, nontender, nondistended; Bowel sounds present  NEUROLOGY: No asterixis or tremor.   SKIN: Intact, no jaundice        Data:                        7.3    13.55 )-----------( 349      ( 23 Sep 2023 14:12 )             23.3     Hgb Trend:  7.3  09-23-23 @ 14:12  8.1  09-23-23 @ 12:05  10.0  09-22-23 @ 07:42  8.6  09-21-23 @ 08:45        09-23    145  |  104  |  132<HH>  ----------------------------<  138<H>  3.5   |  19  |  7.1<HH>    Ca    7.7<L>      23 Sep 2023 12:05  Mg     1.7     09-23    TPro  5.2<L>  /  Alb  2.8<L>  /  TBili  0.3  /  DBili  x   /  AST  20  /  ALT  10  /  AlkPhos  70  09-23    Liver panel trend:  TBili 0.3   /   AST 20   /   ALT 10   /   AlkP 70   /   Tptn 5.2   /   Alb 2.8    /   DBili --      09-23  TBili 0.2   /   AST 21   /   ALT 12   /   AlkP 81   /   Tptn 5.8   /   Alb 2.9    /   DBili --      09-21  TBili 0.2   /   AST 23   /   ALT 14   /   AlkP 84   /   Tptn 5.9   /   Alb 3.1    /   DBili --      09-20  TBili 0.3   /   AST 28   /   ALT 14   /   AlkP 86   /   Tptn 6.0   /   Alb 2.9    /   DBili --      09-19  TBili 0.3   /   AST 28   /   ALT 13   /   AlkP 83   /   Tptn 5.7   /   Alb 3.0    /   DBili --      09-18  TBili 0.3   /   AST 32   /   ALT 11   /   AlkP 84   /   Tptn 6.0   /   Alb 2.9    /   DBili --      09-16  TBili 0.3   /   AST 23   /   ALT 9   /   AlkP 89   /   Tptn 6.3   /   Alb 2.9    /   DBili --      09-15      PTT - ( 23 Sep 2023 00:25 )  PTT:76.7 sec        Radiology:  CT Angio Abdomen and Pelvis w/ IV Cont:   ACC: [id number] EXAM:  CT ANGIO ABD PELV (W)AW IC   ORDERED BY: BRE MOSELEY     PROCEDURE DATE:  09/23/2023          INTERPRETATION:  CTA abdomen and pelvis with and without IV contrast    CLINICAL HISTORY/REASON FOR EXAM: Melena, active bleeding.    TECHNIQUE: CTA abdomen and pelvis with and without IV contrast   (gastrointestinal bleeding protocol). Contiguous axial CT images of the   abdomen and pelvis were obtained before and after the administration of   intravenous contrast. Arterial and venous phase images obtained. Oral   contrast was not administered. Reformatted images in the coronal and   sagittal planes were acquired. 3-D/MIP images obtained.    COMPARISON: CT abdomen and pelvis 9/16/2023.      FINDINGS:    LOWER CHEST: Small bilateral pleural effusions, right greater than left,   slightly increased since prior.    HEPATOBILIARY: Cholelithiasis.    SPLEEN: Unremarkable.    PANCREAS: Unremarkable.    ADRENAL GLANDS: Redemonstrated 1.4 x 1 cm left adrenal nodule versus   periadrenal lymph node (6/225).    KIDNEYS: Symmetric pattern of renal enhancement. No hydronephrosis   bilaterally.    ABDOMINOPELVIC NODES: No lymphadenopathy.    PELVIC ORGANS: Small foci of air within the urinary bladder, likely due   to Mayfield catheter placement. Prostatomegaly.    PERITONEUM/MESENTERY/BOWEL: Between the first and second portions of the   duodenum, there is gastrointestinal arterial extravasation with   subsequent venous pooling reflecting active intestinal bleed. Small   hiatal hernia. No bowel obstruction. No pneumoperitoneum.    BONES/SOFT TISSUES: Destructive bony endplate changes at L3-L4.   Degenerative osseous changes.    OTHER: Atherosclerotic calcifications.      IMPRESSION:      Between the first and second portions of the duodenum, there is active   arterial extravasation and venous pooling reflecting active GI bleed.    Destructive L3-L4 vertebral body changes compatible with   discitis/osteomyelitis, unchanged.    Small bilateral pleural effusions, slightlyincreased since prior.    Redemonstrated left adrenal nodule versus periadrenal lymph node. Can be   followed up with outpatient MRI.    Communication: The summary of above findings were discussed with readback   confirmation with Dr. Norman by resident Olga Martinez MD on 9/23/2023   at 4:49 PM.    --- End of Report ---          OLGA MARTINEZ MD; Resident Radiologist  This document has been electronically signed.  ARRON HOYOS MD; Attending Radiologist  This document has been electronically signed. Sep 23 2023  5:11PM (09-23-23 @ 16:37)

## 2023-09-23 NOTE — CHART NOTE - NSCHARTNOTEFT_GEN_A_CORE
Patient continues to have large bloody bowel movement  patient is hypotensive and lethargic  Giving 1st unit of pRBC  spoke to patient's HCP - the brother over the phone  HCP is agreeable to blood transfusion and STAT CTA A/P to r/o GI bleed  Patient's HCP understands that CTA w/ contrast will place patient at risk for needing HD in the future

## 2023-09-24 LAB
ALBUMIN SERPL ELPH-MCNC: 2.8 G/DL — LOW (ref 3.5–5.2)
ALP SERPL-CCNC: 73 U/L — SIGNIFICANT CHANGE UP (ref 30–115)
ALT FLD-CCNC: 11 U/L — SIGNIFICANT CHANGE UP (ref 0–41)
ANION GAP SERPL CALC-SCNC: 18 MMOL/L — HIGH (ref 7–14)
APTT BLD: 30.1 SEC — SIGNIFICANT CHANGE UP (ref 27–39.2)
AST SERPL-CCNC: 17 U/L — SIGNIFICANT CHANGE UP (ref 0–41)
BASE EXCESS BLDA CALC-SCNC: -2.4 MMOL/L — LOW (ref -2–3)
BILIRUB SERPL-MCNC: 0.7 MG/DL — SIGNIFICANT CHANGE UP (ref 0.2–1.2)
BUN SERPL-MCNC: 135 MG/DL — CRITICAL HIGH (ref 10–20)
CALCIUM SERPL-MCNC: 7.8 MG/DL — LOW (ref 8.4–10.5)
CHLORIDE SERPL-SCNC: 105 MMOL/L — SIGNIFICANT CHANGE UP (ref 98–110)
CO2 SERPL-SCNC: 21 MMOL/L — SIGNIFICANT CHANGE UP (ref 17–32)
CREAT SERPL-MCNC: 7.5 MG/DL — CRITICAL HIGH (ref 0.7–1.5)
EGFR: 7 ML/MIN/1.73M2 — LOW
GAS PNL BLDA: SIGNIFICANT CHANGE UP
GAS PNL BLDA: SIGNIFICANT CHANGE UP
GLUCOSE BLDC GLUCOMTR-MCNC: 100 MG/DL — HIGH (ref 70–99)
GLUCOSE BLDC GLUCOMTR-MCNC: 112 MG/DL — HIGH (ref 70–99)
GLUCOSE BLDC GLUCOMTR-MCNC: 137 MG/DL — HIGH (ref 70–99)
GLUCOSE SERPL-MCNC: 125 MG/DL — HIGH (ref 70–99)
HCO3 BLDA-SCNC: 22 MMOL/L — SIGNIFICANT CHANGE UP (ref 21–28)
HCT VFR BLD CALC: 26.7 % — LOW (ref 42–52)
HCT VFR BLD CALC: 27.3 % — LOW (ref 42–52)
HCT VFR BLD CALC: 27.6 % — LOW (ref 42–52)
HGB BLD-MCNC: 8.9 G/DL — LOW (ref 14–18)
HGB BLD-MCNC: 9.1 G/DL — LOW (ref 14–18)
HGB BLD-MCNC: 9.1 G/DL — LOW (ref 14–18)
HOROWITZ INDEX BLDA+IHG-RTO: 40 — SIGNIFICANT CHANGE UP
MAGNESIUM SERPL-MCNC: 1.7 MG/DL — LOW (ref 1.8–2.4)
MCHC RBC-ENTMCNC: 26.8 PG — LOW (ref 27–31)
MCHC RBC-ENTMCNC: 26.9 PG — LOW (ref 27–31)
MCHC RBC-ENTMCNC: 27.9 PG — SIGNIFICANT CHANGE UP (ref 27–31)
MCHC RBC-ENTMCNC: 32.6 G/DL — SIGNIFICANT CHANGE UP (ref 32–37)
MCHC RBC-ENTMCNC: 33 G/DL — SIGNIFICANT CHANGE UP (ref 32–37)
MCHC RBC-ENTMCNC: 34.1 G/DL — SIGNIFICANT CHANGE UP (ref 32–37)
MCV RBC AUTO: 81.7 FL — SIGNIFICANT CHANGE UP (ref 80–94)
MCV RBC AUTO: 81.9 FL — SIGNIFICANT CHANGE UP (ref 80–94)
MCV RBC AUTO: 82.2 FL — SIGNIFICANT CHANGE UP (ref 80–94)
MRSA PCR RESULT.: NEGATIVE — SIGNIFICANT CHANGE UP
NRBC # BLD: 0 /100 WBCS — SIGNIFICANT CHANGE UP (ref 0–0)
PCO2 BLDA: 34 MMHG — LOW (ref 35–48)
PH BLDA: 7.41 — SIGNIFICANT CHANGE UP (ref 7.35–7.45)
PLATELET # BLD AUTO: 276 K/UL — SIGNIFICANT CHANGE UP (ref 130–400)
PLATELET # BLD AUTO: 314 K/UL — SIGNIFICANT CHANGE UP (ref 130–400)
PLATELET # BLD AUTO: 316 K/UL — SIGNIFICANT CHANGE UP (ref 130–400)
PMV BLD: 10 FL — SIGNIFICANT CHANGE UP (ref 7.4–10.4)
PMV BLD: 9.8 FL — SIGNIFICANT CHANGE UP (ref 7.4–10.4)
PMV BLD: 9.8 FL — SIGNIFICANT CHANGE UP (ref 7.4–10.4)
PO2 BLDA: 81 MMHG — LOW (ref 83–108)
POTASSIUM SERPL-MCNC: 4.1 MMOL/L — SIGNIFICANT CHANGE UP (ref 3.5–5)
POTASSIUM SERPL-SCNC: 4.1 MMOL/L — SIGNIFICANT CHANGE UP (ref 3.5–5)
PROT SERPL-MCNC: 5.3 G/DL — LOW (ref 6–8)
RBC # BLD: 3.26 M/UL — LOW (ref 4.7–6.1)
RBC # BLD: 3.32 M/UL — LOW (ref 4.7–6.1)
RBC # BLD: 3.38 M/UL — LOW (ref 4.7–6.1)
RBC # FLD: 18 % — HIGH (ref 11.5–14.5)
RBC # FLD: 18.1 % — HIGH (ref 11.5–14.5)
RBC # FLD: 18.2 % — HIGH (ref 11.5–14.5)
SAO2 % BLDA: 97.5 % — SIGNIFICANT CHANGE UP (ref 94–98)
SODIUM SERPL-SCNC: 144 MMOL/L — SIGNIFICANT CHANGE UP (ref 135–146)
WBC # BLD: 16.63 K/UL — HIGH (ref 4.8–10.8)
WBC # BLD: 18.06 K/UL — HIGH (ref 4.8–10.8)
WBC # BLD: 18.43 K/UL — HIGH (ref 4.8–10.8)
WBC # FLD AUTO: 16.63 K/UL — HIGH (ref 4.8–10.8)
WBC # FLD AUTO: 18.06 K/UL — HIGH (ref 4.8–10.8)
WBC # FLD AUTO: 18.43 K/UL — HIGH (ref 4.8–10.8)

## 2023-09-24 PROCEDURE — 99291 CRITICAL CARE FIRST HOUR: CPT

## 2023-09-24 RX ORDER — FENTANYL CITRATE 50 UG/ML
0.5 INJECTION INTRAVENOUS
Qty: 2500 | Refills: 0 | Status: DISCONTINUED | OUTPATIENT
Start: 2023-09-24 | End: 2023-09-24

## 2023-09-24 RX ORDER — MAGNESIUM SULFATE 500 MG/ML
2 VIAL (ML) INJECTION ONCE
Refills: 0 | Status: COMPLETED | OUTPATIENT
Start: 2023-09-24 | End: 2023-09-24

## 2023-09-24 RX ORDER — CHLORHEXIDINE GLUCONATE 213 G/1000ML
15 SOLUTION TOPICAL
Refills: 0 | Status: DISCONTINUED | OUTPATIENT
Start: 2023-09-24 | End: 2023-09-25

## 2023-09-24 RX ORDER — SODIUM CHLORIDE 9 MG/ML
250 INJECTION, SOLUTION INTRAVENOUS ONCE
Refills: 0 | Status: COMPLETED | OUTPATIENT
Start: 2023-09-24 | End: 2023-09-24

## 2023-09-24 RX ORDER — SODIUM CHLORIDE 9 MG/ML
500 INJECTION, SOLUTION INTRAVENOUS ONCE
Refills: 0 | Status: COMPLETED | OUTPATIENT
Start: 2023-09-24 | End: 2023-09-24

## 2023-09-24 RX ADMIN — SODIUM CHLORIDE 1000 MILLILITER(S): 9 INJECTION INTRAMUSCULAR; INTRAVENOUS; SUBCUTANEOUS at 00:00

## 2023-09-24 RX ADMIN — SODIUM CHLORIDE 500 MILLILITER(S): 9 INJECTION, SOLUTION INTRAVENOUS at 04:26

## 2023-09-24 RX ADMIN — PANTOPRAZOLE SODIUM 10 MG/HR: 20 TABLET, DELAYED RELEASE ORAL at 03:16

## 2023-09-24 RX ADMIN — Medication 25 GRAM(S): at 16:17

## 2023-09-24 RX ADMIN — CHLORHEXIDINE GLUCONATE 15 MILLILITER(S): 213 SOLUTION TOPICAL at 05:24

## 2023-09-24 RX ADMIN — FENTANYL CITRATE 4.86 MICROGRAM(S)/KG/HR: 50 INJECTION INTRAVENOUS at 02:08

## 2023-09-24 RX ADMIN — PANTOPRAZOLE SODIUM 10 MG/HR: 20 TABLET, DELAYED RELEASE ORAL at 20:00

## 2023-09-24 RX ADMIN — SODIUM CHLORIDE 500 MILLILITER(S): 9 INJECTION, SOLUTION INTRAVENOUS at 00:59

## 2023-09-24 RX ADMIN — Medication 9.11 MICROGRAM(S)/KG/MIN: at 20:00

## 2023-09-24 RX ADMIN — CHLORHEXIDINE GLUCONATE 1 APPLICATION(S): 213 SOLUTION TOPICAL at 05:24

## 2023-09-24 NOTE — CONSULT NOTE ADULT - ASSESSMENT
NIMO SARMIENTO is a 83y man with a medical history significant for DM, COPD, anemia, lymphedemia, afib on xarelto, HFrEF, DM who presented initially with altered mental status, and is now in the critical care unit for massive GI bleeding and intubation for procedure.     Impression    #Duodenal ulcer  -- Actively bleeding/spurting vessel visualized and intervened on 9/23  #Hematochezia  #Hemorrhagic Shock  #Metabolic Encephalopathy   #Suspected Severe Sepsis (Hypotention/AMS/PAULA/Oliguric)   #PAULA vs CKD 4/5  w/ Progression   #HAGMA 2/2 PAULA   #Oliguric Renal Failure moving towards Anuria  #History of Left Foot OM w/ surrounding Cellulitis   #Sacral DTI / Stage 1 POA     Plan:      CNS:  Sedation: Fentanyl alone  Daily SAT    HEENT:  Oral care.  ETT care  Advance tube 1cm  ETT day: 2    CARDIOVASCULAR  Vasopressors: levophed for s/p hemorrhagic shock and ongoing positive pressure  Hold antihypertensives & GDMT for now while in shock  Repeat CHEETAH as needed and goal-directed IVF boluses    PULMONARY  HOB @ 45 degrees, aspiration precautions  Target plateau pressure < 30, driving pressure <15  Vent changes: decrease FiO2 as tolerated to maintain spO2 > 92%  Repeat CXR daily  Daily SBT    GASTROINTESTINAL  High risk of rebleeding given endoscopy findings  Appreciate ongoing GI recommendations  GI prophylaxis: PPI gtt for 72 hours s/p EGD  Feeds: NPO, obtain enteral access  BM: regimen PRN  Maintain 2x large bore IV access,   Obtain stat CTA abdomen/pelvis for clinically significant bleeding.    GENITOURINARY/RENAL  [place]: maintain  Monitor I&O  Appreciate nephrology recommendations  Resume diuretics if recommended  Worsening PAULA with recent contrast study    INFECTIOUS  Monitor off antibiotics, culture PRN  No acute concerns    HEMATOLOGIC  DVT ppx: SCD alone  Repeat CBC q8 for now  maintain active type & screen, transfuse to maintain Hgb > 8.   s/p reversal of heparin w/ protamine    ENDOCRINE  Follow up FS.  Insulin protocol as needed.  BG goal 140-180    MSK/DERM  PT/OT when cooperative      CODE STATUS: FULL  DISPO: remain in ICU  LINES: 2x large bore PIV

## 2023-09-24 NOTE — PROGRESS NOTE ADULT - ASSESSMENT
84 yo m ho DM, COPD, anemia, lymphedemia, afib on xarelto, HFrEF, DM coming in from nursing home for AMS x 2 days. Unable to gather story from pt as he is altered and lethargic.  As per NH was becoming more agitated x 2 days, was hypotensive yesterday and started on cefepime and vancomycin. Brought in to the ED for AMS. PICC line in place for cefepime 1q q8 until 10/5/23 and vanc 1q24 until 9/23/23 for LLE cellulitis/OM. admitted with PAULA. GI is called for evaluation of hematochezia.     # Hemorraghic shock 2/2 UGIB 2/2 bleeding duodenal ulcer:   - hypotensive, on levo  - labs reviewed  - CTA reviewed: Between the first and second portions of the duodenum, there is active arterial extravasation and venous pooling reflecting active GI bleed.  - was on heparin, reversed by primary team  - s/p EGD on 9/23: Normal mucosa in the whole esophagus. Erythema in the stomach compatible with non-erosive gastritis. Blood and clots in the fundus and the antrum limited exam. 2 cm actively bleeding duodenal ulcer with spurting vessel (Aaron classification 1a) was noted in the duodenal sweep on the base. 10 cc of 1/64043 epinephrine was injected in circumferential pattern successfully. A 11/6 OVESCO clip was successfully deployed at the vessel for the purposes of hemostasis. There was no evidence of active bleeding at the end of procedure.    plan:  - can insert NG or OG tube and start trickle feeds   - PPI infusion  - Monitor H/H  - Active type and screen  - Transfuse for Hb >8   - Expect Melena next 2-3 days    will follow.

## 2023-09-24 NOTE — PROGRESS NOTE ADULT - SUBJECTIVE AND OBJECTIVE BOX
Gastroenterology progress note:     Patient is a 83y old  Male who presents with a chief complaint of AMS (24 Sep 2023 10:24)       Admitted on: 09-15-23    We are following the patient for: UGIB       Interval History:    No acute events overnight.   remains intubated  on minimal levo and PPI      PAST MEDICAL & SURGICAL HISTORY:  HTN (hypertension)      COPD (chronic obstructive pulmonary disease)      High cholesterol      Mild anemia      Coffee ground emesis      Chronic diastolic heart failure      PAULA (acute kidney injury)      No significant past surgical history          MEDICATIONS  (STANDING):  calcium acetate 667 milliGRAM(s) Oral three times a day with meals  chlorhexidine 0.12% Liquid 15 milliLiter(s) Oral Mucosa two times a day  chlorhexidine 2% Cloths 1 Application(s) Topical <User Schedule>  dextrose 5%. 1000 milliLiter(s) (100 mL/Hr) IV Continuous <Continuous>  dextrose 50% Injectable 25 Gram(s) IV Push once  dextrose 50% Injectable 12.5 Gram(s) IV Push once  dextrose 50% Injectable 25 Gram(s) IV Push once  fentaNYL   Infusion 0.5 MICROgram(s)/kG/Hr (4.86 mL/Hr) IV Continuous <Continuous>  glucagon  Injectable 1 milliGRAM(s) IntraMuscular once  insulin lispro (ADMELOG) corrective regimen sliding scale   SubCutaneous three times a day before meals  norepinephrine Infusion 0.05 MICROgram(s)/kG/Min (9.11 mL/Hr) IV Continuous <Continuous>  pantoprazole Infusion 8 mG/Hr (10 mL/Hr) IV Continuous <Continuous>  sodium bicarbonate 1300 milliGRAM(s) Oral every 8 hours    MEDICATIONS  (PRN):  albuterol    90 MICROgram(s) HFA Inhaler 1 Puff(s) Inhalation every 6 hours PRN for shortness of breath and/or wheezing  dextrose Oral Gel 15 Gram(s) Oral once PRN Blood Glucose LESS THAN 70 milliGRAM(s)/deciliter      Allergies  No Known Allergies      Review of Systems:  limited     Physical Examination:  T(C): 35.6 (09-24-23 @ 12:00), Max: 37.4 (09-23-23 @ 17:00)  HR: 60 (09-24-23 @ 13:30) (48 - 109)  BP: 146/72 (09-24-23 @ 13:15) (55/36 - 168/70)  RR: 29 (09-24-23 @ 13:15) (8 - 33)  SpO2: 99% (09-24-23 @ 13:30) (70% - 100%)      09-23-23 @ 07:01  -  09-24-23 @ 07:00  --------------------------------------------------------  IN: 3287.4 mL / OUT: 317 mL / NET: 2970.4 mL    09-24-23 @ 07:01  -  09-24-23 @ 14:05  --------------------------------------------------------  IN: 200.4 mL / OUT: 58 mL / NET: 142.4 mL        GENERAL: intubated, sedated   HEAD:  Atraumatic, Normocephalic  EYES: conjunctiva and sclera clear  NECK: Supple, no JVD or thyromegaly  CHEST/LUNG: Clear to auscultation bilaterally; No wheeze, rhonchi, or rales  HEART: Regular rate and rhythm; normal S1, S2, No murmurs.  ABDOMEN: Soft, nontender, nondistended; Bowel sounds present  NEUROLOGY: No asterixis or tremor.   SKIN: Intact, no jaundice     Data:                        9.1    16.63 )-----------( 276      ( 24 Sep 2023 11:00 )             27.6     Hgb trend:  9.1  09-24-23 @ 11:00  8.9  09-24-23 @ 04:37  9.1  09-24-23 @ 00:30  7.3  09-23-23 @ 14:12  8.1  09-23-23 @ 12:05  10.0  09-22-23 @ 07:42      09-23-23 @ 07:01  -  09-24-23 @ 07:00  --------------------------------------------------------  IN: 1489 mL      09-24    144  |  105  |  135<HH>  ----------------------------<  125<H>  4.1   |  21  |  7.5<HH>    Ca    7.8<L>      24 Sep 2023 11:00  Mg     1.7     09-24    TPro  5.3<L>  /  Alb  2.8<L>  /  TBili  0.7  /  DBili  x   /  AST  17  /  ALT  11  /  AlkPhos  73  09-24    Liver panel trend:  TBili 0.7   /   AST 17   /   ALT 11   /   AlkP 73   /   Tptn 5.3   /   Alb 2.8    /   DBili --      09-24  TBili 0.3   /   AST 20   /   ALT 10   /   AlkP 70   /   Tptn 5.2   /   Alb 2.8    /   DBili --      09-23  TBili 0.2   /   AST 21   /   ALT 12   /   AlkP 81   /   Tptn 5.8   /   Alb 2.9    /   DBili --      09-21  TBili 0.2   /   AST 23   /   ALT 14   /   AlkP 84   /   Tptn 5.9   /   Alb 3.1    /   DBili --      09-20  TBili 0.3   /   AST 28   /   ALT 14   /   AlkP 86   /   Tptn 6.0   /   Alb 2.9    /   DBili --      09-19  TBili 0.3   /   AST 28   /   ALT 13   /   AlkP 83   /   Tptn 5.7   /   Alb 3.0    /   DBili --      09-18  TBili 0.3   /   AST 32   /   ALT 11   /   AlkP 84   /   Tptn 6.0   /   Alb 2.9    /   DBili --      09-16  TBili 0.3   /   AST 23   /   ALT 9   /   AlkP 90   /   Tptn 6.3   /   Alb 2.9    /   DBili --      09-15      PTT - ( 24 Sep 2023 04:37 )  PTT:30.1 sec       Radiology:  CT Angio Abdomen and Pelvis w/ IV Cont:   ACC: [id number] EXAM:  CT ANGIO ABD PELV (W)AW IC   ORDERED BY: BRE MOSELEY     PROCEDURE DATE:  09/23/2023          INTERPRETATION:  CTA abdomen and pelvis with and without IV contrast    CLINICAL HISTORY/REASON FOR EXAM: Melena, active bleeding.    TECHNIQUE: CTA abdomen and pelvis with and without IV contrast   (gastrointestinal bleeding protocol). Contiguous axial CT images of the   abdomen and pelvis were obtained before and after the administration of   intravenous contrast. Arterial and venous phase images obtained. Oral   contrast was not administered. Reformatted images in the coronal and   sagittal planes were acquired. 3-D/MIP images obtained.    COMPARISON: CT abdomen and pelvis 9/16/2023.      FINDINGS:    LOWER CHEST: Small bilateral pleural effusions, right greater than left,   slightly increased since prior.    HEPATOBILIARY: Cholelithiasis.    SPLEEN: Unremarkable.    PANCREAS: Unremarkable.    ADRENAL GLANDS: Redemonstrated 1.4 x 1 cm left adrenal nodule versus   periadrenal lymph node (6/225).    KIDNEYS: Symmetric pattern of renal enhancement. No hydronephrosis   bilaterally.    ABDOMINOPELVIC NODES: No lymphadenopathy.    PELVIC ORGANS: Small foci of air within the urinary bladder, likely due   to Mayfield catheter placement. Prostatomegaly.    PERITONEUM/MESENTERY/BOWEL: Between the first and second portions of the   duodenum, there is gastrointestinal arterial extravasation with   subsequent venous pooling reflecting active intestinal bleed. Small   hiatal hernia. No bowel obstruction. No pneumoperitoneum.    BONES/SOFT TISSUES: Destructive bony endplate changes at L3-L4.   Degenerative osseous changes.    OTHER: Atherosclerotic calcifications.      IMPRESSION:      Between the first and second portions of the duodenum, there is active   arterial extravasation and venous pooling reflecting active GI bleed.    Destructive L3-L4 vertebral body changes compatible with   discitis/osteomyelitis, unchanged.    Small bilateral pleural effusions, slightlyincreased since prior.    Redemonstrated left adrenal nodule versus periadrenal lymph node. Can be   followed up with outpatient MRI.    Communication: The summary of above findings were discussed with readback   confirmation with Dr. Norman by resident Olga Martinez MD on 9/23/2023   at 4:49 PM.    --- End of Report ---          OLGA MARTINEZ MD; Resident Radiologist  This document has been electronically signed.  ARRON HOYOS MD; Attending Radiologist  This document has been electronically signed. Sep 23 2023  5:11PM (09-23-23 @ 16:37)

## 2023-09-24 NOTE — PROGRESS NOTE ADULT - ASSESSMENT
82 yo m ho DM, COPD, anemia, paroxysmal afib on xarelto, HFrEF, DM coming in from nursing home for AMS x 2 days. Found to have toxic metabolic encephalopathy with PAULA.  PAULA/ toxic metabolic encephalopathy/ HAGMA/ HFrEF  possible ATN iso hypotension and possible sepsis/ vanco toxicity / last level 60   no hydro on imaging  DC IVF   trial of Bumex 2 mg IV q12h   k phosphorus  level noted / phoslo 2/2/2  creat trending down   cont  sodium bicarbonate to  1300 q 8   no acute indication for RRT unless oliguric or worsening kidney function     will follow

## 2023-09-24 NOTE — PROGRESS NOTE ADULT - SUBJECTIVE AND OBJECTIVE BOX
Nephrology progress note    THIS IS AN INCOMPLETE NOTE . FULL NOTE TO FOLLOW SHORTLY    Patient is seen and examined, events over the last 24 h noted .    Allergies:  No Known Allergies    Hospital Medications:   MEDICATIONS  (STANDING):  acetaminophen     Tablet .. 650 milliGRAM(s) Oral every 6 hours  calcium acetate 667 milliGRAM(s) Oral three times a day with meals  chlorhexidine 0.12% Liquid 15 milliLiter(s) Oral Mucosa two times a day  chlorhexidine 2% Cloths 1 Application(s) Topical <User Schedule>  dextrose 5%. 1000 milliLiter(s) (100 mL/Hr) IV Continuous <Continuous>  dextrose 50% Injectable 25 Gram(s) IV Push once  dextrose 50% Injectable 12.5 Gram(s) IV Push once  dextrose 50% Injectable 25 Gram(s) IV Push once  fentaNYL   Infusion 0.5 MICROgram(s)/kG/Hr (4.86 mL/Hr) IV Continuous <Continuous>  glucagon  Injectable 1 milliGRAM(s) IntraMuscular once  insulin lispro (ADMELOG) corrective regimen sliding scale   SubCutaneous three times a day before meals  midodrine. 10 milliGRAM(s) Oral three times a day  norepinephrine Infusion 0.05 MICROgram(s)/kG/Min (9.11 mL/Hr) IV Continuous <Continuous>  pantoprazole Infusion 8 mG/Hr (10 mL/Hr) IV Continuous <Continuous>  sodium bicarbonate 1300 milliGRAM(s) Oral every 8 hours        VITALS:  T(F): 97.2 (09-24-23 @ 04:00), Max: 99.4 (09-23-23 @ 17:00)  HR: 66 (09-24-23 @ 07:00)  BP: 130/57 (09-24-23 @ 07:00)  RR: 18 (09-24-23 @ 07:00)  SpO2: 100% (09-24-23 @ 07:00)  Wt(kg): --    09-22 @ 07:01  -  09-23 @ 07:00  --------------------------------------------------------  IN: 594 mL / OUT: 700 mL / NET: -106 mL    09-23 @ 07:01  -  09-24 @ 07:00  --------------------------------------------------------  IN: 3287.4 mL / OUT: 317 mL / NET: 2970.4 mL      Height (cm): 177.8 (09-24 @ 05:00)    PHYSICAL EXAM:  Constitutional: NAD  HEENT: anicteric sclera, oropharynx clear, MMM  Neck: No JVD  Respiratory: CTAB, no wheezes, rales or rhonchi  Cardiovascular: S1, S2, RRR  Gastrointestinal: BS+, soft, NT/ND  Extremities: No cyanosis or clubbing. No peripheral edema  :  No barth.   Skin: No rashes    LABS:  09-23    145  |  104  |  132<HH>  ----------------------------<  138<H>  3.5   |  19  |  7.1<HH>  Creatinine Trend: 7.1<--, 7.6<--, 8.2<--, 7.4<--, 7.4<--, 7.1<--  Ca    7.7<L>      23 Sep 2023 12:05  Mg     1.7     09-23    TPro  5.2<L>  /  Alb  2.8<L>  /  TBili  0.3  /  DBili      /  AST  20  /  ALT  10  /  AlkPhos  70  09-23                          8.9    18.06 )-----------( 314      ( 24 Sep 2023 04:37 )             27.3       Urine Studies:  Urinalysis Basic - ( 23 Sep 2023 12:05 )    Color:  / Appearance:  / SG:  / pH:   Gluc: 138 mg/dL / Ketone:   / Bili:  / Urobili:    Blood:  / Protein:  / Nitrite:    Leuk Esterase:  / RBC:  / WBC    Sq Epi:  / Non Sq Epi:  / Bacteria:       Sodium, Random Urine: 78.0 mmoL/L (09-18 @ 11:15)  Creatinine, Random Urine: 42 mg/dL (09-18 @ 11:15)  Osmolality, Random Urine: 299 mos/kg (09-18 @ 11:15)  Potassium, Random Urine: 24 mmol/L (09-18 @ 11:15)              RADIOLOGY & ADDITIONAL STUDIES:   Nephrology progress note    Patient is seen and examined, events over the last 24 h noted .  Lying in bed intubated on MV     Allergies:  No Known Allergies    Hospital Medications:   MEDICATIONS  (STANDING):  acetaminophen     Tablet .. 650 milliGRAM(s) Oral every 6 hours  calcium acetate 667 milliGRAM(s) Oral three times a day with meals  dextrose 5%. 1000 milliLiter(s) (100 mL/Hr) IV Continuous <Continuous>  fentaNYL   Infusion 0.5 MICROgram(s)/kG/Hr (4.86 mL/Hr) IV Continuous <Continuous>  glucagon  Injectable 1 milliGRAM(s) IntraMuscular once  insulin lispro (ADMELOG) corrective regimen sliding scale   SubCutaneous three times a day before meals  midodrine. 10 milliGRAM(s) Oral three times a day  norepinephrine Infusion 0.05 MICROgram(s)/kG/Min (9.11 mL/Hr) IV Continuous <Continuous>  pantoprazole Infusion 8 mG/Hr (10 mL/Hr) IV Continuous <Continuous>  sodium bicarbonate 1300 milliGRAM(s) Oral every 8 hours        VITALS:  T(F): 97.2 (09-24-23 @ 04:00), Max: 99.4 (09-23-23 @ 17:00)  HR: 66 (09-24-23 @ 07:00)  BP: 130/57 (09-24-23 @ 07:00)  RR: 18 (09-24-23 @ 07:00)  SpO2: 100% (09-24-23 @ 07:00)      09-22 @ 07:01  -  09-23 @ 07:00  --------------------------------------------------------  IN: 594 mL / OUT: 700 mL / NET: -106 mL    09-23 @ 07:01  -  09-24 @ 07:00  --------------------------------------------------------  IN: 3287.4 mL / OUT: 317 mL / NET: 2970.4 mL      Height (cm): 177.8 (09-24 @ 05:00)    PHYSICAL EXAM:  Constitutional: NAD  Respiratory: CTAB,  Cardiovascular: S1, S2, RRR  Gastrointestinal: BS+, soft, NT/ND  Extremities: No cyanosis or clubbing. No peripheral edema  :  No barth.   Skin: No rashes    LABS:  09-23    145  |  104  |  132<HH>  ----------------------------<  138<H>  3.5   |  19  |  7.1<HH>    Creatinine Trend: 7.1<--, 7.6<--, 8.2<--, 7.4<--, 7.4<--, 7.1<--    Ca    7.7<L>      23 Sep 2023 12:05  Mg     1.7     09-23    TPro  5.2<L>  /  Alb  2.8<L>  /  TBili  0.3  /  DBili      /  AST  20  /  ALT  10  /  AlkPhos  70  09-23                          8.9    18.06 )-----------( 314      ( 24 Sep 2023 04:37 )             27.3       Urine Studies:  Urinalysis Basic - ( 23 Sep 2023 12:05 )    Color:  / Appearance:  / SG:  / pH:   Gluc: 138 mg/dL / Ketone:   / Bili:  / Urobili:    Blood:  / Protein:  / Nitrite:    Leuk Esterase:  / RBC:  / WBC    Sq Epi:  / Non Sq Epi:  / Bacteria:       Sodium, Random Urine: 78.0 mmoL/L (09-18 @ 11:15)  Creatinine, Random Urine: 42 mg/dL (09-18 @ 11:15)  Osmolality, Random Urine: 299 mos/kg (09-18 @ 11:15)  Potassium, Random Urine: 24 mmol/L (09-18 @ 11:15)              RADIOLOGY & ADDITIONAL STUDIES:

## 2023-09-24 NOTE — CONSULT NOTE ADULT - SUBJECTIVE AND OBJECTIVE BOX
Patient is a 83y old  Male who presents with a chief complaint of AMS (24 Sep 2023 07:58)      HPI:  84 yo m ho DM, COPD, anemia, lymphedemia, afib on xarelto, HFrEF, DM coming in from nursing home for AMS x 2 days. Unable to gather story from pt as he is altered and lethargic.  As per NH was becoming more agitated x 2 days, was hypotensive yesterday and started on cefepime and vancomycin. Brought in to the ED for AMS. PICC line in place for cefepime 1q q8 until 10/5/23 and vanc 1q24 until 9/23/23 for LLE cellulitis/OM  (was at [facility] last month for LLE thick wound cellulitis/OM and sacral DTI as per call with [facility] as per collateral from [facility])      In the ED, vitals wnl. Labs showing wbc 11.30, Hb 9.6, MCV 79.1, INR 1.59, CO2 14, AG 21, Cr 6 (~baseline 1.2), , trops 0.09. CTH wnl, RBUS with no hydro, poor visualization of left kidney    Patient was seen by nephro for possible ATN with creatinine of 7.4 , not requiring hemodialysis   patient has a fib and was on full anticoagulation (Heparin) , yesterday patient has large dark red bowel movement.       (15 Sep 2023 22:12)      PAST MEDICAL & SURGICAL HISTORY:  HTN (hypertension)      COPD (chronic obstructive pulmonary disease)      High cholesterol      Mild anemia      Coffee ground emesis      Chronic diastolic heart failure      PAULA (acute kidney injury)      No significant past surgical history          SOCIAL HX:   Smoking                         ETOH                            Other    FAMILY HISTORY:  No pertinent family history in first degree relatives    :  No known cardiovacular family hisotry     Review Of Systems:     All ROS are negative except per HPI       Allergies    No Known Allergies    Intolerances          PHYSICAL EXAM    ICU Vital Signs Last 24 Hrs  T(C): 35.7 (24 Sep 2023 08:00), Max: 37.4 (23 Sep 2023 17:00)  T(F): 96.3 (24 Sep 2023 08:00), Max: 99.4 (23 Sep 2023 17:00)  HR: 58 (24 Sep 2023 10:00) (48 - 109)  BP: 157/58 (24 Sep 2023 10:00) (55/36 - 168/70)  BP(mean): 84 (24 Sep 2023 10:00) (51 - 120)  ABP: 134/133 (24 Sep 2023 10:00) (47/42 - 153/148)  ABP(mean): 134 (24 Sep 2023 10:00) (45 - 151)  RR: 19 (24 Sep 2023 10:00) (9 - 33)  SpO2: 100% (24 Sep 2023 10:00) (70% - 100%)    O2 Parameters below as of 24 Sep 2023 08:00  Patient On (Oxygen Delivery Method): ventilator    O2 Concentration (%): 50      Constitutional: no acute distress, well nourished well developed  Neuro: moving all 4 limbs spontaneously.  Sedated.  HEENT: NCAT, anicteric, ETT in place  Neck: no visible lymphadenopathy or goiter  Pulm: synchronous with ventilator, coarse bilateral breath sounds anteriorly  Cardiac: extremities appear pink and well-perfused.  regular rhythm and rate, no murmur detected  Abdomen: non-distended  Extremities: trace dependent edema  Skin: no visible rashes or lesions          09-23-23 @ 07:01  -  09-24-23 @ 07:00  --------------------------------------------------------  IN:    Dexmedetomidine: 13.4 mL    dextrose 5% + sodium chloride 0.9%: 75 mL    FentaNYL: 29.4 mL    IV PiggyBack: 50 mL    Lactated Ringers Bolus: 750 mL    Norepinephrine: 480.6 mL    Pantoprazole: 150 mL    Plasma: 347 mL    Platelets - Single Donor: 311 mL    PRBCs (Packed Red Blood Cells): 831 mL    Sodium Chloride 0.9% Bolus: 250 mL  Total IN: 3287.4 mL    OUT:    FentaNYL: 0 mL    FentaNYL: 0 mL    Indwelling Catheter - Urethral (mL): 317 mL  Total OUT: 317 mL    Total NET: 2970.4 mL      09-24-23 @ 07:01  -  09-24-23 @ 10:29  --------------------------------------------------------  IN:    FentaNYL: 29.2 mL    Norepinephrine: 65.6 mL    Pantoprazole: 30 mL  Total IN: 124.8 mL    OUT:    Indwelling Catheter - Urethral (mL): 20 mL  Total OUT: 20 mL    Total NET: 104.8 mL          LABS:                          8.9    18.06 )-----------( 314      ( 24 Sep 2023 04:37 )             27.3                                               09-23    145  |  104  |  132<HH>  ----------------------------<  138<H>  3.5   |  19  |  7.1<HH>    Ca    7.7<L>      23 Sep 2023 12:05  Mg     1.7     09-23    TPro  5.2<L>  /  Alb  2.8<L>  /  TBili  0.3  /  DBili  x   /  AST  20  /  ALT  10  /  AlkPhos  70  09-23      PTT - ( 24 Sep 2023 04:37 )  PTT:30.1 sec                                       Urinalysis Basic - ( 23 Sep 2023 12:05 )    Color: x / Appearance: x / SG: x / pH: x  Gluc: 138 mg/dL / Ketone: x  / Bili: x / Urobili: x   Blood: x / Protein: x / Nitrite: x   Leuk Esterase: x / RBC: x / WBC x   Sq Epi: x / Non Sq Epi: x / Bacteria: x                                                  LIVER FUNCTIONS - ( 23 Sep 2023 12:05 )  Alb: 2.8 g/dL / Pro: 5.2 g/dL / ALK PHOS: 70 U/L / ALT: 10 U/L / AST: 20 U/L / GGT: x                                                                                               Mode: AC/ CMV (Assist Control/ Continuous Mandatory Ventilation)  RR (machine): 14  TV (machine): 450  FiO2: 50  PEEP: 8  ITime: 1  MAP: 12  PIP: 20                                      ABG - ( 24 Sep 2023 04:41 )  pH, Arterial: 7.35  pH, Blood: x     /  pCO2: 41    /  pO2: 312   / HCO3: 23    / Base Excess: -2.9  /  SaO2: 100.0               X-Rays reviewed                                                                                     ECHO    CXR interpreted by me     MEDICATIONS  (STANDING):  acetaminophen     Tablet .. 650 milliGRAM(s) Oral every 6 hours  calcium acetate 667 milliGRAM(s) Oral three times a day with meals  chlorhexidine 0.12% Liquid 15 milliLiter(s) Oral Mucosa two times a day  chlorhexidine 2% Cloths 1 Application(s) Topical <User Schedule>  dextrose 5%. 1000 milliLiter(s) (100 mL/Hr) IV Continuous <Continuous>  dextrose 50% Injectable 25 Gram(s) IV Push once  dextrose 50% Injectable 25 Gram(s) IV Push once  dextrose 50% Injectable 12.5 Gram(s) IV Push once  fentaNYL   Infusion 0.5 MICROgram(s)/kG/Hr (4.86 mL/Hr) IV Continuous <Continuous>  glucagon  Injectable 1 milliGRAM(s) IntraMuscular once  insulin lispro (ADMELOG) corrective regimen sliding scale   SubCutaneous three times a day before meals  midodrine. 10 milliGRAM(s) Oral three times a day  norepinephrine Infusion 0.05 MICROgram(s)/kG/Min (9.11 mL/Hr) IV Continuous <Continuous>  pantoprazole Infusion 8 mG/Hr (10 mL/Hr) IV Continuous <Continuous>  sodium bicarbonate 1300 milliGRAM(s) Oral every 8 hours    MEDICATIONS  (PRN):  albuterol    90 MICROgram(s) HFA Inhaler 1 Puff(s) Inhalation every 6 hours PRN for shortness of breath and/or wheezing  dextrose Oral Gel 15 Gram(s) Oral once PRN Blood Glucose LESS THAN 70 milliGRAM(s)/deciliter

## 2023-09-25 LAB
ALBUMIN SERPL ELPH-MCNC: 2.9 G/DL — LOW (ref 3.5–5.2)
ALP SERPL-CCNC: 76 U/L — SIGNIFICANT CHANGE UP (ref 30–115)
ALT FLD-CCNC: 12 U/L — SIGNIFICANT CHANGE UP (ref 0–41)
ANION GAP SERPL CALC-SCNC: 21 MMOL/L — HIGH (ref 7–14)
AST SERPL-CCNC: 20 U/L — SIGNIFICANT CHANGE UP (ref 0–41)
BASOPHILS # BLD AUTO: 0.05 K/UL — SIGNIFICANT CHANGE UP (ref 0–0.2)
BASOPHILS NFR BLD AUTO: 0.3 % — SIGNIFICANT CHANGE UP (ref 0–1)
BILIRUB SERPL-MCNC: 0.5 MG/DL — SIGNIFICANT CHANGE UP (ref 0.2–1.2)
BUN SERPL-MCNC: 133 MG/DL — CRITICAL HIGH (ref 10–20)
CALCIUM SERPL-MCNC: 8.1 MG/DL — LOW (ref 8.4–10.5)
CHLORIDE SERPL-SCNC: 106 MMOL/L — SIGNIFICANT CHANGE UP (ref 98–110)
CO2 SERPL-SCNC: 19 MMOL/L — SIGNIFICANT CHANGE UP (ref 17–32)
CREAT SERPL-MCNC: 7.6 MG/DL — CRITICAL HIGH (ref 0.7–1.5)
EGFR: 7 ML/MIN/1.73M2 — LOW
EOSINOPHIL # BLD AUTO: 0.8 K/UL — HIGH (ref 0–0.7)
EOSINOPHIL NFR BLD AUTO: 5.2 % — SIGNIFICANT CHANGE UP (ref 0–8)
GLUCOSE BLDC GLUCOMTR-MCNC: 106 MG/DL — HIGH (ref 70–99)
GLUCOSE BLDC GLUCOMTR-MCNC: 83 MG/DL — SIGNIFICANT CHANGE UP (ref 70–99)
GLUCOSE BLDC GLUCOMTR-MCNC: 85 MG/DL — SIGNIFICANT CHANGE UP (ref 70–99)
GLUCOSE BLDC GLUCOMTR-MCNC: 99 MG/DL — SIGNIFICANT CHANGE UP (ref 70–99)
GLUCOSE SERPL-MCNC: 93 MG/DL — SIGNIFICANT CHANGE UP (ref 70–99)
HCT VFR BLD CALC: 25.9 % — LOW (ref 42–52)
HCT VFR BLD CALC: 27.2 % — LOW (ref 42–52)
HGB BLD-MCNC: 8.7 G/DL — LOW (ref 14–18)
HGB BLD-MCNC: 8.9 G/DL — LOW (ref 14–18)
IMM GRANULOCYTES NFR BLD AUTO: 0.5 % — HIGH (ref 0.1–0.3)
LYMPHOCYTES # BLD AUTO: 15.1 % — LOW (ref 20.5–51.1)
LYMPHOCYTES # BLD AUTO: 2.31 K/UL — SIGNIFICANT CHANGE UP (ref 1.2–3.4)
MCHC RBC-ENTMCNC: 27 PG — SIGNIFICANT CHANGE UP (ref 27–31)
MCHC RBC-ENTMCNC: 27.9 PG — SIGNIFICANT CHANGE UP (ref 27–31)
MCHC RBC-ENTMCNC: 32.7 G/DL — SIGNIFICANT CHANGE UP (ref 32–37)
MCHC RBC-ENTMCNC: 33.6 G/DL — SIGNIFICANT CHANGE UP (ref 32–37)
MCV RBC AUTO: 82.4 FL — SIGNIFICANT CHANGE UP (ref 80–94)
MCV RBC AUTO: 83 FL — SIGNIFICANT CHANGE UP (ref 80–94)
MONOCYTES # BLD AUTO: 1.18 K/UL — HIGH (ref 0.1–0.6)
MONOCYTES NFR BLD AUTO: 7.7 % — SIGNIFICANT CHANGE UP (ref 1.7–9.3)
NEUTROPHILS # BLD AUTO: 10.83 K/UL — HIGH (ref 1.4–6.5)
NEUTROPHILS NFR BLD AUTO: 71.2 % — SIGNIFICANT CHANGE UP (ref 42.2–75.2)
NRBC # BLD: 0 /100 WBCS — SIGNIFICANT CHANGE UP (ref 0–0)
NRBC # BLD: 0 /100 WBCS — SIGNIFICANT CHANGE UP (ref 0–0)
PLATELET # BLD AUTO: 223 K/UL — SIGNIFICANT CHANGE UP (ref 130–400)
PLATELET # BLD AUTO: 239 K/UL — SIGNIFICANT CHANGE UP (ref 130–400)
PMV BLD: 10 FL — SIGNIFICANT CHANGE UP (ref 7.4–10.4)
PMV BLD: 9.6 FL — SIGNIFICANT CHANGE UP (ref 7.4–10.4)
POTASSIUM SERPL-MCNC: 3.8 MMOL/L — SIGNIFICANT CHANGE UP (ref 3.5–5)
POTASSIUM SERPL-SCNC: 3.8 MMOL/L — SIGNIFICANT CHANGE UP (ref 3.5–5)
PROT SERPL-MCNC: 5.5 G/DL — LOW (ref 6–8)
RBC # BLD: 3.12 M/UL — LOW (ref 4.7–6.1)
RBC # BLD: 3.3 M/UL — LOW (ref 4.7–6.1)
RBC # FLD: 18.7 % — HIGH (ref 11.5–14.5)
RBC # FLD: 19.2 % — HIGH (ref 11.5–14.5)
SODIUM SERPL-SCNC: 146 MMOL/L — SIGNIFICANT CHANGE UP (ref 135–146)
WBC # BLD: 13.44 K/UL — HIGH (ref 4.8–10.8)
WBC # BLD: 15.25 K/UL — HIGH (ref 4.8–10.8)
WBC # FLD AUTO: 13.44 K/UL — HIGH (ref 4.8–10.8)
WBC # FLD AUTO: 15.25 K/UL — HIGH (ref 4.8–10.8)

## 2023-09-25 PROCEDURE — 93010 ELECTROCARDIOGRAM REPORT: CPT

## 2023-09-25 PROCEDURE — 99233 SBSQ HOSP IP/OBS HIGH 50: CPT

## 2023-09-25 PROCEDURE — 71045 X-RAY EXAM CHEST 1 VIEW: CPT | Mod: 26

## 2023-09-25 RX ORDER — PANTOPRAZOLE SODIUM 20 MG/1
40 TABLET, DELAYED RELEASE ORAL
Refills: 0 | Status: DISCONTINUED | OUTPATIENT
Start: 2023-09-25 | End: 2023-10-01

## 2023-09-25 RX ORDER — METOPROLOL TARTRATE 50 MG
12.5 TABLET ORAL
Refills: 0 | Status: DISCONTINUED | OUTPATIENT
Start: 2023-09-25 | End: 2023-09-26

## 2023-09-25 RX ADMIN — CHLORHEXIDINE GLUCONATE 1 APPLICATION(S): 213 SOLUTION TOPICAL at 06:04

## 2023-09-25 RX ADMIN — Medication 1300 MILLIGRAM(S): at 21:44

## 2023-09-25 RX ADMIN — Medication 12.5 MILLIGRAM(S): at 17:46

## 2023-09-25 RX ADMIN — Medication 667 MILLIGRAM(S): at 17:42

## 2023-09-25 RX ADMIN — PANTOPRAZOLE SODIUM 40 MILLIGRAM(S): 20 TABLET, DELAYED RELEASE ORAL at 17:43

## 2023-09-25 NOTE — PROGRESS NOTE ADULT - SUBJECTIVE AND OBJECTIVE BOX
Patient is a 83y old  Male who presents with a chief complaint of AMS (25 Sep 2023 07:25)        Over Night Events: Drips Protonix drip , off pressors. S/P extubated 3 : 50 pm yesterday         ROS:     All ROS are negative except HPI         PHYSICAL EXAM    ICU Vital Signs Last 24 Hrs  T(C): 36.5 (25 Sep 2023 04:00), Max: 36.7 (24 Sep 2023 20:00)  T(F): 97.7 (25 Sep 2023 04:00), Max: 98.1 (25 Sep 2023 00:00)  HR: 95 (25 Sep 2023 07:00) (51 - 125)  BP: 120/76 (25 Sep 2023 07:00) (96/54 - 166/64)  BP(mean): 91 (25 Sep 2023 07:00) (66 - 102)  ABP: 94/82 (24 Sep 2023 15:00) (59/52 - 151/151)  ABP(mean): 87 (24 Sep 2023 15:00) (56 - 151)  RR: 27 (25 Sep 2023 07:00) (8 - 61)  SpO2: 98% (25 Sep 2023 07:00) (89% - 100%)    O2 Parameters below as of 25 Sep 2023 07:00  Patient On (Oxygen Delivery Method): room air            CONSTITUTIONAL:  ill appearing       ENT:   Airway patent,   Mouth with normal mucosa.      EYES:   Pupils equal,   Round and reactive to light.    CARDIAC:   tachycardiac        RESPIRATORY:   No wheezing  Bilateral BS  Normal chest expansion  Not tachypneic,  No use of accessory muscles    GASTROINTESTINAL:  Abdomen soft,   Non-tender,   No guarding,   + BS         NEUROLOGICAL:   AAO x 2      SKIN:   Sacral ulcer stage 2   DTI            09-24-23 @ 07:01  -  09-25-23 @ 07:00  --------------------------------------------------------  IN:    FentaNYL: 29.2 mL    IV PiggyBack: 50 mL    Norepinephrine: 169.8 mL    Pantoprazole: 240 mL  Total IN: 489 mL    OUT:    Indwelling Catheter - Urethral (mL): 968 mL  Total OUT: 968 mL    Total NET: -479 mL          LABS:                            8.9    13.44 )-----------( 223      ( 25 Sep 2023 05:24 )             27.2                                               09-25    146  |  106  |  133<HH>  ----------------------------<  93  3.8   |  19  |  7.6<HH>    Ca    8.1<L>      25 Sep 2023 05:24  Mg     1.7     09-24    TPro  5.5<L>  /  Alb  2.9<L>  /  TBili  0.5  /  DBili  x   /  AST  20  /  ALT  12  /  AlkPhos  76  09-25      PTT - ( 24 Sep 2023 04:37 )  PTT:30.1 sec                                       Urinalysis Basic - ( 25 Sep 2023 05:24 )    Color: x / Appearance: x / SG: x / pH: x  Gluc: 93 mg/dL / Ketone: x  / Bili: x / Urobili: x   Blood: x / Protein: x / Nitrite: x   Leuk Esterase: x / RBC: x / WBC x   Sq Epi: x / Non Sq Epi: x / Bacteria: x                                                  LIVER FUNCTIONS - ( 25 Sep 2023 05:24 )  Alb: 2.9 g/dL / Pro: 5.5 g/dL / ALK PHOS: 76 U/L / ALT: 12 U/L / AST: 20 U/L / GGT: x                                                             MEDICATIONS  (STANDING):  calcium acetate 667 milliGRAM(s) Oral three times a day with meals  chlorhexidine 2% Cloths 1 Application(s) Topical <User Schedule>  dextrose 5%. 1000 milliLiter(s) (100 mL/Hr) IV Continuous <Continuous>  dextrose 50% Injectable 25 Gram(s) IV Push once  dextrose 50% Injectable 25 Gram(s) IV Push once  dextrose 50% Injectable 12.5 Gram(s) IV Push once  glucagon  Injectable 1 milliGRAM(s) IntraMuscular once  insulin lispro (ADMELOG) corrective regimen sliding scale   SubCutaneous three times a day before meals  norepinephrine Infusion 0.05 MICROgram(s)/kG/Min (9.11 mL/Hr) IV Continuous <Continuous>  pantoprazole Infusion 8 mG/Hr (10 mL/Hr) IV Continuous <Continuous>  sodium bicarbonate 1300 milliGRAM(s) Oral every 8 hours    MEDICATIONS  (PRN):  albuterol    90 MICROgram(s) HFA Inhaler 1 Puff(s) Inhalation every 6 hours PRN for shortness of breath and/or wheezing  dextrose Oral Gel 15 Gram(s) Oral once PRN Blood Glucose LESS THAN 70 milliGRAM(s)/deciliter             Patient is a 83y old  Male who presents with a chief complaint of AMS (25 Sep 2023 07:25)        Over Night Events: On Protonix drip , off pressors. S/P extubation 3 : 50 pm yesterday         ROS:     All ROS are negative except HPI         PHYSICAL EXAM    ICU Vital Signs Last 24 Hrs  T(C): 36.5 (25 Sep 2023 04:00), Max: 36.7 (24 Sep 2023 20:00)  T(F): 97.7 (25 Sep 2023 04:00), Max: 98.1 (25 Sep 2023 00:00)  HR: 95 (25 Sep 2023 07:00) (51 - 125)  BP: 120/76 (25 Sep 2023 07:00) (96/54 - 166/64)  BP(mean): 91 (25 Sep 2023 07:00) (66 - 102)  ABP: 94/82 (24 Sep 2023 15:00) (59/52 - 151/151)  ABP(mean): 87 (24 Sep 2023 15:00) (56 - 151)  RR: 27 (25 Sep 2023 07:00) (8 - 61)  SpO2: 98% (25 Sep 2023 07:00) (89% - 100%)    O2 Parameters below as of 25 Sep 2023 07:00  Patient On (Oxygen Delivery Method): room air            CONSTITUTIONAL:  ill appearing       ENT:   Airway patent,   Mouth with normal mucosa.      EYES:   Pupils equal,   Round and reactive to light.    CARDIAC:   tachycardiac        RESPIRATORY:   No wheezing  Bilateral BS  Normal chest expansion  Not tachypneic,  No use of accessory muscles    GASTROINTESTINAL:  Abdomen soft,   Non-tender,   No guarding,   + BS         NEUROLOGICAL:   AAO x 2      SKIN:   Sacral ulcer stage 2   DTI            09-24-23 @ 07:01  -  09-25-23 @ 07:00  --------------------------------------------------------  IN:    FentaNYL: 29.2 mL    IV PiggyBack: 50 mL    Norepinephrine: 169.8 mL    Pantoprazole: 240 mL  Total IN: 489 mL    OUT:    Indwelling Catheter - Urethral (mL): 968 mL  Total OUT: 968 mL    Total NET: -479 mL          LABS:                            8.9    13.44 )-----------( 223      ( 25 Sep 2023 05:24 )             27.2                                               09-25    146  |  106  |  133<HH>  ----------------------------<  93  3.8   |  19  |  7.6<HH>        Ca    8.1<L>      25 Sep 2023 05:24  Mg     1.7     09-24    TPro  5.5<L>  /  Alb  2.9<L>  /  TBili  0.5  /  DBili  x   /  AST  20  /  ALT  12  /  AlkPhos  76  09-25      PTT - ( 24 Sep 2023 04:37 )  PTT:30.1 sec                                       Urinalysis Basic - ( 25 Sep 2023 05:24 )    Color: x / Appearance: x / SG: x / pH: x  Gluc: 93 mg/dL / Ketone: x  / Bili: x / Urobili: x   Blood: x / Protein: x / Nitrite: x   Leuk Esterase: x / RBC: x / WBC x   Sq Epi: x / Non Sq Epi: x / Bacteria: x                                                  LIVER FUNCTIONS - ( 25 Sep 2023 05:24 )  Alb: 2.9 g/dL / Pro: 5.5 g/dL / ALK PHOS: 76 U/L / ALT: 12 U/L / AST: 20 U/L / GGT: x                                                             MEDICATIONS  (STANDING):  calcium acetate 667 milliGRAM(s) Oral three times a day with meals  chlorhexidine 2% Cloths 1 Application(s) Topical <User Schedule>  dextrose 5%. 1000 milliLiter(s) (100 mL/Hr) IV Continuous <Continuous>  dextrose 50% Injectable 25 Gram(s) IV Push once  dextrose 50% Injectable 25 Gram(s) IV Push once  dextrose 50% Injectable 12.5 Gram(s) IV Push once  glucagon  Injectable 1 milliGRAM(s) IntraMuscular once  insulin lispro (ADMELOG) corrective regimen sliding scale   SubCutaneous three times a day before meals  norepinephrine Infusion 0.05 MICROgram(s)/kG/Min (9.11 mL/Hr) IV Continuous <Continuous>  pantoprazole Infusion 8 mG/Hr (10 mL/Hr) IV Continuous <Continuous>  sodium bicarbonate 1300 milliGRAM(s) Oral every 8 hours    MEDICATIONS  (PRN):  albuterol    90 MICROgram(s) HFA Inhaler 1 Puff(s) Inhalation every 6 hours PRN for shortness of breath and/or wheezing  dextrose Oral Gel 15 Gram(s) Oral once PRN Blood Glucose LESS THAN 70 milliGRAM(s)/deciliter

## 2023-09-25 NOTE — PROGRESS NOTE ADULT - ASSESSMENT
Impression    #Duodenal ulcer  -- Actively bleeding/spurting vessel visualized and intervened on 9/23 Resolved   #Hematochezia resolved   #Hemorrhagic Shock resolved   #Metabolic Encephalopathy  improved   #Suspected Severe Sepsis (Hypotention/AMS/PAULA/Oliguric) improved   #PAULA vs CKD 4/5  w/ Progression   #HAGMA 2/2 PAULA stable non oliguric   #History of Left Foot OM w/ surrounding Cellulitis   #Sacral DTI / Stage 1 POA     Plan:      CNS:  Monitor mental status     HEENT:  Aspiration precautions      CARDIOVASCULAR Keep I=O . gentle diuresis with bumex. Metoprolol 12.5 mg BID     PULMONARY O2 support if necessary    GASTROINTESTINAL :  feeding once Hb is stable at 11 am. Speech and swallow eval. Protonix to Pushes BID      GENITOURINARY/RENAL: Nephro evaluation appreciated. monitor Urine out put. Monitor BMP      INFECTIOUS : Monitor off abx . f/u cultures         HEMATOLOGIC: SCD for now. Keep type and screen active. Target Hb > 7. Repeat Hb at 11 . GI recommendations noted regarding AC.  Continue to hold.        ENDOCRINE  Follow up FS.  Insulin protocol as needed.  BG goal 140-180    MSK/DERM  PT/OT when cooperative      CODE STATUS: FULL     Lines: peripheral Line    SDU  Impression  UGIB secondary to Duodenal ulcer SP EGD   #Hematochezia resolved   #Hemorrhagic Shock resolved   #Metabolic Encephalopathy  improved   #Suspected Severe Sepsis (Hypotention/AMS/PAULA/Oliguric) improved   PAULA / CKD stable non oliguric   #History of Left Foot OM w/ surrounding Cellulitis   #Sacral DTI / Stage 1 POA     Plan:      CNS:  Monitor mental status     HEENT:  Aspiration precautions      CARDIOVASCULAR Keep I=O . gentle diuresis with bumex. Metoprolol 12.5 mg BID     PULMONARY O2 support if necessary.  Incentive spirometry     GASTROINTESTINAL :  feeding once Hb is stable at 11 am. Speech and swallow eval. Protonix to Pushes BID      GENITOURINARY/RENAL: Nephro evaluation appreciated. monitor Urine out put. Monitor BMP      INFECTIOUS : Monitor off abx . f/u cultures         HEMATOLOGIC: SCD for now. Keep type and screen active. Target Hb > 7. Repeat Hb at 11 . GI recommendations noted regarding AC.  Continue to hold.        ENDOCRINE  Follow up FS.  Insulin protocol as needed.  BG goal 140-180    MSK/DERM  PT/OT when cooperative.  Off loading       CODE STATUS: FULL     Lines: peripheral Line    DGTF PM

## 2023-09-25 NOTE — PROGRESS NOTE ADULT - ASSESSMENT
Assessment and Plan  Case of an 83 year old male patient known to have COPD. DM, atrial fibrillation, HFrEF, anemia, lymphedema, and recent left foot OM who was brought from the NH to the ED on 09/15 for evaluation of altered mental status, found to have sepsis in setting of recent left foot OM, admitted for further management. Stay was complicated by hemorrhagic shock and drop in Hb secondary to hematochezia on 09/23. Status post EGD on 09/23 revealing a bleeding duodenal ulcer s/p OVESCO placement. We are consulted for hematochezia and hypotension.      Hemorrhagic Shock Secondary to Hematochezia from Duodenal Ulcer Bleed s/p OVESCO placement 09/23  Acute Drop in Hemoglobin- Improved  * Hemodynamically stable  * Baseline hemoglobin (prior to admission): Hb 13.8 in 2016 -> 09/12 9.5  * ED Vitals:  /63 mmHg, HR 88 bpm  * Hemoglobin on admission: Hb 9.6 on 09/15 -> Hb 8.1/7.3 s/p 5 units pRBC on 09/23 -> 09/24 Hb 9.1  * Coags: INR 1.59 on 09/15  * AC: on Xarelto for afib until 09/15. Then Heparin 5000 units SC Q8h until 09/20, then IV Heparin with prolonged aPTT from 09/20-09/23, then SCDs since 09/23  * Status post IV Protamine sulfate 36mg x1 dose on 09/23  * MARISSA red blood 09/23  * CT angio AP with IC 09/23: arterial extravasation between D1 and 2 with venous pooling, small hiatal hernia  * No prior EGD or colonoscopy  * Family History: negative for GI malignancies  * Status Post EGD on 09/23: blood clots in fundus and antrum; 2cm actively bleeding duodenal ulcer with spurting vessel ([place] 1a) in sweep on base s/p 10cc epi and s/p 11/6 OVESCO placement for hemostasis    RECOMMENDATIONS  - Status post EGD on 09/23 as detailed above (s/p OVESCO placement for hemostasis)  - Keep NPO for now and consider advancing diet if Hb stable with no recurrence of bleeding  - Trend CBC closely BID and transfuse as needed (target Hb >8). Maintain active Type and screen  - Trend BUN  - x2 large 18G IV Bores  - Continue IV pantoprazole drip (started on 09/23)  - Continue holding therapeutic AC: was on Xarelto for afib until 09/15, then Heparin 5000 units SC Q8h until 09/20, then IV Heparin with prolonged aPTT from 09/20-09/23, then SCDs since 09/23. Status post IV Protamine sulfate 36mg x1 dose on 09/23  - Monitor BM for recurrence of hematochezia or melena  - Please avoid any NSAIDs  - If any unstable bleed, please call GI STAT  - Follow up with our [facility] located at [address] [place] [address]. Phone Number: [phone]        Asymptomatic Cholelithiasis  * Noted on CT  * LFTs noted    RECOMMENDATIONS  - Monitor for symptoms  - Trend LFTs      Thank you for your consult.  - Please note that plan was discussed with Dr. Santoro and communicated with medical team.  - Please reach GI on [phone] during weekdays till 5pm.  - Please call the GI service line after 5pm on Weekdays and anytime on Weekends: [phone].      Rickie Jenkins MD  PGY - 4 Gastroenterology Fellow  [facility]

## 2023-09-25 NOTE — PROGRESS NOTE ADULT - ASSESSMENT
Impression  UGIB secondary to Duodenal ulcer SP EGD   #Hematochezia resolved   #Hemorrhagic Shock resolved   #Metabolic Encephalopathy  improved   #Suspected Severe Sepsis (Hypotention/AMS/PAULA/Oliguric) improved   PAULA / CKD stable non oliguric   #History of Left Foot OM w/ surrounding Cellulitis   #Sacral DTI / Stage 1 POA     Plan:      CNS:  Monitor mental status     HEENT:  Aspiration precautions      CARDIOVASCULAR Keep I=O . gentle diuresis with bumex. Metoprolol 12.5 mg BID     PULMONARY O2 support if necessary.  Incentive spirometry     GASTROINTESTINAL :  feeding once Hb is stable at 11 am. Speech and swallow eval. Protonix to Pushes BID      GENITOURINARY/RENAL: Nephro evaluation appreciated. monitor Urine out put. Monitor BMP      INFECTIOUS : Monitor off abx . f/u cultures         HEMATOLOGIC: SCD for now. Keep type and screen active. Target Hb > 7. Repeat Hb at 11 . GI recommendations noted regarding AC.  Continue to hold.        ENDOCRINE  Follow up FS.  Insulin protocol as needed.  BG goal 140-180    MSK/DERM  PT/OT when cooperative.  Off loading       CODE STATUS: FULL     Lines: peripheral Line    DGTF PM

## 2023-09-25 NOTE — CHART NOTE - NSCHARTNOTEFT_GEN_A_CORE
Transfer from ICU  Transfer to Med surg    82 yo m ho DM, COPD, anemia, lymphedemia, afib on xarelto, HFrEF, DM coming in from nursing home for AMS x 2 days. Unable to gather story from pt as he is altered and lethargic.  As per NH was becoming more agitated x 2 days, was hypotensive yesterday and started on cefepime and vancomycin. Brought in to the ED for AMS. PICC line in place for cefepime 1q q8 until 10/5/23 and vanc 1q24 until 9/23/23 for LLE cellulitis/OM  (was at [facility] last month for LLE thick wound cellulitis/OM and sacral DTI as per call with [facility] as per collateral from [facility])      In the ED, vitals wnl. Labs showing wbc 11.30, Hb 9.6, MCV 79.1, INR 1.59, CO2 14, AG 21, Cr 6 (~baseline 1.2), , trops 0.09. CTH wnl, RBUS with no hydro, poor visualization of left kidney.    When pt arrived to ICU, he was in shock, hypotensive, with active Melena. BP in 60's.  GI consulted urgently for endoscopy. Procedure was done at bedside and bleeding controlled. Pt. was given 2 U of blood, 1U FFP and 1 Plasma in ICU. HGB has maintained above 8 and pt has not sings of melena.     Impression  UGIB secondary to Duodenal ulcer SP EGD   #Hematochezia resolved   #Hemorrhagic Shock resolved   #Metabolic Encephalopathy  improved   #Suspected Severe Sepsis (Hypotention/AMS/PAULA/Oliguric) improved   PAULA / CKD stable non oliguric   #History of Left Foot OM w/ surrounding Cellulitis   #Sacral DTI / Stage 1 POA     Plan:      CNS:  Monitor mental status     HEENT:  Aspiration precautions      CARDIOVASCULAR Keep I=O . gentle diuresis with bumex. Metoprolol 12.5 mg BID     PULMONARY O2 support if necessary.  Incentive spirometry     GASTROINTESTINAL :  feeding once Hb is stable at 11 am. Speech and swallow eval. Protonix to Pushes BID      GENITOURINARY/RENAL: Nephro evaluation appreciated. monitor Urine out put. Monitor BMP      INFECTIOUS : Monitor off abx . f/u cultures         HEMATOLOGIC: SCD for now. Keep type and screen active. Target Hb > 7. Repeat Hb at 11 . GI recommendations noted regarding AC.  Continue to hold.        ENDOCRINE  Follow up FS.  Insulin protocol as needed.  BG goal 140-180    MSK/DERM  PT/OT when cooperative.  Off loading       CODE STATUS: FULL     Lines: peripheral Line

## 2023-09-25 NOTE — PROGRESS NOTE ADULT - SUBJECTIVE AND OBJECTIVE BOX
24H events:    Patient is a 83y old Male who presents with a chief complaint of AMS (25 Sep 2023 08:53)    Primary diagnosis of Altered mental status      Today is 10d of hospitalization. This morning patient was seen and examined at bedside, resting comfortably in bed.    No acute or major events overnight. Patient denies fevers, chills, headache, chest pain, dyspnea, cough, nausea, vomiting, abdominal pain or BM changes.     Code Status:    Family communication:  Contact date:  Name of person contacted:  Relationship to patient:  Communication details:  What matters most:    PAST MEDICAL & SURGICAL HISTORY  HTN (hypertension)    COPD (chronic obstructive pulmonary disease)    High cholesterol    Mild anemia    Coffee ground emesis    Chronic diastolic heart failure    PAULA (acute kidney injury)    No significant past surgical history      SOCIAL HISTORY:  Social History:      ALLERGIES:  No Known Allergies    MEDICATIONS:  STANDING MEDICATIONS  calcium acetate 667 milliGRAM(s) Oral three times a day with meals  chlorhexidine 2% Cloths 1 Application(s) Topical <User Schedule>  dextrose 5%. 1000 milliLiter(s) IV Continuous <Continuous>  dextrose 50% Injectable 25 Gram(s) IV Push once  dextrose 50% Injectable 25 Gram(s) IV Push once  dextrose 50% Injectable 12.5 Gram(s) IV Push once  glucagon  Injectable 1 milliGRAM(s) IntraMuscular once  insulin lispro (ADMELOG) corrective regimen sliding scale   SubCutaneous three times a day before meals  metoprolol tartrate 12.5 milliGRAM(s) Oral two times a day  norepinephrine Infusion 0.05 MICROgram(s)/kG/Min IV Continuous <Continuous>  pantoprazole  Injectable 40 milliGRAM(s) IV Push two times a day  sodium bicarbonate 1300 milliGRAM(s) Oral every 8 hours    PRN MEDICATIONS  albuterol    90 MICROgram(s) HFA Inhaler 1 Puff(s) Inhalation every 6 hours PRN  dextrose Oral Gel 15 Gram(s) Oral once PRN    VITALS:   T(F): 97.2  HR: 108  BP: 103/57  RR: 27  SpO2: 97%    PHYSICAL EXAM:  GENERAL:   ( x) NAD, lying in bed comfortably     (  ) obtunded     (  ) lethargic     (  ) somnolent    HEAD:   ( x) Atraumatic     (  ) hematoma     (  ) laceration (specify location:       )     NECK:  (x) Supple     (  ) neck stiffness     (  ) nuchal rigidity     (  )  no JVD     (  ) JVD present ( -- cm)    HEART:  Rate -->     (x) normal rate     (  ) bradycardic     (  ) tachycardic  Rhythm -->     (x) regular     (  ) regularly irregular     (  ) irregularly irregular  Murmurs -->     (x) normal s1s2     (  ) systolic murmur     (  ) diastolic murmur     (  ) continuous murmur      (  ) S3 present     (  ) S4 present    LUNGS:   ( x)Unlabored respirations     (  ) tachypnea  ( x) B/L air entry     (  ) decreased breath sounds in:  (location     )    ( x) no adventitious sound     (  ) crackles     (  ) wheezing      (  ) rhonchi      (specify location:       )  (  ) chest wall tenderness (specify location:       )    ABDOMEN:   ( x) Soft     (  ) tense   |   (X  ) nondistended     (  ) distended   |   ( X ) +BS     (  ) hypoactive bowel sounds     (  ) hyperactive bowel sounds  ( x) nontender     (  ) RUQ tenderness     (  ) RLQ tenderness     (  ) LLQ tenderness     (  ) epigastric tenderness     (  ) diffuse tenderness  (  ) Splenomegaly      (  ) Hepatomegaly      (  ) Jaundice     (  ) ecchymosis     EXTREMITIES:  ( x) Normal     (  ) Rash     (  ) ecchymosis     (  ) varicose veins      (  ) pitting edema     (  ) non-pitting edema   (  ) ulceration     (  ) gangrene:     (location:     )    NERVOUS SYSTEM:    ( x) A&Ox3     (  ) confused     (  ) lethargic  CN II-XII:     (  ) Intact     (  ) deficits found     (Specify:     )   Upper extremities:     (  ) no sensorimotor deficits     (  ) weakness     (  ) loss of proprioception/vibration     (  ) loss of touch/temperature (specify:    )  Lower extremities:     (  ) no sensorimotor deficits     (  ) weakness     (  ) loss of proprioception/vibration     (  ) loss of touch/temperature (specify:    )    SKIN:   ( X ) No rashes or lesions     (  ) maculopapular rash     (  ) pustules     (  ) vesicles     (  ) ulcer     (  ) ecchymosis     (specify location:     )    AMPAC score :    (  ) Indwelling [place] Catheter:   Date inserted:    Reason (  ) Critical illness     (  ) urinary retention    (  ) Accurate Ins/Outs Monitoring     (  ) CMO patient    (  ) Central Line:   Date inserted:  Location: (  ) Right IJ     (  ) Left IJ     (  ) Right Fem     (  ) Left Fem    (  ) SPC        (  ) pigtail       (  ) PEG tube       (  ) colostomy       (  ) jejunostomy  (  ) U-Dall    LABS:                        8.9    13.44 )-----------( 223      ( 25 Sep 2023 05:24 )             27.2     09-25    146  |  106  |  133<HH>  ----------------------------<  93  3.8   |  19  |  7.6<HH>    Ca    8.1<L>      25 Sep 2023 05:24  Mg     1.7     09-24    TPro  5.5<L>  /  Alb  2.9<L>  /  TBili  0.5  /  DBili  x   /  AST  20  /  ALT  12  /  AlkPhos  76  09-25    PTT - ( 24 Sep 2023 04:37 )  PTT:30.1 sec  Urinalysis Basic - ( 25 Sep 2023 05:24 )    Color: x / Appearance: x / SG: x / pH: x  Gluc: 93 mg/dL / Ketone: x  / Bili: x / Urobili: x   Blood: x / Protein: x / Nitrite: x   Leuk Esterase: x / RBC: x / WBC x   Sq Epi: x / Non Sq Epi: x / Bacteria: x      ABG - ( 24 Sep 2023 16:25 )  pH, Arterial: 7.41  pH, Blood: x     /  pCO2: 34    /  pO2: 81    / HCO3: 22    / Base Excess: -2.4  /  SaO2: 97.5                      RADIOLOGY:    < from: Xray Chest 1 View- PORTABLE-Routine (Xray Chest 1 View- PORTABLE-Routine in AM.) (09.25.23 @ 07:18) >  PROCEDURE DATE:  09/25/2023          INTERPRETATION:  Clinical History / Reason for exam: Follow-up.    Comparison : Chest radiograph September 23, 2023.    Technique/Positioning: Adequate.    Findings:    Support devices: None.    Cardiac/mediastinum/hilum: Stable    Lung parenchyma/Pleura: Bibasilar opacities, unchanged. No pneumothorax   is seen.    Skeleton/soft tissues: Stable    Impression:    Bibasilar opacities, unchanged.    --- End of Report ---            ZARA AG MD; Attending Radiologist  This document has been electronically signed. Sep 25 2023  8:59AM    < end of copied text >

## 2023-09-25 NOTE — PROGRESS NOTE ADULT - ATTENDING COMMENTS
EGD performed on 9/23/23 revealing large duodenal ulcer with vessel s/p epi and ovesco clip. Expect passage of old blood, otherwise no further active ongoing/overt GI bleeding. Continue IV PPI. Advance diet gradually per SLP recommendations

## 2023-09-25 NOTE — DIETITIAN INITIAL EVALUATION ADULT - NSFNSPHYEXAMSKINFT_GEN_A_CORE
Stage 2 pressure injury to bilateral buttock, unstageable pressure injury to left heel, DTI to left cheek

## 2023-09-25 NOTE — PROGRESS NOTE ADULT - ATTENDING COMMENTS
Impression  UGIB secondary to Duodenal ulcer SP EGD   #Hematochezia resolved   #Hemorrhagic Shock resolved   #Metabolic Encephalopathy  improved   #Suspected Severe Sepsis (Hypotention/AMS/PAULA/Oliguric) improved   PAULA / CKD stable non oliguric   #History of Left Foot OM w/ surrounding Cellulitis   #Sacral DTI / Stage 1 POA     Plan as outlined above

## 2023-09-25 NOTE — DIETITIAN INITIAL EVALUATION ADULT - PERTINENT MEDS FT
MEDICATIONS  (STANDING):  calcium acetate 667 milliGRAM(s) Oral three times a day with meals  chlorhexidine 2% Cloths 1 Application(s) Topical <User Schedule>  dextrose 5%. 1000 milliLiter(s) (100 mL/Hr) IV Continuous <Continuous>  dextrose 50% Injectable 25 Gram(s) IV Push once  dextrose 50% Injectable 12.5 Gram(s) IV Push once  dextrose 50% Injectable 25 Gram(s) IV Push once  glucagon  Injectable 1 milliGRAM(s) IntraMuscular once  insulin lispro (ADMELOG) corrective regimen sliding scale   SubCutaneous three times a day before meals  metoprolol tartrate 12.5 milliGRAM(s) Oral two times a day  norepinephrine Infusion 0.05 MICROgram(s)/kG/Min (9.11 mL/Hr) IV Continuous <Continuous>  pantoprazole  Injectable 40 milliGRAM(s) IV Push two times a day  sodium bicarbonate 1300 milliGRAM(s) Oral every 8 hours    MEDICATIONS  (PRN):  albuterol    90 MICROgram(s) HFA Inhaler 1 Puff(s) Inhalation every 6 hours PRN for shortness of breath and/or wheezing  dextrose Oral Gel 15 Gram(s) Oral once PRN Blood Glucose LESS THAN 70 milliGRAM(s)/deciliter

## 2023-09-25 NOTE — PROGRESS NOTE ADULT - SUBJECTIVE AND OBJECTIVE BOX
Nephrology progress note    THIS IS AN INCOMPLETE NOTE . FULL NOTE TO FOLLOW SHORTLY    Patient is seen and examined, events over the last 24 h noted .    Allergies:  No Known Allergies    Hospital Medications:   MEDICATIONS  (STANDING):  calcium acetate 667 milliGRAM(s) Oral three times a day with meals  chlorhexidine 2% Cloths 1 Application(s) Topical <User Schedule>  dextrose 5%. 1000 milliLiter(s) (100 mL/Hr) IV Continuous <Continuous>  dextrose 50% Injectable 25 Gram(s) IV Push once  dextrose 50% Injectable 12.5 Gram(s) IV Push once  dextrose 50% Injectable 25 Gram(s) IV Push once  glucagon  Injectable 1 milliGRAM(s) IntraMuscular once  insulin lispro (ADMELOG) corrective regimen sliding scale   SubCutaneous three times a day before meals  norepinephrine Infusion 0.05 MICROgram(s)/kG/Min (9.11 mL/Hr) IV Continuous <Continuous>  pantoprazole  Injectable 40 milliGRAM(s) IV Push two times a day  sodium bicarbonate 1300 milliGRAM(s) Oral every 8 hours        VITALS:  T(F): 97.8 (09-25-23 @ 08:00), Max: 98.1 (09-25-23 @ 00:00)  HR: 95 (09-25-23 @ 08:00)  BP: 96/56 (09-25-23 @ 08:00)  RR: 21 (09-25-23 @ 08:00)  SpO2: 99% (09-25-23 @ 08:00)  Wt(kg): --    09-23 @ 07:01  -  09-24 @ 07:00  --------------------------------------------------------  IN: 3287.4 mL / OUT: 317 mL / NET: 2970.4 mL    09-24 @ 07:01  -  09-25 @ 07:00  --------------------------------------------------------  IN: 489 mL / OUT: 968 mL / NET: -479 mL      Height (cm): 193 (09-24 @ 22:00)    PHYSICAL EXAM:  Constitutional: NAD  HEENT: anicteric sclera, oropharynx clear, MMM  Neck: No JVD  Respiratory: CTAB, no wheezes, rales or rhonchi  Cardiovascular: S1, S2, RRR  Gastrointestinal: BS+, soft, NT/ND  Extremities: No cyanosis or clubbing. No peripheral edema  :  No barth.   Skin: No rashes    LABS:  09-25    146  |  106  |  133<HH>  ----------------------------<  93  3.8   |  19  |  7.6<HH>    Ca    8.1<L>      25 Sep 2023 05:24  Mg     1.7     09-24    TPro  5.5<L>  /  Alb  2.9<L>  /  TBili  0.5  /  DBili      /  AST  20  /  ALT  12  /  AlkPhos  76  09-25                          8.9    13.44 )-----------( 223      ( 25 Sep 2023 05:24 )             27.2       Urine Studies:  Urinalysis Basic - ( 25 Sep 2023 05:24 )    Color:  / Appearance:  / SG:  / pH:   Gluc: 93 mg/dL / Ketone:   / Bili:  / Urobili:    Blood:  / Protein:  / Nitrite:    Leuk Esterase:  / RBC:  / WBC    Sq Epi:  / Non Sq Epi:  / Bacteria:       Sodium, Random Urine: 78.0 mmoL/L (09-18 @ 11:15)  Creatinine, Random Urine: 42 mg/dL (09-18 @ 11:15)  Osmolality, Random Urine: 299 mos/kg (09-18 @ 11:15)  Potassium, Random Urine: 24 mmol/L (09-18 @ 11:15)              RADIOLOGY & ADDITIONAL STUDIES:   Nephrology progress note  Patient is seen and examined, events over the last 24 h noted .  Lying in bed  extubated     Allergies:  No Known Allergies    Hospital Medications:   MEDICATIONS  (STANDING):  calcium acetate 667 milliGRAM(s) Oral three times a day with meals  chlorhexidine 2% Cloths 1 Application(s) Topical <User Schedule>  glucagon  Injectable 1 milliGRAM(s) IntraMuscular once  insulin lispro (ADMELOG) corrective regimen sliding scale   SubCutaneous three times a day before meals  norepinephrine Infusion 0.05 MICROgram(s)/kG/Min (9.11 mL/Hr) IV Continuous <Continuous>  pantoprazole  Injectable 40 milliGRAM(s) IV Push two times a day  sodium bicarbonate 1300 milliGRAM(s) Oral every 8 hours        VITALS:  T(F): 97.8 (09-25-23 @ 08:00), Max: 98.1 (09-25-23 @ 00:00)  HR: 95 (09-25-23 @ 08:00)  BP: 96/56 (09-25-23 @ 08:00)  RR: 21 (09-25-23 @ 08:00)  SpO2: 99% (09-25-23 @ 08:00)      09-23 @ 07:01  -  09-24 @ 07:00  --------------------------------------------------------  IN: 3287.4 mL / OUT: 317 mL / NET: 2970.4 mL    09-24 @ 07:01  -  09-25 @ 07:00  --------------------------------------------------------  IN: 489 mL / OUT: 968 mL / NET: -479 mL      Height (cm): 193 (09-24 @ 22:00)    PHYSICAL EXAM:  Constitutional: NAD  Respiratory: CTAB  Cardiovascular: S1, S2, RRR  Gastrointestinal: BS+, soft, NT/ND  Extremities: No cyanosis or clubbing. No peripheral edema  :  No barth.   Skin: No rashes    LABS:    09-25    146  |  106  |  133<HH>  ----------------------------<  93  3.8   |  19  |  7.6<HH>    Ca    8.1<L>      25 Sep 2023 05:24  Mg     1.7     09-24    TPro  5.5<L>  /  Alb  2.9<L>  /  TBili  0.5  /  DBili      /  AST  20  /  ALT  12  /  AlkPhos  76  09-25                          8.9    13.44 )-----------( 223      ( 25 Sep 2023 05:24 )             27.2       Urine Studies:  Urinalysis Basic - ( 25 Sep 2023 05:24 )    Color:  / Appearance:  / SG:  / pH:   Gluc: 93 mg/dL / Ketone:   / Bili:  / Urobili:    Blood:  / Protein:  / Nitrite:    Leuk Esterase:  / RBC:  / WBC    Sq Epi:  / Non Sq Epi:  / Bacteria:       Sodium, Random Urine: 78.0 mmoL/L (09-18 @ 11:15)  Creatinine, Random Urine: 42 mg/dL (09-18 @ 11:15)  Osmolality, Random Urine: 299 mos/kg (09-18 @ 11:15)  Potassium, Random Urine: 24 mmol/L (09-18 @ 11:15)              RADIOLOGY & ADDITIONAL STUDIES:

## 2023-09-25 NOTE — DIETITIAN INITIAL EVALUATION ADULT - SIGNS/SYMPTOMS
moderate muscle wasting, moderate edema making weight loss, pt met <50% estimated needs for > 5 days

## 2023-09-25 NOTE — DIETITIAN INITIAL EVALUATION ADULT - ORAL NUTRITION SUPPLEMENTS
Add Ensure Plus High Protein (350kcal, 20g protein each) 1can 3x/day -- need to thicken to mildly thick consistency; Magic cup (290kcal, 9g protein) 1can 2x/day

## 2023-09-25 NOTE — PROGRESS NOTE ADULT - SUBJECTIVE AND OBJECTIVE BOX
Gastroenterology Follow Up Note      Location: [facility] [address] ([facility]- [address])  Patient Name: NIMO SARMIENTO  Age: 83y  Gender: Male      Chief Complaint  Patient is a 83y old Male who presents with a chief complaint of AMS (24 Sep 2023 14:05)  Primary diagnosis of Altered mental status      Reason for Consult  Hematochezia      Progress Note  This morning patient was seen and examined at bedside.    Today is hospital day 10d.  Patient is doing fine. No acute events overnight.   Patient's appetite is adequate, and he/she is tolerating diet and denies nausea or vomiting.   Patient reports some mild abdominal pain that is burning in nature and radiating up to throat.  Per nurse he had an episode of dark brown stools on 09/23 PM with no recurrence since then..      Vital Signs in the last 24 hours   Vitals Summary T(C): 36.5 (09-25-23 @ 04:00), Max: 36.7 (09-24-23 @ 20:00)  HR: 95 (09-25-23 @ 07:00) (51 - 125)  BP: 120/76 (09-25-23 @ 07:00) (96/54 - 166/64)  RR: 27 (09-25-23 @ 07:00) (8 - 61)  SpO2: 98% (09-25-23 @ 07:00) (89% - 100%)  Vent Data Device: Avea, Mode: CPAP with PS, PEEP: 8, PS: 6  Intake/ Output   09-24-23 @ 07:01  -  09-25-23 @ 07:00  --------------------------------------------------------  IN: 489 mL / OUT: 968 mL / NET: -479 mL      Physical Exam  * General Appearance: Alert, cooperative, interactive, oriented to person but not to time or place, in no acute distress  * Neck: Supple, symmetrical, trachea midline, no adenopathy   * Lungs: Good bilateral air entry, normal breath sounds (Clear to auscultation bilaterally, no audible wheezes, crackles, or rhonchi)  * Heart: Regular Rate and Rhythm, normal S1 and S2, no audible murmur, rub, or gallop  * Abdomen: Symmetric, non-distended, soft, non-tender, bowel sounds active all four quadrants, no masses, no organomegaly (no hepatosplenomegaly)  * Rectal: red blood on 09/23      Investigations   Laboratory Workup      - CBC:                        8.9    13.44 )-----------( 223      ( 25 Sep 2023 05:24 )             27.2       - Hgb Trend:  8.9  09-25-23 @ 05:24  9.1  09-24-23 @ 11:00  8.9  09-24-23 @ 04:37  9.1  09-24-23 @ 00:30  7.3  09-23-23 @ 14:12  8.1  09-23-23 @ 12:05  10.0  09-22-23 @ 07:42      09-23-23 @ 07:01  -  09-24-23 @ 07:00  --------------------------------------------------------  IN: 1489 mL        - Chemistry:  09-25    146  |  106  |  133<HH>  ----------------------------<  93  3.8   |  19  |  7.6<HH>    Ca    8.1<L>      25 Sep 2023 05:24  Mg     1.7     09-24    TPro  5.5<L>  /  Alb  2.9<L>  /  TBili  0.5  /  DBili  x   /  AST  20  /  ALT  12  /  AlkPhos  76  09-25    Liver panel trend:  TBili 0.5   /   AST 20   /   ALT 12   /   AlkP 76   /   Tptn 5.5   /   Alb 2.9    /   DBili --      09-25  TBili 0.7   /   AST 17   /   ALT 11   /   AlkP 73   /   Tptn 5.3   /   Alb 2.8    /   DBili --      09-24  TBili 0.3   /   AST 20   /   ALT 10   /   AlkP 70   /   Tptn 5.2   /   Alb 2.8    /   DBili --      09-23  TBili 0.2   /   AST 21   /   ALT 12   /   AlkP 81   /   Tptn 5.8   /   Alb 2.9    /   DBili --      09-21  TBili 0.2   /   AST 23   /   ALT 14   /   AlkP 84   /   Tptn 5.9   /   Alb 3.1    /   DBili --      09-20  TBili 0.3   /   AST 28   /   ALT 14   /   AlkP 86   /   Tptn 6.0   /   Alb 2.9    /   DBili --      09-19  TBili 0.3   /   AST 28   /   ALT 13   /   AlkP 83   /   Tptn 5.7   /   Alb 3.0    /   DBili --      09-18  TBili 0.3   /   AST 32   /   ALT 11   /   AlkP 84   /   Tptn 6.0   /   Alb 2.9    /   DBili --      09-16  TBili 0.3   /   AST 23   /   ALT 9   /   AlkP 90   /   Tptn 6.3   /   Alb 2.9    /   DBili --      09-15      - Coagulation Studies:  PTT - ( 24 Sep 2023 04:37 )  PTT:30.1 sec    - ABG:  ABG - ( 24 Sep 2023 16:25 )  pH, Arterial: 7.41  pH, Blood: x     /  pCO2: 34    /  pO2: 81    / HCO3: 22    / Base Excess: -2.4  /  SaO2: 97.5        Microbiological Workup  Urinalysis Basic - ( 25 Sep 2023 05:24 )    Color: x / Appearance: x / SG: x / pH: x  Gluc: 93 mg/dL / Ketone: x  / Bili: x / Urobili: x   Blood: x / Protein: x / Nitrite: x   Leuk Esterase: x / RBC: x / WBC x   Sq Epi: x / Non Sq Epi: x / Bacteria: x        Current Medications  Standing Medications  calcium acetate 667 milliGRAM(s) Oral three times a day with meals  chlorhexidine 2% Cloths 1 Application(s) Topical <User Schedule>  dextrose 5%. 1000 milliLiter(s) (100 mL/Hr) IV Continuous <Continuous>  dextrose 50% Injectable 25 Gram(s) IV Push once  dextrose 50% Injectable 25 Gram(s) IV Push once  dextrose 50% Injectable 12.5 Gram(s) IV Push once  glucagon  Injectable 1 milliGRAM(s) IntraMuscular once  insulin lispro (ADMELOG) corrective regimen sliding scale   SubCutaneous three times a day before meals  norepinephrine Infusion 0.05 MICROgram(s)/kG/Min (9.11 mL/Hr) IV Continuous <Continuous>  pantoprazole Infusion 8 mG/Hr (10 mL/Hr) IV Continuous <Continuous>  sodium bicarbonate 1300 milliGRAM(s) Oral every 8 hours    PRN Medications  albuterol    90 MICROgram(s) HFA Inhaler 1 Puff(s) Inhalation every 6 hours PRN for shortness of breath and/or wheezing  dextrose Oral Gel 15 Gram(s) Oral once PRN Blood Glucose LESS THAN 70 milliGRAM(s)/deciliter    Singles Doses Administered  (ADM OVERRIDE) 1 each &lt;see task&gt; GiveOnce  (ADM OVERRIDE) 1 each &lt;see task&gt; GiveOnce  (ADM OVERRIDE) 1 each &lt;see task&gt; GiveOnce  (ADM OVERRIDE) 1 each &lt;see task&gt; GiveOnce  (ADM OVERRIDE) 1 each &lt;see task&gt; GiveOnce  (ADM OVERRIDE) 1 each &lt;see task&gt; GiveOnce  (ADM OVERRIDE) 1 each &lt;see task&gt; GiveOnce  (ADM OVERRIDE) 1 each &lt;see task&gt; GiveOnce  (ADM OVERRIDE) 1 each &lt;see task&gt; GiveOnce  (ADM OVERRIDE) 1 each &lt;see task&gt; GiveOnce  cefepime   IVPB 2000 milliGRAM(s) IV Intermittent once  cefepime   IVPB 2000 milliGRAM(s) IV Intermittent every 8 hours  cefepime   IVPB      diphenhydrAMINE Injectable 50 milliGRAM(s) IntraMuscular once  fentaNYL    Injectable 50 MICROGram(s) IV Push every 10 minutes PRN  haloperidol    Injectable 5 milliGRAM(s) IntraMuscular Once  heparin   Injectable 8000 Unit(s) IV Push once  lactated ringers Bolus 500 milliLiter(s) IV Bolus once  lactated ringers Bolus 1000 milliLiter(s) IV Bolus once  lactated ringers Bolus 1000 milliLiter(s) IV Bolus once  lactated ringers Bolus 250 milliLiter(s) IV Bolus once  lactated ringers Bolus 500 milliLiter(s) IV Bolus once  LORazepam   Injectable 1 milliGRAM(s) IntraMuscular once  magnesium sulfate  IVPB 2 Gram(s) IV Intermittent once  mupirocin 2% Ointment 1 Application(s) Both Nostrils two times a day  potassium chloride   Powder 40 milliEquivalent(s) Oral once  potassium chloride   Powder 40 milliEquivalent(s) Oral once  protamine  IVPB 36 milliGRAM(s) IV Intermittent once  sodium chloride 0.9% Bolus 750 milliLiter(s) IV Bolus once  sodium chloride 0.9% Bolus 250 milliLiter(s) IV Bolus once  vancomycin  IVPB. 1000 milliGRAM(s) IV Intermittent once

## 2023-09-25 NOTE — DIETITIAN INITIAL EVALUATION ADULT - OTHER INFO
Pt presented with AMS x 2 days and was being treated for toxic metabolic encephalopathy and PAULA. Pt had a large dark red bowel movement .  on 9/23 and was transferred to ICU. EGD 9/23 found actively bleeding duodenal ulcer with spurting vessel, treated. Pt extubated 9/24. Had S+S this AM with SLP's recs for minced and moist w/ mildly thick liquids. Now planning for downgrade.

## 2023-09-25 NOTE — DIETITIAN NUTRITION RISK NOTIFICATION - TREATMENT: THE FOLLOWING DIET HAS BEEN RECOMMENDED
Change Diet To	mildly thick liquids; minced and moist; low sodium  Oral Nutrition Supplements	Add Ensure Plus High Protein (350kcal, 20g protein each) 1can 3x/day -- need to thicken to mildly thick consistency; Magic cup (290kcal, 9g protein) 1can 2x/day  Feeding Assistance	Please provide assistance and encouragement at meal times

## 2023-09-25 NOTE — SWALLOW BEDSIDE ASSESSMENT ADULT - SLP PERTINENT HISTORY OF CURRENT PROBLEM
Pt is an 84 y/o M w/ PMHx: DM2, COPD, anemia, paroxysmal Afib on Xarelto, HFrEF, sent to the hospital by [facility] for AMS. Pt recently admitted to [facility] from [facility] for OM needing IV Abx. Pt has been having worsening mental status over the last 2 days per NH records. Pt is being treated for metabolic encephalopathy, suspected severe sepsis, PAULA vs. CKD, HAGMA. CTH (-). CXR 9/19-> retrocardiac opacification, atelectatic. Hospital course complicated by hemorrhagic shock 2' hematochezia, EGD 9/23 revealed bleeding duodenal ulcer w/p OVESCO placement. Cleared for PO today if Hb remains stable, swallow eval ordered. Pt was seen previously in this admission, initially w/ recs on 9/20 for soft w/ mildly thick, and advanced to thin liquids on 9/21.

## 2023-09-25 NOTE — PROGRESS NOTE ADULT - NS ATTEND AMEND GEN_ALL_CORE FT
I examined pt, placed 14 f coude urethral catheter. monitor creat
EGD performed on 9/23/23 revealing large duodenal ulcer with vessel s/p epi and ovesco clip. Expect passage of old blood, otherwise no further active ongoing/overt GI bleeding. Continue IV PPI. Advance diet gradually per SLP recommendations.

## 2023-09-25 NOTE — DIETITIAN INITIAL EVALUATION ADULT - ORAL INTAKE PTA/DIET HISTORY
Limited to chart review at this time as pt with confusion. Per EMR, pt weighed 96.4 kg on 5/22/2019.

## 2023-09-25 NOTE — DIETITIAN INITIAL EVALUATION ADULT - PERTINENT LABORATORY DATA
09-25    146  |  106  |  133<HH>  ----------------------------<  93  3.8   |  19  |  7.6<HH>    Ca    8.1<L>      25 Sep 2023 05:24  Mg     1.7     09-24    TPro  5.5<L>  /  Alb  2.9<L>  /  TBili  0.5  /  DBili  x   /  AST  20  /  ALT  12  /  AlkPhos  76  09-25    CAPILLARY BLOOD GLUCOSE  POCT Blood Glucose.: 99 mg/dL (25 Sep 2023 12:24)  POCT Blood Glucose.: 85 mg/dL (25 Sep 2023 06:19)  POCT Blood Glucose.: 106 mg/dL (25 Sep 2023 00:16)  POCT Blood Glucose.: 100 mg/dL (24 Sep 2023 16:46)    A1C with Estimated Average Glucose Result: 6.4 % (09-16-23 @ 07:18)

## 2023-09-25 NOTE — PROGRESS NOTE ADULT - ASSESSMENT
82 yo m ho DM, COPD, anemia, paroxysmal afib on xarelto, HFrEF, DM coming in from nursing home for AMS x 2 days. Found to have toxic metabolic encephalopathy with PAULA.  PAULA/ toxic metabolic encephalopathy/ HAGMA/ HFrEF    possible ATN iso hypotension and possible sepsis/ vanco toxicity  no hydro on imaging  cont Bumex 2 mg IV q12h   Creatinine Trend: 7.6<--, 7.5<--, 7.1<--, 7.6<--, 8.2<--, 7.4<--   k phosphorus  level noted / phoslo 2/2/2  cont  sodium bicarbonate to  1300 q 8   no acute indication for RRT unless oliguric or worsening kidney function     will follow

## 2023-09-25 NOTE — CHART NOTE - NSCHARTNOTEFT_GEN_A_CORE
Informed by nurse that patient is bradycardic with HR in 30-40s. Patient denied symptoms. Patient denied chest pain, nausea, shortness of breath. Repeat vitals /54, HR 44, saturating 97% on RA, Temp 98.4 F. Ordered EKG stat.

## 2023-09-25 NOTE — DIETITIAN INITIAL EVALUATION ADULT - NUTRITIONGOAL OUTCOME1
Pt will meet >50% estimated needs in 3-5 days. Pt deemed high risk; RD to follow in 3-5 days.  Monitor diet order, kcal intake, body composition, NFPE, labs  (lytes, BG, renal indices)

## 2023-09-25 NOTE — SWALLOW BEDSIDE ASSESSMENT ADULT - ORAL PREPARATORY PHASE
Within functional limits Follow up with your doctor and with cardiology within 1 week  REturn to ED for new or worsening symptoms, chest pain, shortness of breath, fevers, cough or other concerns    Chest Pain    Chest pain can be caused by many different conditions which may or may not be dangerous. Causes include heartburn, lung infections, heart attack, blood clot in lungs, skin infections, strain or damage to muscle, cartilage, or bones, etc. In addition to a history and physical examination, an electrocardiogram (ECG) or other lab tests may have been performed to determine the cause of your chest pain. Follow up with your primary care provider or with a cardiologist as instructed.     SEEK IMMEDIATE MEDICAL CARE IF YOU HAVE ANY OF THE FOLLOWING SYMPTOMS: worsening chest pain, coughing up blood, unexplained back/neck/jaw pain, severe abdominal pain, dizziness or lightheadedness, fainting, shortness of breath, sweaty or clammy skin, vomiting, or racing heart beat. These symptoms may represent a serious problem that is an emergency. Do not wait to see if the symptoms will go away. Get medical help right away. Call 911 and do not drive yourself to the hospital.

## 2023-09-26 LAB
ALBUMIN SERPL ELPH-MCNC: 3 G/DL — LOW (ref 3.5–5.2)
ALP SERPL-CCNC: 79 U/L — SIGNIFICANT CHANGE UP (ref 30–115)
ALT FLD-CCNC: 12 U/L — SIGNIFICANT CHANGE UP (ref 0–41)
ANION GAP SERPL CALC-SCNC: 21 MMOL/L — HIGH (ref 7–14)
AST SERPL-CCNC: 18 U/L — SIGNIFICANT CHANGE UP (ref 0–41)
BASE EXCESS BLDA CALC-SCNC: -0.3 MMOL/L — SIGNIFICANT CHANGE UP (ref -2–3)
BASOPHILS # BLD AUTO: 0.03 K/UL — SIGNIFICANT CHANGE UP (ref 0–0.2)
BASOPHILS NFR BLD AUTO: 0.2 % — SIGNIFICANT CHANGE UP (ref 0–1)
BILIRUB SERPL-MCNC: 0.5 MG/DL — SIGNIFICANT CHANGE UP (ref 0.2–1.2)
BUN SERPL-MCNC: 141 MG/DL — CRITICAL HIGH (ref 10–20)
CALCIUM SERPL-MCNC: 8 MG/DL — LOW (ref 8.4–10.5)
CHLORIDE SERPL-SCNC: 109 MMOL/L — SIGNIFICANT CHANGE UP (ref 98–110)
CO2 SERPL-SCNC: 21 MMOL/L — SIGNIFICANT CHANGE UP (ref 17–32)
CREAT SERPL-MCNC: 7.3 MG/DL — CRITICAL HIGH (ref 0.7–1.5)
EGFR: 7 ML/MIN/1.73M2 — LOW
EOSINOPHIL # BLD AUTO: 0.39 K/UL — SIGNIFICANT CHANGE UP (ref 0–0.7)
EOSINOPHIL NFR BLD AUTO: 3.2 % — SIGNIFICANT CHANGE UP (ref 0–8)
GAS PNL BLDA: SIGNIFICANT CHANGE UP
GLUCOSE BLDC GLUCOMTR-MCNC: 103 MG/DL — HIGH (ref 70–99)
GLUCOSE BLDC GLUCOMTR-MCNC: 106 MG/DL — HIGH (ref 70–99)
GLUCOSE BLDC GLUCOMTR-MCNC: 79 MG/DL — SIGNIFICANT CHANGE UP (ref 70–99)
GLUCOSE BLDC GLUCOMTR-MCNC: 98 MG/DL — SIGNIFICANT CHANGE UP (ref 70–99)
GLUCOSE SERPL-MCNC: 101 MG/DL — HIGH (ref 70–99)
HCO3 BLDA-SCNC: 21 MMOL/L — SIGNIFICANT CHANGE UP (ref 21–28)
HCT VFR BLD CALC: 25.1 % — LOW (ref 42–52)
HGB BLD-MCNC: 8.2 G/DL — LOW (ref 14–18)
IMM GRANULOCYTES NFR BLD AUTO: 0.6 % — HIGH (ref 0.1–0.3)
LACTATE SERPL-SCNC: 1.5 MMOL/L — SIGNIFICANT CHANGE UP (ref 0.7–2)
LYMPHOCYTES # BLD AUTO: 1.87 K/UL — SIGNIFICANT CHANGE UP (ref 1.2–3.4)
LYMPHOCYTES # BLD AUTO: 15.3 % — LOW (ref 20.5–51.1)
MAGNESIUM SERPL-MCNC: 1.9 MG/DL — SIGNIFICANT CHANGE UP (ref 1.8–2.4)
MCHC RBC-ENTMCNC: 27 PG — SIGNIFICANT CHANGE UP (ref 27–31)
MCHC RBC-ENTMCNC: 32.7 G/DL — SIGNIFICANT CHANGE UP (ref 32–37)
MCV RBC AUTO: 82.6 FL — SIGNIFICANT CHANGE UP (ref 80–94)
MONOCYTES # BLD AUTO: 0.79 K/UL — HIGH (ref 0.1–0.6)
MONOCYTES NFR BLD AUTO: 6.5 % — SIGNIFICANT CHANGE UP (ref 1.7–9.3)
NEUTROPHILS # BLD AUTO: 9.05 K/UL — HIGH (ref 1.4–6.5)
NEUTROPHILS NFR BLD AUTO: 74.2 % — SIGNIFICANT CHANGE UP (ref 42.2–75.2)
NRBC # BLD: 0 /100 WBCS — SIGNIFICANT CHANGE UP (ref 0–0)
PCO2 BLDA: 26 MMHG — LOW (ref 35–48)
PH BLDA: 7.52 — HIGH (ref 7.35–7.45)
PLATELET # BLD AUTO: 258 K/UL — SIGNIFICANT CHANGE UP (ref 130–400)
PMV BLD: 10.4 FL — SIGNIFICANT CHANGE UP (ref 7.4–10.4)
PO2 BLDA: 179 MMHG — HIGH (ref 83–108)
POTASSIUM SERPL-MCNC: 3.4 MMOL/L — LOW (ref 3.5–5)
POTASSIUM SERPL-SCNC: 3.4 MMOL/L — LOW (ref 3.5–5)
PROT SERPL-MCNC: 5.4 G/DL — LOW (ref 6–8)
RBC # BLD: 3.04 M/UL — LOW (ref 4.7–6.1)
RBC # FLD: 19.5 % — HIGH (ref 11.5–14.5)
SAO2 % BLDA: 100 % — HIGH (ref 94–98)
SODIUM SERPL-SCNC: 151 MMOL/L — HIGH (ref 135–146)
WBC # BLD: 12.2 K/UL — HIGH (ref 4.8–10.8)
WBC # FLD AUTO: 12.2 K/UL — HIGH (ref 4.8–10.8)

## 2023-09-26 PROCEDURE — 99232 SBSQ HOSP IP/OBS MODERATE 35: CPT

## 2023-09-26 PROCEDURE — 99233 SBSQ HOSP IP/OBS HIGH 50: CPT

## 2023-09-26 PROCEDURE — 71045 X-RAY EXAM CHEST 1 VIEW: CPT | Mod: 26,76

## 2023-09-26 RX ORDER — METOPROLOL TARTRATE 50 MG
2.5 TABLET ORAL EVERY 6 HOURS
Refills: 0 | Status: DISCONTINUED | OUTPATIENT
Start: 2023-09-26 | End: 2023-09-27

## 2023-09-26 RX ORDER — BUMETANIDE 0.25 MG/ML
2 INJECTION INTRAMUSCULAR; INTRAVENOUS ONCE
Refills: 0 | Status: COMPLETED | OUTPATIENT
Start: 2023-09-26 | End: 2023-09-26

## 2023-09-26 RX ORDER — BUMETANIDE 0.25 MG/ML
2 INJECTION INTRAMUSCULAR; INTRAVENOUS
Refills: 0 | Status: DISCONTINUED | OUTPATIENT
Start: 2023-09-26 | End: 2023-09-27

## 2023-09-26 RX ORDER — HEPARIN SODIUM 5000 [USP'U]/ML
5000 INJECTION INTRAVENOUS; SUBCUTANEOUS EVERY 12 HOURS
Refills: 0 | Status: DISCONTINUED | OUTPATIENT
Start: 2023-09-26 | End: 2023-09-28

## 2023-09-26 RX ORDER — POTASSIUM CHLORIDE 20 MEQ
20 PACKET (EA) ORAL
Refills: 0 | Status: COMPLETED | OUTPATIENT
Start: 2023-09-26 | End: 2023-09-26

## 2023-09-26 RX ORDER — SODIUM CHLORIDE 9 MG/ML
1000 INJECTION, SOLUTION INTRAVENOUS
Refills: 0 | Status: DISCONTINUED | OUTPATIENT
Start: 2023-09-26 | End: 2023-09-29

## 2023-09-26 RX ORDER — ACETAMINOPHEN 500 MG
650 TABLET ORAL EVERY 6 HOURS
Refills: 0 | Status: DISCONTINUED | OUTPATIENT
Start: 2023-09-26 | End: 2023-10-13

## 2023-09-26 RX ADMIN — CHLORHEXIDINE GLUCONATE 1 APPLICATION(S): 213 SOLUTION TOPICAL at 05:30

## 2023-09-26 RX ADMIN — Medication 50 MILLIEQUIVALENT(S): at 16:06

## 2023-09-26 RX ADMIN — HEPARIN SODIUM 5000 UNIT(S): 5000 INJECTION INTRAVENOUS; SUBCUTANEOUS at 17:40

## 2023-09-26 RX ADMIN — PANTOPRAZOLE SODIUM 40 MILLIGRAM(S): 20 TABLET, DELAYED RELEASE ORAL at 17:40

## 2023-09-26 RX ADMIN — Medication 1300 MILLIGRAM(S): at 05:26

## 2023-09-26 RX ADMIN — SODIUM CHLORIDE 75 MILLILITER(S): 9 INJECTION, SOLUTION INTRAVENOUS at 10:27

## 2023-09-26 RX ADMIN — Medication 50 MILLIEQUIVALENT(S): at 14:41

## 2023-09-26 RX ADMIN — BUMETANIDE 2 MILLIGRAM(S): 0.25 INJECTION INTRAMUSCULAR; INTRAVENOUS at 11:33

## 2023-09-26 RX ADMIN — PANTOPRAZOLE SODIUM 40 MILLIGRAM(S): 20 TABLET, DELAYED RELEASE ORAL at 05:26

## 2023-09-26 NOTE — PROGRESS NOTE ADULT - SUBJECTIVE AND OBJECTIVE BOX
Gastroenterology Follow Up Note      Location: [address] ([address]) [address] ([address]  Patient Name: NIMO SARMIENTO  Age: 83y  Gender: Male      Chief Complaint  Patient is a 83y old Male who presents with a chief complaint of Altered mental status   (25 Sep 2023 15:14)  Primary diagnosis of Altered mental status      Reason for Consult  Hematochezia      Progress Note  This morning patient was seen and examined at bedside.    Today is hospital day 11d.  Patient is doing fine (had borderline BP and bradycardia on 09/25 with PVCs on 09/26).  He has been asking for food, and he denies nausea or vomiting.   Patient reports some mild abdominal pain that is burning in nature and radiating up to throat.  Per nurse he had an episode of dark brown stools on 09/23 PM with no recurrence since then (Per ICU team, no BM recorded for 09/24; per 3E team, no BM recorded for 09/25).      Vital Signs in the last 24 hours   Vitals Summary T(C): 36.2 (09-26-23 @ 04:52), Max: 36.9 (09-25-23 @ 18:55)  HR: 63 (09-26-23 @ 04:52) (44 - 161)  BP: 103/65 (09-26-23 @ 04:52) (89/43 - 118/77)  RR: 18 (09-26-23 @ 04:52) (18 - 37)  SpO2: 97% (09-26-23 @ 04:52) (95% - 99%)  Vent Data   Intake/ Output   09-25-23 @ 07:01  -  09-26-23 @ 07:00  --------------------------------------------------------  IN: 260 mL / OUT: 900 mL / NET: -640 mL      Physical Exam  * General Appearance: Alert but seems confused, cooperative, interactive, oriented to person but not to time or place, in no acute distress  * Neck: Supple, symmetrical, trachea midline, no adenopathy   * Lungs: Good bilateral air entry, normal breath sounds (Clear to auscultation bilaterally, no audible wheezes, crackles, or rhonchi)  * Heart: Regular Rate and Rhythm, normal S1 and S2, no audible murmur, rub, or gallop  * Abdomen: Symmetric, non-distended, soft, non-tender, bowel sounds active all four quadrants, no masses, no organomegaly (no hepatosplenomegaly)  * Rectal: red blood on 09/23      Investigations   Laboratory Workup      - CBC:                        8.7    15.25 )-----------( 239      ( 25 Sep 2023 11:59 )             25.9       - Hgb Trend:  8.7  09-25-23 @ 11:59  8.9  09-25-23 @ 05:24  9.1  09-24-23 @ 11:00  8.9  09-24-23 @ 04:37  9.1  09-24-23 @ 00:30  7.3  09-23-23 @ 14:12  8.1  09-23-23 @ 12:05      09-23-23 @ 07:01  -  09-24-23 @ 07:00  --------------------------------------------------------  IN: 1489 mL        - Chemistry:  09-25    146  |  106  |  133<HH>  ----------------------------<  93  3.8   |  19  |  7.6<HH>    Ca    8.1<L>      25 Sep 2023 05:24  Mg     1.7     09-24    TPro  5.5<L>  /  Alb  2.9<L>  /  TBili  0.5  /  DBili  x   /  AST  20  /  ALT  12  /  AlkPhos  76  09-25    Liver panel trend:  TBili 0.5   /   AST 20   /   ALT 12   /   AlkP 76   /   Tptn 5.5   /   Alb 2.9    /   DBili --      09-25  TBili 0.7   /   AST 17   /   ALT 11   /   AlkP 73   /   Tptn 5.3   /   Alb 2.8    /   DBili --      09-24  TBili 0.3   /   AST 20   /   ALT 10   /   AlkP 70   /   Tptn 5.2   /   Alb 2.8    /   DBili --      09-23  TBili 0.2   /   AST 21   /   ALT 12   /   AlkP 81   /   Tptn 5.8   /   Alb 2.9    /   DBili --      09-21  TBili 0.2   /   AST 23   /   ALT 14   /   AlkP 84   /   Tptn 5.9   /   Alb 3.1    /   DBili --      09-20  TBili 0.3   /   AST 28   /   ALT 14   /   AlkP 86   /   Tptn 6.0   /   Alb 2.9    /   DBili --      09-19  TBili 0.3   /   AST 28   /   ALT 13   /   AlkP 83   /   Tptn 5.7   /   Alb 3.0    /   DBili --      09-18    - ABG:  ABG - ( 24 Sep 2023 16:25 )  pH, Arterial: 7.41  pH, Blood: x     /  pCO2: 34    /  pO2: 81    / HCO3: 22    / Base Excess: -2.4  /  SaO2: 97.5        Microbiological Workup  Urinalysis Basic - ( 25 Sep 2023 05:24 )    Color: x / Appearance: x / SG: x / pH: x  Gluc: 93 mg/dL / Ketone: x  / Bili: x / Urobili: x   Blood: x / Protein: x / Nitrite: x   Leuk Esterase: x / RBC: x / WBC x   Sq Epi: x / Non Sq Epi: x / Bacteria: x      Current Medications  Standing Medications  calcium acetate 667 milliGRAM(s) Oral three times a day with meals  chlorhexidine 2% Cloths 1 Application(s) Topical <User Schedule>  dextrose 5%. 1000 milliLiter(s) (100 mL/Hr) IV Continuous <Continuous>  dextrose 50% Injectable 25 Gram(s) IV Push once  dextrose 50% Injectable 12.5 Gram(s) IV Push once  dextrose 50% Injectable 25 Gram(s) IV Push once  glucagon  Injectable 1 milliGRAM(s) IntraMuscular once  insulin lispro (ADMELOG) corrective regimen sliding scale   SubCutaneous three times a day before meals  metoprolol tartrate 12.5 milliGRAM(s) Oral two times a day  pantoprazole  Injectable 40 milliGRAM(s) IV Push two times a day  sodium bicarbonate 1300 milliGRAM(s) Oral every 8 hours    PRN Medications  albuterol    90 MICROgram(s) HFA Inhaler 1 Puff(s) Inhalation every 6 hours PRN for shortness of breath and/or wheezing  dextrose Oral Gel 15 Gram(s) Oral once PRN Blood Glucose LESS THAN 70 milliGRAM(s)/deciliter    Singles Doses Administered  (ADM OVERRIDE) 1 each &lt;see task&gt; GiveOnce  (ADM OVERRIDE) 1 each &lt;see task&gt; GiveOnce  (ADM OVERRIDE) 1 each &lt;see task&gt; GiveOnce  (ADM OVERRIDE) 1 each &lt;see task&gt; GiveOnce  (ADM OVERRIDE) 1 each &lt;see task&gt; GiveOnce  (ADM OVERRIDE) 1 each &lt;see task&gt; GiveOnce  (ADM OVERRIDE) 1 each &lt;see task&gt; GiveOnce  (ADM OVERRIDE) 1 each &lt;see task&gt; GiveOnce  (ADM OVERRIDE) 1 each &lt;see task&gt; GiveOnce  (ADM OVERRIDE) 1 each &lt;see task&gt; GiveOnce  cefepime   IVPB      cefepime   IVPB 2000 milliGRAM(s) IV Intermittent every 8 hours  cefepime   IVPB 2000 milliGRAM(s) IV Intermittent once  diphenhydrAMINE Injectable 50 milliGRAM(s) IntraMuscular once  fentaNYL    Injectable 50 MICROGram(s) IV Push every 10 minutes PRN  haloperidol    Injectable 5 milliGRAM(s) IntraMuscular Once  heparin   Injectable 8000 Unit(s) IV Push once  lactated ringers Bolus 1000 milliLiter(s) IV Bolus once  lactated ringers Bolus 500 milliLiter(s) IV Bolus once  lactated ringers Bolus 500 milliLiter(s) IV Bolus once  lactated ringers Bolus 1000 milliLiter(s) IV Bolus once  lactated ringers Bolus 250 milliLiter(s) IV Bolus once  LORazepam   Injectable 1 milliGRAM(s) IntraMuscular once  magnesium sulfate  IVPB 2 Gram(s) IV Intermittent once  mupirocin 2% Ointment 1 Application(s) Both Nostrils two times a day  potassium chloride   Powder 40 milliEquivalent(s) Oral once  potassium chloride   Powder 40 milliEquivalent(s) Oral once  protamine  IVPB 36 milliGRAM(s) IV Intermittent once  sodium chloride 0.9% Bolus 750 milliLiter(s) IV Bolus once  sodium chloride 0.9% Bolus 250 milliLiter(s) IV Bolus once  vancomycin  IVPB. 1000 milliGRAM(s) IV Intermittent once

## 2023-09-26 NOTE — CHART NOTE - NSCHARTNOTEFT_GEN_A_CORE
Per chart review this afternoon, pt with desaturation event and placed on NRB. Decline in status. Recommend NPO with alternate means of nutrition/hydration. SLP to f/u 9/27.

## 2023-09-26 NOTE — ADVANCED PRACTICE NURSE CONSULT - RECOMMEDATIONS
Wound and Skin care recommendations:  Clean wound with soap and water, pat dry  then apply no-sting skin barrier and cover with Allevyn  Skin and incontinence care.  Pressure Injury Prevention.  Assess wound and inform primary provider of any changes.   Case discussed with primary RN.  Wound/ ostomy specialist to f/u as needed.     Offloading: [x ] Frequent position changes [ ] Devices/Equipment  Cleansing: [ ] Saline [x ] Soap/Water [ ] Other: ______  Topicals: [x] no sting skin barrier [ ] Antimicrobial [ ] Enzymatic Wound Debridement.  Dressings: [ ] Dry, sterile [x] Allevyn  Foam [ ] Absorbant Pads [ ] Collagenase.  Other Recs. per Primary team.  Wound and Skin care recommendations:  Clean wound with soap and water, pat dry  then apply no-sting skin barrier and cover with Allevyn daily  Skin and incontinence care.  Pressure Injury Prevention.  Assess wound and inform primary provider of any changes.   Case discussed with primary RN.  Wound/ ostomy specialist to f/u as needed.     Offloading: [x ] Frequent position changes [ ] Devices/Equipment  Cleansing: [ ] Saline [x ] Soap/Water [ ] Other: ______  Topicals: [x] no sting skin barrier [ ] Antimicrobial [ ] Enzymatic Wound Debridement.  Dressings: [ ] Dry, sterile [x] Allevyn  Foam [ ] Absorbant Pads [ ] Collagenase.  Other Recs. per Primary team.

## 2023-09-26 NOTE — PROGRESS NOTE ADULT - ASSESSMENT
·	worsening acute Hypoxic Respiratory Failure, possibly upper airways (inability to clear oral secretions+aspiration), differential diagnoses also include volrumr overald   ·	upper Gastrointestinal bleed due to duodenal ulcer  ·	Anemia of acute blood loss   ·	Acutely worsening uremia and acute kidney failure , at high risk for ATN  ·	Afib, chronic; uncontrolled rate   ·	Reported possible L3L4 OM on CT scan/ sacral DTI/ Foot cellulitis on broad-spectrum intravenous antibiotics as per ID recs  ·	Increased frailty and decreased physical capacity     PLAN  ·	Discussed with renal attending re: initiation of renal replacement therapy  ·	Discussed with medicine resident re: hemodialysis access request   ·	Discussed with ID attending: patient is unlikely to benefit from prolonged therapy with cefepime+vanco (to cover possible OM) due to adverse outcomes associated kidney dysfunction, cognitive impact ...etc )   ·	Discussed with GI team: may resume UH in prophlyactic dose to lower risk of DVT; remains unsafe for therapeutic dose anticoagulant due to high risk for recurrent Gastrointestinal bleed   ·	F/U Hb transfuse PRN   ·	Close F/U of BUN/Crea/ Lytes and UO   ·	Medicine team updates patient's brother re: all above and the overall serious clinical status and poor prognosis associated.   ·	As per palliative care team: next-of-kin not in favor of further discussing Goals of Care Conversation or advance directives.     At high risk for intubation and cardiac arrest despite all care due to extreme frailty and multiple morbidities   Senior medicine resident Discussed above with brother at bedside , remains full code for now   Discussed with Pulmonary & Critical Care Medicine team and patient is upgraded to SDU as per their request

## 2023-09-26 NOTE — PROGRESS NOTE ADULT - SUBJECTIVE AND OBJECTIVE BOX
NIMO SARMIENTO 83y Male  MRN#: [id number]     Hospital Day: 11d    Pt is currently admitted with the primary diagnosis of  Altered mental status        SUBJECTIVE     Overnight events  None    Subjective complaints  Pt was evaluated this am. Patient in distress and agitated.                                            ----------------------------------------------------------  OBJECTIVE  PAST MEDICAL & SURGICAL HISTORY  HTN (hypertension)    COPD (chronic obstructive pulmonary disease)    High cholesterol    Mild anemia    Coffee ground emesis    Chronic diastolic heart failure    PAULA (acute kidney injury)    No significant past surgical history                                              -----------------------------------------------------------  ALLERGIES:  No Known Allergies                                            ------------------------------------------------------------    HOME MEDICATIONS  Home Medications:  acetaminophen 325 mg oral tablet: 2 tab(s) orally every 6 hours, As needed, Mild Pain (1 - 3), Moderate Pain (4 - 6) (25 May 2019 09:25)  Albuterol (Eqv-ProAir HFA) 90 mcg/inh inhalation aerosol: 1 puff(s) inhaled every 6 hours as needed for  shortness of breath and/or wheezing (15 Sep 2023 22:56)  Breo Ellipta 100 mcg-25 mcg/inh inhalation powder: 1 puff(s) inhaled once a day (15 Sep 2023 22:56)  empagliflozin 10 mg oral tablet: 1 tab(s) orally once a day (15 Sep 2023 22:56)  Entresto 24 mg-26 mg oral tablet: 1 tab(s) orally 2 times a day (15 Sep 2023 22:57)  Lasix 20 mg oral tablet: 1 tab(s) orally once a day (15 Sep 2023 22:57)  Metoprolol Succinate ER 25 mg oral tablet, extended release: 1 tab(s) orally once a day (15 Sep 2023 22:57)  Percocet 5 mg-325 mg oral tablet: 1 tab(s) orally every 8 hours as needed for  severe pain (15 Sep 2023 22:57)  Xarelto 20 mg oral tablet: 1 tab(s) orally once a day (15 Sep 2023 22:57)                           MEDICATIONS:  STANDING MEDICATIONS  buMETAnide Injectable 2 milliGRAM(s) IV Push two times a day  calcium acetate 667 milliGRAM(s) Oral three times a day with meals  chlorhexidine 2% Cloths 1 Application(s) Topical <User Schedule>  dextrose 5%. 1000 milliLiter(s) IV Continuous <Continuous>  dextrose 5%. 1000 milliLiter(s) IV Continuous <Continuous>  dextrose 50% Injectable 25 Gram(s) IV Push once  dextrose 50% Injectable 25 Gram(s) IV Push once  dextrose 50% Injectable 12.5 Gram(s) IV Push once  glucagon  Injectable 1 milliGRAM(s) IntraMuscular once  heparin   Injectable 5000 Unit(s) SubCutaneous every 12 hours  insulin lispro (ADMELOG) corrective regimen sliding scale   SubCutaneous three times a day before meals  metoprolol tartrate 12.5 milliGRAM(s) Oral two times a day  pantoprazole  Injectable 40 milliGRAM(s) IV Push two times a day  potassium chloride  20 mEq/100 mL IVPB 20 milliEquivalent(s) IV Intermittent every 2 hours  sodium bicarbonate 1300 milliGRAM(s) Oral every 8 hours    PRN MEDICATIONS  acetaminophen     Tablet .. 650 milliGRAM(s) Oral every 6 hours PRN  albuterol    90 MICROgram(s) HFA Inhaler 1 Puff(s) Inhalation every 6 hours PRN  dextrose Oral Gel 15 Gram(s) Oral once PRN                                            ------------------------------------------------------------  VITAL SIGNS: Last 24 Hours  T(C): 36.2 (26 Sep 2023 04:52), Max: 36.9 (25 Sep 2023 18:55)  T(F): 97.1 (26 Sep 2023 04:52), Max: 98.4 (25 Sep 2023 18:55)  HR: 69 (26 Sep 2023 11:29) (44 - 161)  BP: 125/58 (26 Sep 2023 11:29) (89/50 - 125/58)  BP(mean): 83 (26 Sep 2023 11:29) (60 - 83)  RR: 20 (26 Sep 2023 11:29) (18 - 30)  SpO2: 100% (26 Sep 2023 11:29) (76% - 100%)      09-25-23 @ 07:01  -  09-26-23 @ 07:00  --------------------------------------------------------  IN: 260 mL / OUT: 900 mL / NET: -640 mL                                             --------------------------------------------------------------  LABS:                        8.2    12.20 )-----------( 258      ( 26 Sep 2023 12:05 )             25.1     09-26    151<H>  |  109  |  141<HH>  ----------------------------<  101<H>  3.4<L>   |  21  |  7.3<HH>    Ca    8.0<L>      26 Sep 2023 12:05  Mg     1.9     09-26    TPro  5.4<L>  /  Alb  3.0<L>  /  TBili  0.5  /  DBili  x   /  AST  18  /  ALT  12  /  AlkPhos  79  09-26      Urinalysis Basic - ( 26 Sep 2023 12:05 )    Color: x / Appearance: x / SG: x / pH: x  Gluc: 101 mg/dL / Ketone: x  / Bili: x / Urobili: x   Blood: x / Protein: x / Nitrite: x   Leuk Esterase: x / RBC: x / WBC x   Sq Epi: x / Non Sq Epi: x / Bacteria: x      ABG - ( 26 Sep 2023 11:39 )  pH, Arterial: 7.43  pH, Blood: x     /  pCO2: 37    /  pO2: 34    / HCO3: 25    / Base Excess: 0.4   /  SaO2: 56.5              Lactate, Blood: 1.5 mmol/L (09-26-23 @ 12:05)                                                      -------------------------------------------------------------  RADIOLOGY:  Xray Chest 1 View- PORTABLE-Routine (09.26.23 @ 06:17)  Stable right greater than left basilar opacity/effusion. No pneumothorax.  Stable cardiac silhouette.  Right PICC line, satisfactory position.    CT Angio Abdomen and Pelvis w/ IV Cont (09.23.23 @ 16:37)  Between the first and second portions of the duodenum, there is active   arterial extravasation and venous pooling reflecting active GI bleed.  Destructive L3-L4 vertebral body changes compatible with discitis/osteomyelitis, unchanged.  Small bilateral pleural effusions, slightlyincreased since prior.  Redemonstrated left adrenal nodule versus periadrenal lymph node. Can be followed up with outpatient MRI.                                              --------------------------------------------------------------    PHYSICAL EXAM:  GENERAL: Agitated. distressed  HEAD:  Atraumatic, Normocephalic  EYES: EOMI, conjunctiva and sclera clear  ENT: Moist mucous membranes  NECK: Supple, No JVD  CHEST/LUNG: B/L crackles present with added sounds  HEART: regular rate and rhythm; No murmurs, rubs, or gallops  ABDOMEN: Bowel sounds present; Soft, Nontender, Nondistended.    EXTREMITIES: Warm. No clubbing, cyanosis, or edema  NERVOUS SYSTEM:  Alert & Oriented X0. No focal deficits   SKIN: Left heel DTI, Sacral ulcer stage I                                           --------------------------------------------------------------

## 2023-09-26 NOTE — ADVANCED PRACTICE NURSE CONSULT - ASSESSMENT
Patient received in bed, limited mobility, incontinent of bowel and bladder, high risk for pressure injury development or progression. Primary RN at bedside recommended to place urine incontinence bag on patient for containment of urine. Patient has moisture associated skin damage to b/l buttocks and sacrum. (Barrier cream in place, appropriate treatment at this time.    Wound  Type & Location: Left heel DTI with proegression  Size:~[id number]  Tissue Description: Dark purple   Wound Exudate : none  Wound Edge: denuded skin at edge of wound  Periwound Condition: intact  Patient received in bed, limited mobility, incontinent of bowel and bladder, high risk for pressure injury development or progression. Primary RN at bedside recommended to place urine incontinence bag on patient for containment of urine. Patient has moisture associated skin damage to b/l buttocks and sacrum. (Barrier cream in place, appropriate treatment at this time.)    Wound  Type & Location: Left heel DTI with progression  Size:~[id number]  Tissue Description: Dark purple   Wound Exudate : none  Wound Edge: denuded skin at edge of wound  Periwound Condition: intact

## 2023-09-26 NOTE — CHART NOTE - NSCHARTNOTEFT_GEN_A_CORE
Informed by RN regarding BP of 93/50, HR 79 and multiple alarms going off on tele. On examination patient was AxO x 0, and when I attempted to examine him he resisted physical examination. He did not appear to be in any distress. Significant artifact noted on tele monitor with frequent PVCs however no alarms heard at the time. Called by RN again regarding tele alarms and on repeat examination of patient, he did not appear to be in distress. Upon review of EKG done earlier in the evening, Informed by RN regarding BP of 93/50, HR 79 and multiple alarms going off on tele. On examination patient was AxO x 0, and when I attempted to examine him he resisted physical examination. He did not appear to be in any distress. Significant artifact noted on tele monitor with frequent PVCs however no alarms heard at the time. Called by RN again regarding tele alarms and on repeat examination of patient, he did not appear to be in distress. Multiple PVCs noted on EKG done earlier in the evening. Pt was asymptomatic and was monitored.

## 2023-09-26 NOTE — PROGRESS NOTE ADULT - SUBJECTIVE AND OBJECTIVE BOX
Patient is a 83y old  Male who presents with a chief complaint of AMS (26 Sep 2023 15:15)        SUBJECTIVE: Has had multiple periods of desaturating requiring NRB and aggressive suctioning      REVIEW OF SYSTEMS:    All Pertinent ROS are negative except per HPI       PHYSICAL EXAM  Vital Signs Last 24 Hrs  T(C): 36.2 (26 Sep 2023 04:52), Max: 36.9 (25 Sep 2023 18:55)  T(F): 97.1 (26 Sep 2023 04:52), Max: 98.4 (25 Sep 2023 18:55)  HR: 69 (26 Sep 2023 11:29) (44 - 83)  BP: 125/58 (26 Sep 2023 11:29) (93/50 - 125/58)  BP(mean): 83 (26 Sep 2023 11:29) (60 - 83)  RR: 20 (26 Sep 2023 11:29) (18 - 22)  SpO2: 100% (26 Sep 2023 11:29) (76% - 100%)    Parameters below as of 26 Sep 2023 11:29  Patient On (Oxygen Delivery Method): NRB        CONSTITUTIONAL:  NAD    ENT:   Airway patent,     CARDIAC:   Normal rate,   regular rhythm.      RESPIRATORY:   NO Wheezing  Nnormal chest expansion  Nnot tachypneic,  No use of accessory muscles    GASTROINTESTINAL:  Abdomen soft,   non-tender,   no guarding,   + BS    MUSCULOSKELETAL:   range of motion is not limited,  no clubbing, cyanosis    NEUROLOGICAL:   Alert and confused    SKIN:   warm, dry       09-25-23 @ 07:01  -  09-26-23 @ 07:00  --------------------------------------------------------  IN:    Oral Fluid: 250 mL    Pantoprazole: 10 mL  Total IN: 260 mL    OUT:    Indwelling Catheter - Urethral (mL): 900 mL    Norepinephrine: 0 mL  Total OUT: 900 mL    Total NET: -640 mL      09-26-23 @ 07:01  -  09-26-23 @ 17:54  --------------------------------------------------------  IN:    dextrose 5%: 150 mL    IV PiggyBack: 200 mL  Total IN: 350 mL    OUT:  Total OUT: 0 mL    Total NET: 350 mL          LABS:                          8.2    12.20 )-----------( 258      ( 26 Sep 2023 12:05 )             25.1                                               09-26    151<H>  |  109  |  141<HH>  ----------------------------<  101<H>  3.4<L>   |  21  |  7.3<HH>    Ca    8.0<L>      26 Sep 2023 12:05  Mg     1.9     09-26    TPro  5.4<L>  /  Alb  3.0<L>  /  TBili  0.5  /  DBili  x   /  AST  18  /  ALT  12  /  AlkPhos  79  09-26                                             Urinalysis Basic - ( 26 Sep 2023 12:05 )    Color: x / Appearance: x / SG: x / pH: x  Gluc: 101 mg/dL / Ketone: x  / Bili: x / Urobili: x   Blood: x / Protein: x / Nitrite: x   Leuk Esterase: x / RBC: x / WBC x   Sq Epi: x / Non Sq Epi: x / Bacteria: x                                                  LIVER FUNCTIONS - ( 26 Sep 2023 12:05 )  Alb: 3.0 g/dL / Pro: 5.4 g/dL / ALK PHOS: 79 U/L / ALT: 12 U/L / AST: 18 U/L / GGT: x                                                                                            ABG - ( 26 Sep 2023 15:34 )  pH, Arterial: 7.52  pH, Blood: x     /  pCO2: 26    /  pO2: 179   / HCO3: 21    / Base Excess: -0.3  /  SaO2: 100.0               MEDICATIONS  (STANDING):  buMETAnide Injectable 2 milliGRAM(s) IV Push two times a day  calcium acetate 667 milliGRAM(s) Oral three times a day with meals  chlorhexidine 2% Cloths 1 Application(s) Topical <User Schedule>  dextrose 5%. 1000 milliLiter(s) (100 mL/Hr) IV Continuous <Continuous>  dextrose 5%. 1000 milliLiter(s) (75 mL/Hr) IV Continuous <Continuous>  dextrose 50% Injectable 25 Gram(s) IV Push once  dextrose 50% Injectable 25 Gram(s) IV Push once  dextrose 50% Injectable 12.5 Gram(s) IV Push once  glucagon  Injectable 1 milliGRAM(s) IntraMuscular once  heparin   Injectable 5000 Unit(s) SubCutaneous every 12 hours  insulin lispro (ADMELOG) corrective regimen sliding scale   SubCutaneous three times a day before meals  metoprolol tartrate 12.5 milliGRAM(s) Oral two times a day  pantoprazole  Injectable 40 milliGRAM(s) IV Push two times a day  sodium bicarbonate 1300 milliGRAM(s) Oral every 8 hours    MEDICATIONS  (PRN):  acetaminophen     Tablet .. 650 milliGRAM(s) Oral every 6 hours PRN Moderate Pain (4 - 6)  albuterol    90 MICROgram(s) HFA Inhaler 1 Puff(s) Inhalation every 6 hours PRN for shortness of breath and/or wheezing  dextrose Oral Gel 15 Gram(s) Oral once PRN Blood Glucose LESS THAN 70 milliGRAM(s)/deciliter      X-Rays reviewed    CXR interpreted by me: Patient is a 83y old  Male who presents with a chief complaint of AMS (26 Sep 2023 15:15)        SUBJECTIVE: Has had multiple periods of desaturating requiring NRB and aggressive suctioning      REVIEW OF SYSTEMS:    All Pertinent ROS are negative except per HPI       PHYSICAL EXAM  Vital Signs Last 24 Hrs  T(C): 36.2 (26 Sep 2023 04:52), Max: 36.9 (25 Sep 2023 18:55)  T(F): 97.1 (26 Sep 2023 04:52), Max: 98.4 (25 Sep 2023 18:55)  HR: 69 (26 Sep 2023 11:29) (44 - 83)  BP: 125/58 (26 Sep 2023 11:29) (93/50 - 125/58)  BP(mean): 83 (26 Sep 2023 11:29) (60 - 83)  RR: 20 (26 Sep 2023 11:29) (18 - 22)  SpO2: 100% (26 Sep 2023 11:29) (76% - 100%)    Parameters below as of 26 Sep 2023 11:29  Patient On (Oxygen Delivery Method): NRB        CONSTITUTIONAL:  NAD    ENT:   Airway patent,     CARDIAC:   Normal rate,   regular rhythm.      RESPIRATORY:   NO Wheezing  Nnormal chest expansion  Nnot tachypneic,  No use of accessory muscles    GASTROINTESTINAL:  Abdomen soft,   non-tender,   no guarding,   + BS    MUSCULOSKELETAL:   range of motion is not limited,  no clubbing, cyanosis    NEUROLOGICAL:   Alert and confused    SKIN:   warm, dry       09-25-23 @ 07:01  -  09-26-23 @ 07:00  --------------------------------------------------------  IN:    Oral Fluid: 250 mL    Pantoprazole: 10 mL  Total IN: 260 mL    OUT:    Indwelling Catheter - Urethral (mL): 900 mL    Norepinephrine: 0 mL  Total OUT: 900 mL    Total NET: -640 mL      09-26-23 @ 07:01  -  09-26-23 @ 17:54  --------------------------------------------------------  IN:    dextrose 5%: 150 mL    IV PiggyBack: 200 mL  Total IN: 350 mL    OUT:  Total OUT: 0 mL    Total NET: 350 mL          LABS:                          8.2    12.20 )-----------( 258      ( 26 Sep 2023 12:05 )             25.1                                               09-26    151<H>  |  109  |  141<HH>  ----------------------------<  101<H>  3.4<L>   |  21  |  7.3<HH>    Ca    8.0<L>      26 Sep 2023 12:05  Mg     1.9     09-26    TPro  5.4<L>  /  Alb  3.0<L>  /  TBili  0.5  /  DBili  x   /  AST  18  /  ALT  12  /  AlkPhos  79  09-26                                             Urinalysis Basic - ( 26 Sep 2023 12:05 )    Color: x / Appearance: x / SG: x / pH: x  Gluc: 101 mg/dL / Ketone: x  / Bili: x / Urobili: x   Blood: x / Protein: x / Nitrite: x   Leuk Esterase: x / RBC: x / WBC x   Sq Epi: x / Non Sq Epi: x / Bacteria: x                                                  LIVER FUNCTIONS - ( 26 Sep 2023 12:05 )  Alb: 3.0 g/dL / Pro: 5.4 g/dL / ALK PHOS: 79 U/L / ALT: 12 U/L / AST: 18 U/L / GGT: x                                                                                            ABG - ( 26 Sep 2023 15:34 )  pH, Arterial: 7.52  pH, Blood: x     /  pCO2: 26    /  pO2: 179   / HCO3: 21    / Base Excess: -0.3  /  SaO2: 100.0               MEDICATIONS  (STANDING):  buMETAnide Injectable 2 milliGRAM(s) IV Push two times a day  calcium acetate 667 milliGRAM(s) Oral three times a day with meals  chlorhexidine 2% Cloths 1 Application(s) Topical <User Schedule>  dextrose 5%. 1000 milliLiter(s) (100 mL/Hr) IV Continuous <Continuous>  dextrose 5%. 1000 milliLiter(s) (75 mL/Hr) IV Continuous <Continuous>  dextrose 50% Injectable 25 Gram(s) IV Push once  dextrose 50% Injectable 25 Gram(s) IV Push once  dextrose 50% Injectable 12.5 Gram(s) IV Push once  glucagon  Injectable 1 milliGRAM(s) IntraMuscular once  heparin   Injectable 5000 Unit(s) SubCutaneous every 12 hours  insulin lispro (ADMELOG) corrective regimen sliding scale   SubCutaneous three times a day before meals  metoprolol tartrate 12.5 milliGRAM(s) Oral two times a day  pantoprazole  Injectable 40 milliGRAM(s) IV Push two times a day  sodium bicarbonate 1300 milliGRAM(s) Oral every 8 hours    MEDICATIONS  (PRN):  acetaminophen     Tablet .. 650 milliGRAM(s) Oral every 6 hours PRN Moderate Pain (4 - 6)  albuterol    90 MICROgram(s) HFA Inhaler 1 Puff(s) Inhalation every 6 hours PRN for shortness of breath and/or wheezing  dextrose Oral Gel 15 Gram(s) Oral once PRN Blood Glucose LESS THAN 70 milliGRAM(s)/deciliter      X-Rays reviewed

## 2023-09-26 NOTE — PROGRESS NOTE ADULT - ASSESSMENT
Assessment and Plan  Case of an 83 year old male patient known to have COPD. DM, atrial fibrillation, HFrEF, anemia, lymphedema, and recent left foot OM who was brought from the NH to the ED on 09/15 for evaluation of altered mental status, found to have sepsis in setting of recent left foot OM, admitted for further management. Stay was complicated by hemorrhagic shock and drop in Hb secondary to hematochezia on 09/23. Status post EGD on 09/23 revealing a bleeding duodenal ulcer s/p OVESCO placement. We are consulted for hematochezia and hypotension.      Hemorrhagic Shock Secondary to Hematochezia from Duodenal Ulcer Bleed s/p OVESCO placement 09/23  Acute Drop in Hemoglobin- Improved  * Hemodynamically stable  * Baseline hemoglobin (prior to admission): Hb 13.8 in 2016 -> 09/12 9.5  * ED Vitals:  /63 mmHg, HR 88 bpm  * Hemoglobin on admission: Hb 9.6 on 09/15 -> Hb 8.1/7.3 s/p 5 units pRBC on 09/23 -> 09/24 Hb 9.1-> 09/25 Hb 8.9/8.7  * Coags: INR 1.59 on 09/15  * AC: on Xarelto for afib until 09/15. Then Heparin 5000 units SC Q8h until 09/20, then IV Heparin with prolonged aPTT from 09/20-09/23, then SCDs since 09/23  * Status post IV Protamine sulfate 36mg x1 dose on 09/23  * MARISSA red blood 09/23  * CT angio AP with IC 09/23: arterial extravasation between D1 and 2 with venous pooling, small hiatal hernia  * No prior EGD or colonoscopy  * Family History: negative for GI malignancies  * Status Post EGD on 09/23: blood clots in fundus and antrum; 2cm actively bleeding duodenal ulcer with spurting vessel ([place] 1a) in sweep on base s/p 10cc epi and s/p 11/6 OVESCO placement for hemostasis    RECOMMENDATIONS  - Status post EGD on 09/23 as detailed above (s/p OVESCO placement for hemostasis)  - Continue diet per speech and swallow: currently on minced and moist; mildly thick liquids  - Trend CBC closely BID and transfuse as needed (target Hb >8). Maintain active Type and screen  - Trend BUN  - x2 large 18G IV Bores  - Continue IV Protonix 40mg BID (started 09/25). Was on IV pantoprazole drip (09/23-09/25)  - Continue holding therapeutic AC: was on Xarelto for afib until 09/15, then Heparin 5000 units SC Q8h until 09/20, then IV Heparin with prolonged aPTT from 09/20-09/23, then SCDs since 09/23. Status post IV Protamine sulfate 36mg x1 dose on 09/23  - Monitor BM for recurrence of hematochezia or melena  - Please avoid any NSAIDs  - If any unstable bleed, please call GI STAT  - Follow up with our [facility] located at [address] [place] [address]. Phone Number: [phone]        Asymptomatic Cholelithiasis  * Noted on CT  * LFTs noted    RECOMMENDATIONS  - Monitor for symptoms  - Trend LFTs      Thank you for your consult.  - Please note that plan was discussed with Dr. Santoro and communicated with medical team.  - Please reach GI on [phone] during weekdays till 5pm.  - Please call the GI service line after 5pm on Weekdays and anytime on Weekends: [phone].      Rickie Jenkins MD  PGY - 4 Gastroenterology Fellow  [facility]     Assessment and Plan  Case of an 83 year old male patient known to have COPD. DM, atrial fibrillation, HFrEF, anemia, lymphedema, and recent left foot OM who was brought from the NH to the ED on 09/15 for evaluation of altered mental status, found to have sepsis in setting of recent left foot OM, admitted for further management. Stay was complicated by hemorrhagic shock and drop in Hb secondary to hematochezia on 09/23. Status post EGD on 09/23 revealing a bleeding duodenal ulcer s/p OVESCO placement. We are consulted for hematochezia and hypotension.      Hemorrhagic Shock Secondary to Hematochezia from Duodenal Ulcer Bleed s/p OVESCO placement 09/23  Acute Drop in Hemoglobin- Improved  * Hemodynamically stable  * Baseline hemoglobin (prior to admission): Hb 13.8 in 2016 -> 09/12 9.5  * ED Vitals:  /63 mmHg, HR 88 bpm  * Hemoglobin on admission: Hb 9.6 on 09/15 -> Hb 8.1/7.3 s/p 5 units pRBC on 09/23 -> 09/24 Hb 9.1-> 09/25 Hb 8.9/8.7  * Coags: INR 1.59 on 09/15  * AC: on Xarelto for afib until 09/15. Then Heparin 5000 units SC Q8h until 09/20, then IV Heparin with prolonged aPTT from 09/20-09/23, then SCDs since 09/23  * Status post IV Protamine sulfate 36mg x1 dose on 09/23  * MARISSA red blood 09/23  * CT angio AP with IC 09/23: arterial extravasation between D1 and 2 with venous pooling, small hiatal hernia  * No prior EGD or colonoscopy  * Family History: negative for GI malignancies  * Status Post EGD on 09/23: blood clots in fundus and antrum; 2cm actively bleeding duodenal ulcer with spurting vessel ([place] 1a) in sweep on base s/p 10cc epi and s/p 11/6 OVESCO placement for hemostasis    RECOMMENDATIONS  - Status post EGD on 09/23 as detailed above (s/p OVESCO placement for hemostasis)  - Continue diet per speech and swallow: currently on minced and moist; mildly thick liquids  - Trend CBC closely BID and transfuse as needed (target Hb >8). Maintain active Type and screen  - Check stool H pylori Ag (could be false negative and require follow up testing to confirm H pylori status)  - Trend BUN  - x2 large 18G IV Bores  - Continue IV Protonix 40mg BID (started 09/25). Was on IV pantoprazole drip (09/23-09/25)  - Continue holding therapeutic AC: was on Xarelto for afib until 09/15, then Heparin 5000 units SC Q8h until 09/20, then IV Heparin with prolonged aPTT from 09/20-09/23, then SCDs since 09/23. Status post IV Protamine sulfate 36mg x1 dose on 09/23  - Monitor BM for recurrence of hematochezia or melena  - Please avoid any NSAIDs  - If any unstable bleed, please call GI STAT  - Follow up with our [facility] located at [address] [place] [address]. Phone Number: [phone]        Asymptomatic Cholelithiasis  * Noted on CT  * LFTs noted    RECOMMENDATIONS  - Monitor for symptoms  - Trend LFTs      Thank you for your consult.  - Please note that plan was discussed with Dr. Santoro and communicated with medical team.  - Please reach GI on [phone] during weekdays till 5pm.  - Please call the GI service line after 5pm on Weekdays and anytime on Weekends: [phone].      Rickie Jenkins MD  PGY - 4 Gastroenterology Fellow  [facility]

## 2023-09-26 NOTE — PROGRESS NOTE ADULT - SUBJECTIVE AND OBJECTIVE BOX
Nephrology progress note    THIS IS AN INCOMPLETE NOTE . FULL NOTE TO FOLLOW SHORTLY    Patient is seen and examined, events over the last 24 h noted .    Allergies:  No Known Allergies    Hospital Medications:   MEDICATIONS  (STANDING):  buMETAnide Injectable 2 milliGRAM(s) IV Push two times a day  calcium acetate 667 milliGRAM(s) Oral three times a day with meals  chlorhexidine 2% Cloths 1 Application(s) Topical <User Schedule>  dextrose 5%. 1000 milliLiter(s) (100 mL/Hr) IV Continuous <Continuous>  dextrose 50% Injectable 25 Gram(s) IV Push once  dextrose 50% Injectable 25 Gram(s) IV Push once  dextrose 50% Injectable 12.5 Gram(s) IV Push once  glucagon  Injectable 1 milliGRAM(s) IntraMuscular once  insulin lispro (ADMELOG) corrective regimen sliding scale   SubCutaneous three times a day before meals  metoprolol tartrate 12.5 milliGRAM(s) Oral two times a day  pantoprazole  Injectable 40 milliGRAM(s) IV Push two times a day  sodium bicarbonate 1300 milliGRAM(s) Oral every 8 hours        VITALS:  T(F): 97.1 (09-26-23 @ 04:52), Max: 98.4 (09-25-23 @ 18:55)  HR: 63 (09-26-23 @ 04:52)  BP: 103/65 (09-26-23 @ 04:52)  RR: 18 (09-26-23 @ 04:52)  SpO2: 97% (09-26-23 @ 04:52)  Wt(kg): --    09-24 @ 07:01  -  09-25 @ 07:00  --------------------------------------------------------  IN: 489 mL / OUT: 968 mL / NET: -479 mL    09-25 @ 07:01  -  09-26 @ 07:00  --------------------------------------------------------  IN: 260 mL / OUT: 900 mL / NET: -640 mL      Height (cm): 193 (09-25 @ 15:14)    PHYSICAL EXAM:  Constitutional: NAD  HEENT: anicteric sclera, oropharynx clear, MMM  Neck: No JVD  Respiratory: CTAB, no wheezes, rales or rhonchi  Cardiovascular: S1, S2, RRR  Gastrointestinal: BS+, soft, NT/ND  Extremities: No cyanosis or clubbing. No peripheral edema  :  No barth.   Skin: No rashes    LABS:  09-25    146  |  106  |  133<HH>  ----------------------------<  93  3.8   |  19  |  7.6<HH>    Ca    8.1<L>      25 Sep 2023 05:24  Mg     1.7     09-24    TPro  5.5<L>  /  Alb  2.9<L>  /  TBili  0.5  /  DBili      /  AST  20  /  ALT  12  /  AlkPhos  76  09-25                          8.7    15.25 )-----------( 239      ( 25 Sep 2023 11:59 )             25.9       Urine Studies:  Urinalysis Basic - ( 25 Sep 2023 05:24 )    Color:  / Appearance:  / SG:  / pH:   Gluc: 93 mg/dL / Ketone:   / Bili:  / Urobili:    Blood:  / Protein:  / Nitrite:    Leuk Esterase:  / RBC:  / WBC    Sq Epi:  / Non Sq Epi:  / Bacteria:                   RADIOLOGY & ADDITIONAL STUDIES:   Nephrology progress note  Patient is seen and examined, events over the last 24 h noted .  Lying in bed   lethargic overnight   Allergies:  No Known Allergies    Hospital Medications:   MEDICATIONS  (STANDING):  buMETAnide Injectable 2 milliGRAM(s) IV Push two times a day  calcium acetate 667 milliGRAM(s) Oral three times a day with meals  glucagon  Injectable 1 milliGRAM(s) IntraMuscular once  insulin lispro (ADMELOG) corrective regimen sliding scale   SubCutaneous three times a day before meals  metoprolol tartrate 12.5 milliGRAM(s) Oral two times a day  pantoprazole  Injectable 40 milliGRAM(s) IV Push two times a day  sodium bicarbonate 1300 milliGRAM(s) Oral every 8 hours        VITALS:  T(F): 97.1 (09-26-23 @ 04:52), Max: 98.4 (09-25-23 @ 18:55)  HR: 63 (09-26-23 @ 04:52)  BP: 103/65 (09-26-23 @ 04:52)  RR: 18 (09-26-23 @ 04:52)  SpO2: 97% (09-26-23 @ 04:52)  Wt(kg): --    09-24 @ 07:01  -  09-25 @ 07:00  --------------------------------------------------------  IN: 489 mL / OUT: 968 mL / NET: -479 mL    09-25 @ 07:01  -  09-26 @ 07:00  --------------------------------------------------------  IN: 260 mL / OUT: 900 mL / NET: -640 mL      Height (cm): 193 (09-25 @ 15:14)    PHYSICAL EXAM:  Constitutional: lethargic   Respiratory: CTAB, no wheezes, rales or rhonchi  Cardiovascular: S1, S2, RRR  Gastrointestinal: BS+, soft, NT/ND  Extremities: No cyanosis or clubbing. No peripheral edema  :  No barth.   Skin: No rashes    LABS:    Creatinine Trend: 7.6<--, 7.5<--, 7.1<--, 7.6<--, 8.2<--, 7.4<--    09-25    146  |  106  |  133<HH>  ----------------------------<  93  3.8   |  19  |  7.6<HH>    Ca    8.1<L>      25 Sep 2023 05:24  Mg     1.7     09-24    TPro  5.5<L>  /  Alb  2.9<L>  /  TBili  0.5  /  DBili      /  AST  20  /  ALT  12  /  AlkPhos  76  09-25                          8.7    15.25 )-----------( 239      ( 25 Sep 2023 11:59 )             25.9       Urine Studies:  Urinalysis Basic - ( 25 Sep 2023 05:24 )    Color:  / Appearance:  / SG:  / pH:   Gluc: 93 mg/dL / Ketone:   / Bili:  / Urobili:    Blood:  / Protein:  / Nitrite:    Leuk Esterase:  / RBC:  / WBC    Sq Epi:  / Non Sq Epi:  / Bacteria:                   RADIOLOGY & ADDITIONAL STUDIES:

## 2023-09-26 NOTE — PROGRESS NOTE ADULT - ASSESSMENT
82 yo m ho DM, COPD, anemia, paroxysmal afib on xarelto, HFrEF, DM coming in from nursing home for AMS x 2 days. Found to have toxic metabolic encephalopathy with PAULA.  PAULA/ toxic metabolic encephalopathy/ HAGMA/ HFrEF    possible ATN iso hypotension and possible sepsis/ vanco toxicity  no hydro on imaging  creat trend noted   please repeat BMP from today if creat worse call surgery for access / may dialyze today- tomorrow   cont Bumex 2 mg IV q12h   k phosphorus  level noted / phoslo 2/2/2  cont  sodium bicarbonate to  1300 q 8       will follow

## 2023-09-26 NOTE — PROGRESS NOTE ADULT - SUBJECTIVE AND OBJECTIVE BOX
GUILLERMINANIMO  83y, Male  Allergy: No Known Allergies    Hospital Day: 11d    Patient seen and examined earlier today.     PMH/PSH:    HTN (hypertension)  COPD (chronic obstructive pulmonary disease)  High cholesterol  Mild anemia  Coffee ground emesis  Chronic diastolic heart failure  PAULA (acute kidney injury)  No significant past surgical history    LAST 24-Hr EVENTS:  remains with some hypoxemic events and irregular heart rates     VITALS:  T(F): 97.1 (09-26-23 @ 04:52), Max: 98.4 (09-25-23 @ 18:55)  HR: 69 (09-26-23 @ 11:29)  BP: 125/58 (09-26-23 @ 11:29) (89/50 - 125/58)  RR: 20 (09-26-23 @ 11:29)  SpO2: 100% (09-26-23 @ 11:29) on NC           TESTS & MEASUREMENTS:  Weight/Height/BMI  Height (cm): 193 (09-25-23 @ 15:14)    09-24-23 @ 07:01  -  09-25-23 @ 07:00  --------------------------------------------------------  IN: 489 mL / OUT: 968 mL / NET: -479 mL    09-25-23 @ 07:01  -  09-26-23 @ 07:00  --------------------------------------------------------  IN: 260 mL / OUT: 900 mL / NET: -640 mL                            8.2    12.20 )-----------( 258      ( 26 Sep 2023 12:05 )             25.1         09-26    151<H>  |  109  |  141<HH>  ----------------------------<  101<H>  3.4<L>   |  21  |  7.3<HH>    Creatinine Trend:  7.3 (09-26 @ 12:05)    7.6 (09-25 @ 05:24)    7.5 (09-24 @ 11:00)    7.1 (09-23 @ 12:05)    7.6 (09-22 @ 07:42)        BUN Trend:  141 mg/dL (09-26-23 @ 12:05)  133 mg/dL (09-25-23 @ 05:24)  135 mg/dL (09-24-23 @ 11:00)  TPro  5.4<L>  /  Alb  3.0<L>  /  TBili  0.5  /  DBili  x   /  AST  18  /  ALT  12  /  AlkPhos  79  09-26    LIVER FUNCTIONS - ( 26 Sep 2023 12:05 )  Alb: 3.0 g/dL / Pro: 5.4 g/dL / ALK PHOS: 79 U/L / ALT: 12 U/L / AST: 18 U/L / GGT: x                 Urinalysis Basic - ( 26 Sep 2023 12:05 )    Color: x / Appearance: x / SG: x / pH: x  Gluc: 101 mg/dL / Ketone: x  / Bili: x / Urobili: x   Blood: x / Protein: x / Nitrite: x   Leuk Esterase: x / RBC: x / WBC x   Sq Epi: x / Non Sq Epi: x / Bacteria: x      Procalcitonin, Serum: 0.49 ng/mL (09-17-23 @ 08:20)    Vancomycin Level, Trough: 46.2 ug/mL (09-22-23 @ 07:42)        RADIOLOGY, ECG, & ADDITIONAL TESTS:    Ventricular Rate 88 BPM  Atrial Rate 40 BPM  QRS Duration 92 ms  Atrial fibrillation  with Premature ventricular complexes  Left axis deviation      Confirmed by Luis Morel ([id number]) on 9/26/2023 7:38:55 AM (09-25-23 @ 19:03)    CT Abdomen and Pelvis No Cont:   ACC: [id number] EXAM:  CT ABDOMEN AND PELVIS   ORDERED BY: EMERY CASTRO     PROCEDURE DATE:  09/16/2023          INTERPRETATION:  Clinical Indication: Assess sacral osteomyelitis.    Technique: Multidetector-row CT images of the abdomen and pelvis were   obtained from the xiphoid through the symphysis pubis. No contrast was   administered. Coronal and sagittal reconstructions were performed.    Contrast: None administered.    Comparison: CT left lower extremity 9/16/2023. CT abdomen pelvis3/9/2019.      IMPRESSION:    Destructive vertebral body changes of L3-L4 compatible with   osteomyelitis. No definite drainable collection on this noncontrast exam.    No evidence for sacral osteomyelitis.    Distended gallbladder with stones. No biliary dilation. Given significant   artifact over this area, recommend abdominal ultrasound for further   evaluation.    Small/trace bilateral effusions as described.    New left adrenal nodule versus periadrenal lymph node as described. This   can be followed as an outpatient with an MRI.      --- End of Report ---    CEDRIC FARRIS MD; Attending Radiologist  This document has been electronically signed. Sep 18 2023  1:09PM (09-16-23 @ 21:15)        MEDICATIONS:  MEDICATIONS  (STANDING):  buMETAnide Injectable 2 milliGRAM(s) IV Push two times a day  calcium acetate 667 milliGRAM(s) Oral three times a day with meals  chlorhexidine 2% Cloths 1 Application(s) Topical <User Schedule>  dextrose 5%. 1000 milliLiter(s) (75 mL/Hr) IV Continuous <Continuous>  dextrose 5%. 1000 milliLiter(s) (100 mL/Hr) IV Continuous <Continuous>  dextrose 50% Injectable 25 Gram(s) IV Push once  dextrose 50% Injectable 25 Gram(s) IV Push once  dextrose 50% Injectable 12.5 Gram(s) IV Push once  glucagon  Injectable 1 milliGRAM(s) IntraMuscular once  heparin   Injectable 5000 Unit(s) SubCutaneous every 12 hours  insulin lispro (ADMELOG) corrective regimen sliding scale   SubCutaneous three times a day before meals  metoprolol tartrate 12.5 milliGRAM(s) Oral two times a day  pantoprazole  Injectable 40 milliGRAM(s) IV Push two times a day  potassium chloride  20 mEq/100 mL IVPB 20 milliEquivalent(s) IV Intermittent every 2 hours  sodium bicarbonate 1300 milliGRAM(s) Oral every 8 hours    MEDICATIONS  (PRN):  acetaminophen     Tablet .. 650 milliGRAM(s) Oral every 6 hours PRN Moderate Pain (4 - 6)  albuterol    90 MICROgram(s) HFA Inhaler 1 Puff(s) Inhalation every 6 hours PRN for shortness of breath and/or wheezing  dextrose Oral Gel 15 Gram(s) Oral once PRN Blood Glucose LESS THAN 70 milliGRAM(s)/deciliter      HOME MEDICATIONS (as per chart review):  acetaminophen 325 mg oral tablet (05-25)  Albuterol (Eqv-ProAir HFA) 90 mcg/inh inhalation aerosol (09-15)  Breo Ellipta 100 mcg-25 mcg/inh inhalation powder (09-15)  empagliflozin 10 mg oral tablet (09-15)  Entresto 24 mg-26 mg oral tablet (09-15)  Lasix 20 mg oral tablet (09-15)  Metoprolol Succinate ER 25 mg oral tablet, extended release (09-15)  Percocet 5 mg-325 mg oral tablet (09-15)  Xarelto 20 mg oral tablet (09-15)      PHYSICAL EXAM:  GENERAL: frail, able to speak in short sentences and at times incoherently   CHEST/LUNG: audible rales on ant ausc/ diffuse   HEART: S1S2 irreg irregular  ABDOMEN: soft without rebound  EXTREMITIES:  chronic skin changes     TOTAL ENCOUNTER TIME: 60 mins  Direct clinical care,  coordination with consultants (ID and Nephrology teams), nursing and case management/social work teams, review of tests and scheduled medications,  and resident supervision.

## 2023-09-26 NOTE — PROGRESS NOTE ADULT - ATTENDING COMMENTS
Impression    Acute hypoxemic respiratory failure   UGIB secondary to Duodenal ulcer SP EGD   Hematochezia resolved   Hemorrhagic Shock resolved   Metabolic Encephalopathy  improved   Suspected Severe Sepsis (Hypotention/AMS/PAULA/Oliguric) improved   PAULA / CKD stable non oliguric   History of Left Foot OM w/ surrounding Cellulitis   Sacral DTI / Stage 1 POA     Plan as outlined above

## 2023-09-26 NOTE — PROGRESS NOTE ADULT - ASSESSMENT
83 year old male patient known to have COPD. DM, atrial fibrillation, HFrEF, anemia, lymphedema, and recent left foot OM who was brought from the NH to the ED on 09/15 for evaluation of altered mental status, found to have sepsis in setting of recent left foot OM, admitted for further management. Stay was complicated by hemorrhagic shock and drop in Hb secondary to hematochezia on 09/23. Status post EGD on 09/23 revealing a bleeding duodenal ulcer s/p OVESCO placement.    #Acute hypoxic respi failure  -was hypoxic to 70s today  -CXR stable opacities  -Frequents suctioning  -Aspiration precautions  -avoid volume overload  -F/U ABG  -Might need upgrade to Stepdown unit    #UGIB secondary to Duodenal ulcer SP EGD   #Hematochezia   #Hemorrhagic Shock   -Hematochezia resolved  -Hemorrhagic shock resolved  -Hemoglobin on admission: Hb 9.6 on 09/15 -> Hb 8.1/7.3 s/p 5 units pRBC on 09/23 -> 09/24 Hb 9.1-> 09/25 Hb 8.9/8.7  - AC: on Xarelto for afib until 09/15. Then Heparin 5000 units SC Q8h until 09/20, then IV Heparin with prolonged aPTT from 09/20-09/23, then SCDs since 09/23  -Status post IV Protamine sulfate 36mg x1 dose on 09/23  -MARISSA red blood 09/23  -CT angio AP with IC 09/23: arterial extravasation between D1 and 2 with venous pooling, small hiatal hernia  - Status post EGD on 09/23 as detailed above (s/p OVESCO placement for hemostasis) by gastro team  - Speech and swallow followed: NPO   - Trend CBC closely BID and transfuse as needed (target Hb >8). Maintain active Type and screen  - Continue IV Protonix 40mg BID  - Monitor BM for recurrence of hematochezia or melena    #PAULA / CKD stable non oliguric   -Cr 7.3 today(was 7.6 yesterday)>>trend BUN/Cr>>if worsening will need dialysis>>f/u Nephro    #History of Left Foot OM w/ surrounding Cellulitis   #Sacral DTI / Stage 1 POA   -ID was consulted and recommended monitoring off antibiotics  -wound care recs appreciated    #MISC:  -DVT ppx: Hep sq(discussed with GI about the risk and benefit of prophylactic AC)  -GI ppx: PPI  -Diet: NPO  -Activity: IAT  -Code status: Full code(family doesnot want to talk to palliative and re-confirmed full code)  -Dispo: Acute, Follow up Crit care eval

## 2023-09-26 NOTE — SWALLOW BEDSIDE ASSESSMENT ADULT - SLP PERTINENT HISTORY OF CURRENT PROBLEM
Pt is an 84 y/o M w/ PMHx: DM2, COPD, anemia, paroxysmal Afib on Xarelto, HFrEF, sent to the hospital by [facility] for AMS. Pt recently admitted to [facility] from [facility] for OM needing IV Abx. Pt has been having worsening mental status over the last 2 days per NH records. Pt is being treated for metabolic encephalopathy, suspected severe sepsis, PAULA vs. CKD, HAGMA. CTH (-). CXR 9/19-> retrocardiac opacification, atelectatic. Hospital course complicated by hemorrhagic shock 2' hematochezia, EGD 9/23 revealed bleeding duodenal ulcer w/p OVESCO placement. Cleared for PO today if Hb remains stable, swallow eval ordered. Pt was seen previously in this admission, initially w/ recs on 9/20 for soft w/ mildly thick, and advanced to thin liquids on 9/21.

## 2023-09-26 NOTE — PROGRESS NOTE ADULT - ASSESSMENT
Impression    Acute hypoxemic respiratory failure   UGIB secondary to Duodenal ulcer SP EGD   Hematochezia resolved   Hemorrhagic Shock resolved   Metabolic Encephalopathy  improved   Suspected Severe Sepsis (Hypotention/AMS/PAULA/Oliguric) improved   PAULA / CKD stable non oliguric   History of Left Foot OM w/ surrounding Cellulitis   Sacral DTI / Stage 1 POA     Plan:      CNS:  Monitor mental status     HEENT:  Aspiration precautions    CARDIOVASCULAR Keep I=O . gentle diuresis with bumex. Metoprolol 12.5 mg BID     PULMONARY: Chest xray noted. Aggressive pulmonary toilet, including chest PT, albuterol and mucocyst nebulizers and frequent suctioning. Incentive spirometry     GASTROINTESTINAL : feeding per Speech and swallow eval. Protonix Pushes BID      GENITOURINARY/RENAL: Nephro following; may need HD. monitor Urine out put. Monitor BMP    INFECTIOUS : Monitor off abx . f/u cultures      HEMATOLOGIC: SCD for now. Keep type and screen active. Target Hb > 7. Repeat Hb at 11 . GI recommendations noted regarding AC.  Continue to hold.      ENDOCRINE  Follow up FS.  Insulin protocol as needed.  BG goal 140-180    MSK/DERM  PT/OT when cooperative.  Off loading       CODE STATUS: FULL      DISPO: SDU Impression    Acute hypoxemic respiratory failure   UGIB secondary to Duodenal ulcer SP EGD   Hematochezia resolved   Hemorrhagic Shock resolved   Metabolic Encephalopathy  improved   Suspected Severe Sepsis (Hypotention/AMS/PAULA/Oliguric) improved   PAULA / CKD stable non oliguric   History of Left Foot OM w/ surrounding Cellulitis   Sacral DTI / Stage 1 POA     Plan:      CNS:  Monitor mental status     HEENT:  Aspiration precautions    CARDIOVASCULAR D5W to correct free H2O deficit.  Metoprolol 12.5 mg BID     PULMONARY: Chest xray noted. Aggressive pulmonary toilet, including chest PT, albuterol and mucocyst nebulizers and frequent suctioning. Incentive spirometry     GASTROINTESTINAL : feeding per Speech and swallow eval. Protonix Pushes BID      GENITOURINARY/RENAL: Nephro following; may need HD. monitor Urine out put. Monitor BMP    INFECTIOUS : Monitor off abx . f/u cultures      HEMATOLOGIC: SCD for now. Keep type and screen active. Target Hb > 7. Repeat Hb at 11 . GI recommendations noted regarding AC.  Continue to hold.      ENDOCRINE  Follow up FS.  Insulin protocol as needed.  BG goal 140-180    MSK/DERM  PT/OT when cooperative.  Off loading       CODE STATUS: FULL      DISPO: SDU

## 2023-09-26 NOTE — PROGRESS NOTE ADULT - ATTENDING COMMENTS
No further overt GI bleeding. Pt tolerating diet. Continue IV PPI. Check stool H pylori Ag though could be falsely negative and would recommend repeat testing in few weeks to confirm status.

## 2023-09-26 NOTE — CHART NOTE - NSCHARTNOTEFT_GEN_A_CORE
Transfer Note    Transfer from:     Transfer to: (  ) Medicine    (  ) Telemetry     (  ) RCU       (  ) CEU    (  ) VENT                        (  ) Palliative    (  ) Stroke Unit    (  ) MICU    (  ) CCU    Signout given to:     ----------------------------------------------------------------------------------------------------------  HPI / ICU COURSE:    HPI:  82 yo m ho DM, COPD, anemia, lymphedemia, afib on xarelto, HFrEF, DM coming in from nursing home for AMS x 2 days. Unable to gather story from pt as he is altered and lethargic.  As per NH was becoming more agitated x 2 days, was hypotensive yesterday and started on cefepime and vancomycin. Brought in to the ED for AMS. PICC line in place for cefepime 1q q8 until 10/5/23 and vanc 1q24 until 9/23/23 for LLE cellulitis/OM  (was at [facility] last month for LLE thick wound cellulitis/OM and sacral DTI as per call with [facility] as per collateral from NH DEISY Barry)      In the ED, vitals wnl. Labs showing wbc 11.30, Hb 9.6, MCV 79.1, INR 1.59, CO2 14, AG 21, Cr 6 (~baseline 1.2), , trops 0.09. CTH wnl, RBUS with no hydro, poor visualization of left kidney   (15 Sep 2023 22:12)    ICU Course:  When pt arrived to ICU, he was in shock, hypotensive, with active Melena. BP in 60's.  GI consulted urgently for endoscopy. Procedure was done at bedside and bleeding controlled. Pt. was given 2 U of blood, 1U FFP in ICU. HGB has maintained above 8 and pt has not sings of melena.     3E Course:  The patient was Hypoxic(as low as 70s). Evaluated the patient at bedside. Aox 1. On auscultation, bilateral crackles present. Placed the patient on Venturi mask and then on NRB. The Spo2 improved to 99%. Stat vitals repeated(vitals Spo2 99%, /70s, HR 95). Ordered CXR stat, ABG stat, Bumex 2mg IV x 1 stat. Labs and charts reviewed. Stat Labs drawn.          --------------------------------------------------------------------------------------------------------  PMH/PSH:  PAST MEDICAL & SURGICAL HISTORY:  HTN (hypertension)    COPD (chronic obstructive pulmonary disease)    High cholesterol    Mild anemia    Coffee ground emesis    Chronic diastolic heart failure    PAULA (acute kidney injury)    No significant past surgical history        -------------------------------------------------------------------------------------------------------  PHYSICAL EXAM:  General: NAD  HEENT: Atraumatic  Respiratory: CTAB  Cardiac: RRR  Abdomen: soft, non-tender, non-distended  Extremities: warm and well-perfused  Neuro: A+Ox4. No FND    Vital Signs Last 24 Hrs  T(C): 36.2 (26 Sep 2023 04:52), Max: 36.9 (25 Sep 2023 18:55)  T(F): 97.1 (26 Sep 2023 04:52), Max: 98.4 (25 Sep 2023 18:55)  HR: 69 (26 Sep 2023 11:29) (44 - 161)  BP: 125/58 (26 Sep 2023 11:29) (89/50 - 125/58)  BP(mean): 83 (26 Sep 2023 11:29) (60 - 83)  RR: 20 (26 Sep 2023 11:29) (18 - 30)  SpO2: 100% (26 Sep 2023 11:29) (76% - 100%)    Parameters below as of 26 Sep 2023 11:29  Patient On (Oxygen Delivery Method): NRB        I&O's Summary    25 Sep 2023 07:01  -  26 Sep 2023 07:00  --------------------------------------------------------  IN: 260 mL / OUT: 900 mL / NET: -640 mL        --------------------------------------------------------------------------------------------------------  LABS:                               8.2    12.20 )-----------( 258      ( 26 Sep 2023 12:05 )             25.1       09-26    151<H>  |  109  |  141<HH>  ----------------------------<  101<H>  3.4<L>   |  21  |  7.3<HH>    Ca    8.0<L>      26 Sep 2023 12:05  Mg     1.9     09-26    TPro  5.4<L>  /  Alb  3.0<L>  /  TBili  0.5  /  DBili  x   /  AST  18  /  ALT  12  /  AlkPhos  79  09-26          ABG - ( 26 Sep 2023 11:39 )  pH, Arterial: 7.43  pH, Blood: x     /  pCO2: 37    /  pO2: 34    / HCO3: 25    / Base Excess: 0.4   /  SaO2: 56.5                CULTURE RESULTS:                09-18-23 @ 11:15  Specimen Source: --  Method Type: --  Gram Stain - RRL: --  Gram Stain - Wound: --  Bacteria: Negative  Culture Results: --      Specimen Source:   Method Type:   Gram Stain:   Culture Results:   Bacteria:       -------------------------------------------------------------------------------------------------  RADIOLOGY:  Xray Chest 1 View- PORTABLE-Urgent (Xray Chest 1 View- PORTABLE-Urgent .) (09.26.23 @ 12:11)    Right greater than left basilar opacity/effusion without significant change.          ---------------------------------------------------------------------------------------------------  ASSESSMENT & PLAN:   #Acute hypoxic respi failure  -was hypoxic to 70s today  -CXR stable opacities  -Frequents suctioning  -Aspiration precautions  -avoid volume overload  -F/U ABG  -Might need upgrade to Stepdown unit    #UGIB secondary to Duodenal ulcer SP EGD   #Hematochezia   #Hemorrhagic Shock   -Hematochezia resolved  -Hemorrhagic shock resolved  -Hemoglobin on admission: Hb 9.6 on 09/15 -> Hb 8.1/7.3 s/p 5 units pRBC on 09/23 -> 09/24 Hb 9.1-> 09/25 Hb 8.9/8.7  - AC: on Xarelto for afib until 09/15. Then Heparin 5000 units SC Q8h until 09/20, then IV Heparin with prolonged aPTT from 09/20-09/23, then SCDs since 09/23  -Status post IV Protamine sulfate 36mg x1 dose on 09/23  -MARISSA red blood 09/23  -CT angio AP with IC 09/23: arterial extravasation between D1 and 2 with venous pooling, small hiatal hernia  - Status post EGD on 09/23 as detailed above (s/p OVESCO placement for hemostasis) by gastro team  - Speech and swallow followed: NPO   - Trend CBC closely BID and transfuse as needed (target Hb >8). Maintain active Type and screen  - Continue IV Protonix 40mg BID  - Monitor BM for recurrence of hematochezia or melena    #PAULA / CKD stable non oliguric   -Cr 7.3 today(was 7.6 yesterday)>>trend BUN/Cr>>if worsening will need dialysis>>f/u Nephro    #History of Left Foot OM w/ surrounding Cellulitis   #Sacral DTI / Stage 1 POA   -ID was consulted and recommended monitoring off antibiotics  -wound care recs appreciated    #MISC:  -DVT ppx: Hep sq(discussed with GI about the risk and benefit of prophylactic AC)  -GI ppx: PPI  -Diet: NPO      FOR FOLLOW UP:  [ ] ABG  [ ] F/U Nephro, F/u GI  [ ] Trend CMP

## 2023-09-26 NOTE — SWALLOW BEDSIDE ASSESSMENT ADULT - COMMENTS
Pt received awake, confused. Lethargic and garbled speech. Refusing all PO. Unable to recommend diet. Consider NPO- congested coughing, minimal PO intake. Very confused

## 2023-09-27 LAB
ALBUMIN SERPL ELPH-MCNC: 2.9 G/DL — LOW (ref 3.5–5.2)
ALP SERPL-CCNC: 76 U/L — SIGNIFICANT CHANGE UP (ref 30–115)
ALT FLD-CCNC: 12 U/L — SIGNIFICANT CHANGE UP (ref 0–41)
ANION GAP SERPL CALC-SCNC: 20 MMOL/L — HIGH (ref 7–14)
AST SERPL-CCNC: 18 U/L — SIGNIFICANT CHANGE UP (ref 0–41)
BASOPHILS # BLD AUTO: 0.03 K/UL — SIGNIFICANT CHANGE UP (ref 0–0.2)
BASOPHILS # BLD AUTO: 0.03 K/UL — SIGNIFICANT CHANGE UP (ref 0–0.2)
BASOPHILS NFR BLD AUTO: 0.3 % — SIGNIFICANT CHANGE UP (ref 0–1)
BASOPHILS NFR BLD AUTO: 0.4 % — SIGNIFICANT CHANGE UP (ref 0–1)
BILIRUB SERPL-MCNC: 0.5 MG/DL — SIGNIFICANT CHANGE UP (ref 0.2–1.2)
BUN SERPL-MCNC: 141 MG/DL — CRITICAL HIGH (ref 10–20)
CALCIUM SERPL-MCNC: 8.4 MG/DL — SIGNIFICANT CHANGE UP (ref 8.4–10.5)
CHLORIDE SERPL-SCNC: 110 MMOL/L — SIGNIFICANT CHANGE UP (ref 98–110)
CO2 SERPL-SCNC: 23 MMOL/L — SIGNIFICANT CHANGE UP (ref 17–32)
CREAT SERPL-MCNC: 7.2 MG/DL — CRITICAL HIGH (ref 0.7–1.5)
EGFR: 7 ML/MIN/1.73M2 — LOW
EOSINOPHIL # BLD AUTO: 0.39 K/UL — SIGNIFICANT CHANGE UP (ref 0–0.7)
EOSINOPHIL # BLD AUTO: 0.44 K/UL — SIGNIFICANT CHANGE UP (ref 0–0.7)
EOSINOPHIL NFR BLD AUTO: 3.8 % — SIGNIFICANT CHANGE UP (ref 0–8)
EOSINOPHIL NFR BLD AUTO: 5.4 % — SIGNIFICANT CHANGE UP (ref 0–8)
GLUCOSE BLDC GLUCOMTR-MCNC: 113 MG/DL — HIGH (ref 70–99)
GLUCOSE BLDC GLUCOMTR-MCNC: 114 MG/DL — HIGH (ref 70–99)
GLUCOSE BLDC GLUCOMTR-MCNC: 124 MG/DL — HIGH (ref 70–99)
GLUCOSE SERPL-MCNC: 100 MG/DL — HIGH (ref 70–99)
HCT VFR BLD CALC: 24.7 % — LOW (ref 42–52)
HCT VFR BLD CALC: 27.8 % — LOW (ref 42–52)
HCV AB S/CO SERPL IA: 0.05 COI — SIGNIFICANT CHANGE UP
HCV AB SERPL-IMP: SIGNIFICANT CHANGE UP
HGB BLD-MCNC: 7.9 G/DL — LOW (ref 14–18)
HGB BLD-MCNC: 8.8 G/DL — LOW (ref 14–18)
IMM GRANULOCYTES NFR BLD AUTO: 0.4 % — HIGH (ref 0.1–0.3)
IMM GRANULOCYTES NFR BLD AUTO: 0.4 % — HIGH (ref 0.1–0.3)
LYMPHOCYTES # BLD AUTO: 1.75 K/UL — SIGNIFICANT CHANGE UP (ref 1.2–3.4)
LYMPHOCYTES # BLD AUTO: 1.76 K/UL — SIGNIFICANT CHANGE UP (ref 1.2–3.4)
LYMPHOCYTES # BLD AUTO: 17.3 % — LOW (ref 20.5–51.1)
LYMPHOCYTES # BLD AUTO: 21.4 % — SIGNIFICANT CHANGE UP (ref 20.5–51.1)
MAGNESIUM SERPL-MCNC: 1.9 MG/DL — SIGNIFICANT CHANGE UP (ref 1.8–2.4)
MCHC RBC-ENTMCNC: 27 PG — SIGNIFICANT CHANGE UP (ref 27–31)
MCHC RBC-ENTMCNC: 27.3 PG — SIGNIFICANT CHANGE UP (ref 27–31)
MCHC RBC-ENTMCNC: 31.7 G/DL — LOW (ref 32–37)
MCHC RBC-ENTMCNC: 32 G/DL — SIGNIFICANT CHANGE UP (ref 32–37)
MCV RBC AUTO: 84.3 FL — SIGNIFICANT CHANGE UP (ref 80–94)
MCV RBC AUTO: 86.3 FL — SIGNIFICANT CHANGE UP (ref 80–94)
MONOCYTES # BLD AUTO: 0.56 K/UL — SIGNIFICANT CHANGE UP (ref 0.1–0.6)
MONOCYTES # BLD AUTO: 0.76 K/UL — HIGH (ref 0.1–0.6)
MONOCYTES NFR BLD AUTO: 6.9 % — SIGNIFICANT CHANGE UP (ref 1.7–9.3)
MONOCYTES NFR BLD AUTO: 7.5 % — SIGNIFICANT CHANGE UP (ref 1.7–9.3)
NEUTROPHILS # BLD AUTO: 5.35 K/UL — SIGNIFICANT CHANGE UP (ref 1.4–6.5)
NEUTROPHILS # BLD AUTO: 7.2 K/UL — HIGH (ref 1.4–6.5)
NEUTROPHILS NFR BLD AUTO: 65.5 % — SIGNIFICANT CHANGE UP (ref 42.2–75.2)
NEUTROPHILS NFR BLD AUTO: 70.7 % — SIGNIFICANT CHANGE UP (ref 42.2–75.2)
NRBC # BLD: 0 /100 WBCS — SIGNIFICANT CHANGE UP (ref 0–0)
NRBC # BLD: 0 /100 WBCS — SIGNIFICANT CHANGE UP (ref 0–0)
PHOSPHATE SERPL-MCNC: 6.4 MG/DL — HIGH (ref 2.1–4.9)
PLATELET # BLD AUTO: 244 K/UL — SIGNIFICANT CHANGE UP (ref 130–400)
PLATELET # BLD AUTO: 258 K/UL — SIGNIFICANT CHANGE UP (ref 130–400)
PMV BLD: 10.2 FL — SIGNIFICANT CHANGE UP (ref 7.4–10.4)
PMV BLD: 10.3 FL — SIGNIFICANT CHANGE UP (ref 7.4–10.4)
POTASSIUM SERPL-MCNC: 3.7 MMOL/L — SIGNIFICANT CHANGE UP (ref 3.5–5)
POTASSIUM SERPL-SCNC: 3.7 MMOL/L — SIGNIFICANT CHANGE UP (ref 3.5–5)
PROT SERPL-MCNC: 5.6 G/DL — LOW (ref 6–8)
RBC # BLD: 2.93 M/UL — LOW (ref 4.7–6.1)
RBC # BLD: 3.22 M/UL — LOW (ref 4.7–6.1)
RBC # FLD: 20.3 % — HIGH (ref 11.5–14.5)
RBC # FLD: 20.4 % — HIGH (ref 11.5–14.5)
SODIUM SERPL-SCNC: 153 MMOL/L — HIGH (ref 135–146)
WBC # BLD: 10.18 K/UL — SIGNIFICANT CHANGE UP (ref 4.8–10.8)
WBC # BLD: 8.16 K/UL — SIGNIFICANT CHANGE UP (ref 4.8–10.8)
WBC # FLD AUTO: 10.18 K/UL — SIGNIFICANT CHANGE UP (ref 4.8–10.8)
WBC # FLD AUTO: 8.16 K/UL — SIGNIFICANT CHANGE UP (ref 4.8–10.8)

## 2023-09-27 PROCEDURE — 93010 ELECTROCARDIOGRAM REPORT: CPT

## 2023-09-27 PROCEDURE — 99233 SBSQ HOSP IP/OBS HIGH 50: CPT

## 2023-09-27 PROCEDURE — 99232 SBSQ HOSP IP/OBS MODERATE 35: CPT

## 2023-09-27 PROCEDURE — 71045 X-RAY EXAM CHEST 1 VIEW: CPT | Mod: 26

## 2023-09-27 RX ORDER — METOPROLOL TARTRATE 50 MG
2.5 TABLET ORAL EVERY 6 HOURS
Refills: 0 | Status: DISCONTINUED | OUTPATIENT
Start: 2023-09-27 | End: 2023-10-01

## 2023-09-27 RX ORDER — MIDODRINE HYDROCHLORIDE 2.5 MG/1
5 TABLET ORAL THREE TIMES A DAY
Refills: 0 | Status: DISCONTINUED | OUTPATIENT
Start: 2023-09-27 | End: 2023-10-02

## 2023-09-27 RX ADMIN — Medication 2.5 MILLIGRAM(S): at 05:09

## 2023-09-27 RX ADMIN — Medication 1300 MILLIGRAM(S): at 15:35

## 2023-09-27 RX ADMIN — Medication 650 MILLIGRAM(S): at 22:37

## 2023-09-27 RX ADMIN — PANTOPRAZOLE SODIUM 40 MILLIGRAM(S): 20 TABLET, DELAYED RELEASE ORAL at 18:51

## 2023-09-27 RX ADMIN — PANTOPRAZOLE SODIUM 40 MILLIGRAM(S): 20 TABLET, DELAYED RELEASE ORAL at 05:09

## 2023-09-27 RX ADMIN — Medication 1300 MILLIGRAM(S): at 22:38

## 2023-09-27 RX ADMIN — BUMETANIDE 2 MILLIGRAM(S): 0.25 INJECTION INTRAMUSCULAR; INTRAVENOUS at 05:10

## 2023-09-27 RX ADMIN — Medication 2.5 MILLIGRAM(S): at 15:32

## 2023-09-27 RX ADMIN — CHLORHEXIDINE GLUCONATE 1 APPLICATION(S): 213 SOLUTION TOPICAL at 05:09

## 2023-09-27 RX ADMIN — HEPARIN SODIUM 5000 UNIT(S): 5000 INJECTION INTRAVENOUS; SUBCUTANEOUS at 18:51

## 2023-09-27 RX ADMIN — Medication 2.5 MILLIGRAM(S): at 18:51

## 2023-09-27 RX ADMIN — HEPARIN SODIUM 5000 UNIT(S): 5000 INJECTION INTRAVENOUS; SUBCUTANEOUS at 05:11

## 2023-09-27 NOTE — PROGRESS NOTE ADULT - SUBJECTIVE AND OBJECTIVE BOX
SUBJECTIVE:    Patient is a 83y old Male who presents with a chief complaint of AMS (27 Sep 2023 08:08)    Currently admitted to medicine with the primary diagnosis of Altered mental status       Today is hospital day 12d. This morning he is resting comfortably in bed and reports no new issues or overnight events.     PAST MEDICAL & SURGICAL HISTORY  HTN (hypertension)    COPD (chronic obstructive pulmonary disease)    High cholesterol    Mild anemia    Coffee ground emesis    Chronic diastolic heart failure    PAULA (acute kidney injury)    No significant past surgical history      SOCIAL HISTORY:  Negative for smoking/alcohol/drug use.     ALLERGIES:  No Known Allergies    MEDICATIONS:  STANDING MEDICATIONS  calcium acetate 667 milliGRAM(s) Oral three times a day with meals  chlorhexidine 2% Cloths 1 Application(s) Topical <User Schedule>  dextrose 5%. 1000 milliLiter(s) IV Continuous <Continuous>  dextrose 5%. 1000 milliLiter(s) IV Continuous <Continuous>  dextrose 50% Injectable 25 Gram(s) IV Push once  dextrose 50% Injectable 25 Gram(s) IV Push once  dextrose 50% Injectable 12.5 Gram(s) IV Push once  glucagon  Injectable 1 milliGRAM(s) IntraMuscular once  heparin   Injectable 5000 Unit(s) SubCutaneous every 12 hours  insulin lispro (ADMELOG) corrective regimen sliding scale   SubCutaneous three times a day before meals  metoprolol tartrate Injectable 2.5 milliGRAM(s) IV Push every 6 hours  midodrine. 5 milliGRAM(s) Oral three times a day  pantoprazole  Injectable 40 milliGRAM(s) IV Push two times a day  sodium bicarbonate 1300 milliGRAM(s) Oral every 8 hours    PRN MEDICATIONS  acetaminophen     Tablet .. 650 milliGRAM(s) Oral every 6 hours PRN  albuterol    90 MICROgram(s) HFA Inhaler 1 Puff(s) Inhalation every 6 hours PRN  dextrose Oral Gel 15 Gram(s) Oral once PRN    VITALS:   T(F): 98.1  HR: 116  BP: 111/49  RR: 20  SpO2: 98%    LABS:                        8.8    10.18 )-----------( 258      ( 27 Sep 2023 05:37 )             27.8     09-27    153<H>  |  110  |  141<HH>  ----------------------------<  100<H>  3.7   |  23  |  7.2<HH>    Ca    8.4      27 Sep 2023 05:37  Phos  6.4     09-27  Mg     1.9     09-27    TPro  5.6<L>  /  Alb  2.9<L>  /  TBili  0.5  /  DBili  x   /  AST  18  /  ALT  12  /  AlkPhos  76  09-27      Urinalysis Basic - ( 27 Sep 2023 05:37 )    Color: x / Appearance: x / SG: x / pH: x  Gluc: 100 mg/dL / Ketone: x  / Bili: x / Urobili: x   Blood: x / Protein: x / Nitrite: x   Leuk Esterase: x / RBC: x / WBC x   Sq Epi: x / Non Sq Epi: x / Bacteria: x      ABG - ( 26 Sep 2023 15:34 )  pH, Arterial: 7.52  pH, Blood: x     /  pCO2: 26    /  pO2: 179   / HCO3: 21    / Base Excess: -0.3  /  SaO2: 100.0             Lactate, Blood: 1.5 mmol/L (09-26-23 @ 12:05)          RADIOLOGY:    PHYSICAL EXAM:  GEN: ill appearing  LUNGS: Clear to auscultation bilaterally   HEART: S1/S2 present. RRR.   ABD: Soft, non-tender, non-distended. Bowel sounds present  EXT: erythematous left foot  NEURO: AAOX3    Intravenous access:   NG tube:   Manley Catheter:   Indwelling Urethral Catheter:     Connect To:  Straight Drainage/Gravity    Indication:  Urine Output Monitoring in Critically Ill (09-24-23 @ 03:28) (not performed) [Active]  Indwelling Urethral Catheter:     Connect To:  Straight Drainage/Gravity    Indication:  Urine Output Monitoring in Critically Ill (09-23-23 @ 20:53) (not performed) [Active]  Indwelling Urethral Catheter:     Connect To:  Straight Drainage/Gravity    Indication:  Urine Output Monitoring in Critically Ill (09-22-23 @ 14:00) (not performed) [Active]  Indwelling Urethral Catheter:     Connect To:  Straight Drainage/Gravity    Indication:  Urine Output Monitoring in Critically Ill    Additional Instructions:  Anuric not for HD now PLACE MANLEY STAT and record I/Os  ( severe ranal failure ) (09-20-23 @ 19:57) (not performed) [Active]

## 2023-09-27 NOTE — PROGRESS NOTE ADULT - SUBJECTIVE AND OBJECTIVE BOX
Over Night Events: events noted, on NC this am, afebrile, weak cough    PHYSICAL EXAM    ICU Vital Signs Last 24 Hrs  T(C): 36.7 (27 Sep 2023 04:00), Max: 36.8 (26 Sep 2023 20:00)  T(F): 98.1 (27 Sep 2023 04:00), Max: 98.2 (26 Sep 2023 20:00)  HR: 116 (27 Sep 2023 04:00) (69 - 116)  BP: 111/49 (27 Sep 2023 04:00) (108/59 - 131/67)  BP(mean): 87 (26 Sep 2023 20:00) (80 - 90)  RR: 20 (27 Sep 2023 04:00) (20 - 20)  SpO2: 98% (27 Sep 2023 04:00) (76% - 100%)    O2 Parameters below as of 26 Sep 2023 20:00  Patient On (Oxygen Delivery Method): nasal cannula            General: chronically ill looking  Lungs: Bilateral rhonchi  Cardiovascular: MAY 2.6  Abdomen: Soft, Positive BS  Extremities: No clubbing   follows commands      09-26-23 @ 07:01  -  09-27-23 @ 07:00  --------------------------------------------------------  IN:    dextrose 5%: 975 mL    IV PiggyBack: 200 mL  Total IN: 1175 mL    OUT:  Total OUT: 0 mL    Total NET: 1175 mL          LABS:                          8.2    12.20 )-----------( 258      ( 26 Sep 2023 12:05 )             25.1                                               09-26    151<H>  |  109  |  141<HH>  ----------------------------<  101<H>  3.4<L>   |  21  |  7.3<HH>    Ca    8.0<L>      26 Sep 2023 12:05  Mg     1.9     09-26    TPro  5.4<L>  /  Alb  3.0<L>  /  TBili  0.5  /  DBili  x   /  AST  18  /  ALT  12  /  AlkPhos  79  09-26                                             Urinalysis Basic - ( 26 Sep 2023 12:05 )    Color: x / Appearance: x / SG: x / pH: x  Gluc: 101 mg/dL / Ketone: x  / Bili: x / Urobili: x   Blood: x / Protein: x / Nitrite: x   Leuk Esterase: x / RBC: x / WBC x   Sq Epi: x / Non Sq Epi: x / Bacteria: x                                                  LIVER FUNCTIONS - ( 26 Sep 2023 12:05 )  Alb: 3.0 g/dL / Pro: 5.4 g/dL / ALK PHOS: 79 U/L / ALT: 12 U/L / AST: 18 U/L / GGT: x                                                                                                                                   ABG - ( 26 Sep 2023 15:34 )  pH, Arterial: 7.52  pH, Blood: x     /  pCO2: 26    /  pO2: 179   / HCO3: 21    / Base Excess: -0.3  /  SaO2: 100.0               MEDICATIONS  (STANDING):  buMETAnide Injectable 2 milliGRAM(s) IV Push two times a day  calcium acetate 667 milliGRAM(s) Oral three times a day with meals  chlorhexidine 2% Cloths 1 Application(s) Topical <User Schedule>  dextrose 5%. 1000 milliLiter(s) (100 mL/Hr) IV Continuous <Continuous>  dextrose 5%. 1000 milliLiter(s) (75 mL/Hr) IV Continuous <Continuous>  dextrose 50% Injectable 25 Gram(s) IV Push once  dextrose 50% Injectable 12.5 Gram(s) IV Push once  dextrose 50% Injectable 25 Gram(s) IV Push once  glucagon  Injectable 1 milliGRAM(s) IntraMuscular once  heparin   Injectable 5000 Unit(s) SubCutaneous every 12 hours  insulin lispro (ADMELOG) corrective regimen sliding scale   SubCutaneous three times a day before meals  metoprolol tartrate Injectable 2.5 milliGRAM(s) IV Push every 6 hours  pantoprazole  Injectable 40 milliGRAM(s) IV Push two times a day  sodium bicarbonate 1300 milliGRAM(s) Oral every 8 hours    MEDICATIONS  (PRN):  acetaminophen     Tablet .. 650 milliGRAM(s) Oral every 6 hours PRN Moderate Pain (4 - 6)  albuterol    90 MICROgram(s) HFA Inhaler 1 Puff(s) Inhalation every 6 hours PRN for shortness of breath and/or wheezing  dextrose Oral Gel 15 Gram(s) Oral once PRN Blood Glucose LESS THAN 70 milliGRAM(s)/deciliter      cxr noted

## 2023-09-27 NOTE — PROGRESS NOTE ADULT - SUBJECTIVE AND OBJECTIVE BOX
seen and examined  24 h events noted   no distress        PAST HISTORY  --------------------------------------------------------------------------------  No significant changes to PMH, PSH, FHx, SHx, unless otherwise noted    ALLERGIES & MEDICATIONS  --------------------------------------------------------------------------------  Allergies    No Known Allergies    Intolerances      Standing Inpatient Medications  buMETAnide Injectable 2 milliGRAM(s) IV Push two times a day  calcium acetate 667 milliGRAM(s) Oral three times a day with meals  chlorhexidine 2% Cloths 1 Application(s) Topical <User Schedule>  dextrose 5%. 1000 milliLiter(s) IV Continuous <Continuous>  dextrose 5%. 1000 milliLiter(s) IV Continuous <Continuous>  dextrose 50% Injectable 25 Gram(s) IV Push once  dextrose 50% Injectable 12.5 Gram(s) IV Push once  dextrose 50% Injectable 25 Gram(s) IV Push once  glucagon  Injectable 1 milliGRAM(s) IntraMuscular once  heparin   Injectable 5000 Unit(s) SubCutaneous every 12 hours  insulin lispro (ADMELOG) corrective regimen sliding scale   SubCutaneous three times a day before meals  metoprolol tartrate Injectable 2.5 milliGRAM(s) IV Push every 6 hours  pantoprazole  Injectable 40 milliGRAM(s) IV Push two times a day  sodium bicarbonate 1300 milliGRAM(s) Oral every 8 hours    PRN Inpatient Medications  acetaminophen     Tablet .. 650 milliGRAM(s) Oral every 6 hours PRN  albuterol    90 MICROgram(s) HFA Inhaler 1 Puff(s) Inhalation every 6 hours PRN  dextrose Oral Gel 15 Gram(s) Oral once PRN        VITALS/PHYSICAL EXAM  --------------------------------------------------------------------------------  T(C): 36.7 (09-27-23 @ 04:00), Max: 36.8 (09-26-23 @ 20:00)  HR: 116 (09-27-23 @ 04:00) (69 - 116)  BP: 111/49 (09-27-23 @ 04:00) (108/59 - 131/67)  RR: 20 (09-27-23 @ 04:00) (20 - 20)  SpO2: 98% (09-27-23 @ 04:00) (76% - 100%)  Wt(kg): --  Height (cm): 193 (09-25-23 @ 15:14)      09-26-23 @ 07:01  -  09-27-23 @ 07:00  --------------------------------------------------------  IN: 1175 mL / OUT: 0 mL / NET: 1175 mL      Physical Exam:  	Gen: NAD,  	Pulm: decrease BS  B/L  	CV:  S1S2; no rub  	Abd: +distended  	    LABS/STUDIES  --------------------------------------------------------------------------------              8.8    10.18 >-----------<  258      [09-27-23 @ 05:37]              27.8     153  |  110  |  141  ----------------------------<  100      [09-27-23 @ 05:37]  3.7   |  23  |  7.2        Ca     8.4     [09-27-23 @ 05:37]      Mg     1.9     [09-27-23 @ 05:37]      Phos  6.4     [09-27-23 @ 05:37]    TPro  5.6  /  Alb  2.9  /  TBili  0.5  /  DBili  x   /  AST  18  /  ALT  12  /  AlkPhos  76  [09-27-23 @ 05:37]      Creatinine Trend:  SCr 7.2 [09-27 @ 05:37]  SCr 7.3 [09-26 @ 12:05]  SCr 7.6 [09-25 @ 05:24]  SCr 7.5 [09-24 @ 11:00]  SCr 7.1 [09-23 @ 12:05]    Urinalysis - [09-27-23 @ 05:37]      Color  / Appearance  / SG  / pH       Gluc 100 / Ketone   / Bili  / Urobili        Blood  / Protein  / Leuk Est  / Nitrite       RBC  / WBC  / Hyaline  / Gran  / Sq Epi  / Non Sq Epi  / Bacteria

## 2023-09-27 NOTE — PROGRESS NOTE ADULT - SUBJECTIVE AND OBJECTIVE BOX
Gastroenterology Follow Up Note      Location: [facility] 2A 003 A ([facility] 2A)  Patient Name: NIMO SARMIENTO  Age: 83y  Gender: Male      Chief Complaint  Patient is a 83y old Male who presents with a chief complaint of AMS (27 Sep 2023 07:10)  Primary diagnosis of Altered mental status      Reason for Consult  Hematochezia      Progress Note  This morning patient was seen and examined at bedside.    Today is hospital day 12d.  Patient was upgraded on 09/26 after an episode of confusion and hypoxia.  Currently on nasal canula.   He is NPO and denies nausea or vomiting.   Patient reports improvement in abdominal pain that is burning in nature and radiating up to throat.  Per nurse he had an episode of dark brown stools on 09/23 PM with no recurrence since then (Per ICU team, no BM recorded for 09/24; per 3E team, no BM recorded for 09/25).      Vital Signs in the last 24 hours   Vitals Summary T(C): 36.7 (09-27-23 @ 04:00), Max: 36.8 (09-26-23 @ 20:00)  HR: 116 (09-27-23 @ 04:00) (69 - 116)  BP: 111/49 (09-27-23 @ 04:00) (108/59 - 131/67)  RR: 20 (09-27-23 @ 04:00) (20 - 20)  SpO2: 98% (09-27-23 @ 04:00) (76% - 100%)  Vent Data   Intake/ Output   09-26-23 @ 07:01  -  09-27-23 @ 07:00  --------------------------------------------------------  IN: 1175 mL / OUT: 0 mL / NET: 1175 mL        Physical Exam  * General Appearance: Alert but seems confused, cooperative, interactive, oriented to person but not to time or place, in no acute distress  * Neck: Supple, symmetrical, trachea midline, no adenopathy   * Lungs: Good bilateral air entry, normal breath sounds (Clear to auscultation bilaterally, no audible wheezes, crackles, or rhonchi)  * Heart: Regular Rate and Rhythm, normal S1 and S2, no audible murmur, rub, or gallop  * Abdomen: Symmetric, non-distended, soft, non-tender, bowel sounds active all four quadrants, no masses, no organomegaly (no hepatosplenomegaly)  * Rectal: red blood on 09/23      Investigations   Laboratory Workup      - CBC:                        8.2    12.20 )-----------( 258      ( 26 Sep 2023 12:05 )             25.1       - Hgb Trend:  8.2  09-26-23 @ 12:05  8.7  09-25-23 @ 11:59  8.9  09-25-23 @ 05:24  9.1  09-24-23 @ 11:00          - Chemistry:  09-27    153<H>  |  110  |  141<HH>  ----------------------------<  100<H>  3.7   |  23  |  7.2<HH>    Ca    8.4      27 Sep 2023 05:37  Phos  6.4     09-27  Mg     1.9     09-27    TPro  5.6<L>  /  Alb  2.9<L>  /  TBili  0.5  /  DBili  x   /  AST  18  /  ALT  12  /  AlkPhos  76  09-27    Liver panel trend:  TBili 0.5   /   AST 18   /   ALT 12   /   AlkP 76   /   Tptn 5.6   /   Alb 2.9    /   DBili --      09-27  TBili 0.5   /   AST 18   /   ALT 12   /   AlkP 79   /   Tptn 5.4   /   Alb 3.0    /   DBili -- 09-26  TBili 0.5   /   AST 20   /   ALT 12   /   AlkP 76   /   Tptn 5.5   /   Alb 2.9    /   DBili --      09-25  TBili 0.7   /   AST 17   /   ALT 11   /   AlkP 73   /   Tptn 5.3   /   Alb 2.8    /   DBili -- 09-24  TBili 0.3   /   AST 20   /   ALT 10   /   AlkP 70   /   Tptn 5.2   /   Alb 2.8    /   DBili -- 09-23  TBili 0.2   /   AST 21   /   ALT 12   /   AlkP 81   /   Tptn 5.8   /   Alb 2.9    /   DBili -- 09-21  TBili 0.2   /   AST 23   /   ALT 14   /   AlkP 84   /   Tptn 5.9   /   Alb 3.1    /   DBili --      09-20  TBili 0.3   /   AST 28   /   ALT 14   /   AlkP 86   /   Tptn 6.0   /   Alb 2.9    /   DBili --      09-19  TBili 0.3   /   AST 28   /   ALT 13   /   AlkP 83   /   Tptn 5.7   /   Alb 3.0    /   DBili --      09-18      - Coagulation Studies:      - ABG:  ABG - ( 26 Sep 2023 15:34 )  pH, Arterial: 7.52  pH, Blood: x     /  pCO2: 26    /  pO2: 179   / HCO3: 21    / Base Excess: -0.3  /  SaO2: 100.0       Microbiological Workup  Urinalysis Basic - ( 27 Sep 2023 05:37 )    Color: x / Appearance: x / SG: x / pH: x  Gluc: 100 mg/dL / Ketone: x  / Bili: x / Urobili: x   Blood: x / Protein: x / Nitrite: x   Leuk Esterase: x / RBC: x / WBC x   Sq Epi: x / Non Sq Epi: x / Bacteria: x      Current Medications  Standing Medications  buMETAnide Injectable 2 milliGRAM(s) IV Push two times a day  calcium acetate 667 milliGRAM(s) Oral three times a day with meals  chlorhexidine 2% Cloths 1 Application(s) Topical <User Schedule>  dextrose 5%. 1000 milliLiter(s) (100 mL/Hr) IV Continuous <Continuous>  dextrose 5%. 1000 milliLiter(s) (75 mL/Hr) IV Continuous <Continuous>  dextrose 50% Injectable 25 Gram(s) IV Push once  dextrose 50% Injectable 25 Gram(s) IV Push once  dextrose 50% Injectable 12.5 Gram(s) IV Push once  glucagon  Injectable 1 milliGRAM(s) IntraMuscular once  heparin   Injectable 5000 Unit(s) SubCutaneous every 12 hours  insulin lispro (ADMELOG) corrective regimen sliding scale   SubCutaneous three times a day before meals  metoprolol tartrate Injectable 2.5 milliGRAM(s) IV Push every 6 hours  pantoprazole  Injectable 40 milliGRAM(s) IV Push two times a day  sodium bicarbonate 1300 milliGRAM(s) Oral every 8 hours    PRN Medications  acetaminophen     Tablet .. 650 milliGRAM(s) Oral every 6 hours PRN Moderate Pain (4 - 6)  albuterol    90 MICROgram(s) HFA Inhaler 1 Puff(s) Inhalation every 6 hours PRN for shortness of breath and/or wheezing  dextrose Oral Gel 15 Gram(s) Oral once PRN Blood Glucose LESS THAN 70 milliGRAM(s)/deciliter    Singles Doses Administered  (ADM OVERRIDE) 1 each &lt;see task&gt; GiveOnce  (ADM OVERRIDE) 1 each &lt;see task&gt; GiveOnce  (ADM OVERRIDE) 1 each &lt;see task&gt; GiveOnce  (ADM OVERRIDE) 1 each &lt;see task&gt; GiveOnce  (ADM OVERRIDE) 1 each &lt;see task&gt; GiveOnce  (ADM OVERRIDE) 1 each &lt;see task&gt; GiveOnce  (ADM OVERRIDE) 1 each &lt;see task&gt; GiveOnce  (ADM OVERRIDE) 1 each &lt;see task&gt; GiveOnce  (ADM OVERRIDE) 1 each &lt;see task&gt; GiveOnce  (ADM OVERRIDE) 1 each &lt;see task&gt; GiveOnce  (ADM OVERRIDE) 5 each &lt;see task&gt; GiveOnce  buMETAnide Injectable 2 milliGRAM(s) IV Push once  cefepime   IVPB 2000 milliGRAM(s) IV Intermittent once  cefepime   IVPB 2000 milliGRAM(s) IV Intermittent every 8 hours  cefepime   IVPB      diphenhydrAMINE Injectable 50 milliGRAM(s) IntraMuscular once  fentaNYL    Injectable 50 MICROGram(s) IV Push every 10 minutes PRN  haloperidol    Injectable 5 milliGRAM(s) IntraMuscular Once  heparin   Injectable 8000 Unit(s) IV Push once  lactated ringers Bolus 500 milliLiter(s) IV Bolus once  lactated ringers Bolus 500 milliLiter(s) IV Bolus once  lactated ringers Bolus 1000 milliLiter(s) IV Bolus once  lactated ringers Bolus 1000 milliLiter(s) IV Bolus once  lactated ringers Bolus 250 milliLiter(s) IV Bolus once  LORazepam   Injectable 1 milliGRAM(s) IntraMuscular once  magnesium sulfate  IVPB 2 Gram(s) IV Intermittent once  mupirocin 2% Ointment 1 Application(s) Both Nostrils two times a day  potassium chloride   Powder 40 milliEquivalent(s) Oral once  potassium chloride   Powder 40 milliEquivalent(s) Oral once  potassium chloride  20 mEq/100 mL IVPB 20 milliEquivalent(s) IV Intermittent every 2 hours  protamine  IVPB 36 milliGRAM(s) IV Intermittent once  sodium chloride 0.9% Bolus 750 milliLiter(s) IV Bolus once  sodium chloride 0.9% Bolus 250 milliLiter(s) IV Bolus once  vancomycin  IVPB. 1000 milliGRAM(s) IV Intermittent once

## 2023-09-27 NOTE — PROGRESS NOTE ADULT - ASSESSMENT
83 year old male patient known to have COPD. DM, atrial fibrillation, HFrEF, anemia, lymphedema, and recent left foot OM who was brought from the NH to the ED on 09/15 for evaluation of altered mental status, found to have sepsis in setting of recent left foot OM, admitted for further management. Stay was complicated by hemorrhagic shock and drop in Hb secondary to hematochezia on 09/23. Status post EGD on 09/23 revealing a bleeding duodenal ulcer s/p OVESCO placement.    #Acute hypoxic respiratory failure  - desaturated 9/26 and upgrade to SDU  - CXR stable opacities  - Frequents suctioning  - Aspiration precautions  - avoid volume overload    #UGIB secondary to Duodenal ulcer s/p EGD   #Hematochezia  #Hemorrhagic Shock  - CT angio AP with IC 09/23: arterial extravasation between D1 and 2 with venous pooling, small hiatal hernia  - EGD 09/23: bleeding duodenal ulcer s/p OVESCO placement  - s/p 5 units pRBC on 09/23  - s/p protamine sulfate x1 on 09/23  - Trend CBC closely BID and transfuse as needed (target Hb >8). Maintain active Type and screen  - c/w Protonix IV 40mg BID  - c/w heparin (discussed with GI about the risk and benefit of prophylactic AC)  - Monitor BM for recurrence of hematochezia or melena  - Check stool H pylori Ag  - GI following    #PAULA on CKD  - trend BUN/Cr  - phosphorus  level noted/phoslo 2/2/2  - change bumex to po  - start midodrine 5 q 8    - d/c sodium bicarbonate once HD initiated   - c/w sodium bicarbonate to 1300 q 8  - need [place] and dialysis 9/27  - Nephro following    #History of Left Foot OM w/ surrounding Cellulitis   #Sacral DTI/Stage 1 POA   - ID was consulted and recommended monitoring off antibiotics  - wound care recs appreciated    #Afib  - home Xarelto  - c/w heparin  - start Lopressor IV    #MISC:  -DVT ppx: Hep sq (discussed with GI about the risk and benefit of prophylactic AC)  -GI ppx: PPI  -Diet: minced  -Activity: IAT  -Code status: Full code (family does not want to talk to palliative and re-confirmed full code)  -Dispo: Acute, Follow up Crit care rad

## 2023-09-27 NOTE — PROGRESS NOTE ADULT - ASSESSMENT
Assessment and Plan  Case of an 83 year old male patient known to have COPD. DM, atrial fibrillation, HFrEF, anemia, lymphedema, and recent left foot OM who was brought from the NH to the ED on 09/15 for evaluation of altered mental status, found to have sepsis in setting of recent left foot OM, admitted for further management. Stay was complicated by hemorrhagic shock and drop in Hb secondary to hematochezia on 09/23. Status post EGD on 09/23 revealing a bleeding duodenal ulcer s/p OVESCO placement Stay also complicated by an episode of confusion and hypoxia on 09/26 s/p upgraded to step down, currently on diuretics and NC. We are consulted for hematochezia and hypotension.      Hemorrhagic Shock Secondary to Hematochezia from Duodenal Ulcer Bleed s/p OVESCO placement 09/23  Acute Drop in Hemoglobin- Improved  * Hemodynamically stable  * Baseline hemoglobin (prior to admission): Hb 13.8 in 2016 -> 09/12 9.5  * ED Vitals:  /63 mmHg, HR 88 bpm  * Hemoglobin on admission: Hb 9.6 on 09/15 -> Hb 8.1/7.3 s/p 5 units pRBC on 09/23 -> 09/24 Hb 9.1-> 09/25 Hb 8.9/8.7 -> 09/26 Hb 8.2  * Coags: INR 1.59 on 09/15  * AC: on Xarelto for afib until 09/15. Then Heparin 5000 units SC Q8h until 09/20, then IV Heparin with prolonged aPTT from 09/20-09/23, then SCDs since 09/23  * Status post IV Protamine sulfate 36mg x1 dose on 09/23  * MARISSA red blood 09/23  * CT angio AP with IC 09/23: arterial extravasation between D1 and 2 with venous pooling, small hiatal hernia  * No prior EGD or colonoscopy  * Family History: negative for GI malignancies  * Status Post EGD on 09/23: blood clots in fundus and antrum; 2cm actively bleeding duodenal ulcer with spurting vessel ([place] 1a) in sweep on base s/p 10cc epi and s/p 11/6 OVESCO placement for hemostasis    RECOMMENDATIONS  - Status post EGD on 09/23 as detailed above (s/p OVESCO placement for hemostasis)  - Continue diet per speech and swallow: currently back to NPO after episode of confusion and hypoxia on 09/26. Recommendation was minced and moist; mildly thick liquids  - Trend CBC closely BID and transfuse as needed (target Hb >8). Maintain active Type and screen  - Check stool H pylori Ag (could be false negative and require follow up testing to confirm H pylori status)  - Trend BUN  - x2 large 18G IV Bores  - Continue IV Protonix 40mg BID (started 09/25). Was on IV pantoprazole drip (09/23-09/25)  - Continue holding therapeutic AC: was on Xarelto for afib until 09/15, then Heparin 5000 units SC Q8h until 09/20, then IV Heparin with prolonged aPTT from 09/20-09/23, then SCDs since 09/23. Currently on prophylactic dose of heparin. Status post IV Protamine sulfate 36mg x1 dose on 09/23  - Monitor BM for recurrence of hematochezia or melena  - Please avoid any NSAIDs  - If any unstable bleed, please call GI STAT  - Follow up with our [facility] located at [address] [place] [address]. Phone Number: [phone]        Asymptomatic Cholelithiasis  * Noted on CT  * LFTs noted    RECOMMENDATIONS  - Monitor for symptoms  - Trend LFTs      Thank you for your consult.  - Please note that plan was discussed with Dr. Santoro and communicated with medical team.  - Please reach GI on [phone] during weekdays till 5pm.  - Please call the GI service line after 5pm on Weekdays and anytime on Weekends: [phone].      Rickie Jenkins MD  PGY - 4 Gastroenterology Fellow  [facility]

## 2023-09-27 NOTE — CHART NOTE - NSCHARTNOTEFT_GEN_A_CORE
Repeat CBC showed hemoglobin of 7.9. As per GI recommendations, ordered 1 unit of pRBC to keep goal Hgb >8.  No melena today as per nurse. Vitals BP  Ordered repeat CBC and type and screen at 23:30. Will follow up. Repeat CBC showed hemoglobin of 7.9. As per GI recommendations, ordered 1 unit of pRBC to keep goal Hgb >8.  No melena today as per nurse. Vitals stable.  Ordered repeat CBC and type and screen at 23:30. Will follow up.

## 2023-09-27 NOTE — PROGRESS NOTE ADULT - ASSESSMENT
Impression    Acute hypoxemic respiratory failure   r pleural effusion  aspiration penumonia  UGIB secondary to Duodenal ulcer SP EGD   Hematochezia resolved   Hemorrhagic Shock resolved   Metabolic Encephalopathy   hypernatremia  PAULA / CKD stable non oliguric   History of Left Foot OM w/ surrounding Cellulitis   Sacral DTI / Stage 1 POA     Plan:      CNS:  Monitor mental status     HEENT:  Aspiration precautions    CARDIOVASCULAR Keep I=O . hold bumex    PULMONARY: Chest xray noted. Aggressive pulmonary toilet, including chest PT, albuterol and mucocyst nebulizers and frequent suctioning. Incentive spirometry  keep Sao2 92 to 96%    GASTROINTESTINAL : feeding per Speech and swallow eval. Protonix q 12, NGT     GENITOURINARY/RENAL: Nephro following; need HD    INFECTIOUS : Repeat cx, abx per ID    HEMATOLOGIC: SCD for now. Keep type and screen active. Target Hb > 7. trend CBC     ENDOCRINE  Follow up FS.  Insulin protocol as needed.  BG goal 140-180    MSK/DERM  PT/OT when cooperative.  Off loading       Palliative care eval  poor prognosis

## 2023-09-27 NOTE — PROGRESS NOTE ADULT - ASSESSMENT
84 yo m ho DM, COPD, anemia, paroxysmal afib on xarelto, HFrEF, DM coming in from nursing home for AMS x 2 days. Found to have toxic metabolic encephalopathy with PAULA.  PAULA/ toxic metabolic encephalopathy/ HAGMA/ HFrEF  possible ATN iso hypotension and possible sepsis/ vanco toxicity  no hydro on imaging  creat trend noted   please call surgery for udall placement / will start HD today    phosphorus  level noted / phoslo 2/2/2  change bumex to po  start midodrine 5 q 8    d/c sodium bicarbonate once hd initiated   cont  sodium bicarbonate to  1300 q 8   will follow

## 2023-09-27 NOTE — PROGRESS NOTE ADULT - ATTENDING COMMENTS
My note supersedes all residents notes that I sign, My correction for their notes are in my notes   On exam  General: awake, confused , NAD, chronic ill appearance  Lungs:  clear to ausculations b/l, normal resp effort  Heart: regular ryhthm   Abdomen: soft, non tender non distended, urine collecting bag  Ext: no edema, can move all  his extremities       Acute Hypoxic Respiratory Failure, possible aspiration pna / volume overload  /R pleural effusions   Anemia of acute blood loss   Hemorrhagic Shock Secondary to Hematochezia from Duodenal Ulcer Bleed s/p OVESCO placement 09/23  Acutely worsening uremia and acute kidney failure , at high risk for ATN  Afib, chronic; uncontrolled rate   Reported possible L3L4 OM on CT scan/ sacral DTI/ Foot cellulitis on broad-spectrum intravenous antibiotics as per ID recs  Increased frailty and decreased physical capacity   Metabolic Encephalopathy   hypernatremia  PAULA / CKD stable non oliguric   History of Left Foot OM w/ surrounding Cellulitis   skin wounds / Left heel DTI        PLAN  as per previous notes the team Discussed with ID attending: patient is unlikely to benefit from prolonged therapy with cefepime+vanco (to cover possible OM) due to adverse outcomes associated kidney dysfunction, cognitive impact ...etc )   GI f/u appreciated Continue IV Protonix 40mg BID,  Continue holding therapeutic AC - Status post IV Protamine sulfate 36mg x1 dose on 09/23  void any NSAIDs, Check stool H pylori Ag (could be false negative and require follow up testing to confirm H pylori status)  F/U Hb transfuse PRN   Close F/U of BUN/Crea/ Lytes and UO   - podiatry input appreciated c/w Local wound care; offloading boots bilaterally  wound care input appreciated; frequent turning, off loading,and positioning and skin care as per protocol, Maintain pressure injury prevention, Keep skin clean, Offload heels, Monitor wound for changes and notify provider if any   as per nephrology - udall placement today to start HD today   c/w phoslo 2/2/2,  change bumex to po  start midodrine 5 q 8  , d/c sodium bicarbonate once hd initiated   SLP input appreciated   Creatinine Trend: 7.2<--, 7.3<--, 7.6<--, 7.5<--, 7.1<--, 7.6<--  Hemoglobin: 8.8 g/dL (09-27 @ 05:37)  Hemoglobin: 8.2 g/dL (09-26 @ 12:05)  Hemoglobin: 8.7 g/dL (09-25 @ 11:59)  Hemoglobin: 8.9 g/dL (09-25 @ 05:24)  Hemoglobin: 9.1 g/dL (09-24 @ 11:00)        DVT ppx heparin sq - NO THERAPEUTIC AG as per GI   GI PPX: PPI IV bid       GOC :  Full code -  As per palliative care team: next-of-kin not in favor of further discussing Goals of Care Conversation or advance directives. My note supersedes all residents notes that I sign, My correction for their notes are in my notes   On exam  General: awake, confused , NAD, chronic ill appearance  Lungs:  clear to ausculations b/l, normal resp effort  Heart: regular ryhthm   Abdomen: soft, non tender non distended, urine collecting bag  Ext: no edema, can move all  his extremities       Acute Hypoxic Respiratory Failure, possible aspiration pna / volume overload  /R pleural effusions   Anemia of acute blood loss   Hemorrhagic Shock Secondary to Hematochezia from Duodenal Ulcer Bleed s/p OVESCO placement 09/23  Acutely worsening uremia and acute kidney failure , at high risk for ATN  Afib, chronic; uncontrolled rate   Reported possible L3L4 OM on CT scan/ sacral DTI/ Foot cellulitis on broad-spectrum intravenous antibiotics as per ID recs  Increased frailty and decreased physical capacity   Metabolic Encephalopathy   hypernatremia  PAULA / CKD stable non oliguric   History of Left Foot OM w/ surrounding Cellulitis   skin wounds / Left heel DTI        PLAN  as per previous notes the team Discussed with ID attending: patient is unlikely to benefit from prolonged therapy with cefepime+vanco (to cover possible OM) due to adverse outcomes associated kidney dysfunction, cognitive impact ...etc )   GI f/u appreciated Continue IV Protonix 40mg BID,  Continue holding therapeutic AC - Status post IV Protamine sulfate 36mg x1 dose on 09/23  void any NSAIDs, Check stool H pylori Ag (could be false negative and require follow up testing to confirm H pylori status)  F/U Hb transfuse PRN   Close F/U of BUN/Crea/ Lytes and UO   - podiatry input appreciated c/w Local wound care; offloading boots bilaterally  wound care input appreciated; frequent turning, off loading,and positioning and skin care as per protocol, Maintain pressure injury prevention, Keep skin clean, Offload heels, Monitor wound for changes and notify provider if any   as per nephrology - udall placement today to start HD today   c/w phoslo 2/2/2,  change bumex to po  start midodrine 5 q 8  , d/c sodium bicarbonate once hd initiated   SLP input appreciated - NPO , place NGT and fu with SLP   Creatinine Trend: 7.2<--, 7.3<--, 7.6<--, 7.5<--, 7.1<--, 7.6<--  Hemoglobin: 8.8 g/dL (09-27 @ 05:37)  Hemoglobin: 8.2 g/dL (09-26 @ 12:05)  Hemoglobin: 8.7 g/dL (09-25 @ 11:59)  Hemoglobin: 8.9 g/dL (09-25 @ 05:24)  Hemoglobin: 9.1 g/dL (09-24 @ 11:00)        DVT ppx heparin sq - NO THERAPEUTIC AG as per GI   GI PPX: PPI IV bid       GOC :  Full code -  As per palliative care team: next-of-kin not in favor of further discussing Goals of Care Conversation or advance directives.

## 2023-09-28 LAB
ALBUMIN SERPL ELPH-MCNC: 3.1 G/DL — LOW (ref 3.5–5.2)
ALP SERPL-CCNC: 78 U/L — SIGNIFICANT CHANGE UP (ref 30–115)
ALT FLD-CCNC: 11 U/L — SIGNIFICANT CHANGE UP (ref 0–41)
ANION GAP SERPL CALC-SCNC: 17 MMOL/L — HIGH (ref 7–14)
ANION GAP SERPL CALC-SCNC: 19 MMOL/L — HIGH (ref 7–14)
AST SERPL-CCNC: 19 U/L — SIGNIFICANT CHANGE UP (ref 0–41)
BASOPHILS # BLD AUTO: 0.04 K/UL — SIGNIFICANT CHANGE UP (ref 0–0.2)
BASOPHILS # BLD AUTO: 0.04 K/UL — SIGNIFICANT CHANGE UP (ref 0–0.2)
BASOPHILS NFR BLD AUTO: 0.4 % — SIGNIFICANT CHANGE UP (ref 0–1)
BASOPHILS NFR BLD AUTO: 0.4 % — SIGNIFICANT CHANGE UP (ref 0–1)
BILIRUB SERPL-MCNC: 0.6 MG/DL — SIGNIFICANT CHANGE UP (ref 0.2–1.2)
BUN SERPL-MCNC: 121 MG/DL — CRITICAL HIGH (ref 10–20)
BUN SERPL-MCNC: 87 MG/DL — CRITICAL HIGH (ref 10–20)
CALCIUM SERPL-MCNC: 8.1 MG/DL — LOW (ref 8.4–10.5)
CALCIUM SERPL-MCNC: 8.4 MG/DL — SIGNIFICANT CHANGE UP (ref 8.4–10.4)
CHLORIDE SERPL-SCNC: 111 MMOL/L — HIGH (ref 98–110)
CHLORIDE SERPL-SCNC: 112 MMOL/L — HIGH (ref 98–110)
CO2 SERPL-SCNC: 22 MMOL/L — SIGNIFICANT CHANGE UP (ref 17–32)
CO2 SERPL-SCNC: 24 MMOL/L — SIGNIFICANT CHANGE UP (ref 17–32)
CREAT SERPL-MCNC: 4.6 MG/DL — CRITICAL HIGH (ref 0.7–1.5)
CREAT SERPL-MCNC: 6 MG/DL — CRITICAL HIGH (ref 0.7–1.5)
EGFR: 12 ML/MIN/1.73M2 — LOW
EGFR: 9 ML/MIN/1.73M2 — LOW
EOSINOPHIL # BLD AUTO: 0.44 K/UL — SIGNIFICANT CHANGE UP (ref 0–0.7)
EOSINOPHIL # BLD AUTO: 0.48 K/UL — SIGNIFICANT CHANGE UP (ref 0–0.7)
EOSINOPHIL NFR BLD AUTO: 4.2 % — SIGNIFICANT CHANGE UP (ref 0–8)
EOSINOPHIL NFR BLD AUTO: 5.2 % — SIGNIFICANT CHANGE UP (ref 0–8)
GLUCOSE BLDC GLUCOMTR-MCNC: 89 MG/DL — SIGNIFICANT CHANGE UP (ref 70–99)
GLUCOSE BLDC GLUCOMTR-MCNC: 93 MG/DL — SIGNIFICANT CHANGE UP (ref 70–99)
GLUCOSE BLDC GLUCOMTR-MCNC: 96 MG/DL — SIGNIFICANT CHANGE UP (ref 70–99)
GLUCOSE BLDC GLUCOMTR-MCNC: 98 MG/DL — SIGNIFICANT CHANGE UP (ref 70–99)
GLUCOSE SERPL-MCNC: 68 MG/DL — LOW (ref 70–99)
GLUCOSE SERPL-MCNC: 92 MG/DL — SIGNIFICANT CHANGE UP (ref 70–99)
HBV CORE AB SER-ACNC: SIGNIFICANT CHANGE UP
HBV SURFACE AB SER-ACNC: <3 MIU/ML — LOW
HBV SURFACE AB SER-ACNC: SIGNIFICANT CHANGE UP
HBV SURFACE AG SER-ACNC: SIGNIFICANT CHANGE UP
HCT VFR BLD CALC: 28.1 % — LOW (ref 42–52)
HCT VFR BLD CALC: 30.1 % — LOW (ref 42–52)
HGB BLD-MCNC: 8.7 G/DL — LOW (ref 14–18)
HGB BLD-MCNC: 9.2 G/DL — LOW (ref 14–18)
IMM GRANULOCYTES NFR BLD AUTO: 0.3 % — SIGNIFICANT CHANGE UP (ref 0.1–0.3)
IMM GRANULOCYTES NFR BLD AUTO: 0.4 % — HIGH (ref 0.1–0.3)
LYMPHOCYTES # BLD AUTO: 2.16 K/UL — SIGNIFICANT CHANGE UP (ref 1.2–3.4)
LYMPHOCYTES # BLD AUTO: 2.65 K/UL — SIGNIFICANT CHANGE UP (ref 1.2–3.4)
LYMPHOCYTES # BLD AUTO: 23.6 % — SIGNIFICANT CHANGE UP (ref 20.5–51.1)
LYMPHOCYTES # BLD AUTO: 25.5 % — SIGNIFICANT CHANGE UP (ref 20.5–51.1)
MAGNESIUM SERPL-MCNC: 1.9 MG/DL — SIGNIFICANT CHANGE UP (ref 1.8–2.4)
MCHC RBC-ENTMCNC: 26.7 PG — LOW (ref 27–31)
MCHC RBC-ENTMCNC: 27.2 PG — SIGNIFICANT CHANGE UP (ref 27–31)
MCHC RBC-ENTMCNC: 30.6 G/DL — LOW (ref 32–37)
MCHC RBC-ENTMCNC: 31 G/DL — LOW (ref 32–37)
MCV RBC AUTO: 87.5 FL — SIGNIFICANT CHANGE UP (ref 80–94)
MCV RBC AUTO: 87.8 FL — SIGNIFICANT CHANGE UP (ref 80–94)
MONOCYTES # BLD AUTO: 0.82 K/UL — HIGH (ref 0.1–0.6)
MONOCYTES # BLD AUTO: 0.85 K/UL — HIGH (ref 0.1–0.6)
MONOCYTES NFR BLD AUTO: 7.9 % — SIGNIFICANT CHANGE UP (ref 1.7–9.3)
MONOCYTES NFR BLD AUTO: 9.3 % — SIGNIFICANT CHANGE UP (ref 1.7–9.3)
NEUTROPHILS # BLD AUTO: 5.59 K/UL — SIGNIFICANT CHANGE UP (ref 1.4–6.5)
NEUTROPHILS # BLD AUTO: 6.4 K/UL — SIGNIFICANT CHANGE UP (ref 1.4–6.5)
NEUTROPHILS NFR BLD AUTO: 61.2 % — SIGNIFICANT CHANGE UP (ref 42.2–75.2)
NEUTROPHILS NFR BLD AUTO: 61.6 % — SIGNIFICANT CHANGE UP (ref 42.2–75.2)
NRBC # BLD: 0 /100 WBCS — SIGNIFICANT CHANGE UP (ref 0–0)
NRBC # BLD: 0 /100 WBCS — SIGNIFICANT CHANGE UP (ref 0–0)
PHOSPHATE SERPL-MCNC: 5.8 MG/DL — HIGH (ref 2.1–4.9)
PLATELET # BLD AUTO: 198 K/UL — SIGNIFICANT CHANGE UP (ref 130–400)
PLATELET # BLD AUTO: 210 K/UL — SIGNIFICANT CHANGE UP (ref 130–400)
PMV BLD: 10.1 FL — SIGNIFICANT CHANGE UP (ref 7.4–10.4)
PMV BLD: 10.5 FL — HIGH (ref 7.4–10.4)
POTASSIUM SERPL-MCNC: 3.6 MMOL/L — SIGNIFICANT CHANGE UP (ref 3.5–5)
POTASSIUM SERPL-MCNC: 3.6 MMOL/L — SIGNIFICANT CHANGE UP (ref 3.5–5)
POTASSIUM SERPL-SCNC: 3.6 MMOL/L — SIGNIFICANT CHANGE UP (ref 3.5–5)
POTASSIUM SERPL-SCNC: 3.6 MMOL/L — SIGNIFICANT CHANGE UP (ref 3.5–5)
PROT SERPL-MCNC: 6 G/DL — SIGNIFICANT CHANGE UP (ref 6–8)
RBC # BLD: 3.2 M/UL — LOW (ref 4.7–6.1)
RBC # BLD: 3.44 M/UL — LOW (ref 4.7–6.1)
RBC # FLD: 20.7 % — HIGH (ref 11.5–14.5)
RBC # FLD: 20.9 % — HIGH (ref 11.5–14.5)
SODIUM SERPL-SCNC: 152 MMOL/L — HIGH (ref 135–146)
SODIUM SERPL-SCNC: 153 MMOL/L — HIGH (ref 135–146)
WBC # BLD: 10.39 K/UL — SIGNIFICANT CHANGE UP (ref 4.8–10.8)
WBC # BLD: 9.15 K/UL — SIGNIFICANT CHANGE UP (ref 4.8–10.8)
WBC # FLD AUTO: 10.39 K/UL — SIGNIFICANT CHANGE UP (ref 4.8–10.8)
WBC # FLD AUTO: 9.15 K/UL — SIGNIFICANT CHANGE UP (ref 4.8–10.8)

## 2023-09-28 PROCEDURE — 99233 SBSQ HOSP IP/OBS HIGH 50: CPT

## 2023-09-28 PROCEDURE — 71045 X-RAY EXAM CHEST 1 VIEW: CPT | Mod: 26

## 2023-09-28 PROCEDURE — 93970 EXTREMITY STUDY: CPT | Mod: 26

## 2023-09-28 PROCEDURE — 99232 SBSQ HOSP IP/OBS MODERATE 35: CPT

## 2023-09-28 PROCEDURE — 71045 X-RAY EXAM CHEST 1 VIEW: CPT | Mod: 26,77

## 2023-09-28 RX ORDER — SODIUM CHLORIDE 9 MG/ML
250 INJECTION, SOLUTION INTRAVENOUS ONCE
Refills: 0 | Status: COMPLETED | OUTPATIENT
Start: 2023-09-28 | End: 2023-09-28

## 2023-09-28 RX ADMIN — HEPARIN SODIUM 5000 UNIT(S): 5000 INJECTION INTRAVENOUS; SUBCUTANEOUS at 06:44

## 2023-09-28 RX ADMIN — MIDODRINE HYDROCHLORIDE 5 MILLIGRAM(S): 2.5 TABLET ORAL at 13:19

## 2023-09-28 RX ADMIN — Medication 2.5 MILLIGRAM(S): at 06:49

## 2023-09-28 RX ADMIN — CHLORHEXIDINE GLUCONATE 1 APPLICATION(S): 213 SOLUTION TOPICAL at 06:45

## 2023-09-28 RX ADMIN — Medication 667 MILLIGRAM(S): at 19:57

## 2023-09-28 RX ADMIN — MIDODRINE HYDROCHLORIDE 5 MILLIGRAM(S): 2.5 TABLET ORAL at 19:57

## 2023-09-28 RX ADMIN — Medication 2.5 MILLIGRAM(S): at 13:19

## 2023-09-28 RX ADMIN — Medication 2.5 MILLIGRAM(S): at 00:45

## 2023-09-28 RX ADMIN — Medication 667 MILLIGRAM(S): at 13:19

## 2023-09-28 RX ADMIN — Medication 1300 MILLIGRAM(S): at 06:44

## 2023-09-28 RX ADMIN — MIDODRINE HYDROCHLORIDE 5 MILLIGRAM(S): 2.5 TABLET ORAL at 06:49

## 2023-09-28 RX ADMIN — PANTOPRAZOLE SODIUM 40 MILLIGRAM(S): 20 TABLET, DELAYED RELEASE ORAL at 19:57

## 2023-09-28 RX ADMIN — PANTOPRAZOLE SODIUM 40 MILLIGRAM(S): 20 TABLET, DELAYED RELEASE ORAL at 06:44

## 2023-09-28 RX ADMIN — Medication 2.5 MILLIGRAM(S): at 19:57

## 2023-09-28 RX ADMIN — SODIUM CHLORIDE 250 MILLILITER(S): 9 INJECTION, SOLUTION INTRAVENOUS at 13:21

## 2023-09-28 NOTE — PROGRESS NOTE ADULT - ASSESSMENT
Impression    Acute hypoxemic respiratory failure on NC  r pleural effusion  aspiration pneumonia  UGIB secondary to Duodenal ulcer SP EGD   Hematochezia resolved   Hemorrhagic Shock resolved   Metabolic Encephalopathy   hypernatremia  PAULA / CKD SP HD  History of Left Foot OM w/ surrounding Cellulitis   Sacral DTI    Plan:      CNS:  Monitor mental status     HEENT:  Aspiration precautions    CARDIOVASCULAR Keep I=O .     PULMONARY: Chest xray noted. Aggressive pulmonary toilet, including chest PT, albuterol and mucocyst nebulizers and frequent suctioning. Incentive spirometry  keep Sao2 92 to 96%    GASTROINTESTINAL : feeding per Speech and swallow eval. Protonix q 12     GENITOURINARY/RENAL: Nephro following; HD    INFECTIOUS : , abx per ID    HEMATOLOGIC: SCD for now. Keep type and screen active. Target Hb > 7. trend CBC     ENDOCRINE  Follow up FS.  Insulin protocol as needed.  BG goal 140-180    MSK/DERM  PT/OT when cooperative.  Off loading       Palliative care eval  poor prognosis

## 2023-09-28 NOTE — PROCEDURE NOTE - NSPOSTPRCRAD_GEN_A_CORE
central line located in the superior vena cava/no pneumothorax/post-procedure radiography performed
central line located in the/central line located in the superior vena cava/post-procedure radiography performed

## 2023-09-28 NOTE — PROGRESS NOTE ADULT - SUBJECTIVE AND OBJECTIVE BOX
Over Night Events: events noted, sp r ij u dall, afebrile, pulled IV    PHYSICAL EXAM    ICU Vital Signs Last 24 Hrs  T(C): 36.2 (28 Sep 2023 04:00), Max: 36.8 (27 Sep 2023 21:30)  T(F): 97.1 (28 Sep 2023 04:00), Max: 98.2 (27 Sep 2023 21:30)  HR: 80 (28 Sep 2023 06:45) (73 - 118)  BP: 152/65 (28 Sep 2023 06:45) (95/36 - 152/65)  BP(mean): 93 (28 Sep 2023 06:45) (77 - 107)  RR: 20 (27 Sep 2023 17:15) (20 - 20)  SpO2: 99% (28 Sep 2023 06:45) (98% - 100%)    O2 Parameters below as of 28 Sep 2023 04:00  Patient On (Oxygen Delivery Method): nasal cannula            General: Ill looking  Lungs: dec bs r base  Cardiovascular: MAY 2.6  Abdomen: Soft, Positive BS  Extremities: No clubbing   Neurological: Non focal       09-27-23 @ 07:01  -  09-28-23 @ 07:00  --------------------------------------------------------  IN:    Other (mL): 200 mL  Total IN: 200 mL    OUT:  Total OUT: 0 mL    Total NET: 200 mL          LABS:                          8.7    9.15  )-----------( 210      ( 28 Sep 2023 04:39 )             28.1                                               09-28    152<H>  |  111<H>  |  121<HH>  ----------------------------<  92  3.6   |  22  |  6.0<HH>    Ca    8.4      28 Sep 2023 04:39  Phos  5.8     09-28  Mg     1.9     09-28    TPro  5.6<L>  /  Alb  2.9<L>  /  TBili  0.5  /  DBili  x   /  AST  18  /  ALT  12  /  AlkPhos  76  09-27                                             Urinalysis Basic - ( 28 Sep 2023 04:39 )    Color: x / Appearance: x / SG: x / pH: x  Gluc: 92 mg/dL / Ketone: x  / Bili: x / Urobili: x   Blood: x / Protein: x / Nitrite: x   Leuk Esterase: x / RBC: x / WBC x   Sq Epi: x / Non Sq Epi: x / Bacteria: x                                                  LIVER FUNCTIONS - ( 27 Sep 2023 05:37 )  Alb: 2.9 g/dL / Pro: 5.6 g/dL / ALK PHOS: 76 U/L / ALT: 12 U/L / AST: 18 U/L / GGT: x                                                  Culture - Blood (collected 26 Sep 2023 12:05)  Source: .Blood Blood  Preliminary Report (27 Sep 2023 23:01):    No growth at 24 hours                                                                                       ABG - ( 26 Sep 2023 15:34 )  pH, Arterial: 7.52  pH, Blood: x     /  pCO2: 26    /  pO2: 179   / HCO3: 21    / Base Excess: -0.3  /  SaO2: 100.0               MEDICATIONS  (STANDING):  calcium acetate 667 milliGRAM(s) Oral three times a day with meals  chlorhexidine 2% Cloths 1 Application(s) Topical <User Schedule>  dextrose 5%. 1000 milliLiter(s) (100 mL/Hr) IV Continuous <Continuous>  dextrose 5%. 1000 milliLiter(s) (75 mL/Hr) IV Continuous <Continuous>  dextrose 50% Injectable 25 Gram(s) IV Push once  dextrose 50% Injectable 25 Gram(s) IV Push once  dextrose 50% Injectable 12.5 Gram(s) IV Push once  glucagon  Injectable 1 milliGRAM(s) IntraMuscular once  heparin   Injectable 5000 Unit(s) SubCutaneous every 12 hours  insulin lispro (ADMELOG) corrective regimen sliding scale   SubCutaneous three times a day before meals  metoprolol tartrate Injectable 2.5 milliGRAM(s) IV Push every 6 hours  midodrine. 5 milliGRAM(s) Oral three times a day  pantoprazole  Injectable 40 milliGRAM(s) IV Push two times a day  sodium bicarbonate 1300 milliGRAM(s) Oral every 8 hours    MEDICATIONS  (PRN):  acetaminophen     Tablet .. 650 milliGRAM(s) Oral every 6 hours PRN Moderate Pain (4 - 6)  albuterol    90 MICROgram(s) HFA Inhaler 1 Puff(s) Inhalation every 6 hours PRN for shortness of breath and/or wheezing  dextrose Oral Gel 15 Gram(s) Oral once PRN Blood Glucose LESS THAN 70 milliGRAM(s)/deciliter    CXR noted

## 2023-09-28 NOTE — PROGRESS NOTE ADULT - ASSESSMENT
Assessment and Plan  Case of an 83 year old male patient known to have COPD. DM, atrial fibrillation, HFrEF, anemia, lymphedema, and recent left foot OM who was brought from the NH to the ED on 09/15 for evaluation of altered mental status, found to have sepsis in setting of recent left foot OM, admitted for further management. Stay was complicated by hemorrhagic shock and drop in Hb secondary to hematochezia on 09/23. Status post EGD on 09/23 revealing a bleeding duodenal ulcer s/p OVESCO placement Stay also complicated by an episode of confusion and hypoxia on 09/26 s/p upgraded to step down, currently on diuretics and NC. We are consulted for hematochezia and hypotension.      Hemorrhagic Shock Secondary to Hematochezia from Duodenal Ulcer Bleed s/p OVESCO placement 09/23  Acute Drop in Hemoglobin- Improved  * Hemodynamically stable  * Baseline hemoglobin (prior to admission): Hb 13.8 in 2016 -> 09/12 9.5  * ED Vitals:  /63 mmHg, HR 88 bpm  * Hemoglobin on admission: Hb 9.6 on 09/15 -> Hb 8.1/7.3 s/p 5 units pRBC on 09/23 -> 09/24 Hb 9.1-> 09/25 Hb 8.9/8.7 -> 09/27 Hb 8.8/7.9 s/p 1 unit -> 09/28 Hb 8.7  * Coags: INR 1.59 on 09/15  * AC: on Xarelto for afib until 09/15. Then Heparin 5000 units SC Q8h until 09/20, then IV Heparin with prolonged aPTT from 09/20-09/23, then SCDs since 09/23  * Status post IV Protamine sulfate 36mg x1 dose on 09/23  * MARISSA red blood 09/23  * CT angio AP with IC 09/23: arterial extravasation between D1 and 2 with venous pooling, small hiatal hernia  * No prior EGD or colonoscopy  * Family History: negative for GI malignancies  * Status Post EGD on 09/23: blood clots in fundus and antrum; 2cm actively bleeding duodenal ulcer with spurting vessel (Otis 1a) in sweep on base s/p 10cc epi and s/p 11/6 OVESCO placement for hemostasis    RECOMMENDATIONS  - Status post EGD on 09/23 as detailed above (s/p OVESCO placement for hemostasis)  - Continue diet per speech and swallow  - Trend CBC closely BID and transfuse as needed (target Hb >8). Received 1 unit pRBC on 09/27 following an episode of melena. Maintain active Type and screen  - Check stool H pylori Ag (could be false negative and require follow up testing to confirm H pylori status)  - Trend BUN  - x2 large 18G IV Bores  - Continue IV Protonix 40mg BID (started 09/25). Was on IV pantoprazole drip (09/23-09/25)  - Continue holding therapeutic AC: was on Xarelto for afib until 09/15, then Heparin 5000 units SC Q8h until 09/20, then IV Heparin with prolonged aPTT from 09/20-09/23, then SCDs since 09/23. Currently on prophylactic dose of heparin. Status post IV Protamine sulfate 36mg x1 dose on 09/23  - Monitor BM for recurrence of hematochezia or melena: had melena on 09/27  - Please avoid any NSAIDs  - If any unstable bleed, please call GI STAT  - Follow up with our [facility] located at [address] [place] [address]. Phone Number: [phone]        Asymptomatic Cholelithiasis  * Noted on CT  * LFTs noted    RECOMMENDATIONS  - Monitor for symptoms  - Trend LFTs      Thank you for your consult.  - Please note that plan was discussed with Dr. Santoro and communicated with medical team.  - Please reach GI on [phone] during weekdays till 5pm.  - Please call the GI service line after 5pm on Weekdays and anytime on Weekends: [phone].      Rickie Jenkins MD  PGY - 4 Gastroenterology Fellow  [facility]

## 2023-09-28 NOTE — PROGRESS NOTE ADULT - ASSESSMENT
83 year old male patient known to have COPD. DM, atrial fibrillation, HFrEF, anemia, lymphedema, and recent left foot OM who was brought from the NH to the ED on 09/15 for evaluation of altered mental status, found to have sepsis in setting of recent left foot OM, admitted for further management. Stay was complicated by hemorrhagic shock and drop in Hb secondary to hematochezia on 09/23. Status post EGD on 09/23 revealing a bleeding duodenal ulcer s/p OVESCO placement.    #Acute hypoxic respiratory failure  - desaturated 9/26 and upgrade to SDU  - CXR stable opacities  - Frequents suctioning  - Aspiration precautions - NPO pending SLP reevaluation   - avoid volume overload    #UGIB secondary to Duodenal ulcer s/p EGD   #Hematochezia  #Hemorrhagic Shock  - CT angio AP with IC 09/23: arterial extravasation between D1 and 2 with venous pooling, small hiatal hernia  - EGD 09/23: bleeding duodenal ulcer s/p OVESCO placement  - s/p 5 units pRBC on 09/23; s/p 1 unit pRBC 9/28; 6 units total this admission  - s/p protamine sulfate x1 on 09/23  - Trend CBC closely BID and transfuse as needed (target Hb >8). Maintain active Type and screen  - c/w Protonix IV 40mg BID  - hold heparin for melena last night (discussed with GI about the risk and benefit of prophylactic AC)  - Monitor BM for recurrence of hematochezia or melena  - Check stool H pylori Ag  - GI following  - NPO; attempted NGT yesterday but patient was uncooperative     #PAULA on CKD  - trend BUN/Cr  - phosphorus level noted/phoslo 2/2/2  - holding bumex  - c/w midodrine 5 q 8    - d/c sodium bicarbonate   - [place] placed 9/27 and HD done same day  - Give  bolus then D5 for Na today  - Nephro following    #History of Left Foot OM w/ surrounding Cellulitis   #Sacral DTI/Stage 1 POA   - ID was consulted and recommended monitoring off antibiotics  - wound care recs appreciated    #Afib  - home Xarelto  - c/w heparin  - c/w Lopressor IV    #MISC:  -DVT ppx: Hep sq (discussed with GI about the risk and benefit of prophylactic AC)  -GI ppx: PPI  -Diet: NPO  -Activity: IAT  -Code status: Full code (family does not want to talk to palliative and re-confirmed full code)  -Dispo: Acute, Follow up Crit care eval

## 2023-09-28 NOTE — CHART NOTE - NSCHARTNOTEFT_GEN_A_CORE
Patient ripped out left IV placed in antecubital fossa. Noted to have superficial hematoma approximately 4 x 4 inches. No swelling at this time.   Advise cold compression.

## 2023-09-28 NOTE — PROGRESS NOTE ADULT - ATTENDING COMMENTS
My note supersedes all residents notes that I sign, My correction for their notes are in my notes   the patient is more awake today and more talkative , he stated that he is thirsty , still confused  RIJ in place       Acute Hypoxic Respiratory Failure, possible aspiration pna / volume overload  /R pleural effusions   Anemia of acute blood loss   Hemorrhagic Shock Secondary to Hematochezia from Duodenal Ulcer Bleed s/p OVESCO placement 09/23  Acutely worsening uremia and acute kidney failure , at high risk for ATN  Afib, chronic; uncontrolled rate   Reported possible L3L4 OM on CT scan/ sacral DTI/ Foot cellulitis on broad-spectrum intravenous antibiotics as per ID recs  Increased frailty and decreased physical capacity   Metabolic Encephalopathy   hypernatremia  PAULA / CKD stable non oliguric   History of Left Foot OM w/ surrounding Cellulitis   skin wounds / Left heel DTI        PLAN  as per previous notes the team Discussed with ID attending: patient is unlikely to benefit from prolonged therapy with cefepime+vanco (to cover possible OM) due to adverse outcomes associated kidney dysfunction, cognitive impact ...etc )   GI f/u appreciated Continue IV Protonix 40mg BID,  Continue holding therapeutic AC - Status post IV Protamine sulfate 36mg x1 dose on 09/23  void any NSAIDs, Check stool H pylori Ag (could be false negative and require follow up testing to confirm H pylori status)  F/U Hb transfuse PRN   Close F/U of BUN/Crea/ Lytes and UO   - podiatry input appreciated c/w Local wound care; offloading boots bilaterally  wound care input appreciated; frequent turning, off loading,and positioning and skin care as per protocol, Maintain pressure injury prevention, Keep skin clean, Offload heels, Monitor wound for changes and notify provider if any   c/w phoslo 2/2/2,  change bumex to po  start midodrine 5 q 8  , d/c sodium bicarbonate once hd initiated   SLP Revalauted the patient today - strted diet as per verbal reccs    udall placement 9/27/23  started on HD 9/27 but did not tolerate it with access problems  udall changed today he received HD today   Creatinine Trend: 6.0<--, 7.2<--, 7.3<--, 7.6<--, 7.5<--, 7.1<--  received 250 cc LR bolus for hypernatremia - f/u with nephro and repeat labs today   reported dark stool overnight as per nursing, GI f/u requested and heparin sq stopped   Hemoglobin: 8.7 g/dL (09-28 @ 04:39)  Hemoglobin: 7.9 g/dL (09-27 @ 17:44)  Hemoglobin: 8.8 g/dL (09-27 @ 05:37)  Hemoglobin: 8.2 g/dL (09-26 @ 12:05)  Hemoglobin: 8.7 g/dL (09-25 @ 11:59)  Repeat CBC today         DVT ppx SCD,   heparin sq held today , get LE duplex    GI PPX: PPI IV bid       GOC :  Full code -  As per palliative care team: next-of-kin not in favor of further discussing Goals of Care Conversation or advance directives.

## 2023-09-28 NOTE — PROGRESS NOTE ADULT - SUBJECTIVE AND OBJECTIVE BOX
Nephrology progress note    THIS IS AN INCOMPLETE NOTE . FULL NOTE TO FOLLOW SHORTLY    Patient is seen and examined, events over the last 24 h noted .    Allergies:  No Known Allergies    Hospital Medications:   MEDICATIONS  (STANDING):  calcium acetate 667 milliGRAM(s) Oral three times a day with meals  chlorhexidine 2% Cloths 1 Application(s) Topical <User Schedule>  dextrose 5%. 1000 milliLiter(s) (100 mL/Hr) IV Continuous <Continuous>  dextrose 5%. 1000 milliLiter(s) (75 mL/Hr) IV Continuous <Continuous>  dextrose 50% Injectable 25 Gram(s) IV Push once  dextrose 50% Injectable 12.5 Gram(s) IV Push once  dextrose 50% Injectable 25 Gram(s) IV Push once  glucagon  Injectable 1 milliGRAM(s) IntraMuscular once  insulin lispro (ADMELOG) corrective regimen sliding scale   SubCutaneous three times a day before meals  metoprolol tartrate Injectable 2.5 milliGRAM(s) IV Push every 6 hours  midodrine. 5 milliGRAM(s) Oral three times a day  pantoprazole  Injectable 40 milliGRAM(s) IV Push two times a day        VITALS:  T(F): 97.4 (09-28-23 @ 07:53), Max: 98.2 (09-27-23 @ 21:30)  HR: 153 (09-28-23 @ 07:53)  BP: 130/63 (09-28-23 @ 07:53)  RR: 20 (09-28-23 @ 07:53)  SpO2: 90% (09-28-23 @ 07:53)  Wt(kg): --    09-26 @ 07:01  -  09-27 @ 07:00  --------------------------------------------------------  IN: 1175 mL / OUT: 0 mL / NET: 1175 mL    09-27 @ 07:01  -  09-28 @ 07:00  --------------------------------------------------------  IN: 200 mL / OUT: 0 mL / NET: 200 mL          PHYSICAL EXAM:  Constitutional: NAD  HEENT: anicteric sclera, oropharynx clear, MMM  Neck: No JVD  Respiratory: CTAB, no wheezes, rales or rhonchi  Cardiovascular: S1, S2, RRR  Gastrointestinal: BS+, soft, NT/ND  Extremities: No cyanosis or clubbing. No peripheral edema  :  No barth.   Skin: No rashes    LABS:  09-28    152<H>  |  111<H>  |  121<HH>  ----------------------------<  92  3.6   |  22  |  6.0<HH>    Ca    8.4      28 Sep 2023 04:39  Phos  5.8     09-28  Mg     1.9     09-28    TPro  5.6<L>  /  Alb  2.9<L>  /  TBili  0.5  /  DBili      /  AST  18  /  ALT  12  /  AlkPhos  76  09-27                          8.7    9.15  )-----------( 210      ( 28 Sep 2023 04:39 )             28.1       Urine Studies:  Urinalysis Basic - ( 28 Sep 2023 04:39 )    Color:  / Appearance:  / SG:  / pH:   Gluc: 92 mg/dL / Ketone:   / Bili:  / Urobili:    Blood:  / Protein:  / Nitrite:    Leuk Esterase:  / RBC:  / WBC    Sq Epi:  / Non Sq Epi:  / Bacteria:                   RADIOLOGY & ADDITIONAL STUDIES:   Nephrology progress note  Patient is seen and examined, events over the last 24 h noted .  confused agitated   lying in bed   Allergies:  No Known Allergies    Hospital Medications:   MEDICATIONS  (STANDING):  calcium acetate 667 milliGRAM(s) Oral three times a day with meals  glucagon  Injectable 1 milliGRAM(s) IntraMuscular once  insulin lispro (ADMELOG) corrective regimen sliding scale   SubCutaneous three times a day before meals  metoprolol tartrate Injectable 2.5 milliGRAM(s) IV Push every 6 hours  midodrine. 5 milliGRAM(s) Oral three times a day  pantoprazole  Injectable 40 milliGRAM(s) IV Push two times a day        VITALS:  T(F): 97.4 (09-28-23 @ 07:53), Max: 98.2 (09-27-23 @ 21:30)  HR: 153 (09-28-23 @ 07:53)  BP: 130/63 (09-28-23 @ 07:53)  RR: 20 (09-28-23 @ 07:53)  SpO2: 90% (09-28-23 @ 07:53)      09-26 @ 07:01  -  09-27 @ 07:00  --------------------------------------------------------  IN: 1175 mL / OUT: 0 mL / NET: 1175 mL    09-27 @ 07:01  -  09-28 @ 07:00  --------------------------------------------------------  IN: 200 mL / OUT: 0 mL / NET: 200 mL          PHYSICAL EXAM:  Constitutional: confused   Respiratory: CTAB,   Cardiovascular: S1, S2, RRR  Gastrointestinal: BS+, soft, NT/ND  Extremities: No cyanosis or clubbing. No peripheral edema   Skin: No rashes    LABS:  09-28    152<H>  |  111<H>  |  121<HH>  ----------------------------<  92  3.6   |  22  |  6.0<HH>    Ca    8.4      28 Sep 2023 04:39  Phos  5.8     09-28  Mg     1.9     09-28    TPro  5.6<L>  /  Alb  2.9<L>  /  TBili  0.5  /  DBili      /  AST  18  /  ALT  12  /  AlkPhos  76  09-27                          8.7    9.15  )-----------( 210      ( 28 Sep 2023 04:39 )             28.1       Urine Studies:  Urinalysis Basic - ( 28 Sep 2023 04:39 )    Color:  / Appearance:  / SG:  / pH:   Gluc: 92 mg/dL / Ketone:   / Bili:  / Urobili:    Blood:  / Protein:  / Nitrite:    Leuk Esterase:  / RBC:  / WBC    Sq Epi:  / Non Sq Epi:  / Bacteria:           RADIOLOGY & ADDITIONAL STUDIES:

## 2023-09-28 NOTE — PROGRESS NOTE ADULT - ASSESSMENT
84 yo m ho DM, COPD, anemia, paroxysmal afib on xarelto, HFrEF, DM coming in from nursing home for AMS x 2 days. Found to have toxic metabolic encephalopathy with PAULA.    PAULA/ toxic metabolic encephalopathy/ HAGMA/ HFrEF  possible ATN iso hypotension and possible sepsis/ vanco toxicity  no hydro on imaging  creat trend noted   started on HD yesterday / did not tolerate it with access problems  new access today and trial of HD after that   phosphorus  level noted / phoslo 2/2/2  off Bumex / increase free water if able   start midodrine 5 q 8    d/c sodium bicarbonate     will follow  / prognosis poor

## 2023-09-28 NOTE — PROGRESS NOTE ADULT - SUBJECTIVE AND OBJECTIVE BOX
SUBJECTIVE:    Patient is a 83y old Male who presents with a chief complaint of AMS (28 Sep 2023 09:09)    Currently admitted to medicine with the primary diagnosis of Altered mental status       Today is hospital day 13d. Over night he received 1 unit RBC for Hgb 7.9 and had episode of melena. Pulled out IV while getting transfusion.    PAST MEDICAL & SURGICAL HISTORY  HTN (hypertension)    COPD (chronic obstructive pulmonary disease)    High cholesterol    Mild anemia    Coffee ground emesis    Chronic diastolic heart failure    PAULA (acute kidney injury)    No significant past surgical history      SOCIAL HISTORY:  Negative for smoking/alcohol/drug use.     ALLERGIES:  No Known Allergies    MEDICATIONS:  STANDING MEDICATIONS  calcium acetate 667 milliGRAM(s) Oral three times a day with meals  chlorhexidine 2% Cloths 1 Application(s) Topical <User Schedule>  dextrose 5%. 1000 milliLiter(s) IV Continuous <Continuous>  dextrose 5%. 1000 milliLiter(s) IV Continuous <Continuous>  dextrose 50% Injectable 25 Gram(s) IV Push once  dextrose 50% Injectable 25 Gram(s) IV Push once  dextrose 50% Injectable 12.5 Gram(s) IV Push once  glucagon  Injectable 1 milliGRAM(s) IntraMuscular once  insulin lispro (ADMELOG) corrective regimen sliding scale   SubCutaneous three times a day before meals  lactated ringers Bolus 250 milliLiter(s) IV Bolus once  metoprolol tartrate Injectable 2.5 milliGRAM(s) IV Push every 6 hours  midodrine. 5 milliGRAM(s) Oral three times a day  pantoprazole  Injectable 40 milliGRAM(s) IV Push two times a day    PRN MEDICATIONS  acetaminophen     Tablet .. 650 milliGRAM(s) Oral every 6 hours PRN  albuterol    90 MICROgram(s) HFA Inhaler 1 Puff(s) Inhalation every 6 hours PRN  dextrose Oral Gel 15 Gram(s) Oral once PRN    VITALS:   T(F): 97.4  HR: 153  BP: 130/63  RR: 20  SpO2: 90%    LABS:                        8.7    9.15  )-----------( 210      ( 28 Sep 2023 04:39 )             28.1     09-28    152<H>  |  111<H>  |  121<HH>  ----------------------------<  92  3.6   |  22  |  6.0<HH>    Ca    8.4      28 Sep 2023 04:39  Phos  5.8     09-28  Mg     1.9     09-28    TPro  5.6<L>  /  Alb  2.9<L>  /  TBili  0.5  /  DBili  x   /  AST  18  /  ALT  12  /  AlkPhos  76  09-27      Urinalysis Basic - ( 28 Sep 2023 04:39 )    Color: x / Appearance: x / SG: x / pH: x  Gluc: 92 mg/dL / Ketone: x  / Bili: x / Urobili: x   Blood: x / Protein: x / Nitrite: x   Leuk Esterase: x / RBC: x / WBC x   Sq Epi: x / Non Sq Epi: x / Bacteria: x      ABG - ( 26 Sep 2023 15:34 )  pH, Arterial: 7.52  pH, Blood: x     /  pCO2: 26    /  pO2: 179   / HCO3: 21    / Base Excess: -0.3  /  SaO2: 100.0                 Culture - Blood (collected 26 Sep 2023 12:05)  Source: .Blood Blood  Preliminary Report (27 Sep 2023 23:01):    No growth at 24 hours          RADIOLOGY:    PHYSICAL EXAM:  GEN: ill appearing  LUNGS: Clear to auscultation bilaterally   HEART: S1/S2 present. RRR.   ABD: Soft, non-tender, non-distended. Bowel sounds present  EXT: NC/NC/NE/2+PP/YEE/Skin Intact.   NEURO: AAOX1    Intravenous access:   NG tube:   Manley Catheter:   Indwelling Urethral Catheter:     Connect To:  Straight Drainage/Gravity    Indication:  Urine Output Monitoring in Critically Ill (09-24-23 @ 03:28) (not performed) [Active]  Indwelling Urethral Catheter:     Connect To:  Straight Drainage/Gravity    Indication:  Urine Output Monitoring in Critically Ill (09-23-23 @ 20:53) (not performed) [Active]  Indwelling Urethral Catheter:     Connect To:  Straight Drainage/Gravity    Indication:  Urine Output Monitoring in Critically Ill (09-22-23 @ 14:00) (not performed) [Active]  Indwelling Urethral Catheter:     Connect To:  Straight Drainage/Gravity    Indication:  Urine Output Monitoring in Critically Ill    Additional Instructions:  Anuric not for HD now PLACE MANLEY STAT and record I/Os  ( severe ranal failure ) (09-20-23 @ 19:57) (not performed) [Active]

## 2023-09-28 NOTE — PROGRESS NOTE ADULT - SUBJECTIVE AND OBJECTIVE BOX
Gastroenterology Follow Up Note      Location: [facility] 2A 003 A ([facility] 2A)  Patient Name: NIMO SARMIENTO  Age: 83y  Gender: Male      Chief Complaint  Patient is a 83y old Male who presents with a chief complaint of AMS (28 Sep 2023 10:29)  Primary diagnosis of Altered mental status      Reason for Consult  Hematochezia      Progress Note  This morning patient was seen and examined at bedside.    Today is hospital day 13d.  Patient was upgraded on 09/26 after an episode of confusion and hypoxia.  Currently on nasal canula.   He denies nausea or vomiting.   Patient reports improvement in abdominal pain that is burning in nature and radiating up to throat.  He had melena x1 on 09/27.      Vital Signs in the last 24 hours   Vitals Summary T(C): 36.2 (09-28-23 @ 11:40), Max: 36.8 (09-27-23 @ 21:30)  HR: 89 (09-28-23 @ 13:45) (73 - 153)  BP: 151/71 (09-28-23 @ 13:45) (95/36 - 152/65)  RR: 19 (09-28-23 @ 13:45) (19 - 20)  SpO2: 94% (09-28-23 @ 11:40) (90% - 100%)  Vent Data   Intake/ Output   09-27-23 @ 07:01  -  09-28-23 @ 07:00  --------------------------------------------------------  IN: 200 mL / OUT: 0 mL / NET: 200 mL        Physical Exam  * General Appearance: Alert, cooperative, interactive, oriented to time, place, and person, in no acute distress  * Eyes: PERRL, conjunctiva/corneas clear, EOM's intact, fundi benign, both eyes  * Throat: Lips, mucosa, and tongue normal; teeth and gums normal  * Neck: Supple, symmetrical, trachea midline, no adenopathy   * Lungs: Good bilateral air entry, normal breath sounds (Clear to auscultation bilaterally, no audible wheezes, crackles, or rhonchi)  * Heart: Regular Rate and Rhythm, normal S1 and S2, no audible murmur, rub, or gallop  * Abdomen: Symmetric, non-distended, no scar, soft, non-tender, bowel sounds active all four quadrants, no masses, no organomegaly (no hepatosplenomegaly)  * Rectal: Brown stool, Normal tone, no palpable masses or tenderness  * Extremities: no lower extremity pitting edema bilaterally      Investigations   Laboratory Workup      - CBC:                        8.7    9.15  )-----------( 210      ( 28 Sep 2023 04:39 )             28.1       - Hgb Trend:  8.7  09-28-23 @ 04:39  7.9  09-27-23 @ 17:44  8.8  09-27-23 @ 05:37  8.2  09-26-23 @ 12:05          - Chemistry:  09-28    152<H>  |  111<H>  |  121<HH>  ----------------------------<  92  3.6   |  22  |  6.0<HH>    Ca    8.4      28 Sep 2023 04:39  Phos  5.8     09-28  Mg     1.9     09-28    TPro  5.6<L>  /  Alb  2.9<L>  /  TBili  0.5  /  DBili  x   /  AST  18  /  ALT  12  /  AlkPhos  76  09-27    Liver panel trend:  TBili 0.5   /   AST 18   /   ALT 12   /   AlkP 76   /   Tptn 5.6   /   Alb 2.9    /   DBili --      09-27  TBili 0.5   /   AST 18   /   ALT 12   /   AlkP 79   /   Tptn 5.4   /   Alb 3.0    /   DBili --      09-26  TBili 0.5   /   AST 20   /   ALT 12   /   AlkP 76   /   Tptn 5.5   /   Alb 2.9    /   DBili --      09-25  TBili 0.7   /   AST 17   /   ALT 11   /   AlkP 73   /   Tptn 5.3   /   Alb 2.8    /   DBili --      09-24  TBili 0.3   /   AST 20   /   ALT 10   /   AlkP 70   /   Tptn 5.2   /   Alb 2.8    /   DBili --      09-23  TBili 0.2   /   AST 21   /   ALT 12   /   AlkP 81   /   Tptn 5.8   /   Alb 2.9    /   DBili --      09-21  TBili 0.2   /   AST 23   /   ALT 14   /   AlkP 84   /   Tptn 5.9   /   Alb 3.1    /   DBili --      09-20  TBili 0.3   /   AST 28   /   ALT 14   /   AlkP 86   /   Tptn 6.0   /   Alb 2.9    /   DBili --      09-19  TBili 0.3   /   AST 28   /   ALT 13   /   AlkP 83   /   Tptn 5.7   /   Alb 3.0    /   DBili --      09-18      - ABG:  ABG - ( 26 Sep 2023 15:34 )pH, Arterial: 7.52  pH, Blood: x     /  pCO2: 26    /  pO2: 179   / HCO3: 21    / Base Excess: -0.3  /  SaO2: 100.0       Microbiological Workup  Urinalysis Basic - ( 28 Sep 2023 04:39 )    Color: x / Appearance: x / SG: x / pH: x  Gluc: 92 mg/dL / Ketone: x  / Bili: x / Urobili: x   Blood: x / Protein: x / Nitrite: x   Leuk Esterase: x / RBC: x / WBC x   Sq Epi: x / Non Sq Epi: x / Bacteria: x    Culture - Blood (collected 26 Sep 2023 12:05)  Source: .Blood Blood  Preliminary Report (27 Sep 2023 23:01):    No growth at 24 hours      Current Medications  Standing Medications  calcium acetate 667 milliGRAM(s) Oral three times a day with meals  chlorhexidine 2% Cloths 1 Application(s) Topical <User Schedule>  dextrose 5%. 1000 milliLiter(s) (75 mL/Hr) IV Continuous <Continuous>  dextrose 5%. 1000 milliLiter(s) (100 mL/Hr) IV Continuous <Continuous>  dextrose 50% Injectable 25 Gram(s) IV Push once  dextrose 50% Injectable 25 Gram(s) IV Push once  dextrose 50% Injectable 12.5 Gram(s) IV Push once  glucagon  Injectable 1 milliGRAM(s) IntraMuscular once  insulin lispro (ADMELOG) corrective regimen sliding scale   SubCutaneous three times a day before meals  metoprolol tartrate Injectable 2.5 milliGRAM(s) IV Push every 6 hours  midodrine. 5 milliGRAM(s) Oral three times a day  pantoprazole  Injectable 40 milliGRAM(s) IV Push two times a day    PRN Medications  acetaminophen     Tablet .. 650 milliGRAM(s) Oral every 6 hours PRN Moderate Pain (4 - 6)  albuterol    90 MICROgram(s) HFA Inhaler 1 Puff(s) Inhalation every 6 hours PRN for shortness of breath and/or wheezing  dextrose Oral Gel 15 Gram(s) Oral once PRN Blood Glucose LESS THAN 70 milliGRAM(s)/deciliter    Singles Doses Administered  (ADM OVERRIDE) 1 each &lt;see task&gt; GiveOnce  (ADM OVERRIDE) 1 each &lt;see task&gt; GiveOnce  (ADM OVERRIDE) 1 each &lt;see task&gt; GiveOnce  (ADM OVERRIDE) 1 each &lt;see task&gt; GiveOnce  (ADM OVERRIDE) 1 each &lt;see task&gt; GiveOnce  (ADM OVERRIDE) 1 each &lt;see task&gt; GiveOnce  (ADM OVERRIDE) 1 each &lt;see task&gt; GiveOnce  (ADM OVERRIDE) 1 each &lt;see task&gt; GiveOnce  (ADM OVERRIDE) 1 each &lt;see task&gt; GiveOnce  (ADM OVERRIDE) 1 each &lt;see task&gt; GiveOnce  (ADM OVERRIDE) 5 each &lt;see task&gt; GiveOnce  buMETAnide Injectable 2 milliGRAM(s) IV Push once  cefepime   IVPB 2000 milliGRAM(s) IV Intermittent once  cefepime   IVPB 2000 milliGRAM(s) IV Intermittent every 8 hours  cefepime   IVPB      diphenhydrAMINE Injectable 50 milliGRAM(s) IntraMuscular once  fentaNYL    Injectable 50 MICROGram(s) IV Push every 10 minutes PRN  haloperidol    Injectable 5 milliGRAM(s) IntraMuscular Once  heparin   Injectable 8000 Unit(s) IV Push once  lactated ringers Bolus 1000 milliLiter(s) IV Bolus once  lactated ringers Bolus 500 milliLiter(s) IV Bolus once  lactated ringers Bolus 250 milliLiter(s) IV Bolus once  lactated ringers Bolus 500 milliLiter(s) IV Bolus once  lactated ringers Bolus 1000 milliLiter(s) IV Bolus once  lactated ringers Bolus 250 milliLiter(s) IV Bolus once  LORazepam   Injectable 1 milliGRAM(s) IntraMuscular once  magnesium sulfate  IVPB 2 Gram(s) IV Intermittent once  mupirocin 2% Ointment 1 Application(s) Both Nostrils two times a day  potassium chloride   Powder 40 milliEquivalent(s) Oral once  potassium chloride   Powder 40 milliEquivalent(s) Oral once  potassium chloride  20 mEq/100 mL IVPB 20 milliEquivalent(s) IV Intermittent every 2 hours  protamine  IVPB 36 milliGRAM(s) IV Intermittent once  sodium chloride 0.9% Bolus 750 milliLiter(s) IV Bolus once  sodium chloride 0.9% Bolus 250 milliLiter(s) IV Bolus once  vancomycin  IVPB. 1000 milliGRAM(s) IV Intermittent once

## 2023-09-29 LAB
ALBUMIN SERPL ELPH-MCNC: 2.9 G/DL — LOW (ref 3.5–5.2)
ALP SERPL-CCNC: 70 U/L — SIGNIFICANT CHANGE UP (ref 30–115)
ALT FLD-CCNC: 10 U/L — SIGNIFICANT CHANGE UP (ref 0–41)
ANION GAP SERPL CALC-SCNC: 16 MMOL/L — HIGH (ref 7–14)
ANION GAP SERPL CALC-SCNC: 17 MMOL/L — HIGH (ref 7–14)
AST SERPL-CCNC: 17 U/L — SIGNIFICANT CHANGE UP (ref 0–41)
BASOPHILS # BLD AUTO: 0.02 K/UL — SIGNIFICANT CHANGE UP (ref 0–0.2)
BASOPHILS # BLD AUTO: 0.03 K/UL — SIGNIFICANT CHANGE UP (ref 0–0.2)
BASOPHILS NFR BLD AUTO: 0.2 % — SIGNIFICANT CHANGE UP (ref 0–1)
BASOPHILS NFR BLD AUTO: 0.3 % — SIGNIFICANT CHANGE UP (ref 0–1)
BILIRUB SERPL-MCNC: 0.6 MG/DL — SIGNIFICANT CHANGE UP (ref 0.2–1.2)
BUN SERPL-MCNC: 86 MG/DL — CRITICAL HIGH (ref 10–20)
BUN SERPL-MCNC: 87 MG/DL — CRITICAL HIGH (ref 10–20)
CALCIUM SERPL-MCNC: 7.9 MG/DL — LOW (ref 8.4–10.5)
CALCIUM SERPL-MCNC: 8.3 MG/DL — LOW (ref 8.4–10.4)
CHLORIDE SERPL-SCNC: 111 MMOL/L — HIGH (ref 98–110)
CHLORIDE SERPL-SCNC: 111 MMOL/L — HIGH (ref 98–110)
CO2 SERPL-SCNC: 23 MMOL/L — SIGNIFICANT CHANGE UP (ref 17–32)
CO2 SERPL-SCNC: 24 MMOL/L — SIGNIFICANT CHANGE UP (ref 17–32)
CREAT SERPL-MCNC: 4.4 MG/DL — CRITICAL HIGH (ref 0.7–1.5)
CREAT SERPL-MCNC: 4.8 MG/DL — CRITICAL HIGH (ref 0.7–1.5)
EGFR: 12 ML/MIN/1.73M2 — LOW
EGFR: 13 ML/MIN/1.73M2 — LOW
EOSINOPHIL # BLD AUTO: 0.43 K/UL — SIGNIFICANT CHANGE UP (ref 0–0.7)
EOSINOPHIL # BLD AUTO: 0.49 K/UL — SIGNIFICANT CHANGE UP (ref 0–0.7)
EOSINOPHIL NFR BLD AUTO: 4 % — SIGNIFICANT CHANGE UP (ref 0–8)
EOSINOPHIL NFR BLD AUTO: 5.6 % — SIGNIFICANT CHANGE UP (ref 0–8)
GLUCOSE BLDC GLUCOMTR-MCNC: 115 MG/DL — HIGH (ref 70–99)
GLUCOSE BLDC GLUCOMTR-MCNC: 120 MG/DL — HIGH (ref 70–99)
GLUCOSE BLDC GLUCOMTR-MCNC: 95 MG/DL — SIGNIFICANT CHANGE UP (ref 70–99)
GLUCOSE SERPL-MCNC: 107 MG/DL — HIGH (ref 70–99)
GLUCOSE SERPL-MCNC: 64 MG/DL — LOW (ref 70–99)
HCT VFR BLD CALC: 27.3 % — LOW (ref 42–52)
HCT VFR BLD CALC: 28.7 % — LOW (ref 42–52)
HGB BLD-MCNC: 8.4 G/DL — LOW (ref 14–18)
HGB BLD-MCNC: 8.9 G/DL — LOW (ref 14–18)
IMM GRANULOCYTES NFR BLD AUTO: 0.3 % — SIGNIFICANT CHANGE UP (ref 0.1–0.3)
IMM GRANULOCYTES NFR BLD AUTO: 0.3 % — SIGNIFICANT CHANGE UP (ref 0.1–0.3)
LYMPHOCYTES # BLD AUTO: 2.38 K/UL — SIGNIFICANT CHANGE UP (ref 1.2–3.4)
LYMPHOCYTES # BLD AUTO: 27.2 % — SIGNIFICANT CHANGE UP (ref 20.5–51.1)
LYMPHOCYTES # BLD AUTO: 29.7 % — SIGNIFICANT CHANGE UP (ref 20.5–51.1)
LYMPHOCYTES # BLD AUTO: 3.17 K/UL — SIGNIFICANT CHANGE UP (ref 1.2–3.4)
MAGNESIUM SERPL-MCNC: 1.8 MG/DL — SIGNIFICANT CHANGE UP (ref 1.8–2.4)
MCHC RBC-ENTMCNC: 27.4 PG — SIGNIFICANT CHANGE UP (ref 27–31)
MCHC RBC-ENTMCNC: 27.6 PG — SIGNIFICANT CHANGE UP (ref 27–31)
MCHC RBC-ENTMCNC: 30.8 G/DL — LOW (ref 32–37)
MCHC RBC-ENTMCNC: 31 G/DL — LOW (ref 32–37)
MCV RBC AUTO: 88.9 FL — SIGNIFICANT CHANGE UP (ref 80–94)
MCV RBC AUTO: 88.9 FL — SIGNIFICANT CHANGE UP (ref 80–94)
MONOCYTES # BLD AUTO: 0.75 K/UL — HIGH (ref 0.1–0.6)
MONOCYTES # BLD AUTO: 0.97 K/UL — HIGH (ref 0.1–0.6)
MONOCYTES NFR BLD AUTO: 8.6 % — SIGNIFICANT CHANGE UP (ref 1.7–9.3)
MONOCYTES NFR BLD AUTO: 9.1 % — SIGNIFICANT CHANGE UP (ref 1.7–9.3)
NEUTROPHILS # BLD AUTO: 5.09 K/UL — SIGNIFICANT CHANGE UP (ref 1.4–6.5)
NEUTROPHILS # BLD AUTO: 6.05 K/UL — SIGNIFICANT CHANGE UP (ref 1.4–6.5)
NEUTROPHILS NFR BLD AUTO: 56.6 % — SIGNIFICANT CHANGE UP (ref 42.2–75.2)
NEUTROPHILS NFR BLD AUTO: 58.1 % — SIGNIFICANT CHANGE UP (ref 42.2–75.2)
NRBC # BLD: 0 /100 WBCS — SIGNIFICANT CHANGE UP (ref 0–0)
NRBC # BLD: 0 /100 WBCS — SIGNIFICANT CHANGE UP (ref 0–0)
PLATELET # BLD AUTO: 183 K/UL — SIGNIFICANT CHANGE UP (ref 130–400)
PLATELET # BLD AUTO: 195 K/UL — SIGNIFICANT CHANGE UP (ref 130–400)
PMV BLD: 10.6 FL — HIGH (ref 7.4–10.4)
PMV BLD: 10.6 FL — HIGH (ref 7.4–10.4)
POTASSIUM SERPL-MCNC: 3.6 MMOL/L — SIGNIFICANT CHANGE UP (ref 3.5–5)
POTASSIUM SERPL-MCNC: 3.8 MMOL/L — SIGNIFICANT CHANGE UP (ref 3.5–5)
POTASSIUM SERPL-SCNC: 3.6 MMOL/L — SIGNIFICANT CHANGE UP (ref 3.5–5)
POTASSIUM SERPL-SCNC: 3.8 MMOL/L — SIGNIFICANT CHANGE UP (ref 3.5–5)
PROT SERPL-MCNC: 5.6 G/DL — LOW (ref 6–8)
RBC # BLD: 3.07 M/UL — LOW (ref 4.7–6.1)
RBC # BLD: 3.23 M/UL — LOW (ref 4.7–6.1)
RBC # FLD: 21 % — HIGH (ref 11.5–14.5)
RBC # FLD: 21 % — HIGH (ref 11.5–14.5)
SODIUM SERPL-SCNC: 151 MMOL/L — HIGH (ref 135–146)
SODIUM SERPL-SCNC: 151 MMOL/L — HIGH (ref 135–146)
WBC # BLD: 10.68 K/UL — SIGNIFICANT CHANGE UP (ref 4.8–10.8)
WBC # BLD: 8.76 K/UL — SIGNIFICANT CHANGE UP (ref 4.8–10.8)
WBC # FLD AUTO: 10.68 K/UL — SIGNIFICANT CHANGE UP (ref 4.8–10.8)
WBC # FLD AUTO: 8.76 K/UL — SIGNIFICANT CHANGE UP (ref 4.8–10.8)

## 2023-09-29 PROCEDURE — 93971 EXTREMITY STUDY: CPT | Mod: 26,LT

## 2023-09-29 PROCEDURE — 99232 SBSQ HOSP IP/OBS MODERATE 35: CPT

## 2023-09-29 PROCEDURE — 99233 SBSQ HOSP IP/OBS HIGH 50: CPT

## 2023-09-29 RX ORDER — SODIUM CHLORIDE 9 MG/ML
1000 INJECTION, SOLUTION INTRAVENOUS
Refills: 0 | Status: DISCONTINUED | OUTPATIENT
Start: 2023-09-29 | End: 2023-09-29

## 2023-09-29 RX ORDER — SODIUM CHLORIDE 9 MG/ML
1000 INJECTION, SOLUTION INTRAVENOUS
Refills: 0 | Status: DISCONTINUED | OUTPATIENT
Start: 2023-09-29 | End: 2023-10-01

## 2023-09-29 RX ORDER — HEPARIN SODIUM 5000 [USP'U]/ML
5000 INJECTION INTRAVENOUS; SUBCUTANEOUS EVERY 8 HOURS
Refills: 0 | Status: DISCONTINUED | OUTPATIENT
Start: 2023-09-29 | End: 2023-10-12

## 2023-09-29 RX ADMIN — Medication 667 MILLIGRAM(S): at 12:04

## 2023-09-29 RX ADMIN — Medication 2.5 MILLIGRAM(S): at 06:47

## 2023-09-29 RX ADMIN — SODIUM CHLORIDE 80 MILLILITER(S): 9 INJECTION, SOLUTION INTRAVENOUS at 22:07

## 2023-09-29 RX ADMIN — HEPARIN SODIUM 5000 UNIT(S): 5000 INJECTION INTRAVENOUS; SUBCUTANEOUS at 21:58

## 2023-09-29 RX ADMIN — PANTOPRAZOLE SODIUM 40 MILLIGRAM(S): 20 TABLET, DELAYED RELEASE ORAL at 17:51

## 2023-09-29 RX ADMIN — HEPARIN SODIUM 5000 UNIT(S): 5000 INJECTION INTRAVENOUS; SUBCUTANEOUS at 13:11

## 2023-09-29 RX ADMIN — MIDODRINE HYDROCHLORIDE 5 MILLIGRAM(S): 2.5 TABLET ORAL at 12:04

## 2023-09-29 RX ADMIN — MIDODRINE HYDROCHLORIDE 5 MILLIGRAM(S): 2.5 TABLET ORAL at 17:51

## 2023-09-29 RX ADMIN — Medication 2.5 MILLIGRAM(S): at 00:48

## 2023-09-29 RX ADMIN — Medication 667 MILLIGRAM(S): at 08:30

## 2023-09-29 RX ADMIN — CHLORHEXIDINE GLUCONATE 1 APPLICATION(S): 213 SOLUTION TOPICAL at 06:48

## 2023-09-29 RX ADMIN — Medication 667 MILLIGRAM(S): at 17:16

## 2023-09-29 RX ADMIN — Medication 2.5 MILLIGRAM(S): at 12:04

## 2023-09-29 RX ADMIN — MIDODRINE HYDROCHLORIDE 5 MILLIGRAM(S): 2.5 TABLET ORAL at 06:47

## 2023-09-29 RX ADMIN — PANTOPRAZOLE SODIUM 40 MILLIGRAM(S): 20 TABLET, DELAYED RELEASE ORAL at 06:47

## 2023-09-29 NOTE — PROGRESS NOTE ADULT - ATTENDING COMMENTS
Pt had possible dark stools reported, may be passage of old blood. Hb overall stable. Continue IV PPI. If recurrent overt GI bleeding with drop in Hb will consider need for repeat EGD.

## 2023-09-29 NOTE — PROGRESS NOTE ADULT - SUBJECTIVE AND OBJECTIVE BOX
T H I S   I S    N O  T   A    F I N A L I Z E D   N O T E      NIMO SARMIENTO  83y, Male  Allergy: No Known Allergies    Hospital Day: 14d    Patient seen and examined earlier today.     PMH/PSH:  PAST MEDICAL & SURGICAL HISTORY:  HTN (hypertension)      COPD (chronic obstructive pulmonary disease)      High cholesterol      Mild anemia      Coffee ground emesis      Chronic diastolic heart failure      PAULA (acute kidney injury)      No significant past surgical history          LAST 24-Hr EVENTS:    VITALS:  T(F): 97.3 (09-29-23 @ 11:24), Max: 97.3 (09-29-23 @ 11:24)  HR: 80 (09-29-23 @ 11:24)  BP: 123/66 (09-29-23 @ 11:24) (116/60 - 175/63)  RR: 20 (09-29-23 @ 11:24)  SpO2: 100% (09-29-23 @ 11:24)          TESTS & MEASUREMENTS:  Weight/BMI      09-27-23 @ 07:01  -  09-28-23 @ 07:00  --------------------------------------------------------  IN: 200 mL / OUT: 0 mL / NET: 200 mL    09-28-23 @ 07:01  -  09-29-23 @ 07:00  --------------------------------------------------------  IN: 0 mL / OUT: 500 mL / NET: -500 mL                            8.9    10.68 )-----------( 195      ( 29 Sep 2023 04:33 )             28.7         09-29    151<H>  |  111<H>  |  87<HH>  ----------------------------<  64<L>  3.8   |  23  |  4.8<HH>    Ca    8.3<L>      29 Sep 2023 04:33  Phos  5.8     09-28  Mg     1.8     09-29    TPro  6.0  /  Alb  3.1<L>  /  TBili  0.6  /  DBili  x   /  AST  19  /  ALT  11  /  AlkPhos  78  09-28    LIVER FUNCTIONS - ( 28 Sep 2023 20:00 )  Alb: 3.1 g/dL / Pro: 6.0 g/dL / ALK PHOS: 78 U/L / ALT: 11 U/L / AST: 19 U/L / GGT: x                 Culture - Blood (collected 09-26-23 @ 12:05)  Source: .Blood Blood  Preliminary Report (09-28-23 @ 23:01):    No growth at 48 Hours      Urinalysis Basic - ( 29 Sep 2023 04:33 )    Color: x / Appearance: x / SG: x / pH: x  Gluc: 64 mg/dL / Ketone: x  / Bili: x / Urobili: x   Blood: x / Protein: x / Nitrite: x   Leuk Esterase: x / RBC: x / WBC x   Sq Epi: x / Non Sq Epi: x / Bacteria: x                    A1C with Estimated Average Glucose Result: 6.4 % (09-16-23 @ 07:18)      Indwelling Urethral Catheter:     Connect To:  Straight Drainage/Gravity    Indication:  Urine Output Monitoring in Critically Ill (09-24-23 @ 03:28) (not performed)  Indwelling Urethral Catheter:     Connect To:  Straight Drainage/Gravity    Indication:  Urine Output Monitoring in Critically Ill (09-23-23 @ 20:53) (not performed)      RADIOLOGY, ECG, & ADDITIONAL TESTS:  12 Lead ECG:   Ventricular Rate 161 BPM    Atrial Rate 166 BPM    P-R Interval 134 ms    QRS Duration 6 ms    Q-T Interval 194 ms    QTC Calculation(Bazett) 317 ms    P Axis 2 degrees    R Axis 0 degrees    T Axis -11 degrees    Diagnosis Line *** Poor data quality, interpretation may be adversely affected  Undetermined rhythm , probable atrial fibrillation with rapid ventricular  response  Nonspecific T wave abnormality  Abnormal ECG    Confirmed by Santhosh Rojo ([id number]) on 9/28/2023 9:28:09 AM (09-27-23 @ 08:25)      RECENT DIAGNOSTIC ORDERS:  Comprehensive Metabolic Panel: 16:00 (09-29-23 @ 08:44)  VA Duplex Lower Ext Vein Scan, Bilat: 14:18  Exam Completed (09-28-23 @ 15:06)  Diet, Minced and Moist (09-28-23 @ 14:51)      MEDICATIONS:  MEDICATIONS  (STANDING):  calcium acetate 667 milliGRAM(s) Oral three times a day with meals  chlorhexidine 2% Cloths 1 Application(s) Topical <User Schedule>  dextrose 5%. 1000 milliLiter(s) (100 mL/Hr) IV Continuous <Continuous>  dextrose 5%. 1000 milliLiter(s) (80 mL/Hr) IV Continuous <Continuous>  dextrose 50% Injectable 25 Gram(s) IV Push once  dextrose 50% Injectable 12.5 Gram(s) IV Push once  dextrose 50% Injectable 25 Gram(s) IV Push once  glucagon  Injectable 1 milliGRAM(s) IntraMuscular once  heparin   Injectable 5000 Unit(s) SubCutaneous every 8 hours  insulin lispro (ADMELOG) corrective regimen sliding scale   SubCutaneous three times a day before meals  metoprolol tartrate Injectable 2.5 milliGRAM(s) IV Push every 6 hours  midodrine. 5 milliGRAM(s) Oral three times a day  pantoprazole  Injectable 40 milliGRAM(s) IV Push two times a day    MEDICATIONS  (PRN):  acetaminophen     Tablet .. 650 milliGRAM(s) Oral every 6 hours PRN Moderate Pain (4 - 6)  albuterol    90 MICROgram(s) HFA Inhaler 1 Puff(s) Inhalation every 6 hours PRN for shortness of breath and/or wheezing  dextrose Oral Gel 15 Gram(s) Oral once PRN Blood Glucose LESS THAN 70 milliGRAM(s)/deciliter      HOME MEDICATIONS:  acetaminophen 325 mg oral tablet (05-25)  Albuterol (Eqv-ProAir HFA) 90 mcg/inh inhalation aerosol (09-15)  Breo Ellipta 100 mcg-25 mcg/inh inhalation powder (09-15)  empagliflozin 10 mg oral tablet (09-15)  Entresto 24 mg-26 mg oral tablet (09-15)  Lasix 20 mg oral tablet (09-15)  Metoprolol Succinate ER 25 mg oral tablet, extended release (09-15)  Percocet 5 mg-325 mg oral tablet (09-15)  Xarelto 20 mg oral tablet (09-15)      PHYSICAL EXAM:  GENERAL:   CHEST/LUNG:   HEART:   ABDOMEN:   EXTREMITIES:             NIMO SARMIENTO  83y, Male  Allergy: No Known Allergies    Hospital Day: 14d    Patient seen and examined earlier today. Pt ia more awake, talkative, less confused     PMH/PSH:  PAST MEDICAL & SURGICAL HISTORY:  HTN (hypertension)      COPD (chronic obstructive pulmonary disease)      High cholesterol      Mild anemia      Coffee ground emesis      Chronic diastolic heart failure      PAULA (acute kidney injury)      No significant past surgical history          LAST 24-Hr EVENTS:    VITALS:  T(F): 97.3 (09-29-23 @ 11:24), Max: 97.3 (09-29-23 @ 11:24)  HR: 80 (09-29-23 @ 11:24)  BP: 123/66 (09-29-23 @ 11:24) (116/60 - 175/63)  RR: 20 (09-29-23 @ 11:24)  SpO2: 100% (09-29-23 @ 11:24)          TESTS & MEASUREMENTS:  Weight/BMI      09-27-23 @ 07:01  -  09-28-23 @ 07:00  --------------------------------------------------------  IN: 200 mL / OUT: 0 mL / NET: 200 mL    09-28-23 @ 07:01  -  09-29-23 @ 07:00  --------------------------------------------------------  IN: 0 mL / OUT: 500 mL / NET: -500 mL                            8.9    10.68 )-----------( 195      ( 29 Sep 2023 04:33 )             28.7         09-29    151<H>  |  111<H>  |  87<HH>  ----------------------------<  64<L>  3.8   |  23  |  4.8<HH>    Ca    8.3<L>      29 Sep 2023 04:33  Phos  5.8     09-28  Mg     1.8     09-29    TPro  6.0  /  Alb  3.1<L>  /  TBili  0.6  /  DBili  x   /  AST  19  /  ALT  11  /  AlkPhos  78  09-28    LIVER FUNCTIONS - ( 28 Sep 2023 20:00 )  Alb: 3.1 g/dL / Pro: 6.0 g/dL / ALK PHOS: 78 U/L / ALT: 11 U/L / AST: 19 U/L / GGT: x                 Culture - Blood (collected 09-26-23 @ 12:05)  Source: .Blood Blood  Preliminary Report (09-28-23 @ 23:01):    No growth at 48 Hours      Urinalysis Basic - ( 29 Sep 2023 04:33 )    Color: x / Appearance: x / SG: x / pH: x  Gluc: 64 mg/dL / Ketone: x  / Bili: x / Urobili: x   Blood: x / Protein: x / Nitrite: x   Leuk Esterase: x / RBC: x / WBC x   Sq Epi: x / Non Sq Epi: x / Bacteria: x                    A1C with Estimated Average Glucose Result: 6.4 % (09-16-23 @ 07:18)      Indwelling Urethral Catheter:     Connect To:  Straight Drainage/Gravity    Indication:  Urine Output Monitoring in Critically Ill (09-24-23 @ 03:28) (not performed)  Indwelling Urethral Catheter:     Connect To:  Straight Drainage/Gravity    Indication:  Urine Output Monitoring in Critically Ill (09-23-23 @ 20:53) (not performed)      RADIOLOGY, ECG, & ADDITIONAL TESTS:  12 Lead ECG:   Ventricular Rate 161 BPM    Atrial Rate 166 BPM    P-R Interval 134 ms    QRS Duration 6 ms    Q-T Interval 194 ms    QTC Calculation(Bazett) 317 ms    P Axis 2 degrees    R Axis 0 degrees    T Axis -11 degrees    Diagnosis Line *** Poor data quality, interpretation may be adversely affected  Undetermined rhythm , probable atrial fibrillation with rapid ventricular  response  Nonspecific T wave abnormality  Abnormal ECG    Confirmed by Santhosh Rojo ([id number]) on 9/28/2023 9:28:09 AM (09-27-23 @ 08:25)      RECENT DIAGNOSTIC ORDERS:  Comprehensive Metabolic Panel: 16:00 (09-29-23 @ 08:44)  VA Duplex Lower Ext Vein Scan, Bilat: 14:18  Exam Completed (09-28-23 @ 15:06)  Diet, Minced and Moist (09-28-23 @ 14:51)      MEDICATIONS:  MEDICATIONS  (STANDING):  calcium acetate 667 milliGRAM(s) Oral three times a day with meals  chlorhexidine 2% Cloths 1 Application(s) Topical <User Schedule>  dextrose 5%. 1000 milliLiter(s) (100 mL/Hr) IV Continuous <Continuous>  dextrose 5%. 1000 milliLiter(s) (80 mL/Hr) IV Continuous <Continuous>  dextrose 50% Injectable 25 Gram(s) IV Push once  dextrose 50% Injectable 12.5 Gram(s) IV Push once  dextrose 50% Injectable 25 Gram(s) IV Push once  glucagon  Injectable 1 milliGRAM(s) IntraMuscular once  heparin   Injectable 5000 Unit(s) SubCutaneous every 8 hours  insulin lispro (ADMELOG) corrective regimen sliding scale   SubCutaneous three times a day before meals  metoprolol tartrate Injectable 2.5 milliGRAM(s) IV Push every 6 hours  midodrine. 5 milliGRAM(s) Oral three times a day  pantoprazole  Injectable 40 milliGRAM(s) IV Push two times a day    MEDICATIONS  (PRN):  acetaminophen     Tablet .. 650 milliGRAM(s) Oral every 6 hours PRN Moderate Pain (4 - 6)  albuterol    90 MICROgram(s) HFA Inhaler 1 Puff(s) Inhalation every 6 hours PRN for shortness of breath and/or wheezing  dextrose Oral Gel 15 Gram(s) Oral once PRN Blood Glucose LESS THAN 70 milliGRAM(s)/deciliter      HOME MEDICATIONS:  acetaminophen 325 mg oral tablet (05-25)  Albuterol (Eqv-ProAir HFA) 90 mcg/inh inhalation aerosol (09-15)  Breo Ellipta 100 mcg-25 mcg/inh inhalation powder (09-15)  empagliflozin 10 mg oral tablet (09-15)  Entresto 24 mg-26 mg oral tablet (09-15)  Lasix 20 mg oral tablet (09-15)  Metoprolol Succinate ER 25 mg oral tablet, extended release (09-15)  Percocet 5 mg-325 mg oral tablet (09-15)  Xarelto 20 mg oral tablet (09-15)      PHYSICAL EXAM:  On exam  General: awake, alert but confused , NAD, chronic ill appearance + RIJ udal   Lungs:  clear to ausculations b/l, normal resp effort  Heart: regular ryhthm   Abdomen: soft, non tender non distended  Ext: no edema,

## 2023-09-29 NOTE — PROGRESS NOTE ADULT - SUBJECTIVE AND OBJECTIVE BOX
Over Night Events: events noted, sp HD, on NC    PHYSICAL EXAM    ICU Vital Signs Last 24 Hrs  T(C): 36.2 (29 Sep 2023 04:00), Max: 36.3 (28 Sep 2023 07:53)  T(F): 97.2 (29 Sep 2023 04:00), Max: 97.4 (28 Sep 2023 07:53)  HR: 106 (29 Sep 2023 04:00) (74 - 153)  BP: 125/66 (29 Sep 2023 04:00) (116/60 - 175/63)  BP(mean): 91 (28 Sep 2023 20:00) (91 - 92)  RR: 18 (29 Sep 2023 04:00) (18 - 20)  SpO2: 100% (29 Sep 2023 04:00) (90% - 100%)    O2 Parameters below as of 28 Sep 2023 20:00  Patient On (Oxygen Delivery Method): nasal cannula            General: ILL looking  Lungs: Bilateral rhonchi  Cardiovascular: MAY 2.6  Abdomen: Soft, Positive BS  Extremities: No clubbing   Follows commands    09-28-23 @ 07:01  -  09-29-23 @ 07:00  --------------------------------------------------------  IN:  Total IN: 0 mL    OUT:    Other (mL): 500 mL  Total OUT: 500 mL    Total NET: -500 mL          LABS:                          8.9    10.68 )-----------( 195      ( 29 Sep 2023 04:33 )             28.7                                               09-29    151<H>  |  111<H>  |  87<HH>  ----------------------------<  64<L>  3.8   |  23  |  4.8<HH>    Ca    8.3<L>      29 Sep 2023 04:33  Phos  5.8     09-28  Mg     1.8     09-29    TPro  6.0  /  Alb  3.1<L>  /  TBili  0.6  /  DBili  x   /  AST  19  /  ALT  11  /  AlkPhos  78  09-28                                             Urinalysis Basic - ( 29 Sep 2023 04:33 )    Color: x / Appearance: x / SG: x / pH: x  Gluc: 64 mg/dL / Ketone: x  / Bili: x / Urobili: x   Blood: x / Protein: x / Nitrite: x   Leuk Esterase: x / RBC: x / WBC x   Sq Epi: x / Non Sq Epi: x / Bacteria: x                                                  LIVER FUNCTIONS - ( 28 Sep 2023 20:00 )  Alb: 3.1 g/dL / Pro: 6.0 g/dL / ALK PHOS: 78 U/L / ALT: 11 U/L / AST: 19 U/L / GGT: x                                                  Culture - Blood (collected 26 Sep 2023 12:05)  Source: .Blood Blood  Preliminary Report (28 Sep 2023 23:01):    No growth at 48 Hours                                                                                           MEDICATIONS  (STANDING):  calcium acetate 667 milliGRAM(s) Oral three times a day with meals  chlorhexidine 2% Cloths 1 Application(s) Topical <User Schedule>  dextrose 5%. 1000 milliLiter(s) (100 mL/Hr) IV Continuous <Continuous>  dextrose 5%. 1000 milliLiter(s) (100 mL/Hr) IV Continuous <Continuous>  dextrose 50% Injectable 25 Gram(s) IV Push once  dextrose 50% Injectable 25 Gram(s) IV Push once  dextrose 50% Injectable 12.5 Gram(s) IV Push once  glucagon  Injectable 1 milliGRAM(s) IntraMuscular once  insulin lispro (ADMELOG) corrective regimen sliding scale   SubCutaneous three times a day before meals  metoprolol tartrate Injectable 2.5 milliGRAM(s) IV Push every 6 hours  midodrine. 5 milliGRAM(s) Oral three times a day  pantoprazole  Injectable 40 milliGRAM(s) IV Push two times a day    MEDICATIONS  (PRN):  acetaminophen     Tablet .. 650 milliGRAM(s) Oral every 6 hours PRN Moderate Pain (4 - 6)  albuterol    90 MICROgram(s) HFA Inhaler 1 Puff(s) Inhalation every 6 hours PRN for shortness of breath and/or wheezing  dextrose Oral Gel 15 Gram(s) Oral once PRN Blood Glucose LESS THAN 70 milliGRAM(s)/deciliter

## 2023-09-29 NOTE — SWALLOW BEDSIDE ASSESSMENT ADULT - PHARYNGEAL PHASE
w/ thin liquids and large cup sips of mildly thick/Wet vocal quality post oral intake/Cough post oral intake/Multiple swallows

## 2023-09-29 NOTE — PROGRESS NOTE ADULT - SUBJECTIVE AND OBJECTIVE BOX
Gastroenterology Follow Up Note      Location: [facility] 3E 010 A ([facility] 3E)  Patient Name: NIMO SARMINETO  Age: 83y  Gender: Male      Chief Complaint  Patient is a 83y old Male who presents with a chief complaint of AMS (29 Sep 2023 11:34)  Primary diagnosis of Altered mental status      Reason for Consult  Hematochezia      Progress Note  This morning patient was seen and examined at bedside.    Today is hospital day 14d.  Patient was upgraded on 09/26 after an episode of confusion and hypoxia.  Currently on nasal canula.   He denies nausea or vomiting.   Patient reports improvement in abdominal pain that is burning in nature and radiating up to throat.  He had melena x1 on 09/27.      Vital Signs in the last 24 hours   Vitals Summary T(C): 35.7 (09-29-23 @ 17:00), Max: 36.3 (09-29-23 @ 11:24)  HR: 71 (09-29-23 @ 17:45) (68 - 106)  BP: 127/62 (09-29-23 @ 17:45) (123/57 - 175/63)  RR: 18 (09-29-23 @ 17:00) (18 - 25)  SpO2: 100% (09-29-23 @ 11:24) (100% - 100%)  Vent Data   Intake/ Output   09-28-23 @ 07:01  -  09-29-23 @ 07:00  --------------------------------------------------------  IN: 0 mL / OUT: 500 mL / NET: -500 mL    09-29-23 @ 07:01  -  09-29-23 @ 17:59  --------------------------------------------------------  IN: 660 mL / OUT: 0 mL / NET: 660 mL      Measurements       Physical Exam  * General Appearance: Alert but seems confused, cooperative, interactive, oriented to person but not to time or place, in no acute distress  * Neck: Supple, symmetrical, trachea midline, no adenopathy   * Lungs: Good bilateral air entry, normal breath sounds (Clear to auscultation bilaterally, no audible wheezes, crackles, or rhonchi)  * Heart: Regular Rate and Rhythm, normal S1 and S2, no audible murmur, rub, or gallop  * Abdomen: Symmetric, non-distended, soft, non-tender, bowel sounds active all four quadrants, no masses, no organomegaly (no hepatosplenomegaly)    Investigations   Laboratory Workup      - CBC:                        8.9    10.68 )-----------( 195      ( 29 Sep 2023 04:33 )             28.7       - Hgb Trend:  8.9  09-29-23 @ 04:33  9.2  09-28-23 @ 20:00  8.7  09-28-23 @ 04:39  7.9  09-27-23 @ 17:44  8.8  09-27-23 @ 05:3        - Chemistry:  09-29    151<H>  |  111<H>  |  87<HH>  ----------------------------<  64<L>  3.8   |  23  |  4.8<HH>    Ca    8.3<L>      29 Sep 2023 04:33  Phos  5.8     09-28  Mg     1.8     09-29    TPro  6.0  /  Alb  3.1<L>  /  TBili  0.6  /  DBili  x   /  AST  19  /  ALT  11  /  AlkPhos  78  09-28    Liver panel trend:  TBili 0.6   /   AST 19   /   ALT 11   /   AlkP 78   /   Tptn 6.0   /   Alb 3.1    /   DBili --      09-28  TBili 0.5   /   AST 18   /   ALT 12   /   AlkP 76   /   Tptn 5.6   /   Alb 2.9    /   DBili --      09-27  TBili 0.5   /   AST 18   /   ALT 12   /   AlkP 79   /   Tptn 5.4   /   Alb 3.0    /   DBili --      09-26  TBili 0.5   /   AST 20   /   ALT 12   /   AlkP 76   /   Tptn 5.5   /   Alb 2.9    /   DBili --      09-25  TBili 0.7   /   AST 17   /   ALT 11   /   AlkP 73   /   Tptn 5.3   /   Alb 2.8    /   DBili --      09-24  TBili 0.3   /   AST 20   /   ALT 10   /   AlkP 70   /   Tptn 5.2   /   Alb 2.8    /   DBili --      09-23  TBili 0.2   /   AST 21   /   ALT 12   /   AlkP 81   /   Tptn 5.8   /   Alb 2.9    /   DBili --      09-21  TBili 0.2   /   AST 23   /   ALT 14   /   AlkP 84   /   Tptn 5.9   /   Alb 3.1    /   DBili --      09-20      Microbiological Workup  Urinalysis Basic - ( 29 Sep 2023 04:33 )    Color: x / Appearance: x / SG: x / pH: x  Gluc: 64 mg/dL / Ketone: x  / Bili: x / Urobili: x   Blood: x / Protein: x / Nitrite: x   Leuk Esterase: x / RBC: x / WBC x   Sq Epi: x / Non Sq Epi: x / Bacteria: x      Current Medications  Standing Medications  calcium acetate 667 milliGRAM(s) Oral three times a day with meals  chlorhexidine 2% Cloths 1 Application(s) Topical <User Schedule>  dextrose 5%. 1000 milliLiter(s) (100 mL/Hr) IV Continuous <Continuous>  dextrose 5%. 1000 milliLiter(s) (80 mL/Hr) IV Continuous <Continuous>  dextrose 50% Injectable 25 Gram(s) IV Push once  dextrose 50% Injectable 25 Gram(s) IV Push once  dextrose 50% Injectable 12.5 Gram(s) IV Push once  glucagon  Injectable 1 milliGRAM(s) IntraMuscular once  heparin   Injectable 5000 Unit(s) SubCutaneous every 8 hours  insulin lispro (ADMELOG) corrective regimen sliding scale   SubCutaneous three times a day before meals  metoprolol tartrate Injectable 2.5 milliGRAM(s) IV Push every 6 hours  midodrine. 5 milliGRAM(s) Oral three times a day  pantoprazole  Injectable 40 milliGRAM(s) IV Push two times a day    PRN Medications  acetaminophen     Tablet .. 650 milliGRAM(s) Oral every 6 hours PRN Moderate Pain (4 - 6)  albuterol    90 MICROgram(s) HFA Inhaler 1 Puff(s) Inhalation every 6 hours PRN for shortness of breath and/or wheezing  dextrose Oral Gel 15 Gram(s) Oral once PRN Blood Glucose LESS THAN 70 milliGRAM(s)/deciliter    Singles Doses Administered  (ADM OVERRIDE) 1 each &lt;see task&gt; GiveOnce  (ADM OVERRIDE) 1 each &lt;see task&gt; GiveOnce  (ADM OVERRIDE) 1 each &lt;see task&gt; GiveOnce  (ADM OVERRIDE) 1 each &lt;see task&gt; GiveOnce  (ADM OVERRIDE) 1 each &lt;see task&gt; GiveOnce  (ADM OVERRIDE) 1 each &lt;see task&gt; GiveOnce  (ADM OVERRIDE) 1 each &lt;see task&gt; GiveOnce  (ADM OVERRIDE) 1 each &lt;see task&gt; GiveOnce  (ADM OVERRIDE) 1 each &lt;see task&gt; GiveOnce  (ADM OVERRIDE) 5 each &lt;see task&gt; GiveOnce  (ADM OVERRIDE) 1 each &lt;see task&gt; GiveOnce  buMETAnide Injectable 2 milliGRAM(s) IV Push once  cefepime   IVPB      cefepime   IVPB 2000 milliGRAM(s) IV Intermittent every 8 hours  cefepime   IVPB 2000 milliGRAM(s) IV Intermittent once  diphenhydrAMINE Injectable 50 milliGRAM(s) IntraMuscular once  fentaNYL    Injectable 50 MICROGram(s) IV Push every 10 minutes PRN  haloperidol    Injectable 5 milliGRAM(s) IntraMuscular Once  heparin   Injectable 8000 Unit(s) IV Push once  lactated ringers Bolus 500 milliLiter(s) IV Bolus once  lactated ringers Bolus 250 milliLiter(s) IV Bolus once  lactated ringers Bolus 1000 milliLiter(s) IV Bolus once  lactated ringers Bolus 1000 milliLiter(s) IV Bolus once  lactated ringers Bolus 250 milliLiter(s) IV Bolus once  lactated ringers Bolus 500 milliLiter(s) IV Bolus once  LORazepam   Injectable 1 milliGRAM(s) IntraMuscular once  magnesium sulfate  IVPB 2 Gram(s) IV Intermittent once  midazolam  1 mG/mL Injectable (Rx Quick Charge) 2 milliGRAM(s) IV Push   mupirocin 2% Ointment 1 Application(s) Both Nostrils two times a day  potassium chloride   Powder 40 milliEquivalent(s) Oral once  potassium chloride   Powder 40 milliEquivalent(s) Oral once  potassium chloride  20 mEq/100 mL IVPB 20 milliEquivalent(s) IV Intermittent every 2 hours  protamine  IVPB 36 milliGRAM(s) IV Intermittent once  rocuronium 10 mG/mL Injectable (Rx Quick Charge) 50 milliGRAM(s) IV Push   sodium chloride 0.9% Bolus 750 milliLiter(s) IV Bolus once  sodium chloride 0.9% Bolus 250 milliLiter(s) IV Bolus once  succinylcholine 20 mG/mL Injectable (Rx Quick Charge) 120 milliGRAM(s) IV Push   vancomycin  IVPB. 1000 milliGRAM(s) IV Intermittent once

## 2023-09-29 NOTE — PROGRESS NOTE ADULT - SUBJECTIVE AND OBJECTIVE BOX
Nephrology progress note    THIS IS AN INCOMPLETE NOTE . FULL NOTE TO FOLLOW SHORTLY    Patient is seen and examined, events over the last 24 h noted .    Allergies:  No Known Allergies    Hospital Medications:   MEDICATIONS  (STANDING):  calcium acetate 667 milliGRAM(s) Oral three times a day with meals  chlorhexidine 2% Cloths 1 Application(s) Topical <User Schedule>  dextrose 5%. 1000 milliLiter(s) (100 mL/Hr) IV Continuous <Continuous>  dextrose 5%. 1000 milliLiter(s) (80 mL/Hr) IV Continuous <Continuous>  dextrose 50% Injectable 25 Gram(s) IV Push once  dextrose 50% Injectable 12.5 Gram(s) IV Push once  dextrose 50% Injectable 25 Gram(s) IV Push once  glucagon  Injectable 1 milliGRAM(s) IntraMuscular once  heparin   Injectable 5000 Unit(s) SubCutaneous every 8 hours  insulin lispro (ADMELOG) corrective regimen sliding scale   SubCutaneous three times a day before meals  metoprolol tartrate Injectable 2.5 milliGRAM(s) IV Push every 6 hours  midodrine. 5 milliGRAM(s) Oral three times a day  pantoprazole  Injectable 40 milliGRAM(s) IV Push two times a day        VITALS:  T(F): 96.8 (09-29-23 @ 07:54), Max: 97.2 (09-28-23 @ 11:40)  HR: 100 (09-29-23 @ 07:54)  BP: 140/73 (09-29-23 @ 07:54)  RR: 25 (09-29-23 @ 07:54)  SpO2: 100% (09-29-23 @ 07:54)  Wt(kg): --    09-27 @ 07:01  -  09-28 @ 07:00  --------------------------------------------------------  IN: 200 mL / OUT: 0 mL / NET: 200 mL    09-28 @ 07:01  -  09-29 @ 07:00  --------------------------------------------------------  IN: 0 mL / OUT: 500 mL / NET: -500 mL          PHYSICAL EXAM:  Constitutional: NAD  HEENT: anicteric sclera, oropharynx clear, MMM  Neck: No JVD  Respiratory: CTAB, no wheezes, rales or rhonchi  Cardiovascular: S1, S2, RRR  Gastrointestinal: BS+, soft, NT/ND  Extremities: No cyanosis or clubbing. No peripheral edema  :  No barth.   Skin: No rashes    LABS:  09-29    151<H>  |  111<H>  |  87<HH>  ----------------------------<  64<L>  3.8   |  23  |  4.8<HH>    Ca    8.3<L>      29 Sep 2023 04:33  Phos  5.8     09-28  Mg     1.8     09-29    TPro  6.0  /  Alb  3.1<L>  /  TBili  0.6  /  DBili      /  AST  19  /  ALT  11  /  AlkPhos  78  09-28                          8.9    10.68 )-----------( 195      ( 29 Sep 2023 04:33 )             28.7       Urine Studies:  Urinalysis Basic - ( 29 Sep 2023 04:33 )    Color:  / Appearance:  / SG:  / pH:   Gluc: 64 mg/dL / Ketone:   / Bili:  / Urobili:    Blood:  / Protein:  / Nitrite:    Leuk Esterase:  / RBC:  / WBC    Sq Epi:  / Non Sq Epi:  / Bacteria:             HBsAb <3.0      [09-27-23 @ 17:44]  HBsAb Nonreact      [09-27-23 @ 17:44]  HBsAg Nonreact      [09-27-23 @ 17:44]  HBcAb Nonreact      [09-27-23 @ 17:44]        RADIOLOGY & ADDITIONAL STUDIES:   Nephrology progress note    Patient is seen and examined, events over the last 24 h noted .  Lying in bed  conversing today  not confused     Allergies:  No Known Allergies    Hospital Medications:   MEDICATIONS  (STANDING):  calcium acetate 667 milliGRAM(s) Oral three times a day with meals  glucagon  Injectable 1 milliGRAM(s) IntraMuscular once  heparin   Injectable 5000 Unit(s) SubCutaneous every 8 hours  insulin lispro (ADMELOG) corrective regimen sliding scale   SubCutaneous three times a day before meals  metoprolol tartrate Injectable 2.5 milliGRAM(s) IV Push every 6 hours  midodrine. 5 milliGRAM(s) Oral three times a day  pantoprazole  Injectable 40 milliGRAM(s) IV Push two times a day        VITALS:  T(F): 96.8 (09-29-23 @ 07:54), Max: 97.2 (09-28-23 @ 11:40)  HR: 100 (09-29-23 @ 07:54)  BP: 140/73 (09-29-23 @ 07:54)  RR: 25 (09-29-23 @ 07:54)  SpO2: 100% (09-29-23 @ 07:54)      09-27 @ 07:01  -  09-28 @ 07:00  --------------------------------------------------------  IN: 200 mL / OUT: 0 mL / NET: 200 mL    09-28 @ 07:01  -  09-29 @ 07:00  --------------------------------------------------------  IN: 0 mL / OUT: 500 mL / NET: -500 mL          PHYSICAL EXAM:  Constitutional: NAD  Respiratory: CTAB, no wheezes, rales or rhonchi  Cardiovascular: S1, S2, RRR  Gastrointestinal: BS+, soft, NT/ND  Extremities: No cyanosis or clubbing. No peripheral edema  :  No barth.   Skin: No rashes    LABS:  09-29    151<H>  |  111<H>  |  87<HH>  ----------------------------<  64<L>  3.8   |  23  |  4.8<HH>    SODIUM TREND:  Sodium 151 [09-29 @ 04:33]  Sodium 153 [09-28 @ 20:00]  Sodium 152 [09-28 @ 04:39]  Sodium 153 [09-27 @ 05:37]  Sodium 151 [09-26 @ 12:05]  Sodium 146 [09-25 @ 05:24]  Sodium 144 [09-24 @ 11:00]  Sodium 145 [09-23 @ 12:05]  Sodium 145 [09-22 @ 07:42]  Sodium 145 [09-21 @ 08:45]    Ca    8.3<L>      29 Sep 2023 04:33  Phos  5.8     09-28  Mg     1.8     09-29    TPro  6.0  /  Alb  3.1<L>  /  TBili  0.6  /  DBili      /  AST  19  /  ALT  11  /  AlkPhos  78  09-28                          8.9    10.68 )-----------( 195      ( 29 Sep 2023 04:33 )             28.7       Urine Studies:  Urinalysis Basic - ( 29 Sep 2023 04:33 )    Color:  / Appearance:  / SG:  / pH:   Gluc: 64 mg/dL / Ketone:   / Bili:  / Urobili:    Blood:  / Protein:  / Nitrite:    Leuk Esterase:  / RBC:  / WBC    Sq Epi:  / Non Sq Epi:  / Bacteria:             HBsAb <3.0      [09-27-23 @ 17:44]  HBsAb Nonreact      [09-27-23 @ 17:44]  HBsAg Nonreact      [09-27-23 @ 17:44]  HBcAb Nonreact      [09-27-23 @ 17:44]        RADIOLOGY & ADDITIONAL STUDIES:

## 2023-09-29 NOTE — PROGRESS NOTE ADULT - ASSESSMENT
82 yo m ho DM, COPD, anemia, paroxysmal afib on xarelto, HFrEF, DM coming in from nursing home for AMS x 2 days. Found to have toxic metabolic encephalopathy with PAULA.    PAULA/ toxic metabolic encephalopathy/ HAGMA/ HFrEF/ hypernatremia  possible ATN iso hypotension and possible sepsis/ vanco toxicity  no hydro on imaging  creat trend noted   sp Right IJ / was able to do HD yesterday   next ttt in AM    phosphorus  level noted / phoslo 2/2/2  off Bumex / increase free water if able / serum sodium better         will follow  / prognosis poor

## 2023-09-29 NOTE — CHART NOTE - NSCHARTNOTEFT_GEN_A_CORE
SDU Transfer Note    Transfer from: SDU   Transfer to: Floors                            HPI:  84 yo m ho DM, COPD, anemia, lymphedemia, afib on xarelto, HFrEF, DM coming in from nursing home for AMS x 2 days. Unable to gather story from pt as he is altered and lethargic.  As per NH was becoming more agitated x 2 days, was hypotensive yesterday and started on cefepime and vancomycin. Brought in to the ED for AMS. PICC line in place for cefepime 1q q8 until 10/5/23 and vanc 1q24 until 9/23/23 for LLE cellulitis/OM  (was at [facility] last month for LLE thick wound cellulitis/OM and sacral DTI as per call with [facility] as per collateral from NH RN Asha)    In the ED, vitals wnl. Labs showing wbc 11.30, Hb 9.6, MCV 79.1, INR 1.59, CO2 14, AG 21, Cr 6 (~baseline 1.2), , trops 0.09. CTH wnl, RBUS with no hydro, poor visualization of left kidney    Hospital course:  When pt arrived to ICU, he was in shock, hypotensive, with active melena. BP in 60's. GI consulted urgently for endoscopy. Procedure was done at bedside and bleeding controlled. Pt. was given 2 U of blood, 1U FFP in ICU. HgB has maintained above 8 and pt has not signs of melena.     3E Course:  The patient was Hypoxic (as low as 70s). Evaluated the patient at bedside. AAOx 1. On auscultation, bilateral crackles present. Placed the patient on venturi mask and then on NRB. The Spo2 improved to 99%. Stat vitals repeated (vitals Spo2 99%, /70s, HR 95). Ordered CXR stat, ABG stat, Bumex 2mg IV x 1 stat. Labs and charts reviewed. Stat Labs drawn. Upgraded to SDU on 9/27.    In the SDU, patient respiratory status was much improved on NC. Patient was seen by speech and swallow and put on NPO for poor swallowing. Nephrology recommended dialysis for patients PAULA. On 9/27, a [place] was placed and patient received hemo that day. However, he became hypotensive 30 mins into dialysis.     Patient is clinically and hemodynamically stable for  transfer.    ASSESSMENT AND PLAN:    # Misc  - DVT Prophylaxis:   - GI Prophylaxis:   - Diet:   - Activity:    - Code Status:   -Dispo:     PENDINGS:    ICU Vital Signs Last 24 Hrs  T(C): 36.3 (29 Sep 2023 11:24), Max: 36.3 (29 Sep 2023 11:24)  T(F): 97.3 (29 Sep 2023 11:24), Max: 97.3 (29 Sep 2023 11:24)  HR: 80 (29 Sep 2023 11:24) (74 - 106)  BP: 123/66 (29 Sep 2023 11:24) (116/60 - 175/63)  BP(mean): 87 (29 Sep 2023 11:24) (87 - 100)  ABP: --  ABP(mean): --  RR: 20 (29 Sep 2023 11:24) (18 - 25)  SpO2: 100% (29 Sep 2023 11:24) (95% - 100%)    O2 Parameters below as of 29 Sep 2023 11:24  Patient On (Oxygen Delivery Method): nasal cannula          I&O's Summary    28 Sep 2023 07:01  -  29 Sep 2023 07:00  --------------------------------------------------------  IN: 0 mL / OUT: 500 mL / NET: -500 mL          LABS:                        8.9    10.68 )-----------( 195      ( 29 Sep 2023 04:33 )             28.7     09-29    151<H>  |  111<H>  |  87<HH>  ----------------------------<  64<L>  3.8   |  23  |  4.8<HH>    Ca    8.3<L>      29 Sep 2023 04:33  Phos  5.8     09-28  Mg     1.8     09-29    TPro  6.0  /  Alb  3.1<L>  /  TBili  0.6  /  DBili  x   /  AST  19  /  ALT  11  /  AlkPhos  78  09-28      Urinalysis Basic - ( 29 Sep 2023 04:33 )    Color: x / Appearance: x / SG: x / pH: x  Gluc: 64 mg/dL / Ketone: x  / Bili: x / Urobili: x   Blood: x / Protein: x / Nitrite: x   Leuk Esterase: x / RBC: x / WBC x   Sq Epi: x / Non Sq Epi: x / Bacteria: x      CAPILLARY BLOOD GLUCOSE      POCT Blood Glucose.: 95 mg/dL (29 Sep 2023 08:29)  POCT Blood Glucose.: 89 mg/dL (28 Sep 2023 21:19)  POCT Blood Glucose.: 93 mg/dL (28 Sep 2023 16:10)        RADIOLOGY & ADDITIONAL TESTS:    MEDICATIONS  (STANDING):  calcium acetate 667 milliGRAM(s) Oral three times a day with meals  chlorhexidine 2% Cloths 1 Application(s) Topical <User Schedule>  dextrose 5%. 1000 milliLiter(s) (100 mL/Hr) IV Continuous <Continuous>  dextrose 5%. 1000 milliLiter(s) (80 mL/Hr) IV Continuous <Continuous>  dextrose 50% Injectable 25 Gram(s) IV Push once  dextrose 50% Injectable 25 Gram(s) IV Push once  dextrose 50% Injectable 12.5 Gram(s) IV Push once  glucagon  Injectable 1 milliGRAM(s) IntraMuscular once  heparin   Injectable 5000 Unit(s) SubCutaneous every 8 hours  insulin lispro (ADMELOG) corrective regimen sliding scale   SubCutaneous three times a day before meals  metoprolol tartrate Injectable 2.5 milliGRAM(s) IV Push every 6 hours  midodrine. 5 milliGRAM(s) Oral three times a day  pantoprazole  Injectable 40 milliGRAM(s) IV Push two times a day    MEDICATIONS  (PRN):  acetaminophen     Tablet .. 650 milliGRAM(s) Oral every 6 hours PRN Moderate Pain (4 - 6)  albuterol    90 MICROgram(s) HFA Inhaler 1 Puff(s) Inhalation every 6 hours PRN for shortness of breath and/or wheezing  dextrose Oral Gel 15 Gram(s) Oral once PRN Blood Glucose LESS THAN 70 milliGRAM(s)/deciliter      PHYSICAL EXAM:  GENERAL: well built, well nourished  HEAD:  Atraumatic, Normocephalic  EYES: EOMI, PERRLA, conjunctiva and sclera clear  ENT: No tonsillar erythema, exudates, or enlargement; Moist mucous membranes, Good dentition, No lesions  NECK: Supple, No JVD, Normal thyroid, no enlarged nodes  NERVOUS SYSTEM:  Alert & Oriented X3, Good concentration; Motor Strength 5/5 B/L upper and lower extremities; DTRs 2+ intact and symmetric, sensory intact  CHEST/LUNG: B/L good air entry; No rales, rhonchi, or wheezing  HEART: S1S2 normal, no S3, Regular rate and rhythm; No murmurs, rubs, or gallops  ABDOMEN: Soft, Nontender, Nondistended; Bowel sounds present  EXTREMITIES:  2+ Peripheral Pulses, No clubbing, cyanosis, or edema  LYMPH: No lymphadenopathy noted  SKIN: No rashes or lesions  Wound:       [facility] PGY1 SDU Transfer Note    Transfer from: SDU   Transfer to: Floors                            HPI:  82 yo m ho DM, COPD, anemia, lymphedemia, afib on xarelto, HFrEF, DM coming in from nursing home for AMS x 2 days. Unable to gather story from pt as he is altered and lethargic.  As per NH was becoming more agitated x 2 days, was hypotensive yesterday and started on cefepime and vancomycin. Brought in to the ED for AMS. PICC line in place for cefepime 1q q8 until 10/5/23 and vanc 1q24 until 9/23/23 for LLE cellulitis/OM  (was at [facility] last month for LLE thick wound cellulitis/OM and sacral DTI as per call with [facility] as per collateral from NH RN Asha)    In the ED, vitals wnl. Labs showing wbc 11.30, Hb 9.6, MCV 79.1, INR 1.59, CO2 14, AG 21, Cr 6 (~baseline 1.2), , trops 0.09. CTH wnl, RBUS with no hydro, poor visualization of left kidney    Hospital course:  When pt arrived to ICU, he was in shock, hypotensive, with active melena. BP in 60's. GI consulted urgently for endoscopy. Procedure was done at bedside and bleeding controlled. Pt. was given 2 U of blood, 1U FFP in ICU. HgB has maintained above 8 and pt has not signs of melena.     3E Course:  The patient was Hypoxic (as low as 70s). Evaluated the patient at bedside. AAOx 1. On auscultation, bilateral crackles present. Placed the patient on venturi mask and then on NRB. The Spo2 improved to 99%. Stat vitals repeated (vitals Spo2 99%, /70s, HR 95). Ordered CXR stat, ABG stat, Bumex 2mg IV x 1 stat. Labs and charts reviewed. Stat Labs drawn. Upgraded to SDU on 9/27.    In the SDU, patient respiratory status was much improved on NC. Nephrology recommended dialysis for patient's PAULA. On 9/27, a [place] was placed and patient received hemo that day. However, he became hypotensive 30 mins into dialysis and was stopped. The following day, the [place] was replaced with a longer one and patient received HD again with no complications. On the evening of 9/27, patient hgb was 6.9 and he received 1 unit pRBC. There was also an episode of melena and GI was notified. Patient remained hemodynamically stable and GI recommended to just monitor. Heparin was held, but has since been restarted for DVT ppx. On 9/29, he was started on D5 @80cc for 24 hours to address free water deficit. Starting sodium was 151 with a target goal of 145 after 24 hours. Last, patient was seen by speech and swallow and put on NPO for poor swallowing. DGTF on 9/29.     Patient is clinically and hemodynamically stable for  transfer.    ASSESSMENT AND PLAN:  83 year old male patient known to have COPD. DM, atrial fibrillation, HFrEF, anemia, lymphedema, and recent left foot OM who was brought from the NH to the ED on 09/15 for evaluation of altered mental status, found to have sepsis in setting of recent left foot OM, admitted for further management. Stay was complicated by hemorrhagic shock and drop in Hb secondary to hematochezia on 09/23. Status post EGD on 09/23 revealing a bleeding duodenal ulcer s/p OVESCO placement.    #Acute hypoxic respiratory failure  - desaturated 9/26 and upgrade to SDU  - CXR stable opacities  - Frequents suctioning  - Aspiration precautions - NPO per SLP   - avoid volume overload    #UGIB secondary to Duodenal ulcer s/p EGD   #Hematochezia  #Hemorrhagic Shock  - CT angio AP with IC 09/23: arterial extravasation between D1 and 2 with venous pooling, small hiatal hernia  - EGD 09/23: bleeding duodenal ulcer s/p OVESCO placement  - s/p 5 units pRBC on 09/23; s/p 1 unit pRBC 9/28; 6 units total this admission  - s/p protamine sulfate x1 on 09/23  - Trend CBC closely BID and transfuse as needed (target Hb >8). Maintain active Type and screen  - c/w Protonix IV 40mg BID  - Monitor BM for recurrence of hematochezia or melena  - Check stool H pylori Ag  - GI following  - NPO    #PAULA on CKD  - trend BUN/Cr  - phosphorus level noted/phoslo 2/2/2  - holding bumex  - c/w midodrine 5 q 8    - d/c sodium bicarbonate   - [place] placed 9/27. Last HD on 9/28  - start D5 @80cc for 24 hours for free water deficit. Starting sodium was 151 with a target goal of 145 after 24 hours  - Nephro following    #History of Left Foot OM w/ surrounding Cellulitis   #Sacral DTI/Stage 1 POA   - ID was consulted and recommended monitoring off antibiotics  - wound care recs appreciated    #Afib  - home Xarelto  - c/w heparin  - c/w Lopressor IV    #MISC:  -DVT ppx: Hep sq (discussed with GI about the risk and benefit of prophylactic AC)  -GI ppx: PPI  -Diet: NPO  -Activity: IAT  -Code status: Full code (family does not want to talk to palliative and re-confirmed full code)  -Dispo: DGTF    PENDINGS: BMP at 4pm to check Na. Started D5 today @80cc for 24 hours for free water deficit. Starting sodium was 151 with a target goal of 145 after 24 hours    ICU Vital Signs Last 24 Hrs  T(C): 36.3 (29 Sep 2023 11:24), Max: 36.3 (29 Sep 2023 11:24)  T(F): 97.3 (29 Sep 2023 11:24), Max: 97.3 (29 Sep 2023 11:24)  HR: 80 (29 Sep 2023 11:24) (74 - 106)  BP: 123/66 (29 Sep 2023 11:24) (116/60 - 175/63)  BP(mean): 87 (29 Sep 2023 11:24) (87 - 100)  ABP: --  ABP(mean): --  RR: 20 (29 Sep 2023 11:24) (18 - 25)  SpO2: 100% (29 Sep 2023 11:24) (95% - 100%)    O2 Parameters below as of 29 Sep 2023 11:24  Patient On (Oxygen Delivery Method): nasal cannula          I&O's Summary    28 Sep 2023 07:01  -  29 Sep 2023 07:00  --------------------------------------------------------  IN: 0 mL / OUT: 500 mL / NET: -500 mL          LABS:                        8.9    10.68 )-----------( 195      ( 29 Sep 2023 04:33 )             28.7     09-29    151<H>  |  111<H>  |  87<HH>  ----------------------------<  64<L>  3.8   |  23  |  4.8<HH>    Ca    8.3<L>      29 Sep 2023 04:33  Phos  5.8     09-28  Mg     1.8     09-29    TPro  6.0  /  Alb  3.1<L>  /  TBili  0.6  /  DBili  x   /  AST  19  /  ALT  11  /  AlkPhos  78  09-28      Urinalysis Basic - ( 29 Sep 2023 04:33 )    Color: x / Appearance: x / SG: x / pH: x  Gluc: 64 mg/dL / Ketone: x  / Bili: x / Urobili: x   Blood: x / Protein: x / Nitrite: x   Leuk Esterase: x / RBC: x / WBC x   Sq Epi: x / Non Sq Epi: x / Bacteria: x      CAPILLARY BLOOD GLUCOSE      POCT Blood Glucose.: 95 mg/dL (29 Sep 2023 08:29)  POCT Blood Glucose.: 89 mg/dL (28 Sep 2023 21:19)  POCT Blood Glucose.: 93 mg/dL (28 Sep 2023 16:10)        RADIOLOGY & ADDITIONAL TESTS:    MEDICATIONS  (STANDING):  calcium acetate 667 milliGRAM(s) Oral three times a day with meals  chlorhexidine 2% Cloths 1 Application(s) Topical <User Schedule>  dextrose 5%. 1000 milliLiter(s) (100 mL/Hr) IV Continuous <Continuous>  dextrose 5%. 1000 milliLiter(s) (80 mL/Hr) IV Continuous <Continuous>  dextrose 50% Injectable 25 Gram(s) IV Push once  dextrose 50% Injectable 25 Gram(s) IV Push once  dextrose 50% Injectable 12.5 Gram(s) IV Push once  glucagon  Injectable 1 milliGRAM(s) IntraMuscular once  heparin   Injectable 5000 Unit(s) SubCutaneous every 8 hours  insulin lispro (ADMELOG) corrective regimen sliding scale   SubCutaneous three times a day before meals  metoprolol tartrate Injectable 2.5 milliGRAM(s) IV Push every 6 hours  midodrine. 5 milliGRAM(s) Oral three times a day  pantoprazole  Injectable 40 milliGRAM(s) IV Push two times a day    MEDICATIONS  (PRN):  acetaminophen     Tablet .. 650 milliGRAM(s) Oral every 6 hours PRN Moderate Pain (4 - 6)  albuterol    90 MICROgram(s) HFA Inhaler 1 Puff(s) Inhalation every 6 hours PRN for shortness of breath and/or wheezing  dextrose Oral Gel 15 Gram(s) Oral once PRN Blood Glucose LESS THAN 70 milliGRAM(s)/deciliter      PHYSICAL EXAM:  GENERAL: ill appearing  NERVOUS SYSTEM:  Alert & Oriented X1  CHEST/LUNG: B/L good air entry; No rales, rhonchi, or wheezing  HEART: S1S2 normal, no S3, Regular rate and rhythm; No murmurs, rubs, or gallops  ABDOMEN: Soft, Nontender, Nondistended; Bowel sounds present  EXTREMITIES:  2+ Peripheral Pulses, No clubbing, cyanosis, or edema  SKIN: No rashes or lesions      [facility] PGY1 SDU Transfer Note    Transfer from: SDU   Transfer to: Floors                            HPI:  82 yo m ho DM, COPD, anemia, lymphedemia, afib on xarelto, HFrEF, DM coming in from nursing home for AMS x 2 days. Unable to gather story from pt as he is altered and lethargic.  As per NH was becoming more agitated x 2 days, was hypotensive yesterday and started on cefepime and vancomycin. Brought in to the ED for AMS. PICC line in place for cefepime 1q q8 until 10/5/23 and vanc 1q24 until 9/23/23 for LLE cellulitis/OM  (was at [facility] last month for LLE thick wound cellulitis/OM and sacral DTI as per call with [facility] as per collateral from NH RN Asha)    In the ED, vitals wnl. Labs showing wbc 11.30, Hb 9.6, MCV 79.1, INR 1.59, CO2 14, AG 21, Cr 6 (~baseline 1.2), , trops 0.09. CTH wnl, RBUS with no hydro, poor visualization of left kidney    Hospital course:  When pt arrived to ICU, he was in shock, hypotensive, with active melena. BP in 60's. GI consulted urgently for endoscopy. Procedure was done at bedside and bleeding controlled. Pt. was given 2 U of blood, 1U FFP in ICU. HgB has maintained above 8 and pt has not signs of melena.     3E Course:  The patient was Hypoxic (as low as 70s). Evaluated the patient at bedside. AAOx 1. On auscultation, bilateral crackles present. Placed the patient on venturi mask and then on NRB. The Spo2 improved to 99%. Stat vitals repeated (vitals Spo2 99%, /70s, HR 95). Ordered CXR stat, ABG stat, Bumex 2mg IV x 1 stat. Labs and charts reviewed. Stat Labs drawn. Upgraded to SDU on 9/27.    In the SDU, patient respiratory status was much improved on NC. Nephrology recommended dialysis for patient's PAULA. On 9/27, a [place] was placed and patient received hemo that day. However, he became hypotensive 30 mins into dialysis and was stopped. The following day, the [place] was replaced with a longer one and patient received HD again with no complications. On the evening of 9/27, patient hgb was 6.9 and he received 1 unit pRBC. There was also an episode of melena and GI was notified. Patient remained hemodynamically stable and GI recommended to just monitor. Heparin was held, but has since been restarted for DVT ppx. On 9/29, he was started on D5 @80cc for 24 hours to address free water deficit. Starting sodium was 151 with a target goal of 145 after 24 hours. Last, patient was seen by speech and swallow and put on NPO for poor swallowing. DGTF on 9/29.     Patient is clinically and hemodynamically stable for  transfer.    ASSESSMENT AND PLAN:  83 year old male patient known to have COPD. DM, atrial fibrillation, HFrEF, anemia, lymphedema, and recent left foot OM who was brought from the NH to the ED on 09/15 for evaluation of altered mental status, found to have sepsis in setting of recent left foot OM, admitted for further management. Stay was complicated by hemorrhagic shock and drop in Hb secondary to hematochezia on 09/23. Status post EGD on 09/23 revealing a bleeding duodenal ulcer s/p OVESCO placement.    #Acute hypoxic respiratory failure  - desaturated 9/26 and upgrade to SDU  - CXR stable opacities  - Frequents suctioning  - Aspiration precautions - NPO per SLP   - avoid volume overload    #UGIB secondary to Duodenal ulcer s/p EGD   #Hematochezia  #Hemorrhagic Shock  - CT angio AP with IC 09/23: arterial extravasation between D1 and 2 with venous pooling, small hiatal hernia  - EGD 09/23: bleeding duodenal ulcer s/p OVESCO placement  - s/p 5 units pRBC on 09/23; s/p 1 unit pRBC 9/28; 6 units total this admission  - s/p protamine sulfate x1 on 09/23  - Trend CBC closely BID and transfuse as needed (target Hb >8). Maintain active Type and screen  - c/w Protonix IV 40mg BID  - Monitor BM for recurrence of hematochezia or melena  - Check stool H pylori Ag  - GI following  - NPO    #PAULA on CKD  #Hypernatremia   - trend BUN/Cr  - phosphorus level noted/phoslo 2/2/2  - holding bumex  - c/w midodrine 5 q 8    - d/c sodium bicarbonate   - [place] placed 9/27. Last HD on 9/28  - start D5 @80cc for 24 hours for free water deficit. Starting sodium was 151 with a target goal of 145 after 24 hours  - Nephro following    #History of Left Foot OM w/ surrounding Cellulitis   #Sacral DTI/Stage 1 POA   - ID was consulted and recommended monitoring off antibiotics  - wound care recs appreciated    #Afib  - home Xarelto  - c/w heparin  - c/w Lopressor IV    #MISC:  -DVT ppx: Hep sq (discussed with GI about the risk and benefit of prophylactic AC)  -GI ppx: PPI  -Diet: NPO  -Activity: IAT  -Code status: Full code (family does not want to talk to palliative and re-confirmed full code)  -Dispo: DGTF    PENDINGS: BMP at 4pm to check Na. Started D5 today @80cc for 24 hours for free water deficit. Starting sodium was 151 with a target goal of 145 after 24 hours.     ICU Vital Signs Last 24 Hrs  T(C): 36.3 (29 Sep 2023 11:24), Max: 36.3 (29 Sep 2023 11:24)  T(F): 97.3 (29 Sep 2023 11:24), Max: 97.3 (29 Sep 2023 11:24)  HR: 80 (29 Sep 2023 11:24) (74 - 106)  BP: 123/66 (29 Sep 2023 11:24) (116/60 - 175/63)  BP(mean): 87 (29 Sep 2023 11:24) (87 - 100)  ABP: --  ABP(mean): --  RR: 20 (29 Sep 2023 11:24) (18 - 25)  SpO2: 100% (29 Sep 2023 11:24) (95% - 100%)    O2 Parameters below as of 29 Sep 2023 11:24  Patient On (Oxygen Delivery Method): nasal cannula          I&O's Summary    28 Sep 2023 07:01  -  29 Sep 2023 07:00  --------------------------------------------------------  IN: 0 mL / OUT: 500 mL / NET: -500 mL          LABS:                        8.9    10.68 )-----------( 195      ( 29 Sep 2023 04:33 )             28.7     09-29    151<H>  |  111<H>  |  87<HH>  ----------------------------<  64<L>  3.8   |  23  |  4.8<HH>    Ca    8.3<L>      29 Sep 2023 04:33  Phos  5.8     09-28  Mg     1.8     09-29    TPro  6.0  /  Alb  3.1<L>  /  TBili  0.6  /  DBili  x   /  AST  19  /  ALT  11  /  AlkPhos  78  09-28      Urinalysis Basic - ( 29 Sep 2023 04:33 )    Color: x / Appearance: x / SG: x / pH: x  Gluc: 64 mg/dL / Ketone: x  / Bili: x / Urobili: x   Blood: x / Protein: x / Nitrite: x   Leuk Esterase: x / RBC: x / WBC x   Sq Epi: x / Non Sq Epi: x / Bacteria: x      CAPILLARY BLOOD GLUCOSE      POCT Blood Glucose.: 95 mg/dL (29 Sep 2023 08:29)  POCT Blood Glucose.: 89 mg/dL (28 Sep 2023 21:19)  POCT Blood Glucose.: 93 mg/dL (28 Sep 2023 16:10)        RADIOLOGY & ADDITIONAL TESTS:    MEDICATIONS  (STANDING):  calcium acetate 667 milliGRAM(s) Oral three times a day with meals  chlorhexidine 2% Cloths 1 Application(s) Topical <User Schedule>  dextrose 5%. 1000 milliLiter(s) (100 mL/Hr) IV Continuous <Continuous>  dextrose 5%. 1000 milliLiter(s) (80 mL/Hr) IV Continuous <Continuous>  dextrose 50% Injectable 25 Gram(s) IV Push once  dextrose 50% Injectable 25 Gram(s) IV Push once  dextrose 50% Injectable 12.5 Gram(s) IV Push once  glucagon  Injectable 1 milliGRAM(s) IntraMuscular once  heparin   Injectable 5000 Unit(s) SubCutaneous every 8 hours  insulin lispro (ADMELOG) corrective regimen sliding scale   SubCutaneous three times a day before meals  metoprolol tartrate Injectable 2.5 milliGRAM(s) IV Push every 6 hours  midodrine. 5 milliGRAM(s) Oral three times a day  pantoprazole  Injectable 40 milliGRAM(s) IV Push two times a day    MEDICATIONS  (PRN):  acetaminophen     Tablet .. 650 milliGRAM(s) Oral every 6 hours PRN Moderate Pain (4 - 6)  albuterol    90 MICROgram(s) HFA Inhaler 1 Puff(s) Inhalation every 6 hours PRN for shortness of breath and/or wheezing  dextrose Oral Gel 15 Gram(s) Oral once PRN Blood Glucose LESS THAN 70 milliGRAM(s)/deciliter      PHYSICAL EXAM:  GENERAL: ill appearing  NERVOUS SYSTEM:  Alert & Oriented X1  CHEST/LUNG: B/L good air entry; No rales, rhonchi, or wheezing  HEART: S1S2 normal, no S3, Regular rate and rhythm; No murmurs, rubs, or gallops  ABDOMEN: Soft, Nontender, Nondistended; Bowel sounds present  EXTREMITIES:  2+ Peripheral Pulses, No clubbing, cyanosis, or edema  SKIN: No rashes or lesions      [facility] PGY1

## 2023-09-29 NOTE — PROGRESS NOTE ADULT - ASSESSMENT
Acute Hypoxic Respiratory Failure, possible aspiration pna / volume overload  /R pleural effusions   Anemia of acute blood loss   Hemorrhagic Shock Secondary to Hematochezia from Duodenal Ulcer Bleed s/p OVESCO placement 09/23  Acutely worsening uremia and acute kidney failure , at high risk for ATN  Afib, chronic; uncontrolled rate   Reported possible L3L4 OM on CT scan/ sacral DTI/ Foot cellulitis on broad-spectrum intravenous antibiotics as per ID recs  Increased frailty and decreased physical capacity   Metabolic Encephalopathy   hypernatremia  PAULA / CKD stable non oliguric   History of Left Foot OM w/ surrounding Cellulitis   skin wounds / Left heel DTI  1:1 for safety and for udal observation       PLAN  as per previous notes the team Discussed with ID attending: patient is unlikely to benefit from prolonged therapy with cefepime+vanco (to cover possible OM) due to adverse outcomes associated kidney dysfunction, cognitive impact ...etc )   GI f/u appreciated Continue IV Protonix 40mg BID,  Continue holding therapeutic AC - Status post IV Protamine sulfate 36mg x1 dose on 09/23  void any NSAIDs, Check stool H pylori Ag (could be false negative and require follow up testing to confirm H pylori status)  F/U Hb transfuse PRN   Close F/U of BUN/Crea/ Lytes and UO   - podiatry input appreciated c/w Local wound care; offloading boots bilaterally  wound care input appreciated; frequent turning, off loading,and positioning and skin care as per protocol, Maintain pressure injury prevention, Keep skin clean, Offload heels, Monitor wound for changes and notify provider if any   c/w phoslo 2/2/2,  off bumex   c/w midodrine 5 q 8  , d/c sodium bicarbonate   SLP Revalauted the patient today - strted diet as per verbal reccs    udall placement 9/27/23  started on HD 9/27 but did not tolerate it with access problems  udall changed 9/28  he received HD 9/28   Creatinine Trend: 4.8<--, 4.6<--, 6.0<--, 7.2<--, 7.3<--, 7.6<--  received 250 cc LR bolus for hypernatremia - f/u with nephro and repeat labs today   reported dark stool 9/28 GI f/u requested appreciated   Hemoglobin: 8.9 g/dL (09-29 @ 04:33)  Hemoglobin: 9.2 g/dL (09-28 @ 20:00)  Hemoglobin: 8.7 g/dL (09-28 @ 04:39)  Hemoglobin: 7.9 g/dL (09-27 @ 17:44)  Hemoglobin: 8.8 g/dL (09-27 @ 05:37)  no melena today , resume heparin sq today    Hypernatremia improving , give free water as tolerated , D5w 80 cc - ok by nephrology   repeat BMP today 4 pm         DVT ppx SCD,   heparin sq resumed today , f/u LE duplex  report   GI PPX: PPI IV bid       GOC :  Full code -  As per palliative care team: next-of-kin not in favor of further discussing Goals of Care Conversation or advance directives.      Pending; labs, mental status improving / nephrology / CBC

## 2023-09-29 NOTE — SWALLOW BEDSIDE ASSESSMENT ADULT - SLP PERTINENT HISTORY OF CURRENT PROBLEM
Pt is an 84 y/o Male w/ PMH: DM2, COPD, anemia, paroxysmal Afib on Xarelto, HFrEF, sent to the hospital by [facility] for AMS. Pt recently admitted to [facility] from [facility] for OM needing IV Abx. Pt has been having worsening mental status over the last 2 days per NH records. Pt is being treated for metabolic encephalopathy, suspected severe sepsis, PAULA vs. CKD, HAGMA. CTH (-). CXR 9/19-> retrocardiac opacification, atelectatic. Hospital course complicated by hemorrhagic shock 2' hematochezia, EGD 9/23 revealed bleeding duodenal ulcer w/p OVESCO placement. Cleared for PO today if Hb remains stable, swallow eval ordered. Pt was seen previously in this admission, initially w/ recs on 9/20 for soft w/ mildly thick, and advanced to thin liquids on 9/21. 84 y/o Male w/ PMH: DM2, COPD, anemia, paroxysmal Afib on Xarelto, HFrEF, sent to the hospital by [facility] for AMS. Pt recently admitted to [facility] from [facility] for OM needing IV Abx. Pt has been having worsening mental status over the last 2 days per NH records. Pt is being treated for metabolic encephalopathy, suspected severe sepsis, PAULA vs. CKD, HAGMA. CTH (-). CXR 9/19-> retrocardiac opacification, atelectatic. Hospital course complicated by hemorrhagic shock 2' hematochezia, EGD 9/23 revealed bleeding duodenal ulcer w/p OVESCO placement. Cleared for PO today if Hb remains stable, swallow eval ordered.

## 2023-09-29 NOTE — SWALLOW BEDSIDE ASSESSMENT ADULT - COMMENTS
Pt. received awake, confused. Lethargic and garbled speech. Refusing all PO. Unable to recommend diet. Consider NPO- congested coughing, minimal PO intake. Very confused Pt was seen previously in this admission, initially w/ recs on 9/20 for soft w/ mildly thick, and advanced to thin liquids on 9/21.

## 2023-09-29 NOTE — PROGRESS NOTE ADULT - ASSESSMENT
Impression    Acute hypoxemic respiratory failure on NC  r pleural effusion  aspiration pneumonitis  UGIB secondary to Duodenal ulcer SP EGD   Hematochezia resolved   Hemorrhagic Shock resolved   Metabolic Encephalopathy   hypernatremia  PAULA / CKD SP HD  History of Left Foot OM w/ surrounding Cellulitis   Sacral DTI    Plan:      CNS: Monitor mental status     HEENT:  Aspiration precautions    CARDIOVASCULAR Keep I=O .     PULMONARY: Chest xray noted. Aggressive pulmonary toilet, including chest PT, albuterol and mucocyst nebulizers and frequent suctioning. Incentive spirometry  keep Sao2 92 to 96%    GASTROINTESTINAL : feeding per Speech and swallow eval. Protonix q 12     GENITOURINARY/RENAL: Nephro following; HD    INFECTIOUS : , abx per ID    HEMATOLOGIC: SCD for now. Keep type and screen active. Target Hb > 7. trend CBC     ENDOCRINE  Follow up FS.  Insulin protocol as needed.  BG goal 140-180    MSK/DERM  PT/OT when cooperative.  Off loading     floor  Palliative care eval  poor prognosis

## 2023-09-29 NOTE — SWALLOW BEDSIDE ASSESSMENT ADULT - NS SPL SWALLOW CLINIC TRIAL FT
+c/o globus w/ soft and bite sized, +overt s/s of penetration/aspiration w thin liquids, +toleration of minced and moist, puree and mildly thick w/o immediate overt s/s of penetration/aspiration
+overt s/s of aspiration with thin liquids and sequential sips of mildly thick liquids. +tolerance for puree and small/ controlled cup sips of mildly thick liquids without overt s/s of penetration/ aspiration.
pt aversive to all other PO trials despite encouragement from SLP.

## 2023-09-29 NOTE — PROGRESS NOTE ADULT - ASSESSMENT
Assessment and Plan  Case of an 83 year old male patient known to have COPD. DM, atrial fibrillation, HFrEF, anemia, lymphedema, and recent left foot OM who was brought from the NH to the ED on 09/15 for evaluation of altered mental status, found to have sepsis in setting of recent left foot OM, admitted for further management. Stay was complicated by hemorrhagic shock and drop in Hb secondary to hematochezia on 09/23. Status post EGD on 09/23 revealing a bleeding duodenal ulcer s/p OVESCO placement Stay also complicated by an episode of confusion and hypoxia on 09/26 s/p upgraded to step down, currently on diuretics and NC. We are consulted for hematochezia and hypotension.      Hemorrhagic Shock Secondary to Hematochezia from Duodenal Ulcer Bleed s/p OVESCO placement 09/23  Acute Drop in Hemoglobin- Improved  * Hemodynamically stable  * Baseline hemoglobin (prior to admission): Hb 13.8 in 2016 -> 09/12 9.5  * ED Vitals:  /63 mmHg, HR 88 bpm  * Hemoglobin on admission: Hb 9.6 on 09/15 -> Hb 8.1/7.3 s/p 5 units pRBC on 09/23 -> 09/24 Hb 9.1-> 09/25 Hb 8.9/8.7 -> 09/27 Hb 8.8/7.9 s/p 1 unit -> 09/28 Hb 8.7  * Coags: INR 1.59 on 09/15  * AC: on Xarelto for afib until 09/15. Then Heparin 5000 units SC Q8h until 09/20, then IV Heparin with prolonged aPTT from 09/20-09/23, then SCDs since 09/23  * Status post IV Protamine sulfate 36mg x1 dose on 09/23  * MARISSA red blood 09/23  * CT angio AP with IC 09/23: arterial extravasation between D1 and 2 with venous pooling, small hiatal hernia  * No prior EGD or colonoscopy  * Family History: negative for GI malignancies  * Status Post EGD on 09/23: blood clots in fundus and antrum; 2cm actively bleeding duodenal ulcer with spurting vessel ([place] 1a) in sweep on base s/p 10cc epi and s/p 11/6 OVESCO placement for hemostasis    RECOMMENDATIONS  - Status post EGD on 09/23 as detailed above (s/p OVESCO placement for hemostasis)  - Continue diet per speech and swallow  - Trend CBC closely BID and transfuse as needed (target Hb >8). Received 1 unit pRBC on 09/27 following an episode of melena. Maintain active Type and screen  - Check stool H pylori Ag (could be false negative and require follow up testing to confirm H pylori status)  - Trend BUN  - x2 large 18G IV Bores  - Continue IV Protonix 40mg BID (started 09/25). Was on IV pantoprazole drip (09/23-09/25)  - Anticoagulation resumption per team after discussion risks of rebleed and benefits  - Monitor BM for recurrence of hematochezia or melena: had melena on 09/27  - Please avoid any NSAIDs  - If any unstable bleed, please call GI STAT  - Follow up with our [facility] located at [address] [place] [address]. Phone Number: [phone]        Asymptomatic Cholelithiasis  * Noted on CT  * LFTs noted    RECOMMENDATIONS  - Monitor for symptoms  - Trend LFTs      Thank you for your consult.  - Please note that plan was discussed with Dr. Santoro and communicated with medical team.  - Please reach GI on [phone] during weekdays till 5pm.  - Please call the GI service line after 5pm on Weekdays and anytime on Weekends: [phone].      Rickie Jenkins MD  PGY - 4 Gastroenterology Fellow  [facility]         Assessment and Plan  Case of an 83 year old male patient known to have COPD. DM, atrial fibrillation, HFrEF, anemia, lymphedema, and recent left foot OM who was brought from the NH to the ED on 09/15 for evaluation of altered mental status, found to have sepsis in setting of recent left foot OM, admitted for further management. Stay was complicated by hemorrhagic shock and drop in Hb secondary to hematochezia on 09/23. Status post EGD on 09/23 revealing a bleeding duodenal ulcer s/p OVESCO placement Stay also complicated by an episode of confusion and hypoxia on 09/26 s/p upgraded to step down, currently on diuretics and NC. We are consulted for hematochezia and hypotension.      Hemorrhagic Shock Secondary to Hematochezia from Duodenal Ulcer Bleed s/p OVESCO placement 09/23  Acute Drop in Hemoglobin- Improved  * Hemodynamically stable  * Baseline hemoglobin (prior to admission): Hb 13.8 in 2016 -> 09/12 9.5  * ED Vitals:  /63 mmHg, HR 88 bpm  * Hemoglobin on admission: Hb 9.6 on 09/15 -> Hb 8.1/7.3 s/p 5 units pRBC on 09/23 -> 09/24 Hb 9.1-> 09/25 Hb 8.9/8.7 -> 09/27 Hb 8.8/7.9 s/p 1 unit -> 09/28 Hb 8.7  * Coags: INR 1.59 on 09/15  * AC: on Xarelto for afib until 09/15. Then Heparin 5000 units SC Q8h until 09/20, then IV Heparin with prolonged aPTT from 09/20-09/23, then SCDs since 09/23  * Status post IV Protamine sulfate 36mg x1 dose on 09/23  * MARISSA red blood 09/23  * CT angio AP with IC 09/23: arterial extravasation between D1 and 2 with venous pooling, small hiatal hernia  * No prior EGD or colonoscopy  * Family History: negative for GI malignancies  * Status Post EGD on 09/23: blood clots in fundus and antrum; 2cm actively bleeding duodenal ulcer with spurting vessel ([place] 1a) in sweep on base s/p 10cc epi and s/p 11/6 OVESCO placement for hemostasis    RECOMMENDATIONS  - Status post EGD on 09/23 as detailed above (s/p OVESCO placement for hemostasis)  - Continue diet per speech and swallow  - Trend CBC closely BID and transfuse as needed (target Hb >8). Received 1 unit pRBC on 09/27 following an episode of possible melena. Maintain active Type and screen  - Check stool H pylori Ag (could be false negative and require follow up testing to confirm H pylori status)  - Trend BUN  - x2 large 18G IV Bores  - Continue IV Protonix 40mg BID (started 09/25). Was on IV pantoprazole drip (09/23-09/25)  - Anticoagulation resumption per team after discussion risks of rebleed and benefits  - Monitor BM for recurrence of hematochezia or melena: had reported melena on 09/27  - Please avoid any NSAIDs  - If any unstable bleed, please call GI STAT  - Follow up with our [facility] located at [address] [place] [address]. Phone Number: [phone]        Asymptomatic Cholelithiasis  * Noted on CT  * LFTs noted    RECOMMENDATIONS  - Monitor for symptoms  - Trend LFTs      Thank you for your consult.  - Please note that plan was discussed with Dr. Santoro and communicated with medical team.  - Please reach GI on [phone] during weekdays till 5pm.  - Please call the GI service line after 5pm on Weekdays and anytime on Weekends: [phone].      Rickie Jenkins MD  PGY - 4 Gastroenterology Fellow  [facility]

## 2023-09-30 LAB
ANION GAP SERPL CALC-SCNC: 10 MMOL/L — SIGNIFICANT CHANGE UP (ref 7–14)
BASOPHILS # BLD AUTO: 0.01 K/UL — SIGNIFICANT CHANGE UP (ref 0–0.2)
BASOPHILS NFR BLD AUTO: 0.1 % — SIGNIFICANT CHANGE UP (ref 0–1)
BUN SERPL-MCNC: 83 MG/DL — CRITICAL HIGH (ref 10–20)
CALCIUM SERPL-MCNC: 7.8 MG/DL — LOW (ref 8.4–10.5)
CHLORIDE SERPL-SCNC: 109 MMOL/L — SIGNIFICANT CHANGE UP (ref 98–110)
CO2 SERPL-SCNC: 28 MMOL/L — SIGNIFICANT CHANGE UP (ref 17–32)
CREAT SERPL-MCNC: 4 MG/DL — HIGH (ref 0.7–1.5)
EGFR: 14 ML/MIN/1.73M2 — LOW
EOSINOPHIL # BLD AUTO: 0.62 K/UL — SIGNIFICANT CHANGE UP (ref 0–0.7)
EOSINOPHIL NFR BLD AUTO: 7.7 % — SIGNIFICANT CHANGE UP (ref 0–8)
GLUCOSE BLDC GLUCOMTR-MCNC: 105 MG/DL — HIGH (ref 70–99)
GLUCOSE BLDC GLUCOMTR-MCNC: 110 MG/DL — HIGH (ref 70–99)
GLUCOSE BLDC GLUCOMTR-MCNC: 121 MG/DL — HIGH (ref 70–99)
GLUCOSE BLDC GLUCOMTR-MCNC: 135 MG/DL — HIGH (ref 70–99)
GLUCOSE SERPL-MCNC: 121 MG/DL — HIGH (ref 70–99)
HCT VFR BLD CALC: 27.4 % — LOW (ref 42–52)
HGB BLD-MCNC: 8.5 G/DL — LOW (ref 14–18)
IMM GRANULOCYTES NFR BLD AUTO: 0.2 % — SIGNIFICANT CHANGE UP (ref 0.1–0.3)
LYMPHOCYTES # BLD AUTO: 1.96 K/UL — SIGNIFICANT CHANGE UP (ref 1.2–3.4)
LYMPHOCYTES # BLD AUTO: 24.4 % — SIGNIFICANT CHANGE UP (ref 20.5–51.1)
MAGNESIUM SERPL-MCNC: 1.6 MG/DL — LOW (ref 1.8–2.4)
MCHC RBC-ENTMCNC: 27.2 PG — SIGNIFICANT CHANGE UP (ref 27–31)
MCHC RBC-ENTMCNC: 31 G/DL — LOW (ref 32–37)
MCV RBC AUTO: 87.5 FL — SIGNIFICANT CHANGE UP (ref 80–94)
MONOCYTES # BLD AUTO: 0.67 K/UL — HIGH (ref 0.1–0.6)
MONOCYTES NFR BLD AUTO: 8.3 % — SIGNIFICANT CHANGE UP (ref 1.7–9.3)
NEUTROPHILS # BLD AUTO: 4.76 K/UL — SIGNIFICANT CHANGE UP (ref 1.4–6.5)
NEUTROPHILS NFR BLD AUTO: 59.3 % — SIGNIFICANT CHANGE UP (ref 42.2–75.2)
NRBC # BLD: 0 /100 WBCS — SIGNIFICANT CHANGE UP (ref 0–0)
PLATELET # BLD AUTO: 170 K/UL — SIGNIFICANT CHANGE UP (ref 130–400)
PMV BLD: 10.3 FL — SIGNIFICANT CHANGE UP (ref 7.4–10.4)
POTASSIUM SERPL-MCNC: 3.2 MMOL/L — LOW (ref 3.5–5)
POTASSIUM SERPL-SCNC: 3.2 MMOL/L — LOW (ref 3.5–5)
RBC # BLD: 3.13 M/UL — LOW (ref 4.7–6.1)
RBC # FLD: 20.6 % — HIGH (ref 11.5–14.5)
SODIUM SERPL-SCNC: 147 MMOL/L — HIGH (ref 135–146)
WBC # BLD: 8.04 K/UL — SIGNIFICANT CHANGE UP (ref 4.8–10.8)
WBC # FLD AUTO: 8.04 K/UL — SIGNIFICANT CHANGE UP (ref 4.8–10.8)

## 2023-09-30 PROCEDURE — 93010 ELECTROCARDIOGRAM REPORT: CPT

## 2023-09-30 PROCEDURE — 99233 SBSQ HOSP IP/OBS HIGH 50: CPT

## 2023-09-30 RX ORDER — SODIUM CHLORIDE 9 MG/ML
250 INJECTION, SOLUTION INTRAVENOUS ONCE
Refills: 0 | Status: COMPLETED | OUTPATIENT
Start: 2023-09-30 | End: 2023-09-30

## 2023-09-30 RX ADMIN — Medication 2.5 MILLIGRAM(S): at 11:33

## 2023-09-30 RX ADMIN — CHLORHEXIDINE GLUCONATE 1 APPLICATION(S): 213 SOLUTION TOPICAL at 05:50

## 2023-09-30 RX ADMIN — PANTOPRAZOLE SODIUM 40 MILLIGRAM(S): 20 TABLET, DELAYED RELEASE ORAL at 17:45

## 2023-09-30 RX ADMIN — HEPARIN SODIUM 5000 UNIT(S): 5000 INJECTION INTRAVENOUS; SUBCUTANEOUS at 22:06

## 2023-09-30 RX ADMIN — MIDODRINE HYDROCHLORIDE 5 MILLIGRAM(S): 2.5 TABLET ORAL at 05:51

## 2023-09-30 RX ADMIN — Medication 667 MILLIGRAM(S): at 17:45

## 2023-09-30 RX ADMIN — MIDODRINE HYDROCHLORIDE 5 MILLIGRAM(S): 2.5 TABLET ORAL at 11:36

## 2023-09-30 RX ADMIN — PANTOPRAZOLE SODIUM 40 MILLIGRAM(S): 20 TABLET, DELAYED RELEASE ORAL at 05:50

## 2023-09-30 RX ADMIN — HEPARIN SODIUM 5000 UNIT(S): 5000 INJECTION INTRAVENOUS; SUBCUTANEOUS at 05:51

## 2023-09-30 RX ADMIN — Medication 667 MILLIGRAM(S): at 08:08

## 2023-09-30 RX ADMIN — Medication 2.5 MILLIGRAM(S): at 06:13

## 2023-09-30 RX ADMIN — Medication 667 MILLIGRAM(S): at 11:36

## 2023-09-30 RX ADMIN — SODIUM CHLORIDE 250 MILLILITER(S): 9 INJECTION, SOLUTION INTRAVENOUS at 22:05

## 2023-09-30 RX ADMIN — MIDODRINE HYDROCHLORIDE 5 MILLIGRAM(S): 2.5 TABLET ORAL at 17:45

## 2023-09-30 NOTE — PROGRESS NOTE ADULT - SUBJECTIVE AND OBJECTIVE BOX
SUBJECTIVE:    Patient is a 83y old Male who presents with a chief complaint of AMS (30 Sep 2023 08:28)    Currently admitted to medicine with the primary diagnosis of Altered mental status    Today is hospital day 15d. This morning he is resting comfortably in bed and reports no new issues or overnight events.     PAST MEDICAL & SURGICAL HISTORY  HTN (hypertension)    COPD (chronic obstructive pulmonary disease)    High cholesterol    Mild anemia    Coffee ground emesis    Chronic diastolic heart failure    PAULA (acute kidney injury)    No significant past surgical history      SOCIAL HISTORY:  Negative for smoking/alcohol/drug use.     ALLERGIES:  No Known Allergies    MEDICATIONS:  STANDING MEDICATIONS  calcium acetate 667 milliGRAM(s) Oral three times a day with meals  chlorhexidine 2% Cloths 1 Application(s) Topical <User Schedule>  dextrose 5%. 1000 milliLiter(s) IV Continuous <Continuous>  dextrose 5%. 1000 milliLiter(s) IV Continuous <Continuous>  dextrose 50% Injectable 25 Gram(s) IV Push once  dextrose 50% Injectable 12.5 Gram(s) IV Push once  dextrose 50% Injectable 25 Gram(s) IV Push once  glucagon  Injectable 1 milliGRAM(s) IntraMuscular once  heparin   Injectable 5000 Unit(s) SubCutaneous every 8 hours  insulin lispro (ADMELOG) corrective regimen sliding scale   SubCutaneous three times a day before meals  metoprolol tartrate Injectable 2.5 milliGRAM(s) IV Push every 6 hours  midodrine. 5 milliGRAM(s) Oral three times a day  pantoprazole  Injectable 40 milliGRAM(s) IV Push two times a day    PRN MEDICATIONS  acetaminophen     Tablet .. 650 milliGRAM(s) Oral every 6 hours PRN  albuterol    90 MICROgram(s) HFA Inhaler 1 Puff(s) Inhalation every 6 hours PRN  dextrose Oral Gel 15 Gram(s) Oral once PRN    VITALS:   T(F): 96.1  HR: 67  BP: 107/55  RR: 18  SpO2: 100%    LABS:                        8.5    8.04  )-----------( 170      ( 30 Sep 2023 08:14 )             27.4     09-30    147<H>  |  109  |  83<HH>  ----------------------------<  121<H>  3.2<L>   |  28  |  4.0<H>    Ca    7.8<L>      30 Sep 2023 08:14  Mg     1.6     09-30    TPro  5.6<L>  /  Alb  2.9<L>  /  TBili  0.6  /  DBili  x   /  AST  17  /  ALT  10  /  AlkPhos  70  09-29      Urinalysis Basic - ( 30 Sep 2023 08:14 )  Color: x / Appearance: x / SG: x / pH: x  Gluc: 121 mg/dL / Ketone: x  / Bili: x / Urobili: x   Blood: x / Protein: x / Nitrite: x   Leuk Esterase: x / RBC: x / WBC x   Sq Epi: x / Non Sq Epi: x / Bacteria: x        PHYSICAL EXAM:  GEN: No acute distress, AO2 with periods of confusion, right IJ catheter   LUNGS: Clear to auscultation bilaterally   HEART: S1/S2 present. RRR.   ABD: Soft, non-tender, non-distended. Bowel sounds present  EXT: No LE edema   NEURO: AAOX3

## 2023-09-30 NOTE — PROGRESS NOTE ADULT - SUBJECTIVE AND OBJECTIVE BOX
Nephrology progress note    THIS IS AN INCOMPLETE NOTE . FULL NOTE TO FOLLOW SHORTLY    Patient is seen and examined, events over the last 24 h noted .    Allergies:  No Known Allergies    Hospital Medications:   MEDICATIONS  (STANDING):  calcium acetate 667 milliGRAM(s) Oral three times a day with meals  chlorhexidine 2% Cloths 1 Application(s) Topical <User Schedule>  dextrose 5%. 1000 milliLiter(s) (100 mL/Hr) IV Continuous <Continuous>  dextrose 5%. 1000 milliLiter(s) (80 mL/Hr) IV Continuous <Continuous>  dextrose 50% Injectable 25 Gram(s) IV Push once  dextrose 50% Injectable 25 Gram(s) IV Push once  dextrose 50% Injectable 12.5 Gram(s) IV Push once  glucagon  Injectable 1 milliGRAM(s) IntraMuscular once  heparin   Injectable 5000 Unit(s) SubCutaneous every 8 hours  insulin lispro (ADMELOG) corrective regimen sliding scale   SubCutaneous three times a day before meals  metoprolol tartrate Injectable 2.5 milliGRAM(s) IV Push every 6 hours  midodrine. 5 milliGRAM(s) Oral three times a day  pantoprazole  Injectable 40 milliGRAM(s) IV Push two times a day        VITALS:  T(F): 97.5 (09-30-23 @ 05:22), Max: 97.9 (09-29-23 @ 21:49)  HR: 84 (09-30-23 @ 05:22)  BP: 107/55 (09-30-23 @ 05:22)  RR: 18 (09-30-23 @ 05:22)  SpO2: 100% (09-29-23 @ 21:58)  Wt(kg): --    09-28 @ 07:01  -  09-29 @ 07:00  --------------------------------------------------------  IN: 0 mL / OUT: 500 mL / NET: -500 mL    09-29 @ 07:01  -  09-30 @ 07:00  --------------------------------------------------------  IN: 660 mL / OUT: 0 mL / NET: 660 mL          PHYSICAL EXAM:  Constitutional: NAD  HEENT: anicteric sclera, oropharynx clear, MMM  Neck: No JVD  Respiratory: CTAB, no wheezes, rales or rhonchi  Cardiovascular: S1, S2, RRR  Gastrointestinal: BS+, soft, NT/ND  Extremities: No cyanosis or clubbing. No peripheral edema  :  No barth.   Skin: No rashes    LABS:  09-29    151<H>  |  111<H>  |  86<HH>  ----------------------------<  107<H>  3.6   |  24  |  4.4<HH>    Ca    7.9<L>      29 Sep 2023 17:59  Mg     1.8     09-29    TPro  5.6<L>  /  Alb  2.9<L>  /  TBili  0.6  /  DBili      /  AST  17  /  ALT  10  /  AlkPhos  70  09-29                          8.4    8.76  )-----------( 183      ( 29 Sep 2023 17:59 )             27.3       Urine Studies:  Urinalysis Basic - ( 29 Sep 2023 17:59 )    Color:  / Appearance:  / SG:  / pH:   Gluc: 107 mg/dL / Ketone:   / Bili:  / Urobili:    Blood:  / Protein:  / Nitrite:    Leuk Esterase:  / RBC:  / WBC    Sq Epi:  / Non Sq Epi:  / Bacteria:             HBsAb <3.0      [09-27-23 @ 17:44]  HBsAb Nonreact      [09-27-23 @ 17:44]  HBsAg Nonreact      [09-27-23 @ 17:44]  HBcAb Nonreact      [09-27-23 @ 17:44]        RADIOLOGY & ADDITIONAL STUDIES:   Nephrology progress note    Patient is seen and examined, events over the last 24 h noted .  Lying in bed   has 1:1 sit     Allergies:  No Known Allergies    Hospital Medications:   MEDICATIONS  (STANDING):  calcium acetate 667 milliGRAM(s) Oral three times a day with meals  glucagon  Injectable 1 milliGRAM(s) IntraMuscular once  heparin   Injectable 5000 Unit(s) SubCutaneous every 8 hours  insulin lispro (ADMELOG) corrective regimen sliding scale   SubCutaneous three times a day before meals  metoprolol tartrate Injectable 2.5 milliGRAM(s) IV Push every 6 hours  midodrine. 5 milliGRAM(s) Oral three times a day  pantoprazole  Injectable 40 milliGRAM(s) IV Push two times a day        VITALS:  T(F): 97.5 (09-30-23 @ 05:22), Max: 97.9 (09-29-23 @ 21:49)  HR: 84 (09-30-23 @ 05:22)  BP: 107/55 (09-30-23 @ 05:22)  RR: 18 (09-30-23 @ 05:22)  SpO2: 100% (09-29-23 @ 21:58)      09-28 @ 07:01  -  09-29 @ 07:00  --------------------------------------------------------  IN: 0 mL / OUT: 500 mL / NET: -500 mL    09-29 @ 07:01  -  09-30 @ 07:00  --------------------------------------------------------  IN: 660 mL / OUT: 0 mL / NET: 660 mL          PHYSICAL EXAM:  Constitutional: NAD  Respiratory: CTAB,   Cardiovascular: S1, S2, RRR  Gastrointestinal: BS+, soft, NT/ND  Extremities: No cyanosis or clubbing. No peripheral edema  Skin: No rashes    LABS:  09-30    147<H>  |  109  |  83<HH>  ----------------------------<  121<H>  3.2<L>   |  28  |  4.0<H>    Ca    7.8<L>      30 Sep 2023 08:14  Mg     1.6     09-30    TPro  5.6<L>  /  Alb  2.9<L>  /  TBili  0.6  /  DBili  x   /  AST  17  /  ALT  10  /  AlkPhos  70  09-29 09-29    151<H>  |  111<H>  |  86<HH>  ----------------------------<  107<H>  3.6   |  24  |  4.4<HH>    Ca    7.9<L>      29 Sep 2023 17:59  Mg     1.8     09-29    TPro  5.6<L>  /  Alb  2.9<L>  /  TBili  0.6  /  DBili      /  AST  17  /  ALT  10  /  AlkPhos  70  09-29                          8.4    8.76  )-----------( 183      ( 29 Sep 2023 17:59 )             27.3       Urine Studies:  Urinalysis Basic - ( 29 Sep 2023 17:59 )    Color:  / Appearance:  / SG:  / pH:   Gluc: 107 mg/dL / Ketone:   / Bili:  / Urobili:    Blood:  / Protein:  / Nitrite:    Leuk Esterase:  / RBC:  / WBC    Sq Epi:  / Non Sq Epi:  / Bacteria:             HBsAb <3.0      [09-27-23 @ 17:44]  HBsAb Nonreact      [09-27-23 @ 17:44]  HBsAg Nonreact      [09-27-23 @ 17:44]  HBcAb Nonreact      [09-27-23 @ 17:44]        RADIOLOGY & ADDITIONAL STUDIES:

## 2023-09-30 NOTE — PROGRESS NOTE ADULT - ASSESSMENT
82 yo m ho DM, COPD, anemia, paroxysmal afib on xarelto, HFrEF, DM coming in from nursing home for AMS x 2 days. Found to have toxic metabolic encephalopathy with PAULA.    PAULA/ toxic metabolic encephalopathy/ HAGMA/ HFrEF/ hypernatremia  possible ATN iso hypotension and possible sepsis/ vanco toxicity  no hydro on imaging  creat trend noted   sp Right IJ / was able to do HD yesterday   HD TODAY low UF / follow creat in AM and in Monday and UOP    phosphorus  level noted / phoslo 2/2/2  off Bumex / increase free water if able / serum sodium better noted          will follow  / prognosis poor

## 2023-09-30 NOTE — PROGRESS NOTE ADULT - ASSESSMENT
Acute Hypoxic Respiratory Failure, possible aspiration pna / volume overload  /R pleural effusions   Anemia of acute blood loss   Hemorrhagic Shock Secondary to Hematochezia from Duodenal Ulcer Bleed s/p OVESCO placement 09/23  Acutely worsening uremia and acute kidney failure most likely 2/2 ATN - now on HD through right IJ [place]   Afib, chronic; uncontrolled rate   Reported possible L3L4 OM on CT scan/ sacral DTI/ Foot cellulitis on broad-spectrum intravenous antibiotics as per ID recs  Increased frailty and decreased physical capacity   Metabolic Encephalopathy   hypernatremia  PAULA / CKD stable non oliguric   History of Left Foot OM w/ surrounding Cellulitis   skin wounds / Left heel DTI  1:1 for safety and for udal observation       PLAN  as per previous notes the team Discussed with ID attending: patient is unlikely to benefit from prolonged therapy with cefepime+vanco (to cover possible OM) due to adverse outcomes associated kidney dysfunction, cognitive impact ...etc )   GI f/u appreciated Continue IV Protonix 40mg BID,  Continue holding therapeutic AC - Status post IV Protamine sulfate 36mg x1 dose on 09/23  Avoid any NSAIDs,   F/U Hb transfuse PRN   Will need to discuss the risks and benefits of long term AC with patient or next of Kin in light of life threating GIB  Close F/U of BUN/Crea/ Lytes and UO   podiatry input appreciated c/w Local wound care; offloading boots bilaterally  wound care input appreciated; frequent turning, off loading,and positioning and skin care as per protocol, Maintain pressure injury prevention, Keep skin clean, Offload heels, Monitor wound for changes and notify provider if any   c/w phoslo 2/2/2,  off bumex   c/w midodrine 5 q 8  , d/c sodium bicarbonate   SLP Revalauted the patient today - strted diet as per verbal reccs   udall placement 9/27/23  started on HD 9/27 but did not tolerate it with access problems  udall changed 9/28  he received HD 9/28   Creatinine Trend  received 250 cc LR bolus for hypernatremia - f/u with nephro and repeat labs  Hypernatremia improving , give free water as tolerated , D5w 80 cc - ok by nephrology       DVT ppx SCD,   heparin sq   GI PPX: PPI  GOC :  Full code -  As per palliative care team: next-of-kin not in favor of further discussing Goals of Care Conversation or advance directives.    Pending; labs, mental status improving / nephrology / CBC

## 2023-10-01 LAB
ANION GAP SERPL CALC-SCNC: 6 MMOL/L — LOW (ref 7–14)
BASOPHILS # BLD AUTO: 0.02 K/UL — SIGNIFICANT CHANGE UP (ref 0–0.2)
BASOPHILS # BLD AUTO: 0.03 K/UL — SIGNIFICANT CHANGE UP (ref 0–0.2)
BASOPHILS NFR BLD AUTO: 0.2 % — SIGNIFICANT CHANGE UP (ref 0–1)
BASOPHILS NFR BLD AUTO: 0.4 % — SIGNIFICANT CHANGE UP (ref 0–1)
BUN SERPL-MCNC: 49 MG/DL — HIGH (ref 10–20)
CALCIUM SERPL-MCNC: 8 MG/DL — LOW (ref 8.4–10.5)
CHLORIDE SERPL-SCNC: 107 MMOL/L — SIGNIFICANT CHANGE UP (ref 98–110)
CO2 SERPL-SCNC: 29 MMOL/L — SIGNIFICANT CHANGE UP (ref 17–32)
CREAT SERPL-MCNC: 2.8 MG/DL — HIGH (ref 0.7–1.5)
CULTURE RESULTS: SIGNIFICANT CHANGE UP
EGFR: 22 ML/MIN/1.73M2 — LOW
EOSINOPHIL # BLD AUTO: 0.31 K/UL — SIGNIFICANT CHANGE UP (ref 0–0.7)
EOSINOPHIL # BLD AUTO: 0.44 K/UL — SIGNIFICANT CHANGE UP (ref 0–0.7)
EOSINOPHIL NFR BLD AUTO: 3.7 % — SIGNIFICANT CHANGE UP (ref 0–8)
EOSINOPHIL NFR BLD AUTO: 6.2 % — SIGNIFICANT CHANGE UP (ref 0–8)
GLUCOSE BLDC GLUCOMTR-MCNC: 102 MG/DL — HIGH (ref 70–99)
GLUCOSE SERPL-MCNC: 89 MG/DL — SIGNIFICANT CHANGE UP (ref 70–99)
HCT VFR BLD CALC: 28.5 % — LOW (ref 42–52)
HCT VFR BLD CALC: 29.1 % — LOW (ref 42–52)
HGB BLD-MCNC: 8.8 G/DL — LOW (ref 14–18)
HGB BLD-MCNC: 9 G/DL — LOW (ref 14–18)
IMM GRANULOCYTES NFR BLD AUTO: 0.3 % — SIGNIFICANT CHANGE UP (ref 0.1–0.3)
IMM GRANULOCYTES NFR BLD AUTO: 0.4 % — HIGH (ref 0.1–0.3)
LYMPHOCYTES # BLD AUTO: 2.06 K/UL — SIGNIFICANT CHANGE UP (ref 1.2–3.4)
LYMPHOCYTES # BLD AUTO: 2.26 K/UL — SIGNIFICANT CHANGE UP (ref 1.2–3.4)
LYMPHOCYTES # BLD AUTO: 27.1 % — SIGNIFICANT CHANGE UP (ref 20.5–51.1)
LYMPHOCYTES # BLD AUTO: 28.9 % — SIGNIFICANT CHANGE UP (ref 20.5–51.1)
MAGNESIUM SERPL-MCNC: 1.6 MG/DL — LOW (ref 1.8–2.4)
MCHC RBC-ENTMCNC: 26.7 PG — LOW (ref 27–31)
MCHC RBC-ENTMCNC: 27.4 PG — SIGNIFICANT CHANGE UP (ref 27–31)
MCHC RBC-ENTMCNC: 30.9 G/DL — LOW (ref 32–37)
MCHC RBC-ENTMCNC: 30.9 G/DL — LOW (ref 32–37)
MCV RBC AUTO: 86.6 FL — SIGNIFICANT CHANGE UP (ref 80–94)
MCV RBC AUTO: 88.4 FL — SIGNIFICANT CHANGE UP (ref 80–94)
MONOCYTES # BLD AUTO: 0.6 K/UL — SIGNIFICANT CHANGE UP (ref 0.1–0.6)
MONOCYTES # BLD AUTO: 0.77 K/UL — HIGH (ref 0.1–0.6)
MONOCYTES NFR BLD AUTO: 8.4 % — SIGNIFICANT CHANGE UP (ref 1.7–9.3)
MONOCYTES NFR BLD AUTO: 9.2 % — SIGNIFICANT CHANGE UP (ref 1.7–9.3)
NEUTROPHILS # BLD AUTO: 3.98 K/UL — SIGNIFICANT CHANGE UP (ref 1.4–6.5)
NEUTROPHILS # BLD AUTO: 4.94 K/UL — SIGNIFICANT CHANGE UP (ref 1.4–6.5)
NEUTROPHILS NFR BLD AUTO: 55.8 % — SIGNIFICANT CHANGE UP (ref 42.2–75.2)
NEUTROPHILS NFR BLD AUTO: 59.4 % — SIGNIFICANT CHANGE UP (ref 42.2–75.2)
NRBC # BLD: 0 /100 WBCS — SIGNIFICANT CHANGE UP (ref 0–0)
NRBC # BLD: 0 /100 WBCS — SIGNIFICANT CHANGE UP (ref 0–0)
PLATELET # BLD AUTO: 136 K/UL — SIGNIFICANT CHANGE UP (ref 130–400)
PLATELET # BLD AUTO: 148 K/UL — SIGNIFICANT CHANGE UP (ref 130–400)
PMV BLD: 10.7 FL — HIGH (ref 7.4–10.4)
PMV BLD: 11.2 FL — HIGH (ref 7.4–10.4)
POTASSIUM SERPL-MCNC: 3.1 MMOL/L — LOW (ref 3.5–5)
POTASSIUM SERPL-SCNC: 3.1 MMOL/L — LOW (ref 3.5–5)
RBC # BLD: 3.29 M/UL — LOW (ref 4.7–6.1)
RBC # BLD: 3.29 M/UL — LOW (ref 4.7–6.1)
RBC # FLD: 20.4 % — HIGH (ref 11.5–14.5)
RBC # FLD: 20.4 % — HIGH (ref 11.5–14.5)
SODIUM SERPL-SCNC: 142 MMOL/L — SIGNIFICANT CHANGE UP (ref 135–146)
SPECIMEN SOURCE: SIGNIFICANT CHANGE UP
WBC # BLD: 7.13 K/UL — SIGNIFICANT CHANGE UP (ref 4.8–10.8)
WBC # BLD: 8.33 K/UL — SIGNIFICANT CHANGE UP (ref 4.8–10.8)
WBC # FLD AUTO: 7.13 K/UL — SIGNIFICANT CHANGE UP (ref 4.8–10.8)
WBC # FLD AUTO: 8.33 K/UL — SIGNIFICANT CHANGE UP (ref 4.8–10.8)

## 2023-10-01 PROCEDURE — 99233 SBSQ HOSP IP/OBS HIGH 50: CPT

## 2023-10-01 RX ORDER — POTASSIUM CHLORIDE 20 MEQ
20 PACKET (EA) ORAL ONCE
Refills: 0 | Status: DISCONTINUED | OUTPATIENT
Start: 2023-10-01 | End: 2023-10-01

## 2023-10-01 RX ORDER — MAGNESIUM SULFATE 500 MG/ML
2 VIAL (ML) INJECTION ONCE
Refills: 0 | Status: COMPLETED | OUTPATIENT
Start: 2023-10-01 | End: 2023-10-01

## 2023-10-01 RX ORDER — METOPROLOL TARTRATE 50 MG
12.5 TABLET ORAL EVERY 12 HOURS
Refills: 0 | Status: DISCONTINUED | OUTPATIENT
Start: 2023-10-01 | End: 2023-10-06

## 2023-10-01 RX ORDER — POTASSIUM CHLORIDE 20 MEQ
20 PACKET (EA) ORAL ONCE
Refills: 0 | Status: COMPLETED | OUTPATIENT
Start: 2023-10-01 | End: 2023-10-01

## 2023-10-01 RX ORDER — POTASSIUM CHLORIDE 20 MEQ
20 PACKET (EA) ORAL ONCE
Refills: 0 | Status: DISCONTINUED | OUTPATIENT
Start: 2023-10-01 | End: 2023-10-03

## 2023-10-01 RX ORDER — PANTOPRAZOLE SODIUM 20 MG/1
40 TABLET, DELAYED RELEASE ORAL
Refills: 0 | Status: DISCONTINUED | OUTPATIENT
Start: 2023-10-01 | End: 2023-10-25

## 2023-10-01 RX ADMIN — Medication 2.5 MILLIGRAM(S): at 00:05

## 2023-10-01 RX ADMIN — Medication 25 GRAM(S): at 08:52

## 2023-10-01 RX ADMIN — MIDODRINE HYDROCHLORIDE 5 MILLIGRAM(S): 2.5 TABLET ORAL at 17:45

## 2023-10-01 RX ADMIN — PANTOPRAZOLE SODIUM 40 MILLIGRAM(S): 20 TABLET, DELAYED RELEASE ORAL at 05:23

## 2023-10-01 RX ADMIN — Medication 50 MILLIEQUIVALENT(S): at 12:50

## 2023-10-01 RX ADMIN — MIDODRINE HYDROCHLORIDE 5 MILLIGRAM(S): 2.5 TABLET ORAL at 12:39

## 2023-10-01 RX ADMIN — Medication 667 MILLIGRAM(S): at 12:39

## 2023-10-01 RX ADMIN — Medication 12.5 MILLIGRAM(S): at 17:45

## 2023-10-01 RX ADMIN — Medication 667 MILLIGRAM(S): at 08:52

## 2023-10-01 RX ADMIN — Medication 667 MILLIGRAM(S): at 17:46

## 2023-10-01 RX ADMIN — HEPARIN SODIUM 5000 UNIT(S): 5000 INJECTION INTRAVENOUS; SUBCUTANEOUS at 22:25

## 2023-10-01 RX ADMIN — CHLORHEXIDINE GLUCONATE 1 APPLICATION(S): 213 SOLUTION TOPICAL at 05:22

## 2023-10-01 RX ADMIN — PANTOPRAZOLE SODIUM 40 MILLIGRAM(S): 20 TABLET, DELAYED RELEASE ORAL at 17:45

## 2023-10-01 RX ADMIN — MIDODRINE HYDROCHLORIDE 5 MILLIGRAM(S): 2.5 TABLET ORAL at 05:24

## 2023-10-01 RX ADMIN — HEPARIN SODIUM 5000 UNIT(S): 5000 INJECTION INTRAVENOUS; SUBCUTANEOUS at 05:23

## 2023-10-01 RX ADMIN — HEPARIN SODIUM 5000 UNIT(S): 5000 INJECTION INTRAVENOUS; SUBCUTANEOUS at 15:04

## 2023-10-01 NOTE — PROGRESS NOTE ADULT - ASSESSMENT
83 year old male patient known to have COPD. DM, atrial fibrillation, HFrEF, anemia, lymphedema, and recent left foot OM who was brought from the NH to the ED on 09/15 for evaluation of altered mental status, found to have sepsis in setting of recent left foot OM, admitted for further management. Stay was complicated by hemorrhagic shock and drop in Hb secondary to hematochezia on 09/23. Status post EGD on 09/23 revealing a bleeding duodenal ulcer s/p OVESCO placement.    Acute Hypoxic Respiratory Failure On NC, possible aspiration pna / volume overload  /R pleural effusions   Anemia of acute blood loss Hemorrhagic Shock Secondary Duodenal Ulcer Bleed s/p OVESCO placement 09/23  Acutely worsening uremia and acute kidney failure most likely 2/2 ATN - now on HD through right IJ [place]   Afib, chronic; uncontrolled rate -> off AC due to GIB  L3L4 OM / sacral DTI/ Foot cellulitis   Increased frailty and decreased physical capacity   Metabolic Encephalopathy improved   hypernatremia resolved   hypomag  hypokalemoa      PLAN  F/U Hb transfuse PRN   Will need to discuss the risks and benefits of long term AC with patient or next of Kin in light of life threating GIB  Close F/U of BUN/Crea/ Lytes and UO   c/w Local wound care; and freq off loading   c/w phoslo 2/2/2  c/w midodrine 5 q 8  c.w PPI IV BID   replete mag and potassium   Send hpylori stool   dc D5 fluid today   SLP Revalauted the patient today - started pureed with mildly thick  Nephro - following-> HD as per nephro  podiatry input appreciated   wound care input appreciated  ID -> : patient is unlikely to benefit from prolonged therapy with cefepime+vanco (to cover possible OM) due to adverse outcomes associated kidney dysfunction, cognitive impact ...etc )   GI f/u appreciated Continue IV Protonix 40mg BID,  Continue holding therapeutic AC -   Avoid any NSAIDs,     DVT ppx SCD,   heparin sq   GI PPX: PPI  GOC :  Full code -  As per palliative care team: next-of-kin not in favor of further discussing Goals of Care Conversation or advance directives.    Pending; nephrology and HD goals-> TDC? GI follow for PPI IV duration, po intake monitoring, Calorie count?, monitor BP, Monitor HGB trend,    83 year old male patient known to have COPD. DM, atrial fibrillation, HFrEF, anemia, lymphedema, and recent left foot OM who was brought from the NH to the ED on 09/15 for evaluation of altered mental status, found to have sepsis in setting of recent left foot OM, admitted for further management. Stay was complicated by hemorrhagic shock and drop in Hb secondary to hematochezia on 09/23. Status post EGD on 09/23 revealing a bleeding duodenal ulcer s/p OVESCO placement.    Acute Hypoxic Respiratory Failure On NC, possible aspiration pna / volume overload  /R pleural effusions   Anemia of acute blood loss Hemorrhagic Shock Secondary Duodenal Ulcer Bleed s/p OVESCO placement 09/23  Acutely worsening uremia and acute kidney failure most likely 2/2 ATN - now on HD through right IJ [place]   Afib, chronic; uncontrolled rate -> off AC due to GIB  L3L4 OM / sacral DTI/ Foot cellulitis   Increased frailty and decreased physical capacity   Metabolic Encephalopathy improved   hypernatremia resolved   hypomagnesemia  hypokalemia      PLAN  F/U Hb transfuse PRN   Will need to discuss the risks and benefits of long term AC with patient or next of Kin in light of life threating GIB  Close F/U of BUN/Crea/ Lytes and UO   c/w Local wound care; and freq off loading   c/w phoslo 2/2/2  c/w midodrine 5 q 8  c.w PPI IV BID   continue with holding therapeutic AC -     replete mag and potassium   Send hpylori stool   dc D5 fluid today   SLP Revalauted the patient today - started pureed with mildly thick  Nephro - Cr is 3 today with HD done yesterday will need to trend Cr levels to see if patient requires outpatient spot. follow up  podiatry input appreciated   wound care input appreciated  ID -> : patient is unlikely to benefit from prolonged therapy with cefepime+vanco (to cover possible OM) due to adverse outcomes associated kidney dysfunction, cognitive impact ...etc )   GI f/u IV Protonix 40mg BID->spoke with GI fellow switch to ppi po bidAvoid any NSAIDs,     DVT ppx SCD,   heparin sq   GI PPX: PPI  GOC :  Full code -  As per palliative care team: next-of-kin not in favor of further discussing Goals of Care Conversation or advance directives.    Pending; nephrology and HD goals-> TDC? at this time cant determine will need to follow cr in between HD days.  GI follow for PPI po bid duration, po intake monitoring, Calorie count?, monitor BP, Monitor HGB trend,

## 2023-10-01 NOTE — PROGRESS NOTE ADULT - SUBJECTIVE AND OBJECTIVE BOX
Nephrology progress note    Patient is seen and examined, events over the last 24 h noted .  Lying in bed comfortable     Allergies:  No Known Allergies    Hospital Medications:   MEDICATIONS  (STANDING):  calcium acetate 667 milliGRAM(s) Oral three times a day with meals  glucagon  Injectable 1 milliGRAM(s) IntraMuscular once  heparin   Injectable 5000 Unit(s) SubCutaneous every 8 hours  insulin lispro (ADMELOG) corrective regimen sliding scale   SubCutaneous three times a day before meals  metoprolol tartrate 12.5 milliGRAM(s) Oral every 12 hours  midodrine. 5 milliGRAM(s) Oral three times a day  pantoprazole  Injectable 40 milliGRAM(s) IV Push two times a day  potassium chloride    Tablet ER 20 milliEquivalent(s) Oral once  potassium chloride  20 mEq/100 mL IVPB 20 milliEquivalent(s) IV Intermittent once        VITALS:  T(F): 98.8 (10-01-23 @ 05:00), Max: 98.8 (10-01-23 @ 05:00)  HR: 71 (10-01-23 @ 05:00)  BP: 99/57 (10-01-23 @ 05:00)  RR: 18 (10-01-23 @ 05:00)  SpO2: 91% (10-01-23 @ 05:00)      09-29 @ 07:01  -  09-30 @ 07:00  --------------------------------------------------------  IN: 660 mL / OUT: 0 mL / NET: 660 mL    09-30 @ 07:01  -  10-01 @ 07:00  --------------------------------------------------------  IN: 0 mL / OUT: 500 mL / NET: -500 mL          PHYSICAL EXAM:  Constitutional: NAD  Respiratory: CTAB,  Cardiovascular: S1, S2, RRR  Gastrointestinal: BS+, soft, NT/ND  Extremities: No cyanosis or clubbing. No peripheral edema  :  No barth.   Skin: No rashes    LABS:  10-01    142  |  107  |  49<H>  ----------------------------<  89  3.1<L>   |  29  |  2.8<H>    Ca    8.0<L>      01 Oct 2023 06:51  Mg     1.6     10-01    TPro  5.6<L>  /  Alb  2.9<L>  /  TBili  0.6  /  DBili      /  AST  17  /  ALT  10  /  AlkPhos  70  09-29                          9.0    7.13  )-----------( 136      ( 01 Oct 2023 06:51 )             29.1       Urine Studies:  Urinalysis Basic - ( 01 Oct 2023 06:51 )    Color:  / Appearance:  / SG:  / pH:   Gluc: 89 mg/dL / Ketone:   / Bili:  / Urobili:    Blood:  / Protein:  / Nitrite:    Leuk Esterase:  / RBC:  / WBC    Sq Epi:  / Non Sq Epi:  / Bacteria:             HBsAb <3.0      [09-27-23 @ 17:44]  HBsAb Nonreact      [09-27-23 @ 17:44]  HBsAg Nonreact      [09-27-23 @ 17:44]  HBcAb Nonreact      [09-27-23 @ 17:44]        RADIOLOGY & ADDITIONAL STUDIES:

## 2023-10-01 NOTE — PROGRESS NOTE ADULT - SUBJECTIVE AND OBJECTIVE BOX
SUBJECTIVE:    Patient is a 83y old Male who presents with a chief complaint of AMS (01 Oct 2023 12:10)    Currently admitted to medicine with the primary diagnosis of: AMS    Today is hospital day 16d.     Overnight Events:     hypotensive overnight     PAST MEDICAL & SURGICAL HISTORY  HTN (hypertension)    COPD (chronic obstructive pulmonary disease)    High cholesterol    Mild anemia    Coffee ground emesis    Chronic diastolic heart failure    PAULA (acute kidney injury)    No significant past surgical history        SOCIAL HISTORY:  Smoking history:  Alcohol Use;  Illicit Drug Use:    ALLERGIES:  No Known Allergies    MEDICATIONS:  STANDING MEDICATIONS  calcium acetate 667 milliGRAM(s) Oral three times a day with meals  chlorhexidine 2% Cloths 1 Application(s) Topical <User Schedule>  dextrose 5%. 1000 milliLiter(s) IV Continuous <Continuous>  dextrose 50% Injectable 25 Gram(s) IV Push once  dextrose 50% Injectable 25 Gram(s) IV Push once  dextrose 50% Injectable 12.5 Gram(s) IV Push once  glucagon  Injectable 1 milliGRAM(s) IntraMuscular once  heparin   Injectable 5000 Unit(s) SubCutaneous every 8 hours  insulin lispro (ADMELOG) corrective regimen sliding scale   SubCutaneous three times a day before meals  metoprolol tartrate 12.5 milliGRAM(s) Oral every 12 hours  midodrine. 5 milliGRAM(s) Oral three times a day  pantoprazole  Injectable 40 milliGRAM(s) IV Push two times a day  potassium chloride  20 mEq/100 mL IVPB 20 milliEquivalent(s) IV Intermittent once    PRN MEDICATIONS  acetaminophen     Tablet .. 650 milliGRAM(s) Oral every 6 hours PRN  albuterol    90 MICROgram(s) HFA Inhaler 1 Puff(s) Inhalation every 6 hours PRN  dextrose Oral Gel 15 Gram(s) Oral once PRN    VITALS:   ICU Vital Signs Last 24 Hrs  T(C): 37.3 (01 Oct 2023 12:32), Max: 37.3 (01 Oct 2023 12:32)  T(F): 99.1 (01 Oct 2023 12:32), Max: 99.1 (01 Oct 2023 12:32)  HR: 84 (01 Oct 2023 12:32) (59 - 93)  BP: 119/53 (01 Oct 2023 12:32) (85/41 - 160/65)  BP(mean): 76 (01 Oct 2023 12:32) (43 - 76)  RR: 18 (01 Oct 2023 12:32) (17 - 18)  SpO2: 91% (01 Oct 2023 05:00) (91% - 94%)    O2 Parameters below as of 01 Oct 2023 12:32  Patient On (Oxygen Delivery Method): nasal cannula            LABS:                        9.0    7.13  )-----------( 136      ( 01 Oct 2023 06:51 )             29.1     10-01    142  |  107  |  49<H>  ----------------------------<  89  3.1<L>   |  29  |  2.8<H>    Ca    8.0<L>      01 Oct 2023 06:51  Mg     1.6     10-01    TPro  5.6<L>  /  Alb  2.9<L>  /  TBili  0.6  /  DBili  x   /  AST  17  /  ALT  10  /  AlkPhos  70  09-29      Urinalysis Basic - ( 01 Oct 2023 06:51 )    Color: x / Appearance: x / SG: x / pH: x  Gluc: 89 mg/dL / Ketone: x  / Bili: x / Urobili: x   Blood: x / Protein: x / Nitrite: x   Leuk Esterase: x / RBC: x / WBC x   Sq Epi: x / Non Sq Epi: x / Bacteria: x                  RADIOLOGY:    PHYSICAL EXAM:    GENERAL: NAD, lying in bed comfortably  CHEST/LUNG: Clear to auscultation bilaterally; No rales, rhonchi, wheezing, or rubs. Unlabored respirations  HEART: Regular rate and rhythm; No murmurs, rubs, or gallops  ABDOMEN: Bowel sounds present; Soft, Nontender, Nondistended. No hepatomegally  EXTREMITIES:  +2 edema   NERVOUS SYSTEM:  Alert & Oriented X2, speech clear. No deficits   MSK: FROM all 4 extremities, full and equal strength  SKIN: No rashes or lesions

## 2023-10-01 NOTE — PROGRESS NOTE ADULT - ASSESSMENT
Acute Hypoxic Respiratory Failure, possible aspiration pna / volume overload  /R pleural effusions   Anemia of acute blood loss   Hemorrhagic Shock Secondary to Hematochezia from Duodenal Ulcer Bleed s/p OVESCO placement 09/23  Acutely worsening uremia and acute kidney failure most likely 2/2 ATN - now on HD through right IJ [place]   Afib, chronic; uncontrolled rate   Reported possible L3L4 OM on CT scan/ sacral DTI/ Foot cellulitis on broad-spectrum intravenous antibiotics as per ID recs  Increased frailty and decreased physical capacity   Metabolic Encephalopathy   hypernatremia  PAULA / CKD stable non oliguric   History of Left Foot OM w/ surrounding Cellulitis   skin wounds / Left heel DTI  1:1 for safety and for udal observation       PLAN  as per previous notes the team Discussed with ID attending: patient is unlikely to benefit from prolonged therapy with cefepime+vanco (to cover possible OM) due to adverse outcomes associated kidney dysfunction, cognitive impact ...etc )   GI f/u appreciated Continue IV Protonix 40mg BID,  Continue holding therapeutic AC - Status post IV Protamine sulfate 36mg x1 dose on 09/23  Avoid any NSAIDs,   F/U Hb transfuse PRN   Will need to discuss the risks and benefits of long term AC with patient or next of Kin in light of life threating GIB  Close F/U of BUN/Crea/ Lytes and UO   podiatry input appreciated c/w Local wound care; offloading boots bilaterally  wound care input appreciated; frequent turning, off loading,and positioning and skin care as per protocol, Maintain pressure injury prevention, Keep skin clean, Offload heels, Monitor wound for changes and notify provider if any   c/w phoslo 2/2/2,  off bumex   c/w midodrine 5 q 8  , d/c sodium bicarbonate   SLP Revalauted the patient today - strted diet as per verbal reccs   udall placement 9/27/23  started on HD 9/27 but did not tolerate it with access problems  udall changed 9/28  he received HD 9/28   Creatinine Trend  received 250 cc LR bolus for hypernatremia - f/u with nephro and repeat labs  Hypernatremia improving , give free water as tolerated , D5w 80 cc - ok by nephrology       DVT ppx SCD,   heparin sq   GI PPX: PPI  GOC :  Full code -  As per palliative care team: next-of-kin not in favor of further discussing Goals of Care Conversation or advance directives.    Pending; labs, mental status improving / nephrology / CBC      Patient is an 82 yo M w/PMH of DM, COPD, anemia, lymphedema, afib on xarelto, HFrEF, and DM who presented for 2 days of AMS from the NH, with an episode of hypotension in the NH and started on cefepime and vanc for LLE cellulitis/OM, then brought into the ED for AMS, w/ED vitals stable, labs notable for wbc 11.30, Hb 9.6, Bicarb 14, AG 21, Cr 6 (~baseline 1.2), , and trop 0.09, CTH unremarkable, and renal bladder US without evidence of hydronephrosis, then admitted for work up and management of AMS.    #Anemia of acute blood loss   #Hemorrhagic Shock Secondary to Hematochezia from Duodenal Ulcer Bleed s/p OVESCO placement 09/23  - Trend hgb, transfuse blood PRN  - continue with holding therapeutic AC  - s/p IV Protamine sulfate 36mg x1 dose on 09/23  - GI f/u appreciated  - Continue IV Protonix 40mg BID  - Avoid any NSAIDs  - Will need to discuss the risks and benefits of long term AC with patient or next of Kin in light of life threating GIB    #Acutely worsening uremia and acute kidney failure most likely 2/2 ATN - now on HD through right IJ [place]  #Metabolic Encephalopathy  #hypernatremia (resolved)  PAULA / CKD stable non oliguric  - udall placement 9/27/23  started on HD 9/27 but did not tolerate it with access problems  - udall changed 9/28  he received HD 9/28 and 9/30  - Close F/U of BUN/Crea/ Lytes and UO  - c/w phoslo 2/2/2,  off bumex  - off sodium bicarbonate   - s/p 250 cc LR bolus for hypernatremia  - s/p D5w @ 80 cc/hr  - f/u w/nephro  - 1:1 for safety and for udall observation     #Acute Hypoxic Respiratory Failure, possible aspiration pna / volume overload  /R pleural effusions   *CXR (9/28) Bilateral pleural effusion/opacity unchanged. No air leak.  - On 2L NC  - C/w albuterol PRN    #Hypotension  - c/w metoprolol tartrate 12.5 mg q12h  - c/w midodrine 5 q 8    #Afib, chronic; uncontrolled rate  - HOLDING therapeutic AC    #History of Left Foot OM w/ surrounding Cellulitis  *CT LLE (9/16): No CT evidence of osteomyelitis or necrotizing infection. No drainable collection seen, within the limitations of a noncontrast exam.  - Increased frailty and decreased physical capacity  - as per previous notes the team Discussed with ID attending: patient is unlikely to benefit from prolonged therapy with cefepime+vanco (to cover possible OM) due to adverse outcomes associated kidney dysfunction, cognitive impact ...etc )  - c/w Local wound care; offloading boots bilaterally, frequently turning and positioning, prevent pressure injury, keep skin clean, and monitor for wound changes and notify provider as per podiatry    On diet as per verbal recs by SLP    #MISC  - DVT ppx SCD,   heparin sq   - GI PPX: PPI  - GOC :  Full code -  As per palliative care team: next-of-kin not in favor of further discussing Goals of Care Conversation or advance directives.    Pending; labs, mental status improving / nephrology / CBC

## 2023-10-01 NOTE — PROGRESS NOTE ADULT - SUBJECTIVE AND OBJECTIVE BOX
----------Daily Progress Note----------    HISTORY OF PRESENT ILLNESS:  Patient is a 83y old Male who presents with a chief complaint of AMS (01 Oct 2023 13:46)    Currently admitted to medicine with the primary diagnosis of Altered mental status       Today is hospital day 16d.     ED course:  84 yo m ho DM, COPD, anemia, lymphedemia, afib on xarelto, HFrEF, DM coming in from nursing home for AMS x 2 days. Unable to gather story from pt as he is altered and lethargic.  As per NH was becoming more agitated x 2 days, was hypotensive yesterday and started on cefepime and vancomycin. Brought in to the ED for AMS. PICC line in place for cefepime 1q q8 until 10/5/23 and vanc 1q24 until 9/23/23 for LLE cellulitis/OM  ([facility] for LLE thick wound cellulitis/OM and sacral DTI as per call with [facility] as per collateral from [facility])      In the ED, vitals wnl. Labs showing wbc 11.30, Hb 9.6, MCV 79.1, INR 1.59, CO2 14, AG 21, Cr 6 (~baseline 1.2), , trops 0.09. CTH wnl, RBUS with no hydro, poor visualization of left kidney      INTERVAL HOSPITAL COURSE / OVERNIGHT EVENTS:    Patient was examined and seen at bedside. This morning he is resting comfortably in bed and reports no new issues or overnight events.     Review of Systems: Otherwise unremarkable     <<<<<PAST MEDICAL & SURGICAL HISTORY>>>>>  HTN (hypertension)    COPD (chronic obstructive pulmonary disease)    High cholesterol    Mild anemia    Coffee ground emesis    Chronic diastolic heart failure    PAULA (acute kidney injury)    No significant past surgical history      ALLERGIES  No Known Allergies      Home Medications:  acetaminophen 325 mg oral tablet: 2 tab(s) orally every 6 hours, As needed, Mild Pain (1 - 3), Moderate Pain (4 - 6) (25 May 2019 09:25)  Albuterol (Eqv-ProAir HFA) 90 mcg/inh inhalation aerosol: 1 puff(s) inhaled every 6 hours as needed for  shortness of breath and/or wheezing (15 Sep 2023 22:56)  Breo Ellipta 100 mcg-25 mcg/inh inhalation powder: 1 puff(s) inhaled once a day (15 Sep 2023 22:56)  empagliflozin 10 mg oral tablet: 1 tab(s) orally once a day (15 Sep 2023 22:56)  Entresto 24 mg-26 mg oral tablet: 1 tab(s) orally 2 times a day (15 Sep 2023 22:57)  Lasix 20 mg oral tablet: 1 tab(s) orally once a day (15 Sep 2023 22:57)  Metoprolol Succinate ER 25 mg oral tablet, extended release: 1 tab(s) orally once a day (15 Sep 2023 22:57)  Percocet 5 mg-325 mg oral tablet: 1 tab(s) orally every 8 hours as needed for  severe pain (15 Sep 2023 22:57)  Xarelto 20 mg oral tablet: 1 tab(s) orally once a day (15 Sep 2023 22:57)        MEDICATIONS  STANDING MEDICATIONS  calcium acetate 667 milliGRAM(s) Oral three times a day with meals  chlorhexidine 2% Cloths 1 Application(s) Topical <User Schedule>  dextrose 5%. 1000 milliLiter(s) IV Continuous <Continuous>  dextrose 50% Injectable 25 Gram(s) IV Push once  dextrose 50% Injectable 25 Gram(s) IV Push once  dextrose 50% Injectable 12.5 Gram(s) IV Push once  glucagon  Injectable 1 milliGRAM(s) IntraMuscular once  heparin   Injectable 5000 Unit(s) SubCutaneous every 8 hours  insulin lispro (ADMELOG) corrective regimen sliding scale   SubCutaneous three times a day before meals  metoprolol tartrate 12.5 milliGRAM(s) Oral every 12 hours  midodrine. 5 milliGRAM(s) Oral three times a day  pantoprazole   Suspension 40 milliGRAM(s) Oral two times a day  potassium chloride  20 mEq/100 mL IVPB 20 milliEquivalent(s) IV Intermittent once    PRN MEDICATIONS  acetaminophen     Tablet .. 650 milliGRAM(s) Oral every 6 hours PRN  albuterol    90 MICROgram(s) HFA Inhaler 1 Puff(s) Inhalation every 6 hours PRN  dextrose Oral Gel 15 Gram(s) Oral once PRN    VITALS:  T(F): 97.5  HR: 71  BP: 124/52  RR: 18  SpO2: 91%    <<<<<LABS>>>>>                        8.8    8.33  )-----------( 148      ( 01 Oct 2023 17:20 )             28.5     10-01    142  |  107  |  49<H>  ----------------------------<  89  3.1<L>   |  29  |  2.8<H>    Ca    8.0<L>      01 Oct 2023 06:51  Mg     1.6     10-01        Urinalysis Basic - ( 01 Oct 2023 06:51 )    Color: x / Appearance: x / SG: x / pH: x  Gluc: 89 mg/dL / Ketone: x  / Bili: x / Urobili: x   Blood: x / Protein: x / Nitrite: x   Leuk Esterase: x / RBC: x / WBC x   Sq Epi: x / Non Sq Epi: x / Bacteria: x          [id number]        <<<<<RADIOLOGY>>>>>    <<<<<PHYSICAL EXAM>>>>>  GENERAL: Well developed, well nourished and in no acute distress. Resting comfortably in bed.  HEENT: Normocephalic, atraumatic, mucous membranes moist, EOMI, PERRLA, bilateral sclera anicteric, no conjunctival injection  Neck: Supple, non-tender, no lymphadenopathy.  PULMONARY: Clear to auscultation bilaterally. No rales, rhonchi, or wheezing.  CARDIOVASCULAR: Regular rate and rhythm, S1-S2, no murmurs  GASTROINTESTINAL: Soft, non-tender, non-distended, no guarding.  RENAL: No CVA tenderness.  SKIN/EXTREMITIES: No clubbing or edema  NEUROLOGIC/MUSCULOSKELETAL: AOx4, grossly moving all extremities, no focal deficits.      ----------------------------------------------------------------------------------------------------------------------------------------------------------------------------------------------- ----------Daily Progress Note----------    HISTORY OF PRESENT ILLNESS:  Patient is an 82 yo M w/PMH of DM, COPD, anemia, lymphedema, afib on xarelto, HFrEF, and DM who presented for 2 days of AMS from the NH, with an episode of hypotension in the NH and started on cefepime and vanc for LLE cellulitis/OM, then brought into the ED for AMS, w/ED vitals stable, labs notable for wbc 11.30, Hb 9.6, Bicarb 14, AG 21, Cr 6 (~baseline 1.2), , and trop 0.09, CTH unremarkable, and renal bladder US without evidence of hydronephrosis, then admitted for work up and management of AMS.    Today is hospital day 16d.     ED course:  82 yo m ho DM, COPD, anemia, lymphedemia, afib on xarelto, HFrEF, DM coming in from nursing home for AMS x 2 days. Unable to gather story from pt as he is altered and lethargic.  As per NH was becoming more agitated x 2 days, was hypotensive yesterday and started on cefepime and vancomycin. Brought in to the ED for AMS. PICC line in place for cefepime 1q q8 until 10/5/23 and vanc 1q24 until 9/23/23 for LLE cellulitis/OM  (was at [facility] last month for LLE thick wound cellulitis/OM and sacral DTI as per call with [facility] as per collateral from [facility])      In the ED, vitals wnl. Labs showing wbc 11.30, Hb 9.6, MCV 79.1, INR 1.59, CO2 14, AG 21, Cr 6 (~baseline 1.2), , trops 0.09. CTH wnl, RBUS with no hydro, poor visualization of left kidney      INTERVAL HOSPITAL COURSE / OVERNIGHT EVENTS:  Patient was examined and seen at bedside. Denies any new symptoms or overnight events, laying comfortably.    Review of Systems: Otherwise unremarkable     <<<<<PAST MEDICAL & SURGICAL HISTORY>>>>>  HTN (hypertension)    COPD (chronic obstructive pulmonary disease)    High cholesterol    Mild anemia    Coffee ground emesis    Chronic diastolic heart failure    PAULA (acute kidney injury)    No significant past surgical history      ALLERGIES  No Known Allergies      Home Medications:  acetaminophen 325 mg oral tablet: 2 tab(s) orally every 6 hours, As needed, Mild Pain (1 - 3), Moderate Pain (4 - 6) (25 May 2019 09:25)  Albuterol (Eqv-ProAir HFA) 90 mcg/inh inhalation aerosol: 1 puff(s) inhaled every 6 hours as needed for  shortness of breath and/or wheezing (15 Sep 2023 22:56)  Breo Ellipta 100 mcg-25 mcg/inh inhalation powder: 1 puff(s) inhaled once a day (15 Sep 2023 22:56)  empagliflozin 10 mg oral tablet: 1 tab(s) orally once a day (15 Sep 2023 22:56)  Entresto 24 mg-26 mg oral tablet: 1 tab(s) orally 2 times a day (15 Sep 2023 22:57)  Lasix 20 mg oral tablet: 1 tab(s) orally once a day (15 Sep 2023 22:57)  Metoprolol Succinate ER 25 mg oral tablet, extended release: 1 tab(s) orally once a day (15 Sep 2023 22:57)  Percocet 5 mg-325 mg oral tablet: 1 tab(s) orally every 8 hours as needed for  severe pain (15 Sep 2023 22:57)  Xarelto 20 mg oral tablet: 1 tab(s) orally once a day (15 Sep 2023 22:57)        MEDICATIONS  STANDING MEDICATIONS  calcium acetate 667 milliGRAM(s) Oral three times a day with meals  chlorhexidine 2% Cloths 1 Application(s) Topical <User Schedule>  dextrose 5%. 1000 milliLiter(s) IV Continuous <Continuous>  dextrose 50% Injectable 25 Gram(s) IV Push once  dextrose 50% Injectable 25 Gram(s) IV Push once  dextrose 50% Injectable 12.5 Gram(s) IV Push once  glucagon  Injectable 1 milliGRAM(s) IntraMuscular once  heparin   Injectable 5000 Unit(s) SubCutaneous every 8 hours  insulin lispro (ADMELOG) corrective regimen sliding scale   SubCutaneous three times a day before meals  metoprolol tartrate 12.5 milliGRAM(s) Oral every 12 hours  midodrine. 5 milliGRAM(s) Oral three times a day  pantoprazole   Suspension 40 milliGRAM(s) Oral two times a day  potassium chloride  20 mEq/100 mL IVPB 20 milliEquivalent(s) IV Intermittent once    PRN MEDICATIONS  acetaminophen     Tablet .. 650 milliGRAM(s) Oral every 6 hours PRN  albuterol    90 MICROgram(s) HFA Inhaler 1 Puff(s) Inhalation every 6 hours PRN  dextrose Oral Gel 15 Gram(s) Oral once PRN    VITALS:  T(F): 97.5  HR: 71  BP: 124/52  RR: 18  SpO2: 91%    <<<<<PHYSICAL EXAM>>>>>  GENERAL: Elderly man laying in bed in no apparent distress  PULMONARY: Clear to auscultation bilaterally. No rales, rhonchi, or wheezing.  CARDIOVASCULAR: RRR, S1/S2, No M/R/G  GASTROINTESTINAL: Soft, non-tender, non-distended, no guarding.  SKIN/EXTREMITIES: Warm, 2+ LE pitting edema  NEUROLOGIC/MUSCULOSKELETAL: AOx4, grossly moving all extremities, no focal deficits.    <<<<<LABS>>>>>                        8.8    8.33  )-----------( 148      ( 01 Oct 2023 17:20 )             28.5     10-01    142  |  107  |  49<H>  ----------------------------<  89  3.1<L>   |  29  |  2.8<H>    Ca    8.0<L>      01 Oct 2023 06:51  Mg     1.6     10-01        Urinalysis Basic - ( 01 Oct 2023 06:51 )    Color: x / Appearance: x / SG: x / pH: x  Gluc: 89 mg/dL / Ketone: x  / Bili: x / Urobili: x   Blood: x / Protein: x / Nitrite: x   Leuk Esterase: x / RBC: x / WBC x   Sq Epi: x / Non Sq Epi: x / Bacteria: x          [id number]        <<<<<RADIOLOGY>>>>>  BL UE venous doppler (9/29): Superficial thrombophlebitis of the left cephalic vein. No evidence of deep venousthrombosis in the left upper extremity.    BL LE venous doppler (9/28): No evidence of deep venous thrombosis or superficial thrombophlebitis in the bilateral lower extremities.    CXR (9/28): Bilateral pleural effusion/opacity unchanged. No air leak.    CT LLE (9/16): No CT evidence of osteomyelitis or necrotizing infection. No drainable collection seen, within the limitations of a noncontrast exam.        -----------------------------------------------------------------------------------------------------------------------------------------------------------------------------------------------

## 2023-10-01 NOTE — PROGRESS NOTE ADULT - ASSESSMENT
84 yo m ho DM, COPD, anemia, paroxysmal afib on xarelto, HFrEF, DM coming in from nursing home for AMS x 2 days. Found to have toxic metabolic encephalopathy with PAULA.    PAULA/ toxic metabolic encephalopathy/ HAGMA/ HFrEF/ hypernatremia  possible ATN iso hypotension and possible sepsis/ vanco toxicity  no hydro on imaging  creat trend noted  K low can give 20 meq rider    sp Right IJ / was able to do HD yesterday   sp HD yesterday follow creat in AM and UOP    phosphorus  level noted / phoslo 2/2/2  off Bumex / increase free water if able / serum sodium better noted          will follow  / prognosis poor

## 2023-10-02 LAB
ANION GAP SERPL CALC-SCNC: 12 MMOL/L — SIGNIFICANT CHANGE UP (ref 7–14)
BASOPHILS # BLD AUTO: 0.02 K/UL — SIGNIFICANT CHANGE UP (ref 0–0.2)
BASOPHILS NFR BLD AUTO: 0.3 % — SIGNIFICANT CHANGE UP (ref 0–1)
BUN SERPL-MCNC: 49 MG/DL — HIGH (ref 10–20)
CALCIUM SERPL-MCNC: 8.2 MG/DL — LOW (ref 8.4–10.5)
CHLORIDE SERPL-SCNC: 108 MMOL/L — SIGNIFICANT CHANGE UP (ref 98–110)
CO2 SERPL-SCNC: 27 MMOL/L — SIGNIFICANT CHANGE UP (ref 17–32)
CREAT SERPL-MCNC: 2.8 MG/DL — HIGH (ref 0.7–1.5)
EGFR: 22 ML/MIN/1.73M2 — LOW
EOSINOPHIL # BLD AUTO: 0.32 K/UL — SIGNIFICANT CHANGE UP (ref 0–0.7)
EOSINOPHIL NFR BLD AUTO: 4.7 % — SIGNIFICANT CHANGE UP (ref 0–8)
GLUCOSE BLDC GLUCOMTR-MCNC: 102 MG/DL — HIGH (ref 70–99)
GLUCOSE BLDC GLUCOMTR-MCNC: 108 MG/DL — HIGH (ref 70–99)
GLUCOSE SERPL-MCNC: 95 MG/DL — SIGNIFICANT CHANGE UP (ref 70–99)
HCT VFR BLD CALC: 28.5 % — LOW (ref 42–52)
HGB BLD-MCNC: 8.7 G/DL — LOW (ref 14–18)
IMM GRANULOCYTES NFR BLD AUTO: 0.3 % — SIGNIFICANT CHANGE UP (ref 0.1–0.3)
LYMPHOCYTES # BLD AUTO: 2.05 K/UL — SIGNIFICANT CHANGE UP (ref 1.2–3.4)
LYMPHOCYTES # BLD AUTO: 30.4 % — SIGNIFICANT CHANGE UP (ref 20.5–51.1)
MAGNESIUM SERPL-MCNC: 1.7 MG/DL — LOW (ref 1.8–2.4)
MCHC RBC-ENTMCNC: 27.1 PG — SIGNIFICANT CHANGE UP (ref 27–31)
MCHC RBC-ENTMCNC: 30.5 G/DL — LOW (ref 32–37)
MCV RBC AUTO: 88.8 FL — SIGNIFICANT CHANGE UP (ref 80–94)
MONOCYTES # BLD AUTO: 0.65 K/UL — HIGH (ref 0.1–0.6)
MONOCYTES NFR BLD AUTO: 9.6 % — HIGH (ref 1.7–9.3)
NEUTROPHILS # BLD AUTO: 3.69 K/UL — SIGNIFICANT CHANGE UP (ref 1.4–6.5)
NEUTROPHILS NFR BLD AUTO: 54.7 % — SIGNIFICANT CHANGE UP (ref 42.2–75.2)
NRBC # BLD: 0 /100 WBCS — SIGNIFICANT CHANGE UP (ref 0–0)
PLATELET # BLD AUTO: 164 K/UL — SIGNIFICANT CHANGE UP (ref 130–400)
PMV BLD: 10.7 FL — HIGH (ref 7.4–10.4)
POTASSIUM SERPL-MCNC: 3.6 MMOL/L — SIGNIFICANT CHANGE UP (ref 3.5–5)
POTASSIUM SERPL-SCNC: 3.6 MMOL/L — SIGNIFICANT CHANGE UP (ref 3.5–5)
RBC # BLD: 3.21 M/UL — LOW (ref 4.7–6.1)
RBC # FLD: 20.6 % — HIGH (ref 11.5–14.5)
SODIUM SERPL-SCNC: 147 MMOL/L — HIGH (ref 135–146)
WBC # BLD: 6.75 K/UL — SIGNIFICANT CHANGE UP (ref 4.8–10.8)
WBC # FLD AUTO: 6.75 K/UL — SIGNIFICANT CHANGE UP (ref 4.8–10.8)

## 2023-10-02 PROCEDURE — 99232 SBSQ HOSP IP/OBS MODERATE 35: CPT

## 2023-10-02 RX ORDER — CALCIUM ACETATE 667 MG
1334 TABLET ORAL
Refills: 0 | Status: DISCONTINUED | OUTPATIENT
Start: 2023-10-02 | End: 2023-10-05

## 2023-10-02 RX ORDER — MIDODRINE HYDROCHLORIDE 2.5 MG/1
10 TABLET ORAL THREE TIMES A DAY
Refills: 0 | Status: DISCONTINUED | OUTPATIENT
Start: 2023-10-02 | End: 2023-10-05

## 2023-10-02 RX ORDER — MAGNESIUM SULFATE 500 MG/ML
2 VIAL (ML) INJECTION ONCE
Refills: 0 | Status: COMPLETED | OUTPATIENT
Start: 2023-10-02 | End: 2023-10-02

## 2023-10-02 RX ADMIN — HEPARIN SODIUM 5000 UNIT(S): 5000 INJECTION INTRAVENOUS; SUBCUTANEOUS at 14:15

## 2023-10-02 RX ADMIN — MIDODRINE HYDROCHLORIDE 10 MILLIGRAM(S): 2.5 TABLET ORAL at 17:39

## 2023-10-02 RX ADMIN — Medication 25 GRAM(S): at 21:05

## 2023-10-02 RX ADMIN — HEPARIN SODIUM 5000 UNIT(S): 5000 INJECTION INTRAVENOUS; SUBCUTANEOUS at 21:05

## 2023-10-02 RX ADMIN — Medication 12.5 MILLIGRAM(S): at 17:39

## 2023-10-02 RX ADMIN — PANTOPRAZOLE SODIUM 40 MILLIGRAM(S): 20 TABLET, DELAYED RELEASE ORAL at 06:03

## 2023-10-02 RX ADMIN — MIDODRINE HYDROCHLORIDE 10 MILLIGRAM(S): 2.5 TABLET ORAL at 14:14

## 2023-10-02 RX ADMIN — Medication 650 MILLIGRAM(S): at 14:14

## 2023-10-02 RX ADMIN — Medication 1334 MILLIGRAM(S): at 17:39

## 2023-10-02 RX ADMIN — CHLORHEXIDINE GLUCONATE 1 APPLICATION(S): 213 SOLUTION TOPICAL at 06:03

## 2023-10-02 RX ADMIN — Medication 667 MILLIGRAM(S): at 08:53

## 2023-10-02 RX ADMIN — MIDODRINE HYDROCHLORIDE 5 MILLIGRAM(S): 2.5 TABLET ORAL at 06:01

## 2023-10-02 RX ADMIN — PANTOPRAZOLE SODIUM 40 MILLIGRAM(S): 20 TABLET, DELAYED RELEASE ORAL at 17:39

## 2023-10-02 RX ADMIN — Medication 1334 MILLIGRAM(S): at 14:14

## 2023-10-02 RX ADMIN — HEPARIN SODIUM 5000 UNIT(S): 5000 INJECTION INTRAVENOUS; SUBCUTANEOUS at 06:01

## 2023-10-02 NOTE — SWALLOW BEDSIDE ASSESSMENT ADULT - SLP PERTINENT HISTORY OF CURRENT PROBLEM
82 y/o Male w/ PMH: DM2, COPD, anemia, paroxysmal Afib on Xarelto, HFrEF, sent to the hospital by [facility] for AMS. Pt recently admitted to [facility] from [facility] for OM needing IV Abx. Pt has been having worsening mental status over the last 2 days per NH records. Pt is being treated for metabolic encephalopathy, suspected severe sepsis, PAULA vs. CKD, HAGMA. CTH (-). CXR 9/19-> retrocardiac opacification, atelectatic. Hospital course complicated by hemorrhagic shock 2' hematochezia, EGD 9/23 revealed bleeding duodenal ulcer w/p OVESCO placement. Cleared for PO today if Hb remains stable, swallow eval ordered.

## 2023-10-02 NOTE — PROGRESS NOTE ADULT - ATTENDING COMMENTS
Patient seen and examined independently.     PAST MEDICAL & SURGICAL HISTORY:  HTN (hypertension)  COPD (chronic obstructive pulmonary disease)  high cholesterol  Coffee ground emesis  Chronic diastolic heart failure  PAULA (acute kidney injury)      Vitals:    HR: 71 (10-02-23 @ 17:20)  BP: 117/58 (10-02-23 @ 17:20)  RR: 18 (10-02-23 @ 14:05)  SpO2: 95% (10-02-23 @ 14:05)    TESTS & MEASUREMENTS:                        8.7    6.75  )-----------( 164      ( 02 Oct 2023 06:09 )             28.5       10-02    147<H>  |  108  |  49<H>  ----------------------------<  95  3.6   |  27  |  2.8<H>    Ca    8.2<L>      02 Oct 2023 06:09  Mg     1.7     10-02    In summary:  ·Persistent acute Hypoxic Respiratory Failure, possibly upper airways (inability to clear oral secretions+aspiration), differential diagnoses also include volume overload   . HyperNatremia, worsening , free water deficit  ·upper Gastrointestinal bleed due to duodenal ulcer, on therapy   ·Anemia of acute blood loss   ·Acutely worsening uremia and acute kidney failure , at high risk for ATN  ·Afib, chronic; uncontrolled rate   ·Reported possible L3L4 OM on CT scan/ sacral DTI/ Foot cellulitis on broad-spectrum intravenous antibiotics as per ID recs  ·Increased frailty and decreased physical capacity     PLAN  ·F/U Hb transfuse PRN   ·Close F/U of BUN/Crea/ Lytes and UO   . Following up closely with nephrology team re: need for outpatient renal replacement therapy   . Rest of A/P as per detailed resident note above  ·Medicine team updates patient's brother re: all above and the overall serious clinical status and poor prognosis associated.   ·As per palliative care team: next-of-kin not in favor of further discussing Goals of Care Conversation or advance directives.     Case discussed with senior resident assigned.   At high risk for worsening cardiac and pulmonary status due to the severity of current illness and the underlying comorbidities.

## 2023-10-02 NOTE — PROGRESS NOTE ADULT - ASSESSMENT
Patient is an 82 yo M w/PMH of DM, COPD, anemia, lymphedema, afib on xarelto, HFrEF, and DM who presented for 2 days of AMS from the NH, with an episode of hypotension in the NH and started on cefepime and vanc for LLE cellulitis/OM, then brought into the ED for AMS, w/ED vitals stable, labs notable for wbc 11.30, Hb 9.6, Bicarb 14, AG 21, Cr 6 (~baseline 1.2), , and trop 0.09, CTH unremarkable, and renal bladder US without evidence of hydronephrosis, then admitted for work up and management of AMS.    #Anemia of acute blood loss   #Hemorrhagic Shock Secondary to Hematochezia from Duodenal Ulcer Bleed s/p OVESCO placement   - Trend hgb, transfuse blood PRN  - continue with holding therapeutic AC  - s/p IV Protamine sulfate 36mg x1 dose on   - GI f/u appreciated  - S/p IV Protonix 40mg BID -> Ok to switch to PO BID as per GI   - Avoid any NSAIDs  - Will need to discuss the risks and benefits of long term AC with patient or next of Kin in light of life threating GIB    #Acutely worsening uremia and acute kidney failure most likely 2/2 ATN - now on HD through right IJ [facility]  #Metabolic Encephalopathy  #hypernatremia   PAULA / CKD stable non oliguric  - udall placement 23  started on HD  but did not tolerate it with access problems  - udall changed   he received HD  and   - Close F/U of BUN/Crea/ Lytes and UO  - c/w phoslo 2/2/2,  off bumex  - off sodium bicarbonate   - s/p 250 cc LR bolus for hypernatremia  - s/p D5w @ 80 cc/hr  - f/u w/nephro  - 1:1 for safety and for udall observation   - Creatinine improvin -> 4 -> 2.8     #Acute Hypoxic Respiratory Failure, possible aspiration pna / volume overload  /R pleural effusions   *CXR () Bilateral pleural effusion/opacity unchanged. No air leak.  - On 2L NC  - C/w albuterol PRN    #Hypotension  - on metoprolol tartrate 12.5 mg q12h  - c/w midodrine 5 q 8 -> Increase to 10 mg Q8     #Afib, chronic; uncontrolled rate  - HOLDING therapeutic AC  - c/w metoprolol tartrate 12.5 mg q12h    #History of Left Foot OM w/ surrounding Cellulitis  *CT LLE (): No CT evidence of osteomyelitis or necrotizing infection. No drainable collection seen, within the limitations of a noncontrast exam.  - Increased frailty and decreased physical capacity  - as per previous notes the team Discussed with ID attending: patient is unlikely to benefit from prolonged therapy with cefepime+vanco (to cover possible OM) due to adverse outcomes associated kidney dysfunction, cognitive impact ...etc )  - c/w Local wound care; offloading boots bilaterally, frequently turning and positioning, prevent pressure injury, keep skin clean, and monitor for wound changes and notify provider as per podiatry    #MISC  - DVT ppx SCD,   heparin sq   - GI PPX: PPI po BID   - GOC :  Full code -  As per palliative care team: next-of-kin not in favor of further discussing Goals of Care Conversation or advance directives.    Pending; labs, mental status improving / nephrology / CBC

## 2023-10-02 NOTE — SWALLOW BEDSIDE ASSESSMENT ADULT - COMMENTS
Pt was seen previously in this admission, initially w/ recs on 9/20 for soft w/ mildly thick, and advanced to thin liquids on 9/21.

## 2023-10-02 NOTE — PROGRESS NOTE ADULT - SUBJECTIVE AND OBJECTIVE BOX
NIMO SARMIENTO 83y Male  MRN#: [id number]   Hospital Day: 17d    HPI:  82 yo m ho DM, COPD, anemia, lymphedemia, afib on xarelto, HFrEF, DM coming in from nursing home for AMS x 2 days. Unable to gather story from pt as he is altered and lethargic.  As per NH was becoming more agitated x 2 days, was hypotensive yesterday and started on cefepime and vancomycin. Brought in to the ED for AMS. PICC line in place for cefepime 1q q8 until 10/5/23 and vanc 1q24 until 9/23/23 for LLE cellulitis/OM  (was at [facility] last month for LLE thick wound cellulitis/OM and sacral DTI as per call with [facility] as per collateral from [facility])      In the ED, vitals wnl. Labs showing wbc 11.30, Hb 9.6, MCV 79.1, INR 1.59, CO2 14, AG 21, Cr 6 (~baseline 1.2), , trops 0.09. CTH wnl, RBUS with no hydro, poor visualization of left kidney       (15 Sep 2023 22:12)      SUBJECTIVE  Patient is a 83y old Male who presents with a chief complaint of AMS (02 Oct 2023 09:26)  Currently admitted to medicine with the primary diagnosis of Altered mental status      INTERVAL HPI AND OVERNIGHT EVENTS:  Patient was examined and seen at bedside. This morning he is resting comfortably in bed.       VITAL SIGNS: Last 24 Hours  T(C): 36.4 (01 Oct 2023 20:13), Max: 36.4 (01 Oct 2023 20:13)  T(F): 97.5 (01 Oct 2023 20:13), Max: 97.5 (01 Oct 2023 20:13)  HR: 63 (02 Oct 2023 05:07) (63 - 85)  BP: 95/59 (02 Oct 2023 05:07) (95/59 - 124/52)  BP(mean): 71 (02 Oct 2023 05:07) (71 - 78)  RR: 18 (02 Oct 2023 05:07) (18 - 18)  SpO2: 99% (02 Oct 2023 05:07) (98% - 99%)    LABS:                        8.7    6.75  )-----------( 164      ( 02 Oct 2023 06:09 )             28.5     10-02    147<H>  |  108  |  49<H>  ----------------------------<  95  3.6   |  27  |  2.8<H>    Ca    8.2<L>      02 Oct 2023 06:09  Mg     1.7     10-02        Urinalysis Basic - ( 02 Oct 2023 06:09 )    Color: x / Appearance: x / SG: x / pH: x  Gluc: 95 mg/dL / Ketone: x  / Bili: x / Urobili: x   Blood: x / Protein: x / Nitrite: x   Leuk Esterase: x / RBC: x / WBC x   Sq Epi: x / Non Sq Epi: x / Bacteria: x    PHYSICAL EXAM:  GENERAL: NAD, lying in bed comfortably  CHEST/LUNG: Clear to auscultation bilaterally; No rales, rhonchi, wheezing, or rubs. Unlabored respirations  HEART: Regular rate and rhythm; No murmurs, rubs, or gallops  ABDOMEN: Bowel sounds present; Soft, Nontender, Nondistended. No hepatomegally  EXTREMITIES:  +2 edema   NERVOUS SYSTEM:  Alert & Oriented X2, speech clear. No deficits   MSK: FROM all 4 extremities, full and equal strength  SKIN: No rashes or lesions       NIMO SARMIENTO 83y Male  MRN#: [id number]   Hospital Day: 17d    HPI:  84 yo m ho DM, COPD, anemia, lymphedemia, afib on xarelto, HFrEF, DM coming in from nursing home for AMS x 2 days. Unable to gather story from pt as he is altered and lethargic.  As per NH was becoming more agitated x 2 days, was hypotensive yesterday and started on cefepime and vancomycin. Brought in to the ED for AMS. PICC line in place for cefepime 1q q8 until 10/5/23 and vanc 1q24 until 9/23/23 for LLE cellulitis/OM  (was at [facility] last month for LLE thick wound cellulitis/OM and sacral DTI as per call with [facility] as per collateral from [facility])      In the ED, vitals wnl. Labs showing wbc 11.30, Hb 9.6, MCV 79.1, INR 1.59, CO2 14, AG 21, Cr 6 (~baseline 1.2), , trops 0.09. CTH wnl, RBUS with no hydro, poor visualization of left kidney       (15 Sep 2023 22:12)      SUBJECTIVE  Patient is a 83y old Male who presents with a chief complaint of AMS (02 Oct 2023 09:26)  Currently admitted to medicine with the primary diagnosis of Altered mental status      INTERVAL HPI AND OVERNIGHT EVENTS:  Patient was examined and seen at bedside. This morning he is resting comfortably in bed.       VITAL SIGNS: Last 24 Hours  T(C): 36.4 (01 Oct 2023 20:13), Max: 36.4 (01 Oct 2023 20:13)  T(F): 97.5 (01 Oct 2023 20:13), Max: 97.5 (01 Oct 2023 20:13)  HR: 63 (02 Oct 2023 05:07) (63 - 85)  BP: 95/59 (02 Oct 2023 05:07) (95/59 - 124/52)  BP(mean): 71 (02 Oct 2023 05:07) (71 - 78)  RR: 18 (02 Oct 2023 05:07) (18 - 18)  SpO2: 99% (02 Oct 2023 05:07) (98% - 99%)    LABS:                        8.7    6.75  )-----------( 164      ( 02 Oct 2023 06:09 )             28.5     10-02    147<H>  |  108  |  49<H>  ----------------------------<  95  3.6   |  27  |  2.8<H>    Sodium Trend:  Sodium: 147 mmol/L (10-02-23 @ 06:09)  Sodium: 142 mmol/L (10-01-23 @ 06:51)  Sodium: 147 mmol/L (09-30-23 @ 08:14)        Ca    8.2<L>      02 Oct 2023 06:09  Mg     1.7     10-02        Urinalysis Basic - ( 02 Oct 2023 06:09 )    Color: x / Appearance: x / SG: x / pH: x  Gluc: 95 mg/dL / Ketone: x  / Bili: x / Urobili: x   Blood: x / Protein: x / Nitrite: x   Leuk Esterase: x / RBC: x / WBC x   Sq Epi: x / Non Sq Epi: x / Bacteria: x    PHYSICAL EXAM:  GENERAL: NAD, lying in bed comfortably  CHEST/LUNG: Clear to auscultation bilaterally; No rales, rhonchi, wheezing, or rubs. Unlabored respirations  HEART: Regular rate and rhythm; No murmurs, rubs, or gallops  ABDOMEN: Bowel sounds present; Soft, Nontender, Nondistended. No hepatomegally  EXTREMITIES:  +2 edema   NERVOUS SYSTEM:  Alert & Oriented X2, speech clear. No deficits   MSK: FROM all 4 extremities, full and equal strength  SKIN: No rashes or lesions

## 2023-10-02 NOTE — PROGRESS NOTE ADULT - ASSESSMENT
82 yo m ho DM, COPD, anemia, paroxysmal afib on xarelto, HFrEF, DM coming in from nursing home for AMS x 2 days. Found to have toxic metabolic encephalopathy with PAULA.    PAULA/ toxic metabolic encephalopathy/ HAGMA/ HFrEF/ hypernatremia  possible ATN iso hypotension and possible sepsis/ vanco toxicity  no hydro on imaging  creat trend noted  no hd today   check daily cr   non oliguric    phosphorus  level noted / phoslo 2/2/2  increase midodrine to 10 q 8   will follow    prognosis poor

## 2023-10-02 NOTE — SWALLOW BEDSIDE ASSESSMENT ADULT - SWALLOW EVAL: ORAL MUSCULATURE
unable to assess due to poor participation/comprehension
generally intact
unable to assess due to poor participation/comprehension
unable to assess due to poor participation/comprehension
gen weakness

## 2023-10-02 NOTE — SWALLOW BEDSIDE ASSESSMENT ADULT - ADDITIONAL RECOMMENDATIONS
SLP will plan to f/u to assess candidacy for instrumental swallow.
SLP will plan to f/u to assess candidacy for instrumental swallow.

## 2023-10-02 NOTE — PROGRESS NOTE ADULT - SUBJECTIVE AND OBJECTIVE BOX
seen and examined  24 h events noted   no distress         PAST HISTORY  --------------------------------------------------------------------------------  No significant changes to PMH, PSH, FHx, SHx, unless otherwise noted    ALLERGIES & MEDICATIONS  --------------------------------------------------------------------------------  Allergies    No Known Allergies    Intolerances      Standing Inpatient Medications  calcium acetate 667 milliGRAM(s) Oral three times a day with meals  chlorhexidine 2% Cloths 1 Application(s) Topical <User Schedule>  dextrose 5%. 1000 milliLiter(s) IV Continuous <Continuous>  dextrose 50% Injectable 12.5 Gram(s) IV Push once  dextrose 50% Injectable 25 Gram(s) IV Push once  dextrose 50% Injectable 25 Gram(s) IV Push once  glucagon  Injectable 1 milliGRAM(s) IntraMuscular once  heparin   Injectable 5000 Unit(s) SubCutaneous every 8 hours  insulin lispro (ADMELOG) corrective regimen sliding scale   SubCutaneous three times a day before meals  metoprolol tartrate 12.5 milliGRAM(s) Oral every 12 hours  midodrine. 5 milliGRAM(s) Oral three times a day  pantoprazole   Suspension 40 milliGRAM(s) Oral two times a day  potassium chloride  20 mEq/100 mL IVPB 20 milliEquivalent(s) IV Intermittent once    PRN Inpatient Medications  acetaminophen     Tablet .. 650 milliGRAM(s) Oral every 6 hours PRN  albuterol    90 MICROgram(s) HFA Inhaler 1 Puff(s) Inhalation every 6 hours PRN  dextrose Oral Gel 15 Gram(s) Oral once PRN          VITALS/PHYSICAL EXAM  --------------------------------------------------------------------------------  T(C): 36.4 (10-01-23 @ 20:13), Max: 37.3 (10-01-23 @ 12:32)  HR: 63 (10-02-23 @ 05:07) (63 - 85)  BP: 95/59 (10-02-23 @ 05:07) (95/59 - 124/52)  RR: 18 (10-02-23 @ 05:07) (18 - 18)  SpO2: 99% (10-02-23 @ 05:07) (98% - 99%)  Wt(kg): --        10-01-23 @ 07:01  -  10-02-23 @ 07:00  --------------------------------------------------------  IN: 0 mL / OUT: 830 mL / NET: -830 mL      Physical Exam:  	Gen: NAD  	Pulm: B/L fredi at bases   	CV:  S1S2; no rub  	Abd: +distended  	Vascular access:    LABS/STUDIES  --------------------------------------------------------------------------------              8.7    6.75  >-----------<  164      [10-02-23 @ 06:09]              28.5     147  |  108  |  49  ----------------------------<  95      [10-02-23 @ 06:09]  3.6   |  27  |  2.8        Ca     8.2     [10-02-23 @ 06:09]      Mg     1.7     [10-02-23 @ 06:09]            Creatinine Trend:  SCr 2.8 [10-02 @ 06:09]  SCr 2.8 [10-01 @ 06:51]  SCr 4.0 [09-30 @ 08:14]  SCr 4.4 [09-29 @ 17:59]  SCr 4.8 [09-29 @ 04:33]    Urinalysis - [10-02-23 @ 06:09]      Color  / Appearance  / SG  / pH       Gluc 95 / Ketone   / Bili  / Urobili        Blood  / Protein  / Leuk Est  / Nitrite       RBC  / WBC  / Hyaline  / Gran  / Sq Epi  / Non Sq Epi  / Bacteria         HBsAb <3.0      [09-27-23 @ 17:44]  HBsAb Nonreact      [09-27-23 @ 17:44]  HBsAg Nonreact      [09-27-23 @ 17:44]  HBcAb Nonreact      [09-27-23 @ 17:44]

## 2023-10-03 LAB
ALBUMIN SERPL ELPH-MCNC: 2.9 G/DL — LOW (ref 3.5–5.2)
ALP SERPL-CCNC: 64 U/L — SIGNIFICANT CHANGE UP (ref 30–115)
ALT FLD-CCNC: 7 U/L — SIGNIFICANT CHANGE UP (ref 0–41)
ANION GAP SERPL CALC-SCNC: 10 MMOL/L — SIGNIFICANT CHANGE UP (ref 7–14)
AST SERPL-CCNC: 16 U/L — SIGNIFICANT CHANGE UP (ref 0–41)
BASOPHILS # BLD AUTO: 0.03 K/UL — SIGNIFICANT CHANGE UP (ref 0–0.2)
BASOPHILS NFR BLD AUTO: 0.5 % — SIGNIFICANT CHANGE UP (ref 0–1)
BILIRUB SERPL-MCNC: 0.5 MG/DL — SIGNIFICANT CHANGE UP (ref 0.2–1.2)
BUN SERPL-MCNC: 47 MG/DL — HIGH (ref 10–20)
CALCIUM SERPL-MCNC: 8.5 MG/DL — SIGNIFICANT CHANGE UP (ref 8.4–10.5)
CHLORIDE SERPL-SCNC: 110 MMOL/L — SIGNIFICANT CHANGE UP (ref 98–110)
CO2 SERPL-SCNC: 28 MMOL/L — SIGNIFICANT CHANGE UP (ref 17–32)
CREAT SERPL-MCNC: 2.6 MG/DL — HIGH (ref 0.7–1.5)
EGFR: 24 ML/MIN/1.73M2 — LOW
EOSINOPHIL # BLD AUTO: 0.36 K/UL — SIGNIFICANT CHANGE UP (ref 0–0.7)
EOSINOPHIL NFR BLD AUTO: 5.6 % — SIGNIFICANT CHANGE UP (ref 0–8)
GLUCOSE BLDC GLUCOMTR-MCNC: 114 MG/DL — HIGH (ref 70–99)
GLUCOSE BLDC GLUCOMTR-MCNC: 137 MG/DL — HIGH (ref 70–99)
GLUCOSE BLDC GLUCOMTR-MCNC: 81 MG/DL — SIGNIFICANT CHANGE UP (ref 70–99)
GLUCOSE BLDC GLUCOMTR-MCNC: 96 MG/DL — SIGNIFICANT CHANGE UP (ref 70–99)
GLUCOSE SERPL-MCNC: 100 MG/DL — HIGH (ref 70–99)
HCT VFR BLD CALC: 29.2 % — LOW (ref 42–52)
HGB BLD-MCNC: 8.9 G/DL — LOW (ref 14–18)
IMM GRANULOCYTES NFR BLD AUTO: 0.3 % — SIGNIFICANT CHANGE UP (ref 0.1–0.3)
LYMPHOCYTES # BLD AUTO: 1.87 K/UL — SIGNIFICANT CHANGE UP (ref 1.2–3.4)
LYMPHOCYTES # BLD AUTO: 29 % — SIGNIFICANT CHANGE UP (ref 20.5–51.1)
MAGNESIUM SERPL-MCNC: 2.4 MG/DL — SIGNIFICANT CHANGE UP (ref 1.8–2.4)
MCHC RBC-ENTMCNC: 27.6 PG — SIGNIFICANT CHANGE UP (ref 27–31)
MCHC RBC-ENTMCNC: 30.5 G/DL — LOW (ref 32–37)
MCV RBC AUTO: 90.4 FL — SIGNIFICANT CHANGE UP (ref 80–94)
MONOCYTES # BLD AUTO: 0.58 K/UL — SIGNIFICANT CHANGE UP (ref 0.1–0.6)
MONOCYTES NFR BLD AUTO: 9 % — SIGNIFICANT CHANGE UP (ref 1.7–9.3)
NEUTROPHILS # BLD AUTO: 3.59 K/UL — SIGNIFICANT CHANGE UP (ref 1.4–6.5)
NEUTROPHILS NFR BLD AUTO: 55.6 % — SIGNIFICANT CHANGE UP (ref 42.2–75.2)
NRBC # BLD: 0 /100 WBCS — SIGNIFICANT CHANGE UP (ref 0–0)
PLATELET # BLD AUTO: 181 K/UL — SIGNIFICANT CHANGE UP (ref 130–400)
PMV BLD: 11.1 FL — HIGH (ref 7.4–10.4)
POTASSIUM SERPL-MCNC: 3.7 MMOL/L — SIGNIFICANT CHANGE UP (ref 3.5–5)
POTASSIUM SERPL-SCNC: 3.7 MMOL/L — SIGNIFICANT CHANGE UP (ref 3.5–5)
PROT SERPL-MCNC: 5.6 G/DL — LOW (ref 6–8)
RBC # BLD: 3.23 M/UL — LOW (ref 4.7–6.1)
RBC # FLD: 20.9 % — HIGH (ref 11.5–14.5)
SODIUM SERPL-SCNC: 148 MMOL/L — HIGH (ref 135–146)
WBC # BLD: 6.45 K/UL — SIGNIFICANT CHANGE UP (ref 4.8–10.8)
WBC # FLD AUTO: 6.45 K/UL — SIGNIFICANT CHANGE UP (ref 4.8–10.8)

## 2023-10-03 PROCEDURE — 99448 NTRPROF PH1/NTRNET/EHR 21-30: CPT

## 2023-10-03 PROCEDURE — 99232 SBSQ HOSP IP/OBS MODERATE 35: CPT

## 2023-10-03 RX ORDER — BUMETANIDE 0.25 MG/ML
1 INJECTION INTRAMUSCULAR; INTRAVENOUS EVERY 12 HOURS
Refills: 0 | Status: DISCONTINUED | OUTPATIENT
Start: 2023-10-03 | End: 2023-10-07

## 2023-10-03 RX ADMIN — Medication 1334 MILLIGRAM(S): at 08:07

## 2023-10-03 RX ADMIN — Medication 12.5 MILLIGRAM(S): at 05:08

## 2023-10-03 RX ADMIN — MIDODRINE HYDROCHLORIDE 10 MILLIGRAM(S): 2.5 TABLET ORAL at 05:09

## 2023-10-03 RX ADMIN — HEPARIN SODIUM 5000 UNIT(S): 5000 INJECTION INTRAVENOUS; SUBCUTANEOUS at 05:08

## 2023-10-03 RX ADMIN — CHLORHEXIDINE GLUCONATE 1 APPLICATION(S): 213 SOLUTION TOPICAL at 05:21

## 2023-10-03 RX ADMIN — Medication 650 MILLIGRAM(S): at 06:31

## 2023-10-03 RX ADMIN — MIDODRINE HYDROCHLORIDE 10 MILLIGRAM(S): 2.5 TABLET ORAL at 11:22

## 2023-10-03 RX ADMIN — Medication 1334 MILLIGRAM(S): at 11:22

## 2023-10-03 RX ADMIN — PANTOPRAZOLE SODIUM 40 MILLIGRAM(S): 20 TABLET, DELAYED RELEASE ORAL at 05:09

## 2023-10-03 RX ADMIN — Medication 650 MILLIGRAM(S): at 11:22

## 2023-10-03 RX ADMIN — HEPARIN SODIUM 5000 UNIT(S): 5000 INJECTION INTRAVENOUS; SUBCUTANEOUS at 22:45

## 2023-10-03 RX ADMIN — Medication 650 MILLIGRAM(S): at 05:08

## 2023-10-03 NOTE — PROGRESS NOTE ADULT - ATTENDING COMMENTS
Patient seen and examined independently on 3E.   More awake and engaged in conversation today       PAST MEDICAL & SURGICAL HISTORY:  HTN (hypertension)  COPD (chronic obstructive pulmonary disease)  high cholesterol  Coffee ground emesis  Chronic diastolic heart failure  PAULA (acute kidney injury)      Vital Signs Last 24 Hrs  T(C): 36.6 (03 Oct 2023 14:49), Max: 37.3 (03 Oct 2023 05:24)  T(F): 97.9 (03 Oct 2023 14:49), Max: 99.2 (03 Oct 2023 05:24)  HR: 54 (03 Oct 2023 14:49) (54 - 75)  BP: 110/58 (03 Oct 2023 14:49) (94/52 - 123/57)  BP(mean): 75 (03 Oct 2023 14:49) (75 - 82)  RR: 18 (03 Oct 2023 14:49) (18 - 18)  SpO2: 99% (03 Oct 2023 14:49) (99% - 99%)      TESTS & MEASUREMENTS:                                   8.9    6.45  )-----------( 181      ( 03 Oct 2023 07:25 )             29.2   10-03    148<H>  |  110  |  47<H>  ----------------------------<  100<H>  3.7   |  28  |  2.6<H>    Sodium Trend:  Sodium: 148 mmol/L (10-03-23 @ 07:25)  Sodium: 147 mmol/L (10-02-23 @ 06:09)  Sodium: 142 mmol/L (10-01-23 @ 06:51)    Ca    8.5      03 Oct 2023 07:25  Mg     2.4     10-03    TPro  5.6<L>  /  Alb  2.9<L>  /  TBili  0.5  /  DBili  x   /  AST  16  /  ALT  7   /  AlkPhos  64  10-03      In summary:  ·Improving  Hypoxic Respiratory Failure, possibly upper airways with superimposed mild volume overload   . Hypernatremia, worsening , free water deficit; being followed up and patient encouraged to have more water intake  ·upper Gastrointestinal bleed due to duodenal ulcer, on therapy   ·Anemia of acute blood loss , stable   ·Acutely worsening uremia and acute kidney failure , at high risk for ATN  ·Afib, chronic; uncontrolled rate   ·Reported possible L3L4 OM on CT scan/ sacral DTI/ Foot cellulitis on broad-spectrum intravenous antibiotics as per ID recs  ·Increased frailty and decreased physical capacity     PLAN  ·Increase oral intake of water  . IR requested for tunneled dialysis cath  . Hepatitis profile recently done  ·Close F/U of BUN/Crea/ Lytes and UO   . Repeat coags requested by IR team     Case discussed with senior resident assigned and Nephrology attending.   At high risk for worsening cardiac and pulmonary status due to the severity of current illness and the underlying comorbidities.

## 2023-10-03 NOTE — CHART NOTE - NSCHARTNOTEFT_GEN_A_CORE
Registered Dietitian Follow-Up     Patient Profile Reviewed                           Yes [x]   No []     Nutrition History Previously Obtained        Yes [x]  No []       Pertinent Subjective Information: Pt has poor appetite; consuming between 0-50% of meals provided in-house.     Pertinent Medical Information: 82 y/o male w/ PMHx of DM, COPD, anemia, lymphedema, afib on xarelto, HFrEF, and DM who presented for 2 days of AMS from the NH.  #Hemorrhagic Shock Secondary to Hematochezia from Duodenal Ulcer Bleed s/p OVESCO placement   # Acutely worsening uremia and acute kidney failure most likely 2/2 ATN - now on HD through right IJ [place]  # Metabolic Encephalopathy     Diet order: Diet, NPO after Midnight:      NPO Start Date: 03-Oct-2023,   NPO Start Time: 23:59 (10-03-23 @ 13:27) [Active]  Diet, Pureed:   Mildly Thick Liquids (MILDTHICKLIQS) (23 @ 11:50) [Active]  Diet, Minced and Moist:   Mildly Thick Liquids (MILDTHICKLIQS)  Low Sodium     Qty per Day:  magic cup 2x/day  Supplement Feeding Modality:  Oral  Ensure Plus High Protein Cans or Servings Per Day:  1       Frequency:  Three Times a day (23 @ 15:30) [Pending Verification By Attending]  Diet, Minced and Moist (23 @ 15:04) [Available for Activation]    Anthropometrics:  Weight: 97.2 KG  Height: 193 cm  BMI: 26.1    Daily Weight in k (10-03), Weight in k.2 (), Weight in k.1 (), Weight in k.9 () -- wt fluctuations likely d/t fluid shifts (on HD)    MEDICATIONS  (STANDING):  buMETAnide 1 milliGRAM(s) Oral every 12 hours  calcium acetate 1334 milliGRAM(s) Oral three times a day with meals  chlorhexidine 2% Cloths 1 Application(s) Topical <User Schedule>  dextrose 5%. 1000 milliLiter(s) (100 mL/Hr) IV Continuous <Continuous>  dextrose 50% Injectable 12.5 Gram(s) IV Push once  dextrose 50% Injectable 25 Gram(s) IV Push once  dextrose 50% Injectable 25 Gram(s) IV Push once  glucagon  Injectable 1 milliGRAM(s) IntraMuscular once  heparin   Injectable 5000 Unit(s) SubCutaneous every 8 hours  insulin lispro (ADMELOG) corrective regimen sliding scale   SubCutaneous three times a day before meals  metoprolol tartrate 12.5 milliGRAM(s) Oral every 12 hours  midodrine. 10 milliGRAM(s) Oral three times a day  pantoprazole   Suspension 40 milliGRAM(s) Oral two times a day    MEDICATIONS  (PRN):  acetaminophen     Tablet .. 650 milliGRAM(s) Oral every 6 hours PRN Moderate Pain (4 - 6)  albuterol    90 MICROgram(s) HFA Inhaler 1 Puff(s) Inhalation every 6 hours PRN for shortness of breath and/or wheezing  dextrose Oral Gel 15 Gram(s) Oral once PRN Blood Glucose LESS THAN 70 milliGRAM(s)/deciliter    Pertinent Labs: 10-03 @ 07:25: Na 148<H>, BUN 47<H>, Cr 2.6<H>, <H>, K+ 3.7, Phos --, Mg 2.4, Alk Phos 64, ALT/SGPT 7, AST/SGOT 16, HbA1c --    Finger Sticks:  POCT Blood Glucose.: 137 mg/dL (10-03 @ 11:14)  POCT Blood Glucose.: 114 mg/dL (10-03 @ 07:57)  POCT Blood Glucose.: 102 mg/dL (10-02 @ 21:45)  POCT Blood Glucose.: 108 mg/dL (10-02 @ 16:55)    Physical Findings:  - Appearance: confused/disoriented per flow sheet  - GI function: last BM on   - Tubes: no feeding tube  - Oral/Mouth cavity: Per SLP 02-Oct-2023, recommend puree with mildly thick liquids small cup sips  note on   - Skin: stage II to B/L buttock, unstageable to left heel, DTI to left cheek per flow sheet  - Edema: generalized edema 2+     Nutrition Requirements:  Weight Used: ABW 97.2 KG -- with consideration for age, BMI, PIs, malnutrition     Estimated Energy Needs    Continue [x]  Adjust []  ENERGY: 2319-2498kcal/day based on MSJ 1784*SF 1.3-1.4     Estimated Protein Needs    Continue [x]  Adjust []  PROTEIN: 126..8 g/day (1.3-1.5 g/day)    Estimated Fluid Needs        Continue [x]  Adjust []  FLUID: 2430 mL/day (25 mL/kg)    [x] Previous Nutrition Diagnosis: Moderate Malnutrition            [x] Ongoing          [] Resolved    [] No active nutrition diagnosis identified at this time     Nutrition Education: Deferred at this time     Goal/Expected Outcome: Pt to meet at least 50-75% of estimated needs within 3-5 days     Indicator/Monitoring: Diet order, PO intake, weights, labs, NFPF, body composition, BM and tolerance to medical food supplements    Recommendation:  1. ADD NEPRO 3X/DAILY to optimize kcal/pro intake -- provides 1275 kcal/day total, 57.3g pro/day total -- on HD, poor appetite *** please add 2 packets of thickener per NEPRO carton  2. Continue with current diet order per SLP recs  3. Encourage PO intake and assist during meals prn    Pt is at high nutrition risk; will f/u in 3-5 days or prn.    RD to remain available: Belem De La Torre [phone] or TEAMS

## 2023-10-03 NOTE — CONSULT NOTE ADULT - SUBJECTIVE AND OBJECTIVE BOX
INTERVENTIONAL RADIOLOGY CONSULT:     Procedure Requested: TDC placement    HPI:  84 yo m ho DM, COPD, anemia, lymphedemia, afib on xarelto, HFrEF, DM coming in from nursing home for AMS x 2 days. Unable to gather story from pt as he is altered and lethargic.  As per NH was becoming more agitated x 2 days, was hypotensive yesterday and started on cefepime and vancomycin. Brought in to the ED for AMS. PICC line in place for cefepime 1q q8 until 10/5/23 and vanc 1q24 until 9/23/23 for LLE cellulitis/OM  (was at [facility] last month for LLE thick wound cellulitis/OM and sacral DTI as per call with [facility] as per collateral from [facility])      In the ED, vitals wnl. Labs showing wbc 11.30, Hb 9.6, MCV 79.1, INR 1.59, CO2 14, AG 21, Cr 6 (~baseline 1.2), , trops 0.09. CTH wnl, RBUS with no hydro, poor visualization of left kidney       (15 Sep 2023 22:12)      PAST MEDICAL & SURGICAL HISTORY:  HTN (hypertension)      COPD (chronic obstructive pulmonary disease)      High cholesterol      Mild anemia      Coffee ground emesis      Chronic diastolic heart failure      PAULA (acute kidney injury)      No significant past surgical history          MEDICATIONS  (STANDING):  buMETAnide 1 milliGRAM(s) Oral every 12 hours  calcium acetate 1334 milliGRAM(s) Oral three times a day with meals  chlorhexidine 2% Cloths 1 Application(s) Topical <User Schedule>  dextrose 5%. 1000 milliLiter(s) (100 mL/Hr) IV Continuous <Continuous>  dextrose 50% Injectable 25 Gram(s) IV Push once  dextrose 50% Injectable 25 Gram(s) IV Push once  dextrose 50% Injectable 12.5 Gram(s) IV Push once  glucagon  Injectable 1 milliGRAM(s) IntraMuscular once  heparin   Injectable 5000 Unit(s) SubCutaneous every 8 hours  insulin lispro (ADMELOG) corrective regimen sliding scale   SubCutaneous three times a day before meals  metoprolol tartrate 12.5 milliGRAM(s) Oral every 12 hours  midodrine. 10 milliGRAM(s) Oral three times a day  pantoprazole   Suspension 40 milliGRAM(s) Oral two times a day    MEDICATIONS  (PRN):  acetaminophen     Tablet .. 650 milliGRAM(s) Oral every 6 hours PRN Moderate Pain (4 - 6)  albuterol    90 MICROgram(s) HFA Inhaler 1 Puff(s) Inhalation every 6 hours PRN for shortness of breath and/or wheezing  dextrose Oral Gel 15 Gram(s) Oral once PRN Blood Glucose LESS THAN 70 milliGRAM(s)/deciliter      Allergies    No Known Allergies    Intolerances        Social History:   Smoking: Yes [ ]  No [ ]   ______pk yrs  ETOH  Yes [ ]  No [ ]  Social [ ]  DRUGS:  Yes [ ]  No [ ]  if so what______________    FAMILY HISTORY:  No pertinent family history in first degree relatives        Physical Exam:   Vital Signs Last 24 Hrs  T(C): 37 (03 Oct 2023 11:05), Max: 37.3 (03 Oct 2023 05:24)  T(F): 98.6 (03 Oct 2023 11:05), Max: 99.2 (03 Oct 2023 05:24)  HR: 58 (03 Oct 2023 13:50) (58 - 75)  BP: 94/52 (03 Oct 2023 13:50) (94/52 - 123/57)  BP(mean): 82 (03 Oct 2023 05:24) (79 - 82)  RR: 18 (03 Oct 2023 13:50) (18 - 18)  SpO2: --    Parameters below as of 03 Oct 2023 13:50  Patient On (Oxygen Delivery Method): room air    Labs:                         8.9    6.45  )-----------( 181      ( 03 Oct 2023 07:25 )             29.2     10-03    148<H>  |  110  |  47<H>  ----------------------------<  100<H>  3.7   |  28  |  2.6<H>    Ca    8.5      03 Oct 2023 07:25  Mg     2.4     10-03    TPro  5.6<L>  /  Alb  2.9<L>  /  TBili  0.5  /  DBili  x   /  AST  16  /  ALT  7   /  AlkPhos  64  10-03        Pertinent labs:                      8.9    6.45  )-----------( 181      ( 03 Oct 2023 07:25 )             29.2       10-03    148<H>  |  110  |  47<H>  ----------------------------<  100<H>  3.7   |  28  |  2.6<H>    Ca    8.5      03 Oct 2023 07:25  Mg     2.4     10-03    TPro  5.6<L>  /  Alb  2.9<L>  /  TBili  0.5  /  DBili  x   /  AST  16  /  ALT  7   /  AlkPhos  64  10-03      Radiology & Additional Studies:   Radiology imaging reviewed.       ASSESSMENT/ PLAN:   84 yo m ho DM, COPD, anemia, lymphedemia, afib on xarelto, HFrEF, DM coming in from nursing home for AMS x 2 days. Unable to gather story from pt as he is altered and lethargic.  As per NH was becoming more agitated x 2 days, was hypotensive yesterday and started on cefepime and vancomycin. Brought in to the ED for AMS. Found to have toxic metabolic encephalopathy with PAULA. Patient had R IJ udall placed by CCU fellow on 9/28 was working until today. Team received call from dialysis unit stating udall was not working, patient unable to get dialysis. IR consulted for TDC placement.   - tentative plan for tunneled HD catheter placement tomorrow pending the following:   - consent: please provide HCP if patient still with AMS  - NPO after midnight tonight  - repeat CBC, CMP  - draw PT/INR   - hold morning dose of subQ heparin       Thank you for the courtesy of this consult, please call [phone] with any further questions.

## 2023-10-03 NOTE — PROGRESS NOTE ADULT - SUBJECTIVE AND OBJECTIVE BOX
seen and examined   24 h events noted   no distress         PAST HISTORY  --------------------------------------------------------------------------------  No significant changes to PMH, PSH, FHx, SHx, unless otherwise noted    ALLERGIES & MEDICATIONS  --------------------------------------------------------------------------------  Allergies    No Known Allergies    Intolerances      Standing Inpatient Medications  calcium acetate 1334 milliGRAM(s) Oral three times a day with meals  chlorhexidine 2% Cloths 1 Application(s) Topical <User Schedule>  dextrose 5%. 1000 milliLiter(s) IV Continuous <Continuous>  dextrose 50% Injectable 25 Gram(s) IV Push once  dextrose 50% Injectable 12.5 Gram(s) IV Push once  dextrose 50% Injectable 25 Gram(s) IV Push once  glucagon  Injectable 1 milliGRAM(s) IntraMuscular once  heparin   Injectable 5000 Unit(s) SubCutaneous every 8 hours  insulin lispro (ADMELOG) corrective regimen sliding scale   SubCutaneous three times a day before meals  metoprolol tartrate 12.5 milliGRAM(s) Oral every 12 hours  midodrine. 10 milliGRAM(s) Oral three times a day  pantoprazole   Suspension 40 milliGRAM(s) Oral two times a day  potassium chloride  20 mEq/100 mL IVPB 20 milliEquivalent(s) IV Intermittent once    PRN Inpatient Medications  acetaminophen     Tablet .. 650 milliGRAM(s) Oral every 6 hours PRN  albuterol    90 MICROgram(s) HFA Inhaler 1 Puff(s) Inhalation every 6 hours PRN  dextrose Oral Gel 15 Gram(s) Oral once PRN      VITALS/PHYSICAL EXAM  --------------------------------------------------------------------------------  T(C): 37.3 (10-03-23 @ 05:24), Max: 37.3 (10-03-23 @ 05:24)  HR: 75 (10-03-23 @ 05:24) (59 - 76)  BP: 118/59 (10-03-23 @ 05:24) (110/56 - 123/57)  RR: 18 (10-03-23 @ 05:24) (18 - 18)  SpO2: 95% (10-02-23 @ 14:05) (95% - 95%)  Wt(kg): --        10-02-23 @ 07:01  -  10-03-23 @ 07:00  --------------------------------------------------------  IN: 0 mL / OUT: 400 mL / NET: -400 mL      Physical Exam:  	Gen: NAD   	Pulm: decrease BS  B/L  	CV:  S1S2; no rub  	Abd: distended  	Vascular access:IJ     LABS/STUDIES  --------------------------------------------------------------------------------              8.7    6.75  >-----------<  164      [10-02-23 @ 06:09]              28.5     147  |  108  |  49  ----------------------------<  95      [10-02-23 @ 06:09]  3.6   |  27  |  2.8        Ca     8.2     [10-02-23 @ 06:09]      Mg     1.7     [10-02-23 @ 06:09]    Creatinine Trend:  SCr 2.8 [10-02 @ 06:09]  SCr 2.8 [10-01 @ 06:51]  SCr 4.0 [09-30 @ 08:14]  SCr 4.4 [09-29 @ 17:59]  SCr 4.8 [09-29 @ 04:33]    Urinalysis - [10-02-23 @ 06:09]      Color  / Appearance  / SG  / pH       Gluc 95 / Ketone   / Bili  / Urobili        Blood  / Protein  / Leuk Est  / Nitrite       RBC  / WBC  / Hyaline  / Gran  / Sq Epi  / Non Sq Epi  / Bacteria         HBsAb <3.0      [09-27-23 @ 17:44]  HBsAb Nonreact      [09-27-23 @ 17:44]  HBsAg Nonreact      [09-27-23 @ 17:44]  HBcAb Nonreact      [09-27-23 @ 17:44]

## 2023-10-03 NOTE — PROGRESS NOTE ADULT - SUBJECTIVE AND OBJECTIVE BOX
NIMO SARMIENTO 83y Male  MRN#: [id number]   Hospital Day: 18d    HPI:  84 yo m ho DM, COPD, anemia, lymphedemia, afib on xarelto, HFrEF, DM coming in from nursing home for AMS x 2 days. Unable to gather story from pt as he is altered and lethargic.  As per NH was becoming more agitated x 2 days, was hypotensive yesterday and started on cefepime and vancomycin. Brought in to the ED for AMS. PICC line in place for cefepime 1q q8 until 10/5/23 and vanc 1q24 until 9/23/23 for LLE cellulitis/OM  (was at [facility] last month for LLE thick wound cellulitis/OM and sacral DTI as per call with [facility] as per collateral from [facility])      In the ED, vitals wnl. Labs showing wbc 11.30, Hb 9.6, MCV 79.1, INR 1.59, CO2 14, AG 21, Cr 6 (~baseline 1.2), , trops 0.09. CTH wnl, RBUS with no hydro, poor visualization of left kidney       (15 Sep 2023 22:12)      SUBJECTIVE  Patient is a 83y old Male who presents with a chief complaint of AMS (03 Oct 2023 08:55)  Currently admitted to medicine with the primary diagnosis of Altered mental status      INTERVAL HPI AND OVERNIGHT EVENTS:  Patient was examined and seen at bedside. This morning he is resting comfortably in bed and reports no issues or overnight events.      VITAL SIGNS: Last 24 Hours  T(C): 37.3 (03 Oct 2023 05:24), Max: 37.3 (03 Oct 2023 05:24)  T(F): 99.2 (03 Oct 2023 05:24), Max: 99.2 (03 Oct 2023 05:24)  HR: 75 (03 Oct 2023 05:24) (59 - 76)  BP: 118/59 (03 Oct 2023 05:24) (110/56 - 123/57)  BP(mean): 82 (03 Oct 2023 05:24) (79 - 82)  RR: 18 (03 Oct 2023 05:24) (18 - 18)  SpO2: 95% (02 Oct 2023 14:05) (95% - 95%)    LABS:                        8.9    6.45  )-----------( 181      ( 03 Oct 2023 07:25 )             29.2     10-03    148<H>  |  110  |  47<H>  ----------------------------<  100<H>  3.7   |  28  |  2.6<H>    Ca    8.5      03 Oct 2023 07:25  Mg     2.4     10-03    TPro  5.6<L>  /  Alb  2.9<L>  /  TBili  0.5  /  DBili  x   /  AST  16  /  ALT  7   /  AlkPhos  64  10-03      Urinalysis Basic - ( 03 Oct 2023 07:25 )    Color: x / Appearance: x / SG: x / pH: x  Gluc: 100 mg/dL / Ketone: x  / Bili: x / Urobili: x   Blood: x / Protein: x / Nitrite: x   Leuk Esterase: x / RBC: x / WBC x   Sq Epi: x / Non Sq Epi: x / Bacteria: x    PHYSICAL EXAM:    GENERAL: NAD, lying in bed comfortably  CHEST/LUNG: Clear to auscultation bilaterally; No rales, rhonchi, wheezing, or rubs. Unlabored respirations  HEART: Regular rate and rhythm; No murmurs, rubs, or gallops  ABDOMEN: Bowel sounds present; Soft, Nontender, Nondistended. No hepatomegally  EXTREMITIES:  +2 edema   NERVOUS SYSTEM:  Alert & Oriented X2, speech clear. No deficits   SKIN: Large ecchymosis on left arm

## 2023-10-03 NOTE — PROGRESS NOTE ADULT - ASSESSMENT
82 yo m ho DM, COPD, anemia, paroxysmal afib on xarelto, HFrEF, DM coming in from nursing home for AMS x 2 days. Found to have toxic metabolic encephalopathy with PAUAL.  PAULA/ toxic metabolic encephalopathy/ HAGMA/ HFrEF/ hypernatremia  possible ATN iso hypotension and possible sepsis/ vanco toxicity  no hydro on imaging  creat trend noted  hd today standard bath uf 1 liter as tolerated   check daily cr /hd twice a week    uo noted  / start bumex 1 q 12     last phosphorus  level noted / phoslo 2/2/2  continue  midodrine to 10 q 8   will follow    prognosis poor

## 2023-10-03 NOTE — PROGRESS NOTE ADULT - ASSESSMENT
Patient is an 82 yo M w/PMH of DM, COPD, anemia, lymphedema, afib on xarelto, HFrEF, and DM who presented for 2 days of AMS from the NH, with an episode of hypotension in the NH and started on cefepime and vanc for LLE cellulitis/OM, then brought into the ED for AMS, w/ED vitals stable, labs notable for wbc 11.30, Hb 9.6, Bicarb 14, AG 21, Cr 6 (~baseline 1.2), , and trop 0.09, CTH unremarkable, and renal bladder US without evidence of hydronephrosis, then admitted for work up and management of AMS.    #Anemia of acute blood loss   #Hemorrhagic Shock Secondary to Hematochezia from Duodenal Ulcer Bleed s/p OVESCO placement   - Trend hgb, transfuse blood PRN  - continue with holding therapeutic AC  - s/p IV Protamine sulfate 36mg x1 dose on   - GI f/u appreciated  - S/p IV Protonix 40mg BID -> Ok to switch to PO BID as per GI   - Avoid any NSAIDs  - Will need to discuss the risks and benefits of long term AC with patient or next of Kin in light of life threating GIB    #Acutely worsening uremia and acute kidney failure most likely 2/2 ATN - now on HD through right [address]  #Metabolic Encephalopathy  #hypernatremia   PAULA / CKD stable non oliguric  - udall placement 23  started on HD  but did not tolerate it with access problems  - udall changed   he received HD  and   - Close F/U of BUN/Crea/ Lytes and UO  - c/w phoslo 2/2/2   - off sodium bicarbonate   - s/p 250 cc LR bolus for hypernatremia  - s/p D5w @ 80 cc/hr  - f/u w/nephro  - 1:1 for safety and for udall observation   - Creatinine improvin -> 4 -> 2.8 -> 2.6   - Now on BUMEX 1 mg BID     #Acute Hypoxic Respiratory Failure, possible aspiration pna / volume overload  /R pleural effusions   *CXR () Bilateral pleural effusion/opacity unchanged. No air leak.  - On 2L NC  - C/w albuterol PRN    #Hypotension  - on metoprolol tartrate 12.5 mg q12h  - c/w midodrine 5 q 8 -> Increase to 10 mg Q8     #Afib, chronic; uncontrolled rate  - HOLDING therapeutic AC  - c/w metoprolol tartrate 12.5 mg q12h    #History of Left Foot OM w/ surrounding Cellulitis  *CT LLE (): No CT evidence of osteomyelitis or necrotizing infection. No drainable collection seen, within the limitations of a noncontrast exam.  - Increased frailty and decreased physical capacity  - as per previous notes the team Discussed with ID attending: patient is unlikely to benefit from prolonged therapy with cefepime+vanco (to cover possible OM) due to adverse outcomes associated kidney dysfunction, cognitive impact ...etc )  - c/w Local wound care; offloading boots bilaterally, frequently turning and positioning, prevent pressure injury, keep skin clean, and monitor for wound changes and notify provider as per podiatry    #MISC  - DVT ppx SCD,   heparin sq   - GI PPX: PPI po BID   - GOC :  Full code -  As per palliative care team: next-of-kin not in favor of further discussing Goals of Care Conversation or advance directives.    Pending; labs, mental status improving / nephrology / CBC    Patient is an 84 yo M w/PMH of DM, COPD, anemia, lymphedema, afib on xarelto, HFrEF, and DM who presented for 2 days of AMS from the NH, with an episode of hypotension in the NH and started on cefepime and vanc for LLE cellulitis/OM, then brought into the ED for AMS, w/ED vitals stable, labs notable for wbc 11.30, Hb 9.6, Bicarb 14, AG 21, Cr 6 (~baseline 1.2), , and trop 0.09, CTH unremarkable, and renal bladder US without evidence of hydronephrosis, then admitted for work up and management of AMS.    #Anemia of acute blood loss   #Hemorrhagic Shock Secondary to Hematochezia from Duodenal Ulcer Bleed s/p OVESCO placement   - Trend hgb, transfuse blood PRN  - continue with holding therapeutic AC  - s/p IV Protamine sulfate 36mg x1 dose on   - GI f/u appreciated  - S/p IV Protonix 40mg BID -> Ok to switch to PO BID as per GI   - Avoid any NSAIDs  - Will need to discuss the risks and benefits of long term AC with patient or next of Kin in light of life threating GIB    #Acutely worsening uremia and acute kidney failure most likely 2/2 ATN - now on HD through right IJ [place]  #Metabolic Encephalopathy  #hypernatremia   PAULA / CKD stable non oliguric  - udall placement 23  started on HD  but did not tolerate it with access problems  - udall changed   he received HD  and   - Close F/U of BUN/Crea/ Lytes and UO  - c/w phoslo 2/2/2   - off sodium bicarbonate   - s/p 250 cc LR bolus for hypernatremia  - s/p D5w @ 80 cc/hr  - f/u w/nephro  - 1:1 for safety and for udall observation   - Creatinine improvin -> 4 -> 2.8 -> 2.6   - Now on BUMEX 1 mg BID   - Plan for tunneled cath placement today or tomorrow with IR     #Acute Hypoxic Respiratory Failure, possible aspiration pna / volume overload  /R pleural effusions   *CXR () Bilateral pleural effusion/opacity unchanged. No air leak.  - On 2L NC  - C/w albuterol PRN    #Hypotension  - on metoprolol tartrate 12.5 mg q12h  - c/w midodrine 5 q 8 -> Increase to 10 mg Q8     #Afib, chronic; uncontrolled rate  - HOLDING therapeutic AC  - c/w metoprolol tartrate 12.5 mg q12h    #History of Left Foot OM w/ surrounding Cellulitis  *CT LLE (): No CT evidence of osteomyelitis or necrotizing infection. No drainable collection seen, within the limitations of a noncontrast exam.  - Increased frailty and decreased physical capacity  - as per previous notes the team Discussed with ID attending: patient is unlikely to benefit from prolonged therapy with cefepime+vanco (to cover possible OM) due to adverse outcomes associated kidney dysfunction, cognitive impact ...etc )  - c/w Local wound care; offloading boots bilaterally, frequently turning and positioning, prevent pressure injury, keep skin clean, and monitor for wound changes and notify provider as per podiatry    #MISC  - DVT ppx SCD,   heparin sq   - GI PPX: PPI po BID   - GOC :  Full code -  As per palliative care team: next-of-kin not in favor of further discussing Goals of Care Conversation or advance directives.    Pending; labs, mental status improving / nephrology / CBC

## 2023-10-03 NOTE — SWALLOW BEDSIDE ASSESSMENT ADULT - SLP PERTINENT HISTORY OF CURRENT PROBLEM
Pt is an 82 y/o M w/ PMHx: DM, COPD, anemia, lymphedema, afib on Xarelto, HFrEF, DM, presents from NH for AMS. Pt is being treated for anemia, hemorrhagic shock 2' hematochezia from duodenal ulcer bleed s/p OVESCO placement 09/23. Course c/b acutely worsening uremia and acute kidney failure most likely 2' ATN- now on HD through right IJ [place]. +Metabolic encephalopathy, hypernatremia, AHRF- possible aspiration PNA, volume overload, R pleural effusions, hypotension. Pt planned for TDC placement tomorrow w/ IR.

## 2023-10-04 LAB
ALBUMIN SERPL ELPH-MCNC: 2.9 G/DL — LOW (ref 3.5–5.2)
ALBUMIN SERPL ELPH-MCNC: 3.1 G/DL — LOW (ref 3.5–5.2)
ALBUMIN SERPL ELPH-MCNC: 3.2 G/DL — LOW (ref 3.5–5.2)
ALP SERPL-CCNC: 66 U/L — SIGNIFICANT CHANGE UP (ref 30–115)
ALP SERPL-CCNC: 69 U/L — SIGNIFICANT CHANGE UP (ref 30–115)
ALP SERPL-CCNC: 73 U/L — SIGNIFICANT CHANGE UP (ref 30–115)
ALT FLD-CCNC: 7 U/L — SIGNIFICANT CHANGE UP (ref 0–41)
ALT FLD-CCNC: 7 U/L — SIGNIFICANT CHANGE UP (ref 0–41)
ALT FLD-CCNC: 8 U/L — SIGNIFICANT CHANGE UP (ref 0–41)
ANION GAP SERPL CALC-SCNC: 10 MMOL/L — SIGNIFICANT CHANGE UP (ref 7–14)
ANION GAP SERPL CALC-SCNC: 14 MMOL/L — SIGNIFICANT CHANGE UP (ref 7–14)
ANION GAP SERPL CALC-SCNC: 15 MMOL/L — HIGH (ref 7–14)
APTT BLD: 29.6 SEC — SIGNIFICANT CHANGE UP (ref 27–39.2)
APTT BLD: 30.7 SEC — SIGNIFICANT CHANGE UP (ref 27–39.2)
AST SERPL-CCNC: 17 U/L — SIGNIFICANT CHANGE UP (ref 0–41)
AST SERPL-CCNC: 18 U/L — SIGNIFICANT CHANGE UP (ref 0–41)
AST SERPL-CCNC: 18 U/L — SIGNIFICANT CHANGE UP (ref 0–41)
BASE EXCESS BLDA CALC-SCNC: -4.5 MMOL/L — LOW (ref -2–3)
BASOPHILS # BLD AUTO: 0.02 K/UL — SIGNIFICANT CHANGE UP (ref 0–0.2)
BASOPHILS NFR BLD AUTO: 0.3 % — SIGNIFICANT CHANGE UP (ref 0–1)
BILIRUB SERPL-MCNC: 0.5 MG/DL — SIGNIFICANT CHANGE UP (ref 0.2–1.2)
BILIRUB SERPL-MCNC: 0.5 MG/DL — SIGNIFICANT CHANGE UP (ref 0.2–1.2)
BILIRUB SERPL-MCNC: 0.6 MG/DL — SIGNIFICANT CHANGE UP (ref 0.2–1.2)
BLD GP AB SCN SERPL QL: SIGNIFICANT CHANGE UP
BUN SERPL-MCNC: 37 MG/DL — HIGH (ref 10–20)
BUN SERPL-MCNC: 38 MG/DL — HIGH (ref 10–20)
BUN SERPL-MCNC: 39 MG/DL — HIGH (ref 10–20)
CALCIUM SERPL-MCNC: 8.4 MG/DL — SIGNIFICANT CHANGE UP (ref 8.4–10.5)
CALCIUM SERPL-MCNC: 8.4 MG/DL — SIGNIFICANT CHANGE UP (ref 8.4–10.5)
CALCIUM SERPL-MCNC: 8.5 MG/DL — SIGNIFICANT CHANGE UP (ref 8.4–10.5)
CHLORIDE SERPL-SCNC: 106 MMOL/L — SIGNIFICANT CHANGE UP (ref 98–110)
CHLORIDE SERPL-SCNC: 106 MMOL/L — SIGNIFICANT CHANGE UP (ref 98–110)
CHLORIDE SERPL-SCNC: 109 MMOL/L — SIGNIFICANT CHANGE UP (ref 98–110)
CO2 SERPL-SCNC: 23 MMOL/L — SIGNIFICANT CHANGE UP (ref 17–32)
CO2 SERPL-SCNC: 24 MMOL/L — SIGNIFICANT CHANGE UP (ref 17–32)
CO2 SERPL-SCNC: 29 MMOL/L — SIGNIFICANT CHANGE UP (ref 17–32)
CREAT SERPL-MCNC: 2.3 MG/DL — HIGH (ref 0.7–1.5)
CREAT SERPL-MCNC: 2.3 MG/DL — HIGH (ref 0.7–1.5)
CREAT SERPL-MCNC: 2.4 MG/DL — HIGH (ref 0.7–1.5)
EGFR: 26 ML/MIN/1.73M2 — LOW
EGFR: 28 ML/MIN/1.73M2 — LOW
EGFR: 28 ML/MIN/1.73M2 — LOW
EOSINOPHIL # BLD AUTO: 0.26 K/UL — SIGNIFICANT CHANGE UP (ref 0–0.7)
EOSINOPHIL NFR BLD AUTO: 3.5 % — SIGNIFICANT CHANGE UP (ref 0–8)
GAS PNL BLDA: SIGNIFICANT CHANGE UP
GLUCOSE BLDC GLUCOMTR-MCNC: 105 MG/DL — HIGH (ref 70–99)
GLUCOSE BLDC GLUCOMTR-MCNC: 105 MG/DL — HIGH (ref 70–99)
GLUCOSE BLDC GLUCOMTR-MCNC: 129 MG/DL — HIGH (ref 70–99)
GLUCOSE BLDC GLUCOMTR-MCNC: 144 MG/DL — HIGH (ref 70–99)
GLUCOSE SERPL-MCNC: 105 MG/DL — HIGH (ref 70–99)
GLUCOSE SERPL-MCNC: 88 MG/DL — SIGNIFICANT CHANGE UP (ref 70–99)
GLUCOSE SERPL-MCNC: 96 MG/DL — SIGNIFICANT CHANGE UP (ref 70–99)
HCO3 BLDA-SCNC: 17 MMOL/L — LOW (ref 21–28)
HCT VFR BLD CALC: 27.3 % — LOW (ref 42–52)
HCT VFR BLD CALC: 28.6 % — LOW (ref 42–52)
HCT VFR BLD CALC: 30.1 % — LOW (ref 42–52)
HCT VFR BLD CALC: 30.3 % — LOW (ref 42–52)
HGB BLD-MCNC: 8.4 G/DL — LOW (ref 14–18)
HGB BLD-MCNC: 8.8 G/DL — LOW (ref 14–18)
HGB BLD-MCNC: 9.1 G/DL — LOW (ref 14–18)
HGB BLD-MCNC: 9.3 G/DL — LOW (ref 14–18)
HOROWITZ INDEX BLDA+IHG-RTO: 40 — SIGNIFICANT CHANGE UP
IMM GRANULOCYTES NFR BLD AUTO: 0.3 % — SIGNIFICANT CHANGE UP (ref 0.1–0.3)
INR BLD: 1.14 RATIO — SIGNIFICANT CHANGE UP (ref 0.65–1.3)
INR BLD: 1.14 RATIO — SIGNIFICANT CHANGE UP (ref 0.65–1.3)
LACTATE SERPL-SCNC: 7.7 MMOL/L — CRITICAL HIGH (ref 0.7–2)
LYMPHOCYTES # BLD AUTO: 2.49 K/UL — SIGNIFICANT CHANGE UP (ref 1.2–3.4)
LYMPHOCYTES # BLD AUTO: 33.7 % — SIGNIFICANT CHANGE UP (ref 20.5–51.1)
MAGNESIUM SERPL-MCNC: 1.8 MG/DL — SIGNIFICANT CHANGE UP (ref 1.8–2.4)
MAGNESIUM SERPL-MCNC: 2.1 MG/DL — SIGNIFICANT CHANGE UP (ref 1.8–2.4)
MCHC RBC-ENTMCNC: 27.4 PG — SIGNIFICANT CHANGE UP (ref 27–31)
MCHC RBC-ENTMCNC: 27.4 PG — SIGNIFICANT CHANGE UP (ref 27–31)
MCHC RBC-ENTMCNC: 27.6 PG — SIGNIFICANT CHANGE UP (ref 27–31)
MCHC RBC-ENTMCNC: 27.8 PG — SIGNIFICANT CHANGE UP (ref 27–31)
MCHC RBC-ENTMCNC: 30.2 G/DL — LOW (ref 32–37)
MCHC RBC-ENTMCNC: 30.7 G/DL — LOW (ref 32–37)
MCHC RBC-ENTMCNC: 30.8 G/DL — LOW (ref 32–37)
MCHC RBC-ENTMCNC: 30.8 G/DL — LOW (ref 32–37)
MCV RBC AUTO: 89.1 FL — SIGNIFICANT CHANGE UP (ref 80–94)
MCV RBC AUTO: 89.8 FL — SIGNIFICANT CHANGE UP (ref 80–94)
MCV RBC AUTO: 90.5 FL — SIGNIFICANT CHANGE UP (ref 80–94)
MCV RBC AUTO: 90.7 FL — SIGNIFICANT CHANGE UP (ref 80–94)
MONOCYTES # BLD AUTO: 0.54 K/UL — SIGNIFICANT CHANGE UP (ref 0.1–0.6)
MONOCYTES NFR BLD AUTO: 7.3 % — SIGNIFICANT CHANGE UP (ref 1.7–9.3)
NEUTROPHILS # BLD AUTO: 4.06 K/UL — SIGNIFICANT CHANGE UP (ref 1.4–6.5)
NEUTROPHILS NFR BLD AUTO: 54.9 % — SIGNIFICANT CHANGE UP (ref 42.2–75.2)
NRBC # BLD: 0 /100 WBCS — SIGNIFICANT CHANGE UP (ref 0–0)
PCO2 BLDA: 23 MMHG — LOW (ref 35–48)
PH BLDA: 7.48 — HIGH (ref 7.35–7.45)
PHOSPHATE SERPL-MCNC: 2.9 MG/DL — SIGNIFICANT CHANGE UP (ref 2.1–4.9)
PLATELET # BLD AUTO: 149 K/UL — SIGNIFICANT CHANGE UP (ref 130–400)
PLATELET # BLD AUTO: 160 K/UL — SIGNIFICANT CHANGE UP (ref 130–400)
PLATELET # BLD AUTO: 166 K/UL — SIGNIFICANT CHANGE UP (ref 130–400)
PLATELET # BLD AUTO: 173 K/UL — SIGNIFICANT CHANGE UP (ref 130–400)
PMV BLD: 10.9 FL — HIGH (ref 7.4–10.4)
PMV BLD: 11.1 FL — HIGH (ref 7.4–10.4)
PMV BLD: 11.2 FL — HIGH (ref 7.4–10.4)
PMV BLD: 11.8 FL — HIGH (ref 7.4–10.4)
PO2 BLDA: 137 MMHG — HIGH (ref 83–108)
POTASSIUM SERPL-MCNC: 3.4 MMOL/L — LOW (ref 3.5–5)
POTASSIUM SERPL-MCNC: 3.6 MMOL/L — SIGNIFICANT CHANGE UP (ref 3.5–5)
POTASSIUM SERPL-MCNC: 3.8 MMOL/L — SIGNIFICANT CHANGE UP (ref 3.5–5)
POTASSIUM SERPL-SCNC: 3.4 MMOL/L — LOW (ref 3.5–5)
POTASSIUM SERPL-SCNC: 3.6 MMOL/L — SIGNIFICANT CHANGE UP (ref 3.5–5)
POTASSIUM SERPL-SCNC: 3.8 MMOL/L — SIGNIFICANT CHANGE UP (ref 3.5–5)
PROT SERPL-MCNC: 5.7 G/DL — LOW (ref 6–8)
PROT SERPL-MCNC: 5.9 G/DL — LOW (ref 6–8)
PROT SERPL-MCNC: 6.1 G/DL — SIGNIFICANT CHANGE UP (ref 6–8)
PROTHROM AB SERPL-ACNC: 13 SEC — HIGH (ref 9.95–12.87)
PROTHROM AB SERPL-ACNC: 13 SEC — HIGH (ref 9.95–12.87)
RBC # BLD: 3.04 M/UL — LOW (ref 4.7–6.1)
RBC # BLD: 3.16 M/UL — LOW (ref 4.7–6.1)
RBC # BLD: 3.32 M/UL — LOW (ref 4.7–6.1)
RBC # BLD: 3.4 M/UL — LOW (ref 4.7–6.1)
RBC # FLD: 20.7 % — HIGH (ref 11.5–14.5)
RBC # FLD: 20.8 % — HIGH (ref 11.5–14.5)
RBC # FLD: 20.9 % — HIGH (ref 11.5–14.5)
RBC # FLD: 21.3 % — HIGH (ref 11.5–14.5)
SAO2 % BLDA: 99.9 % — HIGH (ref 94–98)
SODIUM SERPL-SCNC: 144 MMOL/L — SIGNIFICANT CHANGE UP (ref 135–146)
SODIUM SERPL-SCNC: 144 MMOL/L — SIGNIFICANT CHANGE UP (ref 135–146)
SODIUM SERPL-SCNC: 148 MMOL/L — HIGH (ref 135–146)
WBC # BLD: 10.76 K/UL — SIGNIFICANT CHANGE UP (ref 4.8–10.8)
WBC # BLD: 7.05 K/UL — SIGNIFICANT CHANGE UP (ref 4.8–10.8)
WBC # BLD: 7.39 K/UL — SIGNIFICANT CHANGE UP (ref 4.8–10.8)
WBC # BLD: 7.5 K/UL — SIGNIFICANT CHANGE UP (ref 4.8–10.8)
WBC # FLD AUTO: 10.76 K/UL — SIGNIFICANT CHANGE UP (ref 4.8–10.8)
WBC # FLD AUTO: 7.05 K/UL — SIGNIFICANT CHANGE UP (ref 4.8–10.8)
WBC # FLD AUTO: 7.39 K/UL — SIGNIFICANT CHANGE UP (ref 4.8–10.8)
WBC # FLD AUTO: 7.5 K/UL — SIGNIFICANT CHANGE UP (ref 4.8–10.8)

## 2023-10-04 PROCEDURE — 71045 X-RAY EXAM CHEST 1 VIEW: CPT | Mod: 26,77

## 2023-10-04 PROCEDURE — 71045 X-RAY EXAM CHEST 1 VIEW: CPT | Mod: 26

## 2023-10-04 PROCEDURE — 36558 INSERT TUNNELED CV CATH: CPT | Mod: RT

## 2023-10-04 PROCEDURE — 77001 FLUOROGUIDE FOR VEIN DEVICE: CPT | Mod: 26

## 2023-10-04 PROCEDURE — 99233 SBSQ HOSP IP/OBS HIGH 50: CPT

## 2023-10-04 PROCEDURE — 76937 US GUIDE VASCULAR ACCESS: CPT | Mod: 26

## 2023-10-04 PROCEDURE — 93010 ELECTROCARDIOGRAM REPORT: CPT

## 2023-10-04 RX ORDER — CEFEPIME 1 G/1
1000 INJECTION, POWDER, FOR SOLUTION INTRAMUSCULAR; INTRAVENOUS EVERY 8 HOURS
Refills: 0 | Status: DISCONTINUED | OUTPATIENT
Start: 2023-10-05 | End: 2023-10-05

## 2023-10-04 RX ORDER — NOREPINEPHRINE BITARTRATE/D5W 8 MG/250ML
0.05 PLASTIC BAG, INJECTION (ML) INTRAVENOUS
Qty: 8 | Refills: 0 | Status: DISCONTINUED | OUTPATIENT
Start: 2023-10-04 | End: 2023-10-04

## 2023-10-04 RX ORDER — HALOPERIDOL DECANOATE 100 MG/ML
0.5 INJECTION INTRAMUSCULAR ONCE
Refills: 0 | Status: COMPLETED | OUTPATIENT
Start: 2023-10-04 | End: 2023-10-04

## 2023-10-04 RX ORDER — ATROPINE SULFATE 0.1 MG/ML
1 SYRINGE (ML) INJECTION ONCE
Refills: 0 | Status: DISCONTINUED | OUTPATIENT
Start: 2023-10-04 | End: 2023-10-25

## 2023-10-04 RX ORDER — HALOPERIDOL DECANOATE 100 MG/ML
2 INJECTION INTRAMUSCULAR ONCE
Refills: 0 | Status: DISCONTINUED | OUTPATIENT
Start: 2023-10-04 | End: 2023-10-04

## 2023-10-04 RX ORDER — CEFEPIME 1 G/1
1000 INJECTION, POWDER, FOR SOLUTION INTRAMUSCULAR; INTRAVENOUS ONCE
Refills: 0 | Status: DISCONTINUED | OUTPATIENT
Start: 2023-10-04 | End: 2023-10-05

## 2023-10-04 RX ORDER — SODIUM CHLORIDE 9 MG/ML
500 INJECTION, SOLUTION INTRAVENOUS ONCE
Refills: 0 | Status: COMPLETED | OUTPATIENT
Start: 2023-10-04 | End: 2023-10-04

## 2023-10-04 RX ORDER — VANCOMYCIN HCL 1 G
1000 VIAL (EA) INTRAVENOUS ONCE
Refills: 0 | Status: DISCONTINUED | OUTPATIENT
Start: 2023-10-04 | End: 2023-10-05

## 2023-10-04 RX ORDER — NOREPINEPHRINE BITARTRATE/D5W 8 MG/250ML
0.05 PLASTIC BAG, INJECTION (ML) INTRAVENOUS
Qty: 16 | Refills: 0 | Status: DISCONTINUED | OUTPATIENT
Start: 2023-10-04 | End: 2023-10-05

## 2023-10-04 RX ORDER — CEFEPIME 1 G/1
INJECTION, POWDER, FOR SOLUTION INTRAMUSCULAR; INTRAVENOUS
Refills: 0 | Status: DISCONTINUED | OUTPATIENT
Start: 2023-10-04 | End: 2023-10-05

## 2023-10-04 RX ORDER — HALOPERIDOL DECANOATE 100 MG/ML
2.5 INJECTION INTRAMUSCULAR ONCE
Refills: 0 | Status: DISCONTINUED | OUTPATIENT
Start: 2023-10-04 | End: 2023-10-04

## 2023-10-04 RX ORDER — BUDESONIDE AND FORMOTEROL FUMARATE DIHYDRATE 160; 4.5 UG/1; UG/1
2 AEROSOL RESPIRATORY (INHALATION)
Refills: 0 | Status: DISCONTINUED | OUTPATIENT
Start: 2023-10-04 | End: 2023-10-25

## 2023-10-04 RX ORDER — CHLORHEXIDINE GLUCONATE 213 G/1000ML
1 SOLUTION TOPICAL EVERY 12 HOURS
Refills: 0 | Status: COMPLETED | OUTPATIENT
Start: 2023-10-04 | End: 2023-10-05

## 2023-10-04 RX ADMIN — HEPARIN SODIUM 5000 UNIT(S): 5000 INJECTION INTRAVENOUS; SUBCUTANEOUS at 15:52

## 2023-10-04 RX ADMIN — BUMETANIDE 1 MILLIGRAM(S): 0.25 INJECTION INTRAMUSCULAR; INTRAVENOUS at 18:35

## 2023-10-04 RX ADMIN — HEPARIN SODIUM 5000 UNIT(S): 5000 INJECTION INTRAVENOUS; SUBCUTANEOUS at 05:45

## 2023-10-04 RX ADMIN — CHLORHEXIDINE GLUCONATE 1 APPLICATION(S): 213 SOLUTION TOPICAL at 21:20

## 2023-10-04 RX ADMIN — BUMETANIDE 1 MILLIGRAM(S): 0.25 INJECTION INTRAMUSCULAR; INTRAVENOUS at 05:45

## 2023-10-04 RX ADMIN — Medication 650 MILLIGRAM(S): at 20:49

## 2023-10-04 RX ADMIN — Medication 12.5 MILLIGRAM(S): at 05:45

## 2023-10-04 RX ADMIN — Medication 9.11 MICROGRAM(S)/KG/MIN: at 23:59

## 2023-10-04 RX ADMIN — HALOPERIDOL DECANOATE 0.5 MILLIGRAM(S): 100 INJECTION INTRAMUSCULAR at 16:05

## 2023-10-04 RX ADMIN — MIDODRINE HYDROCHLORIDE 10 MILLIGRAM(S): 2.5 TABLET ORAL at 16:08

## 2023-10-04 RX ADMIN — PANTOPRAZOLE SODIUM 40 MILLIGRAM(S): 20 TABLET, DELAYED RELEASE ORAL at 18:56

## 2023-10-04 RX ADMIN — CHLORHEXIDINE GLUCONATE 1 APPLICATION(S): 213 SOLUTION TOPICAL at 05:51

## 2023-10-04 RX ADMIN — MIDODRINE HYDROCHLORIDE 10 MILLIGRAM(S): 2.5 TABLET ORAL at 05:45

## 2023-10-04 RX ADMIN — BUDESONIDE AND FORMOTEROL FUMARATE DIHYDRATE 2 PUFF(S): 160; 4.5 AEROSOL RESPIRATORY (INHALATION) at 20:51

## 2023-10-04 RX ADMIN — PANTOPRAZOLE SODIUM 40 MILLIGRAM(S): 20 TABLET, DELAYED RELEASE ORAL at 05:46

## 2023-10-04 RX ADMIN — Medication 1334 MILLIGRAM(S): at 18:34

## 2023-10-04 RX ADMIN — Medication 12.5 MILLIGRAM(S): at 18:34

## 2023-10-04 RX ADMIN — HEPARIN SODIUM 5000 UNIT(S): 5000 INJECTION INTRAVENOUS; SUBCUTANEOUS at 21:20

## 2023-10-04 RX ADMIN — SODIUM CHLORIDE 500 MILLILITER(S): 9 INJECTION, SOLUTION INTRAVENOUS at 22:40

## 2023-10-04 NOTE — PROVIDER CONTACT NOTE (OTHER) - ASSESSMENT
AOx0. No distress noted
N/A
Patient denies chest pain/palpitation. VS: 105/59, hr 75, temp 96.1.
Asleep, confused and irritated when awoken. Removed extra blanket and applied cold packs to arm pits and abdomen.
Patient confused, no distress noted. Patient just pulls on things and not trying to get out of bed.
patient confused

## 2023-10-04 NOTE — PROVIDER CONTACT NOTE (OTHER) - ACTION/TREATMENT ORDERED:
pending MD reply
no reply yet
awaiting order DC
Awaiting orders
Patient okay to go to hemodialysis. Cardiology consult in place. Continue telemetry monitoring
Jake lundberg and fluids pending orders

## 2023-10-04 NOTE — RAPID RESPONSE TEAM SUMMARY - NSOTHERINTERVENTIONSRRT_GEN_ALL_CORE
Repeating lactic acid value  Following up closely on cognitive status   Pulmonary & Critical Care Medicine team consultations (stat) if no improvement   Handoff provided to team on call

## 2023-10-04 NOTE — RAPID RESPONSE TEAM SUMMARY - NSSITUATIONBACKGROUNDRRT_GEN_ALL_CORE
Patient post IR procedure. With signs of delirium: restless and confused.   On P/E no gross motor deficit noted and patient able to speak and moves all extremities spontaneously.    Vital signs stable

## 2023-10-04 NOTE — PROGRESS NOTE ADULT - ASSESSMENT
82 yo m ho DM, COPD, anemia, paroxysmal afib on xarelto, HFrEF, DM coming in from nursing home for AMS x 2 days. Found to have toxic metabolic encephalopathy with PAULA.  PAULA/ toxic metabolic encephalopathy/ HAGMA/ HFrEF/ hypernatremia  possible ATN iso hypotension and possible sepsis/ vanc toxicity  no hydro on imaging  creat trend noted / s/p HD yesterday  check daily cr / HD twice a week for now-- may be able to discontinue HD if creat remains stable   non oliguric, started on bumex  phos level noted; decrease phoslo to 1 tab TID qac  BP noted;  cont midodrine only pre-HD   IR for TDC today; appreciate f/u  will follow    prognosis poor

## 2023-10-04 NOTE — PROVIDER CONTACT NOTE (CHANGE IN STATUS NOTIFICATION) - SITUATION
Pt assessed arriving back from pacu severely agitated, de-saturation on 2LNC, w/ hyperventilation + poor cap refill. RN contacted RRT

## 2023-10-04 NOTE — RAPID RESPONSE TEAM SUMMARY - NSSITUATIONBACKGROUNDRRT_GEN_ALL_CORE
Patient was sent PACU after placement of Tunneled dialysis catheter. When he arrived to 3E, he became agitated. Vital signs showed SpO2 86% on 2L NC ( which is his baseline), placed on non breather, improved to 100%. BP was 111/ 60.   ABG was done and was remarkable for lactate 6.1. X-ray: no significant change from prior.     Plan:   - Patient given haldol 0.5 mg  - Will repeat lactate around 6 pm   - Monitor patient 1:1 sit  Patient was sent PACU after placement of Tunneled dialysis catheter. When he arrived to 3E, he became very agitated. Vital signs showed SpO2 86% on 2L NC, placed on non breather, improved to 100%. BP was 111/ 60.   ABG was done and was remarkable for lactate 6.1. X-ray done: no significant change from prior.     Plan:   - Patient given haldol 0.5 mg  - Will repeat lactate around 6 pm   - Monitor patient 1:1 sit

## 2023-10-04 NOTE — CHART NOTE - NSCHARTNOTEFT_GEN_A_CORE
Brief Nutrition Note -- RD consulted for pressure ulcer    Last RD follow up documented on 10/3    Per Wound Care RN note on 9/26:   Wound, Type & Location: Left heel DTI with progression.   - No other wounds documented    Recommendations:  1. ADD NEPRO 3X/DAILY to optimize kcal/pro intake -- provides 1275 kcal/day total, 57.3g pro/day total -- on HD, poor appetite *** please add 2 packets of thickener per NEPRO carton  2. Continue with current diet order per SLP recs  3. Encourage PO intake and assist during meals prn    Diet order w/ supplements (additional protein to aid in wound healing) pending activation.     RD to remain available: Belem De La Torre [phone] or TEAMS

## 2023-10-04 NOTE — PROVIDER CONTACT NOTE (OTHER) - REASON
Abnormal EKG reading
fever 101.6
False Irregular heart rate on monitor
critical lab lactate 8.6, delayed bear hugger
low BP
Medication verification

## 2023-10-04 NOTE — PROVIDER CONTACT NOTE (OTHER) - SITUATION
Temp just reported by PCT at 2038. Patient asleep, given tylenol PO. MD informed and awaiting orders
Patient confused and pulls on leads and telemetry pushing several fa;lse readings. Alarm constantly goiing off. Informed Dr Rosado by text and no reply, attempted to call no answer.
MD to DC Levofed titrable order
BP 93/50. HR 79. Map 60. MD informed
Patient on telemetry monitoring, HR fluctuating from 38-120s. EKG completed by technician and sent to MD.
Reported lactate 8.8 and fact that bear hugger delayed because equipment not available

## 2023-10-04 NOTE — PROCEDURE NOTE - PROCEDURE FINDINGS AND DETAILS
Successful placement of a right internal jugular vein tunneled hemodialysis catheter placement. Catheter is heparinized and ready for immediate use.     Removal of previously placed non tunneled catheter.

## 2023-10-04 NOTE — PRE-OP CHECKLIST - PATIENT SENT TO
Pt resting on cot in position of comfort. Respirations regular. Airway intact. GCS 15. Pt holding conversation with this RN w/o difficulty.  0 s/s of distress. Awaiting further orders. Will continue to monitor.        Evelio Lindsay RN  06/20/22 2745 AMBULATORY SURG INTERVENTIONAL RADIOLOGY

## 2023-10-04 NOTE — PROGRESS NOTE ADULT - ASSESSMENT
·	Improving  Hypoxic Respiratory Failure, possibly upper airways with superimposed mild volume overload   ·	Hypernatremia, worsening , free water deficit; being followed up and patient encouraged to have more water intake  ·	Superfical phlebitis (UE), no evidence of DVT; no clinical symptoms or signs of complications on physical exam   ·	upper Gastrointestinal bleed due to duodenal ulcer, on therapy   ·	Anemia of acute blood loss , stable   ·	Acutely worsening uremia and acute kidney failure , at high risk for ATN  ·	Afib, chronic; uncontrolled rate   ·	Reported possible L3L4 OM on CT scan/ sacral DTI/ Foot cellulitis on broad-spectrum intravenous antibiotics as per ID recs  ·	Increased frailty and decreased physical capacity       PLAN  ·	Continue to encourage free water and overall oral intake   ·	May introduce Symbicort while inpatient and switch back to Breo upon DC  ·	Close F/U of BUN/Crea/ Lytes and UO   ·	Plan is to prepare for discharge after tunneled dialysis cath placed and nephrology Follow up at [facility]   ·	As per my discussion with Nephrology team, it is likely that patient will eventually need hemodialysis routinely; at this time, it continues to be on PRN basis   ·	"prepare for discharge" notice provided and order entered.     At high risk for worsening cardiac and pulmonary status due to the severity of current illness and the underlying comorbidities.

## 2023-10-04 NOTE — PROGRESS NOTE ADULT - SUBJECTIVE AND OBJECTIVE BOX
NIMO SARMIENTO 83y Male  MRN#: [id number]   Hospital Day: 19d    HPI:  82 yo m ho DM, COPD, anemia, lymphedemia, afib on xarelto, HFrEF, DM coming in from nursing home for AMS x 2 days. Unable to gather story from pt as he is altered and lethargic.  As per NH was becoming more agitated x 2 days, was hypotensive yesterday and started on cefepime and vancomycin. Brought in to the ED for AMS. PICC line in place for cefepime 1q q8 until 10/5/23 and vanc 1q24 until 9/23/23 for LLE cellulitis/OM  (was at [facility] last month for LLE thick wound cellulitis/OM and sacral DTI as per call with [facility] as per collateral from NH DEISY Barry)      In the ED, vitals wnl. Labs showing wbc 11.30, Hb 9.6, MCV 79.1, INR 1.59, CO2 14, AG 21, Cr 6 (~baseline 1.2), , trops 0.09. CTH wnl, RBUS with no hydro, poor visualization of left kidney       (15 Sep 2023 22:12)      SUBJECTIVE  Patient is a 83y old Male who presents with a chief complaint of AMS (04 Oct 2023 10:40)  Currently admitted to medicine with the primary diagnosis of Altered mental status      INTERVAL HPI AND OVERNIGHT EVENTS:  Patient was examined and seen at bedside. This morning he is resting comfortably in bed and reports no issues or overnight events.      VITAL SIGNS: Last 24 Hours  T(C): 36.7 (04 Oct 2023 05:24), Max: 37 (03 Oct 2023 11:05)  T(F): 98 (04 Oct 2023 05:24), Max: 98.6 (03 Oct 2023 11:05)  HR: 70 (04 Oct 2023 05:24) (54 - 70)  BP: 123/67 (04 Oct 2023 05:24) (94/52 - 123/67)  BP(mean): 88 (04 Oct 2023 05:24) (75 - 88)  RR: 18 (04 Oct 2023 05:24) (18 - 18)  SpO2: 99% (03 Oct 2023 14:49) (99% - 99%)    LABS:                        9.3    7.50  )-----------( 173      ( 04 Oct 2023 08:10 )             30.3     10-04    148<H>  |  109  |  38<H>  ----------------------------<  96  3.4<L>   |  29  |  2.3<H>    Ca    8.5      04 Oct 2023 08:10  Phos  2.9     10-04  Mg     1.8     10-04    TPro  6.1  /  Alb  3.2<L>  /  TBili  0.6  /  DBili  x   /  AST  17  /  ALT  8   /  AlkPhos  73  10-04    PT/INR - ( 04 Oct 2023 08:10 )   PT: 13.00 sec;   INR: 1.14 ratio         PTT - ( 04 Oct 2023 08:10 )  PTT:29.6 sec  Urinalysis Basic - ( 04 Oct 2023 08:10 )    Color: x / Appearance: x / SG: x / pH: x  Gluc: 96 mg/dL / Ketone: x  / Bili: x / Urobili: x   Blood: x / Protein: x / Nitrite: x   Leuk Esterase: x / RBC: x / WBC x   Sq Epi: x / Non Sq Epi: x / Bacteria: x    PHYSICAL EXAM:    GENERAL: NAD, lying in bed comfortably  CHEST/LUNG: Clear to auscultation bilaterally; No rales, rhonchi, wheezing, or rubs. Unlabored respirations  HEART: Regular rate and rhythm; No murmurs, rubs, or gallops  ABDOMEN: Bowel sounds present; Soft, Nontender, Nondistended. No hepatomegally  EXTREMITIES:  +2 edema   NERVOUS SYSTEM:  Alert & Oriented X2, speech clear. No deficits   SKIN: Large ecchymosis on left arm

## 2023-10-04 NOTE — PROGRESS NOTE ADULT - SUBJECTIVE AND OBJECTIVE BOX
Nephrology Progress Note    NIMO SARMIENTO  MRN-[id number]  83y  Male    S:  Patient is seen and examined, events over the last 24h noted.    O:  Allergies:  No Known Allergies    Hospital Medications:   MEDICATIONS  (STANDING):  budesonide 160 MICROgram(s)/formoterol 4.5 MICROgram(s) Inhaler 2 Puff(s) Inhalation two times a day  buMETAnide 1 milliGRAM(s) Oral every 12 hours  calcium acetate 1334 milliGRAM(s) Oral three times a day with meals  heparin   Injectable 5000 Unit(s) SubCutaneous every 8 hours  insulin lispro (ADMELOG) corrective regimen sliding scale   SubCutaneous three times a day before meals  metoprolol tartrate 12.5 milliGRAM(s) Oral every 12 hours  midodrine. 10 milliGRAM(s) Oral three times a day  pantoprazole   Suspension 40 milliGRAM(s) Oral two times a day    MEDICATIONS  (PRN):  acetaminophen     Tablet .. 650 milliGRAM(s) Oral every 6 hours PRN Moderate Pain (4 - 6)  albuterol    90 MICROgram(s) HFA Inhaler 1 Puff(s) Inhalation every 6 hours PRN for shortness of breath and/or wheezing  dextrose Oral Gel 15 Gram(s) Oral once PRN Blood Glucose LESS THAN 70 milliGRAM(s)/deciliter    Home Medications:  acetaminophen 325 mg oral tablet: 2 tab(s) orally every 6 hours, As needed, Mild Pain (1 - 3), Moderate Pain (4 - 6) (25 May 2019 09:25)  Albuterol (Eqv-ProAir HFA) 90 mcg/inh inhalation aerosol: 1 puff(s) inhaled every 6 hours as needed for  shortness of breath and/or wheezing (15 Sep 2023 22:56)  Breo Ellipta 100 mcg-25 mcg/inh inhalation powder: 1 puff(s) inhaled once a day (15 Sep 2023 22:56)  empagliflozin 10 mg oral tablet: 1 tab(s) orally once a day (15 Sep 2023 22:56)  Entresto 24 mg-26 mg oral tablet: 1 tab(s) orally 2 times a day (15 Sep 2023 22:57)  Lasix 20 mg oral tablet: 1 tab(s) orally once a day (15 Sep 2023 22:57)  Metoprolol Succinate ER 25 mg oral tablet, extended release: 1 tab(s) orally once a day (15 Sep 2023 22:57)  Percocet 5 mg-325 mg oral tablet: 1 tab(s) orally every 8 hours as needed for  severe pain (15 Sep 2023 22:57)  Xarelto 20 mg oral tablet: 1 tab(s) orally once a day (15 Sep 2023 22:57)      VITALS:  Daily Height in cm: 193.04 (04 Oct 2023 11:58)    T(F): 97 (10-04-23 @ 11:45), Max: 98.3 (10-03-23 @ 20:19)  HR: 76 (10-04-23 @ 11:58)  BP: 123/67 (10-04-23 @ 11:58)  RR: 18 (10-04-23 @ 11:58)  SpO2: 100% (10-04-23 @ 11:58)  Wt(kg): --  I&O's Detail    03 Oct 2023 07:01  -  04 Oct 2023 07:00  --------------------------------------------------------  IN:  Total IN: 0 mL    OUT:    Incontinent per Condom Catheter (mL): 300 mL    Other (mL): 300 mL    Voided (mL): 400 mL  Total OUT: 1000 mL    Total NET: -1000 mL        I&O's Summary    03 Oct 2023 07:01  -  04 Oct 2023 07:00  --------------------------------------------------------  IN: 0 mL / OUT: 1000 mL / NET: -1000 mL      Height (cm): 193 (10-04 @ 11:58)    PHYSICAL EXAM:  Gen: NAD  Chest: b/l breath sounds  Abd: soft  Vascular access: udall      LABS:    10-04    144  |  106  |  39<H>  ----------------------------<  88  3.8   |  23  |  2.3<H>    Ca    8.4      04 Oct 2023 11:06  Phos  2.9     10-04  Mg     1.8     10-04    TPro  5.9<L>  /  Alb  3.1<L>  /  TBili  0.5  /  DBili      /  AST  18  /  ALT  7   /  AlkPhos  69  10-04    eGFR: 28 mL/min/1.73m2 (10-04-23 @ 11:06)  eGFR: 28 mL/min/1.73m2 (10-04-23 @ 08:10)    Phosphorus: 2.9 mg/dL (10-04-23 @ 06:12)  Phosphorus: 5.8 mg/dL (09-28-23 @ 04:39)    Intact PTH: 71 pg/mL (03-14-19 @ 06:39)  Osmolality, Serum: 296 mos/kg (03-14-19 @ 06:39)                          9.1    7.05  )-----------( 160      ( 04 Oct 2023 11:06 )             30.1     Mean Cell Volume: 90.7 fL (10-04-23 @ 11:06)        Creatinine trend:  Creatinine: 2.3 mg/dL (10-04-23 @ 11:06)  Creatinine: 2.3 mg/dL (10-04-23 @ 08:10)  Creatinine: 2.4 mg/dL (10-04-23 @ 06:12)  Creatinine: 2.6 mg/dL (10-03-23 @ 07:25)  Creatinine: 2.8 mg/dL (10-02-23 @ 06:09)  Creatinine: 2.8 mg/dL (10-01-23 @ 06:51)  Creatinine: 4.0 mg/dL (09-30-23 @ 08:14)  Creatinine: 4.4 mg/dL (09-29-23 @ 17:59)

## 2023-10-04 NOTE — RAPID RESPONSE TEAM SUMMARY - NSADDTLFINDINGSRRT_GEN_ALL_CORE
ABG: Acceptable O2 levels and PH. PCO2 in favor of hyperventilation. Lactic acid>6   EKG: artefactual (motion)

## 2023-10-04 NOTE — PROCEDURE NOTE - NSINFORMCONSENT_GEN_A_CORE
Benefits, risks, and possible complications of procedure explained to patient/caregiver who verbalized understanding and gave verbal consent.
Gabe (brother)/Benefits, risks, and possible complications of procedure explained to patient/caregiver who verbalized understanding and gave verbal consent.
Gabe/Benefits, risks, and possible complications of procedure explained to patient/caregiver who verbalized understanding and gave verbal consent.

## 2023-10-04 NOTE — PROVIDER CONTACT NOTE (OTHER) - BACKGROUND
Repeat lactate, fever
Transfer from ICU at 630am. EKG done on admission and same readings noted.
admitted for AMS. PMH: DM, afib, HF.
AOX1, confused. Post RRT.
ICU med not discontinued on transfer
Transferred from ICU. Was on Levofed.

## 2023-10-04 NOTE — PROGRESS NOTE ADULT - ASSESSMENT
Patient is an 82 yo M w/PMH of DM, COPD, anemia, lymphedema, afib on xarelto, HFrEF, and DM who presented for 2 days of AMS from the NH, with an episode of hypotension in the NH and started on cefepime and vanc for LLE cellulitis/OM, then brought into the ED for AMS, w/ED vitals stable, labs notable for wbc 11.30, Hb 9.6, Bicarb 14, AG 21, Cr 6 (~baseline 1.2), , and trop 0.09, CTH unremarkable, and renal bladder US without evidence of hydronephrosis, then admitted for work up and management of AMS.    #Anemia of acute blood loss   #Hemorrhagic Shock Secondary to Hematochezia from Duodenal Ulcer Bleed s/p OVESCO placement   - Trend hgb, transfuse blood PRN  - continue with holding therapeutic AC  - s/p IV Protamine sulfate 36mg x1 dose on   - GI f/u appreciated  - S/p IV Protonix 40mg BID -> Ok to switch to PO BID as per GI   - Avoid any NSAIDs  - Will need to discuss the risks and benefits of long term AC with patient or next of Kin in light of life threating GIB    #Acutely worsening uremia and acute kidney failure most likely 2/2 ATN - now on HD through right IJ [place]  #Metabolic Encephalopathy  #hypernatremia   PAULA / CKD stable non oliguric  - udall placement 23  started on HD  but did not tolerate it with access problems  - udall changed   he received HD  and   - Close F/U of BUN/Crea/ Lytes and UO  - c/w phoslo 2/2/2   - off sodium bicarbonate   - s/p 250 cc LR bolus for hypernatremia  - s/p D5w @ 80 cc/hr  - f/u w/nephro  - 1:1 for safety and for udall observation   - Creatinine improvin -> 4 -> 2.8 -> 2.6   - Now on BUMEX 1 mg BID   - Plan for tunneled cath placement today with IR     #Acute Hypoxic Respiratory Failure, possible aspiration pna / volume overload  /R pleural effusions   *CXR () Bilateral pleural effusion/opacity unchanged. No air leak.  - On 2L NC  - C/w albuterol PRN    #Hypotension  - on metoprolol tartrate 12.5 mg q12h  - midodrine 5 q 8 -> Increase to 10 mg Q8     #Afib, chronic; uncontrolled rate  - HOLDING therapeutic AC  - c/w metoprolol tartrate 12.5 mg q12h    #History of Left Foot OM w/ surrounding Cellulitis  *CT LLE (): No CT evidence of osteomyelitis or necrotizing infection. No drainable collection seen, within the limitations of a noncontrast exam.  - Increased frailty and decreased physical capacity  - as per previous notes the team Discussed with ID attending: patient is unlikely to benefit from prolonged therapy with cefepime+vanco (to cover possible OM) due to adverse outcomes associated kidney dysfunction, cognitive impact ...etc )  - c/w Local wound care; offloading boots bilaterally, frequently turning and positioning, prevent pressure injury, keep skin clean, and monitor for wound changes and notify provider as per podiatry    #MISC  - DVT ppx SCD,   heparin sq   - GI PPX: PPI po BID   - GOC :  Full code -  As per palliative care team: next-of-kin not in favor of further discussing Goals of Care Conversation or advance directives.    Pending; labs, mental status improving / nephrology / CBC

## 2023-10-04 NOTE — PROVIDER CONTACT NOTE (CHANGE IN STATUS NOTIFICATION) - ASSESSMENT
Pt assessed arriving back from [facility] severely agitated, de-saturation on 2LNC, w/ hyperventilation + poor cap refill. RN contacted RRT. Placed pt on NRB w/ continuous O2 monitoring.

## 2023-10-04 NOTE — PROVIDER CONTACT NOTE (OTHER) - DATE AND TIME:
26-Sep-2023 06:35
30-Sep-2023 10:45
04-Oct-2023 22:05
25-Sep-2023 22:46
04-Oct-2023 20:59
25-Sep-2023 21:01

## 2023-10-04 NOTE — PROGRESS NOTE ADULT - SUBJECTIVE AND OBJECTIVE BOX
GUILLERMINANIMO MARTIN  83y, Male  Allergy: No Known Allergies    Hospital Day: 19d    Patient seen and examined earlier today. NO events reported.     PMH/PSH:    HTN (hypertension)  COPD (chronic obstructive pulmonary disease)  High cholesterol  Mild anemia  Coffee ground emesis  Chronic diastolic heart failure  PAULA (acute kidney injury) currently on renal replacement therapy     LAST 24-Hr EVENTS:  no events reported   NPO for TDC placement     VITALS:  T(F): 98 (10-04-23 @ 05:24), Max: 98.6 (10-03-23 @ 11:05)  HR: 70 (10-04-23 @ 05:24)  BP: 123/67 (10-04-23 @ 05:24) (94/52 - 123/67)  RR: 18 (10-04-23 @ 05:24)  SpO2: 99% (10-03-23 @ 14:49)          TESTS & MEASUREMENTS:  Weight/Height/BMI  Height (cm): 193 (09-25-23 @ 15:14)    10-02-23 @ 07:01  -  10-03-23 @ 07:00  --------------------------------------------------------  IN: 0 mL / OUT: 400 mL / NET: -400 mL    10-03-23 @ 07:01  -  10-04-23 @ 07:00  --------------------------------------------------------  IN: 0 mL / OUT: 1000 mL / NET: -1000 mL                            9.3    7.50  )-----------( 173      ( 04 Oct 2023 08:10 )             30.3   Hemoglobin Trend:  Hemoglobin: 9.3 g/dL (10-04 @ 08:10)  Hemoglobin: 8.8 g/dL (10-04 @ 06:12)  Hemoglobin: 8.9 g/dL (10-03 @ 07:25)    PT/INR - ( 04 Oct 2023 08:10 )   PT: 13.00 sec;   INR: 1.14 ratio         PTT - ( 04 Oct 2023 08:10 )  PTT:29.6 sec  INR: 1.14 ratio (10-04-23 @ 08:10)    10-04    148<H>  |  109  |  38<H>  ----------------------------<  96  3.4<L>   |  29  |  2.3<H>  Creatinine Trend:  2.3 (10-04 @ 08:10)    2.4 (10-04 @ 06:12)    2.6 (10-03 @ 07:25)    2.8 (10-02 @ 06:09)    2.8 (10-01 @ 06:51)    4.0 (09-30 @ 08:14)    4.4 (09-29 @ 17:59)      Ca    8.5      04 Oct 2023 08:10  Phos  2.9     10-04  Mg     1.8     10-04    TPro  6.1  /  Alb  3.2<L>  /  TBili  0.6  /  DBili  x   /  AST  17  /  ALT  8   /  AlkPhos  73  10-04    LIVER FUNCTIONS - ( 04 Oct 2023 08:10 )  Alb: 3.2 g/dL / Pro: 6.1 g/dL / ALK PHOS: 73 U/L / ALT: 8 U/L / AST: 17 U/L / GGT: x                 Urinalysis Basic - ( 04 Oct 2023 08:10 )    Color: x / Appearance: x / SG: x / pH: x  Gluc: 96 mg/dL / Ketone: x  / Bili: x / Urobili: x   Blood: x / Protein: x / Nitrite: x   Leuk Esterase: x / RBC: x / WBC x   Sq Epi: x / Non Sq Epi: x / Bacteria: x      A1C with Estimated Average Glucose Result: 6.4 % (09-16-23 @ 07:18)      RADIOLOGY, ECG, & ADDITIONAL TESTS:  < from: [facility] Duplex Upper Ext Vein Scan, Left (09.29.23 @ 16:50) >  Superficial thrombophlebitis of the left cephalic vein. No evidence of   deep venousthrombosis in the left upper extremity.    < from: VA Duplex Lower Ext Vein Scan, Bilat (09.28.23 @ 20:26) >  No evidence of deep venous thrombosis or superficial thrombophlebitis in   the bilateral lower extremities.      MEDICATIONS:  MEDICATIONS  (STANDING):  buMETAnide 1 milliGRAM(s) Oral every 12 hours  calcium acetate 1334 milliGRAM(s) Oral three times a day with meals  chlorhexidine 2% Cloths 1 Application(s) Topical <User Schedule>  chlorhexidine 2% Cloths 1 Application(s) Topical every 12 hours  dextrose 5%. 1000 milliLiter(s) (100 mL/Hr) IV Continuous <Continuous>  dextrose 50% Injectable 12.5 Gram(s) IV Push once  dextrose 50% Injectable 25 Gram(s) IV Push once  dextrose 50% Injectable 25 Gram(s) IV Push once  glucagon  Injectable 1 milliGRAM(s) IntraMuscular once  heparin   Injectable 5000 Unit(s) SubCutaneous every 8 hours  insulin lispro (ADMELOG) corrective regimen sliding scale   SubCutaneous three times a day before meals  metoprolol tartrate 12.5 milliGRAM(s) Oral every 12 hours  midodrine. 10 milliGRAM(s) Oral three times a day  pantoprazole   Suspension 40 milliGRAM(s) Oral two times a day    MEDICATIONS  (PRN):  acetaminophen     Tablet .. 650 milliGRAM(s) Oral every 6 hours PRN Moderate Pain (4 - 6)  albuterol    90 MICROgram(s) HFA Inhaler 1 Puff(s) Inhalation every 6 hours PRN for shortness of breath and/or wheezing  dextrose Oral Gel 15 Gram(s) Oral once PRN Blood Glucose LESS THAN 70 milliGRAM(s)/deciliter      HOME MEDICATIONS (as per chart review):  acetaminophen 325 mg oral tablet (05-25)  Albuterol (Eqv-ProAir HFA) 90 mcg/inh inhalation aerosol (09-15)  Breo Ellipta 100 mcg-25 mcg/inh inhalation powder (09-15)  empagliflozin 10 mg oral tablet (09-15)  Entresto 24 mg-26 mg oral tablet (09-15)  Lasix 20 mg oral tablet (09-15)  Metoprolol Succinate ER 25 mg oral tablet, extended release (09-15)  Percocet 5 mg-325 mg oral tablet (09-15)  Xarelto 20 mg oral tablet (09-15)      PHYSICAL EXAM:  GENERAL: in NAD/P, pleasant  CHEST/LUNG: Good air entry bilaterally (ant ausc)  HEART: S1S2  ABDOMEN: BS(+) / soft   EXTREMITIES:  chronic skin changes

## 2023-10-05 LAB
ALBUMIN SERPL ELPH-MCNC: 2.6 G/DL — LOW (ref 3.5–5.2)
ALP SERPL-CCNC: 74 U/L — SIGNIFICANT CHANGE UP (ref 30–115)
ALT FLD-CCNC: 333 U/L — HIGH (ref 0–41)
ANION GAP SERPL CALC-SCNC: 14 MMOL/L — SIGNIFICANT CHANGE UP (ref 7–14)
ANION GAP SERPL CALC-SCNC: 16 MMOL/L — HIGH (ref 7–14)
AST SERPL-CCNC: 1018 U/L — HIGH (ref 0–41)
BASE EXCESS BLDA CALC-SCNC: 0.3 MMOL/L — SIGNIFICANT CHANGE UP (ref -2–3)
BILIRUB SERPL-MCNC: 0.7 MG/DL — SIGNIFICANT CHANGE UP (ref 0.2–1.2)
BUN SERPL-MCNC: 48 MG/DL — HIGH (ref 10–20)
BUN SERPL-MCNC: 49 MG/DL — HIGH (ref 10–20)
CALCIUM SERPL-MCNC: 8.3 MG/DL — LOW (ref 8.4–10.4)
CALCIUM SERPL-MCNC: 8.5 MG/DL — SIGNIFICANT CHANGE UP (ref 8.4–10.4)
CHLORIDE SERPL-SCNC: 106 MMOL/L — SIGNIFICANT CHANGE UP (ref 98–110)
CHLORIDE SERPL-SCNC: 108 MMOL/L — SIGNIFICANT CHANGE UP (ref 98–110)
CO2 SERPL-SCNC: 21 MMOL/L — SIGNIFICANT CHANGE UP (ref 17–32)
CO2 SERPL-SCNC: 22 MMOL/L — SIGNIFICANT CHANGE UP (ref 17–32)
CREAT SERPL-MCNC: 3.9 MG/DL — HIGH (ref 0.7–1.5)
CREAT SERPL-MCNC: 4 MG/DL — HIGH (ref 0.7–1.5)
D DIMER BLD IA.RAPID-MCNC: 3436 NG/ML DDU — HIGH
EGFR: 14 ML/MIN/1.73M2 — LOW
EGFR: 15 ML/MIN/1.73M2 — LOW
GLUCOSE BLDC GLUCOMTR-MCNC: 112 MG/DL — HIGH (ref 70–99)
GLUCOSE BLDC GLUCOMTR-MCNC: 84 MG/DL — SIGNIFICANT CHANGE UP (ref 70–99)
GLUCOSE BLDC GLUCOMTR-MCNC: 85 MG/DL — SIGNIFICANT CHANGE UP (ref 70–99)
GLUCOSE SERPL-MCNC: 105 MG/DL — HIGH (ref 70–99)
GLUCOSE SERPL-MCNC: 124 MG/DL — HIGH (ref 70–99)
HCO3 BLDA-SCNC: 24 MMOL/L — SIGNIFICANT CHANGE UP (ref 21–28)
HCT VFR BLD CALC: 26.5 % — LOW (ref 42–52)
HCT VFR BLD CALC: 27.3 % — LOW (ref 42–52)
HGB BLD-MCNC: 8.3 G/DL — LOW (ref 14–18)
HGB BLD-MCNC: 8.4 G/DL — LOW (ref 14–18)
HOROWITZ INDEX BLDA+IHG-RTO: 36 — SIGNIFICANT CHANGE UP
LACTATE SERPL-SCNC: 3.3 MMOL/L — HIGH (ref 0.7–2)
LACTATE SERPL-SCNC: 8.4 MMOL/L — CRITICAL HIGH (ref 0.7–2)
MAGNESIUM SERPL-MCNC: 1.8 MG/DL — SIGNIFICANT CHANGE UP (ref 1.8–2.4)
MCHC RBC-ENTMCNC: 27.5 PG — SIGNIFICANT CHANGE UP (ref 27–31)
MCHC RBC-ENTMCNC: 27.8 PG — SIGNIFICANT CHANGE UP (ref 27–31)
MCHC RBC-ENTMCNC: 30.8 G/DL — LOW (ref 32–37)
MCHC RBC-ENTMCNC: 31.3 G/DL — LOW (ref 32–37)
MCV RBC AUTO: 88.6 FL — SIGNIFICANT CHANGE UP (ref 80–94)
MCV RBC AUTO: 89.2 FL — SIGNIFICANT CHANGE UP (ref 80–94)
NRBC # BLD: 0 /100 WBCS — SIGNIFICANT CHANGE UP (ref 0–0)
NRBC # BLD: 0 /100 WBCS — SIGNIFICANT CHANGE UP (ref 0–0)
PCO2 BLDA: 33 MMHG — LOW (ref 35–48)
PH BLDA: 7.46 — HIGH (ref 7.35–7.45)
PHOSPHATE SERPL-MCNC: 4.1 MG/DL — SIGNIFICANT CHANGE UP (ref 2.1–4.9)
PHOSPHATE SERPL-MCNC: 4.4 MG/DL — SIGNIFICANT CHANGE UP (ref 2.1–4.9)
PLATELET # BLD AUTO: 167 K/UL — SIGNIFICANT CHANGE UP (ref 130–400)
PLATELET # BLD AUTO: 181 K/UL — SIGNIFICANT CHANGE UP (ref 130–400)
PMV BLD: 11.5 FL — HIGH (ref 7.4–10.4)
PMV BLD: 12.4 FL — HIGH (ref 7.4–10.4)
PO2 BLDA: 110 MMHG — HIGH (ref 83–108)
POTASSIUM SERPL-MCNC: 3.8 MMOL/L — SIGNIFICANT CHANGE UP (ref 3.5–5)
POTASSIUM SERPL-MCNC: 3.8 MMOL/L — SIGNIFICANT CHANGE UP (ref 3.5–5)
POTASSIUM SERPL-SCNC: 3.8 MMOL/L — SIGNIFICANT CHANGE UP (ref 3.5–5)
POTASSIUM SERPL-SCNC: 3.8 MMOL/L — SIGNIFICANT CHANGE UP (ref 3.5–5)
PROT SERPL-MCNC: 5.7 G/DL — LOW (ref 6–8)
RBC # BLD: 2.99 M/UL — LOW (ref 4.7–6.1)
RBC # BLD: 3.06 M/UL — LOW (ref 4.7–6.1)
RBC # FLD: 21.4 % — HIGH (ref 11.5–14.5)
RBC # FLD: 21.7 % — HIGH (ref 11.5–14.5)
SAO2 % BLDA: 99.5 % — HIGH (ref 94–98)
SODIUM SERPL-SCNC: 143 MMOL/L — SIGNIFICANT CHANGE UP (ref 135–146)
SODIUM SERPL-SCNC: 144 MMOL/L — SIGNIFICANT CHANGE UP (ref 135–146)
TROPONIN T SERPL-MCNC: 0.2 NG/ML — CRITICAL HIGH
WBC # BLD: 11.96 K/UL — HIGH (ref 4.8–10.8)
WBC # BLD: 14.17 K/UL — HIGH (ref 4.8–10.8)
WBC # FLD AUTO: 11.96 K/UL — HIGH (ref 4.8–10.8)
WBC # FLD AUTO: 14.17 K/UL — HIGH (ref 4.8–10.8)

## 2023-10-05 PROCEDURE — 99233 SBSQ HOSP IP/OBS HIGH 50: CPT

## 2023-10-05 PROCEDURE — 93970 EXTREMITY STUDY: CPT | Mod: 26

## 2023-10-05 PROCEDURE — 99291 CRITICAL CARE FIRST HOUR: CPT

## 2023-10-05 RX ORDER — NOREPINEPHRINE BITARTRATE/D5W 8 MG/250ML
0.05 PLASTIC BAG, INJECTION (ML) INTRAVENOUS
Qty: 8 | Refills: 0 | Status: DISCONTINUED | OUTPATIENT
Start: 2023-10-05 | End: 2023-10-07

## 2023-10-05 RX ORDER — VANCOMYCIN HCL 1 G
1750 VIAL (EA) INTRAVENOUS ONCE
Refills: 0 | Status: COMPLETED | OUTPATIENT
Start: 2023-10-05 | End: 2023-10-05

## 2023-10-05 RX ORDER — CEFEPIME 1 G/1
1000 INJECTION, POWDER, FOR SOLUTION INTRAMUSCULAR; INTRAVENOUS EVERY 24 HOURS
Refills: 0 | Status: DISCONTINUED | OUTPATIENT
Start: 2023-10-06 | End: 2023-10-07

## 2023-10-05 RX ORDER — CALCIUM ACETATE 667 MG
667 TABLET ORAL
Refills: 0 | Status: DISCONTINUED | OUTPATIENT
Start: 2023-10-05 | End: 2023-10-13

## 2023-10-05 RX ORDER — MIDODRINE HYDROCHLORIDE 2.5 MG/1
10 TABLET ORAL THREE TIMES A DAY
Refills: 0 | Status: DISCONTINUED | OUTPATIENT
Start: 2023-10-05 | End: 2023-10-08

## 2023-10-05 RX ADMIN — HEPARIN SODIUM 5000 UNIT(S): 5000 INJECTION INTRAVENOUS; SUBCUTANEOUS at 05:03

## 2023-10-05 RX ADMIN — MIDODRINE HYDROCHLORIDE 10 MILLIGRAM(S): 2.5 TABLET ORAL at 05:03

## 2023-10-05 RX ADMIN — MIDODRINE HYDROCHLORIDE 10 MILLIGRAM(S): 2.5 TABLET ORAL at 17:12

## 2023-10-05 RX ADMIN — CHLORHEXIDINE GLUCONATE 1 APPLICATION(S): 213 SOLUTION TOPICAL at 05:15

## 2023-10-05 RX ADMIN — CHLORHEXIDINE GLUCONATE 1 APPLICATION(S): 213 SOLUTION TOPICAL at 10:00

## 2023-10-05 RX ADMIN — BUMETANIDE 1 MILLIGRAM(S): 0.25 INJECTION INTRAMUSCULAR; INTRAVENOUS at 05:02

## 2023-10-05 RX ADMIN — Medication 1334 MILLIGRAM(S): at 08:40

## 2023-10-05 RX ADMIN — BUMETANIDE 1 MILLIGRAM(S): 0.25 INJECTION INTRAMUSCULAR; INTRAVENOUS at 17:12

## 2023-10-05 RX ADMIN — CEFEPIME 100 MILLIGRAM(S): 1 INJECTION, POWDER, FOR SOLUTION INTRAMUSCULAR; INTRAVENOUS at 14:29

## 2023-10-05 RX ADMIN — CEFEPIME 100 MILLIGRAM(S): 1 INJECTION, POWDER, FOR SOLUTION INTRAMUSCULAR; INTRAVENOUS at 05:04

## 2023-10-05 RX ADMIN — HEPARIN SODIUM 5000 UNIT(S): 5000 INJECTION INTRAVENOUS; SUBCUTANEOUS at 21:12

## 2023-10-05 RX ADMIN — HEPARIN SODIUM 5000 UNIT(S): 5000 INJECTION INTRAVENOUS; SUBCUTANEOUS at 14:29

## 2023-10-05 RX ADMIN — PANTOPRAZOLE SODIUM 40 MILLIGRAM(S): 20 TABLET, DELAYED RELEASE ORAL at 17:12

## 2023-10-05 RX ADMIN — Medication 12.5 MILLIGRAM(S): at 05:03

## 2023-10-05 RX ADMIN — Medication 12.5 MILLIGRAM(S): at 17:11

## 2023-10-05 RX ADMIN — PANTOPRAZOLE SODIUM 40 MILLIGRAM(S): 20 TABLET, DELAYED RELEASE ORAL at 05:03

## 2023-10-05 RX ADMIN — Medication 667 MILLIGRAM(S): at 17:11

## 2023-10-05 RX ADMIN — MIDODRINE HYDROCHLORIDE 10 MILLIGRAM(S): 2.5 TABLET ORAL at 11:34

## 2023-10-05 RX ADMIN — Medication 250 MILLIGRAM(S): at 20:36

## 2023-10-05 NOTE — PROGRESS NOTE ADULT - ASSESSMENT
Impression  Septic shock  Recent TDC   Acute hypoxemic respiratory failure on NC  Right pleural effusion  HO aspiration pneumonitis SP ABX   UGIB secondary to Duodenal ulcer SP EGD   Hematochezia resolved   Hemorrhagic Shock resolved   Metabolic Encephalopathy   PAULA / CKD on HD  History of Left Foot OM w/ surrounding Cellulitis   Sacral DTI    Plan:    CNS: Monitor mental status.  Avoid depressants      HEENT:  Oral care.  Aspiration precautions    CARDIOVASCULAR:  Goal directed fluid resuscitation.  Avoid overload.  Wean Levophed.      PULMONARY: Chest xray noted. Aggressive pulmonary toilet, including chest PT.  Albuterol PRN.  Frequent suctioning.  Incentive spirometry  keep Sao2 92 to 96%.  Right chest US     GASTROINTESTINAL : feeding per Speech and swallow.  Protonix q 12     GENITOURINARY/RENAL: Nephro following; HD per renal.  Monitor lytes and correct as needed     INFECTIOUS : Continue Cefepime and Vanc for now.  Repeat cultures.  Procal.      HEMATOLOGIC: DVT prophylaxis.  Dimer noted.  Repeat LE duplex.  Risk / benefit ratio remains againt AC for now.  Target Hb > 7.  Trend CBC     ENDOCRINE: Follow up FS.  Insulin protocol as needed.  BG goal 140-180    MSK/DERM:  PT/OT when cooperative.  Off loading.  Skin care      Palliative care follow up     Poor prognosis overall

## 2023-10-05 NOTE — PHARMACOTHERAPY INTERVENTION NOTE - COMMENTS
Recommended re-ordering vancomycin for this evening in the setting of the one-time order of vancomycin ordered 10/4 never being administered. Recommended providing a one-time dose of vancomycin 1750mg (~15-20mg/kg) IV once in the setting of iHD with a schedule that has not yet been set. Further dosing will be recommended utilizing collected pre-iHD levels and whether the patient continues to receive iHD.     Dali TejadaD  Clinical Pharmacy Specialist, Infectious Diseases  Tele-Antimicrobial Stewardship Program (Tele-ASP)  Tele-ASP Phone: [phone]

## 2023-10-05 NOTE — SWALLOW BEDSIDE ASSESSMENT ADULT - SLP GENERAL OBSERVATIONS
pt received in bed awake alert w/ c/o L knee pain. RN aware. +some confusion +2L NC +1:1 sit at bedside

## 2023-10-05 NOTE — PHARMACOTHERAPY INTERVENTION NOTE - COMMENTS
As per policy, ordered a pre-iHD vancomycin random level for tomorrow morning 10/6 AM to assist with further vancomycin dosing optimization.    Dali TejadaD  Clinical Pharmacy Specialist, Infectious Diseases  Tele-Antimicrobial Stewardship Program (Tele-ASP)  Tele-ASP Phone: [phone]

## 2023-10-05 NOTE — PROGRESS NOTE ADULT - SUBJECTIVE AND OBJECTIVE BOX
HPI:  82 yo m ho DM, COPD, anemia, lymphedemia, afib on xarelto, HFrEF, DM coming in from nursing home for AMS x 2 days. Unable to gather story from pt as he is altered and lethargic.  As per NH was becoming more agitated x 2 days, was hypotensive yesterday and started on cefepime and vancomycin. Brought in to the ED for AMS. PICC line in place for cefepime 1q q8 until 10/5/23 and vanc 1q24 until 23 for LLE cellulitis/OM  (was at [facility] last month for LLE thick wound cellulitis/OM and sacral DTI as per call with [facility] as per collateral from NH RN Asha)  In the ED, vitals wnl. Labs showing wbc 11.30, Hb 9.6, MCV 79.1, INR 1.59, CO2 14, AG 21, Cr 6 (~baseline 1.2), , trops 0.09. CTH wnl, RBUS with no hydro, poor visualization of left kidney    Hospital course:  When pt arrived to ICU, he was in shock, hypotensive, with active melena. BP in 60's. GI consulted urgently for endoscopy. Procedure was done at bedside and bleeding controlled. Pt. was given 2 U of blood, 1U FFP in ICU. HgB has maintained above 8 and pt has not signs of melena. Pt was initially in 3E the patient was Hypoxic (as low as 70s). Evaluated the patient at bedside. AAOx 1. On auscultation, bilateral crackles present. Placed the patient on venturi mask and then on NRB. The Spo2 improved to 99%. Stat vitals repeated (vitals Spo2 99%, /70s, HR 95). Ordered CXR stat, ABG stat, Bumex 2mg IV x 1 stat. Labs and charts reviewed. Stat Labs drawn. Upgraded to SDU on . In the SDU, patient respiratory status was much improved on NC. Nephrology recommended dialysis for patient's PAULA. On , a Richton was placed and patient received hemo that day. However, he became hypotensive 30 mins into dialysis and was stopped. The following day, the Richton was replaced with a longer one and patient received HD again with no complications. On the evening of , patient hgb was 6.9 and he received 1 unit pRBC. There was also an episode of melena and GI was notified. Patient remained hemodynamically stable and GI recommended to just monitor. Heparin was held, but has since been restarted for DVT ppx. On , he was started on D5 @80cc for 24 hours to address free water deficit. Starting sodium was 151 with a target goal of 145 after 24 hours. Last, patient was seen by speech and swallow and put on NPO for poor swallowing. DGTF on . Pt was hypernatremic and was managed with D5W. Pt also had a worsening PAULA and nephrology consulted. Pt received HD via R IJ line and per nephro pt needs long term dialysis. On 10/4, a TDC was placed by IR for dialysis. On 15:00 10/4 a rapid response was called for acute altered mental status after the IR procedure. VSS and ABG notable for elevated lactate of 6. Pt spiked fever to 101.6 and repeat ABG notable for lactate of 8.3. Pt's BP dropped to 75/42 and he was given a 500 LR bolus, was started on levophed. Sepsis workup sent for possible aspiration pna. Pt started on vancomycin and cefepime. Pt upgraded to SDU for close monitoring and vasopressor requirements.    Patient is a 83y old  Male who presents with a chief complaint of AMS (10-05-23)      Pt seen and examined at bedside. No CP or SOB.      ABG - ( 05 Oct 2023 03:37 )  pH: 7.46  /  pCO2: 33    /  pO2: 110   / HCO3: 24    / Base Excess: 0.3   /  SaO2: 99.5          PAST MEDICAL & SURGICAL HISTORY:  HTN (hypertension)    COPD (chronic obstructive pulmonary disease)    High cholesterol    Mild anemia    Coffee ground emesis    Chronic diastolic heart failure    PAULA (acute kidney injury)    No significant past surgical history        VITAL SIGNS (Last 24 hrs):  T(C): 37.2 (10-05-23 @ 07:00), Max: 38.7 (10-04-23 @ 20:33)  HR: 76 (10-05-23 @ 11:00) (54 - 114)  BP: 146/82 (10-05-23 @ 11:00) (75/40 - 146/82)  RR: 21 (10-05-23 @ 01:15) (17 - 21)  SpO2: 99% (10-05-23 @ 11:00) (88% - 100%)  Wt(kg): --  Daily Height in cm: 193.04 (04 Oct 2023 11:58)    Daily Weight in k.5 (05 Oct 2023 11:00)    I&O's Summary    04 Oct 2023 07:01  -  05 Oct 2023 07:00  --------------------------------------------------------  IN: 850 mL / OUT: 875 mL / NET: -25 mL    05 Oct 2023 07:01  -  05 Oct 2023 11:51  --------------------------------------------------------  IN: 14.4 mL / OUT: 0 mL / NET: 14.4 mL        PHYSICAL EXAM:  GENERAL: NAD, elderly   HEAD:  Atraumatic, Normocephalic  EYES: EOMI, PERRLA, conjunctiva and sclera clear  NECK: Supple, No JVD  CHEST/LUNG: Clear to auscultation bilaterally; No wheeze  HEART: Regular rate and rhythm; No murmurs, rubs, or gallops  ABDOMEN: Soft, Nontender, Nondistended; Bowel sounds present  EXTREMITIES:  2+ Peripheral Pulses, No clubbing, cyanosis, or edema, left arm bruising, echymosisis   PSYCH: AAOx3  NEUROLOGY: non-focal  SKIN: No rashes or lesions    Labs Reviewed  Spoke to patient in regards to abnormal labs.    CBC Full  -  ( 05 Oct 2023 06:02 )  WBC Count : 11.96 K/uL  Hemoglobin : 8.3 g/dL  Hematocrit : 26.5 %  Platelet Count - Automated : 181 K/uL  Mean Cell Volume : 88.6 fL  Mean Cell Hemoglobin : 27.8 pg  Mean Cell Hemoglobin Concentration : 31.3 g/dL  Auto Neutrophil # : x  Auto Lymphocyte # : x  Auto Monocyte # : x  Auto Eosinophil # : x  Auto Basophil # : x  Auto Neutrophil % : x  Auto Lymphocyte % : x  Auto Monocyte % : x  Auto Eosinophil % : x  Auto Basophil % : x    BMP:    10-05 @ 06:02    Blood Urea Nitrogen - 48  Calcium - 8.3  Carbond Dioxide - 22  Chloride - 108  Creatinine - 3.9  Glucose - 124  Potassium - 3.8  Sodium - 144      Hemoglobin A1c -   PT/INR - ( 04 Oct 2023 11:06 )   PT: 13.00 sec;   INR: 1.14 ratio         PTT - ( 04 Oct 2023 11:06 )  PTT:30.7 sec  Urine Culture:        COVID Labs  CRP:  72.4 (23)  52.5 (23)      D-Dimer:  3436 ng/mL DDU (10-05-23 @ 06:02)            Imaging reviewed independently and reviewed read    < from: Xray Chest 1 View-PORTABLE IMMEDIATE (Xray Chest 1 View-PORTABLE IMMEDIATE .) (10.04.23 @ 23:39) >    Impression:    Mild increase in bilateral opacity/effusion.    < end of copied text >    < from: VA Duplex Upper Ext Vein Scan, Left (23 @ 16:50) >  Impression:    Superficial thrombophlebitis of the left cephalic vein. No evidence of   deep venousthrombosis in the left upper extremity.    ICD-10:M79.89    < end of copied text >      MEDICATIONS  (STANDING):  budesonide 160 MICROgram(s)/formoterol 4.5 MICROgram(s) Inhaler 2 Puff(s) Inhalation two times a day  buMETAnide 1 milliGRAM(s) Oral every 12 hours  calcium acetate 1334 milliGRAM(s) Oral three times a day with meals  cefepime   IVPB 1000 milliGRAM(s) IV Intermittent once  cefepime   IVPB 1000 milliGRAM(s) IV Intermittent every 8 hours  cefepime   IVPB      chlorhexidine 2% Cloths 1 Application(s) Topical every 12 hours  chlorhexidine 2% Cloths 1 Application(s) Topical <User Schedule>  dextrose 5%. 1000 milliLiter(s) (100 mL/Hr) IV Continuous <Continuous>  dextrose 50% Injectable 25 Gram(s) IV Push once  dextrose 50% Injectable 25 Gram(s) IV Push once  dextrose 50% Injectable 12.5 Gram(s) IV Push once  glucagon  Injectable 1 milliGRAM(s) IntraMuscular once  heparin   Injectable 5000 Unit(s) SubCutaneous every 8 hours  insulin lispro (ADMELOG) corrective regimen sliding scale   SubCutaneous three times a day before meals  metoprolol tartrate 12.5 milliGRAM(s) Oral every 12 hours  midodrine. 10 milliGRAM(s) Oral three times a day  norepinephrine Infusion 0.05 MICROgram(s)/kG/Min (9.11 mL/Hr) IV Continuous <Continuous>  pantoprazole   Suspension 40 milliGRAM(s) Oral two times a day  vancomycin  IVPB 1000 milliGRAM(s) IV Intermittent once    MEDICATIONS  (PRN):  acetaminophen     Tablet .. 650 milliGRAM(s) Oral every 6 hours PRN Moderate Pain (4 - 6)  albuterol    90 MICROgram(s) HFA Inhaler 1 Puff(s) Inhalation every 6 hours PRN for shortness of breath and/or wheezing  atropine Injectable 1 milliGRAM(s) IntraMuscular once PRN HR < 30  dextrose Oral Gel 15 Gram(s) Oral once PRN Blood Glucose LESS THAN 70 milliGRAM(s)/deciliter

## 2023-10-05 NOTE — CHART NOTE - NSCHARTNOTEFT_GEN_A_CORE
3E    Transfer from: SDU    Accepting Physician: Dr. Vick Vega    Signout given to:     HPI:    3E course:          Vital Signs Last 24 Hrs  T(C): 37.3 (04 Oct 2023 23:40), Max: 38.7 (04 Oct 2023 20:33)  T(F): 99.1 (04 Oct 2023 23:40), Max: 101.6 (04 Oct 2023 20:33)  HR: 86 (05 Oct 2023 00:05) (54 - 114)  BP: 125/57 (05 Oct 2023 00:05) (75/40 - 125/57)  BP(mean): 80 (05 Oct 2023 00:05) (62 - 88)  RR: 20 (04 Oct 2023 23:40) (17 - 20)  SpO2: 100% (04 Oct 2023 23:50) (98% - 100%)    Parameters below as of 04 Oct 2023 23:40      O2 Concentration (%): 2    I&O's Summary    03 Oct 2023 07:01  -  04 Oct 2023 07:00  --------------------------------------------------------  IN: 0 mL / OUT: 1000 mL / NET: -1000 mL    04 Oct 2023 07:01  -  05 Oct 2023 00:46  --------------------------------------------------------  IN: 0 mL / OUT: 875 mL / NET: -875 mL        Physical Exam:       LABS:                               8.4    10.76 )-----------( 166      ( 04 Oct 2023 23:28 )             27.3       10-04    144  |  106  |  39<H>  ----------------------------<  88  3.8   |  23  |  2.3<H>    Ca    8.4      04 Oct 2023 11:06  Phos  2.9     10-04  Mg     1.8     10-04    TPro  5.9<L>  /  Alb  3.1<L>  /  TBili  0.5  /  DBili  x   /  AST  18  /  ALT  7   /  AlkPhos  69  10-04      PT/INR - ( 04 Oct 2023 11:06 )   PT: 13.00 sec;   INR: 1.14 ratio         PTT - ( 04 Oct 2023 11:06 )  PTT:30.7 sec    ABG - ( 04 Oct 2023 19:33 )  pH, Arterial: 7.48  pH, Blood: x     /  pCO2: 23    /  pO2: 137   / HCO3: 17    / Base Excess: -4.5  /  SaO2: 99.9                  EC Lead ECG:   Ventricular Rate 98 BPM    Atrial Rate 101 BPM    QRS Duration 100 ms    Q-T Interval 372 ms    QTC Calculation(Bazett) 474 ms    R Axis -61 degrees    T Axis 68 degrees    Diagnosis Line Atrial fibrillation  Left axis deviation  Low voltage QRS  Cannot rule out Anterior infarct , age undetermined  Abnormal ECG    Confirmed by austen colbert ([id number]) on 10/4/2023 7:03:14 PM (10-04-23 @ 15:40)    Telemetry:    Imaging:      ASSESSMENT & PLAN: 3E    Transfer from: SDU    Accepting Physician: Dr. Vick Vega    Signout given to: Mando Pearce    HPI:  82 yo m ho DM, COPD, anemia, lymphedemia, afib on xarelto, HFrEF, DM coming in from nursing home for AMS x 2 days. Unable to gather story from pt as he is altered and lethargic.  As per NH was becoming more agitated x 2 days, was hypotensive yesterday and started on cefepime and vancomycin. Brought in to the ED for AMS. PICC line in place for cefepime 1q q8 until 10/5/23 and vanc 1q24 until 23 for LLE cellulitis/OM  (was at [facility] last month for LLE thick wound cellulitis/OM and sacral DTI as per call with [facility] as per collateral from [facility])  In the ED, vitals wnl. Labs showing wbc 11.30, Hb 9.6, MCV 79.1, INR 1.59, CO2 14, AG 21, Cr 6 (~baseline 1.2), , trops 0.09. CTH wnl, RBUS with no hydro, poor visualization of left kidney    Hospital course:  When pt arrived to ICU, he was in shock, hypotensive, with active melena. BP in 60's. GI consulted urgently for endoscopy. Procedure was done at bedside and bleeding controlled. Pt. was given 2 U of blood, 1U FFP in ICU. HgB has maintained above 8 and pt has not signs of melena. Pt was initially in 3E the patient was Hypoxic (as low as 70s). Evaluated the patient at bedside. AAOx 1. On auscultation, bilateral crackles present. Placed the patient on venturi mask and then on NRB. The Spo2 improved to 99%. Stat vitals repeated (vitals Spo2 99%, /70s, HR 95). Ordered CXR stat, ABG stat, Bumex 2mg IV x 1 stat. Labs and charts reviewed. Stat Labs drawn. Upgraded to SDU on . In the SDU, patient respiratory status was much improved on NC. Nephrology recommended dialysis for patient's PAULA. On , a [place] was placed and patient received hemo that day. However, he became hypotensive 30 mins into dialysis and was stopped. The following day, the [place] was replaced with a longer one and patient received HD again with no complications. On the evening of , patient hgb was 6.9 and he received 1 unit pRBC. There was also an episode of melena and GI was notified. Patient remained hemodynamically stable and GI recommended to just monitor. Heparin was held, but has since been restarted for DVT ppx. On , he was started on D5 @80cc for 24 hours to address free water deficit. Starting sodium was 151 with a target goal of 145 after 24 hours. Last, patient was seen by speech and swallow and put on NPO for poor swallowing. DGTF on . Pt was hypernatremic and was managed with D5W. Pt also had a worsening PAULA and nephrology consulted. Pt received HD via R IJ line and per nephro pt needs long term dialysis. On 10/4, a TDC was placed by IR for dialysis. On 15:00 10/4 a rapid response was called for acute altered mental status after the IR procedure. VSS and ABG notable for elevated lactate of 6. Pt spiked fever to 101.6 and repeat ABG notable for lactate of 8.3. Pt's BP dropped to 75/42 and he was given a 500 LR bolus, was started on levophed. Sepsis workup sent for possible aspiration pna. Pt started on vancomycin and cefepime. Pt upgraded to SDU for close monitoring and vasopressor requirements.      Follow-up:  - repeat ABG s/p LR bolus  - f/u sepsis w/u for possible aspiration pna  - f/u troponin for possible post-op MI  - monitor fever  - BP monitoring        Vital Signs Last 24 Hrs  T(C): 37.3 (04 Oct 2023 23:40), Max: 38.7 (04 Oct 2023 20:33)  T(F): 99.1 (04 Oct 2023 23:40), Max: 101.6 (04 Oct 2023 20:33)  HR: 86 (05 Oct 2023 00:05) (54 - 114)  BP: 125/57 (05 Oct 2023 00:05) (75/40 - 125/57)  BP(mean): 80 (05 Oct 2023 00:05) (62 - 88)  RR: 20 (04 Oct 2023 23:40) (17 - 20)  SpO2: 100% (04 Oct 2023 23:50) (98% - 100%)    Parameters below as of 04 Oct 2023 23:40      O2 Concentration (%): 2    I&O's Summary    03 Oct 2023 07:01  -  04 Oct 2023 07:00  --------------------------------------------------------  IN: 0 mL / OUT: 1000 mL / NET: -1000 mL    04 Oct 2023 07:01  -  05 Oct 2023 00:46  --------------------------------------------------------  IN: 0 mL / OUT: 875 mL / NET: -875 mL        Physical Exam:       LABS:                               8.4    10.76 )-----------( 166      ( 04 Oct 2023 23:28 )             27.3       10-04    144  |  106  |  39<H>  ----------------------------<  88  3.8   |  23  |  2.3<H>    Ca    8.4      04 Oct 2023 11:06  Phos  2.9     10-04  Mg     1.8     10-04    TPro  5.9<L>  /  Alb  3.1<L>  /  TBili  0.5  /  DBili  x   /  AST  18  /  ALT  7   /  AlkPhos  69  10-04      PT/INR - ( 04 Oct 2023 11:06 )   PT: 13.00 sec;   INR: 1.14 ratio         PTT - ( 04 Oct 2023 11:06 )  PTT:30.7 sec    ABG - ( 04 Oct 2023 19:33 )  pH, Arterial: 7.48  pH, Blood: x     /  pCO2: 23    /  pO2: 137   / HCO3: 17    / Base Excess: -4.5  /  SaO2: 99.9                  EC Lead ECG:   Ventricular Rate 98 BPM    Atrial Rate 101 BPM    QRS Duration 100 ms    Q-T Interval 372 ms    QTC Calculation(Bazett) 474 ms    R Axis -61 degrees    T Axis 68 degrees    Diagnosis Line Atrial fibrillation  Left axis deviation  Low voltage QRS  Cannot rule out Anterior infarct , age undetermined  Abnormal ECG    Confirmed by austen colbert ([id number]) on 10/4/2023 7:03:14 PM (10-04-23 @ 15:40)    Telemetry:    Imaging:      Assessment & Plan     #Acutely worsening uremia and acute kidney failure most likely 2/2 ATN - now on HD through right IJ [place]  #Metabolic Encephalopathy  #hypernatremia   PAULA / CKD stable non oliguric  - udall placement 23  started on HD  but did not tolerate it with access problems  - udall changed   he received HD  and   - Close F/U of BUN/Crea/ Lytes and UO  - c/w phoslo //   - off sodium bicarbonate   - s/p 250 cc LR bolus for hypernatremia  - s/p D5w @ 80 cc/hr  - f/u w/nephro  - 1:1 for safety and for udall observation   - Creatinine improvin -> 4 -> 2.8 -> 2.6   - Now on BUMEX 1 mg BID   - TDC by IR on 10/4  - rapid response after TDC due to acute change in mental status - ABG notable for elevated lactate  - pt febrile to 101.6 and elevated lactate to 8.4, rapidly became hypotensive on 10/4 - started on levophed  - f/u sepsis w/u  - f/u troponin  - started on vancomycin and cefepime  	  #Anemia of acute blood loss   #Hemorrhagic Shock Secondary to Hematochezia from Duodenal Ulcer Bleed s/p OVESCO placement   - Trend hgb, transfuse blood PRN  - continue with holding therapeutic AC  - s/p IV Protamine sulfate 36mg x1 dose on   - GI f/u appreciated  - S/p IV Protonix 40mg BID -> Ok to switch to PO BID as per GI   - Avoid any NSAIDs  - Will need to discuss the risks and benefits of long term AC with patient or next of Kin in light of life threating GIB    #Acute Hypoxic Respiratory Failure, possible aspiration pna / volume overload  /R pleural effusions   *CXR () Bilateral pleural effusion/opacity unchanged. No air leak.  - On 2L NC  - C/w albuterol PRN    #Hypotension  - on metoprolol tartrate 12.5 mg q12h  - midodrine 5 q 8 -> Increased to 10 mg Q8     #Afib, chronic; uncontrolled rate  - HOLDING therapeutic AC  - c/w metoprolol tartrate 12.5 mg q12h    #History of Left Foot OM w/ surrounding Cellulitis  *CT LLE (): No CT evidence of osteomyelitis or necrotizing infection. No drainable collection seen, within the limitations of a noncontrast exam.  - Increased frailty and decreased physical capacity  - as per previous notes the team Discussed with ID attending: patient is unlikely to benefit from prolonged therapy with cefepime+vanco (to cover possible OM) due to adverse outcomes associated kidney dysfunction, cognitive impact ...etc )  - c/w Local wound care; offloading boots bilaterally, frequently turning and positioning, prevent pressure injury, keep skin clean, and monitor for wound changes and notify provider as per podiatry    #MISC  - DVT ppx SCD,   heparin sq   - GI PPX: PPI po BID   - GOC :  Full code -  As per palliative care team: next-of-kin not in favor of further discussing Goals of Care Conversation or advance directives.    - Pending; sepsis w/u, mental status / nephrology 3E    Transfer from: SDU    Accepting Physician: Dr. Vick Vega    Signout given to: Dr. Portia Gilmore    HPI:  82 yo m ho DM, COPD, anemia, lymphedemia, afib on xarelto, HFrEF, DM coming in from nursing home for AMS x 2 days. Unable to gather story from pt as he is altered and lethargic.  As per NH was becoming more agitated x 2 days, was hypotensive yesterday and started on cefepime and vancomycin. Brought in to the ED for AMS. PICC line in place for cefepime 1q q8 until 10/5/23 and vanc 1q24 until 23 for LLE cellulitis/OM  (was at [facility] last month for LLE thick wound cellulitis/OM and sacral DTI as per call with [facility] as per collateral from [facility])  In the ED, vitals wnl. Labs showing wbc 11.30, Hb 9.6, MCV 79.1, INR 1.59, CO2 14, AG 21, Cr 6 (~baseline 1.2), , trops 0.09. CTH wnl, RBUS with no hydro, poor visualization of left kidney    Hospital course:  When pt arrived to ICU, he was in shock, hypotensive, with active melena. BP in 60's. GI consulted urgently for endoscopy. Procedure was done at bedside and bleeding controlled. Pt. was given 2 U of blood, 1U FFP in ICU. HgB has maintained above 8 and pt has not signs of melena. Pt was initially in 3E the patient was Hypoxic (as low as 70s). Evaluated the patient at bedside. AAOx 1. On auscultation, bilateral crackles present. Placed the patient on venturi mask and then on NRB. The Spo2 improved to 99%. Stat vitals repeated (vitals Spo2 99%, /70s, HR 95). Ordered CXR stat, ABG stat, Bumex 2mg IV x 1 stat. Labs and charts reviewed. Stat Labs drawn. Upgraded to SDU on . In the SDU, patient respiratory status was much improved on NC. Nephrology recommended dialysis for patient's PAULA. On , a [place] was placed and patient received hemo that day. However, he became hypotensive 30 mins into dialysis and was stopped. The following day, the [place] was replaced with a longer one and patient received HD again with no complications. On the evening of , patient hgb was 6.9 and he received 1 unit pRBC. There was also an episode of melena and GI was notified. Patient remained hemodynamically stable and GI recommended to just monitor. Heparin was held, but has since been restarted for DVT ppx. On , he was started on D5 @80cc for 24 hours to address free water deficit. Starting sodium was 151 with a target goal of 145 after 24 hours. Last, patient was seen by speech and swallow and put on NPO for poor swallowing. DGTF on . Pt was hypernatremic and was managed with D5W. Pt also had a worsening PAULA and nephrology consulted. Pt received HD via R IJ line and per nephro pt needs long term dialysis. On 10/4, a TDC was placed by IR for dialysis. On 15:00 10/4 a rapid response was called for acute altered mental status after the IR procedure. VSS and ABG notable for elevated lactate of 6. Pt spiked fever to 101.6 and repeat ABG notable for lactate of 8.3. Pt's BP dropped to 75/42 and he was given a 500 LR bolus, was started on levophed. Sepsis workup sent for possible aspiration pna. Pt started on vancomycin and cefepime. Pt upgraded to SDU for close monitoring and vasopressor requirements.      Follow-up:  - repeat ABG s/p LR bolus  - f/u sepsis w/u for possible aspiration pna  - f/u troponin for possible post-op MI  - monitor fever  - BP monitoring        Vital Signs Last 24 Hrs  T(C): 37.3 (04 Oct 2023 23:40), Max: 38.7 (04 Oct 2023 20:33)  T(F): 99.1 (04 Oct 2023 23:40), Max: 101.6 (04 Oct 2023 20:33)  HR: 86 (05 Oct 2023 00:05) (54 - 114)  BP: 125/57 (05 Oct 2023 00:05) (75/40 - 125/57)  BP(mean): 80 (05 Oct 2023 00:05) (62 - 88)  RR: 20 (04 Oct 2023 23:40) (17 - 20)  SpO2: 100% (04 Oct 2023 23:50) (98% - 100%)    Parameters below as of 04 Oct 2023 23:40      O2 Concentration (%): 2    I&O's Summary    03 Oct 2023 07:01  -  04 Oct 2023 07:00  --------------------------------------------------------  IN: 0 mL / OUT: 1000 mL / NET: -1000 mL    04 Oct 2023 07:01  -  05 Oct 2023 00:46  --------------------------------------------------------  IN: 0 mL / OUT: 875 mL / NET: -875 mL        Physical Exam:       LABS:                               8.4    10.76 )-----------( 166      ( 04 Oct 2023 23:28 )             27.3       10-04    144  |  106  |  39<H>  ----------------------------<  88  3.8   |  23  |  2.3<H>    Ca    8.4      04 Oct 2023 11:06  Phos  2.9     10-04  Mg     1.8     10-04    TPro  5.9<L>  /  Alb  3.1<L>  /  TBili  0.5  /  DBili  x   /  AST  18  /  ALT  7   /  AlkPhos  69  10-04      PT/INR - ( 04 Oct 2023 11:06 )   PT: 13.00 sec;   INR: 1.14 ratio         PTT - ( 04 Oct 2023 11:06 )  PTT:30.7 sec    ABG - ( 04 Oct 2023 19:33 )  pH, Arterial: 7.48  pH, Blood: x     /  pCO2: 23    /  pO2: 137   / HCO3: 17    / Base Excess: -4.5  /  SaO2: 99.9                  EC Lead ECG:   Ventricular Rate 98 BPM    Atrial Rate 101 BPM    QRS Duration 100 ms    Q-T Interval 372 ms    QTC Calculation(Bazett) 474 ms    R Axis -61 degrees    T Axis 68 degrees    Diagnosis Line Atrial fibrillation  Left axis deviation  Low voltage QRS  Cannot rule out Anterior infarct , age undetermined  Abnormal ECG    Confirmed by austen colbert ([id number]) on 10/4/2023 7:03:14 PM (10-04-23 @ 15:40)    Telemetry:    Imaging:      Assessment & Plan     #Acutely worsening uremia and acute kidney failure most likely 2/2 ATN - now on HD through right IJ [place]  #Metabolic Encephalopathy  #hypernatremia   PAULA / CKD stable non oliguric  - udall placement 23  started on HD  but did not tolerate it with access problems  - udall changed   he received HD  and   - Close F/U of BUN/Crea/ Lytes and UO  - c/w phoslo //   - off sodium bicarbonate   - s/p 250 cc LR bolus for hypernatremia  - s/p D5w @ 80 cc/hr  - f/u w/nephro  - 1:1 for safety and for udall observation   - Creatinine improvin -> 4 -> 2.8 -> 2.6   - Now on BUMEX 1 mg BID   - TDC by IR on 10/4  - rapid response after TDC due to acute change in mental status - ABG notable for elevated lactate  - pt febrile to 101.6 and elevated lactate to 8.4, rapidly became hypotensive on 10/4 - started on levophed  - f/u sepsis w/u  - f/u troponin  - started on vancomycin and cefepime  	  #Anemia of acute blood loss   #Hemorrhagic Shock Secondary to Hematochezia from Duodenal Ulcer Bleed s/p OVESCO placement   - Trend hgb, transfuse blood PRN  - continue with holding therapeutic AC  - s/p IV Protamine sulfate 36mg x1 dose on   - GI f/u appreciated  - S/p IV Protonix 40mg BID -> Ok to switch to PO BID as per GI   - Avoid any NSAIDs  - Will need to discuss the risks and benefits of long term AC with patient or next of Kin in light of life threating GIB    #Acute Hypoxic Respiratory Failure, possible aspiration pna / volume overload  /R pleural effusions   *CXR () Bilateral pleural effusion/opacity unchanged. No air leak.  - On 2L NC  - C/w albuterol PRN    #Hypotension  - on metoprolol tartrate 12.5 mg q12h  - midodrine 5 q 8 -> Increased to 10 mg Q8     #Afib, chronic; uncontrolled rate  - HOLDING therapeutic AC  - c/w metoprolol tartrate 12.5 mg q12h    #History of Left Foot OM w/ surrounding Cellulitis  *CT LLE (): No CT evidence of osteomyelitis or necrotizing infection. No drainable collection seen, within the limitations of a noncontrast exam.  - Increased frailty and decreased physical capacity  - as per previous notes the team Discussed with ID attending: patient is unlikely to benefit from prolonged therapy with cefepime+vanco (to cover possible OM) due to adverse outcomes associated kidney dysfunction, cognitive impact ...etc )  - c/w Local wound care; offloading boots bilaterally, frequently turning and positioning, prevent pressure injury, keep skin clean, and monitor for wound changes and notify provider as per podiatry    #MISC  - DVT ppx SCD,   heparin sq   - GI PPX: PPI po BID   - GOC :  Full code -  As per palliative care team: next-of-kin not in favor of further discussing Goals of Care Conversation or advance directives.    - Pending; sepsis w/u, mental status / nephrology

## 2023-10-05 NOTE — PROGRESS NOTE ADULT - ASSESSMENT
#Metabolic Encephalopathy 2/2 septic Shock 2/2 suspected aspiration pneumonia   - rapid response after TDC due to acute change in mental status - ABG notable for elevated lactate  - pt febrile to 101.6 and elevated lactate to 8.4, rapidly became hypotensive on 10/4 - started on levophed  - continue  vancomycin and cefepime  - lactate improving  - follow up cultures   - ID consult   - procal   - continue pressor support, wean off with midodrine   - CXR (9/28) Bilateral pleural effusion/opacity unchanged. No air leak.  - On 2L NC  - C/w albuterol PRN    #Acutely worsening uremia and acute kidney failure most likely 2/2 ATN - now on HD through right IJ [place]  #hypernatremia - resolving   PAULA / CKD stable non oliguric  - udall placement 9/27/23  started on HD 9/27 but did not tolerate it with access problems  - udall changed 9/28  he received HD 9/28 and 9/30  - Close F/U of BUN/Crea/ Lytes and UO  - c/w phoslo 2/2/2   - off sodium bicarbonate   - s/p 250 cc LR bolus for hypernatremia  - s/p D5w @ 80 cc/hr  - f/u w/nephro  - 1:1 for safety and for udall observation   - continue  BUMEX 1 mg BID   - TDC by IR on 10/4  	  #Anemia of acute blood loss   #Hemorrhagic Shock Secondary to Hematochezia from Duodenal Ulcer Bleed s/p OVESCO placement 09/23  - Trend hgb, transfuse blood PRN  - continue with holding therapeutic AC  - s/p IV Protamine sulfate 36mg x1 dose on 09/23  - GI f/u appreciated  - S/p IV Protonix 40mg BID -> Ok to switch to PO BID as per GI   - Avoid any NSAIDs  - overall the pt risk and benefit is goes against Anticoagulation, will further discuss with family , , dw brother regarding the high risk of bleeding and understands the reasons to hold anticoagulation. And understands the risk of cva with Afib.     #Elevated dimer  - duplex neg on 9/29, repeat   - overall the pt risk and benefit is goes against Anticoagulation, will further discuss with family , dw brother regarding the high risk of bleeding and understands to hold anticoagulation And understands the risk of cva with Afib.     #Paroxysmal Afib, chronic; uncontrolled rate  - HOLDING therapeutic AC  - c/w metoprolol tartrate 12.5 mg q12h    #History of Left Foot OM w/ surrounding Cellulitis  *CT LLE (9/16): No CT evidence of osteomyelitis or necrotizing infection. No drainable collection seen, within the limitations of a noncontrast exam.  - Increased frailty and decreased physical capacity  - as per previous notes the team Discussed with ID attending: patient is unlikely to benefit from prolonged therapy with cefepime+vanco (to cover possible OM) due to adverse outcomes associated kidney dysfunction, cognitive impact ...etc )  - c/w Local wound care; offloading boots bilaterally, frequently turning and positioning, prevent pressure injury, keep skin clean, and monitor for wound changes and notify provider as per podiatry    #Progress Note Handoff  Pending (specify):  as above  Family discussion: dw brother and updated him on pt case, dw overall poor prognosis and family continues to want him to be full code.   Disposition: SDU. [facility]   Decision to admit the pt is based on acuity as above

## 2023-10-05 NOTE — PROGRESS NOTE ADULT - SUBJECTIVE AND OBJECTIVE BOX
Patient is a 83y old  Male who presents with a chief complaint of AMS (04 Oct 2023 13:58)        Over Night Events:        ROS:     All ROS are negative except HPI         PHYSICAL EXAM    ICU Vital Signs Last 24 Hrs  T(C): 37.2 (05 Oct 2023 07:00), Max: 38.7 (04 Oct 2023 20:33)  T(F): 98.9 (05 Oct 2023 07:00), Max: 101.6 (04 Oct 2023 20:33)  HR: 73 (05 Oct 2023 08:00) (54 - 114)  BP: 124/58 (05 Oct 2023 08:00) (75/40 - 125/57)  BP(mean): 84 (05 Oct 2023 08:00) (62 - 88)  ABP: --  ABP(mean): --  RR: 21 (05 Oct 2023 01:15) (17 - 21)  SpO2: 100% (05 Oct 2023 08:00) (98% - 100%)    O2 Parameters below as of 05 Oct 2023 08:00  Patient On (Oxygen Delivery Method): nasal cannula  O2 Flow (L/min): 2          CONSTITUTIONAL:  Ill appearing in   NAD    ENT:   Airway patent,   Mouth with normal mucosa.       EYES:   Pupils equal,   Round and reactive to light.    CARDIAC:   Normal rate,   Regular rhythm.        RESPIRATORY:   No wheezing  Bilateral crackles   Normal chest expansion  Not tachypneic,  No use of accessory muscles    GASTROINTESTINAL:  Abdomen soft,   Non-tender,   No guarding,   + BS    MUSCULOSKELETAL:   Range of motion is not limited,  No clubbing, cyanosis    NEUROLOGICAL:   Alert confused  No motor  deficits.    SKIN:   Skin normal color for race,   No evidence of rash.      10-04-23 @ 07:01  -  10-05-23 @ 07:00  --------------------------------------------------------  IN:    Lactated Ringers Bolus: 500 mL    Oral Fluid: 350 mL  Total IN: 850 mL    OUT:    Voided (mL): 875 mL  Total OUT: 875 mL    Total NET: -25 mL          LABS:                            8.3    11.96 )-----------( 181      ( 05 Oct 2023 06:02 )             26.5                     8.3    11.96 )-----------( 181      ( 10-05 @ 06:02 )             26.5                8.4    10.76 )-----------( 166      ( 10-04 @ 23:28 )             27.3                9.1    7.05  )-----------( 160      ( 10-04 @ 11:06 )             30.1                9.3    7.50  )-----------( 173      ( 10-04 @ 08:10 )             30.3                8.8    7.39  )-----------( 149      ( 10-04 @ 06:12 )             28.6                8.9    6.45  )-----------( 181      ( 10-03 @ 07:25 )             29.2                                              10-05    144  |  108  |  48<H>  ----------------------------<  124<H>  3.8   |  22  |  3.9<H>    Creatinine Trend  BUN 48, Cr 3.9, (10-05-23 @ 06:02)  Creatinine Trend  BUN 39, Cr 2.3, (10-04-23 @ 11:06)  Creatinine Trend  BUN 38, Cr 2.3, (10-04-23 @ 08:10)  Creatinine Trend  BUN 37, Cr 2.4, (10-04-23 @ 06:12)  Creatinine Trend  BUN 47, Cr 2.6, (10-03-23 @ 07:25)  Creatinine Trend  BUN 49, Cr 2.8, (10-02-23 @ 06:09)  Creatinine Trend  BUN 49, Cr 2.8, (10-01-23 @ 06:51)      Ca    8.3<L>      05 Oct 2023 06:02  Phos  4.4     10-05  Mg     1.8     10-04    TPro  5.9<L>  /  Alb  3.1<L>  /  TBili  0.5  /  DBili  x   /  AST  18  /  ALT  7   /  AlkPhos  69  10-04      PT/INR - ( 04 Oct 2023 11:06 )   PT: 13.00 sec;   INR: 1.14 ratio         PTT - ( 04 Oct 2023 11:06 )  PTT:30.7 sec                                       Urinalysis Basic - ( 05 Oct 2023 06:02 )    Color: x / Appearance: x / SG: x / pH: x  Gluc: 124 mg/dL / Ketone: x  / Bili: x / Urobili: x   Blood: x / Protein: x / Nitrite: x   Leuk Esterase: x / RBC: x / WBC x   Sq Epi: x / Non Sq Epi: x / Bacteria: x        CARDIAC MARKERS ( 04 Oct 2023 23:28 )  x     / 0.20 ng/mL / x     / x     / x                                                LIVER FUNCTIONS - ( 04 Oct 2023 11:06 )  Alb: 3.1 g/dL / Pro: 5.9 g/dL / ALK PHOS: 69 U/L / ALT: 7 U/L / AST: 18 U/L / GGT: x                                                                                                                                   ABG - ( 05 Oct 2023 03:37 )  pH, Arterial: 7.46  pH, Blood: x     /  pCO2: 33    /  pO2: 110   / HCO3: 24    / Base Excess: 0.3   /  SaO2: 99.5                MEDICATIONS  (STANDING):  budesonide 160 MICROgram(s)/formoterol 4.5 MICROgram(s) Inhaler 2 Puff(s) Inhalation two times a day  buMETAnide 1 milliGRAM(s) Oral every 12 hours  calcium acetate 1334 milliGRAM(s) Oral three times a day with meals  cefepime   IVPB      cefepime   IVPB 1000 milliGRAM(s) IV Intermittent once  cefepime   IVPB 1000 milliGRAM(s) IV Intermittent every 8 hours  chlorhexidine 2% Cloths 1 Application(s) Topical every 12 hours  chlorhexidine 2% Cloths 1 Application(s) Topical <User Schedule>  dextrose 5%. 1000 milliLiter(s) (100 mL/Hr) IV Continuous <Continuous>  dextrose 50% Injectable 25 Gram(s) IV Push once  dextrose 50% Injectable 25 Gram(s) IV Push once  dextrose 50% Injectable 12.5 Gram(s) IV Push once  glucagon  Injectable 1 milliGRAM(s) IntraMuscular once  heparin   Injectable 5000 Unit(s) SubCutaneous every 8 hours  insulin lispro (ADMELOG) corrective regimen sliding scale   SubCutaneous three times a day before meals  metoprolol tartrate 12.5 milliGRAM(s) Oral every 12 hours  midodrine. 10 milliGRAM(s) Oral three times a day  norepinephrine Infusion 0.05 MICROgram(s)/kG/Min (9.11 mL/Hr) IV Continuous <Continuous>  pantoprazole   Suspension 40 milliGRAM(s) Oral two times a day  vancomycin  IVPB 1000 milliGRAM(s) IV Intermittent once    MEDICATIONS  (PRN):  acetaminophen     Tablet .. 650 milliGRAM(s) Oral every 6 hours PRN Moderate Pain (4 - 6)  albuterol    90 MICROgram(s) HFA Inhaler 1 Puff(s) Inhalation every 6 hours PRN for shortness of breath and/or wheezing  atropine Injectable 1 milliGRAM(s) IntraMuscular once PRN HR < 30  dextrose Oral Gel 15 Gram(s) Oral once PRN Blood Glucose LESS THAN 70 milliGRAM(s)/deciliter      New X-rays reviewed:                                                                                  ECHO

## 2023-10-05 NOTE — SWALLOW BEDSIDE ASSESSMENT ADULT - SLP PERTINENT HISTORY OF CURRENT PROBLEM
Pt is an 82 y/o M w/ PMHx: DM, COPD, anemia, lymphedema, afib on Xarelto, HFrEF, DM, presents from NH for AMS. Pt is being treated for anemia, hemorrhagic shock 2' hematochezia from duodenal ulcer bleed s/p OVESCO placement 09/23. Course c/b acutely worsening uremia and acute kidney failure most likely 2' ATN- now on HD through right IJ [place]. +Metabolic encephalopathy, hypernatremia, AHRF- possible aspiration PNA, volume overload, R pleural effusions, hypotension.

## 2023-10-05 NOTE — SWALLOW BEDSIDE ASSESSMENT ADULT - COMMENTS
Pt s/p TDC placement yesterday w/ IR. RRT called shortly after for acute AMS, pt spiked a fever, low BP; sepsis w/u sent for possible aspiration PNA. Repeat CXR 10/4-> Mild increase in bilateral opacity/effusion. Pt upgraded to SDU. Pt full code, per palliative care team: next-of-kin not in favor of further discussing GOC conversation or advance directives.

## 2023-10-05 NOTE — SWALLOW BEDSIDE ASSESSMENT ADULT - PHARYNGEAL PHASE
Within functional limits Delayed pharyngeal swallow/Cough post oral intake/Throat clear post oral intake/Multiple swallows Please take medications as prescribed.

## 2023-10-05 NOTE — PROGRESS NOTE ADULT - ASSESSMENT
82 yo m ho DM, COPD, anemia, paroxysmal afib on xarelto, HFrEF, DM coming in from nursing home for AMS x 2 days. Found to have toxic metabolic encephalopathy with PAULA.    PAULA/ toxic metabolic encephalopathy/ HAGMA/ HFrEF/ hypernatremia  possible ATN iso hypotension and possible sepsis/ vanc toxicity  no hydro on imaging  creat trend noted / trending up   had lactic acidosis ? aspiration   HD short run 2h no UF / May need HD 2 days or hold it   check daily cr - BMP   non oliguric, started on bumex  phos level noted; decrease phoslo to 1 tab TID qac  BP noted;  cont midodrine only pre-HD   will follow    prognosis poor

## 2023-10-05 NOTE — PHARMACOTHERAPY INTERVENTION NOTE - COMMENTS
Recommended adjusting the cefepime dose from 1g IV q8h to cefepime 1g IV q24h in the setting of the patient currently receiving intermittent hemodialysis.    Deejay Shaw PharmD  Clinical Pharmacy Specialist, Infectious Diseases  Tele-Antimicrobial Stewardship Program (Tele-ASP)  Tele-ASP Phone: [phone]

## 2023-10-05 NOTE — CHART NOTE - NSCHARTNOTEFT_GEN_A_CORE
Informed by vascular tech patient developed acute DVT in short segment R femoral vein. Holding AC as patient initially presented to the hospital in hemorrhagic shock. IR consult placed for IVC filter.

## 2023-10-05 NOTE — PROGRESS NOTE ADULT - SUBJECTIVE AND OBJECTIVE BOX
Nephrology progress note    THIS IS AN INCOMPLETE NOTE . FULL NOTE TO FOLLOW SHORTLY    Patient is seen and examined, events over the last 24 h noted .    Allergies:  No Known Allergies    Hospital Medications:   MEDICATIONS  (STANDING):  budesonide 160 MICROgram(s)/formoterol 4.5 MICROgram(s) Inhaler 2 Puff(s) Inhalation two times a day  buMETAnide 1 milliGRAM(s) Oral every 12 hours  calcium acetate 1334 milliGRAM(s) Oral three times a day with meals  cefepime   IVPB      cefepime   IVPB 1000 milliGRAM(s) IV Intermittent once  cefepime   IVPB 1000 milliGRAM(s) IV Intermittent every 8 hours  chlorhexidine 2% Cloths 1 Application(s) Topical <User Schedule>  chlorhexidine 2% Cloths 1 Application(s) Topical every 12 hours  dextrose 5%. 1000 milliLiter(s) (100 mL/Hr) IV Continuous <Continuous>  dextrose 50% Injectable 25 Gram(s) IV Push once  dextrose 50% Injectable 12.5 Gram(s) IV Push once  dextrose 50% Injectable 25 Gram(s) IV Push once  glucagon  Injectable 1 milliGRAM(s) IntraMuscular once  heparin   Injectable 5000 Unit(s) SubCutaneous every 8 hours  insulin lispro (ADMELOG) corrective regimen sliding scale   SubCutaneous three times a day before meals  metoprolol tartrate 12.5 milliGRAM(s) Oral every 12 hours  midodrine. 10 milliGRAM(s) Oral three times a day  norepinephrine Infusion 0.05 MICROgram(s)/kG/Min (9.11 mL/Hr) IV Continuous <Continuous>  pantoprazole   Suspension 40 milliGRAM(s) Oral two times a day  vancomycin  IVPB 1000 milliGRAM(s) IV Intermittent once        VITALS:  T(F): 98.9 (10-05-23 @ 07:00), Max: 101.6 (10-04-23 @ 20:33)  HR: 73 (10-05-23 @ 08:00)  BP: 124/58 (10-05-23 @ 08:00)  RR: 21 (10-05-23 @ 01:15)  SpO2: 100% (10-05-23 @ 08:00)  Wt(kg): --    10-03 @ 07:01  -  10-04 @ 07:00  --------------------------------------------------------  IN: 0 mL / OUT: 1000 mL / NET: -1000 mL    10-04 @ 07:01  -  10-05 @ 07:00  --------------------------------------------------------  IN: 850 mL / OUT: 875 mL / NET: -25 mL      Height (cm): 193 (10-04 @ 11:58)    PHYSICAL EXAM:  Constitutional: NAD  HEENT: anicteric sclera, oropharynx clear, MMM  Neck: No JVD  Respiratory: CTAB, no wheezes, rales or rhonchi  Cardiovascular: S1, S2, RRR  Gastrointestinal: BS+, soft, NT/ND  Extremities: No cyanosis or clubbing. No peripheral edema  :  No barth.   Skin: No rashes    LABS:  10-05    144  |  108  |  48<H>  ----------------------------<  124<H>  3.8   |  22  |  3.9<H>    Ca    8.3<L>      05 Oct 2023 06:02  Phos  4.4     10-05  Mg     1.8     10-04    TPro  5.9<L>  /  Alb  3.1<L>  /  TBili  0.5  /  DBili      /  AST  18  /  ALT  7   /  AlkPhos  69  10-04                          8.3    11.96 )-----------( 181      ( 05 Oct 2023 06:02 )             26.5       Urine Studies:  Urinalysis Basic - ( 05 Oct 2023 06:02 )    Color:  / Appearance:  / SG:  / pH:   Gluc: 124 mg/dL / Ketone:   / Bili:  / Urobili:    Blood:  / Protein:  / Nitrite:    Leuk Esterase:  / RBC:  / WBC    Sq Epi:  / Non Sq Epi:  / Bacteria:             HBsAb <3.0      [09-27-23 @ 17:44]  HBsAb Nonreact      [09-27-23 @ 17:44]  HBsAg Nonreact      [09-27-23 @ 17:44]  HBcAb Nonreact      [09-27-23 @ 17:44]        RADIOLOGY & ADDITIONAL STUDIES:   Nephrology progress note    Patient is seen and examined, events over the last 24 h noted .  Lying in bed   has 1: 1 sit     Allergies:  No Known Allergies    Hospital Medications:   MEDICATIONS  (STANDING):  budesonide 160 MICROgram(s)/formoterol 4.5 MICROgram(s) Inhaler 2 Puff(s) Inhalation two times a day  buMETAnide 1 milliGRAM(s) Oral every 12 hours  calcium acetate 1334 milliGRAM(s) Oral three times a day with meals    cefepime   IVPB 1000 milliGRAM(s) IV Intermittent once  cefepime   IVPB 1000 milliGRAM(s) IV Intermittent every 8 hours  glucagon  Injectable 1 milliGRAM(s) IntraMuscular once  heparin   Injectable 5000 Unit(s) SubCutaneous every 8 hours  insulin lispro (ADMELOG) corrective regimen sliding scale   SubCutaneous three times a day before meals  metoprolol tartrate 12.5 milliGRAM(s) Oral every 12 hours  midodrine. 10 milliGRAM(s) Oral three times a day  norepinephrine Infusion 0.05 MICROgram(s)/kG/Min (9.11 mL/Hr) IV Continuous <Continuous>  pantoprazole   Suspension 40 milliGRAM(s) Oral two times a day  vancomycin  IVPB 1000 milliGRAM(s) IV Intermittent once        VITALS:  T(F): 98.9 (10-05-23 @ 07:00), Max: 101.6 (10-04-23 @ 20:33)  HR: 73 (10-05-23 @ 08:00)  BP: 124/58 (10-05-23 @ 08:00)  RR: 21 (10-05-23 @ 01:15)  SpO2: 100% (10-05-23 @ 08:00)      10-03 @ 07:01  -  10-04 @ 07:00  --------------------------------------------------------  IN: 0 mL / OUT: 1000 mL / NET: -1000 mL    10-04 @ 07:01  -  10-05 @ 07:00  --------------------------------------------------------  IN: 850 mL / OUT: 875 mL / NET: -25 mL      Height (cm): 193 (10-04 @ 11:58)    PHYSICAL EXAM:  Constitutional: NAD  Respiratory: CTAB,   Cardiovascular: S1, S2, RRR  Gastrointestinal: BS+, soft, NT/ND  Extremities: No cyanosis or clubbing. No peripheral edema  Skin: No rashes    LABS:  10-05    144  |  108  |  48<H>  ----------------------------<  124<H>  3.8   |  22  |  3.9<H>    Creatinine Trend: 3.9<--, 2.3<--, 2.3<--, 2.4<--, 2.6<--, 2.8<--    Ca    8.3<L>      05 Oct 2023 06:02  Phos  4.4     10-05  Mg     1.8     10-04    TPro  5.9<L>  /  Alb  3.1<L>  /  TBili  0.5  /  DBili      /  AST  18  /  ALT  7   /  AlkPhos  69  10-04                          8.3    11.96 )-----------( 181      ( 05 Oct 2023 06:02 )             26.5       Urine Studies:  Urinalysis Basic - ( 05 Oct 2023 06:02 )    Color:  / Appearance:  / SG:  / pH:   Gluc: 124 mg/dL / Ketone:   / Bili:  / Urobili:    Blood:  / Protein:  / Nitrite:    Leuk Esterase:  / RBC:  / WBC    Sq Epi:  / Non Sq Epi:  / Bacteria:             HBsAb <3.0      [09-27-23 @ 17:44]  HBsAb Nonreact      [09-27-23 @ 17:44]  HBsAg Nonreact      [09-27-23 @ 17:44]  HBcAb Nonreact      [09-27-23 @ 17:44]        RADIOLOGY & ADDITIONAL STUDIES:

## 2023-10-06 LAB
ALBUMIN SERPL ELPH-MCNC: 2.2 G/DL — LOW (ref 3.5–5.2)
ALP SERPL-CCNC: 74 U/L — SIGNIFICANT CHANGE UP (ref 30–115)
ALT FLD-CCNC: 366 U/L — HIGH (ref 0–41)
ANION GAP SERPL CALC-SCNC: 10 MMOL/L — SIGNIFICANT CHANGE UP (ref 7–14)
AST SERPL-CCNC: 906 U/L — HIGH (ref 0–41)
BILIRUB SERPL-MCNC: 0.6 MG/DL — SIGNIFICANT CHANGE UP (ref 0.2–1.2)
BLD GP AB SCN SERPL QL: SIGNIFICANT CHANGE UP
BUN SERPL-MCNC: 36 MG/DL — HIGH (ref 10–20)
CALCIUM SERPL-MCNC: 8.1 MG/DL — LOW (ref 8.4–10.4)
CHLORIDE SERPL-SCNC: 108 MMOL/L — SIGNIFICANT CHANGE UP (ref 98–110)
CO2 SERPL-SCNC: 27 MMOL/L — SIGNIFICANT CHANGE UP (ref 17–32)
CREAT SERPL-MCNC: 3.2 MG/DL — HIGH (ref 0.7–1.5)
EGFR: 19 ML/MIN/1.73M2 — LOW
GLUCOSE BLDC GLUCOMTR-MCNC: 108 MG/DL — HIGH (ref 70–99)
GLUCOSE BLDC GLUCOMTR-MCNC: 89 MG/DL — SIGNIFICANT CHANGE UP (ref 70–99)
GLUCOSE SERPL-MCNC: 96 MG/DL — SIGNIFICANT CHANGE UP (ref 70–99)
HCT VFR BLD CALC: 26 % — LOW (ref 42–52)
HGB BLD-MCNC: 7.9 G/DL — LOW (ref 14–18)
LACTATE SERPL-SCNC: 1.6 MMOL/L — SIGNIFICANT CHANGE UP (ref 0.7–2)
MAGNESIUM SERPL-MCNC: 1.7 MG/DL — LOW (ref 1.8–2.4)
MCHC RBC-ENTMCNC: 27.1 PG — SIGNIFICANT CHANGE UP (ref 27–31)
MCHC RBC-ENTMCNC: 30.4 G/DL — LOW (ref 32–37)
MCV RBC AUTO: 89.3 FL — SIGNIFICANT CHANGE UP (ref 80–94)
NRBC # BLD: 0 /100 WBCS — SIGNIFICANT CHANGE UP (ref 0–0)
PLATELET # BLD AUTO: 130 K/UL — SIGNIFICANT CHANGE UP (ref 130–400)
PMV BLD: 11.6 FL — HIGH (ref 7.4–10.4)
POTASSIUM SERPL-MCNC: 3.6 MMOL/L — SIGNIFICANT CHANGE UP (ref 3.5–5)
POTASSIUM SERPL-SCNC: 3.6 MMOL/L — SIGNIFICANT CHANGE UP (ref 3.5–5)
PROCALCITONIN SERPL-MCNC: 1.76 NG/ML — HIGH (ref 0.02–0.1)
PROT SERPL-MCNC: 5.2 G/DL — LOW (ref 6–8)
RBC # BLD: 2.91 M/UL — LOW (ref 4.7–6.1)
RBC # FLD: 21.8 % — HIGH (ref 11.5–14.5)
SODIUM SERPL-SCNC: 145 MMOL/L — SIGNIFICANT CHANGE UP (ref 135–146)
VANCOMYCIN FLD-MCNC: 24.9 UG/ML — HIGH (ref 5–10)
WBC # BLD: 16.1 K/UL — HIGH (ref 4.8–10.8)
WBC # FLD AUTO: 16.1 K/UL — HIGH (ref 4.8–10.8)

## 2023-10-06 PROCEDURE — 99291 CRITICAL CARE FIRST HOUR: CPT

## 2023-10-06 PROCEDURE — 37191 INS ENDOVAS VENA CAVA FILTR: CPT

## 2023-10-06 PROCEDURE — 99223 1ST HOSP IP/OBS HIGH 75: CPT

## 2023-10-06 PROCEDURE — 99233 SBSQ HOSP IP/OBS HIGH 50: CPT

## 2023-10-06 PROCEDURE — 76705 ECHO EXAM OF ABDOMEN: CPT | Mod: 26

## 2023-10-06 RX ORDER — MAGNESIUM SULFATE 500 MG/ML
2 VIAL (ML) INJECTION
Refills: 0 | Status: COMPLETED | OUTPATIENT
Start: 2023-10-06 | End: 2023-10-06

## 2023-10-06 RX ORDER — MAGNESIUM SULFATE 500 MG/ML
2 VIAL (ML) INJECTION ONCE
Refills: 0 | Status: COMPLETED | OUTPATIENT
Start: 2023-10-06 | End: 2023-10-06

## 2023-10-06 RX ADMIN — MIDODRINE HYDROCHLORIDE 10 MILLIGRAM(S): 2.5 TABLET ORAL at 15:09

## 2023-10-06 RX ADMIN — MIDODRINE HYDROCHLORIDE 10 MILLIGRAM(S): 2.5 TABLET ORAL at 18:40

## 2023-10-06 RX ADMIN — HEPARIN SODIUM 5000 UNIT(S): 5000 INJECTION INTRAVENOUS; SUBCUTANEOUS at 05:22

## 2023-10-06 RX ADMIN — Medication 25 GRAM(S): at 15:32

## 2023-10-06 RX ADMIN — HEPARIN SODIUM 5000 UNIT(S): 5000 INJECTION INTRAVENOUS; SUBCUTANEOUS at 21:27

## 2023-10-06 RX ADMIN — Medication 25 GRAM(S): at 10:54

## 2023-10-06 RX ADMIN — BUMETANIDE 1 MILLIGRAM(S): 0.25 INJECTION INTRAMUSCULAR; INTRAVENOUS at 19:49

## 2023-10-06 RX ADMIN — HEPARIN SODIUM 5000 UNIT(S): 5000 INJECTION INTRAVENOUS; SUBCUTANEOUS at 15:04

## 2023-10-06 RX ADMIN — Medication 667 MILLIGRAM(S): at 18:41

## 2023-10-06 RX ADMIN — MIDODRINE HYDROCHLORIDE 10 MILLIGRAM(S): 2.5 TABLET ORAL at 05:22

## 2023-10-06 RX ADMIN — CHLORHEXIDINE GLUCONATE 1 APPLICATION(S): 213 SOLUTION TOPICAL at 05:39

## 2023-10-06 RX ADMIN — CEFEPIME 100 MILLIGRAM(S): 1 INJECTION, POWDER, FOR SOLUTION INTRAMUSCULAR; INTRAVENOUS at 18:48

## 2023-10-06 RX ADMIN — BUMETANIDE 1 MILLIGRAM(S): 0.25 INJECTION INTRAMUSCULAR; INTRAVENOUS at 05:22

## 2023-10-06 RX ADMIN — Medication 12.5 MILLIGRAM(S): at 05:22

## 2023-10-06 RX ADMIN — PANTOPRAZOLE SODIUM 40 MILLIGRAM(S): 20 TABLET, DELAYED RELEASE ORAL at 18:40

## 2023-10-06 RX ADMIN — PANTOPRAZOLE SODIUM 40 MILLIGRAM(S): 20 TABLET, DELAYED RELEASE ORAL at 05:23

## 2023-10-06 RX ADMIN — Medication 667 MILLIGRAM(S): at 09:05

## 2023-10-06 RX ADMIN — Medication 25 GRAM(S): at 15:34

## 2023-10-06 NOTE — PROGRESS NOTE ADULT - SUBJECTIVE AND OBJECTIVE BOX
Patient is a 83y old  Male who presents with a chief complaint of AMS (05 Oct 2023 11:50)        Over Night Events:  Remains on Levophed  .  Events noted.         ROS:     All ROS are negative except HPI         PHYSICAL EXAM    ICU Vital Signs Last 24 Hrs  T(C): 36.7 (06 Oct 2023 08:00), Max: 38 (05 Oct 2023 19:00)  T(F): 98.1 (06 Oct 2023 08:00), Max: 100.4 (05 Oct 2023 19:00)  HR: 58 (06 Oct 2023 08:00) (58 - 99)  BP: 106/54 (06 Oct 2023 08:00) (106/54 - 146/82)  BP(mean): 78 (06 Oct 2023 08:00) (72 - 100)  ABP: --  ABP(mean): --  RR: 18 (06 Oct 2023 08:00) (8 - 20)  SpO2: 100% (06 Oct 2023 08:00) (88% - 100%)    O2 Parameters below as of 06 Oct 2023 04:00  Patient On (Oxygen Delivery Method): nasal cannula  O2 Flow (L/min): 2          CONSTITUTIONAL:  In  NAD    ENT:   Airway patent,   Mouth with normal mucosa.       EYES:   Pupils equal,   Round and reactive to light.    CARDIAC:   Normal rate,   Regular rhythm.        RESPIRATORY:   No wheezing  Bilateral BS  Normal chest expansion  Not tachypneic,  No use of accessory muscles    GASTROINTESTINAL:  Abdomen soft,   Non-tender,   No guarding,   + BS    MUSCULOSKELETAL:   Range of motion is not limited,  No clubbing, cyanosis    NEUROLOGICAL:   Alert   No motor  deficits.    SKIN:   Skin normal color for race,   No evidence of rash.        10-05-23 @ 07:01  -  10-06-23 @ 07:00  --------------------------------------------------------  IN:    Norepinephrine: 14.4 mL  Total IN: 14.4 mL    OUT:    Incontinent per Condom Catheter (mL): 100 mL    Other (mL): 0 mL    Voided (mL): 75 mL  Total OUT: 175 mL    Total NET: -160.6 mL          LABS:                            7.9    16.10 )-----------( 130      ( 06 Oct 2023 05:49 )             26.0                                               10-06             7.9    16.10 )-----------( 130      ( 10-06 @ 05:49 )             26.0                8.4    14.17 )-----------( 167      ( 10-05 @ 11:35 )             27.3                8.3    11.96 )-----------( 181      ( 10-05 @ 06:02 )             26.5                8.4    10.76 )-----------( 166      ( 10-04 @ 23:28 )             27.3                9.1    7.05  )-----------( 160      ( 10-04 @ 11:06 )             30.1                9.3    7.50  )-----------( 173      ( 10-04 @ 08:10 )             30.3                8.8    7.39  )-----------( 149      ( 10-04 @ 06:12 )             28.6           145  |  108  |  36<H>  ----------------------------<  96  3.6   |  27  |  3.2<H>    Ca    8.1<L>      06 Oct 2023 05:49  Phos  4.1     10-05  Mg     1.7     10-06    TPro  5.2<L>  /  Alb  2.2<L>  /  TBili  0.6  /  DBili  x   /  AST  906<H>  /  ALT  366<H>  /  AlkPhos  74  10-06      PT/INR - ( 04 Oct 2023 11:06 )   PT: 13.00 sec;   INR: 1.14 ratio         PTT - ( 04 Oct 2023 11:06 )  PTT:30.7 sec                                       Urinalysis Basic - ( 06 Oct 2023 05:49 )    Color: x / Appearance: x / SG: x / pH: x  Gluc: 96 mg/dL / Ketone: x  / Bili: x / Urobili: x   Blood: x / Protein: x / Nitrite: x   Leuk Esterase: x / RBC: x / WBC x   Sq Epi: x / Non Sq Epi: x / Bacteria: x        CARDIAC MARKERS ( 04 Oct 2023 23:28 )  x     / 0.20 ng/mL / x     / x     / x                                                LIVER FUNCTIONS - ( 06 Oct 2023 05:49 )  Alb: 2.2 g/dL / Pro: 5.2 g/dL / ALK PHOS: 74 U/L / ALT: 366 U/L / AST: 906 U/L / GGT: x                                                  Culture - Blood (collected 04 Oct 2023 23:28)  Source: .Blood Blood  Preliminary Report (06 Oct 2023 07:01):    No growth at 24 hours                                                                                       ABG - ( 05 Oct 2023 03:37 )  pH, Arterial: 7.46  pH, Blood: x     /  pCO2: 33    /  pO2: 110   / HCO3: 24    / Base Excess: 0.3   /  SaO2: 99.5                MEDICATIONS  (STANDING):  budesonide 160 MICROgram(s)/formoterol 4.5 MICROgram(s) Inhaler 2 Puff(s) Inhalation two times a day  buMETAnide 1 milliGRAM(s) Oral every 12 hours  calcium acetate 667 milliGRAM(s) Oral three times a day with meals  cefepime   IVPB 1000 milliGRAM(s) IV Intermittent every 24 hours  chlorhexidine 2% Cloths 1 Application(s) Topical <User Schedule>  dextrose 5%. 1000 milliLiter(s) (100 mL/Hr) IV Continuous <Continuous>  dextrose 50% Injectable 25 Gram(s) IV Push once  dextrose 50% Injectable 12.5 Gram(s) IV Push once  dextrose 50% Injectable 25 Gram(s) IV Push once  glucagon  Injectable 1 milliGRAM(s) IntraMuscular once  heparin   Injectable 5000 Unit(s) SubCutaneous every 8 hours  insulin lispro (ADMELOG) corrective regimen sliding scale   SubCutaneous three times a day before meals  midodrine. 10 milliGRAM(s) Oral three times a day  norepinephrine Infusion 0.05 MICROgram(s)/kG/Min (9.11 mL/Hr) IV Continuous <Continuous>  pantoprazole   Suspension 40 milliGRAM(s) Oral two times a day    MEDICATIONS  (PRN):  acetaminophen     Tablet .. 650 milliGRAM(s) Oral every 6 hours PRN Moderate Pain (4 - 6)  albuterol    90 MICROgram(s) HFA Inhaler 1 Puff(s) Inhalation every 6 hours PRN for shortness of breath and/or wheezing  atropine Injectable 1 milliGRAM(s) IntraMuscular once PRN HR < 30  dextrose Oral Gel 15 Gram(s) Oral once PRN Blood Glucose LESS THAN 70 milliGRAM(s)/deciliter      New X-rays reviewed:                                                                                  ECHO

## 2023-10-06 NOTE — PROGRESS NOTE ADULT - ASSESSMENT
#Metabolic Encephalopathy 2/2 septic Shock 2/2 suspected aspiration pneumonia   - rapid response after TDC due to acute change in mental status - ABG notable for elevated lactate  - pt febrile to 101.6 and elevated lactate to 8.4, rapidly became hypotensive on 10/4 - started on levophed  - continue  vancomycin and cefepime  - lactate improving  - follow up cultures   - ID consult   - procal   - off pressor, continue midodrine    - CXR (9/28) Bilateral pleural effusion/opacity unchanged. No air leak.  - On 2L NC  - C/w albuterol PRN    #Acutely worsening uremia and acute kidney failure most likely 2/2 ATN - now on HD through right IJ [place]  #hypernatremia - resolving   PAULA / CKD stable non oliguric  - udall placement 9/27/23  started on HD 9/27 but did not tolerate it with access problems  - udall changed 9/28  he received HD 9/28 and 9/30  - Close F/U of BUN/Crea/ Lytes and UO  - c/w phoslo 2/2/2   - off sodium bicarbonate   - s/p 250 cc LR bolus for hypernatremia  - s/p D5w @ 80 cc/hr  - f/u w/nephro  - 1:1 for safety and for udall observation   - continue  BUMEX 1 mg BID   - TDC by IR on 10/4  	  #Anemia of acute blood loss   #Hemorrhagic Shock Secondary to Hematochezia from Duodenal Ulcer Bleed s/p OVESCO placement 09/23  - Trend hgb, transfuse blood PRN  - continue with holding therapeutic AC  - s/p IV Protamine sulfate 36mg x1 dose on 09/23  - GI f/u appreciated  - S/p IV Protonix 40mg BID -> Ok to switch to PO BID as per GI   - Avoid any NSAIDs  - overall the pt risk and benefit is goes against Anticoagulation, will further discuss with family , , dw brother regarding the high risk of bleeding and understands the reasons to hold anticoagulation. And understands the risk of cva with Afib.     #Elevated dimer  #Acute Femoral DVT   - duplex neg on 9/29, repeat   - overall the pt risk and benefit is goes against Anticoagulation, will further discuss with family , dw brother regarding the high risk of bleeding and understands to hold anticoagulation And understands the risk of cva with Afib.   - IR consulted for IVC filter, NPO today for IVC filter     #transaminitis   - likely 2/2 liver shock 2/2 hypotension   - LFTS improving   - follow up RUQ     #Paroxysmal Afib, chronic; uncontrolled rate  #angelika cardia in evening on cardiac telemonitoring   - HOLDING therapeutic AC  - hold  metoprolol tartrate 12.5 mg q12h    #History of Left Foot OM w/ surrounding Cellulitis  *CT LLE (9/16): No CT evidence of osteomyelitis or necrotizing infection. No drainable collection seen, within the limitations of a noncontrast exam.  - Increased frailty and decreased physical capacity  - as per previous notes the team Discussed with ID attending: patient is unlikely to benefit from prolonged therapy with cefepime+vanco (to cover possible OM) due to adverse outcomes associated kidney dysfunction, cognitive impact ...etc )  - c/w Local wound care; offloading boots bilaterally, frequently turning and positioning, prevent pressure injury, keep skin clean, and monitor for wound changes and notify provider as per podiatry    #Progress Note Handoff  Pending (specify):  as above  Family discussion: house staff updated family   Disposition: SDU. DW CC   Decision to admit the pt is based on acuity as above

## 2023-10-06 NOTE — PROGRESS NOTE ADULT - SUBJECTIVE AND OBJECTIVE BOX
Nephrology progress note    THIS IS AN INCOMPLETE NOTE . FULL NOTE TO FOLLOW SHORTLY    Patient is seen and examined, events over the last 24 h noted .    Allergies:  No Known Allergies    Hospital Medications:   MEDICATIONS  (STANDING):  budesonide 160 MICROgram(s)/formoterol 4.5 MICROgram(s) Inhaler 2 Puff(s) Inhalation two times a day  buMETAnide 1 milliGRAM(s) Oral every 12 hours  calcium acetate 667 milliGRAM(s) Oral three times a day with meals  cefepime   IVPB 1000 milliGRAM(s) IV Intermittent every 24 hours  chlorhexidine 2% Cloths 1 Application(s) Topical <User Schedule>  dextrose 5%. 1000 milliLiter(s) (100 mL/Hr) IV Continuous <Continuous>  dextrose 50% Injectable 25 Gram(s) IV Push once  dextrose 50% Injectable 25 Gram(s) IV Push once  dextrose 50% Injectable 12.5 Gram(s) IV Push once  glucagon  Injectable 1 milliGRAM(s) IntraMuscular once  heparin   Injectable 5000 Unit(s) SubCutaneous every 8 hours  insulin lispro (ADMELOG) corrective regimen sliding scale   SubCutaneous three times a day before meals  midodrine. 10 milliGRAM(s) Oral three times a day  norepinephrine Infusion 0.05 MICROgram(s)/kG/Min (9.11 mL/Hr) IV Continuous <Continuous>  pantoprazole   Suspension 40 milliGRAM(s) Oral two times a day        VITALS:  T(F): 98.1 (10-06-23 @ 08:00), Max: 100.4 (10-05-23 @ 19:00)  HR: 58 (10-06-23 @ 08:00)  BP: 106/54 (10-06-23 @ 08:00)  RR: 18 (10-06-23 @ 08:00)  SpO2: 100% (10-06-23 @ 08:00)  Wt(kg): --    10-04 @ 07:01  -  10-05 @ 07:00  --------------------------------------------------------  IN: 850 mL / OUT: 875 mL / NET: -25 mL    10-05 @ 07:01  -  10-06 @ 07:00  --------------------------------------------------------  IN: 14.4 mL / OUT: 175 mL / NET: -160.6 mL          PHYSICAL EXAM:  Constitutional: NAD  HEENT: anicteric sclera, oropharynx clear, MMM  Neck: No JVD  Respiratory: CTAB, no wheezes, rales or rhonchi  Cardiovascular: S1, S2, RRR  Gastrointestinal: BS+, soft, NT/ND  Extremities: No cyanosis or clubbing. No peripheral edema  :  No barth.   Skin: No rashes    LABS:  10-06    145  |  108  |  36<H>  ----------------------------<  96  3.6   |  27  |  3.2<H>    Ca    8.1<L>      06 Oct 2023 05:49  Phos  4.1     10-05  Mg     1.7     10-06    TPro  5.2<L>  /  Alb  2.2<L>  /  TBili  0.6  /  DBili      /  AST  906<H>  /  ALT  366<H>  /  AlkPhos  74  10-06                          7.9    16.10 )-----------( 130      ( 06 Oct 2023 05:49 )             26.0       Urine Studies:  Urinalysis Basic - ( 06 Oct 2023 05:49 )    Color:  / Appearance:  / SG:  / pH:   Gluc: 96 mg/dL / Ketone:   / Bili:  / Urobili:    Blood:  / Protein:  / Nitrite:    Leuk Esterase:  / RBC:  / WBC    Sq Epi:  / Non Sq Epi:  / Bacteria:             HBsAb <3.0      [09-27-23 @ 17:44]  HBsAb Nonreact      [09-27-23 @ 17:44]  HBsAg Nonreact      [09-27-23 @ 17:44]  HBcAb Nonreact      [09-27-23 @ 17:44]        RADIOLOGY & ADDITIONAL STUDIES:   Nephrology progress note    Patient is seen and examined, events over the last 24 h noted .  Lying in bed   confused today     Allergies:  No Known Allergies    Hospital Medications:   MEDICATIONS  (STANDING):  budesonide 160 MICROgram(s)/formoterol 4.5 MICROgram(s) Inhaler 2 Puff(s) Inhalation two times a day  buMETAnide 1 milliGRAM(s) Oral every 12 hours  calcium acetate 667 milliGRAM(s) Oral three times a day with meals  cefepime   IVPB 1000 milliGRAM(s) IV Intermittent every 24 hours  glucagon  Injectable 1 milliGRAM(s) IntraMuscular once  heparin   Injectable 5000 Unit(s) SubCutaneous every 8 hours  insulin lispro (ADMELOG) corrective regimen sliding scale   SubCutaneous three times a day before meals  midodrine. 10 milliGRAM(s) Oral three times a day  norepinephrine Infusion 0.05 MICROgram(s)/kG/Min (9.11 mL/Hr) IV Continuous <Continuous>  pantoprazole   Suspension 40 milliGRAM(s) Oral two times a day        VITALS:  T(F): 98.1 (10-06-23 @ 08:00), Max: 100.4 (10-05-23 @ 19:00)  HR: 58 (10-06-23 @ 08:00)  BP: 106/54 (10-06-23 @ 08:00)  RR: 18 (10-06-23 @ 08:00)  SpO2: 100% (10-06-23 @ 08:00)    10-04 @ 07:01  -  10-05 @ 07:00  --------------------------------------------------------  IN: 850 mL / OUT: 875 mL / NET: -25 mL    10-05 @ 07:01  -  10-06 @ 07:00  --------------------------------------------------------  IN: 14.4 mL / OUT: 175 mL / NET: -160.6 mL          PHYSICAL EXAM:  Constitutional: NAD  Respiratory: CTAB, no wheezes, rales or rhonchi  Cardiovascular: S1, S2, RRR  Gastrointestinal: BS+, soft, NT/ND  Extremities: No cyanosis or clubbing. No peripheral edema  :  No barth.   Skin: No rashes    LABS:  10-06    145  |  108  |  36<H>  ----------------------------<  96  3.6   |  27  |  3.2<H>  Creatinine Trend: 3.2<--, 4.0<--, 3.9<--, 2.3<--, 2.3<--, 2.4<--  Ca    8.1<L>      06 Oct 2023 05:49  Phos  4.1     10-05  Mg     1.7     10-06    TPro  5.2<L>  /  Alb  2.2<L>  /  TBili  0.6  /  DBili      /  AST  906<H>  /  ALT  366<H>  /  AlkPhos  74  10-06                          7.9    16.10 )-----------( 130      ( 06 Oct 2023 05:49 )             26.0       Urine Studies:  Urinalysis Basic - ( 06 Oct 2023 05:49 )    Color:  / Appearance:  / SG:  / pH:   Gluc: 96 mg/dL / Ketone:   / Bili:  / Urobili:    Blood:  / Protein:  / Nitrite:    Leuk Esterase:  / RBC:  / WBC    Sq Epi:  / Non Sq Epi:  / Bacteria:             HBsAb <3.0      [09-27-23 @ 17:44]  HBsAb Nonreact      [09-27-23 @ 17:44]  HBsAg Nonreact      [09-27-23 @ 17:44]  HBcAb Nonreact      [09-27-23 @ 17:44]        RADIOLOGY & ADDITIONAL STUDIES:

## 2023-10-06 NOTE — PROGRESS NOTE ADULT - SUBJECTIVE AND OBJECTIVE BOX
Patient is a 83y old  Male who presents with a chief complaint of AMS (10-06-23)      Pt seen and examined at bedside. No CP or SOB.  Yesterday found to have acute DVT       ABG - ( 05 Oct 2023 03:37 )  pH: 7.46  /  pCO2: 33    /  pO2: 110   / HCO3: 24    / Base Excess: 0.3   /  SaO2: 99.5          PAST MEDICAL & SURGICAL HISTORY:  HTN (hypertension)    COPD (chronic obstructive pulmonary disease)    High cholesterol    Mild anemia    Coffee ground emesis    Chronic diastolic heart failure    PAULA (acute kidney injury)    No significant past surgical history        VITAL SIGNS (Last 24 hrs):  T(C): 36.7 (10-06-23 @ 11:51), Max: 38 (10-05-23 @ 19:00)  HR: 54 (10-06-23 @ 11:51) (54 - 86)  BP: 107/54 (10-06-23 @ 11:51) (106/54 - 135/58)  RR: 20 (10-06-23 @ 11:51) (8 - 20)  SpO2: 95% (10-06-23 @ 11:51) (95% - 100%)  Wt(kg): --  Daily Height in cm: 193.04 (06 Oct 2023 11:51)    Daily     I&O's Summary    05 Oct 2023 07:01  -  06 Oct 2023 07:00  --------------------------------------------------------  IN: 14.4 mL / OUT: 175 mL / NET: -160.6 mL        PHYSICAL EXAM:  GENERAL: NAD, elderly   HEAD:  Atraumatic, Normocephalic  EYES: EOMI, PERRLA, conjunctiva and sclera clear  NECK: Supple, No JVD  CHEST/LUNG: Clear to auscultation bilaterally; No wheeze  HEART: Regular rate and rhythm; No murmurs, rubs, or gallops  ABDOMEN: Soft, Nontender, Nondistended; Bowel sounds present  EXTREMITIES:  2+ Peripheral Pulses, No clubbing, cyanosis, or edema, left arm bruising, echymosisis   PSYCH: AAOx3  NEUROLOGY: non-focal  SKIN: No rashes or lesions  Labs Reviewed  Spoke to patient in regards to abnormal labs.    CBC Full  -  ( 06 Oct 2023 05:49 )  WBC Count : 16.10 K/uL  Hemoglobin : 7.9 g/dL  Hematocrit : 26.0 %  Platelet Count - Automated : 130 K/uL  Mean Cell Volume : 89.3 fL  Mean Cell Hemoglobin : 27.1 pg  Mean Cell Hemoglobin Concentration : 30.4 g/dL  Auto Neutrophil # : x  Auto Lymphocyte # : x  Auto Monocyte # : x  Auto Eosinophil # : x  Auto Basophil # : x  Auto Neutrophil % : x  Auto Lymphocyte % : x  Auto Monocyte % : x  Auto Eosinophil % : x  Auto Basophil % : x    BMP:    10-06 @ 05:49    Blood Urea Nitrogen - 36  Calcium - 8.1  Carbond Dioxide - 27  Chloride - 108  Creatinine - 3.2  Glucose - 96  Potassium - 3.6  Sodium - 145      Hemoglobin A1c -   PT/INR - ( 04 Oct 2023 11:06 )   PT: 13.00 sec;   INR: 1.14 ratio         PTT - ( 04 Oct 2023 11:06 )  PTT:30.7 sec  Urine Culture:  10-04 @ 23:28 Urine culture: --    Culture Results:   No growth at 24 hours  Method Type: --  Organism: --  Organism Identification: --  Specimen Source: .Blood Blood        COVID Labs  CRP:  72.4 (09-17-23)  52.5 (09-16-23)    Procalcitonin, Serum: 1.76 ng/mL (10-05-23 @ 11:35)    D-Dimer:  3436 ng/mL DDU (10-05-23 @ 06:02)            Imaging reviewed independently and reviewed read  < from: [facility] Duplex Lower Ext Vein Scan, Bilraffi (10.05.23 @ 17:27) >  IMPRESSION:  Acute thrombus in the right proximal femoral vein.    No evidence of deep venous thrombosis in left lower extremity.    < end of copied text >        MEDICATIONS  (STANDING):  budesonide 160 MICROgram(s)/formoterol 4.5 MICROgram(s) Inhaler 2 Puff(s) Inhalation two times a day  buMETAnide 1 milliGRAM(s) Oral every 12 hours  calcium acetate 667 milliGRAM(s) Oral three times a day with meals  cefepime   IVPB 1000 milliGRAM(s) IV Intermittent every 24 hours  chlorhexidine 2% Cloths 1 Application(s) Topical <User Schedule>  dextrose 5%. 1000 milliLiter(s) (100 mL/Hr) IV Continuous <Continuous>  dextrose 50% Injectable 25 Gram(s) IV Push once  dextrose 50% Injectable 25 Gram(s) IV Push once  dextrose 50% Injectable 12.5 Gram(s) IV Push once  glucagon  Injectable 1 milliGRAM(s) IntraMuscular once  heparin   Injectable 5000 Unit(s) SubCutaneous every 8 hours  insulin lispro (ADMELOG) corrective regimen sliding scale   SubCutaneous three times a day before meals  magnesium sulfate  IVPB 2 Gram(s) IV Intermittent every 2 hours  midodrine. 10 milliGRAM(s) Oral three times a day  norepinephrine Infusion 0.05 MICROgram(s)/kG/Min (9.11 mL/Hr) IV Continuous <Continuous>  pantoprazole   Suspension 40 milliGRAM(s) Oral two times a day    MEDICATIONS  (PRN):  acetaminophen     Tablet .. 650 milliGRAM(s) Oral every 6 hours PRN Moderate Pain (4 - 6)  albuterol    90 MICROgram(s) HFA Inhaler 1 Puff(s) Inhalation every 6 hours PRN for shortness of breath and/or wheezing  atropine Injectable 1 milliGRAM(s) IntraMuscular once PRN HR < 30  dextrose Oral Gel 15 Gram(s) Oral once PRN Blood Glucose LESS THAN 70 milliGRAM(s)/deciliter

## 2023-10-06 NOTE — PROGRESS NOTE ADULT - ASSESSMENT
Impression  Septic shock  Recent TDC   Right femoral DVT prelim   Acute hypoxemic respiratory failure on NC  Right pleural effusion  HO aspiration pneumonitis SP ABX   UGIB secondary to Duodenal ulcer SP EGD   Hematochezia resolved   Hemorrhagic Shock resolved   Metabolic Encephalopathy   PAULA / CKD on HD  History of Left Foot OM w/ surrounding Cellulitis   Sacral DTI    Plan:    CNS: Monitor mental status.  Avoid depressants      HEENT:  Oral care.  Aspiration precautions    CARDIOVASCULAR:  Goal directed fluid resuscitation.  Avoid overload.  Wean Levophed.      PULMONARY: Aggressive pulmonary toilet, including chest PT.  Albuterol PRN.  Frequent suctioning.  Incentive spirometry  keep Sao2 92 to 96%.  Right chest US     GASTROINTESTINAL : feeding per Speech and swallow.  Protonix q 12     GENITOURINARY/RENAL: Nephro following; HD per renal.  Monitor lytes and correct as needed     INFECTIOUS : Continue Cefepime and Vanc for now.  Repeat cultures.  Procal noted.  up trending.  Monitor Vacn levels.      HEMATOLOGIC: DVT prophylaxis.  Dimer noted.  FU official Repeat LE duplex.  Will need GFF.  Risk / benefit ratio remains againt AC for now.  Target Hb > 7.  Trend CBC     ENDOCRINE: Follow up FS.  Insulin protocol as needed.  BG goal 140-180    MSK/DERM:  PT/OT when cooperative.  Off loading.  Skin care      Palliative care follow up     Poor prognosis overall     SDU

## 2023-10-06 NOTE — PROGRESS NOTE ADULT - SUBJECTIVE AND OBJECTIVE BOX
Interventional Radiology Follow- Up Note      83y Male s/p non tunneled hemodialysis catheter removal on 10/4 in Interventional Radiology with Dr Cordero         Vitals: T(F): 98.1 (10-06-23 @ 08:00), Max: 100.4 (10-05-23 @ 19:00)  HR: 58 (10-06-23 @ 08:00) (58 - 99)  BP: 106/54 (10-06-23 @ 08:00) (106/54 - 146/82)  RR: 18 (10-06-23 @ 08:00) (8 - 20)  SpO2: 100% (10-06-23 @ 08:00) (88% - 100%)  Wt(kg): --    LABS:                        7.9    16.10 )-----------( 130      ( 06 Oct 2023 05:49 )             26.0     10-06    145  |  108  |  36<H>  ----------------------------<  96  3.6   |  27  |  3.2<H>    Ca    8.1<L>      06 Oct 2023 05:49  Phos  4.1     10-05  Mg     1.7     10-06    TPro  5.2<L>  /  Alb  2.2<L>  /  TBili  0.6  /  DBili  x   /  AST  906<H>  /  ALT  366<H>  /  AlkPhos  74  10-06    PT/INR - ( 04 Oct 2023 11:06 )   PT: 13.00 sec;   INR: 1.14 ratio         PTT - ( 04 Oct 2023 11:06 )  PTT:30.7 sec    Impression: 83y Male admitted with Altered mental status      Plan:  83y Male s/p non tunneled hemodialysis catheter removal on 10/4. Reconsulted for IVC filter placement due to acute DVT in short segment R femoral vein. IR reconsulted for IVC filter.  - Patient on heparin subQ from 9/16-9/20, 9/29-10/6  - plan for IVC filter placement today 10/6  - keep patient NPO  - pertinent labs WNL    Please call Interventional Radiology [phone] with any questions, concerns, or issues regarding above.

## 2023-10-06 NOTE — PHARMACOTHERAPY INTERVENTION NOTE - INTERVENTION TYPE RECOOMEND
Dose Optimization/Non-renal Dose Adjustment
Pharmacokinetics Evaluation
Therapy Recommended - Alternative treatment
Therapy Recommended - Med Rec related

## 2023-10-06 NOTE — CHART NOTE - NSCHARTNOTEFT_GEN_A_CORE
PACU ANESTHESIA ADMISSION NOTE      Procedure:   Post op diagnosis:      ____  Intubated  TV:______       Rate: ______      FiO2: ______    _x___  Patent Airway    x____  Full return of protective reflexes    _x___  Full recovery from anesthesia / back to baseline status    Vitals:  temp(F) 98  /52  spo2 98  RR 18  pulse 56    Mental Status:  __x__ Awake   _____ Alert   _____ Drowsy   _____ Sedated    Nausea/Vomiting:  __x__ NO  ______Yes,   See Post - Op Orders          Pain Scale (0-10):  _____    Treatment: ____ None    __x__ See Post - Op/PCA Orders    Post - Operative Fluids:   ____ Oral   __xx__ See Post - Op Orders    Plan: Discharge:   ____Home       _____Floor     __x ___Critical Care    _____  Other:_________________    Comments: uneventful anesthesia course no complications. Vitals stable. Pt transferred to PACU

## 2023-10-06 NOTE — PROGRESS NOTE ADULT - ASSESSMENT
82 yo m ho DM, COPD, anemia, paroxysmal afib on xarelto, HFrEF, DM coming in from nursing home for AMS x 2 days. Found to have toxic metabolic encephalopathy with PAULA.    PAULA/ toxic metabolic encephalopathy/ HAGMA/ HFrEF/ hypernatremia  possible ATN iso hypotension and possible sepsis/ vanc toxicity  no hydro on imaging  creat trend noted / trending up   had lactic acidosis ? aspiration   sp HD yesterday / may need HD 2 x /week / will assess in AM   check daily cr - BMP   non oliguric, started on bumex  phos level noted; keep phoslo to 1 tab TID qac  BP noted;  cont midodrine only pre-HD   will follow    prognosis poor

## 2023-10-06 NOTE — PRE-ANESTHESIA EVALUATION ADULT - NSRADCARDRESULTSFT_GEN_ALL_CORE
. Technically difficult study.   2. Left ventricular ejection fraction, by visual estimation, is >55%.   3. Normal global left ventricular systolic function.   4. The left ventricular diastolic function could not be assessed in this   study.   5. The aortic valve was not well visualized; appears calcified with   grossly normal opening.   6. Mild mitral regurgitation.   7. Mild tricuspid regurgitation.

## 2023-10-06 NOTE — PROGRESS NOTE ADULT - SUBJECTIVE AND OBJECTIVE BOX
INTERVENTIONAL RADIOLOGY BRIEF PROCEDURE NOTE    Procedure: IVC filter placement  Pre-op diagnosis: DVT, hematochezia  Post-op diagnosis: Same  Attending: Hayley Zuniga MD  Resident: Rodriguez Longoria MD    Anesthesia:  [ ] General anesthesia  [x] Deep sedation  [ ] Conscious sedation  [x] Local    Total face-to-face sedation time: See separate anesthesia record.  Total sedation: Provided by anesthesiology.  Contrast: 40 cc Visipaque 320  Estimated blood loss: Minimal    Condition:   [ ] Critical  [ ] Serious  [ ] Fair   [x] Good    Findings: Argon Option Elite retrievable IVC filter placed in infrarenal IVC.  Specimens removed: As above.  Implants: As above.  Complications: None immediate.  Disposition: Back to IR recovery then floor.    Please call Interventional Radiology [phone] if questions.

## 2023-10-07 LAB
ALBUMIN SERPL ELPH-MCNC: 2.7 G/DL — LOW (ref 3.5–5.2)
ALBUMIN SERPL ELPH-MCNC: 2.8 G/DL — LOW (ref 3.5–5.2)
ALP SERPL-CCNC: 83 U/L — SIGNIFICANT CHANGE UP (ref 30–115)
ALP SERPL-CCNC: 92 U/L — SIGNIFICANT CHANGE UP (ref 30–115)
ALT FLD-CCNC: 531 U/L — HIGH (ref 0–41)
ALT FLD-CCNC: 556 U/L — HIGH (ref 0–41)
ANION GAP SERPL CALC-SCNC: 12 MMOL/L — SIGNIFICANT CHANGE UP (ref 7–14)
ANION GAP SERPL CALC-SCNC: 13 MMOL/L — SIGNIFICANT CHANGE UP (ref 7–14)
AST SERPL-CCNC: 1175 U/L — HIGH (ref 0–41)
AST SERPL-CCNC: 955 U/L — HIGH (ref 0–41)
BILIRUB SERPL-MCNC: 0.7 MG/DL — SIGNIFICANT CHANGE UP (ref 0.2–1.2)
BILIRUB SERPL-MCNC: 0.7 MG/DL — SIGNIFICANT CHANGE UP (ref 0.2–1.2)
BUN SERPL-MCNC: 18 MG/DL — SIGNIFICANT CHANGE UP (ref 10–20)
BUN SERPL-MCNC: 45 MG/DL — HIGH (ref 10–20)
CALCIUM SERPL-MCNC: 8 MG/DL — LOW (ref 8.4–10.5)
CALCIUM SERPL-MCNC: 8.2 MG/DL — LOW (ref 8.4–10.5)
CHLORIDE SERPL-SCNC: 106 MMOL/L — SIGNIFICANT CHANGE UP (ref 98–110)
CHLORIDE SERPL-SCNC: 108 MMOL/L — SIGNIFICANT CHANGE UP (ref 98–110)
CO2 SERPL-SCNC: 24 MMOL/L — SIGNIFICANT CHANGE UP (ref 17–32)
CO2 SERPL-SCNC: 25 MMOL/L — SIGNIFICANT CHANGE UP (ref 17–32)
CREAT SERPL-MCNC: 2 MG/DL — HIGH (ref 0.7–1.5)
CREAT SERPL-MCNC: 3.3 MG/DL — HIGH (ref 0.7–1.5)
EGFR: 18 ML/MIN/1.73M2 — LOW
EGFR: 33 ML/MIN/1.73M2 — LOW
GLUCOSE BLDC GLUCOMTR-MCNC: 104 MG/DL — HIGH (ref 70–99)
GLUCOSE BLDC GLUCOMTR-MCNC: 70 MG/DL — SIGNIFICANT CHANGE UP (ref 70–99)
GLUCOSE BLDC GLUCOMTR-MCNC: 88 MG/DL — SIGNIFICANT CHANGE UP (ref 70–99)
GLUCOSE BLDC GLUCOMTR-MCNC: 90 MG/DL — SIGNIFICANT CHANGE UP (ref 70–99)
GLUCOSE BLDC GLUCOMTR-MCNC: 94 MG/DL — SIGNIFICANT CHANGE UP (ref 70–99)
GLUCOSE SERPL-MCNC: 116 MG/DL — HIGH (ref 70–99)
GLUCOSE SERPL-MCNC: 92 MG/DL — SIGNIFICANT CHANGE UP (ref 70–99)
HCT VFR BLD CALC: 27 % — LOW (ref 42–52)
HGB BLD-MCNC: 8.3 G/DL — LOW (ref 14–18)
IRON SATN MFR SERPL: 23 % — SIGNIFICANT CHANGE UP (ref 15–50)
IRON SATN MFR SERPL: 31 UG/DL — LOW (ref 35–150)
MAGNESIUM SERPL-MCNC: 2.5 MG/DL — HIGH (ref 1.8–2.4)
MCHC RBC-ENTMCNC: 27.9 PG — SIGNIFICANT CHANGE UP (ref 27–31)
MCHC RBC-ENTMCNC: 30.7 G/DL — LOW (ref 32–37)
MCV RBC AUTO: 90.9 FL — SIGNIFICANT CHANGE UP (ref 80–94)
NRBC # BLD: 0 /100 WBCS — SIGNIFICANT CHANGE UP (ref 0–0)
PHOSPHATE SERPL-MCNC: 3.1 MG/DL — SIGNIFICANT CHANGE UP (ref 2.1–4.9)
PLATELET # BLD AUTO: 185 K/UL — SIGNIFICANT CHANGE UP (ref 130–400)
PMV BLD: 11.3 FL — HIGH (ref 7.4–10.4)
POTASSIUM SERPL-MCNC: 3.3 MMOL/L — LOW (ref 3.5–5)
POTASSIUM SERPL-MCNC: 3.5 MMOL/L — SIGNIFICANT CHANGE UP (ref 3.5–5)
POTASSIUM SERPL-SCNC: 3.3 MMOL/L — LOW (ref 3.5–5)
POTASSIUM SERPL-SCNC: 3.5 MMOL/L — SIGNIFICANT CHANGE UP (ref 3.5–5)
PROT SERPL-MCNC: 5.5 G/DL — LOW (ref 6–8)
PROT SERPL-MCNC: 5.6 G/DL — LOW (ref 6–8)
RBC # BLD: 2.97 M/UL — LOW (ref 4.7–6.1)
RBC # FLD: 21.7 % — HIGH (ref 11.5–14.5)
SODIUM SERPL-SCNC: 143 MMOL/L — SIGNIFICANT CHANGE UP (ref 135–146)
SODIUM SERPL-SCNC: 145 MMOL/L — SIGNIFICANT CHANGE UP (ref 135–146)
TIBC SERPL-MCNC: 137 UG/DL — LOW (ref 220–430)
UIBC SERPL-MCNC: 106 UG/DL — LOW (ref 110–370)
VANCOMYCIN TROUGH SERPL-MCNC: 15.7 UG/ML — HIGH (ref 5–10)
WBC # BLD: 13.16 K/UL — HIGH (ref 4.8–10.8)
WBC # FLD AUTO: 13.16 K/UL — HIGH (ref 4.8–10.8)

## 2023-10-07 PROCEDURE — 99233 SBSQ HOSP IP/OBS HIGH 50: CPT

## 2023-10-07 RX ORDER — VANCOMYCIN HCL 1 G
1000 VIAL (EA) INTRAVENOUS ONCE
Refills: 0 | Status: COMPLETED | OUTPATIENT
Start: 2023-10-07 | End: 2023-10-07

## 2023-10-07 RX ORDER — BUMETANIDE 0.25 MG/ML
2 INJECTION INTRAMUSCULAR; INTRAVENOUS EVERY 12 HOURS
Refills: 0 | Status: DISCONTINUED | OUTPATIENT
Start: 2023-10-07 | End: 2023-10-12

## 2023-10-07 RX ORDER — POTASSIUM CHLORIDE 20 MEQ
40 PACKET (EA) ORAL ONCE
Refills: 0 | Status: COMPLETED | OUTPATIENT
Start: 2023-10-07 | End: 2023-10-07

## 2023-10-07 RX ADMIN — Medication 250 MILLIGRAM(S): at 13:13

## 2023-10-07 RX ADMIN — MIDODRINE HYDROCHLORIDE 10 MILLIGRAM(S): 2.5 TABLET ORAL at 13:19

## 2023-10-07 RX ADMIN — MIDODRINE HYDROCHLORIDE 10 MILLIGRAM(S): 2.5 TABLET ORAL at 05:10

## 2023-10-07 RX ADMIN — HEPARIN SODIUM 5000 UNIT(S): 5000 INJECTION INTRAVENOUS; SUBCUTANEOUS at 13:18

## 2023-10-07 RX ADMIN — CHLORHEXIDINE GLUCONATE 1 APPLICATION(S): 213 SOLUTION TOPICAL at 05:16

## 2023-10-07 RX ADMIN — Medication 667 MILLIGRAM(S): at 17:32

## 2023-10-07 RX ADMIN — HEPARIN SODIUM 5000 UNIT(S): 5000 INJECTION INTRAVENOUS; SUBCUTANEOUS at 05:09

## 2023-10-07 RX ADMIN — CEFEPIME 100 MILLIGRAM(S): 1 INJECTION, POWDER, FOR SOLUTION INTRAMUSCULAR; INTRAVENOUS at 11:25

## 2023-10-07 RX ADMIN — Medication 667 MILLIGRAM(S): at 13:19

## 2023-10-07 RX ADMIN — PANTOPRAZOLE SODIUM 40 MILLIGRAM(S): 20 TABLET, DELAYED RELEASE ORAL at 17:32

## 2023-10-07 RX ADMIN — BUMETANIDE 1 MILLIGRAM(S): 0.25 INJECTION INTRAMUSCULAR; INTRAVENOUS at 05:50

## 2023-10-07 RX ADMIN — PANTOPRAZOLE SODIUM 40 MILLIGRAM(S): 20 TABLET, DELAYED RELEASE ORAL at 05:10

## 2023-10-07 RX ADMIN — HEPARIN SODIUM 5000 UNIT(S): 5000 INJECTION INTRAVENOUS; SUBCUTANEOUS at 21:13

## 2023-10-07 RX ADMIN — MIDODRINE HYDROCHLORIDE 10 MILLIGRAM(S): 2.5 TABLET ORAL at 17:32

## 2023-10-07 RX ADMIN — BUMETANIDE 2 MILLIGRAM(S): 0.25 INJECTION INTRAMUSCULAR; INTRAVENOUS at 17:32

## 2023-10-07 NOTE — PROGRESS NOTE ADULT - ASSESSMENT
ASSESSMENT  82 yo m ho DM, COPD, anemia, lymphedemia, afib on xarelto, HFrEF, DM coming in from nursing home for AMS x 2 days. Unable to gather story from pt as he is altered and lethargic.    IMPRESSION  #Significant transaminitis starting 10/5  Clinically no RUQ abd pain  No evidence of cholangitis/cholecystitis  10/6 US poor study.  Possibly ischemic  Drug induced ?  See no need for ABx presently and will recommend holding ABx    #Cellulitis LEs : see none. Left heel has a pressure related ichemic dry ulcer. No active cellulitis    #R FV DVT . S/p IVC filter    # No bacterial PNA    # Possible L3-4 discitis vs degenerative changes . BCX 9/16,17,26, 10/4 NGTD. Will nor recommend empiric ABx    -d/c ABx  -Off loading to prevent pressure sores and preventive measures to avoid aspiration   -heels off the bed at all times   -monitor LFTs

## 2023-10-07 NOTE — PROGRESS NOTE ADULT - SUBJECTIVE AND OBJECTIVE BOX
Over Night Events: events noted, IR reviewed, sp IVC filter, afebrile renal noted, cough  PHYSICAL EXAM    ICU Vital Signs Last 24 Hrs  T(C): 37.1 (07 Oct 2023 07:30), Max: 37.4 (07 Oct 2023 00:00)  T(F): 98.7 (07 Oct 2023 07:30), Max: 99.3 (07 Oct 2023 00:00)  HR: 68 (07 Oct 2023 08:25) (50 - 73)  BP: 123/55 (07 Oct 2023 08:25) (88/52 - 171/69)  BP(mean): 87 (07 Oct 2023 07:30) (76 - 105)  RR: 20 (07 Oct 2023 08:25) (18 - 26)  SpO2: 100% (07 Oct 2023 08:25) (95% - 100%)    O2 Parameters below as of 07 Oct 2023 08:25  Patient On (Oxygen Delivery Method): nasal cannula  O2 Flow (L/min): 2          General: chronically ill looking  Lungs: Bilateral rhonchi  Cardiovascular: MAY 2.6  Abdomen: Soft, Positive BS  Extremities: No clubbing   Neurological: Non focal       10-06-23 @ 07:01  -  10-07-23 @ 07:00  --------------------------------------------------------  IN:  Total IN: 0 mL    OUT:    Incontinent per Condom Catheter (mL): 100 mL  Total OUT: 100 mL    Total NET: -100 mL          LABS:                          8.3    13.16 )-----------( 185      ( 07 Oct 2023 05:47 )             27.0                                               10-07    143  |  106  |  45<H>  ----------------------------<  92  3.3<L>   |  25  |  3.3<H>    Ca    8.2<L>      07 Oct 2023 05:47  Phos  3.1     10-07  Mg     2.5     10-07    TPro  5.5<L>  /  Alb  2.8<L>  /  TBili  0.7  /  DBili  x   /  AST  1175<H>  /  ALT  556<H>  /  AlkPhos  83  10-07                                             Urinalysis Basic - ( 07 Oct 2023 05:47 )    Color: x / Appearance: x / SG: x / pH: x  Gluc: 92 mg/dL / Ketone: x  / Bili: x / Urobili: x   Blood: x / Protein: x / Nitrite: x   Leuk Esterase: x / RBC: x / WBC x   Sq Epi: x / Non Sq Epi: x / Bacteria: x                                                  LIVER FUNCTIONS - ( 07 Oct 2023 05:47 )  Alb: 2.8 g/dL / Pro: 5.5 g/dL / ALK PHOS: 83 U/L / ALT: 556 U/L / AST: 1175 U/L / GGT: x                                                  Culture - Blood (collected 04 Oct 2023 23:28)  Source: .Blood Blood  Preliminary Report (07 Oct 2023 07:02):    No growth at 48 Hours                                                                                           MEDICATIONS  (STANDING):  budesonide 160 MICROgram(s)/formoterol 4.5 MICROgram(s) Inhaler 2 Puff(s) Inhalation two times a day  buMETAnide 2 milliGRAM(s) Oral every 12 hours  calcium acetate 667 milliGRAM(s) Oral three times a day with meals  cefepime   IVPB 1000 milliGRAM(s) IV Intermittent every 24 hours  chlorhexidine 2% Cloths 1 Application(s) Topical <User Schedule>  dextrose 5%. 1000 milliLiter(s) (100 mL/Hr) IV Continuous <Continuous>  dextrose 50% Injectable 25 Gram(s) IV Push once  dextrose 50% Injectable 25 Gram(s) IV Push once  dextrose 50% Injectable 12.5 Gram(s) IV Push once  glucagon  Injectable 1 milliGRAM(s) IntraMuscular once  heparin   Injectable 5000 Unit(s) SubCutaneous every 8 hours  insulin lispro (ADMELOG) corrective regimen sliding scale   SubCutaneous three times a day before meals  midodrine. 10 milliGRAM(s) Oral three times a day  norepinephrine Infusion 0.05 MICROgram(s)/kG/Min (9.11 mL/Hr) IV Continuous <Continuous>  pantoprazole   Suspension 40 milliGRAM(s) Oral two times a day  potassium chloride    Tablet ER 40 milliEquivalent(s) Oral once    MEDICATIONS  (PRN):  acetaminophen     Tablet .. 650 milliGRAM(s) Oral every 6 hours PRN Moderate Pain (4 - 6)  albuterol    90 MICROgram(s) HFA Inhaler 1 Puff(s) Inhalation every 6 hours PRN for shortness of breath and/or wheezing  atropine Injectable 1 milliGRAM(s) IntraMuscular once PRN HR < 30  dextrose Oral Gel 15 Gram(s) Oral once PRN Blood Glucose LESS THAN 70 milliGRAM(s)/deciliter      CXR noted

## 2023-10-07 NOTE — CONSULT NOTE ADULT - ASSESSMENT
83y Male with DM, COPD, anemia, AF on Xarelto, HFrED, DM, NH resident,   Complicated hospital course including hypoxia, sepsis, PAULA on CKD now started on HD, acute DVT, s/p IVC filter  reported bradycardia, metoprolol held,   monitor with HR 70-90s, lowest HR 55 noted, at night time    AF  [id number] Score is 6-7  hypoxia  HF mrEF  CKD  sepsis  AMS  HTN, DM      con't current management  no evidence of tachy angelika  resume metoprolol, use tartrate for easier titration  Maintain electrolytes K>4.0 Mg >2.0  con't AC  recall EP prn

## 2023-10-07 NOTE — PROGRESS NOTE ADULT - ASSESSMENT
Impression    Septic shock  Recent TDC   Right femoral DVT sp IVC flter  Acute hypoxemic respiratory failure on NC  Right pleural effusion  HO aspiration pneumonitis SP ABX   UGIB secondary to Duodenal ulcer SP EGD   Hematochezia resolved   Hemorrhagic Shock resolved   Metabolic Encephalopathy   PAULA / CKD on HD  History of Left Foot OM w/ surrounding Cellulitis   Sacral DTI    Plan:    CNS: Monitor mental status.  Avoid depressants      HEENT:  Oral care.  Aspiration precautions    CARDIOVASCULAR:  Goal directed fluid resuscitation.  Avoid overload    PULMONARY: Aggressive pulmonary toilet, including chest PT.  Albuterol PRN.  Frequent suctioning.  Incentive spirometry  keep Sao2 92 to 96%.  repeat CXR    GASTROINTESTINAL : feeding per Speech and swallow.  Protonix q 12     GENITOURINARY/RENAL: Nephro following; HD per renal.  Monitor lytes and correct as needed     INFECTIOUS : ABX PER ID    HEMATOLOGIC: DVT prophylaxis.   Target Hb > 7.  Trend CBC     ENDOCRINE: Follow up FS.  Insulin protocol as needed.  BG goal 140-180    MSK/DERM:  PT/OT when cooperative.  Off loading.  Skin care      Palliative care follow up     Poor prognosis overall     SDU

## 2023-10-07 NOTE — PHARMACOTHERAPY INTERVENTION NOTE - NSPHARMCOMMASP
ASP - Lab/ test recommended
ASP - Therapy recommended/ Alternative therapy
ASP - DC Therapy - no infection
ASP - Renal dose adjustment
ASP - Dose optimization/Non-Renal dose adjustment

## 2023-10-07 NOTE — CONSULT NOTE ADULT - NS ATTEND AMEND GEN_ALL_CORE FT
Pt eamined.  unable to provide history.  abd soft.  towel in genital region mildly wet.  no sp distension.
complex patient  I reviewed telemetry  No indication for pacemaker at this time

## 2023-10-07 NOTE — PROGRESS NOTE ADULT - ASSESSMENT
#Metabolic Encephalopathy 2/2 septic Shock 2/2 suspected aspiration pneumonia   - rapid response after TDC due to acute change in mental status - ABG notable for elevated lactate  - pt febrile to 101.6 and elevated lactate to 8.4, rapidly became hypotensive on 10/4 - started on levophed  - continue  vancomycin and cefepime  - lactate improving  - follow up cultures   - ID consult   - procal   - off pressor, continue midodrine    - CXR (9/28) Bilateral pleural effusion/opacity unchanged. No air leak.  - On 2L NC  - C/w albuterol PRN    #Acutely worsening uremia and acute kidney failure most likely 2/2 ATN - now on HD through right IJ [place]  #hypernatremia - resolving   PAULA / CKD stable non oliguric  - udall placement 9/27/23  started on HD 9/27 but did not tolerate it with access problems  - udall changed 9/28  he received HD 9/28 and 9/30  - Close F/U of BUN/Crea/ Lytes and UO  - c/w phoslo 2/2/2   - off sodium bicarbonate   - s/p 250 cc LR bolus for hypernatremia  - s/p D5w @ 80 cc/hr  - f/u w/nephro  - 1:1 for safety and for udall observation   - continue  BUMEX 1 mg BID   - TDC by IR on 10/4  	  #Anemia of acute blood loss   #Hemorrhagic Shock Secondary to Hematochezia from Duodenal Ulcer Bleed s/p OVESCO placement 09/23  - Trend hgb, transfuse blood PRN  - continue with holding therapeutic AC  - s/p IV Protamine sulfate 36mg x1 dose on 09/23  - GI f/u appreciated  - S/p IV Protonix 40mg BID -> Ok to switch to PO BID as per GI   - Avoid any NSAIDs  - overall the pt risk and benefit is goes against Anticoagulation, will further discuss with family , , dw brother regarding the high risk of bleeding and understands the reasons to hold anticoagulation. And understands the risk of cva with Afib.     #Elevated dimer  #Acute Femoral DVT s/p IVC filter  - duplex neg on 9/29, repeat   - overall the pt risk and benefit is goes against Anticoagulation, will further discuss with family , dw brother regarding the high risk of bleeding and understands to hold anticoagulation And understands the risk of cva with Afib.    -s/p IVC filter 10/6    #transaminitis   - likely 2/2 liver shock 2/2 hypotension   - LFTS improving   - follow up RUQ   - hepatitis neg   - worsened today   pt is on cefepieme, will follow up with ID   - trend   - if continues to worsen will get hepatology     #Paroxysmal Afib, chronic; uncontrolled rate  #angelika cardia in evening on cardiac telemonitoring   - HOLDING therapeutic AC  - hold  metoprolol tartrate 12.5 mg q12h    #History of Left Foot OM w/ surrounding Cellulitis  *CT LLE (9/16): No CT evidence of osteomyelitis or necrotizing infection. No drainable collection seen, within the limitations of a noncontrast exam.  - Increased frailty and decreased physical capacity  - as per previous notes the team Discussed with ID attending: patient is unlikely to benefit from prolonged therapy with cefepime+vanco (to cover possible OM) due to adverse outcomes associated kidney dysfunction, cognitive impact ...etc )  - c/w Local wound care; offloading boots bilaterally, frequently turning and positioning, prevent pressure injury, keep skin clean, and monitor for wound changes and notify provider as per podiatry    #Progress Note Handoff  Pending (specify):  as above  Family discussion: house staff updated family   Disposition: SDU. DW CC   Decision to admit the pt is based on acuity as above

## 2023-10-07 NOTE — PROGRESS NOTE ADULT - SUBJECTIVE AND OBJECTIVE BOX
GUILLERMINANIMO  83y, Male    All available historical data reviewed    OVERNIGHT EVENTS:  no fevers  no abd pain  no LBP  no diarrhea    ROS:  alert  not reliable  on HD through right tesio    VITALS:  T(F): 98.7, Max: 99.3 (10-07-23 @ 00:00)  HR: 68  BP: 123/55  RR: 20Vital Signs Last 24 Hrs  T(C): 37.1 (07 Oct 2023 07:30), Max: 37.4 (07 Oct 2023 00:00)  T(F): 98.7 (07 Oct 2023 07:30), Max: 99.3 (07 Oct 2023 00:00)  HR: 68 (07 Oct 2023 08:25) (50 - 73)  BP: 123/55 (07 Oct 2023 08:25) (88/52 - 171/69)  BP(mean): 87 (07 Oct 2023 07:30) (76 - 105)  RR: 20 (07 Oct 2023 08:25) (18 - 26)  SpO2: 100% (07 Oct 2023 08:25) (95% - 100%)    Parameters below as of 07 Oct 2023 08:25  Patient On (Oxygen Delivery Method): nasal cannula  O2 Flow (L/min): 2      TESTS & MEASUREMENTS:                        8.3    13.16 )-----------( 185      ( 07 Oct 2023 05:47 )             27.0     10-07    143  |  106  |  45<H>  ----------------------------<  92  3.3<L>   |  25  |  3.3<H>    Ca    8.2<L>      07 Oct 2023 05:47  Phos  3.1     10-07  Mg     2.5     10-07    TPro  5.5<L>  /  Alb  2.8<L>  /  TBili  0.7  /  DBili  x   /  AST  1175<H>  /  ALT  556<H>  /  AlkPhos  83  10-07    LIVER FUNCTIONS - ( 07 Oct 2023 05:47 )  Alb: 2.8 g/dL / Pro: 5.5 g/dL / ALK PHOS: 83 U/L / ALT: 556 U/L / AST: 1175 U/L / GGT: x             Culture - Blood (collected 10-04-23 @ 23:28)  Source: .Blood Blood  Preliminary Report (10-07-23 @ 07:02):    No growth at 48 Hours      Urinalysis Basic - ( 07 Oct 2023 05:47 )    Color: x / Appearance: x / SG: x / pH: x  Gluc: 92 mg/dL / Ketone: x  / Bili: x / Urobili: x   Blood: x / Protein: x / Nitrite: x   Leuk Esterase: x / RBC: x / WBC x   Sq Epi: x / Non Sq Epi: x / Bacteria: x          RADIOLOGY & ADDITIONAL TESTS:  Personal review of radiological diagnostics performed  Echo and EKG results noted when applicable.     MEDICATIONS:  acetaminophen     Tablet .. 650 milliGRAM(s) Oral every 6 hours PRN  albuterol    90 MICROgram(s) HFA Inhaler 1 Puff(s) Inhalation every 6 hours PRN  atropine Injectable 1 milliGRAM(s) IntraMuscular once PRN  budesonide 160 MICROgram(s)/formoterol 4.5 MICROgram(s) Inhaler 2 Puff(s) Inhalation two times a day  buMETAnide 2 milliGRAM(s) Oral every 12 hours  calcium acetate 667 milliGRAM(s) Oral three times a day with meals  cefepime   IVPB 1000 milliGRAM(s) IV Intermittent every 24 hours  chlorhexidine 2% Cloths 1 Application(s) Topical <User Schedule>  dextrose 5%. 1000 milliLiter(s) IV Continuous <Continuous>  dextrose 50% Injectable 25 Gram(s) IV Push once  dextrose 50% Injectable 25 Gram(s) IV Push once  dextrose 50% Injectable 12.5 Gram(s) IV Push once  dextrose Oral Gel 15 Gram(s) Oral once PRN  glucagon  Injectable 1 milliGRAM(s) IntraMuscular once  heparin   Injectable 5000 Unit(s) SubCutaneous every 8 hours  insulin lispro (ADMELOG) corrective regimen sliding scale   SubCutaneous three times a day before meals  midodrine. 10 milliGRAM(s) Oral three times a day  norepinephrine Infusion 0.05 MICROgram(s)/kG/Min IV Continuous <Continuous>  pantoprazole   Suspension 40 milliGRAM(s) Oral two times a day      ANTIBIOTICS:  cefepime   IVPB 1000 milliGRAM(s) IV Intermittent every 24 hours

## 2023-10-07 NOTE — PROGRESS NOTE ADULT - SUBJECTIVE AND OBJECTIVE BOX
seen and examined  24 h events noted   no distress         PAST HISTORY  --------------------------------------------------------------------------------  No significant changes to PMH, PSH, FHx, SHx, unless otherwise noted    ALLERGIES & MEDICATIONS  --------------------------------------------------------------------------------  Allergies    No Known Allergies    Intolerances      Standing Inpatient Medications  budesonide 160 MICROgram(s)/formoterol 4.5 MICROgram(s) Inhaler 2 Puff(s) Inhalation two times a day  buMETAnide 1 milliGRAM(s) Oral every 12 hours  calcium acetate 667 milliGRAM(s) Oral three times a day with meals  cefepime   IVPB 1000 milliGRAM(s) IV Intermittent every 24 hours  chlorhexidine 2% Cloths 1 Application(s) Topical <User Schedule>  dextrose 5%. 1000 milliLiter(s) IV Continuous <Continuous>  dextrose 50% Injectable 12.5 Gram(s) IV Push once  dextrose 50% Injectable 25 Gram(s) IV Push once  dextrose 50% Injectable 25 Gram(s) IV Push once  glucagon  Injectable 1 milliGRAM(s) IntraMuscular once  heparin   Injectable 5000 Unit(s) SubCutaneous every 8 hours  insulin lispro (ADMELOG) corrective regimen sliding scale   SubCutaneous three times a day before meals  midodrine. 10 milliGRAM(s) Oral three times a day  norepinephrine Infusion 0.05 MICROgram(s)/kG/Min IV Continuous <Continuous>  pantoprazole   Suspension 40 milliGRAM(s) Oral two times a day    PRN Inpatient Medications  acetaminophen     Tablet .. 650 milliGRAM(s) Oral every 6 hours PRN  albuterol    90 MICROgram(s) HFA Inhaler 1 Puff(s) Inhalation every 6 hours PRN  atropine Injectable 1 milliGRAM(s) IntraMuscular once PRN  dextrose Oral Gel 15 Gram(s) Oral once PRN      VITALS/PHYSICAL EXAM  --------------------------------------------------------------------------------  T(C): 37.1 (10-07-23 @ 04:00), Max: 37.4 (10-07-23 @ 00:00)  HR: 69 (10-07-23 @ 04:00) (50 - 73)  BP: 136/90 (10-07-23 @ 04:00) (88/52 - 171/69)  RR: 21 (10-07-23 @ 04:00) (18 - 26)  SpO2: 98% (10-07-23 @ 04:00) (95% - 100%)  Wt(kg): --  Height (cm): 193 (10-06-23 @ 11:51)  Weight (kg): 97.2 (10-06-23 @ 11:51)  BMI (kg/m2): 26.1 (10-06-23 @ 11:51)  BSA (m2): 2.28 (10-06-23 @ 11:51)      10-05-23 @ 07:01  -  10-06-23 @ 07:00  --------------------------------------------------------  IN: 14.4 mL / OUT: 175 mL / NET: -160.6 mL      Physical Exam:  	Gen: NAD  	Pulm: decrease BS  B/L  	CV:  S1S2; no rub  	Abd:  soft, nondistended  	LE:  no edema  	Vascular access:tdc     LABS/STUDIES  --------------------------------------------------------------------------------              8.3    13.16 >-----------<  185      [10-07-23 @ 05:47]              27.0     145  |  108  |  36  ----------------------------<  96      [10-06-23 @ 05:49]  3.6   |  27  |  3.2        Ca     8.1     [10-06-23 @ 05:49]      Mg     1.7     [10-06-23 @ 05:49]      Phos  4.1     [10-05-23 @ 11:35]    TPro  5.2  /  Alb  2.2  /  TBili  0.6  /  DBili  x   /  AST  906  /  ALT  366  /  AlkPhos  74  [10-06-23 @ 05:49]      Creatinine Trend:  SCr 3.2 [10-06 @ 05:49]  SCr 4.0 [10-05 @ 11:35]  SCr 3.9 [10-05 @ 06:02]  SCr 2.3 [10-04 @ 11:06]  SCr 2.3 [10-04 @ 08:10]    Urinalysis - [10-06-23 @ 05:49]      Color  / Appearance  / SG  / pH       Gluc 96 / Ketone   / Bili  / Urobili        Blood  / Protein  / Leuk Est  / Nitrite       RBC  / WBC  / Hyaline  / Gran  / Sq Epi  / Non Sq Epi  / Bacteria         HBsAb <3.0      [09-27-23 @ 17:44]  HBsAb Nonreact      [09-27-23 @ 17:44]  HBsAg Nonreact      [09-27-23 @ 17:44]  HBcAb Nonreact      [09-27-23 @ 17:44]

## 2023-10-07 NOTE — SWALLOW BEDSIDE ASSESSMENT ADULT - SLP PERTINENT HISTORY OF CURRENT PROBLEM
Pt is an 84 y/o M w/ PMHx: DM, COPD, anemia, lymphedema, afib on Xarelto, HFrEF, DM, presents from NH for AMS. Pt is being treated for anemia, hemorrhagic shock 2' hematochezia from duodenal ulcer bleed s/p OVESCO placement 09/23. Course c/b acutely worsening uremia and acute kidney failure most likely 2' ATN- now on HD through right IJ [place]. +Metabolic encephalopathy, hypernatremia, AHRF- possible aspiration PNA, volume overload, R pleural effusions, hypotension.

## 2023-10-07 NOTE — CONSULT NOTE ADULT - TIME BILLING
I have personally seen and examined this patient.    I have reviewed all pertinent clinical information and reviewed all relevant imaging and diagnostic studies personally.   I counseled the patient about diagnostic testing and treatment plan. All questions were answered.   I discussed recommendations with the primary team.
bradycardia, AF

## 2023-10-07 NOTE — SWALLOW BEDSIDE ASSESSMENT ADULT - COMMENTS
Pt s/p TDC placement 10/4 w/ IR. RRT called shortly after for acute AMS, pt spiked a fever, low BP; sepsis w/u sent for possible aspiration PNA. Repeat CXR 10/4-> Mild increase in bilateral opacity/effusion. Pt upgraded to SDU. Pt full code, per palliative care team: next-of-kin not in favor of further discussing GOC conversation or advance directives. Pt w/ acute DVT, IR reconsulted for IVC filter, pt s/p IVC placement 10/6.

## 2023-10-07 NOTE — PROGRESS NOTE ADULT - SUBJECTIVE AND OBJECTIVE BOX
Patient is a 83y old  Male who presents with a chief complaint of AMS (10-07-23)      Pt seen and examined at bedside. No CP or SOB.       PAST MEDICAL & SURGICAL HISTORY:  HTN (hypertension)    COPD (chronic obstructive pulmonary disease)    High cholesterol    Mild anemia    Coffee ground emesis    Chronic diastolic heart failure    PAULA (acute kidney injury)    No significant past surgical history        VITAL SIGNS (Last 24 hrs):  T(C): 37.2 (10-07-23 @ 12:29), Max: 37.4 (10-07-23 @ 00:00)  HR: 82 (10-07-23 @ 12:29) (50 - 86)  BP: 128/58 (10-07-23 @ 12:29) (88/52 - 171/69)  RR: 23 (10-07-23 @ 12:29) (18 - 26)  SpO2: 99% (10-07-23 @ 12:29) (97% - 100%)  Wt(kg): --  Daily     Daily     I&O's Summary    06 Oct 2023 07:01  -  07 Oct 2023 07:00  --------------------------------------------------------  IN: 0 mL / OUT: 100 mL / NET: -100 mL    07 Oct 2023 07:01  -  07 Oct 2023 12:49  --------------------------------------------------------  IN: 0 mL / OUT: 1000 mL / NET: -1000 mL        PHYSICAL EXAM:  GENERAL: NAD, elderly   HEAD:  Atraumatic, Normocephalic  EYES: EOMI, PERRLA, conjunctiva and sclera clear  NECK: Supple, No JVD  CHEST/LUNG: Clear to auscultation bilaterally; No wheeze  HEART: Regular rate and rhythm; No murmurs, rubs, or gallops  ABDOMEN: Soft, Nontender, Nondistended; Bowel sounds present  EXTREMITIES:  2+ Peripheral Pulses, No clubbing, cyanosis, or edema, left arm bruising, echymosisis   PSYCH: AAOx3  NEUROLOGY: non-focal  SKIN: No rashes or lesions  Labs Reviewed  Spoke to patient in regards to abnormal labs.    CBC Full  -  ( 07 Oct 2023 05:47 )  WBC Count : 13.16 K/uL  Hemoglobin : 8.3 g/dL  Hematocrit : 27.0 %  Platelet Count - Automated : 185 K/uL  Mean Cell Volume : 90.9 fL  Mean Cell Hemoglobin : 27.9 pg  Mean Cell Hemoglobin Concentration : 30.7 g/dL  Auto Neutrophil # : x  Auto Lymphocyte # : x  Auto Monocyte # : x  Auto Eosinophil # : x  Auto Basophil # : x  Auto Neutrophil % : x  Auto Lymphocyte % : x  Auto Monocyte % : x  Auto Eosinophil % : x  Auto Basophil % : x    BMP:    10-07 @ 05:47    Blood Urea Nitrogen - 45  Calcium - 8.2  Carbond Dioxide - 25  Chloride - 106  Creatinine - 3.3  Glucose - 92  Potassium - 3.3  Sodium - 143      Hemoglobin A1c -   PT/INR - ( 04 Oct 2023 11:06 )   PT: 13.00 sec;   INR: 1.14 ratio         PTT - ( 04 Oct 2023 11:06 )  PTT:30.7 sec  Urine Culture:  10-04 @ 23:28 Urine culture: --    Culture Results:   No growth at 48 Hours  Method Type: --  Organism: --  Organism Identification: --  Specimen Source: .Blood Blood        COVID Labs  CRP:  72.4 (09-17-23)  52.5 (09-16-23)    Procalcitonin, Serum: 1.76 ng/mL (10-05-23 @ 11:35)    D-Dimer:  3436 ng/mL DDU (10-05-23 @ 06:02)            Imaging reviewed independently and reviewed read  < from: US Abdomen Upper Quadrant Right (10.06.23 @ 22:32) >  IMPRESSION:    Limited evaluation.    No intrahepatic biliary ductal dilatation. The gallbladder is not   visualized.    < end of copied text >        MEDICATIONS  (STANDING):  budesonide 160 MICROgram(s)/formoterol 4.5 MICROgram(s) Inhaler 2 Puff(s) Inhalation two times a day  buMETAnide 2 milliGRAM(s) Oral every 12 hours  calcium acetate 667 milliGRAM(s) Oral three times a day with meals  cefepime   IVPB 1000 milliGRAM(s) IV Intermittent every 24 hours  chlorhexidine 2% Cloths 1 Application(s) Topical <User Schedule>  dextrose 5%. 1000 milliLiter(s) (100 mL/Hr) IV Continuous <Continuous>  dextrose 50% Injectable 25 Gram(s) IV Push once  dextrose 50% Injectable 12.5 Gram(s) IV Push once  dextrose 50% Injectable 25 Gram(s) IV Push once  glucagon  Injectable 1 milliGRAM(s) IntraMuscular once  heparin   Injectable 5000 Unit(s) SubCutaneous every 8 hours  insulin lispro (ADMELOG) corrective regimen sliding scale   SubCutaneous three times a day before meals  midodrine. 10 milliGRAM(s) Oral three times a day  norepinephrine Infusion 0.05 MICROgram(s)/kG/Min (9.11 mL/Hr) IV Continuous <Continuous>  pantoprazole   Suspension 40 milliGRAM(s) Oral two times a day  vancomycin  IVPB 1000 milliGRAM(s) IV Intermittent once    MEDICATIONS  (PRN):  acetaminophen     Tablet .. 650 milliGRAM(s) Oral every 6 hours PRN Moderate Pain (4 - 6)  albuterol    90 MICROgram(s) HFA Inhaler 1 Puff(s) Inhalation every 6 hours PRN for shortness of breath and/or wheezing  atropine Injectable 1 milliGRAM(s) IntraMuscular once PRN HR < 30  dextrose Oral Gel 15 Gram(s) Oral once PRN Blood Glucose LESS THAN 70 milliGRAM(s)/deciliter

## 2023-10-07 NOTE — PROGRESS NOTE ADULT - ASSESSMENT
82 yo m ho DM, COPD, anemia, paroxysmal afib on xarelto, HFrEF, DM coming in from nursing home for AMS x 2 days. Found to have toxic metabolic encephalopathy with PAULA.  PAULA/ toxic metabolic encephalopathy/ HAGMA/ HFrEF/ hypernatremia  possible ATN iso hypotension and possible sepsis/ vanc toxicity  no hydro on imaging  creat trend noted   hd today standard bath uf 1 liter as tolerated   check daily cr    UO noted / increase bumex 2 q 12   phos level at goal    follow bp readings to assess need for midodrine   s/p IVC filter   check iron stores   will follow    prognosis poor

## 2023-10-07 NOTE — CONSULT NOTE ADULT - SUBJECTIVE AND OBJECTIVE BOX
Patient is a 83y old  Male who presents with a chief complaint of AMS (07 Oct 2023 12:49)        HPI:  82 yo m ho DM, COPD, anemia, lymphedemia, afib on xarelto, HFrEF, DM coming in from nursing home for AMS x 2 days. Unable to gather story from pt as he is altered and lethargic.  As per NH was becoming more agitated x 2 days, was hypotensive yesterday and started on cefepime and vancomycin. Brought in to the ED for AMS. PICC line in place for cefepime 1q q8 until 10/5/23 and vanc 1q24 until 9/23/23 for LLE cellulitis/OM  (was at [facility] last month for LLE thick wound cellulitis/OM and sacral DTI as per call with [facility] as per collateral from NH RN Asha)      In the ED, vitals wnl. Labs showing wbc 11.30, Hb 9.6, MCV 79.1, INR 1.59, CO2 14, AG 21, Cr 6 (~baseline 1.2), , trops 0.09. CTH wnl, RBUS with no hydro, poor visualization of left kidney       (15 Sep 2023 22:12)      Electrophysiology:  83y Male with DM, COPD, anemia, AF on Xarelto, HFrED, DM, NH resident, was noted to be more lethargic and confused while in NH, PICC in placed from prior admission aand on Abx for LLE cellulitis, Abx were widen empirically  Was admitted due to worsening lethargy and confusion  Complicated hospital course:  Melena, anemia, s/p 2PRBC 1FFP  Hypoxia, diuresed  PAULA on CKD, multiple electrolytes abnormalities, started on HD, s/p tunneled cath placement  Sepsis/ fever now improved  DVT, s/p IVD filter  EP consulted for ?tachy angelika  Patient unable to provide any history, however, denies syncope, dizziness, lightheadedness  Tele viewed AFib 70s, no tachycardia events, frequent PVCs, bigeminy. Bradycardia events are not real as monitor only counts PVC and undersensing native beats. Metoprolol was held due to reported bradycardia    REVIEW OF SYSTEMS    [ ] A ten-point review of systems was otherwise negative except as noted.  [x ] Due to altered mental status/intubation, subjective information were not able to be obtained from the patient. History was obtained, to the extent possible, from review of the chart and collateral sources of information.      PAST MEDICAL & SURGICAL HISTORY:  HTN (hypertension)      COPD (chronic obstructive pulmonary disease)      High cholesterol      Mild anemia      Coffee ground emesis      Chronic diastolic heart failure      PAULA (acute kidney injury)      No significant past surgical history          Home Medications:  acetaminophen 325 mg oral tablet: 2 tab(s) orally every 6 hours, As needed, Mild Pain (1 - 3), Moderate Pain (4 - 6) (25 May 2019 09:25)  Albuterol (Eqv-ProAir HFA) 90 mcg/inh inhalation aerosol: 1 puff(s) inhaled every 6 hours as needed for  shortness of breath and/or wheezing (15 Sep 2023 22:56)  Breo Ellipta 100 mcg-25 mcg/inh inhalation powder: 1 puff(s) inhaled once a day (15 Sep 2023 22:56)  empagliflozin 10 mg oral tablet: 1 tab(s) orally once a day (15 Sep 2023 22:56)  Entresto 24 mg-26 mg oral tablet: 1 tab(s) orally 2 times a day (15 Sep 2023 22:57)  Lasix 20 mg oral tablet: 1 tab(s) orally once a day (15 Sep 2023 22:57)  Metoprolol Succinate ER 25 mg oral tablet, extended release: 1 tab(s) orally once a day (15 Sep 2023 22:57)  Percocet 5 mg-325 mg oral tablet: 1 tab(s) orally every 8 hours as needed for  severe pain (15 Sep 2023 22:57)  Xarelto 20 mg oral tablet: 1 tab(s) orally once a day (15 Sep 2023 22:57)      Allergies:  No Known Allergies      FAMILY HISTORY:  No pertinent family history in first degree relatives        SOCIAL HISTORY: unable to provide    CIGARETTES:  ALCOHOL:  MARIJUANA:  ILLICIT DRUGS:        PREVIOUS DIAGNOSTIC TESTING:      ECHO  FINDINGS:  < from: TTE Echo Complete w/o Contrast w/ Doppler (09.18.23 @ 11:27) >  Summary:   1. Technically difficult study.   2. Left ventricular ejection fraction, by visual estimation, is >55%.   3. Normal global left ventricular systolic function.   4. The left ventricular diastolic function could not be assessed in this   study.   5. The aortic valve was not well visualized; appears calcified with   grossly normal opening.   6. Mild mitral regurgitation.   7. Mild tricuspid regurgitation.    < end of copied text >    STRESS  FINDINGS:    CATHETERIZATION  FINDINGS:    ELECTROPHYSIOLOGY STUDY  FINDINGS:    CAROTID ULTRASOUND:  FINDINGS    VENOUS DUPLEX SCAN:  FINDINGS:    CHEST CT PULMONARY ANGIO with IV Contrast:  FINDINGS:      MEDICATIONS  (STANDING):  budesonide 160 MICROgram(s)/formoterol 4.5 MICROgram(s) Inhaler 2 Puff(s) Inhalation two times a day  buMETAnide 2 milliGRAM(s) Oral every 12 hours  calcium acetate 667 milliGRAM(s) Oral three times a day with meals  cefepime   IVPB 1000 milliGRAM(s) IV Intermittent every 24 hours  chlorhexidine 2% Cloths 1 Application(s) Topical <User Schedule>  dextrose 5%. 1000 milliLiter(s) (100 mL/Hr) IV Continuous <Continuous>  dextrose 50% Injectable 25 Gram(s) IV Push once  dextrose 50% Injectable 25 Gram(s) IV Push once  dextrose 50% Injectable 12.5 Gram(s) IV Push once  glucagon  Injectable 1 milliGRAM(s) IntraMuscular once  heparin   Injectable 5000 Unit(s) SubCutaneous every 8 hours  insulin lispro (ADMELOG) corrective regimen sliding scale   SubCutaneous three times a day before meals  midodrine. 10 milliGRAM(s) Oral three times a day  norepinephrine Infusion 0.05 MICROgram(s)/kG/Min (9.11 mL/Hr) IV Continuous <Continuous>  pantoprazole   Suspension 40 milliGRAM(s) Oral two times a day    MEDICATIONS  (PRN):  acetaminophen     Tablet .. 650 milliGRAM(s) Oral every 6 hours PRN Moderate Pain (4 - 6)  albuterol    90 MICROgram(s) HFA Inhaler 1 Puff(s) Inhalation every 6 hours PRN for shortness of breath and/or wheezing  atropine Injectable 1 milliGRAM(s) IntraMuscular once PRN HR < 30  dextrose Oral Gel 15 Gram(s) Oral once PRN Blood Glucose LESS THAN 70 milliGRAM(s)/deciliter      Vital Signs Last 24 Hrs  T(C): 36.7 (07 Oct 2023 16:00), Max: 37.4 (07 Oct 2023 00:00)  T(F): 98 (07 Oct 2023 16:00), Max: 99.3 (07 Oct 2023 00:00)  HR: 88 (07 Oct 2023 16:00) (50 - 88)  BP: 97/55 (07 Oct 2023 16:00) (96/51 - 171/69)  BP(mean): 66 (07 Oct 2023 16:00) (66 - 105)  RR: 23 (07 Oct 2023 12:29) (18 - 26)  SpO2: 100% (07 Oct 2023 16:00) (97% - 100%)    Parameters below as of 07 Oct 2023 11:25  Patient On (Oxygen Delivery Method): nasal cannula  O2 Flow (L/min): 2      PHYSICAL EXAM:    GENERAL: In no apparent distress, well nourished, and hydrated.  HEAD:  Atraumatic, Normocephalic  EYES: EOMI, PERRLA, conjunctiva and sclera clear  NECK: Supple and normal thyroid.  No JVD or carotid bruit.  Carotid pulse is 2+ bilaterally.  HEART: Regular rate and rhythm; No murmurs, rubs, or gallops.  PULMONARY: Clear to auscultation and perfusion.  No rales, wheezing, or rhonchi bilaterally.  ABDOMEN: Soft, Nontender, Nondistended; Bowel sounds present  EXTREMITIES:  2+ Peripheral Pulses, No clubbing, cyanosis, or edema  NEUROLOGICAL: Grossly nonfocal    I&O's Detail    06 Oct 2023 07:01  -  07 Oct 2023 07:00  --------------------------------------------------------  IN:  Total IN: 0 mL    OUT:    Incontinent per Condom Catheter (mL): 100 mL  Total OUT: 100 mL    Total NET: -100 mL      07 Oct 2023 07:01  -  07 Oct 2023 16:16  --------------------------------------------------------  IN:  Total IN: 0 mL    OUT:    Incontinent per Condom Catheter (mL): 250 mL    Other (mL): 1000 mL  Total OUT: 1250 mL    Total NET: -1250 mL        Daily     Daily     INTERPRETATION OF TELEMETRY:    ECG:        LABS:                        8.3    13.16 )-----------( 185      ( 07 Oct 2023 05:47 )             27.0     10-07    143  |  106  |  45<H>  ----------------------------<  92  3.3<L>   |  25  |  3.3<H>    Ca    8.2<L>      07 Oct 2023 05:47  Phos  3.1     10-07  Mg     2.5     10-07    TPro  5.5<L>  /  Alb  2.8<L>  /  TBili  0.7  /  DBili  x   /  AST  1175<H>  /  ALT  556<H>  /  AlkPhos  83  10-07          Urinalysis Basic - ( 07 Oct 2023 05:47 )    Color: x / Appearance: x / SG: x / pH: x  Gluc: 92 mg/dL / Ketone: x  / Bili: x / Urobili: x   Blood: x / Protein: x / Nitrite: x   Leuk Esterase: x / RBC: x / WBC x   Sq Epi: x / Non Sq Epi: x / Bacteria: x      BNP      Culture - Blood (collected 10-04-23 @ 23:28)  Source: .Blood Blood  Preliminary Report (10-07-23 @ 07:02):    No growth at 48 Hours            RADIOLOGY & ADDITIONAL STUDIES:

## 2023-10-07 NOTE — PHARMACOTHERAPY INTERVENTION NOTE - COMMENTS
vanco level 10/7 @10:15 (15.7), pt 97.2kg, contacted Dr Smith, recommended Vancomycin 1g IV x1 today

## 2023-10-07 NOTE — PHARMACOTHERAPY INTERVENTION NOTE - COMMENTS
Recommended discontinuing empiric cefepime per ID recommendations since patient blood cx has shown no growth x5 days and no signs of bacterial infection.     Saadia Calvin, PharmD   Clinical Pharmacy Specialist, Infectious Diseases  Tele-Antimicrobial Stewardship Program (Tele-ASP)  Tele-ASP Phone: [phone]

## 2023-10-08 LAB
ALBUMIN SERPL ELPH-MCNC: 2.8 G/DL — LOW (ref 3.5–5.2)
ALP SERPL-CCNC: 87 U/L — SIGNIFICANT CHANGE UP (ref 30–115)
ALT FLD-CCNC: 428 U/L — HIGH (ref 0–41)
ANION GAP SERPL CALC-SCNC: 13 MMOL/L — SIGNIFICANT CHANGE UP (ref 7–14)
AST SERPL-CCNC: 526 U/L — HIGH (ref 0–41)
BASOPHILS # BLD AUTO: 0.02 K/UL — SIGNIFICANT CHANGE UP (ref 0–0.2)
BASOPHILS NFR BLD AUTO: 0.2 % — SIGNIFICANT CHANGE UP (ref 0–1)
BILIRUB SERPL-MCNC: 0.8 MG/DL — SIGNIFICANT CHANGE UP (ref 0.2–1.2)
BUN SERPL-MCNC: 24 MG/DL — HIGH (ref 10–20)
CALCIUM SERPL-MCNC: 8.1 MG/DL — LOW (ref 8.4–10.5)
CHLORIDE SERPL-SCNC: 108 MMOL/L — SIGNIFICANT CHANGE UP (ref 98–110)
CO2 SERPL-SCNC: 24 MMOL/L — SIGNIFICANT CHANGE UP (ref 17–32)
CREAT SERPL-MCNC: 2.4 MG/DL — HIGH (ref 0.7–1.5)
EGFR: 26 ML/MIN/1.73M2 — LOW
EOSINOPHIL # BLD AUTO: 0.21 K/UL — SIGNIFICANT CHANGE UP (ref 0–0.7)
EOSINOPHIL NFR BLD AUTO: 2.1 % — SIGNIFICANT CHANGE UP (ref 0–8)
GLUCOSE BLDC GLUCOMTR-MCNC: 101 MG/DL — HIGH (ref 70–99)
GLUCOSE BLDC GLUCOMTR-MCNC: 105 MG/DL — HIGH (ref 70–99)
GLUCOSE BLDC GLUCOMTR-MCNC: 113 MG/DL — HIGH (ref 70–99)
GLUCOSE SERPL-MCNC: 97 MG/DL — SIGNIFICANT CHANGE UP (ref 70–99)
HCT VFR BLD CALC: 25.8 % — LOW (ref 42–52)
HGB BLD-MCNC: 8.2 G/DL — LOW (ref 14–18)
IMM GRANULOCYTES NFR BLD AUTO: 0.3 % — SIGNIFICANT CHANGE UP (ref 0.1–0.3)
LYMPHOCYTES # BLD AUTO: 3.41 K/UL — HIGH (ref 1.2–3.4)
LYMPHOCYTES # BLD AUTO: 34.3 % — SIGNIFICANT CHANGE UP (ref 20.5–51.1)
MAGNESIUM SERPL-MCNC: 2 MG/DL — SIGNIFICANT CHANGE UP (ref 1.8–2.4)
MCHC RBC-ENTMCNC: 28.6 PG — SIGNIFICANT CHANGE UP (ref 27–31)
MCHC RBC-ENTMCNC: 31.8 G/DL — LOW (ref 32–37)
MCV RBC AUTO: 89.9 FL — SIGNIFICANT CHANGE UP (ref 80–94)
MONOCYTES # BLD AUTO: 0.73 K/UL — HIGH (ref 0.1–0.6)
MONOCYTES NFR BLD AUTO: 7.3 % — SIGNIFICANT CHANGE UP (ref 1.7–9.3)
NEUTROPHILS # BLD AUTO: 5.55 K/UL — SIGNIFICANT CHANGE UP (ref 1.4–6.5)
NEUTROPHILS NFR BLD AUTO: 55.8 % — SIGNIFICANT CHANGE UP (ref 42.2–75.2)
NRBC # BLD: 0 /100 WBCS — SIGNIFICANT CHANGE UP (ref 0–0)
PLATELET # BLD AUTO: 163 K/UL — SIGNIFICANT CHANGE UP (ref 130–400)
PMV BLD: 11.6 FL — HIGH (ref 7.4–10.4)
POTASSIUM SERPL-MCNC: 3.5 MMOL/L — SIGNIFICANT CHANGE UP (ref 3.5–5)
POTASSIUM SERPL-SCNC: 3.5 MMOL/L — SIGNIFICANT CHANGE UP (ref 3.5–5)
PROT SERPL-MCNC: 5.8 G/DL — LOW (ref 6–8)
RBC # BLD: 2.87 M/UL — LOW (ref 4.7–6.1)
RBC # FLD: 22 % — HIGH (ref 11.5–14.5)
SODIUM SERPL-SCNC: 145 MMOL/L — SIGNIFICANT CHANGE UP (ref 135–146)
WBC # BLD: 9.95 K/UL — SIGNIFICANT CHANGE UP (ref 4.8–10.8)
WBC # FLD AUTO: 9.95 K/UL — SIGNIFICANT CHANGE UP (ref 4.8–10.8)

## 2023-10-08 PROCEDURE — 99233 SBSQ HOSP IP/OBS HIGH 50: CPT

## 2023-10-08 RX ORDER — MIDODRINE HYDROCHLORIDE 2.5 MG/1
5 TABLET ORAL THREE TIMES A DAY
Refills: 0 | Status: DISCONTINUED | OUTPATIENT
Start: 2023-10-08 | End: 2023-10-09

## 2023-10-08 RX ADMIN — PANTOPRAZOLE SODIUM 40 MILLIGRAM(S): 20 TABLET, DELAYED RELEASE ORAL at 18:49

## 2023-10-08 RX ADMIN — MIDODRINE HYDROCHLORIDE 10 MILLIGRAM(S): 2.5 TABLET ORAL at 05:19

## 2023-10-08 RX ADMIN — Medication 667 MILLIGRAM(S): at 08:37

## 2023-10-08 RX ADMIN — Medication 667 MILLIGRAM(S): at 18:50

## 2023-10-08 RX ADMIN — PANTOPRAZOLE SODIUM 40 MILLIGRAM(S): 20 TABLET, DELAYED RELEASE ORAL at 05:19

## 2023-10-08 RX ADMIN — HEPARIN SODIUM 5000 UNIT(S): 5000 INJECTION INTRAVENOUS; SUBCUTANEOUS at 05:18

## 2023-10-08 RX ADMIN — HEPARIN SODIUM 5000 UNIT(S): 5000 INJECTION INTRAVENOUS; SUBCUTANEOUS at 14:41

## 2023-10-08 RX ADMIN — BUMETANIDE 2 MILLIGRAM(S): 0.25 INJECTION INTRAMUSCULAR; INTRAVENOUS at 18:50

## 2023-10-08 RX ADMIN — BUMETANIDE 2 MILLIGRAM(S): 0.25 INJECTION INTRAMUSCULAR; INTRAVENOUS at 05:19

## 2023-10-08 RX ADMIN — MIDODRINE HYDROCHLORIDE 5 MILLIGRAM(S): 2.5 TABLET ORAL at 18:49

## 2023-10-08 RX ADMIN — HEPARIN SODIUM 5000 UNIT(S): 5000 INJECTION INTRAVENOUS; SUBCUTANEOUS at 21:25

## 2023-10-08 RX ADMIN — CHLORHEXIDINE GLUCONATE 1 APPLICATION(S): 213 SOLUTION TOPICAL at 05:20

## 2023-10-08 NOTE — PROGRESS NOTE ADULT - SUBJECTIVE AND OBJECTIVE BOX
Nephrology progress note    Patient is seen and examined, events over the last 24 h noted .  Not in distress  Allergies:  No Known Allergies    Hospital Medications:   MEDICATIONS  (STANDING):  budesonide 160 MICROgram(s)/formoterol 4.5 MICROgram(s) Inhaler 2 Puff(s) Inhalation two times a day  buMETAnide 2 milliGRAM(s) Oral every 12 hours  calcium acetate 667 milliGRAM(s) Oral three times a day with meals  chlorhexidine 2% Cloths 1 Application(s) Topical <User Schedule>  dextrose 5%. 1000 milliLiter(s) (100 mL/Hr) IV Continuous <Continuous>  dextrose 50% Injectable 25 Gram(s) IV Push once  dextrose 50% Injectable 25 Gram(s) IV Push once  dextrose 50% Injectable 12.5 Gram(s) IV Push once  glucagon  Injectable 1 milliGRAM(s) IntraMuscular once  heparin   Injectable 5000 Unit(s) SubCutaneous every 8 hours  insulin lispro (ADMELOG) corrective regimen sliding scale   SubCutaneous three times a day before meals  pantoprazole   Suspension 40 milliGRAM(s) Oral two times a day        VITALS:  T(F): 98.4 (10-08-23 @ 07:05), Max: 99 (10-07-23 @ 12:29)  HR: 81 (10-08-23 @ 07:05)  BP: 137/57 (10-08-23 @ 07:05)  RR: 12 (10-08-23 @ 04:00)  SpO2: 98% (10-08-23 @ 07:05)  Wt(kg): --    10-06 @ 07:01  -  10-07 @ 07:00  --------------------------------------------------------  IN: 0 mL / OUT: 100 mL / NET: -100 mL    10-07 @ 07:01  -  10-08 @ 07:00  --------------------------------------------------------  IN: 0 mL / OUT: 1265 mL / NET: -1265 mL          PHYSICAL EXAM:  Constitutional: NAD  HEENT: anicteric sclera,   Neck: No JVD  Respiratory: CTA  Cardiovascular: S1, S2, RRR  Gastrointestinal: BS+, soft, NT/ND  Extremities: No peripheral edema  Neurological: Awake alert  : No CVA tenderness. No barth.   Skin: No rashes  Vascular Access: Stamps    LABS:  10-08    145  |  108  |  24<H>  ----------------------------<  97  3.5   |  24  |  2.4<H>  Creatinine Trend: 2.4<--, 2.0<--, 3.3<--, 3.2<--, 4.0<--, 3.9<--  Ca    8.1<L>      08 Oct 2023 08:39  Phos  3.1     10-07  Mg     2.0     10-08    TPro  5.8<L>  /  Alb  2.8<L>  /  TBili  0.8  /  DBili      /  AST  526<H>  /  ALT  428<H>  /  AlkPhos  87  10-08                          8.2    9.95  )-----------( 163      ( 08 Oct 2023 08:39 )             25.8       Urine Studies:  Urinalysis Basic - ( 08 Oct 2023 08:39 )    Color:  / Appearance:  / SG:  / pH:   Gluc: 97 mg/dL / Ketone:   / Bili:  / Urobili:    Blood:  / Protein:  / Nitrite:    Leuk Esterase:  / RBC:  / WBC    Sq Epi:  / Non Sq Epi:  / Bacteria:         RADIOLOGY & ADDITIONAL STUDIES:  < from: US Abdomen Upper Quadrant Right (10.06.23 @ 22:32) >  No intrahepatic biliary ductal dilatation. The gallbladder is not   visualized.    < end of copied text >

## 2023-10-08 NOTE — PROGRESS NOTE ADULT - ASSESSMENT
Impression    Septic shock  Recent TDC   Right femoral DVT sp IVC flter  Acute hypoxemic respiratory failure on NC  Right pleural effusion  HO aspiration pneumonitis SP ABX   UGIB secondary to Duodenal ulcer SP EGD   Hematochezia resolved   Hemorrhagic Shock resolved   Metabolic Encephalopathy   PAULA / CKD on HD  History of Left Foot OM w/ surrounding Cellulitis   Sacral DTI  Transaminitis improving    Plan:    CNS: Monitor mental status.  Avoid depressants      HEENT:  Oral care.  Aspiration precautions    CARDIOVASCULAR:  Avoid overload    PULMONARY: Aggressive pulmonary toilet, including chest PT.  Albuterol PRN.  Frequent suctioning.  Incentive spirometry  keep Sao2 92 to 96%.  repeat CXR    GASTROINTESTINAL : feeding per Speech and swallow.  Protonix q 12     GENITOURINARY/RENAL: Nephro following; HD per renal.  Monitor lytes and correct as needed     INFECTIOUS : ABX PER ID    HEMATOLOGIC: DVT prophylaxis.   Target Hb > 7.  Trend CBC     ENDOCRINE: Follow up FS.  Insulin protocol as needed.  BG goal 140-180    MSK/DERM:  PT/OT when cooperative.  Off loading.  Skin care      Palliative care follow up     Poor prognosis overall     floor  GOC

## 2023-10-08 NOTE — PROGRESS NOTE ADULT - SUBJECTIVE AND OBJECTIVE BOX
Patient is a 83y old  Male who presents with a chief complaint of AMS (10-08-23)      Pt seen and examined at bedside. No CP or SOB.      PAST MEDICAL & SURGICAL HISTORY:  HTN (hypertension)    COPD (chronic obstructive pulmonary disease)    High cholesterol    Mild anemia    Coffee ground emesis    Chronic diastolic heart failure    PAULA (acute kidney injury)    No significant past surgical history        VITAL SIGNS (Last 24 hrs):  T(C): 36.9 (10-08-23 @ 07:05), Max: 37.2 (10-07-23 @ 12:29)  HR: 87 (10-08-23 @ 08:00) (64 - 88)  BP: 135/60 (10-08-23 @ 08:00) (96/51 - 137/61)  RR: 18 (10-08-23 @ 08:00) (12 - 23)  SpO2: 99% (10-08-23 @ 08:00) (98% - 100%)  Wt(kg): --  Daily     Daily     I&O's Summary    07 Oct 2023 07:01  -  08 Oct 2023 07:00  --------------------------------------------------------  IN: 0 mL / OUT: 1265 mL / NET: -1265 mL        PHYSICAL EXAM:  GENERAL: NAD  HEAD:  Atraumatic, Normocephalic  EYES: EOMI, PERRLA, conjunctiva and sclera clear  NECK: Supple, No JVD  CHEST/LUNG: Clear to auscultation bilaterally; No wheeze  HEART: Regular rate and rhythm; No murmurs, rubs, or gallops  ABDOMEN: Soft, Nontender, Nondistended; Bowel sounds present  EXTREMITIES:  2+ Peripheral Pulses, No clubbing, cyanosis, or edema, echymosis in left upper ext   PSYCH: Alert  NEUROLOGY: YEE  SKIN: No rashes or lesions    Labs Reviewed  Spoke to patient in regards to abnormal labs.    CBC Full  -  ( 08 Oct 2023 08:39 )  WBC Count : 9.95 K/uL  Hemoglobin : 8.2 g/dL  Hematocrit : 25.8 %  Platelet Count - Automated : 163 K/uL  Mean Cell Volume : 89.9 fL  Mean Cell Hemoglobin : 28.6 pg  Mean Cell Hemoglobin Concentration : 31.8 g/dL  Auto Neutrophil # : 5.55 K/uL  Auto Lymphocyte # : 3.41 K/uL  Auto Monocyte # : 0.73 K/uL  Auto Eosinophil # : 0.21 K/uL  Auto Basophil # : 0.02 K/uL  Auto Neutrophil % : 55.8 %  Auto Lymphocyte % : 34.3 %  Auto Monocyte % : 7.3 %  Auto Eosinophil % : 2.1 %  Auto Basophil % : 0.2 %    BMP:    10-08 @ 08:39    Blood Urea Nitrogen - 24  Calcium - 8.1  Carbond Dioxide - 24  Chloride - 108  Creatinine - 2.4  Glucose - 97  Potassium - 3.5  Sodium - 145      Hemoglobin A1c -     Urine Culture:  10-04 @ 23:28 Urine culture: --    Culture Results:   No growth at 72 Hours  Method Type: --  Organism: --  Organism Identification: --  Specimen Source: .Blood Blood        COVID Labs  CRP:  72.4 (09-17-23)  52.5 (09-16-23)    Procalcitonin, Serum: 1.76 ng/mL (10-05-23 @ 11:35)    D-Dimer:  3436 ng/mL DDU (10-05-23 @ 06:02)      Imaging reviewed independently and reviewed read  < from: US Abdomen Upper Quadrant Right (10.06.23 @ 22:32) >  IMPRESSION:    Limited evaluation.    No intrahepatic biliary ductal dilatation. The gallbladder is not   visualized.    < end of copied text >        MEDICATIONS  (STANDING):  budesonide 160 MICROgram(s)/formoterol 4.5 MICROgram(s) Inhaler 2 Puff(s) Inhalation two times a day  buMETAnide 2 milliGRAM(s) Oral every 12 hours  calcium acetate 667 milliGRAM(s) Oral three times a day with meals  chlorhexidine 2% Cloths 1 Application(s) Topical <User Schedule>  dextrose 5%. 1000 milliLiter(s) (100 mL/Hr) IV Continuous <Continuous>  dextrose 50% Injectable 25 Gram(s) IV Push once  dextrose 50% Injectable 25 Gram(s) IV Push once  dextrose 50% Injectable 12.5 Gram(s) IV Push once  glucagon  Injectable 1 milliGRAM(s) IntraMuscular once  heparin   Injectable 5000 Unit(s) SubCutaneous every 8 hours  insulin lispro (ADMELOG) corrective regimen sliding scale   SubCutaneous three times a day before meals  midodrine. 5 milliGRAM(s) Oral three times a day  pantoprazole   Suspension 40 milliGRAM(s) Oral two times a day    MEDICATIONS  (PRN):  acetaminophen     Tablet .. 650 milliGRAM(s) Oral every 6 hours PRN Moderate Pain (4 - 6)  albuterol    90 MICROgram(s) HFA Inhaler 1 Puff(s) Inhalation every 6 hours PRN for shortness of breath and/or wheezing  atropine Injectable 1 milliGRAM(s) IntraMuscular once PRN HR < 30  dextrose Oral Gel 15 Gram(s) Oral once PRN Blood Glucose LESS THAN 70 milliGRAM(s)/deciliter

## 2023-10-08 NOTE — PROGRESS NOTE ADULT - ASSESSMENT
#Metabolic Encephalopathy 2/2 septic Shock 2/2 suspected aspiration pneumonia   - rapid response after TDC due to acute change in mental status - ABG notable for elevated lactate  - pt febrile to 101.6 and elevated lactate to 8.4, rapidly became hypotensive on 10/4 - started on levophed  - DC   vancomycin and cefepime as per ID   - lactate improving  - follow up cultures, cultures neg   - ID consult appreciated, no further antibiotics   - procal 1.7  - off pressor, dcrease midodrine 5 mg TID continue to wean   - CXR (9/28) Bilateral pleural effusion/opacity unchanged. No air leak.  - On 2L NC  - C/w albuterol PRN    #Acutely worsening uremia and acute kidney failure most likely 2/2 ATN - now on HD through right IJ Maceo  #hypernatremia - resolving   PAULA / CKD stable non oliguric  - udall placement 9/27/23  started on HD 9/27 but did not tolerate it with access problems  - udall changed 9/28  he received HD 9/28 and 9/30  - Close F/U of BUN/Crea/ Lytes and UO  - c/w phoslo 2/2/2   - off sodium bicarbonate   - s/p 250 cc LR bolus for hypernatremia  - s/p D5w @ 80 cc/hr  - f/u w/nephro  - 1:1 for safety and for udall observation   - increase  BUMEX to 2 mg BID as per Nephro   - TDC by IR on 10/4  - Cr stable for now  	  #Anemia of acute blood loss   #Hemorrhagic Shock Secondary to Hematochezia from Duodenal Ulcer Bleed s/p OVESCO placement 09/23  - Trend hgb, transfuse blood PRN  - continue with holding therapeutic AC  - s/p IV Protamine sulfate 36mg x1 dose on 09/23  - GI f/u appreciated  - S/p IV Protonix 40mg BID -> Ok to switch to PO BID as per GI   - Avoid any NSAIDs  - overall the pt risk and benefit is goes against Anticoagulation, will further discuss with family , , dw brother regarding the high risk of bleeding and understands the reasons to hold anticoagulation. And understands the risk of cva with Afib.     #Elevated dimer  #Acute Femoral DVT s/p IVC filter 10/6  - duplex neg on 9/29, repeat   - overall the pt risk and benefit is goes against Anticoagulation, will further discuss with family , dw brother regarding the high risk of bleeding and understands to hold anticoagulation And understands the risk of cva with Afib.    -s/p IVC filter 10/6    #transaminitis - resolving   - likely 2/2 liver shock 2/2 hypotension   - LFTS improving   - follow up RUQ   - hepatitis neg   - worsened today  - antibiotics dc  - trend   - if continues to worsen will get hepatology     #Paroxysmal Afib, chronic; uncontrolled rate  #angelika cardia in evening on cardiac telemonitoring   - HOLDING therapeutic AC  - hold  metoprolol tartrate 12.5 mg q12h    #History of Left Foot OM w/ surrounding Cellulitis  *CT LLE (9/16): No CT evidence of osteomyelitis or necrotizing infection. No drainable collection seen, within the limitations of a noncontrast exam.  - Increased frailty and decreased physical capacity  - as per previous notes the team Discussed with ID attending: patient is unlikely to benefit from prolonged therapy with cefepime+vanco (to cover possible OM) due to adverse outcomes associated kidney dysfunction, cognitive impact ...etc )  - c/w Local wound care; offloading boots bilaterally, frequently turning and positioning, prevent pressure injury, keep skin clean, and monitor for wound changes and notify provider as per podiatry    #Progress Note Handoff  Pending (specify):  as above  Family discussion: house staff updated family   Disposition: DW CC, can be downgraded to floor   Decision to admit the pt is based on acuity as above

## 2023-10-08 NOTE — CHART NOTE - NSCHARTNOTEFT_GEN_A_CORE
CEU DOWN GRADE NOTE      Patient is a 83y old  Male who presents with a chief complaint of AMS (08 Oct 2023 11:54)      HPI:84 yo m ho DM, COPD, anemia, lymphedemia, afib on xarelto, HFrEF, DM coming in from nursing home for AMS x 2 days. Unable to gather story from pt as he is altered and lethargic.  As per NH was becoming more agitated x 2 days, was hypotensive yesterday and started on cefepime and vancomycin. Brought in to the ED for AMS. PICC line in place for cefepime 1q q8 until 10/5/23 and vanc 1q24 until 9/23/23 for LLE cellulitis/OM  (was at [facility] last month for LLE thick wound cellulitis/OM and sacral DTI as per call with [facility] as per collateral from [facility] DEISY Barry)      In the ED, vitals wnl. Labs showing wbc 11.30, Hb 9.6, MCV 79.1, INR 1.59, CO2 14, AG 21, Cr 6 (~baseline 1.2), , trops 0.09. CTH wnl, RBUS with no hydro, poor visualization of left kidney          INTERVAL HPI/OVERNIGHT EVENTS:  Hospital course:  When pt arrived to ICU, he was in shock, hypotensive, with active melena. BP in 60's. GI consulted urgently for endoscopy. Procedure was done at bedside and bleeding controlled. Pt. was given 2 U of blood, 1U FFP in ICU. HgB has maintained above 8 and pt has not signs of melena. Pt was initially in 3E the patient was Hypoxic (as low as 70s). Evaluated the patient at bedside. AAOx 1. On auscultation, bilateral crackles present. Placed the patient on venturi mask and then on NRB. The Spo2 improved to 99%. Stat vitals repeated (vitals Spo2 99%, /70s, HR 95). Ordered CXR stat, ABG stat, Bumex 2mg IV x 1 stat. Labs and charts reviewed. Stat Labs drawn. Upgraded to SDU on 9/27. In the SDU, patient respiratory status was much improved on NC. Nephrology recommended dialysis for patient's PAULA. On 9/27, a Pringle was placed and patient received hemo that day. However, he became hypotensive 30 mins into dialysis and was stopped. The following day, the Pringle was replaced with a longer one and patient received HD again with no complications. On the evening of 9/27, patient hgb was 6.9 and he received 1 unit pRBC. There was also an episode of melena and GI was notified. Patient remained hemodynamically stable and GI recommended to just monitor. Heparin was held, but has since been restarted for DVT ppx. On 9/29, he was started on D5 @80cc for 24 hours to address free water deficit. Starting sodium was 151 with a target goal of 145 after 24 hours. Last, patient was seen by speech and swallow and put on NPO for poor swallowing. DGTF on 9/29. Pt was hypernatremic and was managed with D5W. Pt also had a worsening PAULA and nephrology consulted. Pt received HD via R IJ line and per nephro pt needs long term dialysis. On 10/4, a TDC was placed by IR for dialysis. On 15:00 10/4 a rapid response was called for acute altered mental status after the IR procedure. VSS and ABG notable for elevated lactate of 6. Pt spiked fever to 101.6 and repeat ABG notable for lactate of 8.3. Pt's BP dropped to 75/42 and he was given a 500 LR bolus, was started on levophed. Sepsis workup sent for possible aspiration pna. Pt started on vancomycin and cefepime. Pt upgraded to SDU for close monitoring and vasopressor requirements.    SDU Course:  - LFTs noted to go up after abx; shock liver vs DILI  - BP stabilized 100/50's--130/90's; weaned to midodrine 5 TID  - monitoring off of ABX per ID      Follow-up:  - BP monitoring  - LFT monitoring      #Metabolic Encephalopathy 2/2 septic Shock 2/2 suspected aspiration pneumonia   - rapid response after TDC due to acute change in mental status - ABG notable for elevated lactate  - pt febrile to 101.6 and elevated lactate to 8.4, rapidly became hypotensive on 10/4 - started on levophed  - DC   vancomycin and cefepime as per ID   - lactate improving  - follow up cultures, cultures neg   - ID consult appreciated, no further antibiotics   - procal 1.7  - off pressor, decrease midodrine 5 mg TID continue to wean   - CXR (9/28) Bilateral pleural effusion/opacity unchanged. No air leak.  - On 2L NC  - C/w albuterol PRN    #Acutely worsening uremia and acute kidney failure most likely 2/2 ATN - now on HD through right IJ [place]  #hypernatremia - resolving   PAULA / CKD stable non oliguric  - udall placement 9/27/23  started on HD 9/27 but did not tolerate it with access problems  - udall changed 9/28  he received HD 9/28 and 9/30  - Close F/U of BUN/Crea/ Lytes and UO  - c/w phoslo 2/2/2   - off sodium bicarbonate   - s/p 250 cc LR bolus for hypernatremia  - s/p D5w @ 80 cc/hr  - f/u w/nephro  - 1:1 for safety and for udall observation   - increase  BUMEX to 2 mg BID as per Nephro   - TDC by IR on 10/4  - Cr stable for now  	  #Anemia of acute blood loss   #Hemorrhagic Shock Secondary to Hematochezia from Duodenal Ulcer Bleed s/p OVESCO placement 09/23  - Trend hgb, transfuse blood PRN  - continue with holding therapeutic AC  - s/p IV Protamine sulfate 36mg x1 dose on 09/23  - GI f/u appreciated  - S/p IV Protonix 40mg BID -> Ok to switch to PO BID as per GI   - Avoid any NSAIDs  - overall the pt risk and benefit is goes against Anticoagulation, will further discuss with family , , dw brother regarding the high risk of bleeding and understands the reasons to hold anticoagulation. And understands the risk of cva with Afib.     #Elevated dimer  #Acute Femoral DVT s/p IVC filter 10/6  - duplex neg on 9/29, repeat   - overall the pt risk and benefit is goes against Anticoagulation, will further discuss with family , dw brother regarding the high risk of bleeding and understands to hold anticoagulation And understands the risk of cva with Afib.    -s/p IVC filter 10/6    #transaminitis - resolving   - likely 2/2 liver shock 2/2 hypotension   - LFTS improving   - follow up RUQ   - hepatitis neg   - worsened today  - antibiotics dc  - trend   - if continues to worsen will get hepatology     #Paroxysmal Afib, chronic; uncontrolled rate  #angelika cardia in evening on cardiac telemonitoring   - HOLDING therapeutic AC  - hold  metoprolol tartrate 12.5 mg q12h    #History of Left Foot OM w/ surrounding Cellulitis  *CT LLE (9/16): No CT evidence of osteomyelitis or necrotizing infection. No drainable collection seen, within the limitations of a noncontrast exam.  - Increased frailty and decreased physical capacity  - as per previous notes the team Discussed with ID attending: patient is unlikely to benefit from prolonged therapy with cefepime+vanco (to cover possible OM) due to adverse outcomes associated kidney dysfunction, cognitive impact ...etc )  - c/w Local wound care; offloading boots bilaterally, frequently turning and positioning, prevent pressure injury, keep skin clean, and monitor for wound changes and notify provider as per podiatry    #Progress Note Handoff  Pending (specify):  as above  Family discussion: house staff updated family   Disposition: DW CC, can be downgraded to floor   Decision to admit the pt is based on acuity as above         MEDICATIONS:  acetaminophen     Tablet .. 650 milliGRAM(s) Oral every 6 hours PRN  albuterol    90 MICROgram(s) HFA Inhaler 1 Puff(s) Inhalation every 6 hours PRN  atropine Injectable 1 milliGRAM(s) IntraMuscular once PRN  budesonide 160 MICROgram(s)/formoterol 4.5 MICROgram(s) Inhaler 2 Puff(s) Inhalation two times a day  buMETAnide 2 milliGRAM(s) Oral every 12 hours  calcium acetate 667 milliGRAM(s) Oral three times a day with meals  chlorhexidine 2% Cloths 1 Application(s) Topical <User Schedule>  dextrose 5%. 1000 milliLiter(s) IV Continuous <Continuous>  dextrose 50% Injectable 25 Gram(s) IV Push once  dextrose 50% Injectable 25 Gram(s) IV Push once  dextrose 50% Injectable 12.5 Gram(s) IV Push once  dextrose Oral Gel 15 Gram(s) Oral once PRN  glucagon  Injectable 1 milliGRAM(s) IntraMuscular once  heparin   Injectable 5000 Unit(s) SubCutaneous every 8 hours  insulin lispro (ADMELOG) corrective regimen sliding scale   SubCutaneous three times a day before meals  midodrine. 5 milliGRAM(s) Oral three times a day  pantoprazole   Suspension 40 milliGRAM(s) Oral two times a day      T(C): 37.1 (10-08-23 @ 16:00), Max: 37.1 (10-08-23 @ 16:00)  HR: 80 (10-08-23 @ 16:00) (64 - 87)  BP: 137/69 (10-08-23 @ 16:00) (123/72 - 145/106)  RR: 17 (10-08-23 @ 16:00) (12 - 20)  SpO2: 99% (10-08-23 @ 16:00) (98% - 100%)  Wt(kg): --Vital Signs Last 24 Hrs  T(C): 37.1 (08 Oct 2023 16:00), Max: 37.1 (08 Oct 2023 16:00)  T(F): 98.7 (08 Oct 2023 16:00), Max: 98.7 (08 Oct 2023 16:00)  HR: 80 (08 Oct 2023 16:00) (64 - 87)  BP: 137/69 (08 Oct 2023 16:00) (123/72 - 145/106)  BP(mean): 93 (08 Oct 2023 16:00) (82 - 121)  RR: 17 (08 Oct 2023 16:00) (12 - 20)  SpO2: 99% (08 Oct 2023 16:00) (98% - 100%)    Parameters below as of 08 Oct 2023 12:00  Patient On (Oxygen Delivery Method): room air      GENERAL: NAD  HEAD:  Atraumatic, Normocephalic  EYES: EOMI, PERRLA, conjunctiva and sclera clear  NECK: Supple, No JVD  CHEST/LUNG: Clear to auscultation bilaterally; No wheeze  HEART: Regular rate and rhythm; No murmurs, rubs, or gallops  ABDOMEN: Soft, Nontender, Nondistended; Bowel sounds present  EXTREMITIES:  2+ Peripheral Pulses, No clubbing, cyanosis, or edema, echymosis in left upper ext   PSYCH: Alert  NEUROLOGY: YEE  SKIN: No rashes or lesions      LABS:                        8.2    9.95  )-----------( 163      ( 08 Oct 2023 08:39 )             25.8     10-08    145  |  108  |  24<H>  ----------------------------<  97  3.5   |  24  |  2.4<H>    Ca    8.1<L>      08 Oct 2023 08:39  Phos  3.1     10-07  Mg     2.0     10-08    TPro  5.8<L>  /  Alb  2.8<L>  /  TBili  0.8  /  DBili  x   /  AST  526<H>  /  ALT  428<H>  /  AlkPhos  87  10-08      Urinalysis Basic - ( 08 Oct 2023 08:39 )    Color: x / Appearance: x / SG: x / pH: x  Gluc: 97 mg/dL / Ketone: x  / Bili: x / Urobili: x   Blood: x / Protein: x / Nitrite: x   Leuk Esterase: x / RBC: x / WBC x   Sq Epi: x / Non Sq Epi: x / Bacteria: x      CAPILLARY BLOOD GLUCOSE      POCT Blood Glucose.: 113 mg/dL (08 Oct 2023 16:19)  POCT Blood Glucose.: 105 mg/dL (08 Oct 2023 12:23)  POCT Blood Glucose.: 101 mg/dL (08 Oct 2023 06:12)  POCT Blood Glucose.: 88 mg/dL (07 Oct 2023 22:24)        Urinalysis Basic - ( 08 Oct 2023 08:39 )    Color: x / Appearance: x / SG: x / pH: x  Gluc: 97 mg/dL / Ketone: x  / Bili: x / Urobili: x   Blood: x / Protein: x / Nitrite: x   Leuk Esterase: x / RBC: x / WBC x   Sq Epi: x / Non Sq Epi: x / Bacteria: x

## 2023-10-08 NOTE — PROGRESS NOTE ADULT - SUBJECTIVE AND OBJECTIVE BOX
Nephrology progress note    Patient is seen and examined, events over the last 24 h noted .    Allergies:  No Known Allergies    Hospital Medications:   MEDICATIONS  (STANDING):  budesonide 160 MICROgram(s)/formoterol 4.5 MICROgram(s) Inhaler 2 Puff(s) Inhalation two times a day  buMETAnide 2 milliGRAM(s) Oral every 12 hours  calcium acetate 667 milliGRAM(s) Oral three times a day with meals  chlorhexidine 2% Cloths 1 Application(s) Topical <User Schedule>  dextrose 5%. 1000 milliLiter(s) (100 mL/Hr) IV Continuous <Continuous>  dextrose 50% Injectable 25 Gram(s) IV Push once  dextrose 50% Injectable 25 Gram(s) IV Push once  dextrose 50% Injectable 12.5 Gram(s) IV Push once  glucagon  Injectable 1 milliGRAM(s) IntraMuscular once  heparin   Injectable 5000 Unit(s) SubCutaneous every 8 hours  insulin lispro (ADMELOG) corrective regimen sliding scale   SubCutaneous three times a day before meals  midodrine. 10 milliGRAM(s) Oral three times a day  pantoprazole   Suspension 40 milliGRAM(s) Oral two times a day        VITALS:  T(F): 98.4 (10-08-23 @ 07:05), Max: 99 (10-07-23 @ 12:29)  HR: 81 (10-08-23 @ 07:05)  BP: 137/57 (10-08-23 @ 07:05)  RR: 12 (10-08-23 @ 04:00)  SpO2: 98% (10-08-23 @ 07:05)  Wt(kg): --    10-06 @ 07:01  -  10-07 @ 07:00  --------------------------------------------------------  IN: 0 mL / OUT: 100 mL / NET: -100 mL    10-07 @ 07:01  -  10-08 @ 07:00  --------------------------------------------------------  IN: 0 mL / OUT: 1265 mL / NET: -1265 mL          PHYSICAL EXAM:  Constitutional: NAD  HEENT: anicteric sclera, oropharynx clear, MMM  Neck: No JVD  Respiratory: CTAB, no wheezes, rales or rhonchi  Cardiovascular: S1, S2, RRR  Gastrointestinal: BS+, soft, NT/ND  Extremities: No cyanosis or clubbing. No peripheral edema  Neurological: A/O x 3, no focal deficits  : No CVA tenderness. No barth.   Skin: No rashes  Vascular Access:    LABS:  10-07    145  |  108  |  18  ----------------------------<  116<H>  3.5   |  24  |  2.0<H>    Ca    8.0<L>      07 Oct 2023 18:15  Phos  3.1     10-07  Mg     2.5     10-07    TPro  5.6<L>  /  Alb  2.7<L>  /  TBili  0.7  /  DBili      /  AST  955<H>  /  ALT  531<H>  /  AlkPhos  92  10-07                          8.3    13.16 )-----------( 185      ( 07 Oct 2023 05:47 )             27.0       Urine Studies:  Urinalysis Basic - ( 07 Oct 2023 18:15 )    Color:  / Appearance:  / SG:  / pH:   Gluc: 116 mg/dL / Ketone:   / Bili:  / Urobili:    Blood:  / Protein:  / Nitrite:    Leuk Esterase:  / RBC:  / WBC    Sq Epi:  / Non Sq Epi:  / Bacteria:         RADIOLOGY & ADDITIONAL STUDIES:

## 2023-10-08 NOTE — PROGRESS NOTE ADULT - SUBJECTIVE AND OBJECTIVE BOX
Over Night Events: events noted, on NC, EPS/ ID/ speech/ RUQ sono noted, afebrile      PHYSICAL EXAM    ICU Vital Signs Last 24 Hrs  T(C): 36.9 (08 Oct 2023 07:05), Max: 37.2 (07 Oct 2023 12:29)  T(F): 98.4 (08 Oct 2023 07:05), Max: 99 (07 Oct 2023 12:29)  HR: 81 (08 Oct 2023 07:05) (64 - 88)  BP: 137/57 (08 Oct 2023 07:05) (96/51 - 137/61)  BP(mean): 82 (08 Oct 2023 07:05) (66 - 111)  RR: 12 (08 Oct 2023 04:00) (12 - 23)  SpO2: 98% (08 Oct 2023 07:05) (98% - 100%)    O2 Parameters below as of 07 Oct 2023 19:34  Patient On (Oxygen Delivery Method): room air            General: chronically ill looking  Lungs: dec bs both bases  Cardiovascular: MAY 2.6  Abdomen: Soft, Positive BS  Extremities: No clubbing   Neurological: Non focal       10-07-23 @ 07:01  -  10-08-23 @ 07:00  --------------------------------------------------------  IN:  Total IN: 0 mL    OUT:    Incontinent per Condom Catheter (mL): 265 mL    Other (mL): 1000 mL  Total OUT: 1265 mL    Total NET: -1265 mL          LABS:                          8.3    13.16 )-----------( 185      ( 07 Oct 2023 05:47 )             27.0                                               10-07    145  |  108  |  18  ----------------------------<  116<H>  3.5   |  24  |  2.0<H>    Ca    8.0<L>      07 Oct 2023 18:15  Phos  3.1     10-07  Mg     2.5     10-07    TPro  5.6<L>  /  Alb  2.7<L>  /  TBili  0.7  /  DBili  x   /  AST  955<H>  /  ALT  531<H>  /  AlkPhos  92  10-07                                             Urinalysis Basic - ( 07 Oct 2023 18:15 )    Color: x / Appearance: x / SG: x / pH: x  Gluc: 116 mg/dL / Ketone: x  / Bili: x / Urobili: x   Blood: x / Protein: x / Nitrite: x   Leuk Esterase: x / RBC: x / WBC x   Sq Epi: x / Non Sq Epi: x / Bacteria: x                                                  LIVER FUNCTIONS - ( 07 Oct 2023 18:15 )  Alb: 2.7 g/dL / Pro: 5.6 g/dL / ALK PHOS: 92 U/L / ALT: 531 U/L / AST: 955 U/L / GGT: x                                                                                                                                       MEDICATIONS  (STANDING):  budesonide 160 MICROgram(s)/formoterol 4.5 MICROgram(s) Inhaler 2 Puff(s) Inhalation two times a day  buMETAnide 2 milliGRAM(s) Oral every 12 hours  calcium acetate 667 milliGRAM(s) Oral three times a day with meals  chlorhexidine 2% Cloths 1 Application(s) Topical <User Schedule>  dextrose 5%. 1000 milliLiter(s) (100 mL/Hr) IV Continuous <Continuous>  dextrose 50% Injectable 25 Gram(s) IV Push once  dextrose 50% Injectable 25 Gram(s) IV Push once  dextrose 50% Injectable 12.5 Gram(s) IV Push once  glucagon  Injectable 1 milliGRAM(s) IntraMuscular once  heparin   Injectable 5000 Unit(s) SubCutaneous every 8 hours  insulin lispro (ADMELOG) corrective regimen sliding scale   SubCutaneous three times a day before meals  midodrine. 10 milliGRAM(s) Oral three times a day  pantoprazole   Suspension 40 milliGRAM(s) Oral two times a day    MEDICATIONS  (PRN):  acetaminophen     Tablet .. 650 milliGRAM(s) Oral every 6 hours PRN Moderate Pain (4 - 6)  albuterol    90 MICROgram(s) HFA Inhaler 1 Puff(s) Inhalation every 6 hours PRN for shortness of breath and/or wheezing  atropine Injectable 1 milliGRAM(s) IntraMuscular once PRN HR < 30  dextrose Oral Gel 15 Gram(s) Oral once PRN Blood Glucose LESS THAN 70 milliGRAM(s)/deciliter

## 2023-10-08 NOTE — PROGRESS NOTE ADULT - ASSESSMENT
82 yo m ho DM, COPD, anemia, paroxysmal afib on xarelto, HFrEF, DM coming in from nursing home for AMS x 2 days. Found to have toxic metabolic encephalopathy with PAULA.  PAULA/ toxic metabolic encephalopathy/ HAGMA/ HFrEF/ hypernatremia  possible ATN iso hypotension and possible sepsis/ vanc toxicity  no hydro on imaging  creat trend noted   hd TTS uf 1 liter as tolerated   check daily cr    UO noted / increase bumex 2 q 12   phos level at goal    follow bp readings to assess need for midodrine   s/p IVC filter   check iron stores   will follow    prognosis poor

## 2023-10-09 LAB
ALBUMIN SERPL ELPH-MCNC: 2.5 G/DL — LOW (ref 3.5–5.2)
ALP SERPL-CCNC: 89 U/L — SIGNIFICANT CHANGE UP (ref 30–115)
ALT FLD-CCNC: 296 U/L — HIGH (ref 0–41)
ANION GAP SERPL CALC-SCNC: 13 MMOL/L — SIGNIFICANT CHANGE UP (ref 7–14)
AST SERPL-CCNC: 217 U/L — HIGH (ref 0–41)
BASOPHILS # BLD AUTO: 0.03 K/UL — SIGNIFICANT CHANGE UP (ref 0–0.2)
BASOPHILS NFR BLD AUTO: 0.3 % — SIGNIFICANT CHANGE UP (ref 0–1)
BILIRUB SERPL-MCNC: 0.8 MG/DL — SIGNIFICANT CHANGE UP (ref 0.2–1.2)
BLD GP AB SCN SERPL QL: SIGNIFICANT CHANGE UP
BUN SERPL-MCNC: 30 MG/DL — HIGH (ref 10–20)
CALCIUM SERPL-MCNC: 8.1 MG/DL — LOW (ref 8.4–10.4)
CHLORIDE SERPL-SCNC: 107 MMOL/L — SIGNIFICANT CHANGE UP (ref 98–110)
CO2 SERPL-SCNC: 25 MMOL/L — SIGNIFICANT CHANGE UP (ref 17–32)
CREAT SERPL-MCNC: 3 MG/DL — HIGH (ref 0.7–1.5)
EGFR: 20 ML/MIN/1.73M2 — LOW
EOSINOPHIL # BLD AUTO: 0.13 K/UL — SIGNIFICANT CHANGE UP (ref 0–0.7)
EOSINOPHIL NFR BLD AUTO: 1.4 % — SIGNIFICANT CHANGE UP (ref 0–8)
FERRITIN SERPL-MCNC: 960 NG/ML — HIGH (ref 30–400)
GLUCOSE BLDC GLUCOMTR-MCNC: 80 MG/DL — SIGNIFICANT CHANGE UP (ref 70–99)
GLUCOSE BLDC GLUCOMTR-MCNC: 85 MG/DL — SIGNIFICANT CHANGE UP (ref 70–99)
GLUCOSE BLDC GLUCOMTR-MCNC: 87 MG/DL — SIGNIFICANT CHANGE UP (ref 70–99)
GLUCOSE BLDC GLUCOMTR-MCNC: 94 MG/DL — SIGNIFICANT CHANGE UP (ref 70–99)
GLUCOSE BLDC GLUCOMTR-MCNC: 96 MG/DL — SIGNIFICANT CHANGE UP (ref 70–99)
GLUCOSE SERPL-MCNC: 87 MG/DL — SIGNIFICANT CHANGE UP (ref 70–99)
HCT VFR BLD CALC: 27.5 % — LOW (ref 42–52)
HGB BLD-MCNC: 8.3 G/DL — LOW (ref 14–18)
IMM GRANULOCYTES NFR BLD AUTO: 0.4 % — HIGH (ref 0.1–0.3)
LYMPHOCYTES # BLD AUTO: 3.38 K/UL — SIGNIFICANT CHANGE UP (ref 1.2–3.4)
LYMPHOCYTES # BLD AUTO: 37.6 % — SIGNIFICANT CHANGE UP (ref 20.5–51.1)
MAGNESIUM SERPL-MCNC: 2 MG/DL — SIGNIFICANT CHANGE UP (ref 1.8–2.4)
MCHC RBC-ENTMCNC: 27.9 PG — SIGNIFICANT CHANGE UP (ref 27–31)
MCHC RBC-ENTMCNC: 30.2 G/DL — LOW (ref 32–37)
MCV RBC AUTO: 92.3 FL — SIGNIFICANT CHANGE UP (ref 80–94)
MONOCYTES # BLD AUTO: 0.8 K/UL — HIGH (ref 0.1–0.6)
MONOCYTES NFR BLD AUTO: 8.9 % — SIGNIFICANT CHANGE UP (ref 1.7–9.3)
NEUTROPHILS # BLD AUTO: 4.6 K/UL — SIGNIFICANT CHANGE UP (ref 1.4–6.5)
NEUTROPHILS NFR BLD AUTO: 51.4 % — SIGNIFICANT CHANGE UP (ref 42.2–75.2)
NRBC # BLD: 0 /100 WBCS — SIGNIFICANT CHANGE UP (ref 0–0)
PLATELET # BLD AUTO: 149 K/UL — SIGNIFICANT CHANGE UP (ref 130–400)
PMV BLD: 11 FL — HIGH (ref 7.4–10.4)
POTASSIUM SERPL-MCNC: 3.8 MMOL/L — SIGNIFICANT CHANGE UP (ref 3.5–5)
POTASSIUM SERPL-SCNC: 3.8 MMOL/L — SIGNIFICANT CHANGE UP (ref 3.5–5)
PROT SERPL-MCNC: 6 G/DL — SIGNIFICANT CHANGE UP (ref 6–8)
RBC # BLD: 2.98 M/UL — LOW (ref 4.7–6.1)
RBC # FLD: 22.1 % — HIGH (ref 11.5–14.5)
SODIUM SERPL-SCNC: 145 MMOL/L — SIGNIFICANT CHANGE UP (ref 135–146)
WBC # BLD: 8.98 K/UL — SIGNIFICANT CHANGE UP (ref 4.8–10.8)
WBC # FLD AUTO: 8.98 K/UL — SIGNIFICANT CHANGE UP (ref 4.8–10.8)

## 2023-10-09 PROCEDURE — 99232 SBSQ HOSP IP/OBS MODERATE 35: CPT

## 2023-10-09 RX ORDER — HYDROMORPHONE HYDROCHLORIDE 2 MG/ML
0.25 INJECTION INTRAMUSCULAR; INTRAVENOUS; SUBCUTANEOUS EVERY 6 HOURS
Refills: 0 | Status: DISCONTINUED | OUTPATIENT
Start: 2023-10-09 | End: 2023-10-09

## 2023-10-09 RX ORDER — MIDODRINE HYDROCHLORIDE 2.5 MG/1
5 TABLET ORAL
Refills: 0 | Status: ACTIVE | OUTPATIENT
Start: 2023-10-09 | End: 2024-09-06

## 2023-10-09 RX ADMIN — BUDESONIDE AND FORMOTEROL FUMARATE DIHYDRATE 2 PUFF(S): 160; 4.5 AEROSOL RESPIRATORY (INHALATION) at 12:49

## 2023-10-09 RX ADMIN — Medication 667 MILLIGRAM(S): at 08:12

## 2023-10-09 RX ADMIN — HEPARIN SODIUM 5000 UNIT(S): 5000 INJECTION INTRAVENOUS; SUBCUTANEOUS at 13:21

## 2023-10-09 RX ADMIN — HEPARIN SODIUM 5000 UNIT(S): 5000 INJECTION INTRAVENOUS; SUBCUTANEOUS at 05:02

## 2023-10-09 RX ADMIN — HEPARIN SODIUM 5000 UNIT(S): 5000 INJECTION INTRAVENOUS; SUBCUTANEOUS at 21:05

## 2023-10-09 RX ADMIN — MIDODRINE HYDROCHLORIDE 5 MILLIGRAM(S): 2.5 TABLET ORAL at 05:02

## 2023-10-09 RX ADMIN — Medication 667 MILLIGRAM(S): at 17:25

## 2023-10-09 RX ADMIN — PANTOPRAZOLE SODIUM 40 MILLIGRAM(S): 20 TABLET, DELAYED RELEASE ORAL at 17:25

## 2023-10-09 RX ADMIN — PANTOPRAZOLE SODIUM 40 MILLIGRAM(S): 20 TABLET, DELAYED RELEASE ORAL at 05:02

## 2023-10-09 RX ADMIN — BUMETANIDE 2 MILLIGRAM(S): 0.25 INJECTION INTRAMUSCULAR; INTRAVENOUS at 05:02

## 2023-10-09 RX ADMIN — BUMETANIDE 2 MILLIGRAM(S): 0.25 INJECTION INTRAMUSCULAR; INTRAVENOUS at 17:25

## 2023-10-09 RX ADMIN — Medication 667 MILLIGRAM(S): at 12:49

## 2023-10-09 RX ADMIN — CHLORHEXIDINE GLUCONATE 1 APPLICATION(S): 213 SOLUTION TOPICAL at 05:10

## 2023-10-09 NOTE — CHART NOTE - NSCHARTNOTEFT_GEN_A_CORE
Registered Dietitian Follow-Up     Patient Profile Reviewed                           Yes [x]   No []     Nutrition History Previously Obtained        Yes [x]  No []       Pertinent Subjective Information: observed 50% tray intake from breakfast tray this AM.      Pertinent Medical Interventions: Pt continues to be managed for Metabolic Encephalopathy 2/2 septic Shock 2/2 suspected aspiration pneumonia, Acutely worsening uremia and acute kidney failure most likely 2/2 ATN - now on HD through right IJ [place], Hemorrhagic Shock Secondary to Hematochezia from Duodenal Ulcer Bleed s/p OVESCO placement , Elevated dimer IVC filter 10/6, transaminitis - resolving. Most recent S+S 10/7 with recs to continue soft and bite sized, mildly thick liquids.      Diet order: Diet, Soft and Bite Sized:   DASH/TLC {Sodium & Cholesterol Restricted}  Mildly Thick Liquids (MILDTHICKLIQS) (10-07-23 @ 13:03) [Active]    Anthropometrics:  Height (cm): 193 (10-09-23 @ 13:13)  Weight (kg): 92 (10-09-23 @ 13:13)  BMI (kg/m2): 24.7 (10-09-23 @ 13:13)  IBW: 89.1 kg  UBW: Per EMR, pt weighed 96.4 kg on 2019.    Daily Weight in k.5 (10-), Weight in k (10-03)  Weight Change: fluctuations noted likely reflective of fluid shifts    MEDICATIONS  (STANDING):  budesonide 160 MICROgram(s)/formoterol 4.5 MICROgram(s) Inhaler 2 Puff(s) Inhalation two times a day  buMETAnide 2 milliGRAM(s) Oral every 12 hours  calcium acetate 667 milliGRAM(s) Oral three times a day with meals  chlorhexidine 2% Cloths 1 Application(s) Topical <User Schedule>  dextrose 5%. 1000 milliLiter(s) (100 mL/Hr) IV Continuous <Continuous>  dextrose 50% Injectable 25 Gram(s) IV Push once  dextrose 50% Injectable 12.5 Gram(s) IV Push once  dextrose 50% Injectable 25 Gram(s) IV Push once  glucagon  Injectable 1 milliGRAM(s) IntraMuscular once  heparin   Injectable 5000 Unit(s) SubCutaneous every 8 hours  insulin lispro (ADMELOG) corrective regimen sliding scale   SubCutaneous three times a day before meals  pantoprazole   Suspension 40 milliGRAM(s) Oral two times a day    MEDICATIONS  (PRN):  acetaminophen     Tablet .. 650 milliGRAM(s) Oral every 6 hours PRN Moderate Pain (4 - 6)  albuterol    90 MICROgram(s) HFA Inhaler 1 Puff(s) Inhalation every 6 hours PRN for shortness of breath and/or wheezing  atropine Injectable 1 milliGRAM(s) IntraMuscular once PRN HR < 30  dextrose Oral Gel 15 Gram(s) Oral once PRN Blood Glucose LESS THAN 70 milliGRAM(s)/deciliter  HYDROmorphone  Injectable 0.25 milliGRAM(s) IV Push every 6 hours PRN Severe Pain (7 - 10)  midodrine. 5 milliGRAM(s) Oral <User Schedule> PRN Before HD    Pertinent Labs: 10-09 @ 05:46: Na 145, BUN 30<H>, Cr 3.0<H>, BG 87, K+ 3.8, Phos --, Mg 2.0, Alk Phos 89, ALT/SGPT 296<H>, AST/SGOT 217<H>, HbA1c --    Finger Sticks:  POCT Blood Glucose.: 96 mg/dL (10-09 @ 11:31)  POCT Blood Glucose.: 87 mg/dL (10-09 @ 06:27)  POCT Blood Glucose.: 85 mg/dL (10-09 @ 00:22)  POCT Blood Glucose.: 113 mg/dL (10-08 @ 16:19)    Physical Findings:  - Appearance: confused  - GI function: abdomen rounded; last bowel movement 06-Oct-2023  - Tubes: no feeding tube  - Oral/Mouth cavity: SLP recs as above  - Skin: skin tears, Stage 2 pressure injury to bilateral buttock, unstageable pressure injury to left heel, DTI to left cheek  - Edema: 1+ generalized edema     Nutrition Requirements:  Weight Used: dosing weight 92 kg     Estimated Energy Needs    Continue [x]  Adjust []  Energy Recommendations: 8457-2752 kcal/day based on MSJ 1784*SF 1.3-1.4    Estimated Protein Needs    Continue [x]  Adjust []  Protein Recommendations: 126..8 g/day (1.3-1.5 g/day)      Estimated Fluid Needs        Continue [x]  Adjust []  Fluid Recommendations:  2430 mL/day (25 mL/kg)     Nutrient Intake: current tray intake meeting 26-50% estimated needs     [x] Previous Nutrition Diagnosis: Malnutrition            [x] Ongoing          [] Resolved    Nutrition Education: N/A     Goal/Expected Outcome: Pt will meet >75% estimated needs in 5-7 days     Indicator/Monitoring: Monitor diet order, kcal intake, body composition, NFPE, labs  (lytes, BG, renal indices)    Recommendation:  Add Ensure Plus High Protein (350kcal, 20g protein each) 1can 2x/day, Magic cup (290kcal, 9g protein) 1can 2x/day  Cont with current diet, consistency per SLP  Provide assistance at meal time    Patient assessed at high nutrition risk; RD to follow in 3-5 days.   RD spectra [phone], also available on Teams.

## 2023-10-09 NOTE — PROGRESS NOTE ADULT - NS ATTEST RISK PROBLEM GEN_ALL_CORE FT
Acute resp failure, SDU, labs reviewed, maría cc, acute dvt, IVC filter today
Acute resp failure, SDU, labs reviewed, maría chavez
Acute resp failure, SDU, labs reviewed, dw cc, can be downraded to floor
Acute resp failure, SDU, labs reviewed, dw cc, can be downraded to floor
Acute resp failure, SDU, labs reviewed, maría cc, acute dvt, IVC filter today

## 2023-10-09 NOTE — PROGRESS NOTE ADULT - SUBJECTIVE AND OBJECTIVE BOX
Patient is a 83y old  Male who presents with a chief complaint of AMS (10-09-23)      Pt seen and examined at bedside. No CP or SOB.      PAST MEDICAL & SURGICAL HISTORY:  HTN (hypertension)    COPD (chronic obstructive pulmonary disease)    High cholesterol    Mild anemia    Coffee ground emesis    Chronic diastolic heart failure    PAULA (acute kidney injury)    No significant past surgical history        VITAL SIGNS (Last 24 hrs):  T(C): 37.2 (10-09-23 @ 11:00), Max: 37.3 (10-09-23 @ 07:00)  HR: 76 (10-09-23 @ 11:00) (72 - 80)  BP: 120/70 (10-09-23 @ 11:00) (109/55 - 151/63)  RR: 28 (10-09-23 @ 11:00) (17 - 29)  SpO2: 100% (10-09-23 @ 11:00) (99% - 100%)  Wt(kg): --  Daily     Daily     I&O's Summary    08 Oct 2023 07:01  -  09 Oct 2023 07:00  --------------------------------------------------------  IN: 0 mL / OUT: 800 mL / NET: -800 mL        PHYSICAL EXAM:  GENERAL: NAD  HEAD:  Atraumatic, Normocephalic  EYES: EOMI, PERRLA, conjunctiva and sclera clear  NECK: Supple, No JVD  CHEST/LUNG: Clear to auscultation bilaterally; No wheeze  HEART: Regular rate and rhythm; No murmurs, rubs, or gallops  ABDOMEN: Soft, Nontender, Nondistended; Bowel sounds present  EXTREMITIES:  2+ Peripheral Pulses, No clubbing, cyanosis, or edema, echymosis in left upper ext   PSYCH: Alert  NEUROLOGY: YEE  SKIN: No rashes or lesions    Labs Reviewed  Spoke to patient in regards to abnormal labs.    CBC Full  -  ( 09 Oct 2023 05:46 )  WBC Count : 8.98 K/uL  Hemoglobin : 8.3 g/dL  Hematocrit : 27.5 %  Platelet Count - Automated : 149 K/uL  Mean Cell Volume : 92.3 fL  Mean Cell Hemoglobin : 27.9 pg  Mean Cell Hemoglobin Concentration : 30.2 g/dL  Auto Neutrophil # : 4.60 K/uL  Auto Lymphocyte # : 3.38 K/uL  Auto Monocyte # : 0.80 K/uL  Auto Eosinophil # : 0.13 K/uL  Auto Basophil # : 0.03 K/uL  Auto Neutrophil % : 51.4 %  Auto Lymphocyte % : 37.6 %  Auto Monocyte % : 8.9 %  Auto Eosinophil % : 1.4 %  Auto Basophil % : 0.3 %    BMP:    10-09 @ 05:46    Blood Urea Nitrogen - 30  Calcium - 8.1  Carbond Dioxide - 25  Chloride - 107  Creatinine - 3.0  Glucose - 87  Potassium - 3.8  Sodium - 145      Hemoglobin A1c -     Urine Culture:  10-04 @ 23:28 Urine culture: --    Culture Results:   No growth at 4 days  Method Type: --  Organism: --  Organism Identification: --  Specimen Source: .Blood Blood        COVID Labs  CRP:  72.4 (09-17-23)  52.5 (09-16-23)    Procalcitonin, Serum: 1.76 ng/mL (10-05-23 @ 11:35)    D-Dimer:  3436 ng/mL DDU (10-05-23 @ 06:02)      MEDICATIONS  (STANDING):  budesonide 160 MICROgram(s)/formoterol 4.5 MICROgram(s) Inhaler 2 Puff(s) Inhalation two times a day  buMETAnide 2 milliGRAM(s) Oral every 12 hours  calcium acetate 667 milliGRAM(s) Oral three times a day with meals  chlorhexidine 2% Cloths 1 Application(s) Topical <User Schedule>  dextrose 5%. 1000 milliLiter(s) (100 mL/Hr) IV Continuous <Continuous>  dextrose 50% Injectable 25 Gram(s) IV Push once  dextrose 50% Injectable 12.5 Gram(s) IV Push once  dextrose 50% Injectable 25 Gram(s) IV Push once  glucagon  Injectable 1 milliGRAM(s) IntraMuscular once  heparin   Injectable 5000 Unit(s) SubCutaneous every 8 hours  insulin lispro (ADMELOG) corrective regimen sliding scale   SubCutaneous three times a day before meals  pantoprazole   Suspension 40 milliGRAM(s) Oral two times a day    MEDICATIONS  (PRN):  acetaminophen     Tablet .. 650 milliGRAM(s) Oral every 6 hours PRN Moderate Pain (4 - 6)  albuterol    90 MICROgram(s) HFA Inhaler 1 Puff(s) Inhalation every 6 hours PRN for shortness of breath and/or wheezing  atropine Injectable 1 milliGRAM(s) IntraMuscular once PRN HR < 30  dextrose Oral Gel 15 Gram(s) Oral once PRN Blood Glucose LESS THAN 70 milliGRAM(s)/deciliter  HYDROmorphone  Injectable 0.25 milliGRAM(s) IV Push every 6 hours PRN Severe Pain (7 - 10)  midodrine. 5 milliGRAM(s) Oral <User Schedule> PRN Before HD

## 2023-10-09 NOTE — PROGRESS NOTE ADULT - ASSESSMENT
#Metabolic Encephalopathy 2/2 septic Shock 2/2 suspected aspiration pneumonia   - rapid response after TDC due to acute change in mental status - ABG notable for elevated lactate  - pt febrile to 101.6 and elevated lactate to 8.4, rapidly became hypotensive on 10/4 - started on levophed  - DC   vancomycin and cefepime as per ID   - lactate improving  - follow up cultures, cultures neg   - ID consult appreciated, no further antibiotics   - procal 1.7  - off pressor, midodrine now descalated to prn prior to hemodialysis   - CXR (9/28) Bilateral pleural effusion/opacity unchanged. No air leak.  - On 2L NC  - C/w albuterol PRN    #Acutely worsening uremia and acute kidney failure most likely 2/2 ATN - now on HD through right IJ [place]  #hypernatremia - resolving   PAULA / CKD stable non oliguric  - udall placement 9/27/23  started on HD 9/27 but did not tolerate it with access problems  - udall changed 9/28  he received HD 9/28 and 9/30  - Close F/U of BUN/Crea/ Lytes and UO  - c/w phoslo 2/2/2   - off sodium bicarbonate   - s/p 250 cc LR bolus for hypernatremia  - s/p D5w @ 80 cc/hr  - f/u w/nephro  - 1:1 for safety and for udall observation   - increase  BUMEX to 2 mg BID as per Nephro   - TDC by IR on 10/4  - Cr stable for now  	  #Anemia of acute blood loss   #Hemorrhagic Shock Secondary to Hematochezia from Duodenal Ulcer Bleed s/p OVESCO placement 09/23  - Trend hgb, transfuse blood PRN  - continue with holding therapeutic AC  - s/p IV Protamine sulfate 36mg x1 dose on 09/23  - GI f/u appreciated  - S/p IV Protonix 40mg BID -> Ok to switch to PO BID as per GI   - Avoid any NSAIDs  - overall the pt risk and benefit is goes against Anticoagulation, will further discuss with family , , dw brother regarding the high risk of bleeding and understands the reasons to hold anticoagulation. And understands the risk of cva with Afib.     #Elevated dimer  #Acute Femoral DVT s/p IVC filter 10/6  - duplex neg on 9/29, repeat   - overall the pt risk and benefit is goes against Anticoagulation, will further discuss with family , dw brother regarding the high risk of bleeding and understands to hold anticoagulation And understands the risk of cva with Afib.    -s/p IVC filter 10/6    #transaminitis - resolving   - likely 2/2 liver shock 2/2 hypotension   - LFTS improving   - follow up RUQ   - hepatitis neg   - worsened today  - antibiotics dc  - trend   - if continues to worsen will get hepatology     #Paroxysmal Afib, chronic; uncontrolled rate  #angelika cardia in evening on cardiac telemonitoring   - HOLDING therapeutic AC  - hold  metoprolol tartrate 12.5 mg q12h    #History of Left Foot OM w/ surrounding Cellulitis  *CT LLE (9/16): No CT evidence of osteomyelitis or necrotizing infection. No drainable collection seen, within the limitations of a noncontrast exam.  - Increased frailty and decreased physical capacity  - as per previous notes the team Discussed with ID attending: patient is unlikely to benefit from prolonged therapy with cefepime+vanco (to cover possible OM) due to adverse outcomes associated kidney dysfunction, cognitive impact ...etc )  - c/w Local wound care; offloading boots bilaterally, frequently turning and positioning, prevent pressure injury, keep skin clean, and monitor for wound changes and notify provider as per podiatry    #Progress Note Handoff  Pending (specify):  as above  Family discussion: house staff updated family   Disposition: DW CC, can be downgraded to floor   Decision to admit the pt is based on acuity as above

## 2023-10-09 NOTE — PROGRESS NOTE ADULT - ASSESSMENT
82 yo m ho DM, COPD, anemia, paroxysmal afib on xarelto, HFrEF, DM coming in from nursing home for AMS x 2 days. Found to have toxic metabolic encephalopathy with PAULA.  PAULA/ toxic metabolic encephalopathy/ HAGMA/ HFrEF/ hypernatremia  possible ATN iso hypotension and possible sepsis/ vanc toxicity  no hydro on imaging  creat trend noted   hd in am   check daily cr    UO noted /  bumex 2 q 12    last phos level at goal   BP noted / on midodrine   s/p IVC filter   iron stores noted / will start venofer and LEIGHANN with hd   will follow    prognosis poor

## 2023-10-09 NOTE — PROGRESS NOTE ADULT - SUBJECTIVE AND OBJECTIVE BOX
seen and examined  24 h events noted   no distress         PAST HISTORY  --------------------------------------------------------------------------------  No significant changes to PMH, PSH, FHx, SHx, unless otherwise noted    ALLERGIES & MEDICATIONS  --------------------------------------------------------------------------------  Allergies    No Known Allergies    Intolerances      Standing Inpatient Medications  budesonide 160 MICROgram(s)/formoterol 4.5 MICROgram(s) Inhaler 2 Puff(s) Inhalation two times a day  buMETAnide 2 milliGRAM(s) Oral every 12 hours  calcium acetate 667 milliGRAM(s) Oral three times a day with meals  chlorhexidine 2% Cloths 1 Application(s) Topical <User Schedule>  dextrose 5%. 1000 milliLiter(s) IV Continuous <Continuous>  dextrose 50% Injectable 25 Gram(s) IV Push once  dextrose 50% Injectable 25 Gram(s) IV Push once  dextrose 50% Injectable 12.5 Gram(s) IV Push once  glucagon  Injectable 1 milliGRAM(s) IntraMuscular once  heparin   Injectable 5000 Unit(s) SubCutaneous every 8 hours  insulin lispro (ADMELOG) corrective regimen sliding scale   SubCutaneous three times a day before meals  midodrine. 5 milliGRAM(s) Oral three times a day  pantoprazole   Suspension 40 milliGRAM(s) Oral two times a day    PRN Inpatient Medications  acetaminophen     Tablet .. 650 milliGRAM(s) Oral every 6 hours PRN  albuterol    90 MICROgram(s) HFA Inhaler 1 Puff(s) Inhalation every 6 hours PRN  atropine Injectable 1 milliGRAM(s) IntraMuscular once PRN  dextrose Oral Gel 15 Gram(s) Oral once PRN          VITALS/PHYSICAL EXAM  --------------------------------------------------------------------------------  T(C): 36.6 (10-09-23 @ 04:00), Max: 37.1 (10-08-23 @ 16:00)  HR: 77 (10-08-23 @ 20:00) (77 - 87)  BP: 133/62 (10-09-23 @ 04:00) (133/61 - 151/63)  RR: 25 (10-09-23 @ 04:00) (17 - 25)  SpO2: 100% (10-09-23 @ 04:00) (99% - 100%)  Wt(kg): --        10-08-23 @ 07:01  -  10-09-23 @ 07:00  --------------------------------------------------------  IN: 0 mL / OUT: 800 mL / NET: -800 mL      Physical Exam:  	Gen: NAD  	Pulm: decrease BS  B/L  	CV:  S1S2; no rub  	Abd: soft, /nondistended  	Vascular access:tdc    LABS/STUDIES  --------------------------------------------------------------------------------              8.3    8.98  >-----------<  149      [10-09-23 @ 05:46]              27.5     145  |  107  |  30  ----------------------------<  87      [10-09-23 @ 05:46]  3.8   |  25  |  3.0        Ca     8.1     [10-09-23 @ 05:46]      Mg     2.0     [10-09-23 @ 05:46]    TPro  6.0  /  Alb  2.5  /  TBili  0.8  /  DBili  x   /  AST  217  /  ALT  296  /  AlkPhos  89  [10-09-23 @ 05:46]    Creatinine Trend:  SCr 3.0 [10-09 @ 05:46]  SCr 2.4 [10-08 @ 08:39]  SCr 2.0 [10-07 @ 18:15]  SCr 3.3 [10-07 @ 05:47]  SCr 3.2 [10-06 @ 05:49]    Urinalysis - [10-09-23 @ 05:46]      Color  / Appearance  / SG  / pH       Gluc 87 / Ketone   / Bili  / Urobili        Blood  / Protein  / Leuk Est  / Nitrite       RBC  / WBC  / Hyaline  / Gran  / Sq Epi  / Non Sq Epi  / Bacteria       Iron 31, TIBC 137, %sat 23      [10-07-23 @ 18:15]    HBsAb <3.0      [09-27-23 @ 17:44]  HBsAb Nonreact      [09-27-23 @ 17:44]  HBsAg Nonreact      [09-27-23 @ 17:44]  HBcAb Nonreact      [09-27-23 @ 17:44]

## 2023-10-09 NOTE — DIETITIAN INITIAL EVALUATION ADULT - NSFNSGIIOFT_GEN_A_CORE
Recovery After Procedural Sedation (Adult)   You have been given medicine by vein to make you sleep during your surgery. This may have included both a pain medicine and sleeping medicine. Most of the effects have worn off. But you may still have some drowsiness for the next 6 to 8 hours.  Home care  Follow these guidelines when you get home:  · For the next 8 hours, you should be watched by a responsible adult. This person should make sure your condition is not getting worse.  · Don't drink any alcohol for the next 24 hours.  · Don't drive, operate dangerous machinery, or make important business or personal decisions during the next 24 hours.  · To prevent injury or falls, use caution when standing and walking for at least 24 hours after your procedure.  Note: Your healthcare provider may tell you not to take any medicine by mouth for pain or sleep in the next 4 hours. These medicines may react with the medicines you were given in the hospital. This could cause a much stronger response than usual.  Follow-up care  Follow up with your healthcare provider if you are not alert and back to your usual level of activity within 12 hours.  When to seek medical advice  Call your healthcare provider right away if any of these occur:  · Drowsiness gets worse  · Weakness or dizziness gets worse  · Repeated vomiting  · You can't be awakened  · Fever  · New rash  [employer] last reviewed this educational content on 9/1/2019  © 6390-6752 [employer]. [address] [place] [id number]. All rights reserved. This information is not intended as a substitute for professional medical care. Always follow your healthcare professional's instructions      High-Fiber Diet  Fiber is in fruits, vegetables, cereals, and grains. Fiber passes through your body undigested. A high-fiber diet helps food move through your intestinal tract. The added bulk is helpful in preventing constipation. In people with diverticulosis, fiber 
helps clean out the pouches along the colon wall. It also prevents new pouches from forming. A high-fiber diet reduces the risk of colon cancer. It also lowers blood cholesterol and prevents high blood sugar in people with diabetes.    The fiber-rich foods listed below should be part of your diet. If you are not used to high-fiber foods, start with 1 or 2 foods from this list. Every 3 to 4 days add a new one to your diet. Do this until you are eating 4 high-fiber foods per day. This should give you 20 to 35 grams of fiber a day. It is also important to drink a lot of water when you are on this diet. You should have 6 to 8 glasses of water a day. Water makes the fiber swell and increases the benefit.  Foods high in dietary fiber  The following foods are high in dietary fiber:  · Breads. Breads made with 100% whole-wheat flour; laya, wheat, or rye crackers; whole-grain tortillas, bran muffins.  · Cereals. Whole-grain and bran cereals with bran (shredded wheat, wheat flakes, raisin bran, corn bran); oatmeal, rolled oats, granola, and brown rice.  · Fruits. Fresh fruits and their edible skins (pears, prunes, raisins, berries, apples, and apricots); bananas, citrus fruit, mangoes, pineapple; and prune juice.  · Nuts. Any nuts and seeds.  · Vegetables. Best served raw or lightly cooked. All types, especially: green peas, celery, eggplant, potatoes, spinach, broccoli, [place] sprouts, winter squash, carrots, cauliflower, soybeans, lentils, and fresh and dried beans of all kinds.  · Other. Popcorn, any spices.  Date Last Reviewed: 8/1/2016  © 6491-4200 [employer]. [address] [place] [address], [place] [address]. All rights reserved. This information is not intended as a substitute for professional medical care. Always follow your healthcare professional's instructions.    
I&O's Detail    24 Sep 2023 07:01  -  25 Sep 2023 07:00  --------------------------------------------------------  IN:    FentaNYL: 29.2 mL    IV PiggyBack: 50 mL    Norepinephrine: 169.8 mL    Pantoprazole: 240 mL  Total IN: 489 mL    OUT:    Indwelling Catheter - Urethral (mL): 968 mL  Total OUT: 968 mL    Total NET: -479 mL    
2021

## 2023-10-09 NOTE — PROGRESS NOTE ADULT - SUBJECTIVE AND OBJECTIVE BOX
Over Night Events: events noted, on NC, renal reviewed, afebrile    PHYSICAL EXAM    ICU Vital Signs Last 24 Hrs  T(C): 36.6 (09 Oct 2023 04:00), Max: 37.1 (08 Oct 2023 16:00)  T(F): 97.8 (09 Oct 2023 04:00), Max: 98.7 (08 Oct 2023 16:00)  HR: 77 (08 Oct 2023 20:00) (77 - 87)  BP: 133/62 (09 Oct 2023 04:00) (133/61 - 151/63)  BP(mean): 86 (09 Oct 2023 04:00) (82 - 121)  RR: 25 (09 Oct 2023 04:00) (17 - 25)  SpO2: 100% (09 Oct 2023 04:00) (98% - 100%)    O2 Parameters below as of 09 Oct 2023 00:00  Patient On (Oxygen Delivery Method): room air            General: ill looking  Lungs:  dec bs both bases  Cardiovascular: MAY 2.6  Abdomen: Soft, Positive BS  Extremities: No clubbing   Neurological: Non focal       10-07-23 @ 07:01  -  10-08-23 @ 07:00  --------------------------------------------------------  IN:  Total IN: 0 mL    OUT:    Incontinent per Condom Catheter (mL): 265 mL    Other (mL): 1000 mL  Total OUT: 1265 mL    Total NET: -1265 mL      10-08-23 @ 07:01  -  10-09-23 @ 06:30  --------------------------------------------------------  IN:  Total IN: 0 mL    OUT:    Voided (mL): 100 mL  Total OUT: 100 mL    Total NET: -100 mL          LABS:                          8.3    8.98  )-----------( 149      ( 09 Oct 2023 05:46 )             27.5                                               10-08    145  |  108  |  24<H>  ----------------------------<  97  3.5   |  24  |  2.4<H>    Ca    8.1<L>      08 Oct 2023 08:39  Mg     2.0     10-08    TPro  5.8<L>  /  Alb  2.8<L>  /  TBili  0.8  /  DBili  x   /  AST  526<H>  /  ALT  428<H>  /  AlkPhos  87  10-08                                             Urinalysis Basic - ( 08 Oct 2023 08:39 )    Color: x / Appearance: x / SG: x / pH: x  Gluc: 97 mg/dL / Ketone: x  / Bili: x / Urobili: x   Blood: x / Protein: x / Nitrite: x   Leuk Esterase: x / RBC: x / WBC x   Sq Epi: x / Non Sq Epi: x / Bacteria: x                                                  LIVER FUNCTIONS - ( 08 Oct 2023 08:39 )  Alb: 2.8 g/dL / Pro: 5.8 g/dL / ALK PHOS: 87 U/L / ALT: 428 U/L / AST: 526 U/L / GGT: x                                                                                                                                       MEDICATIONS  (STANDING):  budesonide 160 MICROgram(s)/formoterol 4.5 MICROgram(s) Inhaler 2 Puff(s) Inhalation two times a day  buMETAnide 2 milliGRAM(s) Oral every 12 hours  calcium acetate 667 milliGRAM(s) Oral three times a day with meals  chlorhexidine 2% Cloths 1 Application(s) Topical <User Schedule>  dextrose 5%. 1000 milliLiter(s) (100 mL/Hr) IV Continuous <Continuous>  dextrose 50% Injectable 12.5 Gram(s) IV Push once  dextrose 50% Injectable 25 Gram(s) IV Push once  dextrose 50% Injectable 25 Gram(s) IV Push once  glucagon  Injectable 1 milliGRAM(s) IntraMuscular once  heparin   Injectable 5000 Unit(s) SubCutaneous every 8 hours  insulin lispro (ADMELOG) corrective regimen sliding scale   SubCutaneous three times a day before meals  midodrine. 5 milliGRAM(s) Oral three times a day  pantoprazole   Suspension 40 milliGRAM(s) Oral two times a day    MEDICATIONS  (PRN):  acetaminophen     Tablet .. 650 milliGRAM(s) Oral every 6 hours PRN Moderate Pain (4 - 6)  albuterol    90 MICROgram(s) HFA Inhaler 1 Puff(s) Inhalation every 6 hours PRN for shortness of breath and/or wheezing  atropine Injectable 1 milliGRAM(s) IntraMuscular once PRN HR < 30  dextrose Oral Gel 15 Gram(s) Oral once PRN Blood Glucose LESS THAN 70 milliGRAM(s)/deciliter

## 2023-10-09 NOTE — PROGRESS NOTE ADULT - ASSESSMENT
Impression    Septic shock resolved  Right femoral DVT sp IVC flter  Acute hypoxemic respiratory failure on NC  Right pleural effusion  HO aspiration pneumonitis SP ABX   UGIB secondary to Duodenal ulcer SP EGD   Hematochezia resolved   Hemorrhagic Shock resolved   Metabolic Encephalopathy   PAULA / CKD on HD  History of Left Foot OM w/ surrounding Cellulitis   Sacral DTI  transaminitis improvng    Plan:    CNS: Monitor mental status.  Avoid depressants      HEENT:  Oral care.  Aspiration precautions    CARDIOVASCULAR:  Goal directed fluid resuscitation.  Avoid overload    PULMONARY: Aggressive pulmonary toilet, including chest PT.  Albuterol PRN.  Frequent suctioning.  Incentive spirometry  keep Sao2 92 to 96%.  repeat CXR    GASTROINTESTINAL : feeding per Speech and swallow.  Protonix q 12     GENITOURINARY/RENAL: Nephro following; HD per renal.  Monitor lytes and correct as needed     INFECTIOUS : ABX PER ID    HEMATOLOGIC: DVT prophylaxis.   Target Hb > 7.  Trend CBC     ENDOCRINE: Follow up FS.  Insulin protocol as needed.  BG goal 140-180    MSK/DERM:  PT/OT when cooperative.  Off loading.  Skin care      Palliative care follow up     Poor prognosis overall     floor

## 2023-10-10 LAB
BASOPHILS # BLD AUTO: 0.02 K/UL — SIGNIFICANT CHANGE UP (ref 0–0.2)
BASOPHILS NFR BLD AUTO: 0.2 % — SIGNIFICANT CHANGE UP (ref 0–1)
CULTURE RESULTS: SIGNIFICANT CHANGE UP
EOSINOPHIL # BLD AUTO: 0.07 K/UL — SIGNIFICANT CHANGE UP (ref 0–0.7)
EOSINOPHIL NFR BLD AUTO: 0.8 % — SIGNIFICANT CHANGE UP (ref 0–8)
GLUCOSE BLDC GLUCOMTR-MCNC: 108 MG/DL — HIGH (ref 70–99)
GLUCOSE BLDC GLUCOMTR-MCNC: 109 MG/DL — HIGH (ref 70–99)
GLUCOSE BLDC GLUCOMTR-MCNC: 71 MG/DL — SIGNIFICANT CHANGE UP (ref 70–99)
GLUCOSE BLDC GLUCOMTR-MCNC: 85 MG/DL — SIGNIFICANT CHANGE UP (ref 70–99)
HCT VFR BLD CALC: 29.2 % — LOW (ref 42–52)
HGB BLD-MCNC: 9 G/DL — LOW (ref 14–18)
IMM GRANULOCYTES NFR BLD AUTO: 0.5 % — HIGH (ref 0.1–0.3)
LYMPHOCYTES # BLD AUTO: 2.75 K/UL — SIGNIFICANT CHANGE UP (ref 1.2–3.4)
LYMPHOCYTES # BLD AUTO: 30.1 % — SIGNIFICANT CHANGE UP (ref 20.5–51.1)
MCHC RBC-ENTMCNC: 28.2 PG — SIGNIFICANT CHANGE UP (ref 27–31)
MCHC RBC-ENTMCNC: 30.8 G/DL — LOW (ref 32–37)
MCV RBC AUTO: 91.5 FL — SIGNIFICANT CHANGE UP (ref 80–94)
MONOCYTES # BLD AUTO: 0.93 K/UL — HIGH (ref 0.1–0.6)
MONOCYTES NFR BLD AUTO: 10.2 % — HIGH (ref 1.7–9.3)
NEUTROPHILS # BLD AUTO: 5.31 K/UL — SIGNIFICANT CHANGE UP (ref 1.4–6.5)
NEUTROPHILS NFR BLD AUTO: 58.2 % — SIGNIFICANT CHANGE UP (ref 42.2–75.2)
NRBC # BLD: 0 /100 WBCS — SIGNIFICANT CHANGE UP (ref 0–0)
PLATELET # BLD AUTO: 126 K/UL — LOW (ref 130–400)
PMV BLD: 11.2 FL — HIGH (ref 7.4–10.4)
RBC # BLD: 3.19 M/UL — LOW (ref 4.7–6.1)
RBC # FLD: 22.2 % — HIGH (ref 11.5–14.5)
SPECIMEN SOURCE: SIGNIFICANT CHANGE UP
WBC # BLD: 9.13 K/UL — SIGNIFICANT CHANGE UP (ref 4.8–10.8)
WBC # FLD AUTO: 9.13 K/UL — SIGNIFICANT CHANGE UP (ref 4.8–10.8)

## 2023-10-10 PROCEDURE — 99233 SBSQ HOSP IP/OBS HIGH 50: CPT

## 2023-10-10 PROCEDURE — 99221 1ST HOSP IP/OBS SF/LOW 40: CPT | Mod: FS

## 2023-10-10 PROCEDURE — 70450 CT HEAD/BRAIN W/O DYE: CPT | Mod: 26

## 2023-10-10 RX ADMIN — Medication 667 MILLIGRAM(S): at 12:05

## 2023-10-10 RX ADMIN — HEPARIN SODIUM 5000 UNIT(S): 5000 INJECTION INTRAVENOUS; SUBCUTANEOUS at 05:26

## 2023-10-10 RX ADMIN — PANTOPRAZOLE SODIUM 40 MILLIGRAM(S): 20 TABLET, DELAYED RELEASE ORAL at 05:26

## 2023-10-10 RX ADMIN — Medication 667 MILLIGRAM(S): at 17:53

## 2023-10-10 RX ADMIN — CHLORHEXIDINE GLUCONATE 1 APPLICATION(S): 213 SOLUTION TOPICAL at 05:26

## 2023-10-10 RX ADMIN — HEPARIN SODIUM 5000 UNIT(S): 5000 INJECTION INTRAVENOUS; SUBCUTANEOUS at 13:24

## 2023-10-10 RX ADMIN — BUMETANIDE 2 MILLIGRAM(S): 0.25 INJECTION INTRAMUSCULAR; INTRAVENOUS at 05:26

## 2023-10-10 RX ADMIN — BUMETANIDE 2 MILLIGRAM(S): 0.25 INJECTION INTRAMUSCULAR; INTRAVENOUS at 17:54

## 2023-10-10 RX ADMIN — PANTOPRAZOLE SODIUM 40 MILLIGRAM(S): 20 TABLET, DELAYED RELEASE ORAL at 17:53

## 2023-10-10 NOTE — PROGRESS NOTE ADULT - SUBJECTIVE AND OBJECTIVE BOX
pt seen and examined.     Resident's notes reviewed, My notes supersede resident's notes in case of discrepancy       ROS: no cp, no sob, no n/v, no fever    Vital Signs Last 24 Hrs  T(C): 37.3 (10 Oct 2023 14:36), Max: 37.4 (10 Oct 2023 05:12)  T(F): 99.2 (10 Oct 2023 14:36), Max: 99.4 (10 Oct 2023 05:12)  HR: 83 (10 Oct 2023 14:36) (70 - 86)  BP: 96/51 (10 Oct 2023 14:36) (96/51 - 112/53)  BP(mean): --  RR: 18 (10 Oct 2023 14:36) (18 - 20)  SpO2: 98% (10 Oct 2023 14:36) (98% - 100%)    Parameters below as of 10 Oct 2023 11:13  Patient On (Oxygen Delivery Method): room air        physical exam  constitutional NAD, AAOX2, Respiratory  lungs CTA, CVS heart RRR, GI: abdomen Soft NT, ND, BS+, skin: pressure ulcer stage 2-3 sacral   neuro exam limited mobility     MEDICATIONS  (STANDING):  budesonide 160 MICROgram(s)/formoterol 4.5 MICROgram(s) Inhaler 2 Puff(s) Inhalation two times a day  buMETAnide 2 milliGRAM(s) Oral every 12 hours  calcium acetate 667 milliGRAM(s) Oral three times a day with meals  chlorhexidine 2% Cloths 1 Application(s) Topical <User Schedule>  dextrose 5%. 1000 milliLiter(s) (100 mL/Hr) IV Continuous <Continuous>  dextrose 50% Injectable 25 Gram(s) IV Push once  dextrose 50% Injectable 12.5 Gram(s) IV Push once  dextrose 50% Injectable 25 Gram(s) IV Push once  glucagon  Injectable 1 milliGRAM(s) IntraMuscular once  heparin   Injectable 5000 Unit(s) SubCutaneous every 8 hours  insulin lispro (ADMELOG) corrective regimen sliding scale   SubCutaneous three times a day before meals  pantoprazole   Suspension 40 milliGRAM(s) Oral two times a day    MEDICATIONS  (PRN):  acetaminophen     Tablet .. 650 milliGRAM(s) Oral every 6 hours PRN Moderate Pain (4 - 6)  albuterol    90 MICROgram(s) HFA Inhaler 1 Puff(s) Inhalation every 6 hours PRN for shortness of breath and/or wheezing  atropine Injectable 1 milliGRAM(s) IntraMuscular once PRN HR < 30  dextrose Oral Gel 15 Gram(s) Oral once PRN Blood Glucose LESS THAN 70 milliGRAM(s)/deciliter  HYDROmorphone  Injectable 0.25 milliGRAM(s) IV Push every 6 hours PRN Severe Pain (7 - 10)  midodrine. 5 milliGRAM(s) Oral <User Schedule> PRN Before HD                            8.3    8.98  )-----------( 149      ( 09 Oct 2023 05:46 )             27.5     10-09    145  |  107  |  30<H>  ----------------------------<  87  3.8   |  25  |  3.0<H>    Ca    8.1<L>      09 Oct 2023 05:46  Mg     2.0     10-09    TPro  6.0  /  Alb  2.5<L>  /  TBili  0.8  /  DBili  x   /  AST  217<H>  /  ALT  296<H>  /  AlkPhos  89  10-09    Ferritin: 960 ng/mL [30 - 400] (10-07-23 @ 18:15)  Procalcitonin, Serum: 1.76 ng/mL [0.02 - 0.10] (10-05-23 @ 11:35)  D-Dimer Assay, Quantitative: 3436 ng/mL DDU (10-05-23 @ 06:02)  Procalcitonin, Serum: 0.49 ng/mL [0.02 - 0.10] (09-17-23 @ 08:20)    a/p  84 yo m ho DM, COPD, anemia, lymphedema afib on xarelto, HFrEF, DM coming in from nursing home for AMS and hypotension   pt had   hypotensive, with active melena. BP in 60's. GI consulted urgently for endoscopy. Procedure was done at bedside and bleeding controlled. Pt. was given 2 U of blood  also pt was found to have PAULA, and was started on HD   Tunneled catheter was placed 10/4 but had RR on that day for AMS and had fever 101.6 and was hypotensive, was started on pressors     # Metabolic Encephalopathy due to sepsis, poa   today mental status has improved, he is awake and alert     # acute GIB, - emergent EGD on 9/23  # anemia of acute gi loss     s/p EGD on 9/23: Normal mucosa in the whole esophagus. Erythema in the stomach compatible with non-erosive gastritis. Blood and clots in the fundus and the antrum limited exam. 2 cm actively bleeding duodenal ulcer with spurting vessel (Aaron classification 1a) was noted in the duodenal sweep on the base. 10 cc of 1/28480 epinephrine was injected in circumferential pattern successfully. A 11/6 OVESCO clip was successfully deployed at the vessel for the purposes of hemostasis. There was no evidence of active bleeding at the end of procedure.  Hemoglobin: 8.3 g/dL (10-09-23 @ 05:46)  Hemoglobin: 8.2 g/dL (10-08-23 @ 08:39)  Hemoglobin: 8.3 g/dL (10-07-23 @ 05:47)    # paula on ckd , requiring HD    #Acute Femoral DVT s/p IVC filter 10/6, not a candidate for ac  #Paroxysmal Afib rate controlled, not on ac due to gib   #History of Left Foot OM w/ surrounding Cellulitis  # pressure ulcer sacral , dw burn   site is clean     #transaminitis - improving, repeat  Alb: 2.5 g/dL / Pro: 6.0 g/dL / ALK PHOS: 89 U/L / ALT: 296 U/L / AST: 217 U/L / GGT: x     /  wei x    / 0.8 mg/dL ( 09 Oct 2023 05:46 )  Alb: 2.8 g/dL / Pro: 5.8 g/dL / ALK PHOS: 87 U/L / ALT: 428 U/L / AST: 526 U/L / GGT: x     /  wei x    / 0.8 mg/dL ( 08 Oct 2023 08:39 )  Alb: 2.7 g/dL / Pro: 5.6 g/dL / ALK PHOS: 92 U/L / ALT: 531 U/L / AST: 955 U/L / GGT: x     /  wei x    / 0.7 mg/dL ( 07 Oct 2023 18:15 )    #Progress Note Handoff    Pending :  discharge planning   Family discussion: resident spoke with pt's brother   Disposition: SNF   code status full code     pt is high risk of morbidity due to : paula, acute gib, acute dvt and hx of afib, both anticoagulation and not anticoagulating are both risky

## 2023-10-10 NOTE — PROGRESS NOTE ADULT - SUBJECTIVE AND OBJECTIVE BOX
24H events:    Patient is a 83y old Male who presents with a chief complaint of AMS (10 Oct 2023 07:57)    Primary diagnosis of Altered mental status      Today is hospital day 25d. This morning patient was seen and examined at bedside, resting comfortably in bed.    No acute or major events overnight. AO*2    Code Status: full        PAST MEDICAL & SURGICAL HISTORY  HTN (hypertension)    COPD (chronic obstructive pulmonary disease)    High cholesterol    Mild anemia    Coffee ground emesis    Chronic diastolic heart failure    PAULA (acute kidney injury)    No significant past surgical history      SOCIAL HISTORY:  Social History:      ALLERGIES:  No Known Allergies    MEDICATIONS:  STANDING MEDICATIONS  budesonide 160 MICROgram(s)/formoterol 4.5 MICROgram(s) Inhaler 2 Puff(s) Inhalation two times a day  buMETAnide 2 milliGRAM(s) Oral every 12 hours  calcium acetate 667 milliGRAM(s) Oral three times a day with meals  chlorhexidine 2% Cloths 1 Application(s) Topical <User Schedule>  dextrose 5%. 1000 milliLiter(s) IV Continuous <Continuous>  dextrose 50% Injectable 25 Gram(s) IV Push once  dextrose 50% Injectable 12.5 Gram(s) IV Push once  dextrose 50% Injectable 25 Gram(s) IV Push once  glucagon  Injectable 1 milliGRAM(s) IntraMuscular once  heparin   Injectable 5000 Unit(s) SubCutaneous every 8 hours  insulin lispro (ADMELOG) corrective regimen sliding scale   SubCutaneous three times a day before meals  pantoprazole   Suspension 40 milliGRAM(s) Oral two times a day    PRN MEDICATIONS  acetaminophen     Tablet .. 650 milliGRAM(s) Oral every 6 hours PRN  albuterol    90 MICROgram(s) HFA Inhaler 1 Puff(s) Inhalation every 6 hours PRN  atropine Injectable 1 milliGRAM(s) IntraMuscular once PRN  dextrose Oral Gel 15 Gram(s) Oral once PRN  HYDROmorphone  Injectable 0.25 milliGRAM(s) IV Push every 6 hours PRN  midodrine. 5 milliGRAM(s) Oral <User Schedule> PRN    VITALS:   T(F): 99.2  HR: 83  BP: 96/51  RR: 18  SpO2: 98%    PHYSICAL EXAM:  GENERAL:   ( x ) NAD, lying in bed comfortably     (  ) obtunded     (  ) lethargic     (  ) somnolent    HEAD:   (x  ) Atraumatic     (  ) hematoma     (  ) laceration (specify location:       )     NECK:  x(  ) Supple     (  ) neck stiffness     (  ) nuchal rigidity     (  )  no JVD     (  ) JVD present ( -- cm)    HEART:  Rate -->     (x ) normal rate     (  ) bradycardic     (  ) tachycardic  Rhythm -->     ( x ) regular     (  ) regularly irregular     (  ) irregularly irregular  Murmurs -->     ( x ) normal s1s2     (  ) systolic murmur     (  ) diastolic murmur     (  ) continuous murmur      (  ) S3 present     (  ) S4 present    LUNGS:   (x  )Unlabored respirations     (  ) tachypnea  (  ) B/L air entry     ( x ) decreased breath sounds in:  (location     )    (x  ) no adventitious sound     (  ) crackles     (  ) wheezing      (  ) rhonchi      (specify location:       )  (  ) chest wall tenderness (specify location:       )    ABDOMEN:   ( x ) Soft     (  ) tense   |   (  ) nondistended     (  ) distended   |   (  ) +BS     (  ) hypoactive bowel sounds     (  ) hyperactive bowel sounds  ( x ) nontender     (  ) RUQ tenderness     (  ) RLQ tenderness     (  ) LLQ tenderness     (  ) epigastric tenderness     (  ) diffuse tenderness  (  ) Splenomegaly      (  ) Hepatomegaly      (  ) Jaundice     (  ) ecchymosis  ulcer on sacral region    EXTREMITIES:  (  ) Normal     (  ) Rash     (  ) ecchymosis     (  ) varicose veins      (  ) pitting edema     (  ) non-pitting edema   (  ) ulceration     (  ) gangrene:     (location:     NERVOUS SYSTEM:    (x  ) A&Ox2     (  ) confused     (  ) lethargic  CN II-XII:     (  ) Intact     (  ) deficits found     (Specify:     )   Upper extremities:     (  ) no sensorimotor deficits     (  ) weakness     (  ) loss of proprioception/vibration     (  ) loss of touch/temperature (specify:    )  Lower extremities:     (  ) no sensorimotor deficits     (  ) weakness     (  ) loss of proprioception/vibration     (  ) loss of touch/temperature (specify:    )      (  ) Indwelling [place] Catheter:   Date insterted:    Reason (  ) Critical illness     (  ) urinary retention    (  ) Accurate Ins/Outs Monitoring     (  ) CMO patient    (  ) Central Line:   Date inserted:  Location: (  ) Right IJ     (  ) Left IJ     (  ) Right Fem     (  ) Left Fem    (  ) SPC        (  ) pigtail       (  ) PEG tube       (  ) colostomy       (  ) jejunostomy  (  ) U-Dall    LABS:                        8.3    8.98  )-----------( 149      ( 09 Oct 2023 05:46 )             27.5     10-09    145  |  107  |  30<H>  ----------------------------<  87  3.8   |  25  |  3.0<H>    Ca    8.1<L>      09 Oct 2023 05:46  Mg     2.0     10-09    TPro  6.0  /  Alb  2.5<L>  /  TBili  0.8  /  DBili  x   /  AST  217<H>  /  ALT  296<H>  /  AlkPhos  89  10-09      Urinalysis Basic - ( 09 Oct 2023 05:46 )    Color: x / Appearance: x / SG: x / pH: x  Gluc: 87 mg/dL / Ketone: x  / Bili: x / Urobili: x   Blood: x / Protein: x / Nitrite: x   Leuk Esterase: x / RBC: x / WBC x   Sq Epi: x / Non Sq Epi: x / Bacteria: x         84 yo m ho DM, COPD, anemia, paroxysmal afib on xarelto, HFrEF, DM coming in from nursing home for AMS x 2 days. Found to have toxic metabolic encephalopathy with PAULA.  PAULA/ toxic metabolic encephalopathy/ HAGMA/ HFrEF/ hypernatremia  possible ATN iso hypotension and possible sepsis/ vanc toxicity        HPI:84 yo m ho DM, COPD, anemia, lymphedemia, afib on xarelto, HFrEF, DM coming in from nursing home for AMS x 2 days. Unable to gather story from pt as he is altered and lethargic.  As per NH was becoming more agitated x 2 days, was hypotensive yesterday and started on cefepime and vancomycin. Brought in to the ED for AMS. PICC line in place for cefepime 1q q8 until 10/5/23 and vanc 1q24 until 9/23/23 for LLE cellulitis/OM  (was at [facility] last month for LLE thick wound cellulitis/OM and sacral DTI as per call with [facility] as per collateral from [facility])      In the ED, vitals wnl. Labs showing wbc 11.30, Hb 9.6, MCV 79.1, INR 1.59, CO2 14, AG 21, Cr 6 (~baseline 1.2), , trops 0.09. CTH wnl, RBUS with no hydro, poor visualization of left kidney          INTERVAL HPI/:  Hospital course:  When pt arrived to ICU, he was in shock, hypotensive, with active melena, BP in 60's, endoscopy Procedure was done at bedside and bleeding controlled,  2 U of blood, 1U FFP in ICU, not signs of melena. Pt was initially in 3E the patient was Hypoxic (as low as 70s, AAOx 1. On auscultation, bilateral crackles present. Placed the patient on venturi mask and then on NRB. The Spo2 improved to 99%. Stat vitals repeated (vitals Spo2 99%, /70s, HR 95). Ordered CXR stat, ABG stat, Bumex 2mg IV x 1 stat. Labs and charts reviewed. Stat Labs drawn. Upgraded to SDU on 9/27. In the SDU, patient respiratory status was much improved on NC. Nephrology recommended dialysis for patient's PAULA. On 9/27, a [place] was placed and patient received HD that day. On the evening of 9/27, patient hgb was 6.9 and he received 1 unit pRBC. There was also an episode of melena and GI was notified. Patient remained hemodynamically stable and GI recommended to just monitor. Heparin was held, but has since been restarted for DVT ppx. On 9/29, he was started on D5 @80cc for 24 hours to address free water deficit. Starting sodium was 151 with a target goal of 145 after 24 hours. Last, patient was seen by speech and swallow and put on NPO for poor swallowing. DGTF on 9/29. Pt was hypernatremic and was managed with D5W. Pt also had a worsening PAULA and nephrology consulted. Pt received HD via R IJ line and per nephro pt needs long term dialysis. On 10/4, a TDC was placed by IR for dialysis. On 15:00 10/4 a rapid response was called for acute altered mental status after the IR procedure. VSS and ABG notable for elevated lactate of 6. Pt spiked fever to 101.6 and repeat ABG notable for lactate of 8.3. Pt's BP dropped to 75/42 and he was given a 500 LR bolus, was started on levophed. Sepsis workup sent for possible aspiration pna. Pt started on vancomycin and cefepime. Pt upgraded to SDU for close monitoring and vasopressor requirements.      #Metabolic Encephalopathy 2/2 septic Shock 2/2 suspected aspiration pneumonia   - rapid response after TDC due to acute change in mental status - ABG notable for elevated lactate   - pt febrile to 101.6 and elevated lactate to 8.4, rapidly became hypotensive on 10/4 - started on levophed  - DC vancomycin and cefepime as per ID   - lactate improved 1.6  - follow up cultures, cultures neg   - ID consult appreciated, no further antibiotics   - procal 1.7  - off pressor, decrease midodrine 5 mg TID continue to wean   - CXR (9/28) Bilateral pleural effusion/opacity unchanged. No air leak.  - On 2L NC  - C/w albuterol PRN    #Acutely worsening uremia and acute kidney failure most likely 2/2 ATN - now on HD through right IJ [place]  #hypernatremia - resolving   PAULA / CKD stable non oliguric  - udall placement 9/27/23  started on HD 9/27 but did not tolerate it with access problems  - udall changed 9/28  he received HD 9/28 and 9/30  - Close F/U of BUN/Crea/ Lytes and UO  - c/w phoslo 2/2/2   - off sodium bicarbonate   - s/p 250 cc LR bolus for hypernatremia  - s/p D5w @ 80 cc/hr  - f/u w/nephro  - 1:1 for safety and for udall observation   - increase  BUMEX to 2 mg BID as per Nephro   - TDC by IR on 10/4  - Cr stable for now  - HD done today, need long term dialysis as per nephro  	  #Anemia of acute blood loss   #Hemorrhagic Shock Secondary to Hematochezia from Duodenal Ulcer Bleed s/p OVESCO placement 09/23  - Trend hgb, transfuse blood PRN  - continue with holding therapeutic AC  - s/p IV Protamine sulfate 36mg x1 dose on 09/23  - GI f/u appreciated  - S/p IV Protonix 40mg BID -> Ok to switch to PO BID as per GI   - Avoid any NSAIDs  - overall the pt risk and benefit is goes against Anticoagulation, will further discuss with family , , dw brother regarding the high risk of bleeding and understands the reasons to hold anticoagulation. And understands the risk of cva with Afib.     #Elevated dimer  #Acute Femoral DVT s/p IVC filter 10/6  - duplex neg on 9/29, repeat   - overall the pt risk and benefit is goes against Anticoagulation, will further discuss with family , dw brother regarding the high risk of bleeding and understands to hold anticoagulation And understands the risk of cva with Afib.    -s/p IVC filter 10/6    #transaminitis - resolving   - likely 2/2 liver shock 2/2 hypotension vs DILI   - LFTS improving   - hepatitis neg   - today AST.ALT: 526/428>>217/296    - antibiotics dc  - trend   - if continues to worsen will get hepatology     #Paroxysmal Afib, chronic; uncontrolled rate  #angelika cardia in evening on cardiac telemonitoring   - HOLDING therapeutic AC  - hold  metoprolol tartrate 12.5 mg q12h    #History of Left Foot OM w/ surrounding Cellulitis  *CT LLE (9/16): No CT evidence of osteomyelitis or necrotizing infection. No drainable collection seen, within the limitations of a noncontrast exam.  - Increased frailty and decreased physical capacity  - as per previous notes the team Discussed with ID attending: patient is unlikely to benefit from prolonged therapy with cefepime+vanco (to cover possible OM) due to adverse outcomes associated kidney dysfunction, cognitive impact ...etc )  - c/w Local wound care; offloading boots bilaterally, frequently turning and positioning, prevent pressure injury, keep skin clean, and monitor for wound changes and notify provider as per podiatry      plan: continue HD, moniter mental status.

## 2023-10-10 NOTE — SWALLOW FEES ASSESSMENT ADULT - RECOMMENDED CONSISTENCY
continue current PO diet at this time; pt did not tolerate scope well, VFSS recommended to further investigate pharyngeal dysphagia

## 2023-10-10 NOTE — CHART NOTE - NSCHARTNOTEFT_GEN_A_CORE
Talked with brother Gabe about his mental status before coming to hospital and said he was perfectly fine( AO*3). We also discussed about GOC, he wants everything to be done to make better since he was perfectly fine before hospital admission.

## 2023-10-10 NOTE — CONSULT NOTE ADULT - ASSESSMENT
Sacral & bilateral buttock pressure ulcers, necrotic    RECOMMENDATION:  Wound care - wash with soap and water. Apply santyl/hydrogel ointment, cover with allevyn pads  At this time patient does not need surgical debridement   If worsens despite santyl, please recall burn - may need debridement   Frequent Offloading/ positional changes

## 2023-10-10 NOTE — SWALLOW FEES ASSESSMENT ADULT - ROSENBEK'S PENETRATION ASPIRATION SCALE
5/8 w/ mildly thick liquids and puree; 7/8 w/ thin liquids/(7) material passes glottis, visible subglottic residue remains despite patient’s response (aspiration)

## 2023-10-10 NOTE — PROGRESS NOTE ADULT - ASSESSMENT
84 yo m ho DM, COPD, anemia, paroxysmal afib on xarelto, HFrEF, DM coming in from nursing home for AMS x 2 days. Found to have toxic metabolic encephalopathy with PAULA.  PAULA/ toxic metabolic encephalopathy/ HAGMA/ HFrEF/ hypernatremia  possible ATN iso hypotension and possible sepsis/ vanc toxicity  no hydro on imaging  creat trend noted   hd today standard bath uf 2 liters as tolerated    UO noted /  bumex 2 q 12 / check bladder scan    last phos level at goal   BP noted / on midodrine / 5 q 8   s/p IVC filter   iron stores noted / will start venofer and aranesp 25 with hd weekly  will follow    prognosis poor

## 2023-10-10 NOTE — SWALLOW FEES ASSESSMENT ADULT - SLP PERTINENT HISTORY OF CURRENT PROBLEM
Pt is an 84 y/o M w/ PMHx: DM, COPD, anemia, lymphedema, afib on Xarelto, HFrEF, DM, presents from NH for AMS. Pt is being treated for anemia, hemorrhagic shock 2' hematochezia from duodenal ulcer bleed s/p OVESCO placement 09/23. Course c/b acutely worsening uremia and acute kidney failure most likely 2' ATN- now on HD through right IJ [place]. +Metabolic encephalopathy, hypernatremia, AHRF- possible aspiration PNA, volume overload, R pleural effusions, hypotension.

## 2023-10-10 NOTE — CONSULT NOTE ADULT - SUBJECTIVE AND OBJECTIVE BOX
83y  Male  HPI:  84 yo m ho DM, COPD, anemia, lymphedemia, afib on xarelto, HFrEF, DM coming in from nursing home for AMS x 2 days. Unable to gather story from pt as he is altered and lethargic.  As per NH was becoming more agitated x 2 days, was hypotensive yesterday and started on cefepime and vancomycin. Brought in to the ED for AMS. PICC line in place for cefepime 1q q8 until 10/5/23 and vanc 1q24 until 9/23/23 for LLE cellulitis/OM  (was at [facility] last month for LLE thick wound cellulitis/OM and sacral DTI as per call with [facility] as per collateral from [facility])      In the ED, vitals wnl. Labs showing wbc 11.30, Hb 9.6, MCV 79.1, INR 1.59, CO2 14, AG 21, Cr 6 (~baseline 1.2), , trops 0.09. CTH wnl, RBUS with no hydro, poor visualization of left kidney       (15 Sep 2023 22:12)    Burn consulted to evaluate sacral pressure ulcer. Patient unable to give history.     Allergies    No Known Allergies        PAST MEDICAL & SURGICAL HISTORY:  HTN (hypertension)      COPD (chronic obstructive pulmonary disease)      High cholesterol      Mild anemia      Coffee ground emesis      Chronic diastolic heart failure      PAULA (acute kidney injury)      No significant past surgical history                            8.3    8.98  )-----------( 149      ( 09 Oct 2023 05:46 )             27.5       Exam:  Gen: well developed, well nourished appearing male, NAD  Wound:  Sacrum extending to left buttock: ~ 8x5cm mixed partial and full thickness sacral pressure ulcer - areas of pink, moist healthy appearing tissue & dark brown/grey necrotic tissue  Right buttock ~4x8cm full thickness wound with moistly dark brown/grey necrotic eschar overlying   No purulence, no bleeding

## 2023-10-10 NOTE — PROGRESS NOTE ADULT - SUBJECTIVE AND OBJECTIVE BOX
seen and examined  24 h events noted   no distress         PAST HISTORY  --------------------------------------------------------------------------------  No significant changes to PMH, PSH, FHx, SHx, unless otherwise noted    ALLERGIES & MEDICATIONS  --------------------------------------------------------------------------------  Allergies    No Known Allergies    Intolerances      Standing Inpatient Medications  budesonide 160 MICROgram(s)/formoterol 4.5 MICROgram(s) Inhaler 2 Puff(s) Inhalation two times a day  buMETAnide 2 milliGRAM(s) Oral every 12 hours  calcium acetate 667 milliGRAM(s) Oral three times a day with meals  chlorhexidine 2% Cloths 1 Application(s) Topical <User Schedule>  dextrose 5%. 1000 milliLiter(s) IV Continuous <Continuous>  dextrose 50% Injectable 25 Gram(s) IV Push once  dextrose 50% Injectable 12.5 Gram(s) IV Push once  dextrose 50% Injectable 25 Gram(s) IV Push once  glucagon  Injectable 1 milliGRAM(s) IntraMuscular once  heparin   Injectable 5000 Unit(s) SubCutaneous every 8 hours  insulin lispro (ADMELOG) corrective regimen sliding scale   SubCutaneous three times a day before meals  pantoprazole   Suspension 40 milliGRAM(s) Oral two times a day    PRN Inpatient Medications  acetaminophen     Tablet .. 650 milliGRAM(s) Oral every 6 hours PRN  albuterol    90 MICROgram(s) HFA Inhaler 1 Puff(s) Inhalation every 6 hours PRN  atropine Injectable 1 milliGRAM(s) IntraMuscular once PRN  dextrose Oral Gel 15 Gram(s) Oral once PRN  HYDROmorphone  Injectable 0.25 milliGRAM(s) IV Push every 6 hours PRN  midodrine. 5 milliGRAM(s) Oral <User Schedule> PRN          VITALS/PHYSICAL EXAM  --------------------------------------------------------------------------------  T(C): 37.4 (10-10-23 @ 05:12), Max: 37.4 (10-10-23 @ 05:12)  HR: 77 (10-10-23 @ 07:55) (69 - 86)  BP: 112/53 (10-10-23 @ 07:55) (99/53 - 120/70)  RR: 19 (10-10-23 @ 05:12) (19 - 28)  SpO2: 98% (10-10-23 @ 05:12) (98% - 100%)  Wt(kg): --  Height (cm): 193 (10-09-23 @ 13:13)  Weight (kg): 92 (10-09-23 @ 13:13)  BMI (kg/m2): 24.7 (10-09-23 @ 13:13)  BSA (m2): 2.23 (10-09-23 @ 13:13)      10-09-23 @ 07:01  -  10-10-23 @ 07:00  --------------------------------------------------------  IN: 0 mL / OUT: 600 mL / NET: -600 mL      Physical Exam:  	Gen: NAD  	Pulm: decrease BS  B/L  	CV:  S1S2; no rub  	Abd: +distended  	LE:dressing   	Vascular access:tdc     LABS/STUDIES  --------------------------------------------------------------------------------              8.3    8.98  >-----------<  149      [10-09-23 @ 05:46]              27.5     145  |  107  |  30  ----------------------------<  87      [10-09-23 @ 05:46]  3.8   |  25  |  3.0        Ca     8.1     [10-09-23 @ 05:46]      Mg     2.0     [10-09-23 @ 05:46]    TPro  6.0  /  Alb  2.5  /  TBili  0.8  /  DBili  x   /  AST  217  /  ALT  296  /  AlkPhos  89  [10-09-23 @ 05:46]    Creatinine Trend:  SCr 3.0 [10-09 @ 05:46]  SCr 2.4 [10-08 @ 08:39]  SCr 2.0 [10-07 @ 18:15]  SCr 3.3 [10-07 @ 05:47]  SCr 3.2 [10-06 @ 05:49]    Urinalysis - [10-09-23 @ 05:46]      Color  / Appearance  / SG  / pH       Gluc 87 / Ketone   / Bili  / Urobili        Blood  / Protein  / Leuk Est  / Nitrite       RBC  / WBC  / Hyaline  / Gran  / Sq Epi  / Non Sq Epi  / Bacteria       Iron 31, TIBC 137, %sat 23      [10-07-23 @ 18:15]  Ferritin 960      [10-07-23 @ 18:15]    HBsAb <3.0      [09-27-23 @ 17:44]  HBsAb Nonreact      [09-27-23 @ 17:44]  HBsAg Nonreact      [09-27-23 @ 17:44]  HBcAb Nonreact      [09-27-23 @ 17:44]

## 2023-10-11 LAB
GLUCOSE BLDC GLUCOMTR-MCNC: 105 MG/DL — HIGH (ref 70–99)
GLUCOSE BLDC GLUCOMTR-MCNC: 108 MG/DL — HIGH (ref 70–99)
GLUCOSE BLDC GLUCOMTR-MCNC: 134 MG/DL — HIGH (ref 70–99)
GLUCOSE BLDC GLUCOMTR-MCNC: 156 MG/DL — HIGH (ref 70–99)

## 2023-10-11 PROCEDURE — 74230 X-RAY XM SWLNG FUNCJ C+: CPT | Mod: 26

## 2023-10-11 PROCEDURE — 99232 SBSQ HOSP IP/OBS MODERATE 35: CPT

## 2023-10-11 PROCEDURE — 95816 EEG AWAKE AND DROWSY: CPT | Mod: 26

## 2023-10-11 PROCEDURE — 71045 X-RAY EXAM CHEST 1 VIEW: CPT | Mod: 26

## 2023-10-11 RX ORDER — COLLAGENASE CLOSTRIDIUM HIST. 250 UNIT/G
1 OINTMENT (GRAM) TOPICAL
Refills: 0 | Status: DISCONTINUED | OUTPATIENT
Start: 2023-10-11 | End: 2023-10-25

## 2023-10-11 RX ORDER — SODIUM CHLORIDE 9 MG/ML
500 INJECTION, SOLUTION INTRAVENOUS ONCE
Refills: 0 | Status: DISCONTINUED | OUTPATIENT
Start: 2023-10-11 | End: 2023-10-12

## 2023-10-11 RX ADMIN — CHLORHEXIDINE GLUCONATE 1 APPLICATION(S): 213 SOLUTION TOPICAL at 05:42

## 2023-10-11 RX ADMIN — Medication 1: at 12:17

## 2023-10-11 RX ADMIN — BUMETANIDE 2 MILLIGRAM(S): 0.25 INJECTION INTRAMUSCULAR; INTRAVENOUS at 05:42

## 2023-10-11 RX ADMIN — BUDESONIDE AND FORMOTEROL FUMARATE DIHYDRATE 2 PUFF(S): 160; 4.5 AEROSOL RESPIRATORY (INHALATION) at 07:55

## 2023-10-11 RX ADMIN — Medication 1 APPLICATION(S): at 18:08

## 2023-10-11 RX ADMIN — HEPARIN SODIUM 5000 UNIT(S): 5000 INJECTION INTRAVENOUS; SUBCUTANEOUS at 14:43

## 2023-10-11 RX ADMIN — Medication 667 MILLIGRAM(S): at 08:31

## 2023-10-11 RX ADMIN — PANTOPRAZOLE SODIUM 40 MILLIGRAM(S): 20 TABLET, DELAYED RELEASE ORAL at 05:42

## 2023-10-11 RX ADMIN — HEPARIN SODIUM 5000 UNIT(S): 5000 INJECTION INTRAVENOUS; SUBCUTANEOUS at 21:51

## 2023-10-11 RX ADMIN — HEPARIN SODIUM 5000 UNIT(S): 5000 INJECTION INTRAVENOUS; SUBCUTANEOUS at 05:42

## 2023-10-11 RX ADMIN — BUMETANIDE 2 MILLIGRAM(S): 0.25 INJECTION INTRAMUSCULAR; INTRAVENOUS at 17:36

## 2023-10-11 RX ADMIN — Medication 667 MILLIGRAM(S): at 17:35

## 2023-10-11 RX ADMIN — PANTOPRAZOLE SODIUM 40 MILLIGRAM(S): 20 TABLET, DELAYED RELEASE ORAL at 17:35

## 2023-10-11 NOTE — GOALS OF CARE CONVERSATION - ADVANCED CARE PLANNING - CONVERSATION DETAILS
Updated pt's brother Gabe Lemons via telephone call [phone] who is the pt's decision maker as the pt lacks capacity to make his own decisions regarding current condition and overall prognosis. Pt's brother Gabe does not seem to fully understand the extent and pt's extremely poor prognosis given his advanced age, multiple comorbidities. Did not want to further discuss comfort measures or hospice with this writer, stating "do not even bring up hospice".     Explained that the patient failed his modified barium swallow exhibiting aspiration and severe pharyngeal dysphagia. Will need alternate means of feeding specifically he will need a PEG prior to discharge. Pt's brother Gabe was agreeable to NGT placement for feeding, but does not want to discuss PEG at this time, stating "he just needs nutrition for 1-2 weeks and then his muscles will be stronger for him to swallow properly". Explained that NGT cannot remain for an extended period of time and that this is temporary until a PEG is placed otherwise the alternative is comfort feeds and hospice. Gabe did not want to discuss CMO/Hospice at this time. Will readdress issue on Friday. Palliative consulted for further assistance w/ goals of care.

## 2023-10-11 NOTE — PROGRESS NOTE ADULT - SUBJECTIVE AND OBJECTIVE BOX
MEDICINE ATTENDING PROGRESS NOTE  84 yo m ho DM, COPD, anemia, lymphedema Afib on Xarelto HFrEF, DM coming in from nursing home for AMS and hypotension in setting of acute GIB. Patient has been on Xarelto for Afib. Patient was found to have Acute Femoral DVT s/p IVC filter 10/6. Admitted for further management.    Interval/Overnight Events    Will get palliative care consult for GOC re: continuation of Xarelto    ROS  General: Denies fevers, chills, nightsweats, weight loss  HEENT: Denies headache, rhinorrhea, sore throat, eye pain  CV: Denies CP, palpitations  PULM: Denies SOB, cough  GI: Denies abdominal pain, diarrhea  : Denies dysuria, hematuria  MSK: Denies arthralgias  SKIN: Denies rash   NEURO: Denies paresthesias, weakness  PSYCH: Denies depression    MEDICATIONS  (STANDING):  budesonide 160 MICROgram(s)/formoterol 4.5 MICROgram(s) Inhaler 2 Puff(s) Inhalation two times a day  buMETAnide 2 milliGRAM(s) Oral every 12 hours  calcium acetate 667 milliGRAM(s) Oral three times a day with meals  chlorhexidine 2% Cloths 1 Application(s) Topical <User Schedule>  collagenase Ointment 1 Application(s) Topical two times a day  dextrose 5%. 1000 milliLiter(s) (100 mL/Hr) IV Continuous <Continuous>  dextrose 50% Injectable 25 Gram(s) IV Push once  dextrose 50% Injectable 25 Gram(s) IV Push once  dextrose 50% Injectable 12.5 Gram(s) IV Push once  glucagon  Injectable 1 milliGRAM(s) IntraMuscular once  heparin   Injectable 5000 Unit(s) SubCutaneous every 8 hours  insulin lispro (ADMELOG) corrective regimen sliding scale   SubCutaneous three times a day before meals  pantoprazole   Suspension 40 milliGRAM(s) Oral two times a day    MEDICATIONS  (PRN):  acetaminophen     Tablet .. 650 milliGRAM(s) Oral every 6 hours PRN Moderate Pain (4 - 6)  albuterol    90 MICROgram(s) HFA Inhaler 1 Puff(s) Inhalation every 6 hours PRN for shortness of breath and/or wheezing  atropine Injectable 1 milliGRAM(s) IntraMuscular once PRN HR < 30  dextrose Oral Gel 15 Gram(s) Oral once PRN Blood Glucose LESS THAN 70 milliGRAM(s)/deciliter  HYDROmorphone  Injectable 0.25 milliGRAM(s) IV Push every 6 hours PRN Severe Pain (7 - 10)  midodrine. 5 milliGRAM(s) Oral <User Schedule> PRN Before HD    ANTIBIOTICS:      VITALS:  T(F): 98.9, Max: 100 (10-10-23 @ 20:30)  HR: 84  BP: 99/58  RR: 18Vital Signs Last 24 Hrs  T(C): 37.2 (11 Oct 2023 05:00), Max: 37.8 (10 Oct 2023 20:30)  T(F): 98.9 (11 Oct 2023 05:00), Max: 100 (10 Oct 2023 20:30)  HR: 84 (11 Oct 2023 05:00) (71 - 84)  BP: 99/58 (11 Oct 2023 05:00) (96/51 - 147/56)  BP(mean): --  RR: 18 (11 Oct 2023 05:00) (18 - 18)  SpO2: 96% (11 Oct 2023 05:00) (95% - 98%)    Parameters below as of 10 Oct 2023 20:30  Patient On (Oxygen Delivery Method): room air     I&O's Summary    10 Oct 2023 07:01  -  11 Oct 2023 07:00  --------------------------------------------------------  IN: 0 mL / OUT: 1425 mL / NET: -1425 mL      PHYSICAL EXAM:  Gen: NAD, resting in bed  HEENT: Normocephalic, atraumatic  Neck: supple, no lymphadenopathy  CV: Regular rate & regular rhythm  Lungs: CTABL no wheeze  Abdomen: Soft, NTND+ BS present  Ext: Warm, well perfused no CCE  Neuro: non focal, awake, CN II-XII intact   Skin: no rash, no erythema  Psych: no SI, HI, Hallucination     LABS:                        9.0    9.13  )-----------( 126      ( 10 Oct 2023 17:45 )             29.2                   MICROBIOLOGY:      Culture - Blood (collected 10-04-23 @ 23:28)  Source: .Blood Blood  Final Report (10-10-23 @ 07:00):    No growth at 5 days          Hepatitis B Surface Antigen: Nonreact (09-27-23 @ 17:44)  MRSA PCR Result.: Negative (09-24-23 @ 04:32)      IMAGING:  CXR      CT    CARDIOLOGY TESTING  QRS axis to [] ° and NSR at a rate of [] BPM. There was no atrial enlargement. There was no ventricular hypertrophy. There were no ST-T changes and all intervals were normal.    12 Lead ECG:   Ventricular Rate 98 BPM    Atrial Rate 101 BPM    QRS Duration 100 ms    Q-T Interval 372 ms    QTC Calculation(Bazett) 474 ms    R Axis -61 degrees    T Axis 68 degrees    Diagnosis Line Atrial fibrillation  Left axis deviation  Low voltage QRS  Cannot rule out Anterior infarct , age undetermined  Abnormal ECG    Confirmed by austen colbert ([id number]) on 10/4/2023 7:03:14 PM (10-04-23 @ 15:40)  12 Lead ECG:   Ventricular Rate 143 BPM    Atrial Rate 143 BPM    P-R Interval 74 ms    QRS Duration 4 ms    Q-T Interval 194 ms    QTC Calculation(Bazett) 299 ms    P Axis 11 degrees    R Axis 0 degrees    T Axis -32 degrees    Diagnosis Line *** Poor data quality, interpretation may be adversely affected  Probable atrial fibrillation  Indeterminate axis  Nonspecific ST and T wave abnormality  Abnormal ECG    Confirmed by austen colbert ([id number]) on 9/30/2023 11:41:21 AM (09-30-23 @ 10:19)      ASSESSMENT/PLAN:  84 yo m ho DM, COPD, anemia, lymphedema Afib on Xarelto HFrEF, DM coming in from nursing home for AMS and hypotension in setting of acute GIB. Patient has been on Xarelto for Afib. Patient was found to have Acute Femoral DVT s/p IVC filter 10/6. Admitted for further management.    pt had   hypotensive, with active melena. BP in 60's. GI consulted urgently for endoscopy. Procedure was done at bedside and bleeding controlled. Pt. was given 2 U of blood  also pt was found to have PAULA, and was started on HD   Tunneled catheter was placed 10/4 but had RR on that day for AMS and had fever 101.6 and was hypotensive, was started on pressors     # Metabolic Encephalopathy due to sepsis, poa   today mental status has improved, he is awake and alert     # acute GIB, - emergent EGD on 9/23  # anemia of acute gi loss     s/p EGD on 9/23: Normal mucosa in the whole esophagus. Erythema in the stomach compatible with non-erosive gastritis. Blood and clots in the fundus and the antrum limited exam. 2 cm actively bleeding duodenal ulcer with spurting vessel (Aaron classification 1a) was noted in the duodenal sweep on the base. 10 cc of 1/13262 epinephrine was injected in circumferential pattern successfully. A 11/6 OVESCO clip was successfully deployed at the vessel for the purposes of hemostasis. There was no evidence of active bleeding at the end of procedure.  Hemoglobin: 8.3 g/dL (10-09-23 @ 05:46)  Hemoglobin: 8.2 g/dL (10-08-23 @ 08:39)  Hemoglobin: 8.3 g/dL (10-07-23 @ 05:47)    # paula on ckd , requiring HD    #Acute Femoral DVT s/p IVC filter 10/6, not a candidate for ac  #Paroxysmal Afib rate controlled, not on ac due to gib   #History of Left Foot OM w/ surrounding Cellulitis  # pressure ulcer sacral , dw burn   site is clean     #transaminitis - improving, repeat  Alb: 2.5 g/dL / Pro: 6.0 g/dL / ALK PHOS: 89 U/L / ALT: 296 U/L / AST: 217 U/L / GGT: x     /  wei x    / 0.8 mg/dL ( 09 Oct 2023 05:46 )  Alb: 2.8 g/dL / Pro: 5.8 g/dL / ALK PHOS: 87 U/L / ALT: 428 U/L / AST: 526 U/L / GGT: x     /  wei x    / 0.8 mg/dL ( 08 Oct 2023 08:39 )  Alb: 2.7 g/dL / Pro: 5.6 g/dL / ALK PHOS: 92 U/L / ALT: 531 U/L / AST: 955 U/L / GGT: x     /  wei x    / 0.7 mg/dL ( 07 Oct 2023 18:15 )    #Supportive Management:  Dispo:   DVT Ppx: heparin   Injectable 5000 Unit(s) SubCutaneous every 8 hours  GI Ppx: pantoprazole   Suspension 40 milliGRAM(s) Oral two times a day  Diet:  Diet, NPO (10-11-23 @ 11:36) [Active]  Diet, Soft and Bite Sized:   DASH/TLC Sodium & Cholesterol Restricted  Mildly Thick Liquids (MILDTHICKLIQS)  Free Water Flush Instructions:  please thicken each Ensure with 2 packs of thickeners     Qty per Day:  Magic cup 2x/day  Supplement Feeding Modality:  Oral  Ensure Plus High Protein Cans or Servings Per Day:  1       Frequency:  Two Times a day (10-09-23 @ 13:43) [Pending Verification By Attending]  Diet, Pureed:   Mildly Thick Liquids (MILDTHICKLIQS)  Free Water Flush Instructions:  *** please add 2 packets of thickener per NEPRO carton ***  Supplement Feeding Modality:  Oral  Nepro Cans or Servings Per Day:  3       Frequency:  Daily (10-03-23 @ 14:36) [Pending Verification By Attending]  Diet, Minced and Moist:   Mildly Thick Liquids (MILDTHICKLIQS)  Low Sodium     Qty per Day:  magic cup 2x/day  Supplement Feeding Modality:  Oral  Ensure Plus High Protein Cans or Servings Per Day:  1       Frequency:  Three Times a day (09-25-23 @ 15:30) [Pending Verification By Attending]        Total time spent to complete patient's bedside assessment, review medical chart, discuss medical plan of care with covering medical team was more than 45 minutes with >50% of time spent face to face with patient, discussion with patient/family and/or coordination of care    Resident's notes reviewed. My notes supersede resident's notes in case of discrepancy.    Finn Sam MD/[place]  Attending Physician

## 2023-10-11 NOTE — SWALLOW VFSS/MBS ASSESSMENT ADULT - SLP PERTINENT HISTORY OF CURRENT PROBLEM
Pt is an 84 y/o M w/ PMHx: DM, COPD, anemia, lymphedema, afib on Xarelto, HFrEF, DM, presents from NH for AMS. Pt is being treated for anemia, hemorrhagic shock 2' hematochezia from duodenal ulcer bleed s/p OVESCO placement 09/23. Course c/b acutely worsening uremia and acute kidney failure most likely 2' ATN- now on HD through right IJ [place]. +Metabolic encephalopathy, hypernatremia, AHRF- possible aspiration PNA, volume overload, R pleural effusions, hypotension. Pt is an 82 y/o M w/ PMHx: DM, COPD, anemia, lymphedema, afib on Xarelto, HFrEF, DM, presents from NH for AMS. Pt is being treated for anemia, hemorrhagic shock 2' hematochezia from duodenal ulcer bleed s/p OVESCO placement 09/23. Course c/b acutely worsening uremia and acute kidney failure most likely 2' ATN- now on HD through right IJ [place]. +Metabolic encephalopathy, hypernatremia, AHRF- possible aspiration PNA, volume overload, R pleural effusions, hypotension. CTH-> focal hypodensity is noted within the left subinsular region, previously seen potentially reflecting age-indeterminate infarct.

## 2023-10-11 NOTE — PROGRESS NOTE ADULT - ASSESSMENT
84 yo m ho DM, COPD, anemia, paroxysmal afib on xarelto, HFrEF, DM coming in from nursing home for AMS x 2 days. Found to have toxic metabolic encephalopathy with PAULA.  PAULA/ toxic metabolic encephalopathy/ HAGMA/ HFrEF/ hypernatremia  possible ATN iso hypotension and possible sepsis/ vanc toxicity  no hydro on imaging  creat trend noted   hd in am   document UO /  bumex 2 q 12    last phos level at goal   BP noted /  midodrine / 5 q 8   s/p IVC filter   iron stores noted / will start venofer and aranesp 25 with hd weekly  will follow    prognosis poor

## 2023-10-11 NOTE — EEG REPORT - NS EEG TEXT BOX
[facility] Department of Neurology  Inpatient Routine-EEG Report      Patient Name:	NIMO SARMIENTO    :	1940  MRN:	-  Study Date/Time:	10/10/2023, 3:44:42 PM  Referred by:	-    Brief Clinical History:  NIMO SARMIENTO is a 83 year old Male; study performed to investigate for seizures or markers of epilepsy.   Diagnosis Code: R40.4 Transient alteration of awareness    Patient Medication:  Proventil    Symbicort    Dilaudid    Atropine    Tylenol    Klor-con powder    Protonix    Proamatine    Admelog    Heparin    Phoslo    Bumex      Acquisition Details:  Electroencephalography was acquired using a minimum of 21 channels on an [employer] system v 9.3.1 with electrode placement according to the standard International 10-20 system following ACNS (American Clinical Neurophysiology Society) guidelines.  Anterior temporal T1 and T2 electrodes were utilized whenever possible.   The [employer] automated spike & seizure detections were all reviewed in detail, in addition to the entire raw EEG.    Findings:  Background:  continuous.   Voltage:  Normal (20uV)  Organization:  Appropriate anterior-posterior gradient  Posterior Dominant Rhythm:  7-8 Hz symmetric, well-organized, and well-modulated  Variability:  Yes	Reactivity:  Yes  Sleep:  Absent.  Focal abnormalities:  No persistent asymmetries of voltage or frequency.  Interictal Activity:  None  Focal Slowing:  None  Generalized Slowing:  Mild  Events:  1)	No electrographic seizures or significant clinical events.  Provocations:  1)	Hyperventilation: was not performed.  2)	Photic stimulation: was not performed.  Impression:  Abnormal due to generalized slowing as above    Clinical Correlation:  Findings consistent with mild diffuse electrocerebral dysfunction secondary to nonspecific etiology    Autumn Wiggins MD  Attending Neurologist, Division of Epilepsy

## 2023-10-11 NOTE — SWALLOW VFSS/MBS ASSESSMENT ADULT - SPECIFY REASON(S)
further investigate pharyngeal swallow function further investigate pharyngeal swallow function, pt did not tolerate FEES study well.

## 2023-10-11 NOTE — PROGRESS NOTE ADULT - SUBJECTIVE AND OBJECTIVE BOX
seen and examined  24 h events noted   no distress         PAST HISTORY  --------------------------------------------------------------------------------  No significant changes to PMH, PSH, FHx, SHx, unless otherwise noted    ALLERGIES & MEDICATIONS  --------------------------------------------------------------------------------  Allergies    No Known Allergies    Intolerances      Standing Inpatient Medications  budesonide 160 MICROgram(s)/formoterol 4.5 MICROgram(s) Inhaler 2 Puff(s) Inhalation two times a day  buMETAnide 2 milliGRAM(s) Oral every 12 hours  calcium acetate 667 milliGRAM(s) Oral three times a day with meals  chlorhexidine 2% Cloths 1 Application(s) Topical <User Schedule>  dextrose 5%. 1000 milliLiter(s) IV Continuous <Continuous>  dextrose 50% Injectable 25 Gram(s) IV Push once  dextrose 50% Injectable 12.5 Gram(s) IV Push once  dextrose 50% Injectable 25 Gram(s) IV Push once  glucagon  Injectable 1 milliGRAM(s) IntraMuscular once  heparin   Injectable 5000 Unit(s) SubCutaneous every 8 hours  insulin lispro (ADMELOG) corrective regimen sliding scale   SubCutaneous three times a day before meals  pantoprazole   Suspension 40 milliGRAM(s) Oral two times a day    PRN Inpatient Medications  acetaminophen     Tablet .. 650 milliGRAM(s) Oral every 6 hours PRN  albuterol    90 MICROgram(s) HFA Inhaler 1 Puff(s) Inhalation every 6 hours PRN  atropine Injectable 1 milliGRAM(s) IntraMuscular once PRN  dextrose Oral Gel 15 Gram(s) Oral once PRN  HYDROmorphone  Injectable 0.25 milliGRAM(s) IV Push every 6 hours PRN  midodrine. 5 milliGRAM(s) Oral <User Schedule> PRN        VITALS/PHYSICAL EXAM  --------------------------------------------------------------------------------  T(C): 37.2 (10-11-23 @ 05:00), Max: 37.8 (10-10-23 @ 20:30)  HR: 84 (10-11-23 @ 05:00) (70 - 84)  BP: 99/58 (10-11-23 @ 05:00) (96/51 - 147/56)  RR: 18 (10-11-23 @ 05:00) (18 - 19)  SpO2: 96% (10-11-23 @ 05:00) (95% - 98%)  Wt(kg): --  Height (cm): 193 (10-09-23 @ 13:13)  Weight (kg): 92 (10-09-23 @ 13:13)  BMI (kg/m2): 24.7 (10-09-23 @ 13:13)  BSA (m2): 2.23 (10-09-23 @ 13:13)      10-10-23 @ 07:01  -  10-11-23 @ 07:00  --------------------------------------------------------  IN: 0 mL / OUT: 1425 mL / NET: -1425 mL      Physical Exam:  	Gen: NAD  	Pulm: decrease BS  B/L  	CV:  S1S2; no rub  	Abd: +distended  	LE: dressing  	Vascular access:tdc     LABS/STUDIES  --------------------------------------------------------------------------------              9.0    9.13  >-----------<  126      [10-10-23 @ 17:45]              29.2     Creatinine Trend:  SCr 3.0 [10-09 @ 05:46]  SCr 2.4 [10-08 @ 08:39]  SCr 2.0 [10-07 @ 18:15]  SCr 3.3 [10-07 @ 05:47]  SCr 3.2 [10-06 @ 05:49]    Urinalysis - [10-09-23 @ 05:46]      Color  / Appearance  / SG  / pH       Gluc 87 / Ketone   / Bili  / Urobili        Blood  / Protein  / Leuk Est  / Nitrite       RBC  / WBC  / Hyaline  / Gran  / Sq Epi  / Non Sq Epi  / Bacteria       Iron 31, TIBC 137, %sat 23      [10-07-23 @ 18:15]  Ferritin 960      [10-07-23 @ 18:15]    HBsAb <3.0      [09-27-23 @ 17:44]  HBsAb Nonreact      [09-27-23 @ 17:44]  HBsAg Nonreact      [09-27-23 @ 17:44]  HBcAb Nonreact      [09-27-23 @ 17:44]

## 2023-10-11 NOTE — PROGRESS NOTE ADULT - SUBJECTIVE AND OBJECTIVE BOX
24H events:    Patient is a 83y old Male who presents with a chief complaint of AMS (11 Oct 2023 08:04)    Primary diagnosis of Altered mental status      Today is hospital day 26d. This morning patient was seen and examined at bedside, resting comfortably in bed.    No acute or major events overnight.    Code Status:full    PAST MEDICAL & SURGICAL HISTORY  HTN (hypertension)    COPD (chronic obstructive pulmonary disease)    High cholesterol    Mild anemia    Coffee ground emesis    Chronic diastolic heart failure    PAULA (acute kidney injury)    No significant past surgical history      SOCIAL HISTORY:  Social History:      ALLERGIES:  No Known Allergies    MEDICATIONS:  STANDING MEDICATIONS  budesonide 160 MICROgram(s)/formoterol 4.5 MICROgram(s) Inhaler 2 Puff(s) Inhalation two times a day  buMETAnide 2 milliGRAM(s) Oral every 12 hours  calcium acetate 667 milliGRAM(s) Oral three times a day with meals  chlorhexidine 2% Cloths 1 Application(s) Topical <User Schedule>  collagenase Ointment 1 Application(s) Topical two times a day  dextrose 5%. 1000 milliLiter(s) IV Continuous <Continuous>  dextrose 50% Injectable 25 Gram(s) IV Push once  dextrose 50% Injectable 25 Gram(s) IV Push once  dextrose 50% Injectable 12.5 Gram(s) IV Push once  glucagon  Injectable 1 milliGRAM(s) IntraMuscular once  heparin   Injectable 5000 Unit(s) SubCutaneous every 8 hours  insulin lispro (ADMELOG) corrective regimen sliding scale   SubCutaneous three times a day before meals  pantoprazole   Suspension 40 milliGRAM(s) Oral two times a day    PRN MEDICATIONS  acetaminophen     Tablet .. 650 milliGRAM(s) Oral every 6 hours PRN  albuterol    90 MICROgram(s) HFA Inhaler 1 Puff(s) Inhalation every 6 hours PRN  atropine Injectable 1 milliGRAM(s) IntraMuscular once PRN  dextrose Oral Gel 15 Gram(s) Oral once PRN  HYDROmorphone  Injectable 0.25 milliGRAM(s) IV Push every 6 hours PRN  midodrine. 5 milliGRAM(s) Oral <User Schedule> PRN    VITALS:   T(F): 98.9  HR: 84  BP: 99/58  RR: 18  SpO2: 96%    PHYSICAL EXAM:  GENERAL:   ( x ) NAD, lying in bed comfortably     (  ) obtunded     (  ) lethargic     (  ) somnolent    HEAD:   ( x ) Atraumatic     (  ) hematoma     (  ) laceration (specify location:       )     NECK:  (x  ) Supple     (  ) neck stiffness     (  ) nuchal rigidity     (  )  no JVD     (  ) JVD present ( -- cm)    HEART:  Rate -->     (x  ) normal rate     (  ) bradycardic     (  ) tachycardic  Rhythm -->     (x  ) regular     (  ) regularly irregular     (  ) irregularly irregular  Murmurs -->     ( x ) normal s1s2     (  ) systolic murmur     (  ) diastolic murmur     (  ) continuous murmur      (  ) S3 present     (  ) S4 present    LUNGS:   ( x )Unlabored respirations     (  ) tachypnea  (x  ) B/L air entry     (  ) decreased breath sounds in:  (location     )    (x  ) no adventitious sound     (  ) crackles     (  ) wheezing      (  ) rhonchi      (specify location:       )  (  ) chest wall tenderness (specify location:       )    ABDOMEN:   ( x ) Soft     (  ) tense   |   (  ) nondistended     (  ) distended   |   (  ) +BS     (  ) hypoactive bowel sounds     (  ) hyperactive bowel sounds  (x  ) nontender     (  ) RUQ tenderness     (  ) RLQ tenderness     (  ) LLQ tenderness     (  ) epigastric tenderness     (  ) diffuse tenderness  (  ) Splenomegaly      (  ) Hepatomegaly      (  ) Jaundice     (  ) ecchymosis     EXTREMITIES:  (  ) Normal     (  ) Rash     (  ) ecchymosis     (  ) varicose veins      (  ) pitting edema     (  ) non-pitting edema   (  ) ulceration     (  ) gangrene:     (location:     ) sacral ulcer stage 2    NERVOUS SYSTEM:    ( x ) A&Ox2     (  ) confused     (  ) lethargic  CN II-XII:     ( x ) Intact     (  ) deficits found     (Specify:     )   Upper extremities:     (x  ) no sensorimotor deficits     (  ) weakness     (  ) loss of proprioception/vibration     (  ) loss of touch/temperature (specify:    )  Lower extremities:     (x  ) no sensorimotor deficits     (  ) weakness     (  ) loss of proprioception/vibration     (  ) loss of touch/temperature (specify:    )        ( x ) Indwelling [place] Catheter:   Date insterted:    Reason (  ) Critical illness     (  ) urinary retention    (  x) Accurate Ins/Outs Monitoring     (  ) CMO patient    (  ) Central Line:   Date inserted:  Location: (  ) Right IJ     (  ) Left IJ     (  ) Right Fem     (  ) Left Fem    (  ) SPC        (  ) pigtail       (  ) PEG tube       (  ) colostomy       (  ) jejunostomy  (  ) U-Dall  (TDC) : 10/4    LABS:                        9.0    9.13  )-----------( 126      ( 10 Oct 2023 17:45 )             29.2                 HPI:82 yo m ho DM, COPD, anemia, lymphedemia, afib on xarelto, HFrEF, DM coming in from nursing home for AMS x 2 days. Unable to gather story from pt as he is altered and lethargic.  As per NH was becoming more agitated x 2 days, was hypotensive yesterday and started on cefepime and vancomycin. Brought in to the ED for AMS. PICC line in place for cefepime 1q q8 until 10/5/23 and vanc 1q24 until 9/23/23 for LLE cellulitis/OM  (was at [facility] last month for LLE thick wound cellulitis/OM and sacral DTI as per call with [facility] as per collateral from NH EDISY Barry)      In the ED, vitals wnl. Labs showing wbc 11.30, Hb 9.6, MCV 79.1, INR 1.59, CO2 14, AG 21, Cr 6 (~baseline 1.2), , trops 0.09. CTH wnl, RBUS with no hydro, poor visualization of left kidney          INTERVAL HPI/:  Hospital course:  When pt arrived to ICU, he was in shock, hypotensive, with active melena, BP in 60's, endoscopy Procedure was done at bedside and bleeding controlled,  2 U of blood, 1U FFP in ICU, not signs of melena. Pt was initially in 3E the patient was Hypoxic (as low as 70s, AAOx 1. On auscultation, bilateral crackles present. Placed the patient on venturi mask and then on NRB. The Spo2 improved to 99%. Stat vitals repeated (vitals Spo2 99%, /70s, HR 95). Ordered CXR stat, ABG stat, Bumex 2mg IV x 1 stat. Labs and charts reviewed. Stat Labs drawn. Upgraded to SDU on 9/27. In the SDU, patient respiratory status was much improved on NC. Nephrology recommended dialysis for patient's PAULA. On 9/27, a [place] was placed and patient received HD that day. On the evening of 9/27, patient hgb was 6.9 and he received 1 unit pRBC. There was also an episode of melena and GI was notified. Patient remained hemodynamically stable and GI recommended to just monitor. Heparin was held, but has since been restarted for DVT ppx. On 9/29, he was started on D5 @80cc for 24 hours to address free water deficit. Starting sodium was 151 with a target goal of 145 after 24 hours. Last, patient was seen by speech and swallow and put on NPO for poor swallowing. DGTF on 9/29. Pt was hypernatremic and was managed with D5W. Pt also had a worsening PAULA and nephrology consulted. Pt received HD via R IJ line and per nephro pt needs long term dialysis. On 10/4, a TDC was placed by IR for dialysis. On 15:00 10/4 a rapid response was called for acute altered mental status after the IR procedure. VSS and ABG notable for elevated lactate of 6. Pt spiked fever to 101.6 and repeat ABG notable for lactate of 8.3. Pt's BP dropped to 75/42 and he was given a 500 LR bolus, was started on levophed. Sepsis workup sent for possible aspiration pna. Pt started on vancomycin and cefepime. Pt upgraded to SDU for close monitoring and vasopressor requirements.      #Metabolic Encephalopathy 2/2 septic Shock 2/2 suspected aspiration pneumonia   - rapid response after TDC due to acute change in mental status - ABG notable for elevated lactate   - pt febrile to 101.6 and elevated lactate to 8.4, rapidly became hypotensive on 10/4 - started on levophed  - DC vancomycin and cefepime as per ID   - lactate improved 1.6  - follow up cultures, cultures neg   - ID consult appreciated, no further antibiotics   - procal 1.7  - off pressor, decrease midodrine 5 mg TID continue to wean   - CXR (9/28) Bilateral pleural effusion/opacity unchanged. No air leak.  - On 2L NC  - C/w albuterol PRN    # dysphagia   - speech and swallow consulted  - barium swallow done  - mod to sev dysphagia    #Acutely worsening uremia and acute kidney failure most likely 2/2 ATN - now on HD through right IJ [place]  #hypernatremia - resolving   PAULA / CKD stable non oliguric  - udall placement 9/27/23  started on HD 9/27 but did not tolerate it with access problems  - udall changed 9/28  he received HD 9/28 and 9/30  - Close F/U of BUN/Crea/ Lytes and UO  - c/w phoslo 2/2/2   - off sodium bicarbonate   - s/p 250 cc LR bolus for hypernatremia  - s/p D5w @ 80 cc/hr  - f/u w/nephro  - 1:1 for safety and for udall observation   - increase  BUMEX to 2 mg BID as per Nephro   - TDC by IR on 10/4  - Cr stable for now  - HD done today, need long term dialysis as per nephro  	  #Anemia of acute blood loss   #Hemorrhagic Shock Secondary to Hematochezia from Duodenal Ulcer Bleed s/p OVESCO placement 09/23  - Trend hgb, transfuse blood PRN  - continue with holding therapeutic AC  - s/p IV Protamine sulfate 36mg x1 dose on 09/23  - GI f/u appreciated  - S/p IV Protonix 40mg BID -> Ok to switch to PO BID as per GI   - Avoid any NSAIDs  - overall the pt risk and benefit is goes against Anticoagulation, will further discuss with family , , dw brother regarding the high risk of bleeding and understands the reasons to hold anticoagulation. And understands the risk of cva with Afib.     #Elevated dimer  #Acute Femoral DVT s/p IVC filter 10/6  - duplex neg on 9/29, repeat   - overall the pt risk and benefit is goes against Anticoagulation, will further discuss with family , dw brother regarding the high risk of bleeding and understands to hold anticoagulation And understands the risk of cva with Afib.    -s/p IVC filter 10/6  - heparin 5KIU 3 times a day    #transaminitis - resolving   - likely 2/2 liver shock 2/2 hypotension vs DILI   - LFTS improving   - hepatitis neg   - today AST.ALT: 526/428>>217/296      #Paroxysmal Afib, chronic; uncontrolled rate  #angelika cardia in evening on cardiac telemonitoring   - HOLDING therapeutic AC  - hold  metoprolol tartrate 12.5 mg q12h    #History of Left Foot OM w/ surrounding Cellulitis  *CT LLE (9/16): No CT evidence of osteomyelitis or necrotizing infection. No drainable collection seen, within the limitations of a noncontrast exam.  - Increased frailty and decreased physical capacity  - as per previous notes the team Discussed with ID attending: patient is unlikely to benefit from prolonged therapy with cefepime+vanco (to cover possible OM) due to adverse outcomes associated kidney dysfunction, cognitive impact ...etc )  - c/w Local wound care; offloading boots bilaterally, frequently turning and positioning, prevent pressure injury, keep skin clean, and monitor for wound changes and notify provider as per podiatry      plan: continue HD, moniter mental status.

## 2023-10-11 NOTE — SWALLOW VFSS/MBS ASSESSMENT ADULT - ORAL PHASE
Delayed oral transit time/Reduced anterior - posterior transport/Residue in oral cavity/Incomplete tongue to palate contact/Uncontrolled bolus / spillover in dieter-pharynx/Uncontrolled bolus / spillover in hypopharynx/Laryngeal penetration before swallow - silent/Aspiration before swallow - silent

## 2023-10-11 NOTE — CHART NOTE - NSCHARTNOTEFT_GEN_A_CORE
Updated pt's brother Gabe Lemons via telephone call [phone] regarding:    GOC/ regarding CMO/Hospice v. Feeding Tube (see GOC note)    In addition, extensive risk and benefit discussion regarding full AC given risk of strokes w/ hx of AFib & high CHADVASc score in addition to blood clots given extended immobility vs. risk of bleeding given hx of hemorrhagic shock 2/2 duodenal ulcers  At this time, brother Gabe would like to restart AC, will start w/ heparin gtt and monitor for bleeding     Also discussed recent CTH findings and need for MRI given age-indeterminate stroke, at this time, Gabe does not want further imaging for patient. MRI will not be performed this admission

## 2023-10-12 DIAGNOSIS — N17.9 ACUTE KIDNEY FAILURE, UNSPECIFIED: ICD-10-CM

## 2023-10-12 DIAGNOSIS — I48.0 PAROXYSMAL ATRIAL FIBRILLATION: ICD-10-CM

## 2023-10-12 DIAGNOSIS — R13.10 DYSPHAGIA, UNSPECIFIED: ICD-10-CM

## 2023-10-12 DIAGNOSIS — D62 ACUTE POSTHEMORRHAGIC ANEMIA: ICD-10-CM

## 2023-10-12 LAB
ALBUMIN SERPL ELPH-MCNC: 2.6 G/DL — LOW (ref 3.5–5.2)
ALP SERPL-CCNC: 78 U/L — SIGNIFICANT CHANGE UP (ref 30–115)
ALT FLD-CCNC: 88 U/L — HIGH (ref 0–41)
ANION GAP SERPL CALC-SCNC: 13 MMOL/L — SIGNIFICANT CHANGE UP (ref 7–14)
APTT BLD: 34.3 SEC — SIGNIFICANT CHANGE UP (ref 27–39.2)
AST SERPL-CCNC: 26 U/L — SIGNIFICANT CHANGE UP (ref 0–41)
BASOPHILS # BLD AUTO: 0.01 K/UL — SIGNIFICANT CHANGE UP (ref 0–0.2)
BASOPHILS # BLD AUTO: 0.02 K/UL — SIGNIFICANT CHANGE UP (ref 0–0.2)
BASOPHILS NFR BLD AUTO: 0.1 % — SIGNIFICANT CHANGE UP (ref 0–1)
BASOPHILS NFR BLD AUTO: 0.2 % — SIGNIFICANT CHANGE UP (ref 0–1)
BILIRUB SERPL-MCNC: 0.7 MG/DL — SIGNIFICANT CHANGE UP (ref 0.2–1.2)
BLD GP AB SCN SERPL QL: SIGNIFICANT CHANGE UP
BUN SERPL-MCNC: 34 MG/DL — HIGH (ref 10–20)
CALCIUM SERPL-MCNC: 7.5 MG/DL — LOW (ref 8.4–10.5)
CHLORIDE SERPL-SCNC: 107 MMOL/L — SIGNIFICANT CHANGE UP (ref 98–110)
CO2 SERPL-SCNC: 24 MMOL/L — SIGNIFICANT CHANGE UP (ref 17–32)
CREAT SERPL-MCNC: 2.5 MG/DL — HIGH (ref 0.7–1.5)
CRP SERPL-MCNC: 138 MG/L — HIGH
EGFR: 25 ML/MIN/1.73M2 — LOW
EOSINOPHIL # BLD AUTO: 0.09 K/UL — SIGNIFICANT CHANGE UP (ref 0–0.7)
EOSINOPHIL # BLD AUTO: 0.14 K/UL — SIGNIFICANT CHANGE UP (ref 0–0.7)
EOSINOPHIL NFR BLD AUTO: 1 % — SIGNIFICANT CHANGE UP (ref 0–8)
EOSINOPHIL NFR BLD AUTO: 1.9 % — SIGNIFICANT CHANGE UP (ref 0–8)
ERYTHROCYTE [SEDIMENTATION RATE] IN BLOOD: 30 MM/HR — HIGH (ref 0–10)
ERYTHROCYTE [SEDIMENTATION RATE] IN BLOOD: 30 MM/HR — HIGH (ref 0–10)
GLUCOSE BLDC GLUCOMTR-MCNC: 104 MG/DL — HIGH (ref 70–99)
GLUCOSE BLDC GLUCOMTR-MCNC: 124 MG/DL — HIGH (ref 70–99)
GLUCOSE BLDC GLUCOMTR-MCNC: 169 MG/DL — HIGH (ref 70–99)
GLUCOSE BLDC GLUCOMTR-MCNC: 84 MG/DL — SIGNIFICANT CHANGE UP (ref 70–99)
GLUCOSE BLDC GLUCOMTR-MCNC: 84 MG/DL — SIGNIFICANT CHANGE UP (ref 70–99)
GLUCOSE SERPL-MCNC: 122 MG/DL — HIGH (ref 70–99)
HCT VFR BLD CALC: 23.4 % — LOW (ref 42–52)
HCT VFR BLD CALC: 24.1 % — LOW (ref 42–52)
HCT VFR BLD CALC: 24.8 % — LOW (ref 42–52)
HCT VFR BLD CALC: 27.1 % — LOW (ref 42–52)
HCT VFR BLD CALC: 28.2 % — LOW (ref 42–52)
HGB BLD-MCNC: 7.1 G/DL — LOW (ref 14–18)
HGB BLD-MCNC: 7.5 G/DL — LOW (ref 14–18)
HGB BLD-MCNC: 7.5 G/DL — LOW (ref 14–18)
HGB BLD-MCNC: 8.5 G/DL — LOW (ref 14–18)
HGB BLD-MCNC: 8.8 G/DL — LOW (ref 14–18)
IMM GRANULOCYTES NFR BLD AUTO: 0.4 % — HIGH (ref 0.1–0.3)
IMM GRANULOCYTES NFR BLD AUTO: 0.7 % — HIGH (ref 0.1–0.3)
INR BLD: 1.42 RATIO — HIGH (ref 0.65–1.3)
LACTATE SERPL-SCNC: 1.9 MMOL/L — SIGNIFICANT CHANGE UP (ref 0.7–2)
LYMPHOCYTES # BLD AUTO: 2.69 K/UL — SIGNIFICANT CHANGE UP (ref 1.2–3.4)
LYMPHOCYTES # BLD AUTO: 2.76 K/UL — SIGNIFICANT CHANGE UP (ref 1.2–3.4)
LYMPHOCYTES # BLD AUTO: 29.7 % — SIGNIFICANT CHANGE UP (ref 20.5–51.1)
LYMPHOCYTES # BLD AUTO: 37 % — SIGNIFICANT CHANGE UP (ref 20.5–51.1)
MAGNESIUM SERPL-MCNC: 1.7 MG/DL — LOW (ref 1.8–2.4)
MCHC RBC-ENTMCNC: 27.7 PG — SIGNIFICANT CHANGE UP (ref 27–31)
MCHC RBC-ENTMCNC: 28.1 PG — SIGNIFICANT CHANGE UP (ref 27–31)
MCHC RBC-ENTMCNC: 28.2 PG — SIGNIFICANT CHANGE UP (ref 27–31)
MCHC RBC-ENTMCNC: 28.3 PG — SIGNIFICANT CHANGE UP (ref 27–31)
MCHC RBC-ENTMCNC: 28.4 PG — SIGNIFICANT CHANGE UP (ref 27–31)
MCHC RBC-ENTMCNC: 30.2 G/DL — LOW (ref 32–37)
MCHC RBC-ENTMCNC: 30.3 G/DL — LOW (ref 32–37)
MCHC RBC-ENTMCNC: 31.1 G/DL — LOW (ref 32–37)
MCHC RBC-ENTMCNC: 31.2 G/DL — LOW (ref 32–37)
MCHC RBC-ENTMCNC: 31.4 G/DL — LOW (ref 32–37)
MCV RBC AUTO: 90.3 FL — SIGNIFICANT CHANGE UP (ref 80–94)
MCV RBC AUTO: 90.6 FL — SIGNIFICANT CHANGE UP (ref 80–94)
MCV RBC AUTO: 91 FL — SIGNIFICANT CHANGE UP (ref 80–94)
MCV RBC AUTO: 91.5 FL — SIGNIFICANT CHANGE UP (ref 80–94)
MCV RBC AUTO: 92.5 FL — SIGNIFICANT CHANGE UP (ref 80–94)
MONOCYTES # BLD AUTO: 0.39 K/UL — SIGNIFICANT CHANGE UP (ref 0.1–0.6)
MONOCYTES # BLD AUTO: 0.95 K/UL — HIGH (ref 0.1–0.6)
MONOCYTES NFR BLD AUTO: 10.5 % — HIGH (ref 1.7–9.3)
MONOCYTES NFR BLD AUTO: 5.2 % — SIGNIFICANT CHANGE UP (ref 1.7–9.3)
NEUTROPHILS # BLD AUTO: 4.1 K/UL — SIGNIFICANT CHANGE UP (ref 1.4–6.5)
NEUTROPHILS # BLD AUTO: 5.26 K/UL — SIGNIFICANT CHANGE UP (ref 1.4–6.5)
NEUTROPHILS NFR BLD AUTO: 55.1 % — SIGNIFICANT CHANGE UP (ref 42.2–75.2)
NEUTROPHILS NFR BLD AUTO: 58.2 % — SIGNIFICANT CHANGE UP (ref 42.2–75.2)
NRBC # BLD: 0 /100 WBCS — SIGNIFICANT CHANGE UP (ref 0–0)
PLATELET # BLD AUTO: 102 K/UL — LOW (ref 130–400)
PLATELET # BLD AUTO: 104 K/UL — LOW (ref 130–400)
PLATELET # BLD AUTO: 106 K/UL — LOW (ref 130–400)
PLATELET # BLD AUTO: 106 K/UL — LOW (ref 130–400)
PLATELET # BLD AUTO: 110 K/UL — LOW (ref 130–400)
PMV BLD: 11 FL — HIGH (ref 7.4–10.4)
PMV BLD: 11.1 FL — HIGH (ref 7.4–10.4)
PMV BLD: 11.2 FL — HIGH (ref 7.4–10.4)
PMV BLD: 11.6 FL — HIGH (ref 7.4–10.4)
PMV BLD: 11.7 FL — HIGH (ref 7.4–10.4)
POTASSIUM SERPL-MCNC: 3.1 MMOL/L — LOW (ref 3.5–5)
POTASSIUM SERPL-SCNC: 3.1 MMOL/L — LOW (ref 3.5–5)
PROT SERPL-MCNC: 5.8 G/DL — LOW (ref 6–8)
PROTHROM AB SERPL-ACNC: 16.4 SEC — HIGH (ref 9.95–12.87)
RBC # BLD: 2.53 M/UL — LOW (ref 4.7–6.1)
RBC # BLD: 2.66 M/UL — LOW (ref 4.7–6.1)
RBC # BLD: 2.71 M/UL — LOW (ref 4.7–6.1)
RBC # BLD: 3 M/UL — LOW (ref 4.7–6.1)
RBC # BLD: 3.1 M/UL — LOW (ref 4.7–6.1)
RBC # FLD: 20.4 % — HIGH (ref 11.5–14.5)
RBC # FLD: 20.8 % — HIGH (ref 11.5–14.5)
RBC # FLD: 21.8 % — HIGH (ref 11.5–14.5)
RBC # FLD: 22 % — HIGH (ref 11.5–14.5)
RBC # FLD: 22 % — HIGH (ref 11.5–14.5)
SODIUM SERPL-SCNC: 144 MMOL/L — SIGNIFICANT CHANGE UP (ref 135–146)
WBC # BLD: 10.07 K/UL — SIGNIFICANT CHANGE UP (ref 4.8–10.8)
WBC # BLD: 10.7 K/UL — SIGNIFICANT CHANGE UP (ref 4.8–10.8)
WBC # BLD: 13.52 K/UL — HIGH (ref 4.8–10.8)
WBC # BLD: 7.45 K/UL — SIGNIFICANT CHANGE UP (ref 4.8–10.8)
WBC # BLD: 9.05 K/UL — SIGNIFICANT CHANGE UP (ref 4.8–10.8)
WBC # FLD AUTO: 10.07 K/UL — SIGNIFICANT CHANGE UP (ref 4.8–10.8)
WBC # FLD AUTO: 10.7 K/UL — SIGNIFICANT CHANGE UP (ref 4.8–10.8)
WBC # FLD AUTO: 13.52 K/UL — HIGH (ref 4.8–10.8)
WBC # FLD AUTO: 7.45 K/UL — SIGNIFICANT CHANGE UP (ref 4.8–10.8)
WBC # FLD AUTO: 9.05 K/UL — SIGNIFICANT CHANGE UP (ref 4.8–10.8)

## 2023-10-12 PROCEDURE — 71250 CT THORAX DX C-: CPT | Mod: 26

## 2023-10-12 PROCEDURE — 99232 SBSQ HOSP IP/OBS MODERATE 35: CPT

## 2023-10-12 PROCEDURE — 99233 SBSQ HOSP IP/OBS HIGH 50: CPT

## 2023-10-12 PROCEDURE — 99222 1ST HOSP IP/OBS MODERATE 55: CPT

## 2023-10-12 RX ORDER — HEPARIN SODIUM 5000 [USP'U]/ML
INJECTION INTRAVENOUS; SUBCUTANEOUS
Qty: 25000 | Refills: 0 | Status: DISCONTINUED | OUTPATIENT
Start: 2023-10-12 | End: 2023-10-12

## 2023-10-12 RX ORDER — CEFTRIAXONE 500 MG/1
1 INJECTION, POWDER, FOR SOLUTION INTRAMUSCULAR; INTRAVENOUS EVERY 24 HOURS
Refills: 0 | Status: DISCONTINUED | OUTPATIENT
Start: 2023-10-12 | End: 2023-10-12

## 2023-10-12 RX ORDER — MAGNESIUM SULFATE 500 MG/ML
2 VIAL (ML) INJECTION ONCE
Refills: 0 | Status: COMPLETED | OUTPATIENT
Start: 2023-10-12 | End: 2023-10-12

## 2023-10-12 RX ORDER — IRON SUCROSE 20 MG/ML
100 INJECTION, SOLUTION INTRAVENOUS
Refills: 0 | Status: DISCONTINUED | OUTPATIENT
Start: 2023-10-12 | End: 2023-10-15

## 2023-10-12 RX ORDER — HEPARIN SODIUM 5000 [USP'U]/ML
5000 INJECTION INTRAVENOUS; SUBCUTANEOUS EVERY 8 HOURS
Refills: 0 | Status: DISCONTINUED | OUTPATIENT
Start: 2023-10-12 | End: 2023-10-25

## 2023-10-12 RX ORDER — POTASSIUM CHLORIDE 20 MEQ
40 PACKET (EA) ORAL ONCE
Refills: 0 | Status: COMPLETED | OUTPATIENT
Start: 2023-10-12 | End: 2023-10-12

## 2023-10-12 RX ORDER — DARBEPOETIN ALFA IN POLYSORBAT 200MCG/0.4
25 PEN INJECTOR (ML) SUBCUTANEOUS
Refills: 0 | Status: DISCONTINUED | OUTPATIENT
Start: 2023-10-12 | End: 2023-10-25

## 2023-10-12 RX ORDER — MIDODRINE HYDROCHLORIDE 2.5 MG/1
10 TABLET ORAL EVERY 8 HOURS
Refills: 0 | Status: DISCONTINUED | OUTPATIENT
Start: 2023-10-12 | End: 2023-10-13

## 2023-10-12 RX ORDER — VANCOMYCIN HCL 1 G
1500 VIAL (EA) INTRAVENOUS ONCE
Refills: 0 | Status: COMPLETED | OUTPATIENT
Start: 2023-10-12 | End: 2023-10-12

## 2023-10-12 RX ORDER — CEFTRIAXONE 500 MG/1
1000 INJECTION, POWDER, FOR SOLUTION INTRAMUSCULAR; INTRAVENOUS EVERY 24 HOURS
Refills: 0 | Status: DISCONTINUED | OUTPATIENT
Start: 2023-10-12 | End: 2023-10-13

## 2023-10-12 RX ORDER — SODIUM CHLORIDE 9 MG/ML
500 INJECTION, SOLUTION INTRAVENOUS ONCE
Refills: 0 | Status: COMPLETED | OUTPATIENT
Start: 2023-10-12 | End: 2023-10-12

## 2023-10-12 RX ORDER — SODIUM CHLORIDE 9 MG/ML
500 INJECTION, SOLUTION INTRAVENOUS ONCE
Refills: 0 | Status: DISCONTINUED | OUTPATIENT
Start: 2023-10-12 | End: 2023-10-12

## 2023-10-12 RX ADMIN — MIDODRINE HYDROCHLORIDE 10 MILLIGRAM(S): 2.5 TABLET ORAL at 08:44

## 2023-10-12 RX ADMIN — BUDESONIDE AND FORMOTEROL FUMARATE DIHYDRATE 2 PUFF(S): 160; 4.5 AEROSOL RESPIRATORY (INHALATION) at 12:07

## 2023-10-12 RX ADMIN — Medication 1 APPLICATION(S): at 17:15

## 2023-10-12 RX ADMIN — MIDODRINE HYDROCHLORIDE 10 MILLIGRAM(S): 2.5 TABLET ORAL at 21:11

## 2023-10-12 RX ADMIN — Medication 25 MICROGRAM(S): at 11:06

## 2023-10-12 RX ADMIN — Medication 300 MILLIGRAM(S): at 16:38

## 2023-10-12 RX ADMIN — PANTOPRAZOLE SODIUM 40 MILLIGRAM(S): 20 TABLET, DELAYED RELEASE ORAL at 17:15

## 2023-10-12 RX ADMIN — HEPARIN SODIUM 5000 UNIT(S): 5000 INJECTION INTRAVENOUS; SUBCUTANEOUS at 21:12

## 2023-10-12 RX ADMIN — SODIUM CHLORIDE 500 MILLILITER(S): 9 INJECTION, SOLUTION INTRAVENOUS at 08:45

## 2023-10-12 RX ADMIN — Medication 1: at 08:32

## 2023-10-12 RX ADMIN — Medication 1 APPLICATION(S): at 06:03

## 2023-10-12 RX ADMIN — PANTOPRAZOLE SODIUM 40 MILLIGRAM(S): 20 TABLET, DELAYED RELEASE ORAL at 06:02

## 2023-10-12 RX ADMIN — Medication 650 MILLIGRAM(S): at 21:13

## 2023-10-12 RX ADMIN — CHLORHEXIDINE GLUCONATE 1 APPLICATION(S): 213 SOLUTION TOPICAL at 06:03

## 2023-10-12 RX ADMIN — CEFTRIAXONE 100 MILLIGRAM(S): 500 INJECTION, POWDER, FOR SOLUTION INTRAMUSCULAR; INTRAVENOUS at 14:13

## 2023-10-12 RX ADMIN — Medication 650 MILLIGRAM(S): at 22:27

## 2023-10-12 RX ADMIN — HEPARIN SODIUM 5000 UNIT(S): 5000 INJECTION INTRAVENOUS; SUBCUTANEOUS at 14:13

## 2023-10-12 RX ADMIN — Medication 40 MILLIEQUIVALENT(S): at 12:06

## 2023-10-12 RX ADMIN — MIDODRINE HYDROCHLORIDE 10 MILLIGRAM(S): 2.5 TABLET ORAL at 14:13

## 2023-10-12 RX ADMIN — IRON SUCROSE 210 MILLIGRAM(S): 20 INJECTION, SOLUTION INTRAVENOUS at 11:06

## 2023-10-12 RX ADMIN — Medication 667 MILLIGRAM(S): at 17:15

## 2023-10-12 RX ADMIN — Medication 25 GRAM(S): at 12:04

## 2023-10-12 RX ADMIN — SODIUM CHLORIDE 500 MILLILITER(S): 9 INJECTION, SOLUTION INTRAVENOUS at 06:42

## 2023-10-12 RX ADMIN — HEPARIN SODIUM 5000 UNIT(S): 5000 INJECTION INTRAVENOUS; SUBCUTANEOUS at 06:02

## 2023-10-12 NOTE — CONSULT NOTE ADULT - ASSESSMENT
82 yo m ho DM, COPD, anemia, paroxysmal afib on xarelto, HFpEF, CKD, coming in from nursing home for AMS and hypotension in setting of acute GIB. Hospital course complicated with septic shock and intubation s/p extubation.     #HFpEF  -TTE in 9/2023: EF >55%, diastolic function could not be assessed, The aortic valve was not well visualized; appears calcified with grossly normal opening, Mild MR and TR.  -Trop 0.20 in the setting of PAULA ontop of CKD  -EKG shows afib, diffuse artifact  -on home Jardiance, Entresto, Lasix, Metoprolol, nut all held in the setting of sepsis and soft low Blood pressure    #p.afib  -rate controlled, on metoprolol but held now  -was on Xarelto stopped due to GI bleed  -CHADsVASC: 4  -HAS BLED: 3 82 yo m ho DM, COPD, anemia, paroxysmal afib on xarelto, HFpEF, CKD, coming in from nursing home for AMS and hypotension in setting of acute GIB. Hospital course complicated with septic shock and intubation s/p extubation.     #p.afib  -rate controlled, on metoprolol but held now  -was on Xarelto stopped due to GI bleed  -CHADsVASC: 4-5  -HAS BLED: 3  -Since pt is A&O x1, recommend that primary team discuss risk vs benefits of Anticoagulation with the Health care proxy or next of kin    #HFpEF  -TTE in 9/2023: EF >55%, diastolic function could not be assessed, The aortic valve was not well visualized; appears calcified with grossly normal opening, Mild MR and TR.  -Trop 0.20 in the setting of PAULA ontop of CKD, likely type II demand ischemia  -EKG shows afib, diffuse artifact  -Keep Jardiance, Entresto, Lasix, Metoprolol held in the setting of sepsis and soft low Blood pressure 84 yo m ho DM, COPD, anemia, paroxysmal afib on xarelto, HFpEF, CKD, coming in from nursing home for AMS and hypotension in setting of acute GIB. Hospital course complicated with septic shock and intubation s/p extubation.     #p.afib  -rate controlled, on metoprolol but held now  -was on Xarelto stopped due to GI bleed  -CHADsVASC: 4-5  -HAS BLED: 3  -Since pt is A&O x1, recommend that primary team discuss risk vs benefits of Anticoagulation with the Health care proxy or next of kin    #HFpEF  -TTE in 9/2023: EF >55%, diastolic function could not be assessed, The aortic valve was not well visualized; appears calcified with grossly normal opening, Mild MR and TR.  -Keep Jardiance, Entresto, Lasix, Metoprolol held in the setting of sepsis and soft low Blood pressure 84 yo m ho DM, COPD, anemia, paroxysmal afib on xarelto, HFpEF, CKD, coming in from nursing home for AMS and hypotension in setting of acute GIB. Hospital course complicated with septic shock and intubation s/p extubation.     #p.afib  -rate controlled, on metoprolol but held now  -was on Xarelto stopped due to GI bleed  -CHADsVASC: 4-5  -HAS BLED: 3  -Since pt is A&O x1, recommend that primary team discuss risk vs benefits of Anticoagulation with the Health care proxy or next of kin  -Rest of car as per primary team

## 2023-10-12 NOTE — PROGRESS NOTE ADULT - SUBJECTIVE AND OBJECTIVE BOX
HPI:  82 yo m ho DM, COPD, anemia, lymphedemia, afib on xarelto, HFrEF, DM coming in from nursing home for AMS x 2 days. Unable to gather story from pt as he is altered and lethargic.  As per NH was becoming more agitated x 2 days, was hypotensive yesterday and started on cefepime and vancomycin. Brought in to the ED for AMS. PICC line in place for cefepime 1q q8 until 10/5/23 and vanc 1q24 until 9/23/23 for LLE cellulitis/OM  (was at [facility] last month for LLE thick wound cellulitis/OM and sacral DTI as per call with [facility] as per collateral from NH RN Asha)      In the ED, vitals wnl. Labs showing wbc 11.30, Hb 9.6, MCV 79.1, INR 1.59, CO2 14, AG 21, Cr 6 (~baseline 1.2), , trops 0.09. CTH wnl, RBUS with no hydro, poor visualization of left kidney       (15 Sep 2023 22:12)      Interval History  Palliative reconsulted for GOC. Patient remains on dialysis. He has failed swallow and requires feeding tube. Primary team discussed with patient's brother and he was receptive to speaking with palliative care again.     ADVANCE DIRECTIVES:    [ ] Full Code [ ] DNR  MOLST  [ ]  Living Will  [ ]   DECISION MAKER(s):  [ ] Health Care Proxy(s)  [ ] Surrogate(s)  [ ] Guardian           Name(s): Phone Number(s):    BASELINE (I)ADL(s) (prior to admission):  [place]: [ ]Total  [ ] Moderate [ ]Dependent  Palliative Performance Status Version 2:         %    http://npcrc.org/files/news/palliative_performance_scale_ppsv2.pdf    Allergies    No Known Allergies    Intolerances    MEDICATIONS  (STANDING):  budesonide 160 MICROgram(s)/formoterol 4.5 MICROgram(s) Inhaler 2 Puff(s) Inhalation two times a day  calcium acetate 667 milliGRAM(s) Oral three times a day with meals  cefTRIAXone   IVPB 1000 milliGRAM(s) IV Intermittent every 24 hours  chlorhexidine 2% Cloths 1 Application(s) Topical <User Schedule>  collagenase Ointment 1 Application(s) Topical two times a day  darbepoetin Injectable Syringe 25 MICROGram(s) IV Push <User Schedule>  dextrose 5%. 1000 milliLiter(s) (100 mL/Hr) IV Continuous <Continuous>  dextrose 50% Injectable 25 Gram(s) IV Push once  dextrose 50% Injectable 25 Gram(s) IV Push once  dextrose 50% Injectable 12.5 Gram(s) IV Push once  glucagon  Injectable 1 milliGRAM(s) IntraMuscular once  heparin   Injectable 5000 Unit(s) SubCutaneous every 8 hours  insulin lispro (ADMELOG) corrective regimen sliding scale   SubCutaneous three times a day before meals  iron sucrose IVPB 100 milliGRAM(s) IV Intermittent <User Schedule>  midodrine. 10 milliGRAM(s) Oral every 8 hours  pantoprazole   Suspension 40 milliGRAM(s) Oral two times a day  vancomycin  IVPB 1500 milliGRAM(s) IV Intermittent once    MEDICATIONS  (PRN):  acetaminophen     Tablet .. 650 milliGRAM(s) Oral every 6 hours PRN Moderate Pain (4 - 6)  albuterol    90 MICROgram(s) HFA Inhaler 1 Puff(s) Inhalation every 6 hours PRN for shortness of breath and/or wheezing  atropine Injectable 1 milliGRAM(s) IntraMuscular once PRN HR < 30  dextrose Oral Gel 15 Gram(s) Oral once PRN Blood Glucose LESS THAN 70 milliGRAM(s)/deciliter  HYDROmorphone  Injectable 0.25 milliGRAM(s) IV Push every 6 hours PRN Severe Pain (7 - 10)    PRESENT SYMPTOMS: [ ]Unable to obtain due to poor mentation   Source if other than patient:  [ ]Family   [ ]Team     Pain: [ ]yes [ ]no  QOL impact -   Location -                    Aggravating factors -  Quality -  Radiation -  Timing-  Severity (0-10 scale):  Minimal acceptable level (0-10 scale):     CPOT:  0  https://www.sccm.org/getattachment/ffg13n07-4q1y-2t8j-0r9x-1897j4435o3b/Critical-Care-Pain-Observation-Tool-(CPOT)    PAIN AD Score:   http://geriatrictoolkit.[email or web address]/painad.pdf (press ctrl +  left click to view)    Dyspnea:                           [ ]None[ ]Mild [ ]Moderate [ ]Severe     Respiratory Distress Observation Scale (RDOS): 0  A score of 0 to 2 signifies little or no respiratory distress, 3 signifies mild distress, scores 4 to 6 indicate moderate distress, and scores greater than 7 signify severe distress  [email or web address]://[email or web address]/sites/default/files/PDFS/887408-ojjctxpvopy-hnustrqy-qsshwjqlqry-lxczo.pdf    Anxiety:                             [ ]None[ ]Mild [ ]Moderate [ ]Severe   Fatigue:                             [ ]None[ ]Mild [ ]Moderate [ ]Severe   Nausea:                             [ ]None[ ]Mild [ ]Moderate [ ]Severe   Loss of appetite:              [ ]None[ ]Mild [ ]Moderate [ ]Severe   Constipation:                    [ ]None[ ]Mild [ ]Moderate [ ]Severe    Other Symptoms:  [ ]All other review of systems negative     Palliative Performance Status Version 2:         %    http://npcrc.org/files/news/palliative_performance_scale_ppsv2.pdf  PHYSICAL EXAM:  Vital Signs Last 24 Hrs  T(C): 38.2 (12 Oct 2023 13:15), Max: 38.2 (12 Oct 2023 13:15)  T(F): 100.8 (12 Oct 2023 13:15), Max: 100.8 (12 Oct 2023 13:15)  HR: 85 (12 Oct 2023 13:15) (62 - 87)  BP: 113/55 (12 Oct 2023 13:15) (89/51 - 113/55)  BP(mean): 65 (12 Oct 2023 06:12) (65 - 65)  RR: 17 (12 Oct 2023 13:15) (17 - 18)  SpO2: 95% (12 Oct 2023 13:15) (95% - 98%)    Parameters below as of 12 Oct 2023 08:19  Patient On (Oxygen Delivery Method): room air     I&O's Summary    11 Oct 2023 07:01  -  12 Oct 2023 07:00  --------------------------------------------------------  IN: 0 mL / OUT: 175 mL / NET: -175 mL    12 Oct 2023 07:01  -  12 Oct 2023 14:45  --------------------------------------------------------  IN: 400 mL / OUT: 0 mL / NET: 400 mL      GENERAL:  NAD   EYES: Closed  ENMT:  No external oral lesions  CARDIOVASCULAR:  RRR  PULMONARY:  Non labored breathing  GASTROINTESTINAL: Non distended  NEUROLOGIC: Grossly intact  BEHAVIORAL/PSYCH:  Calm.   SKIN: No rash on exposed skin    CRITICAL CARE:  [ ] Shock Present  [ ]Septic [ ]Cardiogenic [ ]Neurologic [ ]Hypovolemic  [ ]  Vasopressors [ ]  Inotropes   [ ]Respiratory failure present [ ]Mechanical ventilation [ ]Non-invasive ventilatory support [ ]High flow  [ ]Acute  [ ]Chronic [ ]Hypoxic  [ ]Hypercarbic [ ]Other  [ ]Other organ failure     LABS: reviewed by me                        7.1    7.45  )-----------( 102      ( 12 Oct 2023 09:30 )             23.4   10-12    144  |  107  |  34<H>  ----------------------------<  122<H>  3.1<L>   |  24  |  2.5<H>    Ca    7.5<L>      12 Oct 2023 06:36  Mg     1.7     10-12    TPro  5.8<L>  /  Alb  2.6<L>  /  TBili  0.7  /  DBili  x   /  AST  26  /  ALT  88<H>  /  AlkPhos  78  10-12  PTT - ( 12 Oct 2023 00:40 )  PTT:34.3 sec    Urinalysis Basic - ( 12 Oct 2023 06:36 )    Color: x / Appearance: x / SG: x / pH: x  Gluc: 122 mg/dL / Ketone: x  / Bili: x / Urobili: x   Blood: x / Protein: x / Nitrite: x   Leuk Esterase: x / RBC: x / WBC x   Sq Epi: x / Non Sq Epi: x / Bacteria: x      RADIOLOGY & ADDITIONAL STUDIES: reviewed by me  10/11 cxr    1. Unchanged bibasilar opacities, right greater than left.  2. Lines and tubes as above.      PROTEIN CALORIE MALNUTRITION PRESENT: [ ]mild [ ]moderate [ ]severe [ ]underweight [ ]morbid obesity  https://www.andeal.org/vault/6189/web/files/ONC/Table_Clinical%20Characteristics%20to%20Document%20Malnutrition-White%20JV%20et%20al%202012.pdf    Height (cm): 193 (10-09-23 @ 13:13)  Weight (kg): 92 (10-09-23 @ 13:13)  BMI (kg/m2): 24.7 (10-09-23 @ 13:13)    [ ]PPSV2 < or = to 30% [ ]significant weight loss  [ ]poor nutritional intake  [ ]anasarca      [ ]Artificial Nutrition      REFERRALS:   [ ]Chaplaincy  [ ]Hospice  [ ]Child Life  [ ]Social Work  [ ]Case management [ ]Holistic Therapy     Patient discussed with primary medical team MD  Palliative care education provided to patient and/or family    Goals of Care Document:

## 2023-10-12 NOTE — PROGRESS NOTE ADULT - SUBJECTIVE AND OBJECTIVE BOX
Nephrology progress note    Patient is seen and examined, events over the last 24 h noted .  Lying in bed   BP borderline     Allergies:  No Known Allergies    Hospital Medications:   MEDICATIONS  (STANDING):  budesonide 160 MICROgram(s)/formoterol 4.5 MICROgram(s) Inhaler 2 Puff(s) Inhalation two times a day  calcium acetate 667 milliGRAM(s) Oral three times a day with meals  collagenase Ointment 1 Application(s) Topical two times a day  glucagon  Injectable 1 milliGRAM(s) IntraMuscular once  heparin   Injectable 5000 Unit(s) SubCutaneous every 8 hours  insulin lispro (ADMELOG) corrective regimen sliding scale   SubCutaneous three times a day before meals  magnesium sulfate  IVPB 2 Gram(s) IV Intermittent once  midodrine. 10 milliGRAM(s) Oral every 8 hours  pantoprazole   Suspension 40 milliGRAM(s) Oral two times a day  potassium chloride   Powder 40 milliEquivalent(s) Oral once        VITALS:  T(F): 99.8 (10-12-23 @ 06:12), Max: 99.8 (10-11-23 @ 13:00)  HR: 67 (10-12-23 @ 06:12)  BP: 89/51 (10-12-23 @ 06:12)  RR: 18 (10-12-23 @ 06:12)  SpO2: 95% (10-12-23 @ 08:19)      10-10 @ 07:01  -  10-11 @ 07:00  --------------------------------------------------------  IN: 0 mL / OUT: 1425 mL / NET: -1425 mL    10-11 @ 07:01  -  10-12 @ 07:00  --------------------------------------------------------  IN: 0 mL / OUT: 175 mL / NET: -175 mL          PHYSICAL EXAM:  Constitutional: NAD  Respiratory: CTAB,   Cardiovascular: S1, S2, RRR  Gastrointestinal: BS+, soft, NT/ND  Extremities: No cyanosis or clubbing. No peripheral edema  :  No barth.   Skin: No rashes    LABS:  10-12    144  |  107  |  34<H>  ----------------------------<  122<H>  3.1<L>   |  24  |  2.5<H>    Ca    7.5<L>      12 Oct 2023 06:36  Mg     1.7     10-12    TPro  5.8<L>  /  Alb  2.6<L>  /  TBili  0.7  /  DBili      /  AST  26  /  ALT  88<H>  /  AlkPhos  78  10-12                          7.5    9.05  )-----------( 106      ( 12 Oct 2023 06:36 )             24.8       Urine Studies:  Urinalysis Basic - ( 12 Oct 2023 06:36 )    Color:  / Appearance:  / SG:  / pH:   Gluc: 122 mg/dL / Ketone:   / Bili:  / Urobili:    Blood:  / Protein:  / Nitrite:    Leuk Esterase:  / RBC:  / WBC    Sq Epi:  / Non Sq Epi:  / Bacteria:           Iron 31, TIBC 137, %sat 23      [10-07-23 @ 18:15]  Ferritin 960      [10-07-23 @ 18:15]    HBsAb <3.0      [09-27-23 @ 17:44]  HBsAb Nonreact      [09-27-23 @ 17:44]  HBsAg Nonreact      [09-27-23 @ 17:44]  HBcAb Nonreact      [09-27-23 @ 17:44]        RADIOLOGY & ADDITIONAL STUDIES:

## 2023-10-12 NOTE — PROGRESS NOTE ADULT - ASSESSMENT
84 yo m ho DM, COPD, anemia, paroxysmal afib on xarelto, HFrEF, DM coming in from nursing home for AMS x 2 days. Found to have toxic metabolic encephalopathy with PAULA.  PAULA/ toxic metabolic encephalopathy/ HAGMA/ HFrEF/ hypernatremia  possible ATN iso hypotension and possible sepsis/ vanc toxicity  no hydro on imaging  creat trend noted   HD today 3h UF 1l if BP allows/ will need HD 2 x/ week   document UO /  bumex 2 q 12    last phos level at goal   BP noted /  midodrine / 5 q 8 can increase to 10 q8h   s/p IVC filter   iron stores noted / will start venofer and aranesp 25 with hd today   will follow    prognosis poor

## 2023-10-12 NOTE — PROGRESS NOTE ADULT - CONVERSATION DETAILS
Gabe's perception is that palliative care equates to end-of-life care. I explained that I am here for support and to hear his concerns. He wants to make sure that patient receives every opportunity to recover, and he thinks that feeding tube will help with this. No limitations on care.

## 2023-10-12 NOTE — PROGRESS NOTE ADULT - SUBJECTIVE AND OBJECTIVE BOX
MEDICINE ATTENDING PROGRESS NOTE  84 yo m ho DM, COPD, anemia, lymphedema Afib on Xarelto HFrEF, DM coming in from nursing home for AMS and hypotension in setting of acute GIB. Patient has been on Xarelto for Afib. Patient was found to have Acute Femoral DVT s/p IVC filter 10/6. Admitted for further management.    Interval/Overnight Events  - low BP overnight 91/50 s/p 1 bolus; received midodrine  - Will get lactate, procalcitonin, ESR, CRP, BCx  - Patient is lethargic but alert  - Patient fails swallow test. Patient's family is ok with NGT, not okay with PEG  - Hgb dropped to 7.5 from 9.5 -> hold hep sq for now  - tolerate feed via NGT  - f.u palliative care consult for C re: continuation of Xarelto    ROS  General: Denies fevers, chills, nightsweats, weight loss  HEENT: Denies headache, rhinorrhea, sore throat, eye pain  CV: Denies CP, palpitations  PULM: Denies SOB, cough  GI: Denies abdominal pain, diarrhea  : Denies dysuria, hematuria  MSK: Denies arthralgias  SKIN: Denies rash   NEURO: Denies paresthesias, weakness  PSYCH: Denies depression    MEDICATIONS  (STANDING):  budesonide 160 MICROgram(s)/formoterol 4.5 MICROgram(s) Inhaler 2 Puff(s) Inhalation two times a day  buMETAnide 2 milliGRAM(s) Oral every 12 hours  calcium acetate 667 milliGRAM(s) Oral three times a day with meals  chlorhexidine 2% Cloths 1 Application(s) Topical <User Schedule>  collagenase Ointment 1 Application(s) Topical two times a day  dextrose 5%. 1000 milliLiter(s) (100 mL/Hr) IV Continuous <Continuous>  dextrose 50% Injectable 25 Gram(s) IV Push once  dextrose 50% Injectable 25 Gram(s) IV Push once  dextrose 50% Injectable 12.5 Gram(s) IV Push once  glucagon  Injectable 1 milliGRAM(s) IntraMuscular once  heparin   Injectable 5000 Unit(s) SubCutaneous every 8 hours  insulin lispro (ADMELOG) corrective regimen sliding scale   SubCutaneous three times a day before meals  pantoprazole   Suspension 40 milliGRAM(s) Oral two times a day    MEDICATIONS  (PRN):  acetaminophen     Tablet .. 650 milliGRAM(s) Oral every 6 hours PRN Moderate Pain (4 - 6)  albuterol    90 MICROgram(s) HFA Inhaler 1 Puff(s) Inhalation every 6 hours PRN for shortness of breath and/or wheezing  atropine Injectable 1 milliGRAM(s) IntraMuscular once PRN HR < 30  dextrose Oral Gel 15 Gram(s) Oral once PRN Blood Glucose LESS THAN 70 milliGRAM(s)/deciliter  HYDROmorphone  Injectable 0.25 milliGRAM(s) IV Push every 6 hours PRN Severe Pain (7 - 10)  midodrine. 5 milliGRAM(s) Oral <User Schedule> PRN Before HD    ANTIBIOTICS:      VITALS:  T(F): 98.9, Max: 100 (10-10-23 @ 20:30)  HR: 84  BP: 99/58  RR: 18Vital Signs Last 24 Hrs  T(C): 37.2 (11 Oct 2023 05:00), Max: 37.8 (10 Oct 2023 20:30)  T(F): 98.9 (11 Oct 2023 05:00), Max: 100 (10 Oct 2023 20:30)  HR: 84 (11 Oct 2023 05:00) (71 - 84)  BP: 99/58 (11 Oct 2023 05:00) (96/51 - 147/56)  BP(mean): --  RR: 18 (11 Oct 2023 05:00) (18 - 18)  SpO2: 96% (11 Oct 2023 05:00) (95% - 98%)    Parameters below as of 10 Oct 2023 20:30  Patient On (Oxygen Delivery Method): room air     I&O's Summary    10 Oct 2023 07:01  -  11 Oct 2023 07:00  --------------------------------------------------------  IN: 0 mL / OUT: 1425 mL / NET: -1425 mL      PHYSICAL EXAM:  Gen: NAD, resting in bed  HEENT: Normocephalic, atraumatic  Neck: supple, no lymphadenopathy  CV: Regular rate & regular rhythm  Lungs: CTABL no wheeze  Abdomen: Soft, NTND+ BS present  Ext: Warm, well perfused no CCE  Neuro: non focal, awake, CN II-XII intact   Skin: no rash, no erythema  Psych: no SI, HI, Hallucination     LABS:                        9.0    9.13  )-----------( 126      ( 10 Oct 2023 17:45 )             29.2                   MICROBIOLOGY:      Culture - Blood (collected 10-04-23 @ 23:28)  Source: .Blood Blood  Final Report (10-10-23 @ 07:00):    No growth at 5 days          Hepatitis B Surface Antigen: Nonreact (09-27-23 @ 17:44)  MRSA PCR Result.: Negative (09-24-23 @ 04:32)      IMAGING:  CXR      CT    CARDIOLOGY TESTING  QRS axis to [] ° and NSR at a rate of [] BPM. There was no atrial enlargement. There was no ventricular hypertrophy. There were no ST-T changes and all intervals were normal.    12 Lead ECG:   Ventricular Rate 98 BPM    Atrial Rate 101 BPM    QRS Duration 100 ms    Q-T Interval 372 ms    QTC Calculation(Bazett) 474 ms    R Axis -61 degrees    T Axis 68 degrees    Diagnosis Line Atrial fibrillation  Left axis deviation  Low voltage QRS  Cannot rule out Anterior infarct , age undetermined  Abnormal ECG    Confirmed by austen colbert ([id number]) on 10/4/2023 7:03:14 PM (10-04-23 @ 15:40)  12 Lead ECG:   Ventricular Rate 143 BPM    Atrial Rate 143 BPM    P-R Interval 74 ms    QRS Duration 4 ms    Q-T Interval 194 ms    QTC Calculation(Bazett) 299 ms    P Axis 11 degrees    R Axis 0 degrees    T Axis -32 degrees    Diagnosis Line *** Poor data quality, interpretation may be adversely affected  Probable atrial fibrillation  Indeterminate axis  Nonspecific ST and T wave abnormality  Abnormal ECG    Confirmed by austen colbert ([id number]) on 9/30/2023 11:41:21 AM (09-30-23 @ 10:19)      ASSESSMENT/PLAN:  84 yo m ho DM, COPD, anemia, lymphedema Afib on Xarelto HFrEF, DM coming in from nursing home for AMS and hypotension in setting of acute GIB. Patient has been on Xarelto for Afib. Patient was found to have Acute Femoral DVT s/p IVC filter 10/6. Admitted for further management.    pt had   hypotensive, with active melena. BP in 60's. GI consulted urgently for endoscopy. Procedure was done at bedside and bleeding controlled. Pt. was given 2 U of blood  also pt was found to have PAULA, and was started on HD   Tunneled catheter was placed 10/4 but had RR on that day for AMS and had fever 101.6 and was hypotensive, was started on pressors     # Metabolic Encephalopathy due to sepsis, poa   today mental status has improved, he is awake and alert     # acute GIB, - emergent EGD on 9/23  # anemia of acute gi loss     s/p EGD on 9/23: Normal mucosa in the whole esophagus. Erythema in the stomach compatible with non-erosive gastritis. Blood and clots in the fundus and the antrum limited exam. 2 cm actively bleeding duodenal ulcer with spurting vessel (Aaron classification 1a) was noted in the duodenal sweep on the base. 10 cc of 1/[id number] epinephrine was injected in circumferential pattern successfully. A 11/6 OVESCO clip was successfully deployed at the vessel for the purposes of hemostasis. There was no evidence of active bleeding at the end of procedure.  Hemoglobin: 8.3 g/dL (10-09-23 @ 05:46)  Hemoglobin: 8.2 g/dL (10-08-23 @ 08:39)  Hemoglobin: 8.3 g/dL (10-07-23 @ 05:47)    # paula on ckd , requiring HD    #Acute Femoral DVT s/p IVC filter 10/6, not a candidate for ac  #Paroxysmal Afib rate controlled, not on ac due to gib   #History of Left Foot OM w/ surrounding Cellulitis  # pressure ulcer sacral , dw burn   site is clean     #transaminitis - improving, repeat  Alb: 2.5 g/dL / Pro: 6.0 g/dL / ALK PHOS: 89 U/L / ALT: 296 U/L / AST: 217 U/L / GGT: x     /  wei x    / 0.8 mg/dL ( 09 Oct 2023 05:46 )  Alb: 2.8 g/dL / Pro: 5.8 g/dL / ALK PHOS: 87 U/L / ALT: 428 U/L / AST: 526 U/L / GGT: x     /  wei x    / 0.8 mg/dL ( 08 Oct 2023 08:39 )  Alb: 2.7 g/dL / Pro: 5.6 g/dL / ALK PHOS: 92 U/L / ALT: 531 U/L / AST: 955 U/L / GGT: x     /  wei x    / 0.7 mg/dL ( 07 Oct 2023 18:15 )    #Supportive Management:  Dispo:   DVT Ppx: heparin   Injectable 5000 Unit(s) SubCutaneous every 8 hours  GI Ppx: pantoprazole   Suspension 40 milliGRAM(s) Oral two times a day  Diet:  Diet, NPO (10-11-23 @ 11:36) [Active]  Diet, Soft and Bite Sized:   DASH/TLC Sodium & Cholesterol Restricted  Mildly Thick Liquids (MILDTHICKLIQS)  Free Water Flush Instructions:  please thicken each Ensure with 2 packs of thickeners     Qty per Day:  Magic cup 2x/day  Supplement Feeding Modality:  Oral  Ensure Plus High Protein Cans or Servings Per Day:  1       Frequency:  Two Times a day (10-09-23 @ 13:43) [Pending Verification By Attending]  Diet, Pureed:   Mildly Thick Liquids (MILDTHICKLIQS)  Free Water Flush Instructions:  *** please add 2 packets of thickener per NEPRO carton ***  Supplement Feeding Modality:  Oral  Nepro Cans or Servings Per Day:  3       Frequency:  Daily (10-03-23 @ 14:36) [Pending Verification By Attending]  Diet, Minced and Moist:   Mildly Thick Liquids (MILDTHICKLIQS)  Low Sodium     Qty per Day:  magic cup 2x/day  Supplement Feeding Modality:  Oral  Ensure Plus High Protein Cans or Servings Per Day:  1       Frequency:  Three Times a day (09-25-23 @ 15:30) [Pending Verification By Attending]        Total time spent to complete patient's bedside assessment, review medical chart, discuss medical plan of care with covering medical team was more than 45 minutes with >50% of time spent face to face with patient, discussion with patient/family and/or coordination of care    Resident's notes reviewed. My notes supersede resident's notes in case of discrepancy.    Finn Sam MD/Rashard  Attending Physician MEDICINE ATTENDING PROGRESS NOTE  84 yo m ho DM, COPD, anemia, lymphedema Afib on Xarelto HFrEF, DM coming in from nursing home for AMS and hypotension in setting of acute GIB. Patient has been on Xarelto for Afib. Patient was found to have Acute Femoral DVT s/p IVC filter 10/6. Admitted for further management.    Interval/Overnight Events  - low BP overnight 91/50 s/p 1 bolus; received midodrine  - Will get lactate, procalcitonin, ESR, CRP, BCx, ABG  - Patient is lethargic, febrile, but alert  - Patient fails swallow test. Patient's family is ok with NGT, not okay with PEG -> discussed with brother Gabe again, and he now agrees with it. Will call GI for PEG placement  - Hgb dropped to 7.5 from 9.5 -> hold hep sq for now  - Will get CT chest w/o contrast given fever  - Will do vancomycin x 1 dose (after HD) + ceftriaxone 1g qdaily  - s/p 1 unit pRBCs at HD  - tolerate feed via NGT  - f.u palliative care consult for GOC  - palliative defers to cardiology for risk benefit analysis of resuming Xarelto for Afib  - Patient is critically ill with vital organ impairment or failure. Patient is at high risk for complications, morbidity and mortality. There is a very high probability of imminent or life threatening deterioration in the patient's condition. Family has been updated. Patient is currently Full code.     ROS  General: Denies fevers, chills, nightsweats, weight loss  HEENT: Denies headache, rhinorrhea, sore throat, eye pain  CV: Denies CP, palpitations  PULM: Denies SOB, cough  GI: Denies abdominal pain, diarrhea  : Denies dysuria, hematuria  MSK: Denies arthralgias  SKIN: Denies rash   NEURO: Denies paresthesias, weakness  PSYCH: Denies depression    MEDICATIONS  (STANDING):  budesonide 160 MICROgram(s)/formoterol 4.5 MICROgram(s) Inhaler 2 Puff(s) Inhalation two times a day  calcium acetate 667 milliGRAM(s) Oral three times a day with meals  chlorhexidine 2% Cloths 1 Application(s) Topical <User Schedule>  collagenase Ointment 1 Application(s) Topical two times a day  darbepoetin Injectable Syringe 25 MICROGram(s) IV Push <User Schedule>  dextrose 5%. 1000 milliLiter(s) (100 mL/Hr) IV Continuous <Continuous>  dextrose 50% Injectable 25 Gram(s) IV Push once  dextrose 50% Injectable 25 Gram(s) IV Push once  dextrose 50% Injectable 12.5 Gram(s) IV Push once  glucagon  Injectable 1 milliGRAM(s) IntraMuscular once  heparin   Injectable 5000 Unit(s) SubCutaneous every 8 hours  insulin lispro (ADMELOG) corrective regimen sliding scale   SubCutaneous three times a day before meals  iron sucrose IVPB 100 milliGRAM(s) IV Intermittent <User Schedule>  midodrine. 10 milliGRAM(s) Oral every 8 hours  pantoprazole   Suspension 40 milliGRAM(s) Oral two times a day    MEDICATIONS  (PRN):  acetaminophen     Tablet .. 650 milliGRAM(s) Oral every 6 hours PRN Moderate Pain (4 - 6)  albuterol    90 MICROgram(s) HFA Inhaler 1 Puff(s) Inhalation every 6 hours PRN for shortness of breath and/or wheezing  atropine Injectable 1 milliGRAM(s) IntraMuscular once PRN HR < 30  dextrose Oral Gel 15 Gram(s) Oral once PRN Blood Glucose LESS THAN 70 milliGRAM(s)/deciliter  HYDROmorphone  Injectable 0.25 milliGRAM(s) IV Push every 6 hours PRN Severe Pain (7 - 10)    VITALS  T(F): 99.8 (10-12-23 @ 06:12), Max: 99.8 (10-11-23 @ 20:30)  HR: 66 (10-12-23 @ 11:08) (62 - 87)  BP: 98/56 (10-12-23 @ 11:08) (89/51 - 98/56)  RR: 18 (10-12-23 @ 06:12) (18 - 18)  SpO2: 95% (10-12-23 @ 08:19) (95% - 98%)    PHYSICAL EXAM:  Gen: NAD, resting in bed  HEENT: Normocephalic, atraumatic  Neck: supple, no lymphadenopathy  CV: Regular rate & regular rhythm  Lungs: CTABL no wheeze  Abdomen: Soft, NTND+ BS present  Ext: Warm, well perfused no CCE  Neuro: non focal, awake, CN II-XII intact   Skin: no rash, no erythema  Psych: no SI, HI, Hallucination     LABS:  10-12    144  |  107  |  34<H>  ----------------------------<  122<H>  3.1<L>   |  24  |  2.5<H>    Ca    7.5<L>      12 Oct 2023 06:36  Mg     1.7     10-12    TPro  5.8<L>  /  Alb  2.6<L>  /  TBili  0.7  /  DBili  x   /  AST  26  /  ALT  88<H>  /  AlkPhos  78  10-12    LIVER FUNCTIONS - ( 12 Oct 2023 06:36 )  Alb: 2.6 g/dL / Pro: 5.8 g/dL / ALK PHOS: 78 U/L / ALT: 88 U/L / AST: 26 U/L / GGT: x                               7.1    7.45  )-----------( 102      ( 12 Oct 2023 09:30 )             23.4       MICROBIOLOGY:  Culture - Blood (collected 10-04-23 @ 23:28)  Source: .Blood Blood  Final Report (10-10-23 @ 07:00):    No growth at 5 days    Hepatitis B Surface Antigen: Nonreact (09-27-23 @ 17:44)  MRSA PCR Result.: Negative (09-24-23 @ 04:32)      IMAGING:  Xray Chest 1 View- PORTABLE-Urgent (Xray Chest 1 View- PORTABLE-Urgent .) (10.11.23 @ 14:59)   1. Unchanged bibasilar opacities, right greater than left.  2. Lines and tubes as above.    CARDIOLOGY TESTING  12 Lead ECG:   Ventricular Rate 98 BPM  Atrial Rate 101 BPM  QRS Duration 100 ms  Q-T Interval 372 ms  QTC Calculation(Bazett) 474 ms  R Axis -61 degrees  T Axis 68 degrees    Diagnosis Line Atrial fibrillation  Left axis deviation  Low voltage QRS  Cannot rule out Anterior infarct , age undetermined  Abnormal ECG    Confirmed by austen colbert ([id number]) on 10/4/2023 7:03:14 PM (10-04-23 @ 15:40)    12 Lead ECG:   Ventricular Rate 143 BPM  Atrial Rate 143 BPM  P-R Interval 74 ms  QRS Duration 4 ms  Q-T Interval 194 ms  QTC Calculation(Bazett) 299 ms  P Axis 11 degrees  R Axis 0 degrees  T Axis -32 degrees    Diagnosis Line *** Poor data quality, interpretation may be adversely affected  Probable atrial fibrillation  Indeterminate axis  Nonspecific ST and T wave abnormality  Abnormal ECG    Confirmed by austen colbert ([id number]) on 9/30/2023 11:41:21 AM (09-30-23 @ 10:19)    ASSESSMENT/PLAN:  84 yo m ho DM, COPD, anemia, lymphedema Afib on Xarelto HFrEF, DM coming in from nursing home for AMS and hypotension in setting of acute GIB. Patient has been on Xarelto for Afib. Patient was found to have Acute Femoral DVT s/p IVC filter 10/6. Admitted for further management.    pt had   hypotensive, with active melena. BP in 60's. GI consulted urgently for endoscopy. Procedure was done at bedside and bleeding controlled. Pt. was given 2 U of blood  also pt was found to have PAULA, and was started on HD   Tunneled catheter was placed 10/4 but had RR on that day for AMS and had fever 101.6 and was hypotensive, was started on pressors     # Metabolic Encephalopathy due to sepsis, poa   today mental status has improved, he is awake and alert     # acute GIB, - emergent EGD on 9/23  # anemia of acute gi loss     s/p EGD on 9/23: Normal mucosa in the whole esophagus. Erythema in the stomach compatible with non-erosive gastritis. Blood and clots in the fundus and the antrum limited exam. 2 cm actively bleeding duodenal ulcer with spurting vessel (Aaron classification 1a) was noted in the duodenal sweep on the base. 10 cc of 1/15809 epinephrine was injected in circumferential pattern successfully. A 11/6 OVESCO clip was successfully deployed at the vessel for the purposes of hemostasis. There was no evidence of active bleeding at the end of procedure.  Hemoglobin: 8.3 g/dL (10-09-23 @ 05:46)  Hemoglobin: 8.2 g/dL (10-08-23 @ 08:39)  Hemoglobin: 8.3 g/dL (10-07-23 @ 05:47)    # paula on ckd , requiring HD    #Acute Femoral DVT s/p IVC filter 10/6, not a candidate for ac  #Paroxysmal Afib rate controlled, not on ac due to gib   #History of Left Foot OM w/ surrounding Cellulitis  # pressure ulcer sacral , dw burn   site is clean     #transaminitis - improving, repeat  Alb: 2.5 g/dL / Pro: 6.0 g/dL / ALK PHOS: 89 U/L / ALT: 296 U/L / AST: 217 U/L / GGT: x     /  wei x    / 0.8 mg/dL ( 09 Oct 2023 05:46 )  Alb: 2.8 g/dL / Pro: 5.8 g/dL / ALK PHOS: 87 U/L / ALT: 428 U/L / AST: 526 U/L / GGT: x     /  wei x    / 0.8 mg/dL ( 08 Oct 2023 08:39 )  Alb: 2.7 g/dL / Pro: 5.6 g/dL / ALK PHOS: 92 U/L / ALT: 531 U/L / AST: 955 U/L / GGT: x     /  wei x    / 0.7 mg/dL ( 07 Oct 2023 18:15 )    #Supportive Management:  Dispo:   DVT Ppx: heparin   Injectable 5000 Unit(s) SubCutaneous every 8 hours  GI Ppx: pantoprazole   Suspension 40 milliGRAM(s) Oral two times a day  Diet:  Diet, NPO (10-11-23 @ 11:36) [Active]  Diet, Soft and Bite Sized:   DASH/TLC Sodium & Cholesterol Restricted  Mildly Thick Liquids (MILDTHICKLIQS)  Free Water Flush Instructions:  please thicken each Ensure with 2 packs of thickeners     Qty per Day:  Magic cup 2x/day  Supplement Feeding Modality:  Oral  Ensure Plus High Protein Cans or Servings Per Day:  1       Frequency:  Two Times a day (10-09-23 @ 13:43) [Pending Verification By Attending]  Diet, Pureed:   Mildly Thick Liquids (MILDTHICKLIQS)  Free Water Flush Instructions:  *** please add 2 packets of thickener per NEPRO carton ***  Supplement Feeding Modality:  Oral  Nepro Cans or Servings Per Day:  3       Frequency:  Daily (10-03-23 @ 14:36) [Pending Verification By Attending]  Diet, Minced and Moist:   Mildly Thick Liquids (MILDTHICKLIQS)  Low Sodium     Qty per Day:  magic cup 2x/day  Supplement Feeding Modality:  Oral  Ensure Plus High Protein Cans or Servings Per Day:  1       Frequency:  Three Times a day (09-25-23 @ 15:30) [Pending Verification By Attending]        Total time spent to complete patient's bedside assessment, review medical chart, discuss medical plan of care with covering medical team was more than 45 minutes with >50% of time spent face to face with patient, discussion with patient/family and/or coordination of care    Resident's notes reviewed. My notes supersede resident's notes in case of discrepancy.    Finn Sam MD/Rashard  Attending Physician

## 2023-10-12 NOTE — CONSULT NOTE ADULT - SUBJECTIVE AND OBJECTIVE BOX
24H events:    Patient is a 83y old Male who presents with a chief complaint of AMS (12 Oct 2023 09:24)    Primary diagnosis of Altered mental status       Today is hospital day 27d. This morning patient was seen and examined at bedside, resting comfortably in bed.      PAST MEDICAL & SURGICAL HISTORY  HTN (hypertension)    COPD (chronic obstructive pulmonary disease)    High cholesterol    Mild anemia    Coffee ground emesis    Chronic diastolic heart failure    PAULA (acute kidney injury)    No significant past surgical history      SOCIAL HISTORY:  Social History:      ALLERGIES:  No Known Allergies    MEDICATIONS:  STANDING MEDICATIONS  budesonide 160 MICROgram(s)/formoterol 4.5 MICROgram(s) Inhaler 2 Puff(s) Inhalation two times a day  calcium acetate 667 milliGRAM(s) Oral three times a day with meals  chlorhexidine 2% Cloths 1 Application(s) Topical <User Schedule>  collagenase Ointment 1 Application(s) Topical two times a day  darbepoetin Injectable Syringe 25 MICROGram(s) IV Push <User Schedule>  dextrose 5%. 1000 milliLiter(s) IV Continuous <Continuous>  dextrose 50% Injectable 25 Gram(s) IV Push once  dextrose 50% Injectable 25 Gram(s) IV Push once  dextrose 50% Injectable 12.5 Gram(s) IV Push once  glucagon  Injectable 1 milliGRAM(s) IntraMuscular once  heparin   Injectable 5000 Unit(s) SubCutaneous every 8 hours  insulin lispro (ADMELOG) corrective regimen sliding scale   SubCutaneous three times a day before meals  iron sucrose IVPB 100 milliGRAM(s) IV Intermittent <User Schedule>  midodrine. 10 milliGRAM(s) Oral every 8 hours  pantoprazole   Suspension 40 milliGRAM(s) Oral two times a day    PRN MEDICATIONS  acetaminophen     Tablet .. 650 milliGRAM(s) Oral every 6 hours PRN  albuterol    90 MICROgram(s) HFA Inhaler 1 Puff(s) Inhalation every 6 hours PRN  atropine Injectable 1 milliGRAM(s) IntraMuscular once PRN  dextrose Oral Gel 15 Gram(s) Oral once PRN  HYDROmorphone  Injectable 0.25 milliGRAM(s) IV Push every 6 hours PRN        10-11-23 @ 07:01  -  10-12-23 @ 07:00  --------------------------------------------------------  IN: 0 mL / OUT: 175 mL / NET: -175 mL    10-12-23 @ 07:01  -  10-12-23 @ 12:53  --------------------------------------------------------  IN: 400 mL / OUT: 0 mL / NET: 400 mL        LABS:                        7.1    7.45  )-----------( 102      ( 12 Oct 2023 09:30 )             23.4     10-12    144  |  107  |  34<H>  ----------------------------<  122<H>  3.1<L>   |  24  |  2.5<H>    Ca    7.5<L>      12 Oct 2023 06:36  Mg     1.7     10-12    TPro  5.8<L>  /  Alb  2.6<L>  /  TBili  0.7  /  DBili  x   /  AST  26  /  ALT  88<H>  /  AlkPhos  78  10-12    PTT - ( 12 Oct 2023 00:40 )  PTT:34.3 sec  Urinalysis Basic - ( 12 Oct 2023 06:36 )    Color: x / Appearance: x / SG: x / pH: x  Gluc: 122 mg/dL / Ketone: x  / Bili: x / Urobili: x   Blood: x / Protein: x / Nitrite: x   Leuk Esterase: x / RBC: x / WBC x   Sq Epi: x / Non Sq Epi: x / Bacteria: x        Sedimentation Rate, Erythrocyte: 30 mm/Hr *H* (10-12-23 @ 09:30)            RADIOLOGY:    VITALS:   T(F): 99.8  HR: 66  BP: 98/56  RR: 18  SpO2: 95%    PHYSICAL EXAM:       24H events:    Patient is a 83y old Male who presents with a chief complaint of AMS (12 Oct 2023 09:24)    Primary diagnosis of Altered mental status       Today is hospital day 27d. This morning patient was seen and examined at bedside, resting comfortably in bed.      PAST MEDICAL & SURGICAL HISTORY  HTN (hypertension)    COPD (chronic obstructive pulmonary disease)    High cholesterol    Mild anemia    Coffee ground emesis    Chronic diastolic heart failure    PAULA (acute kidney injury)    No significant past surgical history      SOCIAL HISTORY:  Social History:      ALLERGIES:  No Known Allergies    MEDICATIONS:  STANDING MEDICATIONS  budesonide 160 MICROgram(s)/formoterol 4.5 MICROgram(s) Inhaler 2 Puff(s) Inhalation two times a day  calcium acetate 667 milliGRAM(s) Oral three times a day with meals  chlorhexidine 2% Cloths 1 Application(s) Topical <User Schedule>  collagenase Ointment 1 Application(s) Topical two times a day  darbepoetin Injectable Syringe 25 MICROGram(s) IV Push <User Schedule>  dextrose 5%. 1000 milliLiter(s) IV Continuous <Continuous>  dextrose 50% Injectable 25 Gram(s) IV Push once  dextrose 50% Injectable 25 Gram(s) IV Push once  dextrose 50% Injectable 12.5 Gram(s) IV Push once  glucagon  Injectable 1 milliGRAM(s) IntraMuscular once  heparin   Injectable 5000 Unit(s) SubCutaneous every 8 hours  insulin lispro (ADMELOG) corrective regimen sliding scale   SubCutaneous three times a day before meals  iron sucrose IVPB 100 milliGRAM(s) IV Intermittent <User Schedule>  midodrine. 10 milliGRAM(s) Oral every 8 hours  pantoprazole   Suspension 40 milliGRAM(s) Oral two times a day    PRN MEDICATIONS  acetaminophen     Tablet .. 650 milliGRAM(s) Oral every 6 hours PRN  albuterol    90 MICROgram(s) HFA Inhaler 1 Puff(s) Inhalation every 6 hours PRN  atropine Injectable 1 milliGRAM(s) IntraMuscular once PRN  dextrose Oral Gel 15 Gram(s) Oral once PRN  HYDROmorphone  Injectable 0.25 milliGRAM(s) IV Push every 6 hours PRN        10-11-23 @ 07:01  -  10-12-23 @ 07:00  --------------------------------------------------------  IN: 0 mL / OUT: 175 mL / NET: -175 mL    10-12-23 @ 07:01  -  10-12-23 @ 12:53  --------------------------------------------------------  IN: 400 mL / OUT: 0 mL / NET: 400 mL        LABS:                        7.1    7.45  )-----------( 102      ( 12 Oct 2023 09:30 )             23.4     10-12    144  |  107  |  34<H>  ----------------------------<  122<H>  3.1<L>   |  24  |  2.5<H>    Ca    7.5<L>      12 Oct 2023 06:36  Mg     1.7     10-12    TPro  5.8<L>  /  Alb  2.6<L>  /  TBili  0.7  /  DBili  x   /  AST  26  /  ALT  88<H>  /  AlkPhos  78  10-12    PTT - ( 12 Oct 2023 00:40 )  PTT:34.3 sec  Urinalysis Basic - ( 12 Oct 2023 06:36 )    Color: x / Appearance: x / SG: x / pH: x  Gluc: 122 mg/dL / Ketone: x  / Bili: x / Urobili: x   Blood: x / Protein: x / Nitrite: x   Leuk Esterase: x / RBC: x / WBC x   Sq Epi: x / Non Sq Epi: x / Bacteria: x    Sedimentation Rate, Erythrocyte: 30 mm/Hr *H* (10-12-23 @ 09:30)    VITALS:   T(F): 99.8  HR: 66  BP: 98/56  RR: 18  SpO2: 95%    ROS:    Limited due to patient's mental status and severity of disease      PHYSICAL EXAM:  PHYSICAL EXAM:  GENERAL: incomplete sentences, incomprehensible speech  PSYCH: AAOx1  CHEST/LUNG: Clear to auscultation bilaterally; No wheeze; No crackles; No accessory muscles used  HEART: Irregular rate  ABDOMEN: Soft, Nontender, Nondistended; Bowel sounds present; No guarding  EXTREMITIES: No cyanosis or edema       24H events:    Patient is a 83y old Male who presents with a chief complaint of AMS (12 Oct 2023 09:24)    Primary diagnosis of Altered mental status       Today is hospital day 27d. This morning patient was seen and examined at bedside, resting comfortably in bed.      No chest pain, shortness of breath, palpitations and syncope. No leg swelling, orthopnea and PND.     PAST MEDICAL & SURGICAL HISTORY  HTN (hypertension)    COPD (chronic obstructive pulmonary disease)    High cholesterol    Mild anemia    Coffee ground emesis    Chronic diastolic heart failure    PAULA (acute kidney injury)    No significant past surgical history    Family history: unable to obtain      SOCIAL HISTORY:  Social History: no toxic habits      ALLERGIES:  No Known Allergies    MEDICATIONS:  STANDING MEDICATIONS  budesonide 160 MICROgram(s)/formoterol 4.5 MICROgram(s) Inhaler 2 Puff(s) Inhalation two times a day  calcium acetate 667 milliGRAM(s) Oral three times a day with meals  chlorhexidine 2% Cloths 1 Application(s) Topical <User Schedule>  collagenase Ointment 1 Application(s) Topical two times a day  darbepoetin Injectable Syringe 25 MICROGram(s) IV Push <User Schedule>  dextrose 5%. 1000 milliLiter(s) IV Continuous <Continuous>  dextrose 50% Injectable 25 Gram(s) IV Push once  dextrose 50% Injectable 25 Gram(s) IV Push once  dextrose 50% Injectable 12.5 Gram(s) IV Push once  glucagon  Injectable 1 milliGRAM(s) IntraMuscular once  heparin   Injectable 5000 Unit(s) SubCutaneous every 8 hours  insulin lispro (ADMELOG) corrective regimen sliding scale   SubCutaneous three times a day before meals  iron sucrose IVPB 100 milliGRAM(s) IV Intermittent <User Schedule>  midodrine. 10 milliGRAM(s) Oral every 8 hours  pantoprazole   Suspension 40 milliGRAM(s) Oral two times a day    PRN MEDICATIONS  acetaminophen     Tablet .. 650 milliGRAM(s) Oral every 6 hours PRN  albuterol    90 MICROgram(s) HFA Inhaler 1 Puff(s) Inhalation every 6 hours PRN  atropine Injectable 1 milliGRAM(s) IntraMuscular once PRN  dextrose Oral Gel 15 Gram(s) Oral once PRN  HYDROmorphone  Injectable 0.25 milliGRAM(s) IV Push every 6 hours PRN        10-11-23 @ 07:01  -  10-12-23 @ 07:00  --------------------------------------------------------  IN: 0 mL / OUT: 175 mL / NET: -175 mL    10-12-23 @ 07:01  -  10-12-23 @ 12:53  --------------------------------------------------------  IN: 400 mL / OUT: 0 mL / NET: 400 mL        LABS:                        7.1    7.45  )-----------( 102      ( 12 Oct 2023 09:30 )             23.4     10-12    144  |  107  |  34<H>  ----------------------------<  122<H>  3.1<L>   |  24  |  2.5<H>    Ca    7.5<L>      12 Oct 2023 06:36  Mg     1.7     10-12    TPro  5.8<L>  /  Alb  2.6<L>  /  TBili  0.7  /  DBili  x   /  AST  26  /  ALT  88<H>  /  AlkPhos  78  10-12    PTT - ( 12 Oct 2023 00:40 )  PTT:34.3 sec  Urinalysis Basic - ( 12 Oct 2023 06:36 )    Color: x / Appearance: x / SG: x / pH: x  Gluc: 122 mg/dL / Ketone: x  / Bili: x / Urobili: x   Blood: x / Protein: x / Nitrite: x   Leuk Esterase: x / RBC: x / WBC x   Sq Epi: x / Non Sq Epi: x / Bacteria: x    Sedimentation Rate, Erythrocyte: 30 mm/Hr *H* (10-12-23 @ 09:30)    VITALS:   T(F): 99.8  HR: 66  BP: 98/56  RR: 18  SpO2: 95%    ROS:    Limited due to patient's mental status and severity of disease      PHYSICAL EXAM:  PHYSICAL EXAM:  GENERAL: incomplete sentences, incomprehensible speech  NEURO: AAOX1, NO focal deficits  Neck: supple, no JVD  CHEST/LUNG: Clear to auscultation bilaterally; No wheeze; No crackles; No accessory muscles used  HEART: Irregular rate  ABDOMEN: Soft, Nontender, Nondistended; Bowel sounds present; No guarding  EXTREMITIES: No cyanosis or edema

## 2023-10-12 NOTE — CONSULT NOTE ADULT - ATTENDING COMMENTS
Briefly, 83 year old man for whom cardiology is consulted for risks versus benefits of anticoagulation. This patient has afib and has a had a recent GI bleed. He is AAOx1 and unable to respond appropriately to questions. This is a discussion that the primary team should have with the health care proxy. It is expected that the primary team should be able to utilize risk scores like the CHADSVASC score and the HASBLED score that have been well validated and use those tools to discuss risks and benefits with whoever makes decisions for this patient. Cardiology will sign off.

## 2023-10-12 NOTE — PROGRESS NOTE ADULT - SUBJECTIVE AND OBJECTIVE BOX
24H events:    Patient is a 83y old Male who presents with a chief complaint of AMS (12 Oct 2023 12:52)    Primary diagnosis of Altered mental status         Today is hospital day 27d. This morning patient was seen and examined at bedside, resting comfortably in bed.    No acute or major events overnight.    Code Status: full      PAST MEDICAL & SURGICAL HISTORY  HTN (hypertension)    COPD (chronic obstructive pulmonary disease)    High cholesterol    Mild anemia    Coffee ground emesis    Chronic diastolic heart failure    PAULA (acute kidney injury)    No significant past surgical history      SOCIAL HISTORY:  Social History:      ALLERGIES:  No Known Allergies    MEDICATIONS:  STANDING MEDICATIONS  budesonide 160 MICROgram(s)/formoterol 4.5 MICROgram(s) Inhaler 2 Puff(s) Inhalation two times a day  calcium acetate 667 milliGRAM(s) Oral three times a day with meals  cefTRIAXone   IVPB 1000 milliGRAM(s) IV Intermittent every 24 hours  chlorhexidine 2% Cloths 1 Application(s) Topical <User Schedule>  collagenase Ointment 1 Application(s) Topical two times a day  darbepoetin Injectable Syringe 25 MICROGram(s) IV Push <User Schedule>  dextrose 5%. 1000 milliLiter(s) IV Continuous <Continuous>  dextrose 50% Injectable 25 Gram(s) IV Push once  dextrose 50% Injectable 25 Gram(s) IV Push once  dextrose 50% Injectable 12.5 Gram(s) IV Push once  glucagon  Injectable 1 milliGRAM(s) IntraMuscular once  heparin   Injectable 5000 Unit(s) SubCutaneous every 8 hours  insulin lispro (ADMELOG) corrective regimen sliding scale   SubCutaneous three times a day before meals  iron sucrose IVPB 100 milliGRAM(s) IV Intermittent <User Schedule>  midodrine. 10 milliGRAM(s) Oral every 8 hours  pantoprazole   Suspension 40 milliGRAM(s) Oral two times a day  vancomycin  IVPB 1500 milliGRAM(s) IV Intermittent once    PRN MEDICATIONS  acetaminophen     Tablet .. 650 milliGRAM(s) Oral every 6 hours PRN  albuterol    90 MICROgram(s) HFA Inhaler 1 Puff(s) Inhalation every 6 hours PRN  atropine Injectable 1 milliGRAM(s) IntraMuscular once PRN  dextrose Oral Gel 15 Gram(s) Oral once PRN  HYDROmorphone  Injectable 0.25 milliGRAM(s) IV Push every 6 hours PRN    VITALS:   T(F): 100.8  HR: 85  BP: 113/55  RR: 17  SpO2: 95%    PHYSICAL EXAM:  GENERAL:   ( x ) NAD, lying in bed comfortably     (  ) obtunded     (  ) lethargic     (  ) somnolent    HEAD:   (x  ) Atraumatic     (  ) hematoma     (  ) laceration (specify location:       )     NECK:  (x  ) Supple     (  ) neck stiffness     (  ) nuchal rigidity     (  )  no JVD     (  ) JVD present ( -- cm)    HEART:  Rate -->     ( x ) normal rate     (  ) bradycardic     (  ) tachycardic  Rhythm -->     ( x ) regular     (  ) regularly irregular     (  ) irregularly irregular  Murmurs -->     (x  ) normal s1s2     (  ) systolic murmur     (  ) diastolic murmur     (  ) continuous murmur      (  ) S3 present     (  ) S4 present    LUNGS:   (x  )Unlabored respirations     (  ) tachypnea  ( x ) B/L air entry     (  ) decreased breath sounds in:  (location     )    ( x ) no adventitious sound     (  ) crackles     (  ) wheezing      (  ) rhonchi      (specify location:       )  (  ) chest wall tenderness (specify location:       )    ABDOMEN:   ( x ) Soft     (  ) tense   |   (  ) nondistended     (  ) distended   |   ( x ) +BS     (  ) hypoactive bowel sounds     (  ) hyperactive bowel sounds  (  ) nontender     (  ) RUQ tenderness     (  ) RLQ tenderness     (  ) LLQ tenderness     (  ) epigastric tenderness     (  ) diffuse tenderness  (  ) Splenomegaly      (  ) Hepatomegaly      (  ) Jaundice     (  ) ecchymosis     EXTREMITIES:  (  ) Normal     (  ) Rash     (  ) ecchymosis     (  ) varicose veins      (  ) pitting edema     (  ) non-pitting edema   (  ) ulceration     (  ) gangrene:     (location:     ) sacral ulcer stage 2 , and pressure ulcer in foot    NERVOUS SYSTEM:    (x  ) A&Ox1/2?     (  ) confused     (  ) lethargic  CN II-XII:     ( x ) Intact     (  ) deficits found     (Specify:     )   Upper extremities:     (  ) no sensorimotor deficits     (  ) weakness     (  ) loss of proprioception/vibration     (  ) loss of touch/temperature (specify:    )  Lower extremities:     (  ) no sensorimotor deficits     (  ) weakness     (  ) loss of proprioception/vibration     (  ) loss of touch/temperature (specify:    )        (  ) Indwelling [place] Catheter:   Date insterted:    Reason (  ) Critical illness     (  ) urinary retention    (  ) Accurate Ins/Outs Monitoring     (  ) CMO patient    (  ) Central Line:   Date inserted:  Location: (  ) Right IJ     (  ) Left IJ     (  ) Right Fem     (  ) Left Fem    (  ) SPC        (  ) pigtail       (  ) PEG tube       (  ) colostomy       (  ) jejunostomy  (  ) U-Dall    LABS:                        7.1    7.45  )-----------( 102      ( 12 Oct 2023 09:30 )             23.4     10-12    144  |  107  |  34<H>  ----------------------------<  122<H>  3.1<L>   |  24  |  2.5<H>    Ca    7.5<L>      12 Oct 2023 06:36  Mg     1.7     10-12    TPro  5.8<L>  /  Alb  2.6<L>  /  TBili  0.7  /  DBili  x   /  AST  26  /  ALT  88<H>  /  AlkPhos  78  10-12    PTT - ( 12 Oct 2023 00:40 )  PTT:34.3 sec  Urinalysis Basic - ( 12 Oct 2023 06:36 )    Color: x / Appearance: x / SG: x / pH: x  Gluc: 122 mg/dL / Ketone: x  / Bili: x / Urobili: x   Blood: x / Protein: x / Nitrite: x   Leuk Esterase: x / RBC: x / WBC x   Sq Epi: x / Non Sq Epi: x / Bacteria: x      Sedimentation Rate, Erythrocyte: 30 mm/Hr *H* (10-12-23 @ 09:30)          HPI:82 yo m ho DM, COPD, anemia, lymphedemia, afib on xarelto, HFrEF, DM coming in from nursing home for AMS x 2 days. Unable to gather story from pt as he is altered and lethargic.  As per NH was becoming more agitated x 2 days, was hypotensive yesterday and started on cefepime and vancomycin. Brought in to the ED for AMS. PICC line in place for cefepime 1q q8 until 10/5/23 and vanc 1q24 until 9/23/23 for LLE cellulitis/OM  (was at [facility] month for LLE thick wound cellulitis/OM and sacral DTI as per call with [facility] as per collateral from [facility])      In the ED, vitals wnl. Labs showing wbc 11.30, Hb 9.6, MCV 79.1, INR 1.59, CO2 14, AG 21, Cr 6 (~baseline 1.2), , trops 0.09. CTH wnl, RBUS with no hydro, poor visualization of left kidney      #Metabolic Encephalopathy 2/2 septic Shock 2/2 suspected aspiration pneumonia   # dysphagia   #Acutely worsening uremia and acute kidney failure most likely 2/2 ATN - now on HD  #Acute Femoral DVT s/p IVC filter 10/6 not on any ac  #transaminitis - resolving   #Paroxysmal Afib, chronic; uncontrolled rate  #History of Left Foot OM w/ surrounding Cellulitis : no abx as per ID    #Metabolic Encephalopathy 2/2 septic Shock 2/2 suspected aspiration pneumonia   - rapid response after TDC due to acute change in mental status - ABG notable for elevated lactate   - pt febrile to 101.6 and elevated lactate to 8.4, rapidly became hypotensive on 10/4 - started on levophed  - ID consult appreciated, no further antibiotics   - EEG -ve  - On RA  - C/w albuterol PRN  - basaline mental status: Ao*3>> today AO* 2    # dysphagia   - speech and swallow consulted  - barium swallow done  - mod to sev dysphagia  - started on NG feed  - GI consulted for PEG placement    # temp 99.8  - chest xray same as 10/04  - NCCT chest send to r/o aspiration? HAP?     #Acutely worsening uremia and acute kidney failure most likely 2/2 ATN - now on HD through right IJ Rosalie  #hypernatremia - resolving   PAULA / CKD stable non oliguric  - udall changed 9/28,  he received HD 9/28 and 9/30  - Close F/U of BUN/Crea/ Lytes and UO  - c/w phoslo 2/2/2   - increase  BUMEX to 2 mg BID as per Nephro   - TDC by IR on 10/4  - Cr stable for now  - HD done today, need long term dialysis as per nephro  	  #Anemia of acute blood loss   #Hemorrhagic Shock Secondary to Hematochezia from Duodenal Ulcer Bleed s/p OVESCO placement 09/23  -1U PRBC today during HD 10/12  - Avoid any NSAIDs  - overall the pt risk and benefit is goes against Anticoagulation, will further discuss with family , , dw brother regarding the high risk of bleeding and understands the reasons to hold anticoagulation. And understands the risk of cva with Afib.     #Elevated dimer  #Acute Femoral DVT s/p IVC filter 10/6  - heparin 5KIU 3 times a day  - cardiology consulted regarding anticoagulant    #transaminitis - resolving   - likely 2/2 liver shock 2/2 hypotension vs DILI   - LFTS improving   - hepatitis neg   - today AST.ALT: 526/428>>217/296>>      #Paroxysmal Afib, chronic; uncontrolled rate  - HOLDING therapeutic AC  - hold  metoprolol tartrate 12.5 mg q12h    #History of Left Foot OM w/ surrounding Cellulitis  *CT LLE (9/16): No CT evidence of osteomyelitis or necrotizing infection. No drainable collection seen, within the limitations of a noncontrast exam.  - Increased frailty and decreased physical capacity  - as per previous notes the team Discussed with ID attending: patient is unlikely to benefit from prolonged therapy with cefepime+vanco (to cover possible OM) due to adverse outcomes associated kidney dysfunction, cognitive impact ...etc )  - c/w Local wound care; offloading boots bilaterally, frequently turning and positioning, prevent pressure injury, keep skin clean, and monitor for wound changes and notify provider as per podiatry          plan: continue HD, moniter mental status, f/u with phosphorus, mag, ABG, Bcx, nutrition consult, cbc every 6hrs, cardiology consulted, GI consulted for peg tube

## 2023-10-12 NOTE — PROGRESS NOTE ADULT - ASSESSMENT
83M with PMH including COPD, DM, anemia, lymphedema, AF on xarelto, HFrEF, DM, here from SNF with AMS, from suspected severe sepsis, started on IV vanco and IV cefepime, with subsequent renal failure now on HD, anemia 2/2 GIB, and dysphagia requiring feeding tube.     Patient previously seen by palliative care team. His brother, Gabe's perception is that palliative care equates to end-of-life care. I explained that I am here for support and to hear his concerns. He wants to make sure that patient receives every opportunity to recover, and he thinks that feeding tube will help with this. No limitations on care. Of note, patient was antagonistic from the onset of our conversation. He feels that we are pushing patient towards CMO/hospice even though I never mentioned these topics today.     Plan:  - continue current medical interventions  - family is agreeable to feeding tube placement if medically indicated    Palliative care will sign off.  Please call [phone] with questions or concerns 24/7.

## 2023-10-13 LAB
ANION GAP SERPL CALC-SCNC: 10 MMOL/L — SIGNIFICANT CHANGE UP (ref 7–14)
ANISOCYTOSIS BLD QL: SLIGHT — SIGNIFICANT CHANGE UP
BASOPHILS # BLD AUTO: 0 K/UL — SIGNIFICANT CHANGE UP (ref 0–0.2)
BASOPHILS NFR BLD AUTO: 0 % — SIGNIFICANT CHANGE UP (ref 0–1)
BUN SERPL-MCNC: 22 MG/DL — HIGH (ref 10–20)
BURR CELLS BLD QL SMEAR: PRESENT — SIGNIFICANT CHANGE UP
CALCIUM SERPL-MCNC: 7.7 MG/DL — LOW (ref 8.4–10.4)
CHLORIDE SERPL-SCNC: 106 MMOL/L — SIGNIFICANT CHANGE UP (ref 98–110)
CO2 SERPL-SCNC: 25 MMOL/L — SIGNIFICANT CHANGE UP (ref 17–32)
CREAT SERPL-MCNC: 1.6 MG/DL — HIGH (ref 0.7–1.5)
EGFR: 43 ML/MIN/1.73M2 — LOW
EOSINOPHIL # BLD AUTO: 0.17 K/UL — SIGNIFICANT CHANGE UP (ref 0–0.7)
EOSINOPHIL NFR BLD AUTO: 0.8 % — SIGNIFICANT CHANGE UP (ref 0–8)
GAS PNL BLDA: SIGNIFICANT CHANGE UP
GLUCOSE BLDC GLUCOMTR-MCNC: 122 MG/DL — HIGH (ref 70–99)
GLUCOSE BLDC GLUCOMTR-MCNC: 134 MG/DL — HIGH (ref 70–99)
GLUCOSE BLDC GLUCOMTR-MCNC: 141 MG/DL — HIGH (ref 70–99)
GLUCOSE BLDC GLUCOMTR-MCNC: 85 MG/DL — SIGNIFICANT CHANGE UP (ref 70–99)
GLUCOSE SERPL-MCNC: 104 MG/DL — HIGH (ref 70–99)
HCT VFR BLD CALC: 29.3 % — LOW (ref 42–52)
HEPARIN-PF4 AB RESULT: 0.7 U/ML — SIGNIFICANT CHANGE UP (ref 0–0.9)
HGB BLD-MCNC: 9 G/DL — LOW (ref 14–18)
LYMPHOCYTES # BLD AUTO: 0.19 K/UL — LOW (ref 1.2–3.4)
LYMPHOCYTES # BLD AUTO: 0.9 % — LOW (ref 20.5–51.1)
MACROCYTES BLD QL: SLIGHT — SIGNIFICANT CHANGE UP
MANUAL SMEAR VERIFICATION: SIGNIFICANT CHANGE UP
MCHC RBC-ENTMCNC: 27.9 PG — SIGNIFICANT CHANGE UP (ref 27–31)
MCHC RBC-ENTMCNC: 30.7 G/DL — LOW (ref 32–37)
MCV RBC AUTO: 90.7 FL — SIGNIFICANT CHANGE UP (ref 80–94)
MONOCYTES # BLD AUTO: 0.76 K/UL — HIGH (ref 0.1–0.6)
MONOCYTES NFR BLD AUTO: 3.5 % — SIGNIFICANT CHANGE UP (ref 1.7–9.3)
NEUTROPHILS # BLD AUTO: 20.46 K/UL — HIGH (ref 1.4–6.5)
NEUTROPHILS NFR BLD AUTO: 94.8 % — HIGH (ref 42.2–75.2)
OVALOCYTES BLD QL SMEAR: SLIGHT — SIGNIFICANT CHANGE UP
PF4 HEPARIN CMPLX AB SER-ACNC: NEGATIVE — SIGNIFICANT CHANGE UP
PLAT MORPH BLD: NORMAL — SIGNIFICANT CHANGE UP
PLATELET # BLD AUTO: 132 K/UL — SIGNIFICANT CHANGE UP (ref 130–400)
PMV BLD: 11.9 FL — HIGH (ref 7.4–10.4)
POIKILOCYTOSIS BLD QL AUTO: SIGNIFICANT CHANGE UP
POLYCHROMASIA BLD QL SMEAR: SLIGHT — SIGNIFICANT CHANGE UP
POTASSIUM SERPL-MCNC: 3.4 MMOL/L — LOW (ref 3.5–5)
POTASSIUM SERPL-SCNC: 3.4 MMOL/L — LOW (ref 3.5–5)
RAPID RVP RESULT: SIGNIFICANT CHANGE UP
RBC # BLD: 3.23 M/UL — LOW (ref 4.7–6.1)
RBC # FLD: 20.4 % — HIGH (ref 11.5–14.5)
RBC BLD AUTO: ABNORMAL
SARS-COV-2 RNA SPEC QL NAA+PROBE: SIGNIFICANT CHANGE UP
SMUDGE CELLS # BLD: PRESENT — SIGNIFICANT CHANGE UP
SODIUM SERPL-SCNC: 141 MMOL/L — SIGNIFICANT CHANGE UP (ref 135–146)
WBC # BLD: 21.58 K/UL — HIGH (ref 4.8–10.8)
WBC # FLD AUTO: 21.58 K/UL — HIGH (ref 4.8–10.8)

## 2023-10-13 PROCEDURE — 74018 RADEX ABDOMEN 1 VIEW: CPT | Mod: 26

## 2023-10-13 PROCEDURE — 99222 1ST HOSP IP/OBS MODERATE 55: CPT

## 2023-10-13 PROCEDURE — 99233 SBSQ HOSP IP/OBS HIGH 50: CPT

## 2023-10-13 RX ORDER — SEVELAMER CARBONATE 2400 MG/1
800 POWDER, FOR SUSPENSION ORAL
Refills: 0 | Status: DISCONTINUED | OUTPATIENT
Start: 2023-10-13 | End: 2023-10-15

## 2023-10-13 RX ORDER — POTASSIUM CHLORIDE 20 MEQ
20 PACKET (EA) ORAL ONCE
Refills: 0 | Status: DISCONTINUED | OUTPATIENT
Start: 2023-10-13 | End: 2023-10-13

## 2023-10-13 RX ORDER — SEVELAMER CARBONATE 2400 MG/1
800 POWDER, FOR SUSPENSION ORAL EVERY 8 HOURS
Refills: 0 | Status: DISCONTINUED | OUTPATIENT
Start: 2023-10-13 | End: 2023-10-13

## 2023-10-13 RX ORDER — SENNA PLUS 8.6 MG/1
2 TABLET ORAL AT BEDTIME
Refills: 0 | Status: DISCONTINUED | OUTPATIENT
Start: 2023-10-13 | End: 2023-10-16

## 2023-10-13 RX ORDER — MIDODRINE HYDROCHLORIDE 2.5 MG/1
10 TABLET ORAL EVERY 8 HOURS
Refills: 0 | Status: DISCONTINUED | OUTPATIENT
Start: 2023-10-13 | End: 2023-10-18

## 2023-10-13 RX ORDER — POLYETHYLENE GLYCOL 3350 17 G/17G
17 POWDER, FOR SOLUTION ORAL EVERY 12 HOURS
Refills: 0 | Status: DISCONTINUED | OUTPATIENT
Start: 2023-10-13 | End: 2023-10-16

## 2023-10-13 RX ORDER — POTASSIUM CHLORIDE 20 MEQ
20 PACKET (EA) ORAL
Refills: 0 | Status: COMPLETED | OUTPATIENT
Start: 2023-10-13 | End: 2023-10-13

## 2023-10-13 RX ORDER — ACETAMINOPHEN 500 MG
650 TABLET ORAL EVERY 6 HOURS
Refills: 0 | Status: DISCONTINUED | OUTPATIENT
Start: 2023-10-13 | End: 2023-10-15

## 2023-10-13 RX ORDER — PIPERACILLIN AND TAZOBACTAM 4; .5 G/20ML; G/20ML
3.38 INJECTION, POWDER, LYOPHILIZED, FOR SOLUTION INTRAVENOUS EVERY 12 HOURS
Refills: 0 | Status: DISCONTINUED | OUTPATIENT
Start: 2023-10-13 | End: 2023-10-15

## 2023-10-13 RX ORDER — POTASSIUM CHLORIDE 20 MEQ
40 PACKET (EA) ORAL ONCE
Refills: 0 | Status: COMPLETED | OUTPATIENT
Start: 2023-10-13 | End: 2023-10-13

## 2023-10-13 RX ORDER — MAGNESIUM SULFATE 500 MG/ML
1 VIAL (ML) INJECTION ONCE
Refills: 0 | Status: COMPLETED | OUTPATIENT
Start: 2023-10-13 | End: 2023-10-13

## 2023-10-13 RX ADMIN — HEPARIN SODIUM 5000 UNIT(S): 5000 INJECTION INTRAVENOUS; SUBCUTANEOUS at 13:10

## 2023-10-13 RX ADMIN — PANTOPRAZOLE SODIUM 40 MILLIGRAM(S): 20 TABLET, DELAYED RELEASE ORAL at 17:12

## 2023-10-13 RX ADMIN — PANTOPRAZOLE SODIUM 40 MILLIGRAM(S): 20 TABLET, DELAYED RELEASE ORAL at 05:18

## 2023-10-13 RX ADMIN — SENNA PLUS 2 TABLET(S): 8.6 TABLET ORAL at 21:45

## 2023-10-13 RX ADMIN — HEPARIN SODIUM 5000 UNIT(S): 5000 INJECTION INTRAVENOUS; SUBCUTANEOUS at 21:45

## 2023-10-13 RX ADMIN — Medication 20 MILLIEQUIVALENT(S): at 13:10

## 2023-10-13 RX ADMIN — PIPERACILLIN AND TAZOBACTAM 25 GRAM(S): 4; .5 INJECTION, POWDER, LYOPHILIZED, FOR SOLUTION INTRAVENOUS at 17:45

## 2023-10-13 RX ADMIN — Medication 1 APPLICATION(S): at 05:18

## 2023-10-13 RX ADMIN — Medication 100 GRAM(S): at 10:32

## 2023-10-13 RX ADMIN — Medication 650 MILLIGRAM(S): at 07:40

## 2023-10-13 RX ADMIN — Medication 20 MILLIEQUIVALENT(S): at 10:32

## 2023-10-13 RX ADMIN — MIDODRINE HYDROCHLORIDE 10 MILLIGRAM(S): 2.5 TABLET ORAL at 21:45

## 2023-10-13 RX ADMIN — BUDESONIDE AND FORMOTEROL FUMARATE DIHYDRATE 2 PUFF(S): 160; 4.5 AEROSOL RESPIRATORY (INHALATION) at 07:41

## 2023-10-13 RX ADMIN — CHLORHEXIDINE GLUCONATE 1 APPLICATION(S): 213 SOLUTION TOPICAL at 05:18

## 2023-10-13 RX ADMIN — BUDESONIDE AND FORMOTEROL FUMARATE DIHYDRATE 2 PUFF(S): 160; 4.5 AEROSOL RESPIRATORY (INHALATION) at 21:51

## 2023-10-13 RX ADMIN — MIDODRINE HYDROCHLORIDE 10 MILLIGRAM(S): 2.5 TABLET ORAL at 05:19

## 2023-10-13 RX ADMIN — HEPARIN SODIUM 5000 UNIT(S): 5000 INJECTION INTRAVENOUS; SUBCUTANEOUS at 05:17

## 2023-10-13 RX ADMIN — Medication 650 MILLIGRAM(S): at 08:40

## 2023-10-13 RX ADMIN — Medication 1 APPLICATION(S): at 17:13

## 2023-10-13 RX ADMIN — Medication 20 MILLIEQUIVALENT(S): at 11:57

## 2023-10-13 RX ADMIN — SEVELAMER CARBONATE 800 MILLIGRAM(S): 2400 POWDER, FOR SUSPENSION ORAL at 17:12

## 2023-10-13 NOTE — CONSULT NOTE ADULT - SUBJECTIVE AND OBJECTIVE BOX
Patient is a 83y old  Male who presents with a chief complaint of AMS (13 Oct 2023 12:44)      HPI:  82 yo m ho DM, COPD, anemia, lymphedemia, afib on xarelto, HFrEF, DM coming in from nursing home for AMS x 2 days. Unable to gather story from pt as he is altered and lethargic.  As per NH was becoming more agitated x 2 days, was hypotensive yesterday and started on cefepime and vancomycin. Brought in to the ED for AMS. PICC line in place for cefepime 1q q8 until 10/5/23 and vanc 1q24 until 9/23/23 for LLE cellulitis/OM  (was at [facility] last month for LLE thick wound cellulitis/OM and sacral DTI as per call with [facility] as per collateral from [facility])      In the ED, vitals wnl. Labs showing wbc 11.30, Hb 9.6, MCV 79.1, INR 1.59, CO2 14, AG 21, Cr 6 (~baseline 1.2), , trops 0.09. CTH wnl, RBUS with no hydro, poor visualization of left kidney       (15 Sep 2023 22:12)      PAST MEDICAL & SURGICAL HISTORY:  HTN (hypertension)      COPD (chronic obstructive pulmonary disease)      High cholesterol      Mild anemia      Coffee ground emesis      Chronic diastolic heart failure      PAULA (acute kidney injury)      No significant past surgical history          SOCIAL HX:   Smoking                         ETOH                            Other    FAMILY HISTORY:  No pertinent family history in first degree relatives    :  No known cardiovacular family hisotry     ROS:  See HPI     Allergies    No Known Allergies    Intolerances          PHYSICAL EXAM    ICU Vital Signs Last 24 Hrs  T(C): 36.8 (13 Oct 2023 13:51), Max: 37.9 (12 Oct 2023 20:25)  T(F): 98.3 (13 Oct 2023 13:51), Max: 100.3 (12 Oct 2023 20:25)  HR: 89 (13 Oct 2023 13:51) (68 - 89)  BP: 153/56 (13 Oct 2023 13:51) (88/52 - 153/56)  BP(mean): --  ABP: --  ABP(mean): --  RR: 18 (13 Oct 2023 13:51) (18 - 18)  SpO2: 96% (13 Oct 2023 13:51) (95% - 96%)    O2 Parameters below as of 12 Oct 2023 20:25  Patient On (Oxygen Delivery Method): room air            General: In NAD, confused   HEENT:  ANGÉLICA, NGT        Lymphatic system: No cervical LN   Lungs: Bilateral BS, decreased bases  Cardiovascular: Regular  Gastrointestinal: Soft, Positive BS  Musculoskeletal: No clubbing.  Moves all extremities.  Full range of motion   Skin: Warm.  Intact  Neurological: No motor or sensory deficit       10-12-23 @ 07:01  -  10-13-23 @ 07:00  --------------------------------------------------------  IN:    Jevity 1.2: 400 mL  Total IN: 400 mL    OUT:  Total OUT: 0 mL    Total NET: 400 mL      10-13-23 @ 07:01  -  10-13-23 @ 15:51  --------------------------------------------------------  IN:    Enteral Tube Flush: 200 mL    Jevity 1.2: 200 mL  Total IN: 400 mL    OUT:  Total OUT: 0 mL    Total NET: 400 mL          LABS:                          9.0    21.58 )-----------( 132      ( 13 Oct 2023 11:11 )             29.3                                               10-12    141  |  106  |  22<H>  ----------------------------<  104<H>  3.4<L>   |  25  |  1.6<H>    Ca    7.7<L>      12 Oct 2023 21:49  Mg     1.7     10-12    TPro  5.8<L>  /  Alb  2.6<L>  /  TBili  0.7  /  DBili  x   /  AST  26  /  ALT  88<H>  /  AlkPhos  78  10-12      PT/INR - ( 12 Oct 2023 16:16 )   PT: 16.40 sec;   INR: 1.42 ratio         PTT - ( 12 Oct 2023 00:40 )  PTT:34.3 sec                                       Urinalysis Basic - ( 12 Oct 2023 21:49 )    Color: x / Appearance: x / SG: x / pH: x  Gluc: 104 mg/dL / Ketone: x  / Bili: x / Urobili: x   Blood: x / Protein: x / Nitrite: x   Leuk Esterase: x / RBC: x / WBC x   Sq Epi: x / Non Sq Epi: x / Bacteria: x                                                  LIVER FUNCTIONS - ( 12 Oct 2023 06:36 )  Alb: 2.6 g/dL / Pro: 5.8 g/dL / ALK PHOS: 78 U/L / ALT: 88 U/L / AST: 26 U/L / GGT: x                                                                                                                                   ABG - ( 13 Oct 2023 15:41 )  pH, Arterial: 7.53  pH, Blood: x     /  pCO2: 33    /  pO2: 65    / HCO3: 28    / Base Excess: 4.8   /  SaO2: 95.5                    MEDICATIONS  (STANDING):  budesonide 160 MICROgram(s)/formoterol 4.5 MICROgram(s) Inhaler 2 Puff(s) Inhalation two times a day  chlorhexidine 2% Cloths 1 Application(s) Topical <User Schedule>  collagenase Ointment 1 Application(s) Topical two times a day  darbepoetin Injectable Syringe 25 MICROGram(s) IV Push <User Schedule>  dextrose 5%. 1000 milliLiter(s) (100 mL/Hr) IV Continuous <Continuous>  dextrose 50% Injectable 25 Gram(s) IV Push once  dextrose 50% Injectable 25 Gram(s) IV Push once  dextrose 50% Injectable 12.5 Gram(s) IV Push once  glucagon  Injectable 1 milliGRAM(s) IntraMuscular once  heparin   Injectable 5000 Unit(s) SubCutaneous every 8 hours  insulin lispro (ADMELOG) corrective regimen sliding scale   SubCutaneous three times a day before meals  iron sucrose IVPB 100 milliGRAM(s) IV Intermittent <User Schedule>  midodrine. 10 milliGRAM(s) Oral every 8 hours  pantoprazole   Suspension 40 milliGRAM(s) Oral two times a day  piperacillin/tazobactam IVPB.. 3.375 Gram(s) IV Intermittent every 12 hours  polyethylene glycol 3350 17 Gram(s) Oral every 12 hours  potassium chloride   Powder 40 milliEquivalent(s) Oral once  senna 2 Tablet(s) Oral at bedtime  sevelamer carbonate Powder 800 milliGRAM(s) Enteral Tube three times a day with meals    MEDICATIONS  (PRN):  acetaminophen     Tablet .. 650 milliGRAM(s) Oral every 6 hours PRN Temp greater or equal to 38.5C (101.3F), Mild Pain (1 - 3), Moderate Pain (4 - 6)  albuterol    90 MICROgram(s) HFA Inhaler 1 Puff(s) Inhalation every 6 hours PRN for shortness of breath and/or wheezing  atropine Injectable 1 milliGRAM(s) IntraMuscular once PRN HR < 30  dextrose Oral Gel 15 Gram(s) Oral once PRN Blood Glucose LESS THAN 70 milliGRAM(s)/deciliter  HYDROmorphone  Injectable 0.25 milliGRAM(s) IV Push every 6 hours PRN Severe Pain (7 - 10)

## 2023-10-13 NOTE — PROGRESS NOTE ADULT - SUBJECTIVE AND OBJECTIVE BOX
24H events:    Patient is a 83y old Male who presents with a chief complaint of AMS (13 Oct 2023 09:04)    Primary diagnosis of Altered mental status      Today is hospital day 28d. This morning patient was seen and examined at bedside, resting comfortably in bed.    fever overnight, NG feed, on zosyn    Code Status: full      PAST MEDICAL & SURGICAL HISTORY  HTN (hypertension)    COPD (chronic obstructive pulmonary disease)    High cholesterol    Mild anemia    Coffee ground emesis    Chronic diastolic heart failure    PAULA (acute kidney injury)    No significant past surgical history      SOCIAL HISTORY:  Social History:      ALLERGIES:  No Known Allergies    MEDICATIONS:  STANDING MEDICATIONS  budesonide 160 MICROgram(s)/formoterol 4.5 MICROgram(s) Inhaler 2 Puff(s) Inhalation two times a day  chlorhexidine 2% Cloths 1 Application(s) Topical <User Schedule>  collagenase Ointment 1 Application(s) Topical two times a day  darbepoetin Injectable Syringe 25 MICROGram(s) IV Push <User Schedule>  dextrose 5%. 1000 milliLiter(s) IV Continuous <Continuous>  dextrose 50% Injectable 25 Gram(s) IV Push once  dextrose 50% Injectable 25 Gram(s) IV Push once  dextrose 50% Injectable 12.5 Gram(s) IV Push once  glucagon  Injectable 1 milliGRAM(s) IntraMuscular once  heparin   Injectable 5000 Unit(s) SubCutaneous every 8 hours  insulin lispro (ADMELOG) corrective regimen sliding scale   SubCutaneous three times a day before meals  iron sucrose IVPB 100 milliGRAM(s) IV Intermittent <User Schedule>  midodrine. 10 milliGRAM(s) Oral every 8 hours  pantoprazole   Suspension 40 milliGRAM(s) Oral two times a day  piperacillin/tazobactam IVPB.. 3.375 Gram(s) IV Intermittent every 12 hours  polyethylene glycol 3350 17 Gram(s) Oral every 12 hours  potassium chloride   Powder 20 milliEquivalent(s) Oral every 2 hours  senna 2 Tablet(s) Oral at bedtime  sevelamer carbonate 800 milliGRAM(s) Oral every 8 hours    PRN MEDICATIONS  acetaminophen     Tablet .. 650 milliGRAM(s) Oral every 6 hours PRN  albuterol    90 MICROgram(s) HFA Inhaler 1 Puff(s) Inhalation every 6 hours PRN  atropine Injectable 1 milliGRAM(s) IntraMuscular once PRN  dextrose Oral Gel 15 Gram(s) Oral once PRN  HYDROmorphone  Injectable 0.25 milliGRAM(s) IV Push every 6 hours PRN    VITALS:   T(F): 100.3  HR: 86  BP: 139/58  RR: 18  SpO2: 95%    PHYSICAL EXAM:  GENERAL:   ( x ) NAD, lying in bed comfortably     (  ) obtunded     (  ) lethargic     (  ) somnolent    HEAD:   (x  ) Atraumatic     (  ) hematoma     (  ) laceration (specify location:       )     NECK:  ( x ) Supple     (  ) neck stiffness     (  ) nuchal rigidity     (  )  no JVD     (  ) JVD present ( -- cm)    HEART:  Rate -->     (x  ) normal rate     (  ) bradycardic     (  ) tachycardic  Rhythm -->     (  x) regular     (  ) regularly irregular     (  ) irregularly irregular  Murmurs -->     (  ) normal s1s2     (  ) systolic murmur     (  ) diastolic murmur     (  ) continuous murmur      (  ) S3 present     (  ) S4 present    LUNGS:   ( x )Unlabored respirations     (  ) tachypnea  ( x ) B/L air entry     (  ) decreased breath sounds in:  (location     )    ( x ) no adventitious sound     (  ) crackles     (  ) wheezing      (  ) rhonchi      (specify location:       )  (  ) chest wall tenderness (specify location:       )    ABDOMEN:   (x  ) Soft     (  ) tense   |   ( x ) nondistended     (  ) distended   |   ( x ) +BS     (  ) hypoactive bowel sounds     (  ) hyperactive bowel sounds  (  ) nontender     (  ) RUQ tenderness     (  ) RLQ tenderness     (  ) LLQ tenderness     (  ) epigastric tenderness     (  ) diffuse tenderness  (  ) Splenomegaly      (  ) Hepatomegaly      (  ) Jaundice     (  ) ecchymosis     EXTREMITIES:  ( x ) Normal     (  ) Rash     (  ) ecchymosis     (  ) varicose veins      (  ) pitting edema     (  ) non-pitting edema   (  ) ulceration     (  ) gangrene:     (location:     stage 2 pressure sore on sacral region    NERVOUS SYSTEM:    (x ) A&Ox1     (  ) confused     (  ) lethargic  CN II-XII:     ( x ) Intact     (  ) deficits found     (Specify:     )   Upper extremities:     (  ) no sensorimotor deficits     (  ) weakness     (  ) loss of proprioception/vibration     (  ) loss of touch/temperature (specify:    )  Lower extremities:     (  ) no sensorimotor deficits     (  ) weakness     (  ) loss of proprioception/vibration     (  ) loss of touch/temperature (specify:    )      (x  ) Indwelling [place] Catheter:   Date insterted:  ?  Reason (  ) Critical illness     (  ) urinary retention    (  ) Accurate Ins/Outs Monitoring     (  ) CMO patient    (  ) Central Line:   Date inserted:  Location: (  ) Right IJ     (  ) Left IJ     (  ) Right Fem     (  ) Left Fem    (  ) SPC        (  ) pigtail       (  ) PEG tube       (  ) colostomy       (  ) jejunostomy  (  ) U-Dall  TDC 10/04    LABS:                        9.0    21.58 )-----------( 132      ( 13 Oct 2023 11:11 )             29.3     10-12    141  |  106  |  22<H>  ----------------------------<  104<H>  3.4<L>   |  25  |  1.6<H>    Ca    7.7<L>      12 Oct 2023 21:49  Mg     1.7     10-12    TPro  5.8<L>  /  Alb  2.6<L>  /  TBili  0.7  /  DBili  x   /  AST  26  /  ALT  88<H>  /  AlkPhos  78  10-12    PT/INR - ( 12 Oct 2023 16:16 )   PT: 16.40 sec;   INR: 1.42 ratio         PTT - ( 12 Oct 2023 00:40 )  PTT:34.3 sec  Urinalysis Basic - ( 12 Oct 2023 21:49 )    Color: x / Appearance: x / SG: x / pH: x  Gluc: 104 mg/dL / Ketone: x  / Bili: x / Urobili: x   Blood: x / Protein: x / Nitrite: x   Leuk Esterase: x / RBC: x / WBC x   Sq Epi: x / Non Sq Epi: x / Bacteria: x  Lactate, Blood: 1.9 mmol/L (10-12-23 @ 16:16)        HPI:82 yo m ho DM, COPD, anemia, lymphedemia, afib on xarelto, HFrEF, DM coming in from nursing home for AMS x 2 days. Unable to gather story from pt as he is altered and lethargic.  As per NH was becoming more agitated x 2 days, was hypotensive yesterday and started on cefepime and vancomycin. Brought in to the ED for AMS. PICC line in place for cefepime 1q q8 until 10/5/23 and vanc 1q24 until 9/23/23 for LLE cellulitis/OM  (was at [facility] last month for LLE thick wound cellulitis/OM and sacral DTI as per call with [facility] as per collateral from [facility])      In the ED, vitals wnl. Labs showing wbc 11.30, Hb 9.6, MCV 79.1, INR 1.59, CO2 14, AG 21, Cr 6 (~baseline 1.2), , trops 0.09. CTH wnl, RBUS with no hydro, poor visualization of left kidney      #Metabolic Encephalopathy 2/2 septic Shock 2/2 suspected aspiration pneumonia resolved  # dysphagia on tube feeds, PEG tube planning  #Acutely worsening uremia and acute kidney failure most likely 2/2 ATN - now on HD  #Acute Femoral DVT s/p IVC filter 10/6 not on any ac  #transaminitis - resolving   #Paroxysmal Afib, chronic; uncontrolled rate  #History of Left Foot OM w/ surrounding Cellulitis      #Metabolic Encephalopathy 2/2 septic Shock 2/2 suspected aspiration pneumonia   - rapid response after TDC due to acute change in mental status - ABG notable for elevated lactate   - pt febrile to 101.6 and elevated lactate to 8.4, rapidly became hypotensive on 10/4 - started on levophed  - ID consult appreciated, no further antibiotics   - EEG -ve  - On RA  - C/w albuterol PRN  - basaline mental status: Ao*3>> today AO* 2    # dysphagia   - speech and swallow consulted  - barium swallow done  - mod to sev dysphagia  - started on NG feed  - GI consulted for PEG placement, appreciated :   - neurology consult for indeterminate infarct seen on ct 10/10    # temp 99.8  - chest xray same as 10/04  - NCCT chest send to r/o aspiration? HAP?     #Acutely worsening uremia and acute kidney failure most likely 2/2 ATN - now on HD through right IJ [place]  #hypernatremia - resolving   PAULA / CKD stable non oliguric  - udall changed 9/28,  he received HD 9/28 and 9/30  - Close F/U of BUN/Crea/ Lytes and UO  - c/w phoslo 2/2/2   - increase  BUMEX to 2 mg BID as per Nephro   - TDC by IR on 10/4  - Cr stable for now  - HD done today, need long term dialysis as per nephro  	  #Anemia of acute blood loss   #Hemorrhagic Shock Secondary to Hematochezia from Duodenal Ulcer Bleed s/p OVESCO placement 09/23  -1U PRBC today during HD 10/12  - Avoid any NSAIDs  - overall the pt risk and benefit is goes against Anticoagulation, will further discuss with family , , dw brother regarding the high risk of bleeding and understands the reasons to hold anticoagulation. And understands the risk of cva with Afib.     #Elevated dimer  #Acute Femoral DVT s/p IVC filter 10/6  - heparin 5KIU 3 times a day  - cardiology consulted regarding anticoagulant: refer back to attending    #transaminitis - resolving   - likely 2/2 liver shock 2/2 hypotension vs DILI   - LFTS improving   - hepatitis neg   - ast/alt: 78/76: decreasing trend      #Paroxysmal Afib, chronic; uncontrolled rate  - HOLDING therapeutic AC>> ok to restart as per family but holding due to fall in Hb  - hold  metoprolol tartrate 12.5 mg q12h    #History of Left Foot OM w/ surrounding Cellulitis  *CT LLE (9/16): No CT evidence of osteomyelitis or necrotizing infection. No drainable collection seen, within the limitations of a noncontrast exam.  - Increased frailty and decreased physical capacity  - as per previous notes the team Discussed with ID attending: patient is unlikely to benefit from prolonged therapy with cefepime+vanco (to cover possible OM) due to adverse outcomes associated kidney dysfunction, cognitive impact ...etc )  - c/w Local wound care; offloading boots bilaterally, frequently turning and positioning, prevent pressure injury, keep skin clean, and monitor for wound changes and notify provider as per podiatry      plan: continue HD, moniter mental status, and bowel movement,  Bcx pending, neurology consult,  f/u esr/crp, procal    - Patient is critically ill with vital organ impairment or failure. Patient is at high risk for complications, morbidity and mortality. There is a very high probability of imminent or life threatening deterioration in the patient's condition. Family has been updated. Patient is currently Full code.

## 2023-10-13 NOTE — PROGRESS NOTE ADULT - ASSESSMENT
Assessment and Plan  Case of an 83 year old male patient known to have COPD. DM, atrial fibrillation, HFrEF, anemia, lymphedema, and recent left foot OM who was brought from the NH to the ED on 09/15 for evaluation of altered mental status, found to have sepsis in setting of recent left foot OM, admitted for further management. Stay was complicated by hemorrhagic shock and drop in Hb secondary to hematochezia on 09/23, Status post EGD on 09/23 revealing a bleeding duodenal ulcer s/p OVESCO placement. Stay also complicated by an episode of confusion and hypoxia on 09/26 s/p found to have possible stroke on 10/10. We are reconsulted for evaluation of PEG tube placement after failing FEES study on 10/11. We are consulted for hematochezia and hypotension.      Severe Pharyngeal Dysphagia  Possible Age-Indeterminate Left Subinsular Stroke on 10/10  * Patient was tolerating PO intake at NH prior to admission  * After extubation on 09/24, patient became confused s/p upgrade on 09/26 for hypoxia, AMS, and pneumonia. He has been NPO since then with multiple speech swallow evaluations.  * FEES Study on 10/11: severe pharyngeal dysphagia, recommendation to keep NPO with alternate means  * Spiked T 38.1 degrees on 10/12 at 13:00 PM, /58 mmHg, HR 86 bpm  * Recent Labs: 10/12 WBC 13.52, Hb 8.5, Plt 106  * Abdominal imaging as below    RECOMMENDATIONS  - Will hold off PEG tube placement for now pending sepsis workup (in setting of fever on 10/12) and pending neurology evaluation of age indeterminate infarct on CT 10/10  - Will need neurological prognostication prior to planning PEG tube placement  - Continue NG tube feeding for now  - Palliative team evaluation and GOC discussion appreciated      Hemorrhagic Shock Secondary to Hematochezia from Duodenal Ulcer Bleed s/p OVESCO placement 09/23  Acute Drop in Hemoglobin- Improved  * Hemodynamically stable  * No melena per nurse  * No hypotension or tachycardia  * Hb stable  * Status Post EGD on 09/23: blood clots in fundus and antrum; 2cm actively bleeding duodenal ulcer with spurting vessel (Reading 1a) in sweep on base s/p 10cc epi and s/p 11/6 OVESCO placement for hemostasis    RECOMMENDATIONS  - Status post EGD on 09/23 as detailed above (s/p OVESCO placement for hemostasis)  - Continue PO Protonix 40mg BID via NG tube for now. Was on IV pantoprazole drip (09/23-09/25)  - Anticoagulation resumption per team after discussion risks of rebleed and benefits using CHADSVASC and HASBLED scores  - Diet as above  - Monitor BM for recurrence of hematochezia or melena  - Please avoid any NSAIDs  - If any unstable bleed, please call GI STAT  - Follow up with our [facility] located at [address] [place] [address]. Phone Number: [phone]        Asymptomatic Cholelithiasis  * Noted on CT  * LFTs noted    RECOMMENDATIONS  - Monitor for symptoms  - Trend LFTs      Thank you for your consult.  - Please note that plan was discussed with Dr. Renee and communicated with medical team.  - Please reach GI on [phone] during weekdays till 5pm.  - Please call the GI service line after 5pm on Weekdays and anytime on Weekends: [phone].      Rickie Jenkins MD  PGY - 4 Gastroenterology Fellow  [facility]

## 2023-10-13 NOTE — CONSULT NOTE ADULT - ASSESSMENT
ASSESSMENT/PLAN:  83y Male with PMHx DM, COPD, anemia, lymphedema afib on xarelto, HFrEF, DM coming in from nursing home for AMS x 2 days. Admitted for toxic metabolic encephalopathy/sepsis.  Stroke consulted for age indeterminate infarct in left subinsular on CTH. Patient refusing to be examined or speak to neuro team at this time.    Recommendations:  - MRI brain without contrast  - continue home AC when medically stable for secondary stroke prevention    Discussed with Dr. Kelly    Recommendations not final until attending attestation

## 2023-10-13 NOTE — PROGRESS NOTE ADULT - ASSESSMENT
84 yo m ho DM, COPD, anemia, paroxysmal afib on xarelto, HFrEF, DM coming in from nursing home for AMS x 2 days. Found to have toxic metabolic encephalopathy with PAULA.  PAULA/ toxic metabolic encephalopathy/ HAGMA/ HFrEF/ hypernatremia  possible ATN iso hypotension and possible sepsis/ vanc toxicity  no hydro on imaging  creat trend noted   sp HD yesterday   document UO /  bumex 2 q 12 / strict I and O / follow BMP  may be able to hold HD not volume overloaded / though cachectic last BMP noted    last phos level at goal   BP noted /  midodrine / 5 q 8 can increase to 10 q8h   s/p IVC filter   iron stores noted / keep Hb >7  will follow    prognosis poor

## 2023-10-13 NOTE — PROGRESS NOTE ADULT - SUBJECTIVE AND OBJECTIVE BOX
MEDICINE ATTENDING PROGRESS NOTE  84 yo m ho DM, COPD, anemia, lymphedema Afib on Xarelto HFrEF, DM coming in from nursing home for AMS and hypotension in setting of acute GIB. Patient has been on Xarelto for Afib. Patient was found to have Acute Femoral DVT s/p IVC filter 10/6. Admitted for further management.    Interval/Overnight Events  - Patient is less lethargic today  - Patient has fever this AM per RN, BP improves today  - f/u fever w/u (f/u BCx, Procalcitonin, daily lactate, ESR/CRP)  - f/u CT chest  - start zosyn to cover aspiration pneumonia  - cont to held xarelo until hgb stabilizes; family/Gabe moncada to restart  - GI for PEG placement  - f/u Palliative recs  - Patient is critically ill with vital organ impairment or failure. Patient is at high risk for complications, morbidity and mortality. There is a very high probability of imminent or life threatening deterioration in the patient's condition. Family has been updated. Patient is currently Full code.     ROS  General: Denies fevers, chills, nightsweats, weight loss  HEENT: Denies headache, rhinorrhea, sore throat, eye pain  CV: Denies CP, palpitations  PULM: Denies SOB, cough  GI: Denies abdominal pain, diarrhea  : Denies dysuria, hematuria  MSK: Denies arthralgias  SKIN: Denies rash   NEURO: Denies paresthesias, weakness  PSYCH: Denies depression    MEDICATIONS  (STANDING):  budesonide 160 MICROgram(s)/formoterol 4.5 MICROgram(s) Inhaler 2 Puff(s) Inhalation two times a day  calcium acetate 667 milliGRAM(s) Oral three times a day with meals  chlorhexidine 2% Cloths 1 Application(s) Topical <User Schedule>  collagenase Ointment 1 Application(s) Topical two times a day  darbepoetin Injectable Syringe 25 MICROGram(s) IV Push <User Schedule>  dextrose 5%. 1000 milliLiter(s) (100 mL/Hr) IV Continuous <Continuous>  dextrose 50% Injectable 25 Gram(s) IV Push once  dextrose 50% Injectable 25 Gram(s) IV Push once  dextrose 50% Injectable 12.5 Gram(s) IV Push once  glucagon  Injectable 1 milliGRAM(s) IntraMuscular once  heparin   Injectable 5000 Unit(s) SubCutaneous every 8 hours  insulin lispro (ADMELOG) corrective regimen sliding scale   SubCutaneous three times a day before meals  iron sucrose IVPB 100 milliGRAM(s) IV Intermittent <User Schedule>  midodrine. 10 milliGRAM(s) Oral every 8 hours  pantoprazole   Suspension 40 milliGRAM(s) Oral two times a day    MEDICATIONS  (PRN):  acetaminophen     Tablet .. 650 milliGRAM(s) Oral every 6 hours PRN Moderate Pain (4 - 6)  albuterol    90 MICROgram(s) HFA Inhaler 1 Puff(s) Inhalation every 6 hours PRN for shortness of breath and/or wheezing  atropine Injectable 1 milliGRAM(s) IntraMuscular once PRN HR < 30  dextrose Oral Gel 15 Gram(s) Oral once PRN Blood Glucose LESS THAN 70 milliGRAM(s)/deciliter  HYDROmorphone  Injectable 0.25 milliGRAM(s) IV Push every 6 hours PRN Severe Pain (7 - 10)    VITALS  T(F): 99.8 (10-12-23 @ 06:12), Max: 99.8 (10-11-23 @ 20:30)  HR: 66 (10-12-23 @ 11:08) (62 - 87)  BP: 98/56 (10-12-23 @ 11:08) (89/51 - 98/56)  RR: 18 (10-12-23 @ 06:12) (18 - 18)  SpO2: 95% (10-12-23 @ 08:19) (95% - 98%)    PHYSICAL EXAM:  Gen: NAD, resting in bed  HEENT: Normocephalic, atraumatic  Neck: supple, no lymphadenopathy  CV: Regular rate & regular rhythm  Lungs: CTABL no wheeze  Abdomen: Soft, NTND+ BS present  Ext: Warm, well perfused no CCE  Neuro: non focal, awake, CN II-XII intact   Skin: no rash, no erythema  Psych: no SI, HI, Hallucination     LABS:  10-12    144  |  107  |  34<H>  ----------------------------<  122<H>  3.1<L>   |  24  |  2.5<H>    Ca    7.5<L>      12 Oct 2023 06:36  Mg     1.7     10-12    TPro  5.8<L>  /  Alb  2.6<L>  /  TBili  0.7  /  DBili  x   /  AST  26  /  ALT  88<H>  /  AlkPhos  78  10-12    LIVER FUNCTIONS - ( 12 Oct 2023 06:36 )  Alb: 2.6 g/dL / Pro: 5.8 g/dL / ALK PHOS: 78 U/L / ALT: 88 U/L / AST: 26 U/L / GGT: x                               7.1    7.45  )-----------( 102      ( 12 Oct 2023 09:30 )             23.4       MICROBIOLOGY:  Culture - Blood (collected 10-04-23 @ 23:28)  Source: .Blood Blood  Final Report (10-10-23 @ 07:00):    No growth at 5 days    Hepatitis B Surface Antigen: Nonreact (09-27-23 @ 17:44)  MRSA PCR Result.: Negative (09-24-23 @ 04:32)      IMAGING:  Xray Chest 1 View- PORTABLE-Urgent (Xray Chest 1 View- PORTABLE-Urgent .) (10.11.23 @ 14:59)   1. Unchanged bibasilar opacities, right greater than left.  2. Lines and tubes as above.    CARDIOLOGY TESTING  12 Lead ECG:   Ventricular Rate 98 BPM  Atrial Rate 101 BPM  QRS Duration 100 ms  Q-T Interval 372 ms  QTC Calculation(Bazett) 474 ms  R Axis -61 degrees  T Axis 68 degrees    Diagnosis Line Atrial fibrillation  Left axis deviation  Low voltage QRS  Cannot rule out Anterior infarct , age undetermined  Abnormal ECG    Confirmed by austen colbert ([id number]) on 10/4/2023 7:03:14 PM (10-04-23 @ 15:40)    12 Lead ECG:   Ventricular Rate 143 BPM  Atrial Rate 143 BPM  P-R Interval 74 ms  QRS Duration 4 ms  Q-T Interval 194 ms  QTC Calculation(Bazett) 299 ms  P Axis 11 degrees  R Axis 0 degrees  T Axis -32 degrees    Diagnosis Line *** Poor data quality, interpretation may be adversely affected  Probable atrial fibrillation  Indeterminate axis  Nonspecific ST and T wave abnormality  Abnormal ECG    Confirmed by austen colbert ([id number]) on 9/30/2023 11:41:21 AM (09-30-23 @ 10:19)    ASSESSMENT/PLAN:  84 yo m ho DM, COPD, anemia, lymphedema Afib on Xarelto HFrEF, DM coming in from nursing home for AMS and hypotension in setting of acute GIB. Patient has been on Xarelto for Afib. Patient was found to have Acute Femoral DVT s/p IVC filter 10/6. Admitted for further management.    pt had   hypotensive, with active melena. BP in 60's. GI consulted urgently for endoscopy. Procedure was done at bedside and bleeding controlled. Pt. was given 2 U of blood  also pt was found to have PAULA, and was started on HD   Tunneled catheter was placed 10/4 but had RR on that day for AMS and had fever 101.6 and was hypotensive, was started on pressors     # Metabolic Encephalopathy due to sepsis, poa   today mental status has improved, he is awake and alert     # acute GIB, - emergent EGD on 9/23  # anemia of acute gi loss     s/p EGD on 9/23: Normal mucosa in the whole esophagus. Erythema in the stomach compatible with non-erosive gastritis. Blood and clots in the fundus and the antrum limited exam. 2 cm actively bleeding duodenal ulcer with spurting vessel ([place] classification 1a) was noted in the duodenal sweep on the base. 10 cc of 1/16949 epinephrine was injected in circumferential pattern successfully. A 11/6 OVESCO clip was successfully deployed at the vessel for the purposes of hemostasis. There was no evidence of active bleeding at the end of procedure.  Hemoglobin: 8.3 g/dL (10-09-23 @ 05:46)  Hemoglobin: 8.2 g/dL (10-08-23 @ 08:39)  Hemoglobin: 8.3 g/dL (10-07-23 @ 05:47)    # paula on ckd , requiring HD    #Acute Femoral DVT s/p IVC filter 10/6, not a candidate for ac  #Paroxysmal Afib rate controlled, not on ac due to gib   #History of Left Foot OM w/ surrounding Cellulitis  # pressure ulcer sacral , dw burn   site is clean     #transaminitis - improving, repeat  Alb: 2.5 g/dL / Pro: 6.0 g/dL / ALK PHOS: 89 U/L / ALT: 296 U/L / AST: 217 U/L / GGT: x     /  wei x    / 0.8 mg/dL ( 09 Oct 2023 05:46 )  Alb: 2.8 g/dL / Pro: 5.8 g/dL / ALK PHOS: 87 U/L / ALT: 428 U/L / AST: 526 U/L / GGT: x     /  wei x    / 0.8 mg/dL ( 08 Oct 2023 08:39 )  Alb: 2.7 g/dL / Pro: 5.6 g/dL / ALK PHOS: 92 U/L / ALT: 531 U/L / AST: 955 U/L / GGT: x     /  wei x    / 0.7 mg/dL ( 07 Oct 2023 18:15 )    #Supportive Management:  Dispo:   DVT Ppx: heparin   Injectable 5000 Unit(s) SubCutaneous every 8 hours  GI Ppx: pantoprazole   Suspension 40 milliGRAM(s) Oral two times a day  Diet:  Diet, NPO (10-11-23 @ 11:36) [Active]  Diet, Soft and Bite Sized:   DASH/TLC Sodium & Cholesterol Restricted  Mildly Thick Liquids (MILDTHICKLIQS)  Free Water Flush Instructions:  please thicken each Ensure with 2 packs of thickeners     Qty per Day:  Magic cup 2x/day  Supplement Feeding Modality:  Oral  Ensure Plus High Protein Cans or Servings Per Day:  1       Frequency:  Two Times a day (10-09-23 @ 13:43) [Pending Verification By Attending]  Diet, Pureed:   Mildly Thick Liquids (MILDTHICKLIQS)  Free Water Flush Instructions:  *** please add 2 packets of thickener per NEPRO carton ***  Supplement Feeding Modality:  Oral  Nepro Cans or Servings Per Day:  3       Frequency:  Daily (10-03-23 @ 14:36) [Pending Verification By Attending]  Diet, Minced and Moist:   Mildly Thick Liquids (MILDTHICKLIQS)  Low Sodium     Qty per Day:  magic cup 2x/day  Supplement Feeding Modality:  Oral  Ensure Plus High Protein Cans or Servings Per Day:  1       Frequency:  Three Times a day (09-25-23 @ 15:30) [Pending Verification By Attending]        Total time spent to complete patient's bedside assessment, review medical chart, discuss medical plan of care with covering medical team was more than 45 minutes with >50% of time spent face to face with patient, discussion with patient/family and/or coordination of care    Resident's notes reviewed. My notes supersede resident's notes in case of discrepancy.    Finn Sam MD/[place]  Attending Physician MEDICINE ATTENDING PROGRESS NOTE  83 year old male patient known to have COPD. DM, atrial fibrillation, HFrEF, anemia, lymphedema, and recent left foot OM who was brought from the NH to the ED on 09/15 for evaluation of altered mental status, found to have sepsis in setting of recent left foot OM, admitted for further management. Stay was complicated by hemorrhagic shock and drop in Hb secondary to hematochezia on 09/23, Status post EGD on 09/23 revealing a bleeding duodenal ulcer s/p OVESCO placement. Stay also complicated by an episode of confusion and hypoxia on 09/26 s/p found to have possible stroke on 10/10.  Patient was found to have Acute Femoral DVT s/p IVC filter 10/6. Patient now transferred to general floor for further management.    Interval/Overnight Events  - Patient is less lethargic today  - Patient has fever this AM per RN, BP improves today  - f/u fever w/u (f/u BCx, Procalcitonin, daily lactate, ESR/CRP)  - wbc increases from 10k  to 21k in less than 24hrs with PMN > 94% -> this likely reactive  - Suspected patient has aspiration pneumonitis  - CT chest did not show obvious aspiration pneumonia per radiology read, but did show Bilateral moderate sized pleural effusions on CT chest  - slow feed  - will get ABG  - start zosyn to cover aspiration pneumonia  - cont to held xarelo until hgb stabilizes; family/Gabe moncada to restart  - f/u GI for evaluation of PEG tube placement after failing FEES study on 10/11.  - Will get pulm consult for diagnostic thoracentesis given Bilateral moderate sized pleural effusions on CT chest  - Patient has PICC line since August 2023 at [facility] -> Will remove today and send tips for culture  - s/p 1 unit pRBC yesterday -> will resume NoAC once Hgb stabilizes  - f/u Palliative recs  - Patient is critically ill with vital organ impairment or failure. Patient is at high risk for complications, morbidity and mortality. There is a very high probability of imminent or life threatening deterioration in the patient's condition. Family has been updated. Patient is currently Full code.     ROS  General: Denies fevers, chills, nightsweats, weight loss  HEENT: Denies headache, rhinorrhea, sore throat, eye pain  CV: Denies CP, palpitations  PULM: Denies SOB, cough  GI: Denies abdominal pain, diarrhea  : Denies dysuria, hematuria  MSK: Denies arthralgias  SKIN: Denies rash   NEURO: Denies paresthesias, weakness  PSYCH: Denies depression    MEDICATIONS  (STANDING):  budesonide 160 MICROgram(s)/formoterol 4.5 MICROgram(s) Inhaler 2 Puff(s) Inhalation two times a day  calcium acetate 667 milliGRAM(s) Oral three times a day with meals  chlorhexidine 2% Cloths 1 Application(s) Topical <User Schedule>  collagenase Ointment 1 Application(s) Topical two times a day  darbepoetin Injectable Syringe 25 MICROGram(s) IV Push <User Schedule>  dextrose 5%. 1000 milliLiter(s) (100 mL/Hr) IV Continuous <Continuous>  dextrose 50% Injectable 25 Gram(s) IV Push once  dextrose 50% Injectable 25 Gram(s) IV Push once  dextrose 50% Injectable 12.5 Gram(s) IV Push once  glucagon  Injectable 1 milliGRAM(s) IntraMuscular once  heparin   Injectable 5000 Unit(s) SubCutaneous every 8 hours  insulin lispro (ADMELOG) corrective regimen sliding scale   SubCutaneous three times a day before meals  iron sucrose IVPB 100 milliGRAM(s) IV Intermittent <User Schedule>  midodrine. 10 milliGRAM(s) Oral every 8 hours  pantoprazole   Suspension 40 milliGRAM(s) Oral two times a day    MEDICATIONS  (PRN):  acetaminophen     Tablet .. 650 milliGRAM(s) Oral every 6 hours PRN Moderate Pain (4 - 6)  albuterol    90 MICROgram(s) HFA Inhaler 1 Puff(s) Inhalation every 6 hours PRN for shortness of breath and/or wheezing  atropine Injectable 1 milliGRAM(s) IntraMuscular once PRN HR < 30  dextrose Oral Gel 15 Gram(s) Oral once PRN Blood Glucose LESS THAN 70 milliGRAM(s)/deciliter  HYDROmorphone  Injectable 0.25 milliGRAM(s) IV Push every 6 hours PRN Severe Pain (7 - 10)    VITALS  T(F): 99.8 (10-12-23 @ 06:12), Max: 99.8 (10-11-23 @ 20:30)  HR: 66 (10-12-23 @ 11:08) (62 - 87)  BP: 98/56 (10-12-23 @ 11:08) (89/51 - 98/56)  RR: 18 (10-12-23 @ 06:12) (18 - 18)  SpO2: 95% (10-12-23 @ 08:19) (95% - 98%)    PHYSICAL EXAM:  Gen: NAD, resting in bed  HEENT: Normocephalic, atraumatic  Neck: supple, no lymphadenopathy  CV: Regular rate & regular rhythm  Lungs: CTABL no wheeze  Abdomen: Soft, NTND+ BS present  Ext: Warm, well perfused no CCE  Neuro: non focal, awake, CN II-XII intact   Skin: no rash, no erythema  Psych: no SI, HI, Hallucination     LABS:  10-12    144  |  107  |  34<H>  ----------------------------<  122<H>  3.1<L>   |  24  |  2.5<H>    Ca    7.5<L>      12 Oct 2023 06:36  Mg     1.7     10-12    TPro  5.8<L>  /  Alb  2.6<L>  /  TBili  0.7  /  DBili  x   /  AST  26  /  ALT  88<H>  /  AlkPhos  78  10-12    LIVER FUNCTIONS - ( 12 Oct 2023 06:36 )  Alb: 2.6 g/dL / Pro: 5.8 g/dL / ALK PHOS: 78 U/L / ALT: 88 U/L / AST: 26 U/L / GGT: x                               7.1    7.45  )-----------( 102      ( 12 Oct 2023 09:30 )             23.4       MICROBIOLOGY:  Culture - Blood (collected 10-04-23 @ 23:28)  Source: .Blood Blood  Final Report (10-10-23 @ 07:00):    No growth at 5 days    Hepatitis B Surface Antigen: Nonreact (09-27-23 @ 17:44)  MRSA PCR Result.: Negative (09-24-23 @ 04:32)      IMAGING:  Xray Chest 1 View- PORTABLE-Urgent (Xray Chest 1 View- PORTABLE-Urgent .) (10.11.23 @ 14:59)   1. Unchanged bibasilar opacities, right greater than left.  2. Lines and tubes as above.    CARDIOLOGY TESTING  12 Lead ECG:   Ventricular Rate 98 BPM  Atrial Rate 101 BPM  QRS Duration 100 ms  Q-T Interval 372 ms  QTC Calculation(Bazett) 474 ms  R Axis -61 degrees  T Axis 68 degrees    Diagnosis Line Atrial fibrillation  Left axis deviation  Low voltage QRS  Cannot rule out Anterior infarct , age undetermined  Abnormal ECG    Confirmed by austen colbert ([id number]) on 10/4/2023 7:03:14 PM (10-04-23 @ 15:40)    12 Lead ECG:   Ventricular Rate 143 BPM  Atrial Rate 143 BPM  P-R Interval 74 ms  QRS Duration 4 ms  Q-T Interval 194 ms  QTC Calculation(Bazett) 299 ms  P Axis 11 degrees  R Axis 0 degrees  T Axis -32 degrees    Diagnosis Line *** Poor data quality, interpretation may be adversely affected  Probable atrial fibrillation  Indeterminate axis  Nonspecific ST and T wave abnormality  Abnormal ECG    Confirmed by austen colbert ([id number]) on 9/30/2023 11:41:21 AM (09-30-23 @ 10:19)    ASSESSMENT/PLAN:  83 year old male patient known to have COPD. DM, atrial fibrillation, HFrEF, anemia, lymphedema, and recent left foot OM who was brought from the NH to the ED on 09/15 for evaluation of altered mental status, found to have sepsis in setting of recent left foot OM, admitted for further management. Stay was complicated by hemorrhagic shock and drop in Hb secondary to hematochezia on 09/23, Status post EGD on 09/23 revealing a bleeding duodenal ulcer s/p OVESCO placement. Stay also complicated by an episode of confusion and hypoxia on 09/26 s/p found to have possible stroke on 10/10.  Patient was found to have Acute Femoral DVT s/p IVC filter 10/6. Patient now transferred to general floor for further management.    #Fever, concern for aspiration pneumonitis vs aspiration PNA, r/o PICC line infection  #Acute hypoxemic respiratory failure on NC  - Patient with new onset of Fever and hypotension since 10/12. It started after starting NGT feeding. BP improves 10/13 and pt is less lethargic  - Of note, Patient has PICC line since August 2023 at [facility] -> Will remove 10/13 and send tips for culture  - wbc increases from 10k to 21k in less than 24hrs with PMN > 94% -> this is likely reactive in setting of suspected aspiration pneumonitis  - lactate 1.9 (10/12), ESR 30 (10/12),  (10/12)  - f/u fever w/u (f/u BCx, Procalcitonin, daily lactate, ESR/CRP)  - Will get ABG with lactate today  - CT chest (10/12) did not show obvious aspiration pneumonia per radiology read, but did show Bilateral moderate sized pleural effusions on CT chest  - will slow feed and do aspiration precaution  - started on midodrine standing but now PRN as BP improves  - start zosyn to cover aspiration pneumonia  - Cont oxygen supplementation.  - Will get pulm consult for diagnostic thoracentesis given Bilateral moderate sized pleural effusions on CT chest    #Severe Pharyngeal Dysphagia  - HO aspiration pneumonitis SP ABX   -  Patient was tolerating PO intake at NH prior to admission  -  After extubation on 09/24, patient became confused s/p upgrade on 09/26 for hypoxia, AMS, and pneumonia. He has been NPO since then with multiple speech swallow evaluations.  - FEES Study on 10/11: severe pharyngeal dysphagia, recommendation to keep NPO with alternate means -> now on NGT for feeding  - GI for evaluation of PEG tube placement after failing FEES study on 10/11.  - GI: Will hold off PEG tube placement for now pending sepsis workup (in setting of fever on 10/12) and pending neurology evaluation of age indeterminate infarct on CTH 10/10 + Will need neurological prognostication prior to planning PEG tube placement + Continue NG tube feeding for now    #Metabolic Encephalopathy 2/2 septic Shock 2/2 suspected aspiration pneumonia   - procal 1.7  - rapid response after TDC due to acute change in mental status - ABG notable for elevated lactate  - pt febrile to 101.6 and elevated lactate to 8.4, rapidly became hypotensive on 10/4 - started on levophed  - off pressor, midodrine now descalated to prn prior to hemodialysis   - CXR (9/28) Bilateral pleural effusion/opacity unchanged. No air leak.  - DC  vancomycin and cefepime as per ID   - lactate improving  - follow up cultures, cultures neg   - ID consult appreciated, no further antibiotics   - On 2L NC  - C/w albuterol PRN    #Hemorrhagic Shock Secondary to Hematochezia from Duodenal Ulcer Bleed s/p OVESCO placement 09/23  #UGIB secondary to Duodenal ulcer SP EGD   #Hematochezia resolved   #Hemorrhagic Shock resolved   #Blood Loss Anemia  # Acute Drop in Hemoglobin- Improved  - Status Post EGD on 09/23: blood clots in fundus and antrum; 2cm actively bleeding duodenal ulcer with spurting vessel ([place] 1a) in sweep on base s/p 10cc epi and s/p 11/6 OVESCO placement for hemostasis  - Continue PO Protonix 40mg BID via NG tube for now. Was on IV pantoprazole drip (09/23-09/25)  - Anticoagulation resumption once Hgb stabilizes (s/p 1 unit pRBCs 10/12)  - Monitor BM for recurrence of hematochezia or melena  - Please avoid any NSAIDs  - If any unstable bleed, please call GI   - Follow up with [facility] located at [address] [place] [address]. Phone Number: [phone]      #Anemia of acute blood loss   #Hemorrhagic Shock Secondary to Hematochezia from Duodenal Ulcer Bleed s/p OVESCO placement 09/23  - Trend hgb, transfuse blood PRN  - continue with holding therapeutic AC  - s/p IV Protamine sulfate 36mg x1 dose on 09/23  - GI f/u appreciated  - S/p IV Protonix 40mg BID -> Ok to switch to PO BID as per GI   - Avoid any NSAIDs  - overall the pt risk and benefit is goes against Anticoagulation, will further discuss with family , , dw brother regarding the high risk of bleeding and understands the reasons to hold anticoagulation. And understands the risk of cva with Afib.     #Acutely worsening uremia and acute kidney failure most likely 2/2 ATN - now on HD through right IJ Vincent  #hypernatremia - resolving   #PAULA / CKD stable non oliguric  - possible ATN iso hypotension and possible sepsis/ vanc toxicity  - Cr stable for now -> Close F/U of BUN/Crea/ Lytes and UO  - no hydro on imaging  - udall placement 9/27/23  started on HD 9/27 but did not tolerate it with access problems  - udall changed 9/28  he received HD 9/28 and 9/30  - c/w phoslo 2/2/2   - off sodium bicarbonate   - s/p 250 cc LR bolus for hypernatremia  - s/p D5w @ 80 cc/hr  - f/u w/nephro  - hold BUMEX 2 mg BID given hypotension; resumes once BP stabilizes  - TDC by IR on 10/4    #Acute Femoral DVT s/p IVC filter 10/6  #Elevated dimer  - duplex neg on 9/29, repeat   - overall the pt risk and benefit is goes against Anticoagulation, will further discuss with family , dw brother regarding the high risk of bleeding and understands to hold anticoagulation And understands the risk of cva with Afib.    -s/p IVC filter 10/6    #Possible Age/CVA  - Indeterminate Left Subinsular Stroke on 10/10    #transaminitis - resolving   - likely 2/2 liver shock 2/2 hypotension   - LFTS improving   - follow up RUQ   - hepatitis neg   - worsened today  - antibiotics dc  - trend   - if continues to worsen will get hepatology     #Asymptomatic Cholelithiasis  - Noted on CT  - LFTs noted > Trend LFTs  - Monitor for symptoms    #Paroxysmal Afib, chronic; uncontrolled rate  #angelika cardia in evening on cardiac telemonitoring   - HOLDING therapeutic AC; resume once hgb is stable  - hold  metoprolol tartrate 12.5 mg q12h for hypotension; resumes once BP improves    #History of Left Foot OM w/ surrounding Cellulitis  #Sacral Decubitus Ulcer POA  - CT LLE (9/16): No CT evidence of osteomyelitis or necrotizing infection. No drainable collection seen, within the limitations of a noncontrast exam.  - Increased frailty and decreased physical capacity  - as per previous notes the team Discussed with ID attending: patient is unlikely to benefit from prolonged therapy with cefepime+vanco (to cover possible OM) due to adverse outcomes associated kidney dysfunction, cognitive impact ...etc )  - c/w Local wound care; offloading boots bilaterally, frequently turning and positioning, prevent pressure injury, keep skin clean, and monitor for wound changes and notify provider as per podiatry    #Supportive Management:  Dispo:   DVT Ppx: heparin   Injectable 5000 Unit(s) SubCutaneous every 8 hours  GI Ppx: pantoprazole   Suspension 40 milliGRAM(s) Oral two times a day  Diet:  Diet, NPO (10-11-23 @ 11:36) [Active]  Diet, Soft and Bite Sized:   DASH/TLC Sodium & Cholesterol Restricted  Mildly Thick Liquids (MILDTHICKLIQS)  Free Water Flush Instructions:  please thicken each Ensure with 2 packs of thickeners     Qty per Day:  Magic cup 2x/day  Supplement Feeding Modality:  Oral  Ensure Plus High Protein Cans or Servings Per Day:  1       Frequency:  Two Times a day (10-09-23 @ 13:43) [Pending Verification By Attending]  Diet, Pureed:   Mildly Thick Liquids (MILDTHICKLIQS)  Free Water Flush Instructions:  *** please add 2 packets of thickener per NEPRO carton ***  Supplement Feeding Modality:  Oral  Nepro Cans or Servings Per Day:  3       Frequency:  Daily (10-03-23 @ 14:36) [Pending Verification By Attending]  Diet, Minced and Moist:   Mildly Thick Liquids (MILDTHICKLIQS)  Low Sodium     Qty per Day:  magic cup 2x/day  Supplement Feeding Modality:  Oral  Ensure Plus High Protein Cans or Servings Per Day:  1       Frequency:  Three Times a day (09-25-23 @ 15:30) [Pending Verification By Attending]    Total time spent to complete patient's bedside assessment, review medical chart, discuss medical plan of care with covering medical team was more than 45 minutes with >50% of time spent face to face with patient, discussion with patient/family and/or coordination of care    Resident's notes reviewed. My notes supersede resident's notes in case of discrepancy.    Finn Sam MD/[place]  Attending Physician

## 2023-10-13 NOTE — CHART NOTE - NSCHARTNOTEFT_GEN_A_CORE
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   Neurology went to see patient, however, patient refusing to be examined at this time. States that he is tired and wants to be left alone. ACP attempt to encourage patient to participate in examine several times, however, states that he just wants an ice cream sundae and a beer and if examiner could not be provide then he did not want to participate at this time.   As per RN, patient does sometimes behave this way.    Would recommend MRI without contrast due to age indeterminate infarct.     Discussed with attending, Dr. 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   Neurology went to see patient, however, patient refusing to be examined at this time. States that he is tired and wants to be left alone. ACP attempt to encourage patient to participate in examine several times, however, he did not want to participate at this time.   As per RN, patient does sometimes behave this way.    Would recommend MRI without contrast due to age indeterminate infarct.     Discussed with attending, Dr. Kelly

## 2023-10-13 NOTE — CONSULT NOTE ADULT - ASSESSMENT
Impression:    Bilateral pleural effusion   History of HFREF   Aspiration pneumonia and NGT placement  AMS     Plan:     On room air  Small/mod bilateral effusions on CT; unchanged from previous imaging  No dyspnea; O2 sat adequate   NGT in place  Elevated WBC; possible recurring aspiration; mild consolidation at right base   Check BNP; diurese if elevated   No indications for thoracentesis  Check UA/cx, check blood cultures; adjust abx accordingly  Recall as needed

## 2023-10-13 NOTE — CONSULT NOTE ADULT - ATTENDING COMMENTS
83M 2/ PMH of Afib on Xarelto, HFrEF, DM, COPD, here for AMS, neurology consulted for age-indeterminate infarction in the L. subinsula.     CTH L. 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     Plan:   Would not pursue MR brain as won't   Continue Xarelto    75 minutes spent on total encounter. The necessity of the time spent during the encounter on this date of service was due to:     Review of imaging and chart; obtaining history; examination of pt; discussion and coordination of care, and discussion of lifestyle modification and risk factor control. 83M 2/ PMH of Afib on Xarelto, HFrEF, DM, COPD, here for AMS, neurology consulted for age-indeterminate infarction in the L. subinsula.     CTH L. 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     Plan:   Would not pursue MR brain as won't   Continue Xarelto once medically safe    75 minutes spent on total encounter. The necessity of the time spent during the encounter on this date of service was due to:     Review of imaging and chart; obtaining history; examination of pt; discussion and coordination of care, and discussion of lifestyle modification and risk factor control.

## 2023-10-13 NOTE — PROGRESS NOTE ADULT - SUBJECTIVE AND OBJECTIVE BOX
Nephrology progress note    THIS IS AN INCOMPLETE NOTE . FULL NOTE TO FOLLOW SHORTLY    Patient is seen and examined, events over the last 24 h noted .    Allergies:  No Known Allergies    Hospital Medications:   MEDICATIONS  (STANDING):  budesonide 160 MICROgram(s)/formoterol 4.5 MICROgram(s) Inhaler 2 Puff(s) Inhalation two times a day  cefTRIAXone   IVPB 1000 milliGRAM(s) IV Intermittent every 24 hours  chlorhexidine 2% Cloths 1 Application(s) Topical <User Schedule>  collagenase Ointment 1 Application(s) Topical two times a day  darbepoetin Injectable Syringe 25 MICROGram(s) IV Push <User Schedule>  dextrose 5%. 1000 milliLiter(s) (100 mL/Hr) IV Continuous <Continuous>  dextrose 50% Injectable 25 Gram(s) IV Push once  dextrose 50% Injectable 25 Gram(s) IV Push once  dextrose 50% Injectable 12.5 Gram(s) IV Push once  glucagon  Injectable 1 milliGRAM(s) IntraMuscular once  heparin   Injectable 5000 Unit(s) SubCutaneous every 8 hours  insulin lispro (ADMELOG) corrective regimen sliding scale   SubCutaneous three times a day before meals  iron sucrose IVPB 100 milliGRAM(s) IV Intermittent <User Schedule>  magnesium sulfate  IVPB 1 Gram(s) IV Intermittent once  midodrine. 10 milliGRAM(s) Oral every 8 hours  pantoprazole   Suspension 40 milliGRAM(s) Oral two times a day  polyethylene glycol 3350 17 Gram(s) Oral every 12 hours  potassium chloride   Powder 20 milliEquivalent(s) Oral every 2 hours  senna 2 Tablet(s) Oral at bedtime        VITALS:  T(F): 100.3 (10-13-23 @ 07:31), Max: 100.8 (10-12-23 @ 13:15)  HR: 86 (10-13-23 @ 05:00)  BP: 139/58 (10-13-23 @ 05:00)  RR: 18 (10-13-23 @ 05:00)  SpO2: 95% (10-12-23 @ 20:25)  Wt(kg): --    10-11 @ 07:01  -  10-12 @ 07:00  --------------------------------------------------------  IN: 0 mL / OUT: 175 mL / NET: -175 mL    10-12 @ 07:01  -  10-13 @ 07:00  --------------------------------------------------------  IN: 400 mL / OUT: 0 mL / NET: 400 mL          PHYSICAL EXAM:  Constitutional: NAD  HEENT: anicteric sclera, oropharynx clear, MMM  Neck: No JVD  Respiratory: CTAB, no wheezes, rales or rhonchi  Cardiovascular: S1, S2, RRR  Gastrointestinal: BS+, soft, NT/ND  Extremities: No cyanosis or clubbing. No peripheral edema  :  No barth.   Skin: No rashes    LABS:  10-12    141  |  106  |  22<H>  ----------------------------<  104<H>  3.4<L>   |  25  |  1.6<H>    Ca    7.7<L>      12 Oct 2023 21:49  Mg     1.7     10-12    TPro  5.8<L>  /  Alb  2.6<L>  /  TBili  0.7  /  DBili      /  AST  26  /  ALT  88<H>  /  AlkPhos  78  10-12                          8.5    13.52 )-----------( 106      ( 12 Oct 2023 21:49 )             27.1       Urine Studies:  Urinalysis Basic - ( 12 Oct 2023 21:49 )    Color:  / Appearance:  / SG:  / pH:   Gluc: 104 mg/dL / Ketone:   / Bili:  / Urobili:    Blood:  / Protein:  / Nitrite:    Leuk Esterase:  / RBC:  / WBC    Sq Epi:  / Non Sq Epi:  / Bacteria:           Iron 31, TIBC 137, %sat 23      [10-07-23 @ 18:15]  Ferritin 960      [10-07-23 @ 18:15]    HBsAb <3.0      [09-27-23 @ 17:44]  HBsAb Nonreact      [09-27-23 @ 17:44]  HBsAg Nonreact      [09-27-23 @ 17:44]  HBcAb Nonreact      [09-27-23 @ 17:44]        RADIOLOGY & ADDITIONAL STUDIES:   Nephrology progress note    Patient is seen and examined, events over the last 24 h noted .  Lying in bed   Allergies:  No Known Allergies    Hospital Medications:   MEDICATIONS  (STANDING):  budesonide 160 MICROgram(s)/formoterol 4.5 MICROgram(s) Inhaler 2 Puff(s) Inhalation two times a day  cefTRIAXone   IVPB 1000 milliGRAM(s) IV Intermittent every 24 hours  collagenase Ointment 1 Application(s) Topical two times a day  darbepoetin Injectable Syringe 25 MICROGram(s) IV Push <User Schedule>  glucagon  Injectable 1 milliGRAM(s) IntraMuscular once  heparin   Injectable 5000 Unit(s) SubCutaneous every 8 hours  insulin lispro (ADMELOG) corrective regimen sliding scale   SubCutaneous three times a day before meals  iron sucrose IVPB 100 milliGRAM(s) IV Intermittent <User Schedule>  magnesium sulfate  IVPB 1 Gram(s) IV Intermittent once  midodrine. 10 milliGRAM(s) Oral every 8 hours  pantoprazole   Suspension 40 milliGRAM(s) Oral two times a day  polyethylene glycol 3350 17 Gram(s) Oral every 12 hours  potassium chloride   Powder 20 milliEquivalent(s) Oral every 2 hours  senna 2 Tablet(s) Oral at bedtime        VITALS:  T(F): 100.3 (10-13-23 @ 07:31), Max: 100.8 (10-12-23 @ 13:15)  HR: 86 (10-13-23 @ 05:00)  BP: 139/58 (10-13-23 @ 05:00)  RR: 18 (10-13-23 @ 05:00)  SpO2: 95% (10-12-23 @ 20:25)      10-11 @ 07:01  -  10-12 @ 07:00  --------------------------------------------------------  IN: 0 mL / OUT: 175 mL / NET: -175 mL    10-12 @ 07:01  -  10-13 @ 07:00  --------------------------------------------------------  IN: 400 mL / OUT: 0 mL / NET: 400 mL          PHYSICAL EXAM:  Constitutional: NAD  Respiratory: CTAB,   Cardiovascular: S1, S2, RRR  Gastrointestinal: BS+, soft, NT/ND  Extremities: No cyanosis or clubbing. No peripheral edema  :  No barth.   Skin: No rashes    LABS:      10-12    141  |  106  |  22<H>  ----------------------------<  104<H>  3.4<L>   |  25  |  1.6<H>    Ca    7.7<L>      12 Oct 2023 21:49  Mg     1.7     10-12    TPro  5.8<L>  /  Alb  2.6<L>  /  TBili  0.7  /  DBili      /  AST  26  /  ALT  88<H>  /  AlkPhos  78  10-12                          8.5    13.52 )-----------( 106      ( 12 Oct 2023 21:49 )             27.1       Urine Studies:  Urinalysis Basic - ( 12 Oct 2023 21:49 )    Color:  / Appearance:  / SG:  / pH:   Gluc: 104 mg/dL / Ketone:   / Bili:  / Urobili:    Blood:  / Protein:  / Nitrite:    Leuk Esterase:  / RBC:  / WBC    Sq Epi:  / Non Sq Epi:  / Bacteria:           Iron 31, TIBC 137, %sat 23      [10-07-23 @ 18:15]  Ferritin 960      [10-07-23 @ 18:15]    HBsAb <3.0      [09-27-23 @ 17:44]  HBsAb Nonreact      [09-27-23 @ 17:44]  HBsAg Nonreact      [09-27-23 @ 17:44]  HBcAb Nonreact      [09-27-23 @ 17:44]        RADIOLOGY & ADDITIONAL STUDIES:

## 2023-10-13 NOTE — PROGRESS NOTE ADULT - SUBJECTIVE AND OBJECTIVE BOX
Gastroenterology Follow Up Note      Location: [facility] 3A 004 A [facility]  Patient Name: NIMO SARMIENTO  Age: 83y  Gender: Male      Chief Complaint  Patient is a 83y old Male who presents with a chief complaint of AMS (13 Oct 2023 09:04)  Primary diagnosis of Altered mental status      Reason for Consult  Dysphagia: For PEG tube placement  Hematochezia      Progress Note  This morning patient was seen and examined at bedside.    Today is hospital day 28d.  He is tolerating NG tube feeds.  He denies abdominal pain, nausea, or vomiting.   No melena per nurses.      Vital Signs in the last 24 hours   Vitals Summary T(C): 37.9 (10-13-23 @ 07:31), Max: 38.2 (10-12-23 @ 13:15)  HR: 86 (10-13-23 @ 05:00) (66 - 86)  BP: 139/58 (10-13-23 @ 05:00) (88/52 - 139/58)  RR: 18 (10-13-23 @ 05:00) (17 - 18)  SpO2: 95% (10-12-23 @ 20:25) (95% - 95%)  Vent Data   Intake/ Output   10-12-23 @ 07:01  -  10-13-23 @ 07:00  --------------------------------------------------------  IN: 400 mL / OUT: 0 mL / NET: 400 mL        Physical Exam  * General Appearance: AOx 1-2, interactive but difficult to understand, oriented to place and person/ not to time, in no acute distress  * Lungs: Good bilateral air entry, audible crackles  * Heart: Regular Rate and Rhythm, normal S1 and S2, no audible murmur, rub, or gallop  * Abdomen: Symmetric, non-distended, soft, non-tender, bowel sounds active all four quadrants      Investigations   Laboratory Workup      - CBC:                        8.5    13.52 )-----------( 106      ( 12 Oct 2023 21:49 )             27.1       - Hgb Trend:  8.5  10-12-23 @ 21:49  8.8  10-12-23 @ 16:16  7.1  10-12-23 @ 09:30  7.5  10-12-23 @ 06:36  7.5  10-12-23 @ 00:40  9.0  10-10-23 @ 17:45          - Chemistry:  10-12    141  |  106  |  22<H>  ----------------------------<  104<H>  3.4<L>   |  25  |  1.6<H>    Ca    7.7<L>      12 Oct 2023 21:49  Mg     1.7     10-12    TPro  5.8<L>  /  Alb  2.6<L>  /  TBili  0.7  /  DBili  x   /  AST  26  /  ALT  88<H>  /  AlkPhos  78  10-12    Liver panel trend:  TBili 0.7   /   AST 26   /   ALT 88   /   AlkP 78   /   Tptn 5.8   /   Alb 2.6    /   DBili --      10-12  TBili 0.8   /      /      /   AlkP 89   /   Tptn 6.0   /   Alb 2.5    /   DBili --      10-09  TBili 0.8   /      /      /   AlkP 87   /   Tptn 5.8   /   Alb 2.8    /   DBili --      10-08  TBili 0.7   /      /      /   AlkP 92   /   Tptn 5.6   /   Alb 2.7    /   DBili --      10-07  TBili 0.7   /   AST 1175   /      /   AlkP 83   /   Tptn 5.5   /   Alb 2.8    /   DBili --      10-07  TBili 0.6   /      /      /   AlkP 74   /   Tptn 5.2   /   Alb 2.2    /   DBili --      10-06  TBili 0.7   /   AST 1018   /      /   AlkP 74   /   Tptn 5.7   /   Alb 2.6    /   DBili --      10-05  TBili 0.5   /   AST 18   /   ALT 7   /   AlkP 69   /   Tptn 5.9   /   Alb 3.1    /   DBili --      10-04  TBili 0.6   /   AST 17   /   ALT 8   /   AlkP 73   /   Tptn 6.1   /   Alb 3.2    /   DBili --      10-04  TBili 0.5   /   AST 18   /   ALT 7   /   AlkP 66   /   Tptn 5.7   /   Alb 2.9    /   DBili --      10-04      - Coagulation Studies:  PT/INR - ( 12 Oct 2023 16:16 )   PT: 16.40 sec;   INR: 1.42 ratio   PTT - ( 12 Oct 2023 00:40 )  PTT:34.3 sec      - Cardiac Markers:    Microbiological Workup  Urinalysis Basic - ( 12 Oct 2023 21:49 )    Color: x / Appearance: x / SG: x / pH: x  Gluc: 104 mg/dL / Ketone: x  / Bili: x / Urobili: x   Blood: x / Protein: x / Nitrite: x   Leuk Esterase: x / RBC: x / WBC x   Sq Epi: x / Non Sq Epi: x / Bacteria: x        Current Medications  Standing Medications  budesonide 160 MICROgram(s)/formoterol 4.5 MICROgram(s) Inhaler 2 Puff(s) Inhalation two times a day  chlorhexidine 2% Cloths 1 Application(s) Topical <User Schedule>  collagenase Ointment 1 Application(s) Topical two times a day  darbepoetin Injectable Syringe 25 MICROGram(s) IV Push <User Schedule>  dextrose 5%. 1000 milliLiter(s) (100 mL/Hr) IV Continuous <Continuous>  dextrose 50% Injectable 25 Gram(s) IV Push once  dextrose 50% Injectable 25 Gram(s) IV Push once  dextrose 50% Injectable 12.5 Gram(s) IV Push once  glucagon  Injectable 1 milliGRAM(s) IntraMuscular once  heparin   Injectable 5000 Unit(s) SubCutaneous every 8 hours  insulin lispro (ADMELOG) corrective regimen sliding scale   SubCutaneous three times a day before meals  iron sucrose IVPB 100 milliGRAM(s) IV Intermittent <User Schedule>  magnesium sulfate  IVPB 1 Gram(s) IV Intermittent once  midodrine. 10 milliGRAM(s) Oral every 8 hours  pantoprazole   Suspension 40 milliGRAM(s) Oral two times a day  piperacillin/tazobactam IVPB.. 3.375 Gram(s) IV Intermittent every 12 hours  polyethylene glycol 3350 17 Gram(s) Oral every 12 hours  potassium chloride   Powder 20 milliEquivalent(s) Oral every 2 hours  senna 2 Tablet(s) Oral at bedtime  sevelamer carbonate 800 milliGRAM(s) Oral every 8 hours    PRN Medications  acetaminophen     Tablet .. 650 milliGRAM(s) Oral every 6 hours PRN Temp greater or equal to 38.5C (101.3F), Mild Pain (1 - 3), Moderate Pain (4 - 6)  albuterol    90 MICROgram(s) HFA Inhaler 1 Puff(s) Inhalation every 6 hours PRN for shortness of breath and/or wheezing  atropine Injectable 1 milliGRAM(s) IntraMuscular once PRN HR < 30  dextrose Oral Gel 15 Gram(s) Oral once PRN Blood Glucose LESS THAN 70 milliGRAM(s)/deciliter  HYDROmorphone  Injectable 0.25 milliGRAM(s) IV Push every 6 hours PRN Severe Pain (7 - 10)    Singles Doses Administered  (ADM OVERRIDE) 1 each &lt;see task&gt; GiveOnce  (ADM OVERRIDE) 1 each &lt;see task&gt; GiveOnce  (ADM OVERRIDE) 1 each &lt;see task&gt; GiveOnce  (ADM OVERRIDE) 1 each &lt;see task&gt; GiveOnce  (ADM OVERRIDE) 1 each &lt;see task&gt; GiveOnce  (ADM OVERRIDE) 1 each &lt;see task&gt; GiveOnce  (ADM OVERRIDE) 1 each &lt;see task&gt; GiveOnce  (ADM OVERRIDE) 1 each &lt;see task&gt; GiveOnce  (ADM OVERRIDE) 1 each &lt;see task&gt; GiveOnce  (ADM OVERRIDE) 1 each &lt;see task&gt; GiveOnce  (ADM OVERRIDE) 1 each &lt;see task&gt; GiveOnce  (ADM OVERRIDE) 1 each &lt;see task&gt; GiveOnce  (ADM OVERRIDE) 1 each &lt;see task&gt; GiveOnce  (ADM OVERRIDE) 5 each &lt;see task&gt; GiveOnce  (ADM OVERRIDE) 1 each &lt;see task&gt; GiveOnce  (ADM OVERRIDE) 1 each &lt;see task&gt; GiveOnce  (ADM OVERRIDE) 1 each &lt;see task&gt; GiveOnce  buMETAnide Injectable 2 milliGRAM(s) IV Push once  cefepime   IVPB      cefepime   IVPB 2000 milliGRAM(s) IV Intermittent once  cefepime   IVPB 2000 milliGRAM(s) IV Intermittent every 8 hours  chlorhexidine 2% Cloths 1 Application(s) Topical every 12 hours  diphenhydrAMINE Injectable 50 milliGRAM(s) IntraMuscular once  fentaNYL    Injectable 50 MICROGram(s) IV Push every 10 minutes PRN  haloperidol    Injectable 5 milliGRAM(s) IntraMuscular Once  haloperidol    Injectable 0.5 milliGRAM(s) IntraMuscular once  heparin   Injectable 8000 Unit(s) IV Push once  lactated ringers Bolus 500 milliLiter(s) IV Bolus once  lactated ringers Bolus 500 milliLiter(s) IV Bolus once  lactated ringers Bolus 500 milliLiter(s) IV Bolus once  lactated ringers Bolus 250 milliLiter(s) IV Bolus once  lactated ringers Bolus 1000 milliLiter(s) IV Bolus once  lactated ringers Bolus 500 milliLiter(s) IV Bolus once  lactated ringers Bolus 1000 milliLiter(s) IV Bolus once  lactated ringers Bolus 500 milliLiter(s) IV Bolus once  lactated ringers Bolus 250 milliLiter(s) IV Bolus once  lactated ringers Bolus 250 milliLiter(s) IV Bolus once  LORazepam   Injectable 1 milliGRAM(s) IntraMuscular once  magnesium sulfate  IVPB 2 Gram(s) IV Intermittent once  magnesium sulfate  IVPB 2 Gram(s) IV Intermittent once  magnesium sulfate  IVPB 2 Gram(s) IV Intermittent every 2 hours  magnesium sulfate  IVPB 2 Gram(s) IV Intermittent once  magnesium sulfate  IVPB 2 Gram(s) IV Intermittent once  magnesium sulfate  IVPB 2 Gram(s) IV Intermittent once  midazolam  1 mG/mL Injectable (Rx Quick Charge) 2 milliGRAM(s) IV Push   mupirocin 2% Ointment 1 Application(s) Both Nostrils two times a day  potassium chloride    Tablet ER 40 milliEquivalent(s) Oral once  potassium chloride   Powder 40 milliEquivalent(s) Oral once  potassium chloride   Powder 40 milliEquivalent(s) Oral once  potassium chloride   Powder 40 milliEquivalent(s) Oral once  potassium chloride  20 mEq/100 mL IVPB 20 milliEquivalent(s) IV Intermittent every 2 hours  potassium chloride  20 mEq/100 mL IVPB 20 milliEquivalent(s) IV Intermittent once  protamine  IVPB 36 milliGRAM(s) IV Intermittent once  rocuronium 10 mG/mL Injectable (Rx Quick Charge) 50 milliGRAM(s) IV Push   sodium chloride 0.9% Bolus 750 milliLiter(s) IV Bolus once  sodium chloride 0.9% Bolus 250 milliLiter(s) IV Bolus once  succinylcholine 20 mG/mL Injectable (Rx Quick Charge) 120 milliGRAM(s) IV Push   vancomycin  IVPB 1750 milliGRAM(s) IV Intermittent once  vancomycin  IVPB 1000 milliGRAM(s) IV Intermittent once  vancomycin  IVPB 1500 milliGRAM(s) IV Intermittent once  vancomycin  IVPB. 1000 milliGRAM(s) IV Intermittent once

## 2023-10-13 NOTE — PROCEDURE NOTE - NSPROCDETAILS_GEN_ALL_CORE
guidewire recovered/lumen(s) aspirated and flushed/sterile dressing applied/sterile technique, catheter placed/ultrasound guidance with use of sterile gel and probe cove
guidewire recovered/lumen(s) aspirated and flushed/sterile dressing applied/sterile technique, catheter placed/ultrasound guidance with use of sterile gel and probe cove
ultrasound guidance

## 2023-10-13 NOTE — CHART NOTE - NSCHARTNOTEFT_GEN_A_CORE
Registered Dietitian Follow-Up     Patient Profile Reviewed                           Yes [x]   No []     Nutrition History Previously Obtained        Yes [x]  No []       Pertinent Medical Information: 84 y/o male w/ PMHx of DM, COPD, anemia, lymphedema Afib on Xarelto HFrEF, DM coming in from nursing home for AMS and hypotension in setting of acute GIB. Patient has been on Xarelto for Afib. Patient was found to have Acute Femoral DVT s/p IVC filter 10/6. Admitted for further management.    Per Hospitalist note on 10/13: pt had hypotensive, with active melena. BP in 60's. GI consulted urgently for endoscopy. Procedure was done at bedside and bleeding controlled. Pt. was given 2 U of blood. Also pt was found to have PAULA, and was started on HD. Tunneled catheter was placed 10/4 but had RR on that day for AMS and had fever 101.6 and was hypotensive, was started on pressors.    Per Gastroenterology note on 10/13:   Severe Pharyngeal Dysphagia  Possible Age-Indeterminate Left Subinsular Stroke on 10/10  * Patient was tolerating PO intake at NH prior to admission  * After extubation on 09/24, patient became confused s/p upgrade on 09/26 for hypoxia, AMS, and pneumonia. He has been NPO since then with multiple speech swallow evaluations.  * FEES Study on 10/11: severe pharyngeal dysphagia, recommendation to keep NPO with alternate means    Per Palliative Care Attending note on 10/12: Family is agreeable to feeding tube placement if medically indicated     Diet order: Diet, NPO with Tube Feed:   Tube Feeding Modality: Nasogastric  Jevity 1.2 Ethan  Total Volume for 24 Hours (mL): 5400  Bolus  Total Volume of Bolus (mL):  1800  Total # of Feeds: 3  Tube Feed Frequency: Every 8 hours   Tube Feed Start Time: 09:00  Bolus Feed Rate (mL per Hour): 200   Bolus Feed Duration (in Hours): 3  Bolus Feed Instructions:  Please start at 20 cc/hr, & uptitrate by 20 cc/hr  Bolus   Total Volume per Flush (mL): 200   Frequency: Every 8 Hours   Total Daily Volume of Flush (mL): 600    Start Time: 09:00 (10-13-23 @ 09:08) [Active]    Anthropometrics:  Height (cm): 193 (10-09-23 @ 13:13)  Weight (kg): 92 (10-09-23 @ 13:13)  BMI (kg/m2): 24.7 (10-09-23 @ 13:13)    MEDICATIONS  (STANDING):  budesonide 160 MICROgram(s)/formoterol 4.5 MICROgram(s) Inhaler 2 Puff(s) Inhalation two times a day  chlorhexidine 2% Cloths 1 Application(s) Topical <User Schedule>  collagenase Ointment 1 Application(s) Topical two times a day  darbepoetin Injectable Syringe 25 MICROGram(s) IV Push <User Schedule>  dextrose 5%. 1000 milliLiter(s) (100 mL/Hr) IV Continuous <Continuous>  dextrose 50% Injectable 12.5 Gram(s) IV Push once  dextrose 50% Injectable 25 Gram(s) IV Push once  dextrose 50% Injectable 25 Gram(s) IV Push once  glucagon  Injectable 1 milliGRAM(s) IntraMuscular once  heparin   Injectable 5000 Unit(s) SubCutaneous every 8 hours  insulin lispro (ADMELOG) corrective regimen sliding scale   SubCutaneous three times a day before meals  iron sucrose IVPB 100 milliGRAM(s) IV Intermittent <User Schedule>  midodrine. 10 milliGRAM(s) Oral every 8 hours  pantoprazole   Suspension 40 milliGRAM(s) Oral two times a day  piperacillin/tazobactam IVPB.. 3.375 Gram(s) IV Intermittent every 12 hours  polyethylene glycol 3350 17 Gram(s) Oral every 12 hours  potassium chloride   Powder 20 milliEquivalent(s) Oral every 2 hours  senna 2 Tablet(s) Oral at bedtime  sevelamer carbonate 800 milliGRAM(s) Oral every 8 hours    MEDICATIONS  (PRN):  acetaminophen     Tablet .. 650 milliGRAM(s) Oral every 6 hours PRN Temp greater or equal to 38.5C (101.3F), Mild Pain (1 - 3), Moderate Pain (4 - 6)  albuterol    90 MICROgram(s) HFA Inhaler 1 Puff(s) Inhalation every 6 hours PRN for shortness of breath and/or wheezing  atropine Injectable 1 milliGRAM(s) IntraMuscular once PRN HR < 30  dextrose Oral Gel 15 Gram(s) Oral once PRN Blood Glucose LESS THAN 70 milliGRAM(s)/deciliter  HYDROmorphone  Injectable 0.25 milliGRAM(s) IV Push every 6 hours PRN Severe Pain (7 - 10)    Pertinent Labs: 10-12 @ 21:49: Na 141, BUN 22<H>, Cr 1.6<H>, <H>, K+ 3.4<L>, Phos --, Mg --, Alk Phos --, ALT/SGPT --, AST/SGOT --, HbA1c --    Finger Sticks:  POCT Blood Glucose.: 134 mg/dL (10-13 @ 11:42)  POCT Blood Glucose.: 85 mg/dL (10-13 @ 07:49)  POCT Blood Glucose.: 104 mg/dL (10-12 @ 21:26)  POCT Blood Glucose.: 124 mg/dL (10-12 @ 17:01)    Physical Findings:  - Appearance: confused per flow sheet  - GI function: last BM documented on 10/6 per flow sheet  - Tubes: NGT  - Oral/Mouth cavity: NPO w/ TF  - Skin: stage II to B/L buttocks; unstageable to left heel; DTI to left cheek per flow sheet   - Edema: generalized edema 2+     Nutrition Requirements:  Weight Used: 97.2 KG per initial RD assessment     Estimated Energy Needs    Continue []  Adjust [x]  ENERGY: 7313-5197 kcal/day (20-25 kcal/kg)    Estimated Protein Needs    Continue [x]  Adjust []  PROTEIN: 126..8 g/day (1.3-1.5 g/kg)    Estimated Fluid Needs        Continue [x]  Adjust []  FLUID: 2430 mL/day (25 mL/kg)    [x] Previous Nutrition Diagnosis: Moderate malnutrition            [x] Ongoing          [] Resolved    [] No active nutrition diagnosis identified at this time     Goal/Expected Outcome: Pt to meet >85 - <105% of estimated needs within 3-5 days     Indicator/Monitoring: Diet order, weights, labs, NFPF, body composition, BM and tolerance to TF regimen    Recommendation:  1. Recommend to adjust TF regimen to better meet needs: Glucerna 1.2 via NGT (dx DM), total volume: 1500 mL, bolus of 375 mL q6hrs  ADD no carb prosource 2X/DAILY -- provides 120 kcal, 30g protein. TF regimen with no carb prosource provides: 1920 kcal, 120g protein, 1210 mL free water + additional water flushes of 150 mL pre + post feeds. TF regimen meets >85 - <105% of estimated needs.  2. Adjust insulin prn    Pt is at high nutrition risk; will f/u in 3-5 days or prn.    RD to remain available: Belem De La Torre [phone] or TEAMS

## 2023-10-14 LAB
ALBUMIN SERPL ELPH-MCNC: 2.3 G/DL — LOW (ref 3.5–5.2)
ALP SERPL-CCNC: 98 U/L — SIGNIFICANT CHANGE UP (ref 30–115)
ALT FLD-CCNC: 42 U/L — HIGH (ref 0–41)
ANION GAP SERPL CALC-SCNC: 11 MMOL/L — SIGNIFICANT CHANGE UP (ref 7–14)
APTT BLD: 32.8 SEC — SIGNIFICANT CHANGE UP (ref 27–39.2)
AST SERPL-CCNC: 18 U/L — SIGNIFICANT CHANGE UP (ref 0–41)
BASOPHILS # BLD AUTO: 0.03 K/UL — SIGNIFICANT CHANGE UP (ref 0–0.2)
BASOPHILS NFR BLD AUTO: 0.2 % — SIGNIFICANT CHANGE UP (ref 0–1)
BILIRUB SERPL-MCNC: 0.6 MG/DL — SIGNIFICANT CHANGE UP (ref 0.2–1.2)
BLD GP AB SCN SERPL QL: SIGNIFICANT CHANGE UP
BUN SERPL-MCNC: 42 MG/DL — HIGH (ref 10–20)
CALCIUM SERPL-MCNC: 7.2 MG/DL — LOW (ref 8.4–10.5)
CHLORIDE SERPL-SCNC: 110 MMOL/L — SIGNIFICANT CHANGE UP (ref 98–110)
CO2 SERPL-SCNC: 22 MMOL/L — SIGNIFICANT CHANGE UP (ref 17–32)
CREAT SERPL-MCNC: 2.4 MG/DL — HIGH (ref 0.7–1.5)
EGFR: 26 ML/MIN/1.73M2 — LOW
EOSINOPHIL # BLD AUTO: 0.45 K/UL — SIGNIFICANT CHANGE UP (ref 0–0.7)
EOSINOPHIL NFR BLD AUTO: 2.5 % — SIGNIFICANT CHANGE UP (ref 0–8)
GLUCOSE BLDC GLUCOMTR-MCNC: 117 MG/DL — HIGH (ref 70–99)
GLUCOSE BLDC GLUCOMTR-MCNC: 119 MG/DL — HIGH (ref 70–99)
GLUCOSE BLDC GLUCOMTR-MCNC: 130 MG/DL — HIGH (ref 70–99)
GLUCOSE BLDC GLUCOMTR-MCNC: 151 MG/DL — HIGH (ref 70–99)
GLUCOSE SERPL-MCNC: 121 MG/DL — HIGH (ref 70–99)
HCT VFR BLD CALC: 27.8 % — LOW (ref 42–52)
HGB BLD-MCNC: 8.5 G/DL — LOW (ref 14–18)
IMM GRANULOCYTES NFR BLD AUTO: 0.9 % — HIGH (ref 0.1–0.3)
LYMPHOCYTES # BLD AUTO: 16.8 % — LOW (ref 20.5–51.1)
LYMPHOCYTES # BLD AUTO: 3.05 K/UL — SIGNIFICANT CHANGE UP (ref 1.2–3.4)
MAGNESIUM SERPL-MCNC: 1.9 MG/DL — SIGNIFICANT CHANGE UP (ref 1.8–2.4)
MCHC RBC-ENTMCNC: 28.3 PG — SIGNIFICANT CHANGE UP (ref 27–31)
MCHC RBC-ENTMCNC: 30.6 G/DL — LOW (ref 32–37)
MCV RBC AUTO: 92.7 FL — SIGNIFICANT CHANGE UP (ref 80–94)
MONOCYTES # BLD AUTO: 1.06 K/UL — HIGH (ref 0.1–0.6)
MONOCYTES NFR BLD AUTO: 5.8 % — SIGNIFICANT CHANGE UP (ref 1.7–9.3)
NEUTROPHILS # BLD AUTO: 13.38 K/UL — HIGH (ref 1.4–6.5)
NEUTROPHILS NFR BLD AUTO: 73.8 % — SIGNIFICANT CHANGE UP (ref 42.2–75.2)
NRBC # BLD: 0 /100 WBCS — SIGNIFICANT CHANGE UP (ref 0–0)
PHOSPHATE SERPL-MCNC: 1.6 MG/DL — LOW (ref 2.1–4.9)
PLATELET # BLD AUTO: 155 K/UL — SIGNIFICANT CHANGE UP (ref 130–400)
PMV BLD: 11 FL — HIGH (ref 7.4–10.4)
POTASSIUM SERPL-MCNC: 4.3 MMOL/L — SIGNIFICANT CHANGE UP (ref 3.5–5)
POTASSIUM SERPL-SCNC: 4.3 MMOL/L — SIGNIFICANT CHANGE UP (ref 3.5–5)
PROT SERPL-MCNC: 5.5 G/DL — LOW (ref 6–8)
RBC # BLD: 3 M/UL — LOW (ref 4.7–6.1)
RBC # FLD: 20.7 % — HIGH (ref 11.5–14.5)
SODIUM SERPL-SCNC: 143 MMOL/L — SIGNIFICANT CHANGE UP (ref 135–146)
WBC # BLD: 18.13 K/UL — HIGH (ref 4.8–10.8)
WBC # FLD AUTO: 18.13 K/UL — HIGH (ref 4.8–10.8)

## 2023-10-14 PROCEDURE — 99232 SBSQ HOSP IP/OBS MODERATE 35: CPT

## 2023-10-14 PROCEDURE — 99223 1ST HOSP IP/OBS HIGH 75: CPT

## 2023-10-14 RX ORDER — BUMETANIDE 0.25 MG/ML
2 INJECTION INTRAMUSCULAR; INTRAVENOUS EVERY 12 HOURS
Refills: 0 | Status: DISCONTINUED | OUTPATIENT
Start: 2023-10-14 | End: 2023-10-17

## 2023-10-14 RX ORDER — SODIUM CHLORIDE 9 MG/ML
2700 INJECTION, SOLUTION INTRAVENOUS ONCE
Refills: 0 | Status: DISCONTINUED | OUTPATIENT
Start: 2023-10-14 | End: 2023-10-14

## 2023-10-14 RX ORDER — VANCOMYCIN HCL 1 G
VIAL (EA) INTRAVENOUS
Refills: 0 | Status: DISCONTINUED | OUTPATIENT
Start: 2023-10-14 | End: 2023-10-15

## 2023-10-14 RX ORDER — SODIUM CHLORIDE 9 MG/ML
250 INJECTION, SOLUTION INTRAVENOUS ONCE
Refills: 0 | Status: COMPLETED | OUTPATIENT
Start: 2023-10-14 | End: 2023-10-14

## 2023-10-14 RX ORDER — MIDODRINE HYDROCHLORIDE 2.5 MG/1
10 TABLET ORAL ONCE
Refills: 0 | Status: COMPLETED | OUTPATIENT
Start: 2023-10-14 | End: 2023-10-14

## 2023-10-14 RX ADMIN — SODIUM CHLORIDE 500 MILLILITER(S): 9 INJECTION, SOLUTION INTRAVENOUS at 20:04

## 2023-10-14 RX ADMIN — BUDESONIDE AND FORMOTEROL FUMARATE DIHYDRATE 2 PUFF(S): 160; 4.5 AEROSOL RESPIRATORY (INHALATION) at 07:56

## 2023-10-14 RX ADMIN — BUDESONIDE AND FORMOTEROL FUMARATE DIHYDRATE 2 PUFF(S): 160; 4.5 AEROSOL RESPIRATORY (INHALATION) at 22:31

## 2023-10-14 RX ADMIN — SEVELAMER CARBONATE 800 MILLIGRAM(S): 2400 POWDER, FOR SUSPENSION ORAL at 17:14

## 2023-10-14 RX ADMIN — Medication 1 APPLICATION(S): at 05:07

## 2023-10-14 RX ADMIN — HEPARIN SODIUM 5000 UNIT(S): 5000 INJECTION INTRAVENOUS; SUBCUTANEOUS at 22:31

## 2023-10-14 RX ADMIN — CHLORHEXIDINE GLUCONATE 1 APPLICATION(S): 213 SOLUTION TOPICAL at 05:07

## 2023-10-14 RX ADMIN — Medication 650 MILLIGRAM(S): at 23:00

## 2023-10-14 RX ADMIN — HEPARIN SODIUM 5000 UNIT(S): 5000 INJECTION INTRAVENOUS; SUBCUTANEOUS at 13:09

## 2023-10-14 RX ADMIN — MIDODRINE HYDROCHLORIDE 10 MILLIGRAM(S): 2.5 TABLET ORAL at 13:09

## 2023-10-14 RX ADMIN — MIDODRINE HYDROCHLORIDE 10 MILLIGRAM(S): 2.5 TABLET ORAL at 22:31

## 2023-10-14 RX ADMIN — PIPERACILLIN AND TAZOBACTAM 25 GRAM(S): 4; .5 INJECTION, POWDER, LYOPHILIZED, FOR SOLUTION INTRAVENOUS at 05:08

## 2023-10-14 RX ADMIN — Medication 650 MILLIGRAM(S): at 14:53

## 2023-10-14 RX ADMIN — Medication 1 APPLICATION(S): at 17:14

## 2023-10-14 RX ADMIN — PIPERACILLIN AND TAZOBACTAM 25 GRAM(S): 4; .5 INJECTION, POWDER, LYOPHILIZED, FOR SOLUTION INTRAVENOUS at 17:13

## 2023-10-14 RX ADMIN — Medication 650 MILLIGRAM(S): at 22:30

## 2023-10-14 RX ADMIN — HEPARIN SODIUM 5000 UNIT(S): 5000 INJECTION INTRAVENOUS; SUBCUTANEOUS at 05:06

## 2023-10-14 RX ADMIN — MIDODRINE HYDROCHLORIDE 10 MILLIGRAM(S): 2.5 TABLET ORAL at 05:06

## 2023-10-14 RX ADMIN — MIDODRINE HYDROCHLORIDE 10 MILLIGRAM(S): 2.5 TABLET ORAL at 20:04

## 2023-10-14 RX ADMIN — SEVELAMER CARBONATE 800 MILLIGRAM(S): 2400 POWDER, FOR SUSPENSION ORAL at 11:48

## 2023-10-14 RX ADMIN — PANTOPRAZOLE SODIUM 40 MILLIGRAM(S): 20 TABLET, DELAYED RELEASE ORAL at 05:06

## 2023-10-14 RX ADMIN — Medication 1: at 07:57

## 2023-10-14 RX ADMIN — PANTOPRAZOLE SODIUM 40 MILLIGRAM(S): 20 TABLET, DELAYED RELEASE ORAL at 17:14

## 2023-10-14 RX ADMIN — SEVELAMER CARBONATE 800 MILLIGRAM(S): 2400 POWDER, FOR SUSPENSION ORAL at 07:57

## 2023-10-14 RX ADMIN — Medication 650 MILLIGRAM(S): at 13:10

## 2023-10-14 RX ADMIN — BUMETANIDE 2 MILLIGRAM(S): 0.25 INJECTION INTRAMUSCULAR; INTRAVENOUS at 22:30

## 2023-10-14 RX ADMIN — POLYETHYLENE GLYCOL 3350 17 GRAM(S): 17 POWDER, FOR SOLUTION ORAL at 05:06

## 2023-10-14 RX ADMIN — POLYETHYLENE GLYCOL 3350 17 GRAM(S): 17 POWDER, FOR SOLUTION ORAL at 17:14

## 2023-10-14 NOTE — PROGRESS NOTE ADULT - SUBJECTIVE AND OBJECTIVE BOX
MEDICINE ATTENDING PROGRESS NOTE  83 year old male patient known to have COPD. DM, atrial fibrillation, HFrEF, anemia, lymphedema, and recent left foot OM who was brought from the NH to the ED on 09/15 for evaluation of altered mental status, found to have sepsis in setting of recent left foot OM, admitted for further management. Stay was complicated by hemorrhagic shock and drop in Hb secondary to hematochezia on 09/23, Status post EGD on 09/23 revealing a bleeding duodenal ulcer s/p OVESCO placement. Stay also complicated by an episode of confusion and hypoxia on 09/26 s/p found to have possible stroke on 10/10.  Patient was found to have Acute Femoral DVT s/p IVC filter 10/6. Patient now transferred to general floor for further management.    Interval/Overnight Events  - No acute events      ROS  General: Denies fevers, chills, nightsweats, weight loss  HEENT: Denies headache, rhinorrhea, sore throat, eye pain  CV: Denies CP, palpitations  PULM: Denies SOB, cough  GI: Denies abdominal pain, diarrhea  : Denies dysuria, hematuria  MSK: Denies arthralgias  SKIN: Denies rash   NEURO: Denies paresthesias, weakness  PSYCH: Denies depression    MEDICATIONS  (STANDING):  budesonide 160 MICROgram(s)/formoterol 4.5 MICROgram(s) Inhaler 2 Puff(s) Inhalation two times a day  calcium acetate 667 milliGRAM(s) Oral three times a day with meals  chlorhexidine 2% Cloths 1 Application(s) Topical <User Schedule>  collagenase Ointment 1 Application(s) Topical two times a day  darbepoetin Injectable Syringe 25 MICROGram(s) IV Push <User Schedule>  dextrose 5%. 1000 milliLiter(s) (100 mL/Hr) IV Continuous <Continuous>  dextrose 50% Injectable 25 Gram(s) IV Push once  dextrose 50% Injectable 25 Gram(s) IV Push once  dextrose 50% Injectable 12.5 Gram(s) IV Push once  glucagon  Injectable 1 milliGRAM(s) IntraMuscular once  heparin   Injectable 5000 Unit(s) SubCutaneous every 8 hours  insulin lispro (ADMELOG) corrective regimen sliding scale   SubCutaneous three times a day before meals  iron sucrose IVPB 100 milliGRAM(s) IV Intermittent <User Schedule>  midodrine. 10 milliGRAM(s) Oral every 8 hours  pantoprazole   Suspension 40 milliGRAM(s) Oral two times a day    MEDICATIONS  (PRN):  acetaminophen     Tablet .. 650 milliGRAM(s) Oral every 6 hours PRN Moderate Pain (4 - 6)  albuterol    90 MICROgram(s) HFA Inhaler 1 Puff(s) Inhalation every 6 hours PRN for shortness of breath and/or wheezing  atropine Injectable 1 milliGRAM(s) IntraMuscular once PRN HR < 30  dextrose Oral Gel 15 Gram(s) Oral once PRN Blood Glucose LESS THAN 70 milliGRAM(s)/deciliter  HYDROmorphone  Injectable 0.25 milliGRAM(s) IV Push every 6 hours PRN Severe Pain (7 - 10)    VITALS  T(F): 99.8 (10-12-23 @ 06:12), Max: 99.8 (10-11-23 @ 20:30)  HR: 66 (10-12-23 @ 11:08) (62 - 87)  BP: 98/56 (10-12-23 @ 11:08) (89/51 - 98/56)  RR: 18 (10-12-23 @ 06:12) (18 - 18)  SpO2: 95% (10-12-23 @ 08:19) (95% - 98%)    PHYSICAL EXAM:  Gen: NAD, resting in bed  HEENT: Normocephalic, atraumatic  Neck: supple, no lymphadenopathy  CV: Regular rate & regular rhythm  Lungs: CTABL no wheeze  Abdomen: Soft, NTND+ BS present  Ext: Warm, well perfused no CCE  Neuro: non focal, awake, CN II-XII intact   Skin: no rash, no erythema  Psych: no SI, HI, Hallucination     LABS:  10-12    144  |  107  |  34<H>  ----------------------------<  122<H>  3.1<L>   |  24  |  2.5<H>    Ca    7.5<L>      12 Oct 2023 06:36  Mg     1.7     10-12    TPro  5.8<L>  /  Alb  2.6<L>  /  TBili  0.7  /  DBili  x   /  AST  26  /  ALT  88<H>  /  AlkPhos  78  10-12    LIVER FUNCTIONS - ( 12 Oct 2023 06:36 )  Alb: 2.6 g/dL / Pro: 5.8 g/dL / ALK PHOS: 78 U/L / ALT: 88 U/L / AST: 26 U/L / GGT: x                               7.1    7.45  )-----------( 102      ( 12 Oct 2023 09:30 )             23.4       MICROBIOLOGY:  Culture - Blood (collected 10-04-23 @ 23:28)  Source: .Blood Blood  Final Report (10-10-23 @ 07:00):    No growth at 5 days    Hepatitis B Surface Antigen: Nonreact (09-27-23 @ 17:44)  MRSA PCR Result.: Negative (09-24-23 @ 04:32)      IMAGING:  Xray Chest 1 View- PORTABLE-Urgent (Xray Chest 1 View- PORTABLE-Urgent .) (10.11.23 @ 14:59)   1. Unchanged bibasilar opacities, right greater than left.  2. Lines and tubes as above.    CARDIOLOGY TESTING  12 Lead ECG:   Ventricular Rate 98 BPM  Atrial Rate 101 BPM  QRS Duration 100 ms  Q-T Interval 372 ms  QTC Calculation(Bazett) 474 ms  R Axis -61 degrees  T Axis 68 degrees    Diagnosis Line Atrial fibrillation  Left axis deviation  Low voltage QRS  Cannot rule out Anterior infarct , age undetermined  Abnormal ECG    Confirmed by austen colbert ([id number]) on 10/4/2023 7:03:14 PM (10-04-23 @ 15:40)    12 Lead ECG:   Ventricular Rate 143 BPM  Atrial Rate 143 BPM  P-R Interval 74 ms  QRS Duration 4 ms  Q-T Interval 194 ms  QTC Calculation(Bazett) 299 ms  P Axis 11 degrees  R Axis 0 degrees  T Axis -32 degrees    Diagnosis Line *** Poor data quality, interpretation may be adversely affected  Probable atrial fibrillation  Indeterminate axis  Nonspecific ST and T wave abnormality  Abnormal ECG    Confirmed by austen colbert ([id number]) on 9/30/2023 11:41:21 AM (09-30-23 @ 10:19)    ASSESSMENT/PLAN:  83 year old male patient known to have COPD. DM, atrial fibrillation, HFrEF, anemia, lymphedema, and recent left foot OM who was brought from the NH to the ED on 09/15 for evaluation of altered mental status, found to have sepsis in setting of recent left foot OM, admitted for further management. Stay was complicated by hemorrhagic shock and drop in Hb secondary to hematochezia on 09/23, Status post EGD on 09/23 revealing a bleeding duodenal ulcer s/p OVESCO placement. Stay also complicated by an episode of confusion and hypoxia on 09/26 s/p found to have possible stroke on 10/10.  Patient was found to have Acute Femoral DVT s/p IVC filter 10/6. Patient now transferred to general floor for further management.    #Fever, concern for aspiration pneumonitis vs aspiration PNA, r/o PICC line infection  #Acute hypoxemic respiratory failure on NC  - Patient with new onset of Fever and hypotension since 10/12. It started after starting NGT feeding. BP improves 10/13 and pt is less lethargic  - Of note, Patient has PICC line since August 2023 at [facility] -> Will remove 10/13 and send tips for culture  - wbc increases from 10k to 21k in less than 24hrs with PMN > 94% -> this is likely reactive in setting of suspected aspiration pneumonitis  - lactate 1.9 (10/12), ESR 30 (10/12),  (10/12)  - f/u fever w/u (f/u BCx, Procalcitonin, daily lactate, ESR/CRP)  - Will get ABG with lactate today  - CT chest (10/12) did not show obvious aspiration pneumonia per radiology read, but did show Bilateral moderate sized pleural effusions on CT chest  - will slow feed and do aspiration precaution  - started on midodrine standing but now PRN as BP improves  - start zosyn to cover aspiration pneumonia  - Cont oxygen supplementation.  - Will get pulm consult for diagnostic thoracentesis given Bilateral moderate sized pleural effusions on CT chest    #Severe Pharyngeal Dysphagia  - HO aspiration pneumonitis SP ABX   -  Patient was tolerating PO intake at NH prior to admission  -  After extubation on 09/24, patient became confused s/p upgrade on 09/26 for hypoxia, AMS, and pneumonia. He has been NPO since then with multiple speech swallow evaluations.  - FEES Study on 10/11: severe pharyngeal dysphagia, recommendation to keep NPO with alternate means -> now on NGT for feeding  - GI for evaluation of PEG tube placement after failing FEES study on 10/11.  - GI: Will hold off PEG tube placement for now pending sepsis workup (in setting of fever on 10/12) and pending neurology evaluation of age indeterminate infarct on CTH 10/10 + Will need neurological prognostication prior to planning PEG tube placement + Continue NG tube feeding for now    #Metabolic Encephalopathy 2/2 septic Shock 2/2 suspected aspiration pneumonia   - procal 1.7  - rapid response after TDC due to acute change in mental status - ABG notable for elevated lactate  - pt febrile to 101.6 and elevated lactate to 8.4, rapidly became hypotensive on 10/4 - started on levophed  - off pressor, midodrine now descalated to prn prior to hemodialysis   - CXR (9/28) Bilateral pleural effusion/opacity unchanged. No air leak.  - DC  vancomycin and cefepime as per ID   - lactate improving  - follow up cultures, cultures neg   - ID consult appreciated, no further antibiotics   - On 2L NC  - C/w albuterol PRN    #Hemorrhagic Shock Secondary to Hematochezia from Duodenal Ulcer Bleed s/p OVESCO placement 09/23  #UGIB secondary to Duodenal ulcer SP EGD   #Hematochezia resolved   #Hemorrhagic Shock resolved   #Blood Loss Anemia  # Acute Drop in Hemoglobin- Improved  - Status Post EGD on 09/23: blood clots in fundus and antrum; 2cm actively bleeding duodenal ulcer with spurting vessel (Preston 1a) in sweep on base s/p 10cc epi and s/p 11/6 OVESCO placement for hemostasis  - Continue PO Protonix 40mg BID via NG tube for now. Was on IV pantoprazole drip (09/23-09/25)  - Anticoagulation resumption once Hgb stabilizes (s/p 1 unit pRBCs 10/12)  - Monitor BM for recurrence of hematochezia or melena  - Please avoid any NSAIDs  - If any unstable bleed, please call GI   - Follow up with [facility] located at [address] [place] [address]. Phone Number: [phone]      #Anemia of acute blood loss   #Hemorrhagic Shock Secondary to Hematochezia from Duodenal Ulcer Bleed s/p OVESCO placement 09/23  - Trend hgb, transfuse blood PRN  - continue with holding therapeutic AC  - s/p IV Protamine sulfate 36mg x1 dose on 09/23  - GI f/u appreciated  - S/p IV Protonix 40mg BID -> Ok to switch to PO BID as per GI   - Avoid any NSAIDs  - overall the pt risk and benefit is goes against Anticoagulation, will further discuss with family , , dw brother regarding the high risk of bleeding and understands the reasons to hold anticoagulation. And understands the risk of cva with Afib.     #Acutely worsening uremia and acute kidney failure most likely 2/2 ATN - now on HD through right IJ [place]  #hypernatremia - resolving   #PAULA / CKD stable non oliguric  - possible ATN iso hypotension and possible sepsis/ vanc toxicity  - Cr stable for now -> Close F/U of BUN/Crea/ Lytes and UO  - no hydro on imaging  - udall placement 9/27/23  started on HD 9/27 but did not tolerate it with access problems  - udall changed 9/28  he received HD 9/28 and 9/30  - c/w phoslo 2/2/2   - off sodium bicarbonate   - s/p 250 cc LR bolus for hypernatremia  - s/p D5w @ 80 cc/hr  - f/u w/nephro  - hold BUMEX 2 mg BID given hypotension; resumes once BP stabilizes  - TDC by IR on 10/4    #Acute Femoral DVT s/p IVC filter 10/6  #Elevated dimer  - duplex neg on 9/29, repeat   - overall the pt risk and benefit is goes against Anticoagulation, will further discuss with family , dw brother regarding the high risk of bleeding and understands to hold anticoagulation And understands the risk of cva with Afib.    -s/p IVC filter 10/6    #Possible Age/CVA  - Indeterminate Left Subinsular Stroke on 10/10    #transaminitis - resolving   - likely 2/2 liver shock 2/2 hypotension   - LFTS improving   - follow up RUQ   - hepatitis neg   - worsened today  - antibiotics dc  - trend   - if continues to worsen will get hepatology     #Asymptomatic Cholelithiasis  - Noted on CT  - LFTs noted > Trend LFTs  - Monitor for symptoms    #Paroxysmal Afib, chronic; uncontrolled rate  #angelika cardia in evening on cardiac telemonitoring   - HOLDING therapeutic AC; resume once hgb is stable  - hold  metoprolol tartrate 12.5 mg q12h for hypotension; resumes once BP improves    #History of Left Foot OM w/ surrounding Cellulitis  #Sacral Decubitus Ulcer POA  - CT LLE (9/16): No CT evidence of osteomyelitis or necrotizing infection. No drainable collection seen, within the limitations of a noncontrast exam.  - Increased frailty and decreased physical capacity  - as per previous notes the team Discussed with ID attending: patient is unlikely to benefit from prolonged therapy with cefepime+vanco (to cover possible OM) due to adverse outcomes associated kidney dysfunction, cognitive impact ...etc )  - c/w Local wound care; offloading boots bilaterally, frequently turning and positioning, prevent pressure injury, keep skin clean, and monitor for wound changes and notify provider as per podiatry    #Supportive Management:  Dispo:   DVT Ppx: heparin   Injectable 5000 Unit(s) SubCutaneous every 8 hours  GI Ppx: pantoprazole   Suspension 40 milliGRAM(s) Oral two times a day  Diet:  Diet, NPO (10-11-23 @ 11:36) [Active]  Diet, Soft and Bite Sized:   DASH/TLC Sodium & Cholesterol Restricted  Mildly Thick Liquids (MILDTHICKLIQS)  Free Water Flush Instructions:  please thicken each Ensure with 2 packs of thickeners     Qty per Day:  Magic cup 2x/day  Supplement Feeding Modality:  Oral  Ensure Plus High Protein Cans or Servings Per Day:  1       Frequency:  Two Times a day (10-09-23 @ 13:43) [Pending Verification By Attending]  Diet, Pureed:   Mildly Thick Liquids (MILDTHICKLIQS)  Free Water Flush Instructions:  *** please add 2 packets of thickener per NEPRO carton ***  Supplement Feeding Modality:  Oral  Nepro Cans or Servings Per Day:  3       Frequency:  Daily (10-03-23 @ 14:36) [Pending Verification By Attending]  Diet, Minced and Moist:   Mildly Thick Liquids (MILDTHICKLIQS)  Low Sodium     Qty per Day:  magic cup 2x/day  Supplement Feeding Modality:  Oral  Ensure Plus High Protein Cans or Servings Per Day:  1       Frequency:  Three Times a day (09-25-23 @ 15:30) [Pending Verification By Attending]    Total time spent to complete patient's bedside assessment, review medical chart, discuss medical plan of care with covering medical team was more than 45 minutes with >50% of time spent face to face with patient, discussion with patient/family and/or coordination of care    Resident's notes reviewed. My notes supersede resident's notes in case of discrepancy.    Finn Sam MD/[place]  Attending Physician

## 2023-10-14 NOTE — CHART NOTE - NSCHARTNOTEFT_GEN_A_CORE
Patient is hypotensive to systolic 70s after receiving 250cc bolus, was febrile to T=101.3 this afternoon, is receiving zosyn, and currently afebrile. Giving another 250 cc bolus and starting vancomycin (dosed per cr cl) for MRSA coverage.    monitor for sepsis and F/u with ID. Patient is hypotensive to systolic 70s after receiving 250cc bolus, was febrile to T=101.3 this afternoon, is receiving zosyn, and currently afebrile. Giving another 250 cc bolus x2 and consider vancomycin (dosed per cr cl) for MRSA coverage. Checked vanc trough it is therapeutic and should remain therapeutic until next HD. f/u trough at 11am.    monitor for sepsis and F/u with ID.

## 2023-10-14 NOTE — PROGRESS NOTE ADULT - SUBJECTIVE AND OBJECTIVE BOX
seen and examined  24 h events noted   no distress       PAST HISTORY  --------------------------------------------------------------------------------  No significant changes to PMH, PSH, FHx, SHx, unless otherwise noted    ALLERGIES & MEDICATIONS  --------------------------------------------------------------------------------  Allergies    No Known Allergies    Intolerances      Standing Inpatient Medications  budesonide 160 MICROgram(s)/formoterol 4.5 MICROgram(s) Inhaler 2 Puff(s) Inhalation two times a day  chlorhexidine 2% Cloths 1 Application(s) Topical <User Schedule>  collagenase Ointment 1 Application(s) Topical two times a day  darbepoetin Injectable Syringe 25 MICROGram(s) IV Push <User Schedule>  dextrose 5%. 1000 milliLiter(s) IV Continuous <Continuous>  dextrose 50% Injectable 25 Gram(s) IV Push once  dextrose 50% Injectable 25 Gram(s) IV Push once  dextrose 50% Injectable 12.5 Gram(s) IV Push once  glucagon  Injectable 1 milliGRAM(s) IntraMuscular once  heparin   Injectable 5000 Unit(s) SubCutaneous every 8 hours  insulin lispro (ADMELOG) corrective regimen sliding scale   SubCutaneous three times a day before meals  iron sucrose IVPB 100 milliGRAM(s) IV Intermittent <User Schedule>  midodrine. 10 milliGRAM(s) Oral every 8 hours  pantoprazole   Suspension 40 milliGRAM(s) Oral two times a day  piperacillin/tazobactam IVPB.. 3.375 Gram(s) IV Intermittent every 12 hours  polyethylene glycol 3350 17 Gram(s) Oral every 12 hours  senna 2 Tablet(s) Oral at bedtime  sevelamer carbonate Powder 800 milliGRAM(s) Enteral Tube three times a day with meals  sevelamer carbonate Powder 800 milliGRAM(s) Oral three times a day with meals    PRN Inpatient Medications  acetaminophen     Tablet .. 650 milliGRAM(s) Oral every 6 hours PRN  albuterol    90 MICROgram(s) HFA Inhaler 1 Puff(s) Inhalation every 6 hours PRN  atropine Injectable 1 milliGRAM(s) IntraMuscular once PRN  dextrose Oral Gel 15 Gram(s) Oral once PRN  HYDROmorphone  Injectable 0.25 milliGRAM(s) IV Push every 6 hours PRN          VITALS/PHYSICAL EXAM  --------------------------------------------------------------------------------  T(C): 36.2 (10-14-23 @ 05:00), Max: 36.8 (10-13-23 @ 13:51)  HR: 78 (10-14-23 @ 05:00) (78 - 89)  BP: 89/52 (10-14-23 @ 05:00) (89/52 - 153/56)  RR: 18 (10-14-23 @ 05:00) (18 - 18)  SpO2: 96% (10-13-23 @ 13:51) (96% - 96%)  Wt(kg): --        10-13-23 @ 07:01  -  10-14-23 @ 07:00  --------------------------------------------------------  IN: 1910 mL / OUT: 0 mL / NET: 1910 mL      Physical Exam:  	Gen: NAD  	Pulm: decrease BS B/L  	CV: S1S2; no rub  	Abd: +distended  	Vascular access:tdc     LABS/STUDIES  --------------------------------------------------------------------------------              9.0    21.58 >-----------<  132      [10-13-23 @ 11:11]              29.3     141  |  106  |  22  ----------------------------<  104      [10-12-23 @ 21:49]  3.4   |  25  |  1.6        Ca     7.7     [10-12-23 @ 21:49]    PT/INR: PT 16.40, INR 1.42       [10-12-23 @ 16:16]    Creatinine Trend:  SCr 1.6 [10-12 @ 21:49]  SCr 2.5 [10-12 @ 06:36]  SCr 3.0 [10-09 @ 05:46]  SCr 2.4 [10-08 @ 08:39]  SCr 2.0 [10-07 @ 18:15]    Urinalysis - [10-12-23 @ 21:49]      Color  / Appearance  / SG  / pH       Gluc 104 / Ketone   / Bili  / Urobili        Blood  / Protein  / Leuk Est  / Nitrite       RBC  / WBC  / Hyaline  / Gran  / Sq Epi  / Non Sq Epi  / Bacteria       Iron 31, TIBC 137, %sat 23      [10-07-23 @ 18:15]  Ferritin 960      [10-07-23 @ 18:15]    HBsAb <3.0      [09-27-23 @ 17:44]  HBsAb Nonreact      [09-27-23 @ 17:44]  HBsAg Nonreact      [09-27-23 @ 17:44]  HBcAb Nonreact      [09-27-23 @ 17:44]

## 2023-10-14 NOTE — PROGRESS NOTE ADULT - ASSESSMENT
84 yo m ho DM, COPD, anemia, paroxysmal afib on xarelto, HFrEF, DM coming in from nursing home for AMS x 2 days. Found to have toxic metabolic encephalopathy with PAULA.  PAULA/ toxic metabolic encephalopathy/ HAGMA/ HFrEF/ hypernatremia  possible ATN iso hypotension and possible sepsis/ vanc toxicity  no hydro on imaging  creat trend noted   check bladder scan   no hd today   document UO /  bumex 2 q 12 / strict I and O / follow BMP  may be able to hold HD not volume overloaded / though cachectic last BMP noted    last phos level at goal   BP noted /  midodrine  10 q8h   s/p IVC filter   iron stores noted / keep Hb >7  will follow    prognosis poor

## 2023-10-15 LAB
ALBUMIN SERPL ELPH-MCNC: 2.1 G/DL — LOW (ref 3.5–5.2)
ALBUMIN SERPL ELPH-MCNC: 2.2 G/DL — LOW (ref 3.5–5.2)
ALP SERPL-CCNC: 105 U/L — SIGNIFICANT CHANGE UP (ref 30–115)
ALP SERPL-CCNC: 112 U/L — SIGNIFICANT CHANGE UP (ref 30–115)
ALT FLD-CCNC: 29 U/L — SIGNIFICANT CHANGE UP (ref 0–41)
ALT FLD-CCNC: 32 U/L — SIGNIFICANT CHANGE UP (ref 0–41)
ANION GAP SERPL CALC-SCNC: 11 MMOL/L — SIGNIFICANT CHANGE UP (ref 7–14)
ANION GAP SERPL CALC-SCNC: 12 MMOL/L — SIGNIFICANT CHANGE UP (ref 7–14)
AST SERPL-CCNC: 18 U/L — SIGNIFICANT CHANGE UP (ref 0–41)
AST SERPL-CCNC: 19 U/L — SIGNIFICANT CHANGE UP (ref 0–41)
BASOPHILS # BLD AUTO: 0.04 K/UL — SIGNIFICANT CHANGE UP (ref 0–0.2)
BASOPHILS # BLD AUTO: 0.05 K/UL — SIGNIFICANT CHANGE UP (ref 0–0.2)
BASOPHILS NFR BLD AUTO: 0.2 % — SIGNIFICANT CHANGE UP (ref 0–1)
BASOPHILS NFR BLD AUTO: 0.2 % — SIGNIFICANT CHANGE UP (ref 0–1)
BILIRUB SERPL-MCNC: 0.5 MG/DL — SIGNIFICANT CHANGE UP (ref 0.2–1.2)
BILIRUB SERPL-MCNC: 0.5 MG/DL — SIGNIFICANT CHANGE UP (ref 0.2–1.2)
BUN SERPL-MCNC: 50 MG/DL — HIGH (ref 10–20)
BUN SERPL-MCNC: 54 MG/DL — HIGH (ref 10–20)
CALCIUM SERPL-MCNC: 7.2 MG/DL — LOW (ref 8.4–10.4)
CALCIUM SERPL-MCNC: 7.2 MG/DL — LOW (ref 8.4–10.4)
CHLORIDE SERPL-SCNC: 106 MMOL/L — SIGNIFICANT CHANGE UP (ref 98–110)
CHLORIDE SERPL-SCNC: 107 MMOL/L — SIGNIFICANT CHANGE UP (ref 98–110)
CO2 SERPL-SCNC: 25 MMOL/L — SIGNIFICANT CHANGE UP (ref 17–32)
CO2 SERPL-SCNC: 25 MMOL/L — SIGNIFICANT CHANGE UP (ref 17–32)
CREAT SERPL-MCNC: 2.4 MG/DL — HIGH (ref 0.7–1.5)
CREAT SERPL-MCNC: 2.4 MG/DL — HIGH (ref 0.7–1.5)
EGFR: 26 ML/MIN/1.73M2 — LOW
EGFR: 26 ML/MIN/1.73M2 — LOW
EOSINOPHIL # BLD AUTO: 0.36 K/UL — SIGNIFICANT CHANGE UP (ref 0–0.7)
EOSINOPHIL # BLD AUTO: 0.58 K/UL — SIGNIFICANT CHANGE UP (ref 0–0.7)
EOSINOPHIL NFR BLD AUTO: 1.7 % — SIGNIFICANT CHANGE UP (ref 0–8)
EOSINOPHIL NFR BLD AUTO: 2.5 % — SIGNIFICANT CHANGE UP (ref 0–8)
GAS PNL BLDA: SIGNIFICANT CHANGE UP
GLUCOSE BLDC GLUCOMTR-MCNC: 129 MG/DL — HIGH (ref 70–99)
GLUCOSE BLDC GLUCOMTR-MCNC: 130 MG/DL — HIGH (ref 70–99)
GLUCOSE BLDC GLUCOMTR-MCNC: 137 MG/DL — HIGH (ref 70–99)
GLUCOSE BLDC GLUCOMTR-MCNC: 90 MG/DL — SIGNIFICANT CHANGE UP (ref 70–99)
GLUCOSE SERPL-MCNC: 119 MG/DL — HIGH (ref 70–99)
GLUCOSE SERPL-MCNC: 93 MG/DL — SIGNIFICANT CHANGE UP (ref 70–99)
HCT VFR BLD CALC: 27.4 % — LOW (ref 42–52)
HCT VFR BLD CALC: 28 % — LOW (ref 42–52)
HGB BLD-MCNC: 8.4 G/DL — LOW (ref 14–18)
HGB BLD-MCNC: 8.5 G/DL — LOW (ref 14–18)
IMM GRANULOCYTES NFR BLD AUTO: 0.7 % — HIGH (ref 0.1–0.3)
IMM GRANULOCYTES NFR BLD AUTO: 0.8 % — HIGH (ref 0.1–0.3)
LACTATE SERPL-SCNC: 1.4 MMOL/L — SIGNIFICANT CHANGE UP (ref 0.7–2)
LACTATE SERPL-SCNC: 2.2 MMOL/L — HIGH (ref 0.7–2)
LACTATE SERPL-SCNC: 3 MMOL/L — HIGH (ref 0.7–2)
LYMPHOCYTES # BLD AUTO: 17.2 % — LOW (ref 20.5–51.1)
LYMPHOCYTES # BLD AUTO: 26.8 % — SIGNIFICANT CHANGE UP (ref 20.5–51.1)
LYMPHOCYTES # BLD AUTO: 3.64 K/UL — HIGH (ref 1.2–3.4)
LYMPHOCYTES # BLD AUTO: 6.27 K/UL — HIGH (ref 1.2–3.4)
MAGNESIUM SERPL-MCNC: 1.8 MG/DL — SIGNIFICANT CHANGE UP (ref 1.8–2.4)
MAGNESIUM SERPL-MCNC: 1.8 MG/DL — SIGNIFICANT CHANGE UP (ref 1.8–2.4)
MCHC RBC-ENTMCNC: 27.7 PG — SIGNIFICANT CHANGE UP (ref 27–31)
MCHC RBC-ENTMCNC: 28.8 PG — SIGNIFICANT CHANGE UP (ref 27–31)
MCHC RBC-ENTMCNC: 30.4 G/DL — LOW (ref 32–37)
MCHC RBC-ENTMCNC: 30.7 G/DL — LOW (ref 32–37)
MCV RBC AUTO: 90.4 FL — SIGNIFICANT CHANGE UP (ref 80–94)
MCV RBC AUTO: 94.9 FL — HIGH (ref 80–94)
MONOCYTES # BLD AUTO: 1.1 K/UL — HIGH (ref 0.1–0.6)
MONOCYTES # BLD AUTO: 1.1 K/UL — HIGH (ref 0.1–0.6)
MONOCYTES NFR BLD AUTO: 4.7 % — SIGNIFICANT CHANGE UP (ref 1.7–9.3)
MONOCYTES NFR BLD AUTO: 5.2 % — SIGNIFICANT CHANGE UP (ref 1.7–9.3)
NEUTROPHILS # BLD AUTO: 15.27 K/UL — HIGH (ref 1.4–6.5)
NEUTROPHILS # BLD AUTO: 15.82 K/UL — HIGH (ref 1.4–6.5)
NEUTROPHILS NFR BLD AUTO: 65.1 % — SIGNIFICANT CHANGE UP (ref 42.2–75.2)
NEUTROPHILS NFR BLD AUTO: 74.9 % — SIGNIFICANT CHANGE UP (ref 42.2–75.2)
NRBC # BLD: 0 /100 WBCS — SIGNIFICANT CHANGE UP (ref 0–0)
NRBC # BLD: 0 /100 WBCS — SIGNIFICANT CHANGE UP (ref 0–0)
PHOSPHATE SERPL-MCNC: 2.2 MG/DL — SIGNIFICANT CHANGE UP (ref 2.1–4.9)
PHOSPHATE SERPL-MCNC: 2.3 MG/DL — SIGNIFICANT CHANGE UP (ref 2.1–4.9)
PLATELET # BLD AUTO: 161 K/UL — SIGNIFICANT CHANGE UP (ref 130–400)
PLATELET # BLD AUTO: 202 K/UL — SIGNIFICANT CHANGE UP (ref 130–400)
PMV BLD: 10.8 FL — HIGH (ref 7.4–10.4)
PMV BLD: 11.5 FL — HIGH (ref 7.4–10.4)
POTASSIUM SERPL-MCNC: 4.2 MMOL/L — SIGNIFICANT CHANGE UP (ref 3.5–5)
POTASSIUM SERPL-MCNC: 4.5 MMOL/L — SIGNIFICANT CHANGE UP (ref 3.5–5)
POTASSIUM SERPL-SCNC: 4.2 MMOL/L — SIGNIFICANT CHANGE UP (ref 3.5–5)
POTASSIUM SERPL-SCNC: 4.5 MMOL/L — SIGNIFICANT CHANGE UP (ref 3.5–5)
PROT SERPL-MCNC: 5.5 G/DL — LOW (ref 6–8)
PROT SERPL-MCNC: 5.6 G/DL — LOW (ref 6–8)
RBC # BLD: 2.95 M/UL — LOW (ref 4.7–6.1)
RBC # BLD: 3.03 M/UL — LOW (ref 4.7–6.1)
RBC # FLD: 20.4 % — HIGH (ref 11.5–14.5)
RBC # FLD: 20.7 % — HIGH (ref 11.5–14.5)
SODIUM SERPL-SCNC: 142 MMOL/L — SIGNIFICANT CHANGE UP (ref 135–146)
SODIUM SERPL-SCNC: 144 MMOL/L — SIGNIFICANT CHANGE UP (ref 135–146)
VANCOMYCIN TROUGH SERPL-MCNC: 17.3 UG/ML — HIGH (ref 5–10)
WBC # BLD: 21.13 K/UL — HIGH (ref 4.8–10.8)
WBC # BLD: 23.42 K/UL — HIGH (ref 4.8–10.8)
WBC # FLD AUTO: 21.13 K/UL — HIGH (ref 4.8–10.8)
WBC # FLD AUTO: 23.42 K/UL — HIGH (ref 4.8–10.8)

## 2023-10-15 PROCEDURE — 71045 X-RAY EXAM CHEST 1 VIEW: CPT | Mod: 26

## 2023-10-15 PROCEDURE — 99232 SBSQ HOSP IP/OBS MODERATE 35: CPT

## 2023-10-15 PROCEDURE — 74018 RADEX ABDOMEN 1 VIEW: CPT | Mod: 26

## 2023-10-15 RX ORDER — MEROPENEM 1 G/30ML
500 INJECTION INTRAVENOUS EVERY 24 HOURS
Refills: 0 | Status: DISCONTINUED | OUTPATIENT
Start: 2023-10-16 | End: 2023-10-19

## 2023-10-15 RX ORDER — VANCOMYCIN HCL 1 G
1250 VIAL (EA) INTRAVENOUS ONCE
Refills: 0 | Status: COMPLETED | OUTPATIENT
Start: 2023-10-15 | End: 2023-10-15

## 2023-10-15 RX ORDER — MEROPENEM 1 G/30ML
INJECTION INTRAVENOUS
Refills: 0 | Status: DISCONTINUED | OUTPATIENT
Start: 2023-10-15 | End: 2023-10-19

## 2023-10-15 RX ORDER — SODIUM CHLORIDE 9 MG/ML
500 INJECTION INTRAMUSCULAR; INTRAVENOUS; SUBCUTANEOUS ONCE
Refills: 0 | Status: DISCONTINUED | OUTPATIENT
Start: 2023-10-15 | End: 2023-10-15

## 2023-10-15 RX ORDER — ACETAMINOPHEN 500 MG
650 TABLET ORAL EVERY 6 HOURS
Refills: 0 | Status: DISCONTINUED | OUTPATIENT
Start: 2023-10-15 | End: 2023-10-25

## 2023-10-15 RX ORDER — NOREPINEPHRINE BITARTRATE/D5W 8 MG/250ML
0.05 PLASTIC BAG, INJECTION (ML) INTRAVENOUS
Qty: 8 | Refills: 0 | Status: DISCONTINUED | OUTPATIENT
Start: 2023-10-15 | End: 2023-10-19

## 2023-10-15 RX ORDER — MEROPENEM 1 G/30ML
500 INJECTION INTRAVENOUS ONCE
Refills: 0 | Status: COMPLETED | OUTPATIENT
Start: 2023-10-15 | End: 2023-10-15

## 2023-10-15 RX ORDER — SODIUM CHLORIDE 9 MG/ML
250 INJECTION, SOLUTION INTRAVENOUS ONCE
Refills: 0 | Status: COMPLETED | OUTPATIENT
Start: 2023-10-15 | End: 2023-10-15

## 2023-10-15 RX ORDER — CASPOFUNGIN ACETATE 7 MG/ML
70 INJECTION, POWDER, LYOPHILIZED, FOR SOLUTION INTRAVENOUS ONCE
Refills: 0 | Status: COMPLETED | OUTPATIENT
Start: 2023-10-15 | End: 2023-10-15

## 2023-10-15 RX ADMIN — MEROPENEM 100 MILLIGRAM(S): 1 INJECTION INTRAVENOUS at 15:04

## 2023-10-15 RX ADMIN — Medication 1 APPLICATION(S): at 18:39

## 2023-10-15 RX ADMIN — Medication 650 MILLIGRAM(S): at 14:39

## 2023-10-15 RX ADMIN — BUDESONIDE AND FORMOTEROL FUMARATE DIHYDRATE 2 PUFF(S): 160; 4.5 AEROSOL RESPIRATORY (INHALATION) at 08:51

## 2023-10-15 RX ADMIN — SENNA PLUS 2 TABLET(S): 8.6 TABLET ORAL at 21:49

## 2023-10-15 RX ADMIN — SEVELAMER CARBONATE 800 MILLIGRAM(S): 2400 POWDER, FOR SUSPENSION ORAL at 08:50

## 2023-10-15 RX ADMIN — HEPARIN SODIUM 5000 UNIT(S): 5000 INJECTION INTRAVENOUS; SUBCUTANEOUS at 14:40

## 2023-10-15 RX ADMIN — SEVELAMER CARBONATE 800 MILLIGRAM(S): 2400 POWDER, FOR SUSPENSION ORAL at 08:53

## 2023-10-15 RX ADMIN — Medication 166.67 MILLIGRAM(S): at 11:38

## 2023-10-15 RX ADMIN — MIDODRINE HYDROCHLORIDE 10 MILLIGRAM(S): 2.5 TABLET ORAL at 21:49

## 2023-10-15 RX ADMIN — BUMETANIDE 2 MILLIGRAM(S): 0.25 INJECTION INTRAMUSCULAR; INTRAVENOUS at 05:04

## 2023-10-15 RX ADMIN — CASPOFUNGIN ACETATE 260 MILLIGRAM(S): 7 INJECTION, POWDER, LYOPHILIZED, FOR SOLUTION INTRAVENOUS at 15:38

## 2023-10-15 RX ADMIN — MIDODRINE HYDROCHLORIDE 10 MILLIGRAM(S): 2.5 TABLET ORAL at 05:04

## 2023-10-15 RX ADMIN — SODIUM CHLORIDE 500 MILLILITER(S): 9 INJECTION, SOLUTION INTRAVENOUS at 08:12

## 2023-10-15 RX ADMIN — CHLORHEXIDINE GLUCONATE 1 APPLICATION(S): 213 SOLUTION TOPICAL at 05:06

## 2023-10-15 RX ADMIN — POLYETHYLENE GLYCOL 3350 17 GRAM(S): 17 POWDER, FOR SOLUTION ORAL at 05:04

## 2023-10-15 RX ADMIN — BUDESONIDE AND FORMOTEROL FUMARATE DIHYDRATE 2 PUFF(S): 160; 4.5 AEROSOL RESPIRATORY (INHALATION) at 21:46

## 2023-10-15 RX ADMIN — POLYETHYLENE GLYCOL 3350 17 GRAM(S): 17 POWDER, FOR SOLUTION ORAL at 17:32

## 2023-10-15 RX ADMIN — PANTOPRAZOLE SODIUM 40 MILLIGRAM(S): 20 TABLET, DELAYED RELEASE ORAL at 05:04

## 2023-10-15 RX ADMIN — PANTOPRAZOLE SODIUM 40 MILLIGRAM(S): 20 TABLET, DELAYED RELEASE ORAL at 17:33

## 2023-10-15 RX ADMIN — Medication 8.63 MICROGRAM(S)/KG/MIN: at 10:39

## 2023-10-15 RX ADMIN — Medication 650 MILLIGRAM(S): at 23:57

## 2023-10-15 RX ADMIN — HEPARIN SODIUM 5000 UNIT(S): 5000 INJECTION INTRAVENOUS; SUBCUTANEOUS at 05:04

## 2023-10-15 RX ADMIN — Medication 1 APPLICATION(S): at 05:05

## 2023-10-15 RX ADMIN — PIPERACILLIN AND TAZOBACTAM 25 GRAM(S): 4; .5 INJECTION, POWDER, LYOPHILIZED, FOR SOLUTION INTRAVENOUS at 05:05

## 2023-10-15 RX ADMIN — HEPARIN SODIUM 5000 UNIT(S): 5000 INJECTION INTRAVENOUS; SUBCUTANEOUS at 21:48

## 2023-10-15 RX ADMIN — MIDODRINE HYDROCHLORIDE 10 MILLIGRAM(S): 2.5 TABLET ORAL at 14:40

## 2023-10-15 NOTE — PROGRESS NOTE ADULT - ASSESSMENT
ASSESSMENT  82 yo m ho DM, COPD, anemia, lymphedemia, afib on xarelto, HFrEF, DM coming in from nursing home for AMS x 2 days. Unable to gather story from pt as he is altered and lethargic.    IMPRESSION  #Fever 10/12  - Blood Cx 10/12 NG  - Blood Cx 10/13 NG   - RVP negative  - CT Chest No Cont (10.12.23 @ 19:59): Since  5/22/2019 Multiple right apical nodules including new 7 mm solid nodule  (Series  4/85). Follow-up CT scan 6 months time. Bilateral moderate sized pleural effusions with adjacent lower lobe  atelectasis.  No pneumothorax or parenchymal mass.  - zosyn started 10/13    #Severe Pharyngeal Dysphagia  #Hemorrhagic Shock Secondary to Hematochezia from Duodenal Ulcer Bleed s/p OVESCO placement 09/23  #PAULA / CKD  - started on HD   #Acute Femoral DVT s/p IVC filter 10/6  #transaminitis - suspected shock liver  #History of Left Foot OM w/ surrounding Cellulitis  - CT LLE (9/16): No CT evidence of osteomyelitis or necrotizing infection. No drainable collection seen, within the limitations of a noncontrast exam.  #Sacral Decubitus Ulcer POA  #Paroxysmal Afib, chronic    Recommendations    This is a preliminary incomplete pended note, all final recommendations to follow after interview and examination of the patient.    Please call or message on [employer] Teams if with any questions.  Spectra [phone]   ASSESSMENT  84 yo m ho DM, COPD, anemia, lymphedemia, afib on xarelto, HFrEF, DM coming in from nursing home for AMS x 2 days. Unable to gather story from pt as he is altered and lethargic.    IMPRESSION  #Fever 10/12  - Blood Cx 10/12 NG  - Blood Cx 10/13 NG   - RVP negative  - CT Chest No Cont (10.12.23 @ 19:59): Since  5/22/2019 Multiple right apical nodules including new 7 mm solid nodule  (Series  4/85). Follow-up CT scan 6 months time. Bilateral moderate sized pleural effusions with adjacent lower lobe  atelectasis.  No pneumothorax or parenchymal mass.  - zosyn started 10/13  - PICC line 10/13 (present from admission) -- was removed     #Severe Pharyngeal Dysphagia  #Hemorrhagic Shock Secondary to Hematochezia from Duodenal Ulcer Bleed s/p OVESCO placement 09/23  #PAULA / CKD  - started on HD   #Acute Femoral DVT s/p IVC filter 10/6  #transaminitis - suspected shock liver  #History of Left Foot OM w/ surrounding Cellulitis  - CT LLE (9/16): No CT evidence of osteomyelitis or necrotizing infection. No drainable collection seen, within the limitations of a noncontrast exam.  #Sacral Decubitus Ulcer POA  #Paroxysmal Afib, chronic    Recommendations  - cause of decompensation unclear -- recent blood cx have been negative; confused -- but abdominal tenderness   - vancomycin 1000 mg x 1 -- check levels with AM labs tomorrow  - broden zosyn to meropenem 500 mg q 12 hours given worsening decompensation   - check Urine Cx and UA   - follow-up blood cx 10/15  - follow-up Culture tip 10/13  - abdominal tenderness on exam -- KUB, but when stable, would send for repeat CT Abd/pelvis   - check fungitell -- if lactate acid rising over the day, give capsofungin 70 mg x 1    Please call or message on [employer] Teams if with any questions.  Spectra [phone]

## 2023-10-15 NOTE — PROGRESS NOTE ADULT - ASSESSMENT
84 yo m ho DM, COPD, anemia, paroxysmal afib on xarelto, HFrEF, DM coming in from nursing home for AMS x 2 days. Found to have toxic metabolic encephalopathy with PAULA.  PAULA/ toxic metabolic encephalopathy/ HAGMA/ HFrEF/ hypernatremia  possible ATN iso hypotension and possible sepsis/ vanc toxicity  no hydro on imaging  creat trend noted   monitor bladder scan daily  no hd today   document UO /  bumex 2 q 12 / strict I and O / follow BMP  may be able to hold HD not volume overloaded / though cachectic last BMP noted   phos level noted low;  d/c sevelamer  BP noted / requiring pressor support, midodrine   sepsis w/u, pending KUB/CTAP   s/p IVC filter   iron stores noted / keep Hb >7; d/c iv iron with acute infection  abx per ID - dose per eGFR  will follow    prognosis poor

## 2023-10-15 NOTE — PROGRESS NOTE ADULT - SUBJECTIVE AND OBJECTIVE BOX
MEDICINE ATTENDING PROGRESS NOTE  83 year old male patient known to have COPD. DM, atrial fibrillation, HFrEF, anemia, lymphedema, and recent left foot OM who was brought from the NH to the ED on 09/15 for evaluation of altered mental status, found to have sepsis in setting of recent left foot OM, admitted for further management. Stay was complicated by hemorrhagic shock and drop in Hb secondary to hematochezia on 09/23, Status post EGD on 09/23 revealing a bleeding duodenal ulcer s/p OVESCO placement. Stay also complicated by an episode of confusion and hypoxia on 09/26 s/p found to have possible stroke on 10/10.  Patient was found to have Acute Femoral DVT s/p IVC filter 10/6. Patient now transferred to general floor for further management.    Interval/Overnight Events  - Patient reports no acute complaints  - RN: Tmax 101.3 in last 24hrs  - labile BP not responding to IVF bolus -> now levophed drip  - abdominal tenderness on exam --> will get KUB, but when stable, would send for repeat CT Abd/pelvis   - lactate increased from 2.2 -> 3.0  - wbc increases to 23K from 18K. There is no left shift  - ABG pending  - f/u BCx, UCx  - will check for fungitell  - given lactate acid rising today, will give caspofungin 70 mg x 1 as per ID  - was on zosyn -> siwthc to vanc and meropemem  - Critical care consulted earlier for further manageemnt and possible SDU upgrade  - ID on the case  - Gabe PENG [phone] has been updated  - Patient is critically ill with vital organ impairment or failure. Patient is at high risk for complications, morbidity and mortality. There is a very high probability of imminent or life threatening deterioration in the patient's condition. Family has been updated. Patient is currently FULL CODE      ROS  General: Denies fevers, chills, nightsweats, weight loss  HEENT: Denies headache, rhinorrhea, sore throat, eye pain  CV: Denies CP, palpitations  PULM: Denies SOB, cough  GI: Denies abdominal pain, diarrhea  : Denies dysuria, hematuria  MSK: Denies arthralgias  SKIN: Denies rash   NEURO: Denies paresthesias, weakness  PSYCH: Denies depression    MEDICATIONS  (STANDING):  budesonide 160 MICROgram(s)/formoterol 4.5 MICROgram(s) Inhaler 2 Puff(s) Inhalation two times a day  buMETAnide 2 milliGRAM(s) Oral every 12 hours  caspofungin IVPB 70 milliGRAM(s) IV Intermittent once  chlorhexidine 2% Cloths 1 Application(s) Topical <User Schedule>  collagenase Ointment 1 Application(s) Topical two times a day  darbepoetin Injectable Syringe 25 MICROGram(s) IV Push <User Schedule>  dextrose 5%. 1000 milliLiter(s) (100 mL/Hr) IV Continuous <Continuous>  dextrose 50% Injectable 25 Gram(s) IV Push once  dextrose 50% Injectable 25 Gram(s) IV Push once  dextrose 50% Injectable 12.5 Gram(s) IV Push once  glucagon  Injectable 1 milliGRAM(s) IntraMuscular once  heparin   Injectable 5000 Unit(s) SubCutaneous every 8 hours  insulin lispro (ADMELOG) corrective regimen sliding scale   SubCutaneous three times a day before meals  iron sucrose IVPB 100 milliGRAM(s) IV Intermittent <User Schedule>  meropenem  IVPB      meropenem  IVPB 500 milliGRAM(s) IV Intermittent once  midodrine. 10 milliGRAM(s) Oral every 8 hours  norepinephrine Infusion 0.05 MICROgram(s)/kG/Min (8.63 mL/Hr) IV Continuous <Continuous>  pantoprazole   Suspension 40 milliGRAM(s) Oral two times a day  polyethylene glycol 3350 17 Gram(s) Oral every 12 hours  senna 2 Tablet(s) Oral at bedtime  sevelamer carbonate Powder 800 milliGRAM(s) Oral three times a day with meals  sevelamer carbonate Powder 800 milliGRAM(s) Enteral Tube three times a day with meals    MEDICATIONS  (PRN):  acetaminophen     Tablet .. 650 milliGRAM(s) Oral every 6 hours PRN Temp greater or equal to 38.5C (101.3F), Mild Pain (1 - 3), Moderate Pain (4 - 6)  albuterol    90 MICROgram(s) HFA Inhaler 1 Puff(s) Inhalation every 6 hours PRN for shortness of breath and/or wheezing  atropine Injectable 1 milliGRAM(s) IntraMuscular once PRN HR < 30  dextrose Oral Gel 15 Gram(s) Oral once PRN Blood Glucose LESS THAN 70 milliGRAM(s)/deciliter  HYDROmorphone  Injectable 0.25 milliGRAM(s) IV Push every 6 hours PRN Severe Pain (7 - 10)    ANTIBIOTICS:  caspofungin IVPB 70 milliGRAM(s) IV Intermittent once  meropenem  IVPB      meropenem  IVPB 500 milliGRAM(s) IV Intermittent once    VITALS:  T(F): 98.4, Max: 101.3 (10-14-23 @ 13:01)  HR: 81  BP: 99/58  RR: 20Vital Signs Last 24 Hrs  T(C): 36.9 (15 Oct 2023 05:00), Max: 38.5 (14 Oct 2023 13:01)  T(F): 98.4 (15 Oct 2023 05:00), Max: 101.3 (14 Oct 2023 13:01)  HR: 81 (15 Oct 2023 11:26) (68 - 91)  BP: 99/58 (15 Oct 2023 11:26) (70/51 - 113/61)  BP(mean): 64 (15 Oct 2023 05:00) (64 - 65)  RR: 20 (15 Oct 2023 05:00) (18 - 20)  SpO2: 100% (15 Oct 2023 05:00) (95% - 100%)    Parameters below as of 15 Oct 2023 05:00  Patient On (Oxygen Delivery Method): room air     I&O's Summary    14 Oct 2023 07:01  -  15 Oct 2023 07:00  --------------------------------------------------------  IN: 2700 mL / OUT: 0 mL / NET: 2700 mL      PHYSICAL EXAM:  Gen: NAD, resting in bed  HEENT: Normocephalic, atraumatic  Neck: supple, no lymphadenopathy  CV: Regular rate & regular rhythm  Lungs: CTABL no wheeze  Abdomen: Soft, NTND+ BS present  Ext: Warm, well perfused no CCE  Neuro: non focal, awake, CN II-XII intact   Skin: no rash, no erythema  Psych: no SI, HI, Hallucination     LABS:                        8.5    23.42 )-----------( 161      ( 15 Oct 2023 05:35 )             28.0     10-15    144  |  107  |  50<H>  ----------------------------<  93  4.5   |  25  |  2.4<H>    Ca    7.2<L>      15 Oct 2023 05:35  Phos  2.2     10-15  Mg     1.8     10-15    TPro  5.5<L>  /  Alb  2.1<L>  /  TBili  0.5  /  DBili  x   /  AST  19  /  ALT  32  /  AlkPhos  105  10-15      LIVER FUNCTIONS - ( 15 Oct 2023 05:35 )  Alb: 2.1 g/dL / Pro: 5.5 g/dL / ALK PHOS: 105 U/L / ALT: 32 U/L / AST: 19 U/L / GGT: x           PTT - ( 14 Oct 2023 08:02 )  PTT:32.8 sec    MICROBIOLOGY:  Urinalysis Basic - ( 15 Oct 2023 05:35 )  Color: x / Appearance: x / SG: x / pH: x  Gluc: 93 mg/dL / Ketone: x  / Bili: x / Urobili: x   Blood: x / Protein: x / Nitrite: x   Leuk Esterase: x / RBC: x / WBC x   Sq Epi: x / Non Sq Epi: x / Bacteria: x    Culture - Blood (collected 10-13-23 @ 16:10)  Source: .Blood Blood  Preliminary Report (10-14-23 @ 23:01):    No growth at 24 hours    Culture - Blood (collected 10-12-23 @ 16:16)  Source: .Blood None  Preliminary Report (10-15-23 @ 04:01):    No growth at 48 Hours    SARS-CoV-2: NotDetec (12 Oct 2023 18:47)    Lactate, Blood: 3.0 mmol/L (10-15-23 @ 08:11)  Lactate, Blood: 2.2 mmol/L (10-15-23 @ 01:56)  Lactate, Blood: 1.9 mmol/L (10-12-23 @ 16:16)    Hepatitis B Surface Antigen: Nonreact (09-27-23 @ 17:44)  MRSA PCR Result.: Negative (09-24-23 @ 04:32)    IMAGING:  Xray Chest 1 View- PORTABLE-Urgent (Xray Chest 1 View- PORTABLE-Urgent .) (10.11.23 @ 14:59)   1. Unchanged bibasilar opacities, right greater than left.  2. Lines and tubes as above.    CARDIOLOGY TESTING  12 Lead ECG:   Ventricular Rate 98 BPM  Atrial Rate 101 BPM  QRS Duration 100 ms  Q-T Interval 372 ms  QTC Calculation(Bazett) 474 ms  R Axis -61 degrees  T Axis 68 degrees    Diagnosis Line Atrial fibrillation  Left axis deviation  Low voltage QRS  Cannot rule out Anterior infarct , age undetermined  Abnormal ECG    Confirmed by austen colbert ([id number]) on 10/4/2023 7:03:14 PM (10-04-23 @ 15:40)    12 Lead ECG:   Ventricular Rate 143 BPM  Atrial Rate 143 BPM  P-R Interval 74 ms  QRS Duration 4 ms  Q-T Interval 194 ms  QTC Calculation(Bazett) 299 ms  P Axis 11 degrees  R Axis 0 degrees  T Axis -32 degrees    Diagnosis Line *** Poor data quality, interpretation may be adversely affected  Probable atrial fibrillation  Indeterminate axis  Nonspecific ST and T wave abnormality  Abnormal ECG    Confirmed by austen colbert ([id number]) on 9/30/2023 11:41:21 AM (09-30-23 @ 10:19)    ASSESSMENT/PLAN:  83 year old male patient known to have COPD. DM, atrial fibrillation, HFrEF, anemia, lymphedema, and recent left foot OM who was brought from the NH to the ED on 09/15 for evaluation of altered mental status, found to have sepsis in setting of recent left foot OM, admitted for further management. Stay was complicated by hemorrhagic shock and drop in Hb secondary to hematochezia on 09/23, Status post EGD on 09/23 revealing a bleeding duodenal ulcer s/p OVESCO placement. Stay also complicated by an episode of confusion and hypoxia on 09/26 s/p found to have possible stroke on 10/10.  Patient was found to have Acute Femoral DVT s/p IVC filter 10/6. Patient now transferred to general floor for further management.    #Fever, concern for aspiration pneumonitis vs aspiration PNA, r/o PICC line infection  #Acute hypoxemic respiratory failure on NC  - Patient with new onset of Fever and hypotension since 10/12. It started after starting NGT feeding. BP improves 10/13 and pt is less lethargic  - Of note, Patient has PICC line since August 2023 at [facility] -> Will remove 10/13 and send tips for culture  - wbc increases from 10k to 21k in less than 24hrs with PMN > 94% -> this is likely reactive in setting of suspected aspiration pneumonitis  - lactate 1.9 (10/12), ESR 30 (10/12),  (10/12)  - f/u fever w/u (f/u BCx, Procalcitonin, daily lactate, ESR/CRP)  - Will get ABG with lactate today  - CT chest (10/12) did not show obvious aspiration pneumonia per radiology read, but did show Bilateral moderate sized pleural effusions on CT chest  - will slow feed and do aspiration precaution  - started on midodrine standing but now PRN as BP improves  - start zosyn to cover aspiration pneumonia  - Cont oxygen supplementation.  - Will get pulm consult for diagnostic thoracentesis given Bilateral moderate sized pleural effusions on CT chest    #Severe Pharyngeal Dysphagia  - HO aspiration pneumonitis SP ABX   -  Patient was tolerating PO intake at NH prior to admission  -  After extubation on 09/24, patient became confused s/p upgrade on 09/26 for hypoxia, AMS, and pneumonia. He has been NPO since then with multiple speech swallow evaluations.  - FEES Study on 10/11: severe pharyngeal dysphagia, recommendation to keep NPO with alternate means -> now on NGT for feeding  - GI for evaluation of PEG tube placement after failing FEES study on 10/11.  - GI: Will hold off PEG tube placement for now pending sepsis workup (in setting of fever on 10/12) and pending neurology evaluation of age indeterminate infarct on CTH 10/10 + Will need neurological prognostication prior to planning PEG tube placement + Continue NG tube feeding for now    #Metabolic Encephalopathy 2/2 septic Shock 2/2 suspected aspiration pneumonia   - procal 1.7  - rapid response after TDC due to acute change in mental status - ABG notable for elevated lactate  - pt febrile to 101.6 and elevated lactate to 8.4, rapidly became hypotensive on 10/4 - started on levophed  - off pressor, midodrine now descalated to prn prior to hemodialysis   - CXR (9/28) Bilateral pleural effusion/opacity unchanged. No air leak.  - DC  vancomycin and cefepime as per ID   - lactate improving  - follow up cultures, cultures neg   - ID consult appreciated, no further antibiotics   - On 2L NC  - C/w albuterol PRN    #Hemorrhagic Shock Secondary to Hematochezia from Duodenal Ulcer Bleed s/p OVESCO placement 09/23  #UGIB secondary to Duodenal ulcer SP EGD   #Hematochezia resolved   #Hemorrhagic Shock resolved   #Blood Loss Anemia  # Acute Drop in Hemoglobin- Improved  - Status Post EGD on 09/23: blood clots in fundus and antrum; 2cm actively bleeding duodenal ulcer with spurting vessel (Cannon 1a) in sweep on base s/p 10cc epi and s/p 11/6 OVESCO placement for hemostasis  - Continue PO Protonix 40mg BID via NG tube for now. Was on IV pantoprazole drip (09/23-09/25)  - Anticoagulation resumption once Hgb stabilizes (s/p 1 unit pRBCs 10/12)  - Monitor BM for recurrence of hematochezia or melena  - Please avoid any NSAIDs  - If any unstable bleed, please call GI   - Follow up with [facility] located at [address] [place] [address]. Phone Number: [phone]      #Anemia of acute blood loss   #Hemorrhagic Shock Secondary to Hematochezia from Duodenal Ulcer Bleed s/p OVESCO placement 09/23  - Trend hgb, transfuse blood PRN  - continue with holding therapeutic AC  - s/p IV Protamine sulfate 36mg x1 dose on 09/23  - GI f/u appreciated  - S/p IV Protonix 40mg BID -> Ok to switch to PO BID as per GI   - Avoid any NSAIDs  - overall the pt risk and benefit is goes against Anticoagulation, will further discuss with family , , dw brother regarding the high risk of bleeding and understands the reasons to hold anticoagulation. And understands the risk of cva with Afib.     #Acutely worsening uremia and acute kidney failure most likely 2/2 ATN - now on HD through right IJ Eads  #hypernatremia - resolving   #PAULA / CKD stable non oliguric  - possible ATN iso hypotension and possible sepsis/ vanc toxicity  - Cr stable for now -> Close F/U of BUN/Crea/ Lytes and UO  - no hydro on imaging  - udall placement 9/27/23  started on HD 9/27 but did not tolerate it with access problems  - udall changed 9/28  he received HD 9/28 and 9/30  - c/w phoslo 2/2/2   - off sodium bicarbonate   - s/p 250 cc LR bolus for hypernatremia  - s/p D5w @ 80 cc/hr  - f/u w/nephro  - hold BUMEX 2 mg BID given hypotension; resumes once BP stabilizes  - TDC by IR on 10/4    #Acute Femoral DVT s/p IVC filter 10/6  #Elevated dimer  - duplex neg on 9/29, repeat   - overall the pt risk and benefit is goes against Anticoagulation, will further discuss with family , dw brother regarding the high risk of bleeding and understands to hold anticoagulation And understands the risk of cva with Afib.    -s/p IVC filter 10/6    #Possible Age/CVA  - Indeterminate Left Subinsular Stroke on 10/10    #transaminitis - resolving   - likely 2/2 liver shock 2/2 hypotension   - LFTS improving   - follow up RUQ   - hepatitis neg   - worsened today  - antibiotics dc  - trend   - if continues to worsen will get hepatology     #Asymptomatic Cholelithiasis  - Noted on CT  - LFTs noted > Trend LFTs  - Monitor for symptoms    #Paroxysmal Afib, chronic; uncontrolled rate  #angelika cardia in evening on cardiac telemonitoring   - HOLDING therapeutic AC; resume once hgb is stable  - hold  metoprolol tartrate 12.5 mg q12h for hypotension; resumes once BP improves    #History of Left Foot OM w/ surrounding Cellulitis  #Sacral Decubitus Ulcer POA  - CT LLE (9/16): No CT evidence of osteomyelitis or necrotizing infection. No drainable collection seen, within the limitations of a noncontrast exam.  - Increased frailty and decreased physical capacity  - as per previous notes the team Discussed with ID attending: patient is unlikely to benefit from prolonged therapy with cefepime+vanco (to cover possible OM) due to adverse outcomes associated kidney dysfunction, cognitive impact ...etc )  - c/w Local wound care; offloading boots bilaterally, frequently turning and positioning, prevent pressure injury, keep skin clean, and monitor for wound changes and notify provider as per podiatry    #Supportive Management:  Dispo:   DVT Ppx: heparin   Injectable 5000 Unit(s) SubCutaneous every 8 hours  GI Ppx: pantoprazole   Suspension 40 milliGRAM(s) Oral two times a day  Diet:  Diet, NPO with Tube Feed:   Tube Feeding Modality: Nasogastric  Glucerna 1.2 Ethan  Total Volume for 24 Hours (mL): 1500  Bolus  Total Volume of Bolus (mL):  375  Tube Feed Frequency: Every 6 hours   Tube Feed Start Time: 00:00  Bolus Feed Rate (mL per Hour): 375   Bolus Feed Duration (in Hours): 6  Bolus Feed Instructions:  start feeds at 125 mL and increase as tolerated until goal rate of 375 mL is met  Free Water Flush Instructions:  150 mL pre + post feeds  No Carb Prosource (1pkg = 15gms Protein)     Qty per Day:  2 (10-13-23 @ 12:23) [Active]  Diet, Soft and Bite Sized:   DASH/TLC Sodium & Cholesterol Restricted  Mildly Thick Liquids (MILDTHICKLIQS)  Free Water Flush Instructions:  please thicken each Ensure with 2 packs of thickeners     Qty per Day:  Magic cup 2x/day  Supplement Feeding Modality:  Oral  Ensure Plus High Protein Cans or Servings Per Day:  1       Frequency:  Two Times a day (10-09-23 @ 13:43) [Pending Verification By Attending]  Diet, Pureed:   Mildly Thick Liquids (MILDTHICKLIQS)  Free Water Flush Instructions:  *** please add 2 packets of thickener per NEPRO carton ***  Supplement Feeding Modality:  Oral  Nepro Cans or Servings Per Day:  3       Frequency:  Daily (10-03-23 @ 14:36) [Pending Verification By Attending]  Diet, Minced and Moist:   Mildly Thick Liquids (MILDTHICKLIQS)  Low Sodium     Qty per Day:  magic cup 2x/day  Supplement Feeding Modality:  Oral  Ensure Plus High Protein Cans or Servings Per Day:  1       Frequency:  Three Times a day (09-25-23 @ 15:30) [Pending Verification By Attending]        Total time spent to complete patient's bedside assessment, review medical chart, discuss medical plan of care with covering medical team was more than 45 minutes with >50% of time spent face to face with patient, discussion with patient/family and/or coordination of care    Resident's notes reviewed. My notes supersede resident's notes in case of discrepancy.    Finn Sam MD/[place]ard  Attending Physician MEDICINE ATTENDING PROGRESS NOTE  83 year old male patient known to have COPD. DM, atrial fibrillation, HFrEF, anemia, lymphedema, and recent left foot OM who was brought from the NH to the ED on 09/15 for evaluation of altered mental status, found to have sepsis in setting of recent left foot OM, admitted for further management. Stay was complicated by hemorrhagic shock and drop in Hb secondary to hematochezia on 09/23, Status post EGD on 09/23 revealing a bleeding duodenal ulcer s/p OVESCO placement. Stay also complicated by an episode of confusion and hypoxia on 09/26 s/p found to have possible stroke on 10/10.  Patient was found to have Acute Femoral DVT s/p IVC filter 10/6. Patient now transferred to general floor for further management.    Interval/Overnight Events  - Patient reports no acute complaints  - RN: Tmax 101.3 in last 24hrs  - labile BP not responding to IVF bolus -> now on levophed drip  - abdominal tenderness on exam --> will get KUB, but when stable, would send for repeat CT Abd/pelvis   - lactate increased from 2.2 -> 3.0  - wbc increases to 23K from 18K. There is no left shift  - ABG pending  - f/u BCx, UCx  - will check for fungitell  - given lactate acid rising today, will give caspofungin 70 mg x 1 as per ID  - was on zosyn -> switch to vanc and Meropenem  - Critical care consulted earlier for further management and possible SDU upgrade  - ID on the case  - Gabe PENG [phone] has been updated  - Patient is critically ill with vital organ impairment or failure. Patient is at high risk for complications, morbidity and mortality. There is a very high probability of imminent or life threatening deterioration in the patient's condition. Family has been updated. Patient is currently FULL CODE      ROS  General: Denies fevers, chills, nightsweats, weight loss  HEENT: Denies headache, rhinorrhea, sore throat, eye pain  CV: Denies CP, palpitations  PULM: Denies SOB, cough  GI: Denies abdominal pain, diarrhea  : Denies dysuria, hematuria  MSK: Denies arthralgias  SKIN: Denies rash   NEURO: Denies paresthesias, weakness  PSYCH: Denies depression    MEDICATIONS  (STANDING):  budesonide 160 MICROgram(s)/formoterol 4.5 MICROgram(s) Inhaler 2 Puff(s) Inhalation two times a day  buMETAnide 2 milliGRAM(s) Oral every 12 hours  caspofungin IVPB 70 milliGRAM(s) IV Intermittent once  chlorhexidine 2% Cloths 1 Application(s) Topical <User Schedule>  collagenase Ointment 1 Application(s) Topical two times a day  darbepoetin Injectable Syringe 25 MICROGram(s) IV Push <User Schedule>  dextrose 5%. 1000 milliLiter(s) (100 mL/Hr) IV Continuous <Continuous>  dextrose 50% Injectable 25 Gram(s) IV Push once  dextrose 50% Injectable 25 Gram(s) IV Push once  dextrose 50% Injectable 12.5 Gram(s) IV Push once  glucagon  Injectable 1 milliGRAM(s) IntraMuscular once  heparin   Injectable 5000 Unit(s) SubCutaneous every 8 hours  insulin lispro (ADMELOG) corrective regimen sliding scale   SubCutaneous three times a day before meals  iron sucrose IVPB 100 milliGRAM(s) IV Intermittent <User Schedule>  meropenem  IVPB      meropenem  IVPB 500 milliGRAM(s) IV Intermittent once  midodrine. 10 milliGRAM(s) Oral every 8 hours  norepinephrine Infusion 0.05 MICROgram(s)/kG/Min (8.63 mL/Hr) IV Continuous <Continuous>  pantoprazole   Suspension 40 milliGRAM(s) Oral two times a day  polyethylene glycol 3350 17 Gram(s) Oral every 12 hours  senna 2 Tablet(s) Oral at bedtime  sevelamer carbonate Powder 800 milliGRAM(s) Oral three times a day with meals  sevelamer carbonate Powder 800 milliGRAM(s) Enteral Tube three times a day with meals    MEDICATIONS  (PRN):  acetaminophen     Tablet .. 650 milliGRAM(s) Oral every 6 hours PRN Temp greater or equal to 38.5C (101.3F), Mild Pain (1 - 3), Moderate Pain (4 - 6)  albuterol    90 MICROgram(s) HFA Inhaler 1 Puff(s) Inhalation every 6 hours PRN for shortness of breath and/or wheezing  atropine Injectable 1 milliGRAM(s) IntraMuscular once PRN HR < 30  dextrose Oral Gel 15 Gram(s) Oral once PRN Blood Glucose LESS THAN 70 milliGRAM(s)/deciliter  HYDROmorphone  Injectable 0.25 milliGRAM(s) IV Push every 6 hours PRN Severe Pain (7 - 10)    ANTIBIOTICS:  caspofungin IVPB 70 milliGRAM(s) IV Intermittent once  meropenem  IVPB      meropenem  IVPB 500 milliGRAM(s) IV Intermittent once    VITALS:  T(F): 98.4, Max: 101.3 (10-14-23 @ 13:01)  HR: 81  BP: 99/58  RR: 20Vital Signs Last 24 Hrs  T(C): 36.9 (15 Oct 2023 05:00), Max: 38.5 (14 Oct 2023 13:01)  T(F): 98.4 (15 Oct 2023 05:00), Max: 101.3 (14 Oct 2023 13:01)  HR: 81 (15 Oct 2023 11:26) (68 - 91)  BP: 99/58 (15 Oct 2023 11:26) (70/51 - 113/61)  BP(mean): 64 (15 Oct 2023 05:00) (64 - 65)  RR: 20 (15 Oct 2023 05:00) (18 - 20)  SpO2: 100% (15 Oct 2023 05:00) (95% - 100%)    Parameters below as of 15 Oct 2023 05:00  Patient On (Oxygen Delivery Method): room air     I&O's Summary    14 Oct 2023 07:01  -  15 Oct 2023 07:00  --------------------------------------------------------  IN: 2700 mL / OUT: 0 mL / NET: 2700 mL      PHYSICAL EXAM:  Gen: NAD, resting in bed  HEENT: Normocephalic, atraumatic  Neck: supple, no lymphadenopathy  CV: Regular rate & regular rhythm  Lungs: CTABL no wheeze  Abdomen: Soft, NTND+ BS present  Ext: Warm, well perfused no CCE  Neuro: non focal, awake, CN II-XII intact   Skin: no rash, no erythema  Psych: no SI, HI, Hallucination     LABS:                        8.5    23.42 )-----------( 161      ( 15 Oct 2023 05:35 )             28.0     10-15    144  |  107  |  50<H>  ----------------------------<  93  4.5   |  25  |  2.4<H>    Ca    7.2<L>      15 Oct 2023 05:35  Phos  2.2     10-15  Mg     1.8     10-15    TPro  5.5<L>  /  Alb  2.1<L>  /  TBili  0.5  /  DBili  x   /  AST  19  /  ALT  32  /  AlkPhos  105  10-15      LIVER FUNCTIONS - ( 15 Oct 2023 05:35 )  Alb: 2.1 g/dL / Pro: 5.5 g/dL / ALK PHOS: 105 U/L / ALT: 32 U/L / AST: 19 U/L / GGT: x           PTT - ( 14 Oct 2023 08:02 )  PTT:32.8 sec    MICROBIOLOGY:  Urinalysis Basic - ( 15 Oct 2023 05:35 )  Color: x / Appearance: x / SG: x / pH: x  Gluc: 93 mg/dL / Ketone: x  / Bili: x / Urobili: x   Blood: x / Protein: x / Nitrite: x   Leuk Esterase: x / RBC: x / WBC x   Sq Epi: x / Non Sq Epi: x / Bacteria: x    Culture - Blood (collected 10-13-23 @ 16:10)  Source: .Blood Blood  Preliminary Report (10-14-23 @ 23:01):    No growth at 24 hours    Culture - Blood (collected 10-12-23 @ 16:16)  Source: .Blood None  Preliminary Report (10-15-23 @ 04:01):    No growth at 48 Hours    SARS-CoV-2: NotDetec (12 Oct 2023 18:47)    Lactate, Blood: 3.0 mmol/L (10-15-23 @ 08:11)  Lactate, Blood: 2.2 mmol/L (10-15-23 @ 01:56)  Lactate, Blood: 1.9 mmol/L (10-12-23 @ 16:16)    Hepatitis B Surface Antigen: Nonreact (09-27-23 @ 17:44)  MRSA PCR Result.: Negative (09-24-23 @ 04:32)    IMAGING:  Xray Chest 1 View- PORTABLE-Urgent (Xray Chest 1 View- PORTABLE-Urgent .) (10.11.23 @ 14:59)   1. Unchanged bibasilar opacities, right greater than left.  2. Lines and tubes as above.    CARDIOLOGY TESTING  12 Lead ECG:   Ventricular Rate 98 BPM  Atrial Rate 101 BPM  QRS Duration 100 ms  Q-T Interval 372 ms  QTC Calculation(Bazett) 474 ms  R Axis -61 degrees  T Axis 68 degrees    Diagnosis Line Atrial fibrillation  Left axis deviation  Low voltage QRS  Cannot rule out Anterior infarct , age undetermined  Abnormal ECG    Confirmed by austen colbetr ([id number]) on 10/4/2023 7:03:14 PM (10-04-23 @ 15:40)    12 Lead ECG:   Ventricular Rate 143 BPM  Atrial Rate 143 BPM  P-R Interval 74 ms  QRS Duration 4 ms  Q-T Interval 194 ms  QTC Calculation(Bazett) 299 ms  P Axis 11 degrees  R Axis 0 degrees  T Axis -32 degrees    Diagnosis Line *** Poor data quality, interpretation may be adversely affected  Probable atrial fibrillation  Indeterminate axis  Nonspecific ST and T wave abnormality  Abnormal ECG    Confirmed by austen colbert ([id number]) on 9/30/2023 11:41:21 AM (09-30-23 @ 10:19)    ASSESSMENT/PLAN:  83 year old male patient known to have COPD. DM, atrial fibrillation, HFrEF, anemia, lymphedema, and recent left foot OM who was brought from the NH to the ED on 09/15 for evaluation of altered mental status, found to have sepsis in setting of recent left foot OM, admitted for further management. Stay was complicated by hemorrhagic shock and drop in Hb secondary to hematochezia on 09/23, Status post EGD on 09/23 revealing a bleeding duodenal ulcer s/p OVESCO placement. Stay also complicated by an episode of confusion and hypoxia on 09/26 s/p found to have possible stroke on 10/10.  Patient was found to have Acute Femoral DVT s/p IVC filter 10/6. Patient now transferred to general floor for further management.    #Fever, concern for aspiration pneumonitis vs aspiration PNA, r/o PICC line infection  #Acute hypoxemic respiratory failure on NC  - Patient with new onset of Fever and hypotension since 10/12. It started after starting NGT feeding. BP improves 10/13 and pt is less lethargic  - Of note, Patient has P[facility] line since August 2023 at [facility] -> Will remove 10/13 and send tips for culture  - wbc increases from 10k to 21k in less than 24hrs with PMN > 94% -> this is likely reactive in setting of suspected aspiration pneumonitis  - lactate 1.9 (10/12), ESR 30 (10/12),  (10/12)  - f/u fever w/u (f/u BCx, Procalcitonin, daily lactate, ESR/CRP)  - Will get ABG with lactate today  - CT chest (10/12) did not show obvious aspiration pneumonia per radiology read, but did show Bilateral moderate sized pleural effusions on CT chest  - will slow feed and do aspiration precaution  - started on midodrine standing but now PRN as BP improves  - start zosyn to cover aspiration pneumonia  - Cont oxygen supplementation.  - Will get pulm consult for diagnostic thoracentesis given Bilateral moderate sized pleural effusions on CT chest    #Severe Pharyngeal Dysphagia  - HO aspiration pneumonitis SP ABX   -  Patient was tolerating PO intake at NH prior to admission  -  After extubation on 09/24, patient became confused s/p upgrade on 09/26 for hypoxia, AMS, and pneumonia. He has been NPO since then with multiple speech swallow evaluations.  - FEES Study on 10/11: severe pharyngeal dysphagia, recommendation to keep NPO with alternate means -> now on NGT for feeding  - GI for evaluation of PEG tube placement after failing FEES study on 10/11.  - GI: Will hold off PEG tube placement for now pending sepsis workup (in setting of fever on 10/12) and pending neurology evaluation of age indeterminate infarct on CTH 10/10 + Will need neurological prognostication prior to planning PEG tube placement + Continue NG tube feeding for now    #Metabolic Encephalopathy 2/2 septic Shock 2/2 suspected aspiration pneumonia   - procal 1.7  - rapid response after TDC due to acute change in mental status - ABG notable for elevated lactate  - pt febrile to 101.6 and elevated lactate to 8.4, rapidly became hypotensive on 10/4 - started on levophed  - off pressor, midodrine now descalated to prn prior to hemodialysis   - CXR (9/28) Bilateral pleural effusion/opacity unchanged. No air leak.  - DC  vancomycin and cefepime as per ID   - lactate improving  - follow up cultures, cultures neg   - ID consult appreciated, no further antibiotics   - On 2L NC  - C/w albuterol PRN    #Hemorrhagic Shock Secondary to Hematochezia from Duodenal Ulcer Bleed s/p OVESCO placement 09/23  #UGIB secondary to Duodenal ulcer SP EGD   #Hematochezia resolved   #Hemorrhagic Shock resolved   #Blood Loss Anemia  # Acute Drop in Hemoglobin- Improved  - Status Post EGD on 09/23: blood clots in fundus and antrum; 2cm actively bleeding duodenal ulcer with spurting vessel (Blaine 1a) in sweep on base s/p 10cc epi and s/p 11/6 OVESCO placement for hemostasis  - Continue PO Protonix 40mg BID via NG tube for now. Was on IV pantoprazole drip (09/23-09/25)  - Anticoagulation resumption once Hgb stabilizes (s/p 1 unit pRBCs 10/12)  - Monitor BM for recurrence of hematochezia or melena  - Please avoid any NSAIDs  - If any unstable bleed, please call GI   - [facility] up with [facility] located at [address] [place] [address]. Phone Number: [phone]      #Anemia of acute blood loss   #Hemorrhagic Shock Secondary to Hematochezia from Duodenal Ulcer Bleed s/p OVESCO placement 09/23  - Trend hgb, transfuse blood PRN  - continue with holding therapeutic AC  - s/p IV Protamine sulfate 36mg x1 dose on 09/23  - GI f/u appreciated  - S/p IV Protonix 40mg BID -> Ok to switch to PO BID as per GI   - Avoid any NSAIDs  - overall the pt risk and benefit is goes against Anticoagulation, will further discuss with family , , dw brother regarding the high risk of bleeding and understands the reasons to hold anticoagulation. And understands the risk of cva with Afib.     #Acutely worsening uremia and acute kidney failure most likely 2/2 ATN - now on HD through right IJ [place]  #hypernatremia - resolving   #PAULA / CKD stable non oliguric  - possible ATN iso hypotension and possible sepsis/ vanc toxicity  - Cr stable for now -> Close F/U of BUN/Crea/ Lytes and UO  - no hydro on imaging  - udall placement 9/27/23  started on HD 9/27 but did not tolerate it with access problems  - udall changed 9/28  he received HD 9/28 and 9/30  - c/w phoslo 2/2/2   - off sodium bicarbonate   - s/p 250 cc LR bolus for hypernatremia  - s/p D5w @ 80 cc/hr  - f/u w/nephro  - hold BUMEX 2 mg BID given hypotension; resumes once BP stabilizes  - TDC by IR on 10/4    #Acute Femoral DVT s/p IVC filter 10/6  #Elevated dimer  - duplex neg on 9/29, repeat   - overall the pt risk and benefit is goes against Anticoagulation, will further discuss with family , dw brother regarding the high risk of bleeding and understands to hold anticoagulation And understands the risk of cva with Afib.    -s/p IVC filter 10/6    #Possible Age/CVA  - Indeterminate Left Subinsular Stroke on 10/10    #transaminitis - resolving   - likely 2/2 liver shock 2/2 hypotension   - LFTS improving   - follow up RUQ   - hepatitis neg   - worsened today  - antibiotics dc  - trend   - if continues to worsen will get hepatology     #Asymptomatic Cholelithiasis  - Noted on CT  - LFTs noted > Trend LFTs  - Monitor for symptoms    #Paroxysmal Afib, chronic; uncontrolled rate  #angelika cardia in evening on cardiac telemonitoring   - HOLDING therapeutic AC; resume once hgb is stable  - hold  metoprolol tartrate 12.5 mg q12h for hypotension; resumes once BP improves    #History of Left Foot OM w/ surrounding Cellulitis  #Sacral Decubitus Ulcer POA  - CT LLE (9/16): No CT evidence of osteomyelitis or necrotizing infection. No drainable collection seen, within the limitations of a noncontrast exam.  - Increased frailty and decreased physical capacity  - as per previous notes the team Discussed with ID attending: patient is unlikely to benefit from prolonged therapy with cefepime+vanco (to cover possible OM) due to adverse outcomes associated kidney dysfunction, cognitive impact ...etc )  - c/w Local wound care; offloading boots bilaterally, frequently turning and positioning, prevent pressure injury, keep skin clean, and monitor for wound changes and notify provider as per podiatry    #Supportive Management:  Dispo:   DVT Ppx: heparin   Injectable 5000 Unit(s) SubCutaneous every 8 hours  GI Ppx: pantoprazole   Suspension 40 milliGRAM(s) Oral two times a day  Diet:  Diet, NPO with Tube Feed:   Tube Feeding Modality: Nasogastric  Glucerna 1.2 Ethan  Total Volume for 24 Hours (mL): 1500  Bolus  Total Volume of Bolus (mL):  375  Tube Feed Frequency: Every 6 hours   Tube Feed Start Time: 00:00  Bolus Feed Rate (mL per Hour): 375   Bolus Feed Duration (in Hours): 6  Bolus Feed Instructions:  start feeds at 125 mL and increase as tolerated until goal rate of 375 mL is met  Free Water Flush Instructions:  150 mL pre + post feeds  No Carb Prosource (1pkg = 15gms Protein)     Qty per Day:  2 (10-13-23 @ 12:23) [Active]  Diet, Soft and Bite Sized:   DASH/TLC Sodium & Cholesterol Restricted  Mildly Thick Liquids (MILDTHICKLIQS)  Free Water Flush Instructions:  please thicken each Ensure with 2 packs of thickeners     Qty per Day:  Magic cup 2x/day  Supplement Feeding Modality:  Oral  Ensure Plus High Protein Cans or Servings Per Day:  1       Frequency:  Two Times a day (10-09-23 @ 13:43) [Pending Verification By Attending]  Diet, Pureed:   Mildly Thick Liquids (MILDTHICKLIQS)  Free Water Flush Instructions:  *** please add 2 packets of thickener per NEPRO carton ***  Supplement Feeding Modality:  Oral  Nepro Cans or Servings Per Day:  3       Frequency:  Daily (10-03-23 @ 14:36) [Pending Verification By Attending]  Diet, Minced and Moist:   Mildly Thick Liquids (MILDTHICKLIQS)  Low Sodium     Qty per Day:  magic cup 2x/day  Supplement Feeding Modality:  Oral  Ensure Plus High Protein Cans or Servings Per Day:  1       Frequency:  Three Times a day (09-25-23 @ 15:30) [Pending Verification By Attending]        Total time spent to complete patient's bedside assessment, review medical chart, discuss medical plan of care with covering medical team was more than 45 minutes with >50% of time spent face to face with patient, discussion with patient/family and/or coordination of care    Resident's notes reviewed. My notes supersede resident's notes in case of discrepancy.    Finn Sam MD/[place]  Attending Physician MEDICINE ATTENDING PROGRESS NOTE  83 year old male patient known to have COPD. DM, atrial fibrillation, HFrEF, anemia, lymphedema, and recent left foot OM who was brought from the NH to the ED on 09/15 for evaluation of altered mental status, found to have sepsis in setting of recent left foot OM, admitted for further management. Stay was complicated by hemorrhagic shock and drop in Hb secondary to hematochezia on 09/23, Status post EGD on 09/23 revealing a bleeding duodenal ulcer s/p OVESCO placement. Stay also complicated by an episode of confusion and hypoxia on 09/26 s/p found to have possible stroke on 10/10.  Patient was found to have Acute Femoral DVT s/p IVC filter 10/6. Patient now transferred to general floor for further management.    Interval/Overnight Events  - Patient reports no acute complaints  - RN: Tmax 101.3 in last 24hrs  - labile BP not responding to IVF bolus -> now on levophed drip  - abdominal tenderness on exam --> will get KUB, but when stable, would send for repeat CT Abd/pelvis   - lactate increased from 2.2 -> 3.0  - wbc increases to 23K from 18K. There is no left shift  - ABG pending  - f/u BCx, UCx  - will check for fungitell  - given lactate acid rising today, will give caspofungin 70 mg x 1 as per ID  - was on zosyn -> switch to vanc and Meropenem  - Critical care consulted earlier for further management and possible SDU upgrade  - ID on the case  - Gabe PENG [phone] has been updated  - Patient is critically ill with vital organ impairment or failure. Patient is at high risk for complications, morbidity and mortality. There is a very high probability of imminent or life threatening deterioration in the patient's condition. Family has been updated. Patient is currently FULL CODE      ROS  General: Denies fevers, chills, nightsweats, weight loss  HEENT: Denies headache, rhinorrhea, sore throat, eye pain  CV: Denies CP, palpitations  PULM: Denies SOB, cough  GI: Denies abdominal pain, diarrhea  : Denies dysuria, hematuria  MSK: Denies arthralgias  SKIN: Denies rash   NEURO: Denies paresthesias, weakness  PSYCH: Denies depression    MEDICATIONS  (STANDING):  budesonide 160 MICROgram(s)/formoterol 4.5 MICROgram(s) Inhaler 2 Puff(s) Inhalation two times a day  buMETAnide 2 milliGRAM(s) Oral every 12 hours  caspofungin IVPB 70 milliGRAM(s) IV Intermittent once  chlorhexidine 2% Cloths 1 Application(s) Topical <User Schedule>  collagenase Ointment 1 Application(s) Topical two times a day  darbepoetin Injectable Syringe 25 MICROGram(s) IV Push <User Schedule>  dextrose 5%. 1000 milliLiter(s) (100 mL/Hr) IV Continuous <Continuous>  dextrose 50% Injectable 25 Gram(s) IV Push once  dextrose 50% Injectable 25 Gram(s) IV Push once  dextrose 50% Injectable 12.5 Gram(s) IV Push once  glucagon  Injectable 1 milliGRAM(s) IntraMuscular once  heparin   Injectable 5000 Unit(s) SubCutaneous every 8 hours  insulin lispro (ADMELOG) corrective regimen sliding scale   SubCutaneous three times a day before meals  iron sucrose IVPB 100 milliGRAM(s) IV Intermittent <User Schedule>  meropenem  IVPB      meropenem  IVPB 500 milliGRAM(s) IV Intermittent once  midodrine. 10 milliGRAM(s) Oral every 8 hours  norepinephrine Infusion 0.05 MICROgram(s)/kG/Min (8.63 mL/Hr) IV Continuous <Continuous>  pantoprazole   Suspension 40 milliGRAM(s) Oral two times a day  polyethylene glycol 3350 17 Gram(s) Oral every 12 hours  senna 2 Tablet(s) Oral at bedtime  sevelamer carbonate Powder 800 milliGRAM(s) Oral three times a day with meals  sevelamer carbonate Powder 800 milliGRAM(s) Enteral Tube three times a day with meals    MEDICATIONS  (PRN):  acetaminophen     Tablet .. 650 milliGRAM(s) Oral every 6 hours PRN Temp greater or equal to 38.5C (101.3F), Mild Pain (1 - 3), Moderate Pain (4 - 6)  albuterol    90 MICROgram(s) HFA Inhaler 1 Puff(s) Inhalation every 6 hours PRN for shortness of breath and/or wheezing  atropine Injectable 1 milliGRAM(s) IntraMuscular once PRN HR < 30  dextrose Oral Gel 15 Gram(s) Oral once PRN Blood Glucose LESS THAN 70 milliGRAM(s)/deciliter  HYDROmorphone  Injectable 0.25 milliGRAM(s) IV Push every 6 hours PRN Severe Pain (7 - 10)    ANTIBIOTICS:  caspofungin IVPB 70 milliGRAM(s) IV Intermittent once  meropenem  IVPB      meropenem  IVPB 500 milliGRAM(s) IV Intermittent once    VITALS:  T(F): 98.4, Max: 101.3 (10-14-23 @ 13:01)  HR: 81  BP: 99/58  RR: 20Vital Signs Last 24 Hrs  T(C): 36.9 (15 Oct 2023 05:00), Max: 38.5 (14 Oct 2023 13:01)  T(F): 98.4 (15 Oct 2023 05:00), Max: 101.3 (14 Oct 2023 13:01)  HR: 81 (15 Oct 2023 11:26) (68 - 91)  BP: 99/58 (15 Oct 2023 11:26) (70/51 - 113/61)  BP(mean): 64 (15 Oct 2023 05:00) (64 - 65)  RR: 20 (15 Oct 2023 05:00) (18 - 20)  SpO2: 100% (15 Oct 2023 05:00) (95% - 100%)    Parameters below as of 15 Oct 2023 05:00  Patient On (Oxygen Delivery Method): room air     I&O's Summary    14 Oct 2023 07:01  -  15 Oct 2023 07:00  --------------------------------------------------------  IN: 2700 mL / OUT: 0 mL / NET: 2700 mL      PHYSICAL EXAM:  Gen: NAD, resting in bed  HEENT: Normocephalic, atraumatic  Neck: supple, no lymphadenopathy  CV: Regular rate & regular rhythm  Lungs: CTABL no wheeze  Abdomen: Soft, NTND+ BS present  Ext: Warm, well perfused no CCE  Neuro: non focal, awake, CN II-XII intact   Skin: no rash, no erythema  Psych: no SI, HI, Hallucination     LABS:                        8.5    23.42 )-----------( 161      ( 15 Oct 2023 05:35 )             28.0     10-15    144  |  107  |  50<H>  ----------------------------<  93  4.5   |  25  |  2.4<H>    Ca    7.2<L>      15 Oct 2023 05:35  Phos  2.2     10-15  Mg     1.8     10-15    TPro  5.5<L>  /  Alb  2.1<L>  /  TBili  0.5  /  DBili  x   /  AST  19  /  ALT  32  /  AlkPhos  105  10-15      LIVER FUNCTIONS - ( 15 Oct 2023 05:35 )  Alb: 2.1 g/dL / Pro: 5.5 g/dL / ALK PHOS: 105 U/L / ALT: 32 U/L / AST: 19 U/L / GGT: x           PTT - ( 14 Oct 2023 08:02 )  PTT:32.8 sec    MICROBIOLOGY:  Urinalysis Basic - ( 15 Oct 2023 05:35 )  Color: x / Appearance: x / SG: x / pH: x  Gluc: 93 mg/dL / Ketone: x  / Bili: x / Urobili: x   Blood: x / Protein: x / Nitrite: x   Leuk Esterase: x / RBC: x / WBC x   Sq Epi: x / Non Sq Epi: x / Bacteria: x    Culture - Blood (collected 10-13-23 @ 16:10)  Source: .Blood Blood  Preliminary Report (10-14-23 @ 23:01):    No growth at 24 hours    Culture - Blood (collected 10-12-23 @ 16:16)  Source: .Blood None  Preliminary Report (10-15-23 @ 04:01):    No growth at 48 Hours    SARS-CoV-2: NotDetec (12 Oct 2023 18:47)    Lactate, Blood: 3.0 mmol/L (10-15-23 @ 08:11)  Lactate, Blood: 2.2 mmol/L (10-15-23 @ 01:56)  Lactate, Blood: 1.9 mmol/L (10-12-23 @ 16:16)    Hepatitis B Surface Antigen: Nonreact (09-27-23 @ 17:44)  MRSA PCR Result.: Negative (09-24-23 @ 04:32)    IMAGING:  Xray Chest 1 View- PORTABLE-Urgent (Xray Chest 1 View- PORTABLE-Urgent .) (10.11.23 @ 14:59)   1. Unchanged bibasilar opacities, right greater than left.  2. Lines and tubes as above.    CARDIOLOGY TESTING  12 Lead ECG:   Ventricular Rate 98 BPM  Atrial Rate 101 BPM  QRS Duration 100 ms  Q-T Interval 372 ms  QTC Calculation(Bazett) 474 ms  R Axis -61 degrees  T Axis 68 degrees    Diagnosis Line Atrial fibrillation  Left axis deviation  Low voltage QRS  Cannot rule out Anterior infarct , age undetermined  Abnormal ECG    Confirmed by austen colbert ([id number]) on 10/4/2023 7:03:14 PM (10-04-23 @ 15:40)    12 Lead ECG:   Ventricular Rate 143 BPM  Atrial Rate 143 BPM  P-R Interval 74 ms  QRS Duration 4 ms  Q-T Interval 194 ms  QTC Calculation(Bazett) 299 ms  P Axis 11 degrees  R Axis 0 degrees  T Axis -32 degrees    Diagnosis Line *** Poor data quality, interpretation may be adversely affected  Probable atrial fibrillation  Indeterminate axis  Nonspecific ST and T wave abnormality  Abnormal ECG    Confirmed by austen colbert ([id number]) on 9/30/2023 11:41:21 AM (09-30-23 @ 10:19)    ASSESSMENT/PLAN:  83 year old male patient known to have COPD. DM, atrial fibrillation, HFrEF, anemia, lymphedema, and recent left foot OM who was brought from the NH to the ED on 09/15 for evaluation of altered mental status, found to have sepsis in setting of recent left foot OM, admitted for further management. Stay was complicated by hemorrhagic shock and drop in Hb secondary to hematochezia on 09/23, Status post EGD on 09/23 revealing a bleeding duodenal ulcer s/p OVESCO placement. Stay also complicated by an episode of confusion and hypoxia on 09/26 s/p found to have possible stroke on 10/10.  Patient was found to have Acute Femoral DVT s/p IVC filter 10/6. Patient now transferred to general floor for further management.    #Fever, concern for aspiration pneumonitis vs aspiration PNA, r/o PICC line infection  #Acute hypoxemic respiratory failure on NC  - Patient with new onset of Fever and hypotension since 10/12. It started after starting NGT feeding. BP improves 10/13 and pt is less lethargic  - Of note, Patient has PICC line since August 2023 at [facility] -> Will remove 10/13 and send tips for culture  - wbc increases from 10k to 21k in less than 24hrs with PMN > 94% -> this is likely reactive in setting of suspected aspiration pneumonitis  - lactate 1.9 (10/12), ESR 30 (10/12),  (10/12)  - f/u fever w/u (f/u BCx, Procalcitonin, daily lactate, ESR/CRP)  - Will get ABG with lactate today  - CT chest (10/12) did not show obvious aspiration pneumonia per radiology read, but did show Bilateral moderate sized pleural effusions on CT chest  - will slow feed and do aspiration precaution  - started on midodrine standing but now PRN as BP improves  - start zosyn to cover aspiration pneumonia  - Cont oxygen supplementation.  - Will get pulm consult for diagnostic thoracentesis given Bilateral moderate sized pleural effusions on CT chest    #Severe Pharyngeal Dysphagia  - HO aspiration pneumonitis SP ABX   -  Patient was tolerating PO intake at NH prior to admission  -  After extubation on 09/24, patient became confused s/p upgrade on 09/26 for hypoxia, AMS, and pneumonia. He has been NPO since then with multiple speech swallow evaluations.  - FEES Study on 10/11: severe pharyngeal dysphagia, recommendation to keep NPO with alternate means -> now on NGT for feeding  - GI for evaluation of PEG tube placement after failing FEES study on 10/11.  - GI: Will hold off PEG tube placement for now pending sepsis workup (in setting of fever on 10/12) and pending neurology evaluation of age indeterminate infarct on CTH 10/10 + Will need neurological prognostication prior to planning PEG tube placement + Continue NG tube feeding for now    #Metabolic Encephalopathy 2/2 septic Shock 2/2 suspected aspiration pneumonia   - procal 1.7  - rapid response after TDC due to acute change in mental status - ABG notable for elevated lactate  - pt febrile to 101.6 and elevated lactate to 8.4, rapidly became hypotensive on 10/4 - started on levophed  - off pressor, midodrine now descalated to prn prior to hemodialysis   - CXR (9/28) Bilateral pleural effusion/opacity unchanged. No air leak.  - DC  vancomycin and cefepime as per ID   - lactate improving  - follow up cultures, cultures neg   - ID consult appreciated, no further antibiotics   - On 2L NC  - C/w albuterol PRN    #Hemorrhagic Shock Secondary to Hematochezia from Duodenal Ulcer Bleed s/p OVESCO placement 09/23  #UGIB secondary to Duodenal ulcer SP EGD   #Hematochezia resolved   #Hemorrhagic Shock resolved   #Blood Loss Anemia  # Acute Drop in Hemoglobin- Improved  - Status Post EGD on 09/23: blood clots in fundus and antrum; 2cm actively bleeding duodenal ulcer with spurting vessel ([place] 1a) in sweep on base s/p 10cc epi and s/p 11/6 OVESCO placement for hemostasis  - Continue PO Protonix 40mg BID via NG tube for now. Was on IV pantoprazole drip (09/23-09/25)  - Anticoagulation resumption once Hgb stabilizes (s/p 1 unit pRBCs 10/12)  - Monitor BM for recurrence of hematochezia or melena  - Please avoid any NSAIDs  - If any unstable bleed, please call GI   - [facility] with [facility] located at [address] [place] [address]. Phone Number: [phone]      #Anemia of acute blood loss   #Hemorrhagic Shock Secondary to Hematochezia from Duodenal Ulcer Bleed s/p OVESCO placement 09/23  - Trend hgb, transfuse blood PRN  - continue with holding therapeutic AC  - s/p IV Protamine sulfate 36mg x1 dose on 09/23  - GI f/u appreciated  - S/p IV Protonix 40mg BID -> Ok to switch to PO BID as per GI   - Avoid any NSAIDs  - overall the pt risk and benefit is goes against Anticoagulation, will further discuss with family , , dw brother regarding the high risk of bleeding and understands the reasons to hold anticoagulation. And understands the risk of cva with Afib.     #Acutely worsening uremia and acute kidney failure most likely 2/2 ATN - now on HD through right IJ [place]  #hypernatremia - resolving   #PAULA / CKD stable non oliguric  - possible ATN iso hypotension and possible sepsis/ vanc toxicity  - Cr stable for now -> Close F/U of BUN/Crea/ Lytes and UO  - no hydro on imaging  - udall placement 9/27/23  started on HD 9/27 but did not tolerate it with access problems  - udall changed 9/28  he received HD 9/28 and 9/30  - c/w phoslo 2/2/2   - off sodium bicarbonate   - s/p 250 cc LR bolus for hypernatremia  - s/p D5w @ 80 cc/hr  - f/u w/nephro  - hold BUMEX 2 mg BID given hypotension; resumes once BP stabilizes  - TDC by IR on 10/4    #Acute Femoral DVT s/p IVC filter 10/6  #Elevated dimer  - duplex neg on 9/29, repeat   - overall the pt risk and benefit is goes against Anticoagulation, will further discuss with family , dw brother regarding the high risk of bleeding and understands to hold anticoagulation And understands the risk of cva with Afib.    -s/p IVC filter 10/6    #Possible Age/CVA  - Indeterminate Left Subinsular Stroke on 10/10    #transaminitis - resolving   - likely 2/2 liver shock 2/2 hypotension   - LFTS improving   - follow up RUQ   - hepatitis neg   - worsened today  - antibiotics dc  - trend   - if continues to worsen will get hepatology     #Asymptomatic Cholelithiasis  - Noted on CT  - LFTs noted > Trend LFTs  - Monitor for symptoms    #Paroxysmal Afib, chronic; uncontrolled rate  #angelika cardia in evening on cardiac telemonitoring   - HOLDING therapeutic AC; resume once hgb is stable  - hold  metoprolol tartrate 12.5 mg q12h for hypotension; resumes once BP improves    #History of Left Foot OM w/ surrounding Cellulitis  #Sacral Decubitus Ulcer POA  - CT LLE (9/16): No CT evidence of osteomyelitis or necrotizing infection. No drainable collection seen, within the limitations of a noncontrast exam.  - Increased frailty and decreased physical capacity  - as per previous notes the team Discussed with ID attending: patient is unlikely to benefit from prolonged therapy with cefepime+vanco (to cover possible OM) due to adverse outcomes associated kidney dysfunction, cognitive impact ...etc )  - c/w Local wound care; offloading boots bilaterally, frequently turning and positioning, prevent pressure injury, keep skin clean, and monitor for wound changes and notify provider as per podiatry    #Supportive Management:  Dispo:   DVT Ppx: heparin   Injectable 5000 Unit(s) SubCutaneous every 8 hours  GI Ppx: pantoprazole   Suspension 40 milliGRAM(s) Oral two times a day  Diet:  Diet, NPO with Tube Feed:   Tube Feeding Modality: Nasogastric  Glucerna 1.2 Ethan  Total Volume for 24 Hours (mL): 1500  Bolus  Total Volume of Bolus (mL):  375  Tube Feed Frequency: Every 6 hours   Tube Feed Start Time: 00:00  Bolus Feed Rate (mL per Hour): 375   Bolus Feed Duration (in Hours): 6  Bolus Feed Instructions:  start feeds at 125 mL and increase as tolerated until goal rate of 375 mL is met  Free Water Flush Instructions:  150 mL pre + post feeds  No Carb Prosource (1pkg = 15gms Protein)     Qty per Day:  2 (10-13-23 @ 12:23) [Active]  Diet, Soft and Bite Sized:   DASH/TLC Sodium & Cholesterol Restricted  Mildly Thick Liquids (MILDTHICKLIQS)  Free Water Flush Instructions:  please thicken each Ensure with 2 packs of thickeners     Qty per Day:  Magic cup 2x/day  Supplement Feeding Modality:  Oral  Ensure Plus High Protein Cans or Servings Per Day:  1       Frequency:  Two Times a day (10-09-23 @ 13:43) [Pending Verification By Attending]  Diet, Pureed:   Mildly Thick Liquids (MILDTHICKLIQS)  Free Water Flush Instructions:  *** please add 2 packets of thickener per NEPRO carton ***  Supplement Feeding Modality:  Oral  Nepro Cans or Servings Per Day:  3       Frequency:  Daily (10-03-23 @ 14:36) [Pending Verification By Attending]  Diet, Minced and Moist:   Mildly Thick Liquids (MILDTHICKLIQS)  Low Sodium     Qty per Day:  magic cup 2x/day  Supplement Feeding Modality:  Oral  Ensure Plus High Protein Cans or Servings Per Day:  1       Frequency:  Three Times a day (09-25-23 @ 15:30) [Pending Verification By Attending]        Total time spent to complete patient's bedside assessment, review medical chart, discuss medical plan of care with covering medical team was more than 45 minutes with >50% of time spent face to face with patient, discussion with patient/family and/or coordination of care    Resident's notes reviewed. My notes supersede resident's notes in case of discrepancy.    Finn Sam MD/[place]  Attending Physician

## 2023-10-15 NOTE — PROGRESS NOTE ADULT - SUBJECTIVE AND OBJECTIVE BOX
GUILLERMINANIMO MARTIN  83y, Male  Allergy: No Known Allergies      LOS  30d    CHIEF COMPLAINT: AMS (14 Oct 2023 09:09)      INTERVAL EVENTS/HPI  - No acute events overnight  - T(F): , Max: 101.3 (10-14-23 @ 13:01)  - Denies any worsening symptoms  - Tolerating medication  - WBC Count: 23.42 (10-15-23 @ 05:35)  WBC Count: 18.13 (10-14-23 @ 08:02)     - Creatinine: 2.4 (10-15-23 @ 05:35)  Creatinine: 2.4 (10-14-23 @ 08:02)       ROS  unable to obtain history secondary to patient's mental status and/or sedation      VITALS:  T(F): 98.4, Max: 101.3 (10-14-23 @ 13:01)  HR: 87  BP: 89/50  RR: 20Vital Signs Last 24 Hrs  T(C): 36.9 (15 Oct 2023 05:00), Max: 38.5 (14 Oct 2023 13:01)  T(F): 98.4 (15 Oct 2023 05:00), Max: 101.3 (14 Oct 2023 13:01)  HR: 87 (15 Oct 2023 08:50) (68 - 87)  BP: 89/50 (15 Oct 2023 08:50) (70/51 - 96/52)  BP(mean): 64 (15 Oct 2023 05:00) (64 - 65)  RR: 20 (15 Oct 2023 05:00) (18 - 20)  SpO2: 100% (15 Oct 2023 05:00) (95% - 100%)    Parameters below as of 15 Oct 2023 05:00  Patient On (Oxygen Delivery Method): room air        PHYSICAL EXAM:  Gen: NAD, resting in bed  HEENT: Normocephalic, atraumatic  Neck: supple, no lymphadenopathy  CV: Regular rate & regular rhythm  Lungs: decreased BS at bases, no fremitus  Abdomen: Soft, BS present  Ext: Warm, well perfused  Neuro: non focal, awake  Skin: no rash, no erythema  Lines: no phlebitis    FH: Non-contributory  Social Hx: Non-contributory    TESTS & MEASUREMENTS:                        8.5    23.42 )-----------( 161      ( 15 Oct 2023 05:35 )             28.0     10-15    144  |  107  |  50<H>  ----------------------------<  93  4.5   |  25  |  2.4<H>    Ca    7.2<L>      15 Oct 2023 05:35  Phos  2.2     10-15  Mg     1.8     10-15    TPro  5.5<L>  /  Alb  2.1<L>  /  TBili  0.5  /  DBili  x   /  AST  19  /  ALT  32  /  AlkPhos  105  10-15      LIVER FUNCTIONS - ( 15 Oct 2023 05:35 )  Alb: 2.1 g/dL / Pro: 5.5 g/dL / ALK PHOS: 105 U/L / ALT: 32 U/L / AST: 19 U/L / GGT: x           Urinalysis Basic - ( 15 Oct 2023 05:35 )    Color: x / Appearance: x / SG: x / pH: x  Gluc: 93 mg/dL / Ketone: x  / Bili: x / Urobili: x   Blood: x / Protein: x / Nitrite: x   Leuk Esterase: x / RBC: x / WBC x   Sq Epi: x / Non Sq Epi: x / Bacteria: x        Culture - Blood (collected 10-13-23 @ 16:10)  Source: .Blood Blood  Preliminary Report (10-14-23 @ 23:01):    No growth at 24 hours    Culture - Blood (collected 10-12-23 @ 16:16)  Source: .Blood None  Preliminary Report (10-15-23 @ 04:01):    No growth at 48 Hours    Culture - Blood (collected 10-04-23 @ 23:28)  Source: .Blood Blood  Final Report (10-10-23 @ 07:00):    No growth at 5 days    Culture - Blood (collected 09-26-23 @ 12:05)  Source: .Blood Blood  Final Report (10-01-23 @ 23:00):    No growth at 5 days    Culture - Blood (collected 09-17-23 @ 08:20)  Source: .Blood None  Final Report (09-22-23 @ 15:00):    No growth at 5 days    Culture - Blood (collected 09-16-23 @ 01:41)  Source: .Blood None  Final Report (09-21-23 @ 11:00):    No growth at 5 days        Lactate, Blood: 2.2 mmol/L (10-15-23 @ 01:56)  Lactate, Blood: 1.9 mmol/L (10-12-23 @ 16:16)      INFECTIOUS DISEASES TESTING  Rapid RVP Result: NotDetec (10-12-23 @ 18:47)  Procalcitonin, Serum: 1.76 (10-05-23 @ 11:35)  MRSA PCR Result.: Negative (09-24-23 @ 04:32)  Procalcitonin, Serum: 0.49 (09-17-23 @ 08:20)      INFLAMMATORY MARKERS  Sedimentation Rate, Erythrocyte: 30 mm/Hr (10-12-23 @ 09:30)  C-Reactive Protein, Serum: 138.0 mg/L (10-12-23 @ 09:30)  C-Reactive Protein, Serum: 72.4 mg/L (09-17-23 @ 08:20)  Sedimentation Rate, Erythrocyte: 60 mm/Hr (09-16-23 @ 07:18)      RADIOLOGY & ADDITIONAL TESTS:  I have personally reviewed the last available Chest xray  CXR      CT  CT Chest No Cont:   ACC: [id number] EXAM:  CT CHEST   ORDERED BY: ALLISON MARCIAL     PROCEDURE DATE:  10/12/2023          INTERPRETATION:  REASON FOR STUDY: Fever. Parenchymal opacities.    TECHNIQUE: : Noncontrast CT scan of the thorax was performed from lung   apices to adrenal glands.  Sagittal and coronal reformatted images were generated.      COMPARISON: CT chest-5/22/2019. Chest x-ray 10/11/2023      FINDINGS:      Breathing artifact limits assessment.  TUBES/LINES/DEVICES: NG tube, satisfactory position. Right sided central   venous catheter satisfactory position.  MEDIASTINUM/HILUM : Likely reactive mediastinal lymph nodes.  CHEST WALL/ BREASTS: Unremarkable    HEART/ GREAT VESSELS:No pericardial effusions. Aortic and coronary artery   calcifications. Mitral annular calcifications.    ABDOMEN: Unremarkable nonenhanced upper abdominal organs.    BONES/ SOFT TISSUES: Degenerative changes/disc syndrome thoracic spine    LUNGS/PLEURA/AIRWAYS:   Patent central tracheobronchial tree. Bilateral moderate sized pleural   effusions with adjacent lower lobe atelectasis. No pneumothorax or   parenchymal mass.        PULMONARY NODULES:  Multiple right apical nodules including new 7 mm solid nodule  (Series   4/85)    IMPRESSION:    Since  5/22/2019    Multiple right apical nodules including new 7 mm solid nodule  (Series   4/85). Follow-up CT scan 6 months time.    Bilateral moderate sized pleural effusions with adjacent lower lobe   atelectasis.     No pneumothorax or parenchymal mass.        --- End of Report ---            BENITO BUSTAMANTE MD; Attending Radiologist  This document has been electronically signed. Oct 13 2023  1:19PM (10-12-23 @ 19:59)      CARDIOLOGY TESTING  12 Lead ECG:   Ventricular Rate 98 BPM    Atrial Rate 101 BPM    QRS Duration 100 ms    Q-T Interval 372 ms    QTC Calculation(Bazett) 474 ms    R Axis -61 degrees    T Axis 68 degrees    Diagnosis Line Atrial fibrillation  Left axis deviation  Low voltage QRS  Cannot rule out Anterior infarct , age undetermined  Abnormal ECG    Confirmed by austen colbert ([id number]) on 10/4/2023 7:03:14 PM (10-04-23 @ 15:40)      MEDICATIONS  budesonide 160 MICROgram(s)/formoterol 4.5 MICROgram(s) Inhaler 2 Inhalation two times a day  buMETAnide 2 Oral every 12 hours  chlorhexidine 2% Cloths 1 Topical <User Schedule>  collagenase Ointment 1 Topical two times a day  darbepoetin Injectable Syringe 25 IV Push <User Schedule>  dextrose 5%. 1000 IV Continuous <Continuous>  dextrose 50% Injectable 25 IV Push once  dextrose 50% Injectable 12.5 IV Push once  dextrose 50% Injectable 25 IV Push once  glucagon  Injectable 1 IntraMuscular once  heparin   Injectable 5000 SubCutaneous every 8 hours  insulin lispro (ADMELOG) corrective regimen sliding scale  SubCutaneous three times a day before meals  iron sucrose IVPB 100 IV Intermittent <User Schedule>  midodrine. 10 Oral every 8 hours  pantoprazole   Suspension 40 Oral two times a day  piperacillin/tazobactam IVPB.. 3.375 IV Intermittent every 12 hours  polyethylene glycol 3350 17 Oral every 12 hours  senna 2 Oral at bedtime  sevelamer carbonate Powder 800 Oral three times a day with meals  sevelamer carbonate Powder 800 Enteral Tube three times a day with meals  vancomycin  IVPB         WEIGHT  Weight (kg): 92 (10-09-23 @ 13:13)  Creatinine: 2.4 mg/dL (10-15-23 @ 05:35)      ANTIBIOTICS:  piperacillin/tazobactam IVPB.. 3.375 Gram(s) IV Intermittent every 12 hours  vancomycin  IVPB          All available historical records have been reviewed       NIMO SARMIENTO  83y, Male  Allergy: No Known Allergies      LOS  30d    CHIEF COMPLAINT: AMS (14 Oct 2023 09:09)      INTERVAL EVENTS/HPI  - No acute events overnight  - T(F): , Max: 101.3 (10-14-23 @ 13:01)  - ID   - WBC Count: 23.42 (10-15-23 @ 05:35)  WBC Count: 18.13 (10-14-23 @ 08:02)     - Creatinine: 2.4 (10-15-23 @ 05:35)  Creatinine: 2.4 (10-14-23 @ 08:02)       ROS  unable to obtain history secondary to patient's mental status and/or sedation      VITALS:  T(F): 98.4, Max: 101.3 (10-14-23 @ 13:01)  HR: 87  BP: 89/50  RR: 20Vital Signs Last 24 Hrs  T(C): 36.9 (15 Oct 2023 05:00), Max: 38.5 (14 Oct 2023 13:01)  T(F): 98.4 (15 Oct 2023 05:00), Max: 101.3 (14 Oct 2023 13:01)  HR: 87 (15 Oct 2023 08:50) (68 - 87)  BP: 89/50 (15 Oct 2023 08:50) (70/51 - 96/52)  BP(mean): 64 (15 Oct 2023 05:00) (64 - 65)  RR: 20 (15 Oct 2023 05:00) (18 - 20)  SpO2: 100% (15 Oct 2023 05:00) (95% - 100%)    Parameters below as of 15 Oct 2023 05:00  Patient On (Oxygen Delivery Method): room air        PHYSICAL EXAM:  Gen: NAD, resting in bed  HEENT: Normocephalic, atraumatic  Neck: supple, no lymphadenopathy  CV: Regular rate & regular rhythm  Lungs: decreased BS at bases, no fremitus  Abdomen: Soft, BS present  Ext: Warm, well perfused  Neuro: non focal, awake  Skin: no rash, no erythema  Lines: no phlebitis    FH: Non-contributory  Social Hx: Non-contributory    TESTS & MEASUREMENTS:                        8.5    23.42 )-----------( 161      ( 15 Oct 2023 05:35 )             28.0     10-15    144  |  107  |  50<H>  ----------------------------<  93  4.5   |  25  |  2.4<H>    Ca    7.2<L>      15 Oct 2023 05:35  Phos  2.2     10-15  Mg     1.8     10-15    TPro  5.5<L>  /  Alb  2.1<L>  /  TBili  0.5  /  DBili  x   /  AST  19  /  ALT  32  /  AlkPhos  105  10-15      LIVER FUNCTIONS - ( 15 Oct 2023 05:35 )  Alb: 2.1 g/dL / Pro: 5.5 g/dL / ALK PHOS: 105 U/L / ALT: 32 U/L / AST: 19 U/L / GGT: x           Urinalysis Basic - ( 15 Oct 2023 05:35 )    Color: x / Appearance: x / SG: x / pH: x  Gluc: 93 mg/dL / Ketone: x  / Bili: x / Urobili: x   Blood: x / Protein: x / Nitrite: x   Leuk Esterase: x / RBC: x / WBC x   Sq Epi: x / Non Sq Epi: x / Bacteria: x        Culture - Blood (collected 10-13-23 @ 16:10)  Source: .Blood Blood  Preliminary Report (10-14-23 @ 23:01):    No growth at 24 hours    Culture - Blood (collected 10-12-23 @ 16:16)  Source: .Blood None  Preliminary Report (10-15-23 @ 04:01):    No growth at 48 Hours    Culture - Blood (collected 10-04-23 @ 23:28)  Source: .Blood Blood  Final Report (10-10-23 @ 07:00):    No growth at 5 days    Culture - Blood (collected 09-26-23 @ 12:05)  Source: .Blood Blood  Final Report (10-01-23 @ 23:00):    No growth at 5 days    Culture - Blood (collected 09-17-23 @ 08:20)  Source: .Blood None  Final Report (09-22-23 @ 15:00):    No growth at 5 days    Culture - Blood (collected 09-16-23 @ 01:41)  Source: .Blood None  Final Report (09-21-23 @ 11:00):    No growth at 5 days        Lactate, Blood: 2.2 mmol/L (10-15-23 @ 01:56)  Lactate, Blood: 1.9 mmol/L (10-12-23 @ 16:16)      INFECTIOUS DISEASES TESTING  Rapid RVP Result: NotDetec (10-12-23 @ 18:47)  Procalcitonin, Serum: 1.76 (10-05-23 @ 11:35)  MRSA PCR Result.: Negative (09-24-23 @ 04:32)  Procalcitonin, Serum: 0.49 (09-17-23 @ 08:20)      INFLAMMATORY MARKERS  Sedimentation Rate, Erythrocyte: 30 mm/Hr (10-12-23 @ 09:30)  C-Reactive Protein, Serum: 138.0 mg/L (10-12-23 @ 09:30)  C-Reactive Protein, Serum: 72.4 mg/L (09-17-23 @ 08:20)  Sedimentation Rate, Erythrocyte: 60 mm/Hr (09-16-23 @ 07:18)      RADIOLOGY & ADDITIONAL TESTS:  I have personally reviewed the last available Chest xray  CXR      CT  CT Chest No Cont:   ACC: [id number] EXAM:  CT CHEST   ORDERED BY: ALLISON MARCIAL     PROCEDURE DATE:  10/12/2023          INTERPRETATION:  REASON FOR STUDY: Fever. Parenchymal opacities.    TECHNIQUE: : Noncontrast CT scan of the thorax was performed from lung   apices to adrenal glands.  Sagittal and coronal reformatted images were generated.      COMPARISON: CT chest-5/22/2019. Chest x-ray 10/11/2023      FINDINGS:      Breathing artifact limits assessment.  TUBES/LINES/DEVICES: NG tube, satisfactory position. Right sided central   venous catheter satisfactory position.  MEDIASTINUM/HILUM : Likely reactive mediastinal lymph nodes.  CHEST WALL/ BREASTS: Unremarkable    HEART/ GREAT VESSELS:No pericardial effusions. Aortic and coronary artery   calcifications. Mitral annular calcifications.    ABDOMEN: Unremarkable nonenhanced upper abdominal organs.    BONES/ SOFT TISSUES: Degenerative changes/disc syndrome thoracic spine    LUNGS/PLEURA/AIRWAYS:   Patent central tracheobronchial tree. Bilateral moderate sized pleural   effusions with adjacent lower lobe atelectasis. No pneumothorax or   parenchymal mass.        PULMONARY NODULES:  Multiple right apical nodules including new 7 mm solid nodule  (Series   4/85)    IMPRESSION:    Since  5/22/2019    Multiple right apical nodules including new 7 mm solid nodule  (Series   4/85). Follow-up CT scan 6 months time.    Bilateral moderate sized pleural effusions with adjacent lower lobe   atelectasis.     No pneumothorax or parenchymal mass.        --- End of Report ---            BENITO BUSTAMANTE MD; Attending Radiologist  This document has been electronically signed. Oct 13 2023  1:19PM (10-12-23 @ 19:59)      CARDIOLOGY TESTING  12 Lead ECG:   Ventricular Rate 98 BPM    Atrial Rate 101 BPM    QRS Duration 100 ms    Q-T Interval 372 ms    QTC Calculation(Bazett) 474 ms    R Axis -61 degrees    T Axis 68 degrees    Diagnosis Line Atrial fibrillation  Left axis deviation  Low voltage QRS  Cannot rule out Anterior infarct , age undetermined  Abnormal ECG    Confirmed by austen colbert ([id number]) on 10/4/2023 7:03:14 PM (10-04-23 @ 15:40)      MEDICATIONS  budesonide 160 MICROgram(s)/formoterol 4.5 MICROgram(s) Inhaler 2 Inhalation two times a day  buMETAnide 2 Oral every 12 hours  chlorhexidine 2% Cloths 1 Topical <User Schedule>  collagenase Ointment 1 Topical two times a day  darbepoetin Injectable Syringe 25 IV Push <User Schedule>  dextrose 5%. 1000 IV Continuous <Continuous>  dextrose 50% Injectable 25 IV Push once  dextrose 50% Injectable 12.5 IV Push once  dextrose 50% Injectable 25 IV Push once  glucagon  Injectable 1 IntraMuscular once  heparin   Injectable 5000 SubCutaneous every 8 hours  insulin lispro (ADMELOG) corrective regimen sliding scale  SubCutaneous three times a day before meals  iron sucrose IVPB 100 IV Intermittent <User Schedule>  midodrine. 10 Oral every 8 hours  pantoprazole   Suspension 40 Oral two times a day  piperacillin/tazobactam IVPB.. 3.375 IV Intermittent every 12 hours  polyethylene glycol 3350 17 Oral every 12 hours  senna 2 Oral at bedtime  sevelamer carbonate Powder 800 Oral three times a day with meals  sevelamer carbonate Powder 800 Enteral Tube three times a day with meals  vancomycin  IVPB         WEIGHT  Weight (kg): 92 (10-09-23 @ 13:13)  Creatinine: 2.4 mg/dL (10-15-23 @ 05:35)      ANTIBIOTICS:  piperacillin/tazobactam IVPB.. 3.375 Gram(s) IV Intermittent every 12 hours  vancomycin  IVPB          All available historical records have been reviewed

## 2023-10-15 NOTE — CHART NOTE - NSCHARTNOTEFT_GEN_A_CORE
84 yo m ho DM, COPD, anemia, lymphedemia, afib on xarelto, HFrEF, DM coming in from nursing home for AMS x 2 days. Unable to gather story from pt as he is altered and lethargic.  As per NH was becoming more agitated x 2 days, was hypotensive yesterday and started on cefepime and vancomycin. Brought in to the ED for AMS. PICC line in place for cefepime 1q q8 until 10/5/23 and vanc 1q24 until 9/23/23 for LLE cellulitis/OM  (was at [facility] last month for LLE thick wound cellulitis/OM and sacral DTI as per call with [facility] as per collateral from NH DEISY Barry)  In the ED, vitals wnl. Labs showing wbc 11.30, Hb 9.6, MCV 79.1, INR 1.59, CO2 14, AG 21, Cr 6 (~baseline 1.2), , trops 0.09. CTH wnl, RBUS with no hydro, poor visualization of left kidney    Hospital course:  When pt arrived to ICU, he was in shock, hypotensive, with active melena. BP in 60's. GI consulted urgently for endoscopy. Procedure was done at bedside and bleeding controlled. Pt. was given 2 U of blood, 1U FFP in ICU. HgB has maintained above 8 and pt has not signs of melena. Pt was initially in 3E the patient was Hypoxic (as low as 70s). Evaluated the patient at bedside. AAOx 1. On auscultation, bilateral crackles present. Placed the patient on venturi mask and then on NRB. The Spo2 improved to 99%. Stat vitals repeated (vitals Spo2 99%, /70s, HR 95). Ordered CXR stat, ABG stat, Bumex 2mg IV x 1 stat. Labs and charts reviewed. Stat Labs drawn. Upgraded to SDU on 9/27. In the SDU, patient respiratory status was much improved on NC. Nephrology recommended dialysis for patient's PAULA. On 9/27, a [place] was placed and patient received hemo that day. However, he became hypotensive 30 mins into dialysis and was stopped. The following day, the [place] was replaced with a longer one and patient received HD again with no complications. On the evening of 9/27, patient hgb was 6.9 and he received 1 unit pRBC. There was also an episode of melena and GI was notified. Patient remained hemodynamically stable and GI recommended to just monitor. Heparin was held, but has since been restarted for DVT ppx. On 9/29, he was started on D5 @80cc for 24 hours to address free water deficit. Starting sodium was 151 with a target goal of 145 after 24 hours. Last, patient was seen by speech and swallow and put on NPO for poor swallowing. DGTF on 9/29. Pt was hypernatremic and was managed with D5W. Pt also had a worsening PAULA and nephrology consulted. Pt received HD via R IJ line and per nephro pt needs long term dialysis. On 10/4, a TDC was placed by IR for dialysis. On 15:00 10/4 a rapid response was called for acute altered mental status after the IR procedure. VSS and ABG notable for elevated lactate of 6. Pt spiked fever to 101.6 and repeat ABG notable for lactate of 8.3. Pt's BP dropped to 75/42 and he was given a 500 LR bolus, was started on levophed. Sepsis workup sent for possible aspiration pna. Pt started on vancomycin and cefepime. Pt upgraded to SDU for close monitoring and vasopressor requirements.    SDU Course:  - LFTs noted to go up after abx; shock liver vs DILI  - BP stabilized 100/50's--130/90's; weaned to midodrine 5 TID  - monitoring off of ABX per ID      After coming to 3A from step down on 10/08, he was off antibiotics, getting twice HD twice weekly, Ng tube placed for feeding after discussing with his proxy (brother who doesnot want the PEG tube). Discussion regarding GOC was also done, and brother wants full code (see chart note).  Vitals were stable until 10/12, he started having fever (Tmax 101.4 in last 24 hrs), septic screening was done, started on zosyn, Bcx from 10/12 was negative. However overnight his blood pressure began to drop to 70/51, on midodrine, gave bolus of 250cc twice,  lactate increased from 2.2 -> 3.0, wbc increases to 23K from 18K, no left shift still couldn't maintain BP, started on levophed low dose.       Plan:     - ABG pending  - f/u BCx, UCx, Cx(picc line)  - will check for fungitell  - given lactate acid rising today, trend lactate,  will give caspofungin 70 mg x 1 as per ID  - was on zosyn -> switch to vanc and Meropenem  - abdominal tenderness on exam --> will get KUB, but when stable, would send for repeat CT Abd/pelvis   - ID on the case  - Gabe PENG [phone] has been updated  - Patient is critically ill with vital organ impairment or failure. Patient is at high risk for complications, morbidity and mortality. There is a very high probability of imminent or life threatening deterioration in the patient's condition. Family has been updated. Patient is currently FULL CODE  - upgrade to stepdown    ICU Vital Signs Last 24 Hrs  T(C): 36.9 (15 Oct 2023 05:00), Max: 37.9 (14 Oct 2023 14:53)  T(F): 98.4 (15 Oct 2023 05:00), Max: 100.2 (14 Oct 2023 14:53)  HR: 77 (15 Oct 2023 12:44) (68 - 91)  BP: 112/60 (15 Oct 2023 12:44) (70/51 - 113/61)  BP(mean): 64 (15 Oct 2023 05:00) (64 - 65)  RR: 20 (15 Oct 2023 05:00) (18 - 20)      #Fever, concern for aspiration pneumonitis vs aspiration PNA, r/o PICC line infection  #Acute hypoxemic respiratory failure on NC  - Patient with new onset of Fever and hypotension since 10/12. It started after starting NGT feeding. BP improves 10/13 and pt is less lethargic  - Of note, Patient has PICC line since August 2023 at [facility] -> Will remove 10/13 and send tips for culture  - wbc increases from 10k to 21k in less than 24hrs with PMN > 94% -> this is likely reactive in setting of suspected aspiration pneumonitis  - lactate 1.9 (10/12), ESR 30 (10/12),  (10/12)  - f/u fever w/u (f/u BCx, Procalcitonin, daily lactate, ESR/CRP)  - Will get ABG with lactate today  - CT chest (10/12) did not show obvious aspiration pneumonia per radiology read, but did show Bilateral moderate sized pleural effusions on CT chest  - will slow feed and do aspiration precaution  - started on midodrine standing but now PRN as BP improves  - start zosyn to cover aspiration pneumonia>> changed to vanco and meropenem  - Cont oxygen supplementation.  - Will get pulm consult for diagnostic thoracentesis given Bilateral moderate sized pleural effusions on CT chest>> not required    #Severe Pharyngeal Dysphagia  - HO aspiration pneumonitis SP ABX   -  Patient was tolerating PO intake at NH prior to admission  -  After extubation on 09/24, patient became confused s/p upgrade on 09/26 for hypoxia, AMS, and pneumonia. He has been NPO since then with multiple speech swallow evaluations.  - FEES Study on 10/11: severe pharyngeal dysphagia, recommendation to keep NPO with alternate means -> now on NGT for feeding  - GI for evaluation of PEG tube placement after failing FEES study on 10/11.  - GI: Will hold off PEG tube placement for now pending sepsis workup (in setting of fever on 10/12) and pending neurology evaluation of age indeterminate infarct on CTH 10/10 + Will need neurological prognostication prior to planning PEG tube placement + Continue NG tube feeding for now    #Metabolic Encephalopathy 2/2 septic Shock 2/2 suspected aspiration pneumonia   - procal 1.7  - rapid response after TDC due to acute change in mental status - ABG notable for elevated lactate  - pt febrile to 101.6 and elevated lactate to 8.4, rapidly became hypotensive on 10/4 - started on levophed  - off pressor, midodrine now descalated to prn prior to hemodialysis   - CXR (9/28) Bilateral pleural effusion/opacity unchanged. No air leak.  - follow up cultures, cultures neg   - On 2L NC  - C/w albuterol PRN      #Hemorrhagic Shock Secondary to Hematochezia from Duodenal Ulcer Bleed s/p OVESCO placement 09/23  #UGIB secondary to Duodenal ulcer SP EGD   #Hematochezia resolved   #Hemorrhagic Shock resolved   #Blood Loss Anemia  # Acute Drop in Hemoglobin- Improved  - Status Post EGD on 09/23: blood clots in fundus and antrum; 2cm actively bleeding duodenal ulcer with spurting vessel ([place] 1a) in sweep on base s/p 10cc epi and s/p 11/6 OVESCO placement for hemostasis  - Continue PO Protonix 40mg BID via NG tube for now. Was on IV pantoprazole drip (09/23-09/25)  - Anticoagulation resumption once Hgb stabilizes (s/p 1 unit pRBCs 10/12)  - Monitor BM for recurrence of hematochezia or melena  - Please avoid any NSAIDs  - If any unstable bleed, please call GI   - Follow up with [facility] located at [address] [place] [address]. Phone Number: [phone]      #Anemia of acute blood loss   #Hemorrhagic Shock Secondary to Hematochezia from Duodenal Ulcer Bleed s/p OVESCO placement 09/23  - Trend hgb, transfuse blood PRN  - continue with holding therapeutic AC  - s/p IV Protamine sulfate 36mg x1 dose on 09/23  - GI f/u appreciated  - S/p IV Protonix 40mg BID -> Ok to switch to PO BID as per GI   - Avoid any NSAIDs  - overall the pt risk and benefit is goes against Anticoagulation, will further discuss with family , , dw brother regarding the high risk of bleeding and understands the reasons to hold anticoagulation. And understands the risk of cva with Afib.     #Acutely worsening uremia and acute kidney failure most likely 2/2 ATN - now on HD through right IJ [place]  #hypernatremia - resolving   #PAULA / CKD stable non oliguric  - possible ATN iso hypotension and possible sepsis/ vanc toxicity  - Cr stable for now -> Close F/U of BUN/Crea/ Lytes and UO  - no hydro on imaging  - udall placement 9/27/23  started on HD 9/27 but did not tolerate it with access problems  - udall changed 9/28  he received HD 9/28 and 9/30  - c/w phoslo 2/2/2   - off sodium bicarbonate   - s/p 250 cc LR bolus for hypernatremia  - s/p D5w @ 80 cc/hr  - f/u w/nephro  - hold BUMEX 2 mg BID given hypotension; resumes once BP stabilizes  - TDC by IR on 10/4    #Acute Femoral DVT s/p IVC filter 10/6  #Elevated dimer  - duplex neg on 9/29, repeat   - overall the pt risk and benefit is goes against Anticoagulation, will further discuss with family , dw brother regarding the high risk of bleeding and understands to hold anticoagulation And understands the risk of cva with Afib.    -s/p IVC filter 10/6    #Possible Age/CVA  - Indeterminate Left Subinsular Stroke on 10/10    #transaminitis - resolving   - likely 2/2 liver shock 2/2 hypotension   - LFTS improving   - follow up RUQ   - hepatitis neg   - worsened today  - antibiotics dc  - trend   - if continues to worsen will get hepatology     #Asymptomatic Cholelithiasis  - Noted on CT  - LFTs noted > Trend LFTs  - Monitor for symptoms    #Paroxysmal Afib, chronic; uncontrolled rate  #angelika cardia in evening on cardiac telemonitoring   - HOLDING therapeutic AC; resume once hgb is stable  - hold  metoprolol tartrate 12.5 mg q12h for hypotension; resumes once BP improves    #History of Left Foot OM w/ surrounding Cellulitis  #Sacral Decubitus Ulcer POA  - CT LLE (9/16): No CT evidence of osteomyelitis or necrotizing infection. No drainable collection seen, within the limitations of a noncontrast exam.  - Increased frailty and decreased physical capacity  - as per previous notes the team Discussed with ID attending: patient is unlikely to benefit from prolonged therapy with cefepime+vanco (to cover possible OM) due to adverse outcomes associated kidney dysfunction, cognitive impact ...etc )  - c/w Local wound care; offloading boots bilaterally, frequently turning and positioning, prevent pressure injury, keep skin clean, and monitor for wound changes and notify provider as per podiatry

## 2023-10-15 NOTE — PROGRESS NOTE ADULT - SUBJECTIVE AND OBJECTIVE BOX
Nephrology Progress Note    NMIO SARMIENTO  MRN-[id number]  83y  Male    S:  Patient is seen and examined, events over the last 24h noted.  hypotensive on levophed    O:  Allergies:  No Known Allergies    Hospital Medications:   MEDICATIONS  (STANDING):  budesonide 160 MICROgram(s)/formoterol 4.5 MICROgram(s) Inhaler 2 Puff(s) Inhalation two times a day  buMETAnide 2 milliGRAM(s) Oral every 12 hours  caspofungin IVPB 70 milliGRAM(s) IV Intermittent once  chlorhexidine 2% Cloths 1 Application(s) Topical <User Schedule>  collagenase Ointment 1 Application(s) Topical two times a day  darbepoetin Injectable Syringe 25 MICROGram(s) IV Push <User Schedule>  heparin   Injectable 5000 Unit(s) SubCutaneous every 8 hours  insulin lispro (ADMELOG) corrective regimen sliding scale   SubCutaneous three times a day before meals  iron sucrose IVPB 100 milliGRAM(s) IV Intermittent <User Schedule>  meropenem  IVPB 500 milliGRAM(s) IV Intermittent once  midodrine. 10 milliGRAM(s) Oral every 8 hours  norepinephrine Infusion 0.05 MICROgram(s)/kG/Min (8.63 mL/Hr) IV Continuous <Continuous>  pantoprazole   Suspension 40 milliGRAM(s) Oral two times a day  polyethylene glycol 3350 17 Gram(s) Oral every 12 hours  senna 2 Tablet(s) Oral at bedtime  sevelamer carbonate Powder 800 milliGRAM(s) Oral three times a day with meals  sevelamer carbonate Powder 800 milliGRAM(s) Enteral Tube three times a day with meals    MEDICATIONS  (PRN):  acetaminophen     Tablet .. 650 milliGRAM(s) Oral every 6 hours PRN Temp greater or equal to 38.5C (101.3F), Mild Pain (1 - 3), Moderate Pain (4 - 6)  albuterol    90 MICROgram(s) HFA Inhaler 1 Puff(s) Inhalation every 6 hours PRN for shortness of breath and/or wheezing  atropine Injectable 1 milliGRAM(s) IntraMuscular once PRN HR < 30  dextrose Oral Gel 15 Gram(s) Oral once PRN Blood Glucose LESS THAN 70 milliGRAM(s)/deciliter  HYDROmorphone  Injectable 0.25 milliGRAM(s) IV Push every 6 hours PRN Severe Pain (7 - 10)    Home Medications:  acetaminophen 325 mg oral tablet: 2 tab(s) orally every 6 hours, As needed, Mild Pain (1 - 3), Moderate Pain (4 - 6) (25 May 2019 09:25)  Albuterol (Eqv-ProAir HFA) 90 mcg/inh inhalation aerosol: 1 puff(s) inhaled every 6 hours as needed for  shortness of breath and/or wheezing (15 Sep 2023 22:56)  Breo Ellipta 100 mcg-25 mcg/inh inhalation powder: 1 puff(s) inhaled once a day (15 Sep 2023 22:56)  empagliflozin 10 mg oral tablet: 1 tab(s) orally once a day (15 Sep 2023 22:56)  Entresto 24 mg-26 mg oral tablet: 1 tab(s) orally 2 times a day (15 Sep 2023 22:57)  Lasix 20 mg oral tablet: 1 tab(s) orally once a day (15 Sep 2023 22:57)  Metoprolol Succinate ER 25 mg oral tablet, extended release: 1 tab(s) orally once a day (15 Sep 2023 22:57)  Percocet 5 mg-325 mg oral tablet: 1 tab(s) orally every 8 hours as needed for  severe pain (15 Sep 2023 22:57)  Xarelto 20 mg oral tablet: 1 tab(s) orally once a day (15 Sep 2023 22:57)      VITALS:  Daily     Daily   T(F): 98.4 (10-15-23 @ 05:00), Max: 101.3 (10-14-23 @ 13:01)  HR: 81 (10-15-23 @ 11:26)  BP: 99/58 (10-15-23 @ 11:26)  RR: 20 (10-15-23 @ 05:00)  SpO2: 100% (10-15-23 @ 05:00)  Wt(kg): --  I&O's Detail    14 Oct 2023 07:01  -  15 Oct 2023 07:00  --------------------------------------------------------  IN:    Enteral Tube Flush: 1200 mL    Glucerna: 1500 mL  Total IN: 2700 mL    OUT:  Total OUT: 0 mL    Total NET: 2700 mL        I&O's Summary    14 Oct 2023 07:01  -  15 Oct 2023 07:00  --------------------------------------------------------  IN: 2700 mL / OUT: 0 mL / NET: 2700 mL          PHYSICAL EXAM:  Gen: ill appearing, in NAD  Chest: b/l breath sounds  Abd: soft, ttdp  Ext: no edema  Vascular access: TDC      LABS:  ABG - ( 13 Oct 2023 15:41 )  pH, Arterial: 7.53  pH, Blood: x     /  pCO2: 33    /  pO2: 65    / HCO3: 28    / Base Excess: 4.8   /  SaO2: 95.5        10-15    144  |  107  |  50<H>  ----------------------------<  93  4.5   |  25  |  2.4<H>    Ca    7.2<L>      15 Oct 2023 05:35  Phos  2.2     10-15  Mg     1.8     10-15    TPro  5.5<L>  /  Alb  2.1<L>  /  TBili  0.5  /  DBili      /  AST  19  /  ALT  32  /  AlkPhos  105  10-15    Phosphorus: 2.2 mg/dL (10-15-23 @ 05:35)  Phosphorus: 1.6 mg/dL (10-14-23 @ 08:02)                          8.5    23.42 )-----------( 161      ( 15 Oct 2023 05:35 )             28.0     Mean Cell Volume: 94.9 fL (10-15-23 @ 05:35)    Iron Total: 31 ug/dL (10-07-23 @ 18:15)  % Saturation, Iron: 23 % (10-07-23 @ 18:15)  Ferritin: 960 ng/mL (10.07.23 @ 18:15)        Creatinine trend:  Creatinine: 2.4 mg/dL (10-15-23 @ 05:35)  Creatinine: 2.4 mg/dL (10-14-23 @ 08:02)  Creatinine: 1.6 mg/dL (10-12-23 @ 21:49)  Creatinine: 2.5 mg/dL (10-12-23 @ 06:36)  Creatinine: 3.0 mg/dL (10-09-23 @ 05:46)

## 2023-10-16 LAB
ALBUMIN SERPL ELPH-MCNC: 2.2 G/DL — LOW (ref 3.5–5.2)
ALP SERPL-CCNC: 121 U/L — HIGH (ref 30–115)
ALT FLD-CCNC: 27 U/L — SIGNIFICANT CHANGE UP (ref 0–41)
ANION GAP SERPL CALC-SCNC: 10 MMOL/L — SIGNIFICANT CHANGE UP (ref 7–14)
AST SERPL-CCNC: 18 U/L — SIGNIFICANT CHANGE UP (ref 0–41)
BASOPHILS # BLD AUTO: 0.06 K/UL — SIGNIFICANT CHANGE UP (ref 0–0.2)
BASOPHILS NFR BLD AUTO: 0.3 % — SIGNIFICANT CHANGE UP (ref 0–1)
BILIRUB SERPL-MCNC: 0.5 MG/DL — SIGNIFICANT CHANGE UP (ref 0.2–1.2)
BLD GP AB SCN SERPL QL: SIGNIFICANT CHANGE UP
BUN SERPL-MCNC: 57 MG/DL — HIGH (ref 10–20)
CALCIUM SERPL-MCNC: 7.2 MG/DL — LOW (ref 8.4–10.4)
CHLORIDE SERPL-SCNC: 107 MMOL/L — SIGNIFICANT CHANGE UP (ref 98–110)
CO2 SERPL-SCNC: 26 MMOL/L — SIGNIFICANT CHANGE UP (ref 17–32)
CREAT SERPL-MCNC: 2.2 MG/DL — HIGH (ref 0.7–1.5)
CULTURE RESULTS: SIGNIFICANT CHANGE UP
CULTURE RESULTS: SIGNIFICANT CHANGE UP
EGFR: 29 ML/MIN/1.73M2 — LOW
EOSINOPHIL # BLD AUTO: 0.57 K/UL — SIGNIFICANT CHANGE UP (ref 0–0.7)
EOSINOPHIL NFR BLD AUTO: 2.8 % — SIGNIFICANT CHANGE UP (ref 0–8)
GLUCOSE BLDC GLUCOMTR-MCNC: 110 MG/DL — HIGH (ref 70–99)
GLUCOSE BLDC GLUCOMTR-MCNC: 120 MG/DL — HIGH (ref 70–99)
GLUCOSE BLDC GLUCOMTR-MCNC: 134 MG/DL — HIGH (ref 70–99)
GLUCOSE BLDC GLUCOMTR-MCNC: 180 MG/DL — HIGH (ref 70–99)
GLUCOSE BLDC GLUCOMTR-MCNC: 180 MG/DL — HIGH (ref 70–99)
GLUCOSE SERPL-MCNC: 108 MG/DL — HIGH (ref 70–99)
HCT VFR BLD CALC: 30.1 % — LOW (ref 42–52)
HGB BLD-MCNC: 9.1 G/DL — LOW (ref 14–18)
IMM GRANULOCYTES NFR BLD AUTO: 0.8 % — HIGH (ref 0.1–0.3)
LYMPHOCYTES # BLD AUTO: 19.1 % — LOW (ref 20.5–51.1)
LYMPHOCYTES # BLD AUTO: 3.84 K/UL — HIGH (ref 1.2–3.4)
MAGNESIUM SERPL-MCNC: 1.9 MG/DL — SIGNIFICANT CHANGE UP (ref 1.8–2.4)
MCHC RBC-ENTMCNC: 28.4 PG — SIGNIFICANT CHANGE UP (ref 27–31)
MCHC RBC-ENTMCNC: 30.2 G/DL — LOW (ref 32–37)
MCV RBC AUTO: 94.1 FL — HIGH (ref 80–94)
MONOCYTES # BLD AUTO: 1.18 K/UL — HIGH (ref 0.1–0.6)
MONOCYTES NFR BLD AUTO: 5.9 % — SIGNIFICANT CHANGE UP (ref 1.7–9.3)
NEUTROPHILS # BLD AUTO: 14.31 K/UL — HIGH (ref 1.4–6.5)
NEUTROPHILS NFR BLD AUTO: 71.1 % — SIGNIFICANT CHANGE UP (ref 42.2–75.2)
NRBC # BLD: 0 /100 WBCS — SIGNIFICANT CHANGE UP (ref 0–0)
PHOSPHATE SERPL-MCNC: 2.9 MG/DL — SIGNIFICANT CHANGE UP (ref 2.1–4.9)
PLATELET # BLD AUTO: 215 K/UL — SIGNIFICANT CHANGE UP (ref 130–400)
PMV BLD: 11.2 FL — HIGH (ref 7.4–10.4)
POTASSIUM SERPL-MCNC: 4.4 MMOL/L — SIGNIFICANT CHANGE UP (ref 3.5–5)
POTASSIUM SERPL-SCNC: 4.4 MMOL/L — SIGNIFICANT CHANGE UP (ref 3.5–5)
PROCALCITONIN SERPL-MCNC: 0.87 NG/ML — HIGH (ref 0.02–0.1)
PROT SERPL-MCNC: 5.8 G/DL — LOW (ref 6–8)
RBC # BLD: 3.2 M/UL — LOW (ref 4.7–6.1)
RBC # FLD: 20.3 % — HIGH (ref 11.5–14.5)
SODIUM SERPL-SCNC: 143 MMOL/L — SIGNIFICANT CHANGE UP (ref 135–146)
SPECIMEN SOURCE: SIGNIFICANT CHANGE UP
SPECIMEN SOURCE: SIGNIFICANT CHANGE UP
WBC # BLD: 20.12 K/UL — HIGH (ref 4.8–10.8)
WBC # FLD AUTO: 20.12 K/UL — HIGH (ref 4.8–10.8)

## 2023-10-16 PROCEDURE — 99291 CRITICAL CARE FIRST HOUR: CPT

## 2023-10-16 PROCEDURE — 99232 SBSQ HOSP IP/OBS MODERATE 35: CPT

## 2023-10-16 RX ADMIN — BUMETANIDE 2 MILLIGRAM(S): 0.25 INJECTION INTRAMUSCULAR; INTRAVENOUS at 17:18

## 2023-10-16 RX ADMIN — Medication 8.63 MICROGRAM(S)/KG/MIN: at 00:36

## 2023-10-16 RX ADMIN — MEROPENEM 100 MILLIGRAM(S): 1 INJECTION INTRAVENOUS at 13:34

## 2023-10-16 RX ADMIN — Medication 1 APPLICATION(S): at 05:40

## 2023-10-16 RX ADMIN — CHLORHEXIDINE GLUCONATE 1 APPLICATION(S): 213 SOLUTION TOPICAL at 05:41

## 2023-10-16 RX ADMIN — Medication 8.63 MICROGRAM(S)/KG/MIN: at 12:04

## 2023-10-16 RX ADMIN — Medication 650 MILLIGRAM(S): at 00:27

## 2023-10-16 RX ADMIN — MIDODRINE HYDROCHLORIDE 10 MILLIGRAM(S): 2.5 TABLET ORAL at 05:40

## 2023-10-16 RX ADMIN — HEPARIN SODIUM 5000 UNIT(S): 5000 INJECTION INTRAVENOUS; SUBCUTANEOUS at 13:34

## 2023-10-16 RX ADMIN — HEPARIN SODIUM 5000 UNIT(S): 5000 INJECTION INTRAVENOUS; SUBCUTANEOUS at 05:41

## 2023-10-16 RX ADMIN — Medication 1 APPLICATION(S): at 17:18

## 2023-10-16 RX ADMIN — BUMETANIDE 2 MILLIGRAM(S): 0.25 INJECTION INTRAMUSCULAR; INTRAVENOUS at 05:40

## 2023-10-16 RX ADMIN — POLYETHYLENE GLYCOL 3350 17 GRAM(S): 17 POWDER, FOR SOLUTION ORAL at 05:42

## 2023-10-16 RX ADMIN — PANTOPRAZOLE SODIUM 40 MILLIGRAM(S): 20 TABLET, DELAYED RELEASE ORAL at 05:41

## 2023-10-16 RX ADMIN — HEPARIN SODIUM 5000 UNIT(S): 5000 INJECTION INTRAVENOUS; SUBCUTANEOUS at 21:19

## 2023-10-16 RX ADMIN — MIDODRINE HYDROCHLORIDE 10 MILLIGRAM(S): 2.5 TABLET ORAL at 13:34

## 2023-10-16 RX ADMIN — BUDESONIDE AND FORMOTEROL FUMARATE DIHYDRATE 2 PUFF(S): 160; 4.5 AEROSOL RESPIRATORY (INHALATION) at 20:46

## 2023-10-16 RX ADMIN — MIDODRINE HYDROCHLORIDE 10 MILLIGRAM(S): 2.5 TABLET ORAL at 21:19

## 2023-10-16 RX ADMIN — PANTOPRAZOLE SODIUM 40 MILLIGRAM(S): 20 TABLET, DELAYED RELEASE ORAL at 17:18

## 2023-10-16 NOTE — SWALLOW BEDSIDE ASSESSMENT ADULT - COMMENTS
Pt febrile 10/12, septic screening done, started on zosyn, WBC increase, and hypotensive. Pt upgraded to SDU, concern for aspiration pneumonitis vs aspiration PNA, r/o PICC line infection.

## 2023-10-16 NOTE — PROGRESS NOTE ADULT - ASSESSMENT
83 year old male patient known to have COPD. DM, atrial fibrillation, HFrEF, anemia, lymphedema, and recent left foot OM who was brought from the NH to the ED on 09/15 for evaluation of altered mental status, found to have sepsis in setting of recent left foot OM, admitted for further management. Stay was complicated by hemorrhagic shock and drop in Hb secondary to hematochezia on 09/23, Status post EGD on 09/23 revealing a bleeding duodenal ulcer s/p OVESCO placement. Stay also complicated by an episode of confusion and hypoxia on 09/26 s/p found to have possible stroke on 10/10.  Patient was found to have Acute Femoral DVT s/p IVC filter 10/6. Patient WAS  transferred to general floor for further management. Then upgraded again to CEU        Septic shock   Right femoral DVT sp IVC   Right pleural effusion  HO aspiration pneumoniaSP ABX   UGIB secondary to Duodenal ulcer SP EGD   Metabolic Encephalopathy   PAULA / CKD on HD  History of Left Foot OM w/ surrounding Cellulitis   Sacral DTI      on levophed 0.05 , wean as tolerated      Avoid overload  he is also on midodrine - contiue   c/w meropenem f/u with ID if vacno needed   f/u cultures   c/w NGT  slow feed and do aspiration precaution  - Cont oxygen supplementation.  CT chest 10/12 Multiple right apical nodules including new 7 mm solid nodule  (Series 4/85). Follow-up CT scan 6 months time.  Bilateral moderate sized pleural effusions with adjacent lower lobe atelectasis.  No pneumothorax or parenchymal mass.      []Severe Pharyngeal Dysphagia  - HO aspiration pneumonitis SP ABX   -  Patient was tolerating PO intake at NH prior to admission  -  After extubation on 09/24, patient became confused s/p upgrade on 09/26 for hypoxia, AMS, and pneumonia. He has been NPO since then with multiple speech swallow evaluations.  - FEES Study on 10/11: severe pharyngeal dysphagia, recommendation to keep NPO with alternate means -> now on NGT for feeding  - GI for evaluation of PEG tube placement after failing FEES study on 10/11.  - GI: Will hold off PEG tube placement for now pending hemodynamic stability   c/w NGT feeding for now     []age indeterminate infarct in left subinsular on CTH  neurology input appreciated -Would not pursue MR brain as won't   Continue Xarelto once medically safe        #Hemorrhagic Shock Secondary to Hematochezia from Duodenal Ulcer Bleed s/p OVESCO placement 09/23  #UGIB secondary to Duodenal ulcer SP EGD   #Hematochezia resolved   #Hemorrhagic Shock resolved   #Blood Loss Anemia  # Acute Drop in Hemoglobin- Improved  - Status Post EGD on 09/23: blood clots in fundus and antrum; 2cm actively bleeding duodenal ulcer with spurting vessel ([place] 1a) in sweep on base s/p 10cc epi and s/p 11/6 OVESCO placement for hemostasis  - Continue PO Protonix 40mg BID via NG tube for now. Was on IV pantoprazole drip (09/23-09/25)  - Anticoagulation resumption once Hgb stabilizes (s/p 1 unit pRBCs 10/12) if family agrees   - Monitor BM for recurrence of hematochezia or melena  - Please avoid any NSAIDs  - If any unstable bleed, please call GI   - Follow up with [facility] located at [address] [place] [address]. Phone Number: [phone]      #Anemia of acute blood loss   #Hemorrhagic Shock Secondary to Hematochezia from Duodenal Ulcer Bleed s/p OVESCO placement 09/23  - Trend hgb, transfuse blood PRN  - continue with holding therapeutic AC  - s/p IV Protamine sulfate 36mg x1 dose on 09/23  - GI f/u appreciated  - S/p IV Protonix 40mg BID -> Ok to switch to PO BID as per GI   - Avoid any NSAIDs  - overall the pt risk and benefit is goes against Anticoagulation,   as per previous notes - previous attending maría brother regarding the high risk of bleeding and understands the reasons to hold anticoagulation. And understands the risk of cva with Afib.     []Acutely worsening uremia and acute kidney failure - now on HD   Tunneled hemodialysis cathete by IR 10/4   - nephrology on board   HD as per nephro   c/w bumex       []Acute Femoral DVT s/p IVC filter 10/6  []Elevated dimer  -va duplex:  Acute thrombus in the right proximal femoral vein.  as per previous noted -the medical attending maría brother regarding the high risk of bleeding and understands to hold anticoagulation And understands the risk of cva with Afib.    -s/p IVC filter 10/6  c/w heparin sq for now     #Asymptomatic Cholelithiasis  - Noted on CT  - LFTs noted > Trend LFTs  - Monitor for symptoms    #Paroxysmal Afib, chronic; uncontrolled rate  #angelika cardia in evening on cardiac telemonitoring   - HOLDING therapeutic AC; resume once hgb is stable  - hold  metoprolol tartrate 12.5 mg q12h for hypotension; resumes once BP improves  EP consult appreciated     #History of Left Foot OM w/ surrounding Cellulitis  #Sacral Decubitus Ulcer POA  - CT LLE (9/16): No CT evidence of osteomyelitis or necrotizing infection. No drainable collection seen, within the limitations of a noncontrast exam.  - Increased frailty and decreased physical capacity  - c/w Local wound care; offloading boots bilaterally, frequently turning and positioning, prevent pressure injury, keep skin clean, and monitor for wound changes and notify provider a        over all prognosis is poor     full code - palliative team input appreciated          83 year old male patient known to have COPD. DM, atrial fibrillation, HFrEF, anemia, lymphedema, and recent left foot OM who was brought from the NH to the ED on 09/15 for evaluation of altered mental status, found to have sepsis in setting of recent left foot OM, admitted for further management. Stay was complicated by hemorrhagic shock and drop in Hb secondary to hematochezia on 09/23, Status post EGD on 09/23 revealing a bleeding duodenal ulcer s/p OVESCO placement. Stay also complicated by an episode of confusion and hypoxia on 09/26 s/p found to have possible stroke on 10/10.  Patient was found to have Acute Femoral DVT s/p IVC filter 10/6. Patient WAS  transferred to general floor for further management. Then upgraded again to CEU        Septic shock   Right femoral DVT sp IVC   Right pleural effusion  HO aspiration pneumoniaSP ABX   UGIB secondary to Duodenal ulcer SP EGD   Metabolic Encephalopathy   PAULA / CKD on HD  History of Left Foot OM w/ surrounding Cellulitis   Sacral wounds       on levophed 0.05 , wean as tolerated      Avoid overload  he is also on midodrine - contiue   c/w meropenem f/u with ID if vacno needed   f/u cultures   c/w NGT  slow feed and do aspiration precaution  - Cont oxygen supplementation.  CT chest 10/12 Multiple right apical nodules including new 7 mm solid nodule  (Series 4/85). Follow-up CT scan 6 months time.  Bilateral moderate sized pleural effusions with adjacent lower lobe atelectasis.  No pneumothorax or parenchymal mass.  Burn team input appreciated -Sacral & bilateral buttock pressure ulcers, necrotic  Wound care as per burn team - wash with soap and water. Apply santyl/hydrogel ointment, cover with allevyn pads  At this time patient does not need surgical debridement , If worsens despite santyl, please recall burn - may need debridement   frequent turning, off loading,and positioning and skin care as per protocol, Maintain pressure injury prevention, Keep skin clean, Offload heels, Monitor wound for changes and notify provider if any       []Severe Pharyngeal Dysphagia  - HO aspiration pneumonitis SP ABX   -  Patient was tolerating PO intake at NH prior to admission  -  After extubation on 09/24, patient became confused s/p upgrade on 09/26 for hypoxia, AMS, and pneumonia. He has been NPO since then with multiple speech swallow evaluations.  - FEES Study on 10/11: severe pharyngeal dysphagia, recommendation to keep NPO with alternate means -> now on NGT for feeding  - GI for evaluation of PEG tube placement after failing FEES study on 10/11.  - GI: Will hold off PEG tube placement for now pending hemodynamic stability   c/w NGT feeding for now     []age indeterminate infarct in left subinsular on CTH  neurology input appreciated -Would not pursue MR brain as won't   Continue Xarelto once medically safe        #Hemorrhagic Shock Secondary to Hematochezia from Duodenal Ulcer Bleed s/p OVESCO placement 09/23  #UGIB secondary to Duodenal ulcer SP EGD   #Hematochezia resolved   #Hemorrhagic Shock resolved   #Blood Loss Anemia  # Acute Drop in Hemoglobin- Improved  - Status Post EGD on 09/23: blood clots in fundus and antrum; 2cm actively bleeding duodenal ulcer with spurting vessel ([place] 1a) in sweep on base s/p 10cc epi and s/p 11/6 OVESCO placement for hemostasis  - Continue PO Protonix 40mg BID via NG tube for now. Was on IV pantoprazole drip (09/23-09/25)  - Anticoagulation resumption once Hgb stabilizes (s/p 1 unit pRBCs 10/12) if family agrees   - Monitor BM for recurrence of hematochezia or melena  - Please avoid any NSAIDs  - If any unstable bleed, please call GI   - [facility] up with [facility] located at [address] [place] [address]. Phone Number: [phone]      #Anemia of acute blood loss   #Hemorrhagic Shock Secondary to Hematochezia from Duodenal Ulcer Bleed s/p OVESCO placement 09/23  - Trend hgb, transfuse blood PRN  - continue with holding therapeutic AC  - s/p IV Protamine sulfate 36mg x1 dose on 09/23  - GI f/u appreciated  - S/p IV Protonix 40mg BID -> Ok to switch to PO BID as per GI   - Avoid any NSAIDs  - overall the pt risk and benefit is goes against Anticoagulation,   as per previous notes - previous attending dw brother regarding the high risk of bleeding and understands the reasons to hold anticoagulation. And understands the risk of cva with Afib.     []Acutely worsening uremia and acute kidney failure - now on HD   Tunneled hemodialysis cathete by IR 10/4   - nephrology on board   HD as per nephro   c/w bumex       []Acute Femoral DVT s/p IVC filter 10/6  []Elevated dimer  -va duplex:  Acute thrombus in the right proximal femoral vein.  as per previous noted -the medical attending dw brother regarding the high risk of bleeding and understands to hold anticoagulation And understands the risk of cva with Afib.    -s/p IVC filter 10/6  c/w heparin sq for now     #Asymptomatic Cholelithiasis  - Noted on CT  - LFTs noted > Trend LFTs  - Monitor for symptoms    #Paroxysmal Afib, chronic; uncontrolled rate  #angelika cardia in evening on cardiac telemonitoring   - HOLDING therapeutic AC; resume once hgb is stable  - hold  metoprolol tartrate 12.5 mg q12h for hypotension; resumes once BP improves  EP consult appreciated     #History of Left Foot OM w/ surrounding Cellulitis  #Sacral Decubitus Ulcer POA  - CT LLE (9/16): No CT evidence of osteomyelitis or necrotizing infection. No drainable collection seen, within the limitations of a noncontrast exam.  - Increased frailty and decreased physical capacity  - c/w Local wound care; offloading boots bilaterally, frequently turning and positioning, prevent pressure injury, keep skin clean, and monitor for wound changes and notify provider a        over all prognosis is poor     full code - palliative team input appreciated

## 2023-10-16 NOTE — SWALLOW BEDSIDE ASSESSMENT ADULT - SLP GENERAL OBSERVATIONS
pt received in bed asleep arousable confused w/o c/o pain. +NGT in place +room air +Extensive oral care performed

## 2023-10-16 NOTE — PROGRESS NOTE ADULT - SUBJECTIVE AND OBJECTIVE BOX
Patient is a 83y old  Male who presents with a chief complaint of AMS (16 Oct 2023 08:58)        Over Night Events:  on Levophed.  On RA.          ROS:     All ROS are negative except HPI         PHYSICAL EXAM    ICU Vital Signs Last 24 Hrs  T(C): 37.3 (16 Oct 2023 08:02), Max: 38.6 (15 Oct 2023 13:58)  T(F): 99.2 (16 Oct 2023 08:02), Max: 101.4 (15 Oct 2023 13:58)  HR: 84 (16 Oct 2023 08:02) (68 - 91)  BP: 117/55 (16 Oct 2023 08:02) (90/53 - 128/79)  BP(mean): 78 (16 Oct 2023 08:02) (78 - 95)  ABP: --  ABP(mean): --  RR: 20 (16 Oct 2023 08:02) (18 - 20)  SpO2: 98% (16 Oct 2023 08:02) (96% - 98%)    O2 Parameters below as of 16 Oct 2023 08:02  Patient On (Oxygen Delivery Method): room air            CONSTITUTIONAL:  Ill appearing in  NAD    ENT:   Airway patent,   Mouth with normal mucosa.   NGT     EYES:   Pupils equal,   Round and reactive to light.    CARDIAC:   Normal rate,   Regular rhythm.        RESPIRATORY:   No wheezing  Bilateral BS  Normal chest expansion  Not tachypneic,  No use of accessory muscles    GASTROINTESTINAL:  Abdomen soft,   Non-tender,   No guarding,   + BS    MUSCULOSKELETAL:   Range of motion is  limited,  No clubbing, cyanosis    NEUROLOGICAL:   Alert   Follows simple commands   No motor  deficits.    SKIN:   Skin normal color for race, .   No evidence of rash.        10-15-23 @ 07:01  -  10-16-23 @ 07:00  --------------------------------------------------------  IN:    Enteral Tube Flush: 300 mL    Glucerna: 1125 mL    Jevity 1.2: 600 mL  Total IN: 2025 mL    OUT:  Total OUT: 0 mL    Total NET: 2025 mL          LABS:                            9.1    20.12 )-----------( 215      ( 16 Oct 2023 05:33 )             30.1                                               10-16    143  |  107  |  57<H>  ----------------------------<  108<H>  4.4   |  26  |  2.2<H>    Ca    7.2<L>      16 Oct 2023 05:33  Phos  2.9     10-16  Mg     1.9     10-16    TPro  5.8<L>  /  Alb  2.2<L>  /  TBili  0.5  /  DBili  x   /  AST  18  /  ALT  27  /  AlkPhos  121<H>  10-16                                             Urinalysis Basic - ( 16 Oct 2023 05:33 )    Color: x / Appearance: x / SG: x / pH: x  Gluc: 108 mg/dL / Ketone: x  / Bili: x / Urobili: x   Blood: x / Protein: x / Nitrite: x   Leuk Esterase: x / RBC: x / WBC x   Sq Epi: x / Non Sq Epi: x / Bacteria: x                                                  LIVER FUNCTIONS - ( 16 Oct 2023 05:33 )  Alb: 2.2 g/dL / Pro: 5.8 g/dL / ALK PHOS: 121 U/L / ALT: 27 U/L / AST: 18 U/L / GGT: x                                                  Culture - Catheter (collected 13 Oct 2023 16:50)  Source: PICC Catheter Site  Preliminary Report (15 Oct 2023 14:56):    No growth    Culture - Blood (collected 13 Oct 2023 16:10)  Source: .Blood Blood  Preliminary Report (15 Oct 2023 23:01):    No growth at 48 Hours                                                                                       ABG - ( 15 Oct 2023 11:18 )  pH, Arterial: 7.63  pH, Blood: x     /  pCO2: 24    /  pO2: 147   / HCO3: 25    / Base Excess: 4.3   /  SaO2: 98.9                MEDICATIONS  (STANDING):  budesonide 160 MICROgram(s)/formoterol 4.5 MICROgram(s) Inhaler 2 Puff(s) Inhalation two times a day  buMETAnide 2 milliGRAM(s) Oral every 12 hours  chlorhexidine 2% Cloths 1 Application(s) Topical <User Schedule>  collagenase Ointment 1 Application(s) Topical two times a day  darbepoetin Injectable Syringe 25 MICROGram(s) IV Push <User Schedule>  dextrose 5%. 1000 milliLiter(s) (100 mL/Hr) IV Continuous <Continuous>  dextrose 50% Injectable 25 Gram(s) IV Push once  dextrose 50% Injectable 12.5 Gram(s) IV Push once  dextrose 50% Injectable 25 Gram(s) IV Push once  glucagon  Injectable 1 milliGRAM(s) IntraMuscular once  heparin   Injectable 5000 Unit(s) SubCutaneous every 8 hours  insulin lispro (ADMELOG) corrective regimen sliding scale   SubCutaneous three times a day before meals  meropenem  IVPB 500 milliGRAM(s) IV Intermittent every 24 hours  meropenem  IVPB      midodrine. 10 milliGRAM(s) Oral every 8 hours  norepinephrine Infusion 0.05 MICROgram(s)/kG/Min (8.63 mL/Hr) IV Continuous <Continuous>  pantoprazole   Suspension 40 milliGRAM(s) Oral two times a day  polyethylene glycol 3350 17 Gram(s) Oral every 12 hours  senna 2 Tablet(s) Oral at bedtime    MEDICATIONS  (PRN):  acetaminophen     Tablet .. 650 milliGRAM(s) Oral every 6 hours PRN Temp greater or equal to 38C (100.4F), Mild Pain (1 - 3), Moderate Pain (4 - 6)  albuterol    90 MICROgram(s) HFA Inhaler 1 Puff(s) Inhalation every 6 hours PRN for shortness of breath and/or wheezing  atropine Injectable 1 milliGRAM(s) IntraMuscular once PRN HR < 30  dextrose Oral Gel 15 Gram(s) Oral once PRN Blood Glucose LESS THAN 70 milliGRAM(s)/deciliter  HYDROmorphone  Injectable 0.25 milliGRAM(s) IV Push every 6 hours PRN Severe Pain (7 - 10)                                                                              ECHO

## 2023-10-16 NOTE — PROGRESS NOTE ADULT - ASSESSMENT
82 yo m ho DM, COPD, anemia, paroxysmal afib on xarelto, HFrEF, DM coming in from nursing home for AMS x 2 days. Found to have toxic metabolic encephalopathy with PAULA.  PAULA/ toxic metabolic encephalopathy/ HAGMA/ HFrEF/ hypernatremia  possible ATN iso hypotension and possible sepsis/ vanc toxicity  no hydro on imaging  creat stable   monitor bladder scan daily  no hd today   document UO /  bumex 2 q 12 / strict I and O / follow BMP  phos level noted low;  no binders   BP noted /  midodrine   febrile/ elevated WBC ,remains on ATB / followed by ID / 10 / 13 BC negative   s/p IVC filter   iron stores noted / keep Hb >7; d/c iv iron with acute infection  will follow    prognosis poor

## 2023-10-16 NOTE — PROGRESS NOTE ADULT - SUBJECTIVE AND OBJECTIVE BOX
24H events:    Patient is a 83y old Male who presents with a chief complaint of AMS (16 Oct 2023 13:11)    Primary diagnosis of Altered mental status      Today is hospital day 31d. This morning patient was seen and examined at bedside, resting comfortably in bed.    No acute or major events overnight.    Code Status: full code      PAST MEDICAL & SURGICAL HISTORY  HTN (hypertension)    COPD (chronic obstructive pulmonary disease)    High cholesterol    Mild anemia    Coffee ground emesis    Chronic diastolic heart failure    PAULA (acute kidney injury)    No significant past surgical history      ALLERGIES:  No Known Allergies    MEDICATIONS:  STANDING MEDICATIONS  budesonide 160 MICROgram(s)/formoterol 4.5 MICROgram(s) Inhaler 2 Puff(s) Inhalation two times a day  buMETAnide 2 milliGRAM(s) Oral every 12 hours  chlorhexidine 2% Cloths 1 Application(s) Topical <User Schedule>  collagenase Ointment 1 Application(s) Topical two times a day  darbepoetin Injectable Syringe 25 MICROGram(s) IV Push <User Schedule>  dextrose 5%. 1000 milliLiter(s) IV Continuous <Continuous>  dextrose 50% Injectable 12.5 Gram(s) IV Push once  dextrose 50% Injectable 25 Gram(s) IV Push once  dextrose 50% Injectable 25 Gram(s) IV Push once  glucagon  Injectable 1 milliGRAM(s) IntraMuscular once  heparin   Injectable 5000 Unit(s) SubCutaneous every 8 hours  insulin lispro (ADMELOG) corrective regimen sliding scale   SubCutaneous three times a day before meals  meropenem  IVPB 500 milliGRAM(s) IV Intermittent every 24 hours  meropenem  IVPB      midodrine. 10 milliGRAM(s) Oral every 8 hours  norepinephrine Infusion 0.05 MICROgram(s)/kG/Min IV Continuous <Continuous>  pantoprazole   Suspension 40 milliGRAM(s) Oral two times a day    PRN MEDICATIONS  acetaminophen     Tablet .. 650 milliGRAM(s) Oral every 6 hours PRN  albuterol    90 MICROgram(s) HFA Inhaler 1 Puff(s) Inhalation every 6 hours PRN  atropine Injectable 1 milliGRAM(s) IntraMuscular once PRN  dextrose Oral Gel 15 Gram(s) Oral once PRN  HYDROmorphone  Injectable 0.25 milliGRAM(s) IV Push every 6 hours PRN    VITALS:   T(F): 99.2  HR: 83  BP: 123/63  RR: 20  SpO2: 97%    PHYSICAL EXAM:  GENERAL:   ( x ) NAD, lying in bed comfortably     HEAD:   ( x ) Atraumatic        HEART:  Rate -->     (  x) normal rate     (  Rhythm -->     (x  ) regular    Murmurs -->     ( x ) normal s1s2        LUNGS:   (x  )Unlabored respirations     (x  ) B/L air entry      ( x ) no adventitious sound         ABDOMEN:   ( x ) Soft    (  x) nondistended     ( x ) nontender         EXTREMITIES:  (  x) Normal         NERVOUS SYSTEM:    ( x A&Ox3       LABS:                        9.1    20.12 )-----------( 215      ( 16 Oct 2023 05:33 )             30.1     10-16    143  |  107  |  57<H>  ----------------------------<  108<H>  4.4   |  26  |  2.2<H>    Ca    7.2<L>      16 Oct 2023 05:33  Phos  2.9     10-16  Mg     1.9     10-16    TPro  5.8<L>  /  Alb  2.2<L>  /  TBili  0.5  /  DBili  x   /  AST  18  /  ALT  27  /  AlkPhos  121<H>  10-16      Urinalysis Basic - ( 16 Oct 2023 05:33 )    Color: x / Appearance: x / SG: x / pH: x  Gluc: 108 mg/dL / Ketone: x  / Bili: x / Urobili: x   Blood: x / Protein: x / Nitrite: x   Leuk Esterase: x / RBC: x / WBC x   Sq Epi: x / Non Sq Epi: x / Bacteria: x      ABG - ( 15 Oct 2023 11:18 )  pH, Arterial: 7.63  pH, Blood: x     /  pCO2: 24    /  pO2: 147   / HCO3: 25    / Base Excess: 4.3   /  SaO2: 98.9            RADIOLOGY:

## 2023-10-16 NOTE — SWALLOW BEDSIDE ASSESSMENT ADULT - SLP PERTINENT HISTORY OF CURRENT PROBLEM
Pt is an 82 y/o M w/ PMHx: DM, COPD, anemia, lymphedema, afib on Xarelto, HFrEF, DM, presents from NH for AMS. Pt is being treated for anemia, hemorrhagic shock 2' hematochezia from duodenal ulcer bleed s/p OVESCO placement 09/23. Course c/b acutely worsening uremia and acute kidney failure most likely 2' ATN- now on HD through right IJ [place]. +Metabolic encephalopathy, hypernatremia, AHRF- possible aspiration PNA, volume overload, R pleural effusions, hypotension. CTH-> focal hypodensity is noted within the left subinsular region, previously seen potentially reflecting age-indeterminate infarct. CXR 10/12-> stable B/L opacities, effusions. Pt s/p FEES 10/10, VFSS 10/11 w/ recs for long-term NPO and GOC discussion (PEG vs. comfort feeds). GI holding off on PEG tube placement pending sepsis w/u. GOC ongoing, pt full code at this time.

## 2023-10-16 NOTE — PROGRESS NOTE ADULT - ASSESSMENT
Assessment and Plan  Case of an 83 year old male patient known to have COPD. DM, atrial fibrillation, HFrEF, anemia, lymphedema, and recent left foot OM who was brought from the NH to the ED on 09/15 for evaluation of altered mental status, found to have sepsis in setting of recent left foot OM, admitted for further management. Stay was complicated by hemorrhagic shock and drop in Hb secondary to hematochezia on 09/23, Status post EGD on 09/23 revealing a bleeding duodenal ulcer s/p OVESCO placement. Stay also complicated by an episode of confusion and hypoxia on 09/26 s/p found to have possible stroke on 10/10. We are reconsulted for evaluation of PEG tube placement after failing FEES study on 10/11. We are consulted for hematochezia and hypotension.      Severe Pharyngeal Dysphagia  Possible Age-Indeterminate Left Subinsular Stroke on 10/10  * Patient was tolerating PO intake at NH prior to admission  * After extubation on 09/24, patient became confused s/p upgrade on 09/26 for hypoxia, AMS, and pneumonia. He has been NPO since then with multiple speech swallow evaluations.  * FEES Study on 10/11: severe pharyngeal dysphagia, recommendation to keep NPO with alternate means  * Spiked T 101.4 degrees on 10/15 at 14:00 PM, on levophed 0.05 /71mmHg  * Recent Labs: 10/12 WBC 13.52, Hb 8.5, Plt 106 -> 10/15 WBC 21.13, Hb 8.4, Plt 202  * Abdominal imaging as below    RECOMMENDATIONS  - Will hold off PEG tube placement for now as patient is currently on pressors with leukocytosis and being worked up for sepsis  - Will need neurology evaluation of age indeterminate infarct on CT 10/10 prior to planning PEG tube placement  - Continue NG tube feeding for now  - Palliative team evaluation and GOC discussion appreciated      Hemorrhagic Shock Secondary to Hematochezia from Duodenal Ulcer Bleed s/p OVESCO placement 09/23  Acute Drop in Hemoglobin- Improved  * Hemodynamically stable  * No melena per nurse  * On pressors for presumed sepsis  * Hb stable  * Status Post EGD on 09/23: blood clots in fundus and antrum; 2cm actively bleeding duodenal ulcer with spurting vessel ([place] 1a) in sweep on base s/p 10cc epi and s/p 11/6 OVESCO placement for hemostasis    RECOMMENDATIONS  - Status post EGD on 09/23 as detailed above (s/p OVESCO placement for hemostasis)  - Continue PO Protonix 40mg BID via NG tube for now. Was on IV pantoprazole drip (09/23-09/25)  - Anticoagulation resumption per team after discussion risks of rebleed and benefits using CHADSVASC and HASBLED scores  - Diet as above  - Monitor BM for recurrence of hematochezia or melena  - Please avoid any NSAIDs  - If any unstable bleed, please call GI STAT  - Follow up with our [facility] located at [address] [place] [address]. Phone Number: [phone]        Asymptomatic Cholelithiasis  * Noted on CT  * LFTs noted    RECOMMENDATIONS  - Monitor for symptoms  - Trend LFTs      Thank you for your consult.  - Please note that plan was communicated with medical team.  - Please reach GI on [phone] during weekdays till 5pm.  - Please call the GI service line after 5pm on Weekdays and anytime on Weekends: [phone].      Rickie Jenkins MD  PGY - 4 Gastroenterology Fellow  [facility]

## 2023-10-16 NOTE — PROGRESS NOTE ADULT - ASSESSMENT
84 yo m ho DM, COPD, anemia, lymphedemia, afib on xarelto, HFrEF, DM coming in from nursing home for AMS x 2 days. Unable to gather story from pt as he is altered and lethargic.  As per NH was becoming more agitated x 2 days, was hypotensive yesterday and started on cefepime and vancomycin. Brought in to the ED for AMS. PICC line in place for cefepime 1q q8 until 10/5/23 and vanc 1q24 until 9/23/23 for LLE cellulitis/OM  (was at [facility] last month for LLE thick wound cellulitis/OM and sacral DTI as per call with ers NH as per collateral from NH DEISY Barry).     #Fever, concern for aspiration pneumonitis vs aspiration PNA, r/o PICC line infection  #Acute hypoxemic respiratory failure on NC  - Patient with new onset of Fever and hypotension since 10/12. It started after starting NGT feeding. BP improves 10/13 and pt is less lethargic  - Of note, Patient has PICC line since August 2023 at [facility] -> Will remove 10/13 and send tips for culture  - wbc increases from 10k to 21k in less than 24hrs with PMN > 94% -> this is likely reactive in setting of suspected aspiration pneumonitis  - lactate 1.9 (10/12), ESR 30 (10/12),  (10/12)  - blood cultures and picc line cultures no growth to date   - CT chest (10/12) did not show obvious aspiration pneumonia per radiology read, but did show Bilateral moderate sized pleural effusions on CT chest  -on levophed, wean as tolerated   -midodrine q8  - started zosyn to cover aspiration pneumonia>> changed to vanco and meropenem -> discontinued vanc, continue with meropenem   - Cont oxygen supplementation.  - pulm consulted for diagnostic thoracentesis given Bilateral moderate sized pleural effusions on CT chest>> not required  -procal downtrending, continue to trend     #Severe Pharyngeal Dysphagia  - HO aspiration pneumonitis SP ABX   -  Patient was tolerating PO intake at NH prior to admission  -  After extubation on 09/24, patient became confused s/p upgrade on 09/26 for hypoxia, AMS, and pneumonia. He has been NPO since then with multiple speech swallow evaluations.  - FEES Study on 10/11: severe pharyngeal dysphagia, recommendation to keep NPO with alternate means -> now on NGT for feeding  - GI for evaluation of PEG tube placement after failing FEES study on 10/11.  - GI: Will hold off PEG tube placement for now pending sepsis workup (in setting of fever on 10/12) and pending neurology evaluation of age indeterminate infarct on CTH 10/10 + Will need neurological prognostication prior to planning PEG tube placement   -Continue NG tube feeding for now  -evaluated by S&S on Oct 16, also recommended continue NGT     #Metabolic Encephalopathy 2/2 septic Shock 2/2 suspected aspiration pneumonia   - procal 1.7  - rapid response after TDC due to acute change in mental status - ABG notable for elevated lactate  - pt febrile to 101.6 and elevated lactate to 8.4, rapidly became hypotensive on 10/4 - started on levophed  - off pressor, midodrine now descalated to prn prior to hemodialysis   - CXR (9/28) Bilateral pleural effusion/opacity unchanged. No air leak.  - follow up cultures, cultures neg   - On 2L NC  - C/w albuterol PRN    #Hemorrhagic Shock Secondary to Hematochezia from Duodenal Ulcer Bleed s/p OVESCO placement 09/23  #UGIB secondary to Duodenal ulcer SP EGD   #Hematochezia resolved   #Hemorrhagic Shock resolved   #Blood Loss Anemia  # Acute Drop in Hemoglobin- Improved  - Status Post EGD on 09/23: blood clots in fundus and antrum; 2cm actively bleeding duodenal ulcer with spurting vessel ([place] 1a) in sweep on base s/p 10cc epi and s/p 11/6 OVESCO placement for hemostasis  - Continue PO Protonix 40mg BID via NG tube for now. Was on IV pantoprazole drip (09/23-09/25)  - Anticoagulation resumption once Hgb stabilizes (s/p 1 unit pRBCs 10/12)  - Monitor BM for recurrence of hematochezia or melena  - Please avoid any NSAIDs  - If any unstable bleed, please call GI   - Follow up with [facility] located at [address] [place] [address]. Phone Number: [phone]      #Anemia of acute blood loss   #Hemorrhagic Shock Secondary to Hematochezia from Duodenal Ulcer Bleed s/p OVESCO placement 09/23  - Trend hgb, transfuse blood PRN  - continue with holding therapeutic AC  - s/p IV Protamine sulfate 36mg x1 dose on 09/23  - GI f/u appreciated  - S/p IV Protonix 40mg BID -> Ok to switch to PO BID as per GI   - Avoid any NSAIDs  - overall the pt risk and benefit is goes against Anticoagulation, will further discuss with family, dw brother regarding the high risk of bleeding and understands the reasons to hold anticoagulation. And understands the risk of cva with Afib.     #Acutely worsening uremia and acute kidney failure most likely 2/2 ATN - now on HD through right IJ [place]  #hypernatremia - resolving   #PAULA / CKD stable non oliguric  - possible ATN iso hypotension and possible sepsis/ vanc toxicity  - Cr stable for now -> Close F/U of BUN/Crea/ Lytes and UO  - no hydro on imaging  - udall placement 9/27/23  started on HD 9/27 but did not tolerate it with access problems  - udall changed 9/28  he received HD 9/28 and 9/30  - c/w phoslo 2/2/2   - off sodium bicarbonate   - s/p 250 cc LR bolus for hypernatremia  - s/p D5w @ 80 cc/hr  -nephro following   - Bumex resumed after BP stabilized   - TDC by IR on 10/4  -hemodialysis per nephro     #Acute Femoral DVT s/p IVC filter 10/6  #Elevated dimer  - duplex neg on 9/29, repeat   - overall the pt risk and benefit is goes against Anticoagulation, will further discuss with family , dw brother regarding the high risk of bleeding and understands to hold anticoagulation And understands the risk of cva with Afib.    -s/p IVC filter 10/6    #Possible Age/CVA  - Indeterminate Left Subinsular Stroke on 10/10    #transaminitis - resolving   - likely 2/2 liver shock 2/2 hypotension   - LFTS improving   - follow up RUQ   - hepatitis neg   - worsened today  - antibiotics dc  - trend   - if continues to worsen will get hepatology     #Asymptomatic Cholelithiasis  - Noted on CT  - LFTs noted > Trend LFTs  - Monitor for symptoms    #Paroxysmal Afib, chronic; uncontrolled rate  #bradycardia in evening on cardiac telemonitoring   -  on heparin   - hold metoprolol tartrate 12.5 mg q12h for hypotension; resumes once BP improves    #History of Left Foot OM w/ surrounding Cellulitis  #Sacral Decubitus Ulcer POA  - CT LLE (9/16): No CT evidence of osteomyelitis or necrotizing infection. No drainable collection seen, within the limitations of a noncontrast exam.  - Increased frailty and decreased physical capacity  - as per previous notes the team Discussed with ID attending: patient is unlikely to benefit from prolonged therapy with cefepime+vanco (to cover possible OM) due to adverse outcomes associated kidney dysfunction, cognitive impact ...etc )  - c/w Local wound care; offloading boots bilaterally, frequently turning and positioning, prevent pressure injury, keep skin clean, and monitor for wound changes and notify provider as per podiatry.    -DVT prophylaxis - heparin  -GI prophylaxis - protonix  -diet NPO with tube feeds  -code full

## 2023-10-16 NOTE — PROGRESS NOTE ADULT - SUBJECTIVE AND OBJECTIVE BOX
seen and examined  24 h events noted       PAST HISTORY  --------------------------------------------------------------------------------  No significant changes to PMH, PSH, FHx, SHx, unless otherwise noted    ALLERGIES & MEDICATIONS  --------------------------------------------------------------------------------  Allergies    No Known Allergies    Intolerances      Standing Inpatient Medications  budesonide 160 MICROgram(s)/formoterol 4.5 MICROgram(s) Inhaler 2 Puff(s) Inhalation two times a day  buMETAnide 2 milliGRAM(s) Oral every 12 hours  chlorhexidine 2% Cloths 1 Application(s) Topical <User Schedule>  collagenase Ointment 1 Application(s) Topical two times a day  darbepoetin Injectable Syringe 25 MICROGram(s) IV Push <User Schedule>  dextrose 5%. 1000 milliLiter(s) IV Continuous <Continuous>  dextrose 50% Injectable 25 Gram(s) IV Push once  dextrose 50% Injectable 25 Gram(s) IV Push once  dextrose 50% Injectable 12.5 Gram(s) IV Push once  glucagon  Injectable 1 milliGRAM(s) IntraMuscular once  heparin   Injectable 5000 Unit(s) SubCutaneous every 8 hours  insulin lispro (ADMELOG) corrective regimen sliding scale   SubCutaneous three times a day before meals  meropenem  IVPB 500 milliGRAM(s) IV Intermittent every 24 hours  meropenem  IVPB      midodrine. 10 milliGRAM(s) Oral every 8 hours  norepinephrine Infusion 0.05 MICROgram(s)/kG/Min IV Continuous <Continuous>  pantoprazole   Suspension 40 milliGRAM(s) Oral two times a day  polyethylene glycol 3350 17 Gram(s) Oral every 12 hours  senna 2 Tablet(s) Oral at bedtime    PRN Inpatient Medications  acetaminophen     Tablet .. 650 milliGRAM(s) Oral every 6 hours PRN  albuterol    90 MICROgram(s) HFA Inhaler 1 Puff(s) Inhalation every 6 hours PRN  atropine Injectable 1 milliGRAM(s) IntraMuscular once PRN  dextrose Oral Gel 15 Gram(s) Oral once PRN  HYDROmorphone  Injectable 0.25 milliGRAM(s) IV Push every 6 hours PRN          VITALS/PHYSICAL EXAM  --------------------------------------------------------------------------------  T(C): 37.3 (10-16-23 @ 04:26), Max: 38.6 (10-15-23 @ 13:58)  HR: 72 (10-16-23 @ 04:26) (68 - 91)  BP: 100/71 (10-16-23 @ 04:26) (89/50 - 128/79)  RR: 18 (10-16-23 @ 04:26) (18 - 18)  SpO2: 98% (10-16-23 @ 04:26) (96% - 98%)  Wt(kg): --        10-15-23 @ 07:01  -  10-16-23 @ 07:00  --------------------------------------------------------  IN: 2025 mL / OUT: 0 mL / NET: 2025 mL      Physical Exam:  	Gen: NAD  	Pulm:  B/L fredi at bases   	CV:  S1S2; no rub  	Abd: +distended  	LE: dressing   	Vascular access:tdc     LABS/STUDIES  --------------------------------------------------------------------------------              9.1    20.12 >-----------<  215      [10-16-23 @ 05:33]              30.1     142  |  106  |  54  ----------------------------<  119      [10-15-23 @ 17:47]  4.2   |  25  |  2.4        Ca     7.2     [10-15-23 @ 17:47]      Mg     1.8     [10-15-23 @ 17:47]      Phos  2.3     [10-15-23 @ 17:47]    TPro  5.6  /  Alb  2.2  /  TBili  0.5  /  DBili  x   /  AST  18  /  ALT  29  /  AlkPhos  112  [10-15-23 @ 17:47]    PTT: 32.8       [10-14-23 @ 08:02]    Creatinine Trend:  SCr 2.4 [10-15 @ 17:47]  SCr 2.4 [10-15 @ 05:35]  SCr 2.4 [10-14 @ 08:02]  SCr 1.6 [10-12 @ 21:49]  SCr 2.5 [10-12 @ 06:36]    Urinalysis - [10-15-23 @ 17:47]      Color  / Appearance  / SG  / pH       Gluc 119 / Ketone   / Bili  / Urobili        Blood  / Protein  / Leuk Est  / Nitrite       RBC  / WBC  / Hyaline  / Gran  / Sq Epi  / Non Sq Epi  / Bacteria     Iron 31, TIBC 137, %sat 23      [10-07-23 @ 18:15]  Ferritin 960      [10-07-23 @ 18:15]    HBsAb <3.0      [09-27-23 @ 17:44]  HBsAb Nonreact      [09-27-23 @ 17:44]  HBsAg Nonreact      [09-27-23 @ 17:44]  HBcAb Nonreact      [09-27-23 @ 17:44]

## 2023-10-16 NOTE — PROGRESS NOTE ADULT - ASSESSMENT
Impression    Septic shock   Right femoral DVT sp IVC   Right pleural effusion  HO aspiration pneumoniaSP ABX   UGIB secondary to Duodenal ulcer SP EGD   Metabolic Encephalopathy   PAULA / CKD on HD  History of Left Foot OM w/ surrounding Cellulitis   Sacral DTI    Plan:    CNS: Monitor mental status.  Avoid depressants.       HEENT:  Oral care.  Aspiration precautions    CARDIOVASCULAR:  Goal directed fluid resuscitation.  Wean Levophed.  Avoid overload    PULMONARY:  Albuterol PRN.  Frequent suctioning.  Incentive spirometry  keep Sao2 92 to 96%.  Right chest US>      GASTROINTESTINAL:  Feeding per Speech and swallow.  Protonix q 12     GENITOURINARY/RENAL: Nephro following; HD per renal.  Monitor lytes and correct as needed     INFECTIOUS : ABX PER ID.  Repeat [place].  Trend Procal.      HEMATOLOGIC: DVT prophylaxis.  Target Hb > 7.  Trend CBC     ENDOCRINE: Follow up FS.  Insulin protocol as needed.  BG goal 140-180    MSK/DERM:  PT/OT when cooperative.  Off loading.  Skin care      Palliative care follow up     Poor prognosis overall     SDU    GOC full code for now.

## 2023-10-16 NOTE — PROGRESS NOTE ADULT - SUBJECTIVE AND OBJECTIVE BOX
T H I S   I S    N O  T   A    F I N A L I Z E D   N O T E      NIMO SARMIENTO  83y, Male  Allergy: No Known Allergies    Hospital Day: 31d    Patient seen and examined earlier today.     PMH/PSH:  PAST MEDICAL & SURGICAL HISTORY:  HTN (hypertension)      COPD (chronic obstructive pulmonary disease)      High cholesterol      Mild anemia      Coffee ground emesis      Chronic diastolic heart failure      PAULA (acute kidney injury)      No significant past surgical history          LAST 24-Hr EVENTS:    VITALS:  T(F): 99 (10-16-23 @ 11:46), Max: 101.4 (10-15-23 @ 13:58)  HR: 83 (10-16-23 @ 11:46)  BP: 112/59 (10-16-23 @ 11:46) (100/71 - 128/79)  RR: 20 (10-16-23 @ 11:46)  SpO2: 96% (10-16-23 @ 11:46)          TESTS & MEASUREMENTS:  Weight/BMI      10-14-23 @ 07:01  -  10-15-23 @ 07:00  --------------------------------------------------------  IN: 2700 mL / OUT: 0 mL / NET: 2700 mL    10-15-23 @ 07:01  -  10-16-23 @ 07:00  --------------------------------------------------------  IN: 2025 mL / OUT: 0 mL / NET: 2025 mL                            9.1    20.12 )-----------( 215      ( 16 Oct 2023 05:33 )             30.1       INR: 1.42 ratio (10-12-23 @ 16:16)    10-16    143  |  107  |  57<H>  ----------------------------<  108<H>  4.4   |  26  |  2.2<H>    Ca    7.2<L>      16 Oct 2023 05:33  Phos  2.9     10-16  Mg     1.9     10-16    TPro  5.8<L>  /  Alb  2.2<L>  /  TBili  0.5  /  DBili  x   /  AST  18  /  ALT  27  /  AlkPhos  121<H>  10-16    LIVER FUNCTIONS - ( 16 Oct 2023 05:33 )  Alb: 2.2 g/dL / Pro: 5.8 g/dL / ALK PHOS: 121 U/L / ALT: 27 U/L / AST: 18 U/L / GGT: x                 Culture - Catheter (collected 10-13-23 @ 16:50)  Source: PICC Catheter Site  Preliminary Report (10-15-23 @ 14:56):    No growth    Culture - Blood (collected 10-13-23 @ 16:10)  Source: .Blood Blood  Preliminary Report (10-15-23 @ 23:01):    No growth at 48 Hours    Culture - Blood (collected 10-12-23 @ 16:16)  Source: .Blood None  Preliminary Report (10-16-23 @ 04:01):    No growth at 72 Hours      Urinalysis Basic - ( 16 Oct 2023 05:33 )    Color: x / Appearance: x / SG: x / pH: x  Gluc: 108 mg/dL / Ketone: x  / Bili: x / Urobili: x   Blood: x / Protein: x / Nitrite: x   Leuk Esterase: x / RBC: x / WBC x   Sq Epi: x / Non Sq Epi: x / Bacteria: x          Ferritin: 960 ng/mL (10-07-23 @ 18:15)          Vancomycin Level, Trough: 17.3 ug/mL (10-15-23 @ 01:56)    A1C with Estimated Average Glucose Result: 6.4 % (09-16-23 @ 07:18)          RADIOLOGY, ECG, & ADDITIONAL TESTS:  12 Lead ECG:   Ventricular Rate 98 BPM    Atrial Rate 101 BPM    QRS Duration 100 ms    Q-T Interval 372 ms    QTC Calculation(Bazett) 474 ms    R Axis -61 degrees    T Axis 68 degrees    Diagnosis Line Atrial fibrillation  Left axis deviation  Low voltage QRS  Cannot rule out Anterior infarct , age undetermined  Abnormal ECG    Confirmed by austen colbert ([id number]) on 10/4/2023 7:03:14 PM (10-04-23 @ 15:40)    CT Chest No Cont:   ACC: [id number] EXAM:  CT CHEST   ORDERED BY: ALLISON MARCIAL     PROCEDURE DATE:  10/12/2023          INTERPRETATION:  REASON FOR STUDY: Fever. Parenchymal opacities.    TECHNIQUE: : Noncontrast CT scan of the thorax was performed from lung   apices to adrenal glands.  Sagittal and coronal reformatted images were generated.      COMPARISON: CT chest-5/22/2019. Chest x-ray 10/11/2023      FINDINGS:      Breathing artifact limits assessment.  TUBES/LINES/DEVICES: NG tube, satisfactory position. Right sided central   venous catheter satisfactory position.  MEDIASTINUM/HILUM : Likely reactive mediastinal lymph nodes.  CHEST WALL/ BREASTS: Unremarkable    HEART/ GREAT VESSELS:No pericardial effusions. Aortic and coronary artery   calcifications. Mitral annular calcifications.    ABDOMEN: Unremarkable nonenhanced upper abdominal organs.    BONES/ SOFT TISSUES: Degenerative changes/disc syndrome thoracic spine    LUNGS/PLEURA/AIRWAYS:   Patent central tracheobronchial tree. Bilateral moderate sized pleural   effusions with adjacent lower lobe atelectasis. No pneumothorax or   parenchymal mass.        PULMONARY NODULES:  Multiple right apical nodules including new 7 mm solid nodule  (Series   4/85)    IMPRESSION:    Since  5/22/2019    Multiple right apical nodules including new 7 mm solid nodule  (Series   4/85). Follow-up CT scan 6 months time.    Bilateral moderate sized pleural effusions with adjacent lower lobe   atelectasis.     No pneumothorax or parenchymal mass.        --- End of Report ---            BENITO BUSTAMANTE MD; Attending Radiologist  This document has been electronically signed. Oct 13 2023  1:19PM (10-12-23 @ 19:59)  CT Head No Cont:   ACC: [id number] EXAM:  CT BRAIN   ORDERED BY: JANNETH COOK     PROCEDURE DATE:  10/10/2023          INTERPRETATION:  CLINICAL INDICATION: Evaluation for stroke.    TECHNIQUE: CT of the head was performed without contrast. Multiple   contiguous axial images were acquired from the skullbase to the vertex   without the administration of intravenous contrast. Coronal and sagittal   reformations were made.    COMPARISON: CT head 9/15/2023.    FINDINGS:    Ventricles and sulci are unremarkable. There is no hydrocephalus.    There is a focal hypodensity noted within the left subinsular region,   series 3 image 17.    There is no intracranial hematoma, mass effect, or midline shift. There   is no abnormal extra-axial fluid collection.    The calvarium is intact. The visualized intraorbital compartments,   paranasal sinuses, and mastoid complexes are unremarkable.    IMPRESSION:  No intracranial hemorrhage, mass effect or midline shift.    A focal hypodensity is noted within the left subinsular region, not   previously seen potentially reflecting age-indeterminate infarct. An MRI   of the brain can be obtained for further evaluation if not clinically   contraindicated.    Mild chronic microvascular type changes.    --- End of Report ---          LYDIA GUY MD; Resident Radiologist  This document has been electronically signed.  EDWIGE RAMOS MD; Attending Radiologist  This document has been electronically signed. Oct 11 2023  9:55AM (10-10-23 @ 16:44)    RECENT DIAGNOSTIC ORDERS:  Procalcitonin, Serum: 11:00 (10-16-23 @ 09:13)      MEDICATIONS:  MEDICATIONS  (STANDING):  budesonide 160 MICROgram(s)/formoterol 4.5 MICROgram(s) Inhaler 2 Puff(s) Inhalation two times a day  buMETAnide 2 milliGRAM(s) Oral every 12 hours  chlorhexidine 2% Cloths 1 Application(s) Topical <User Schedule>  collagenase Ointment 1 Application(s) Topical two times a day  darbepoetin Injectable Syringe 25 MICROGram(s) IV Push <User Schedule>  dextrose 5%. 1000 milliLiter(s) (100 mL/Hr) IV Continuous <Continuous>  dextrose 50% Injectable 25 Gram(s) IV Push once  dextrose 50% Injectable 25 Gram(s) IV Push once  dextrose 50% Injectable 12.5 Gram(s) IV Push once  glucagon  Injectable 1 milliGRAM(s) IntraMuscular once  heparin   Injectable 5000 Unit(s) SubCutaneous every 8 hours  insulin lispro (ADMELOG) corrective regimen sliding scale   SubCutaneous three times a day before meals  meropenem  IVPB      meropenem  IVPB 500 milliGRAM(s) IV Intermittent every 24 hours  midodrine. 10 milliGRAM(s) Oral every 8 hours  norepinephrine Infusion 0.05 MICROgram(s)/kG/Min (8.63 mL/Hr) IV Continuous <Continuous>  pantoprazole   Suspension 40 milliGRAM(s) Oral two times a day    MEDICATIONS  (PRN):  acetaminophen     Tablet .. 650 milliGRAM(s) Oral every 6 hours PRN Temp greater or equal to 38C (100.4F), Mild Pain (1 - 3), Moderate Pain (4 - 6)  albuterol    90 MICROgram(s) HFA Inhaler 1 Puff(s) Inhalation every 6 hours PRN for shortness of breath and/or wheezing  atropine Injectable 1 milliGRAM(s) IntraMuscular once PRN HR < 30  dextrose Oral Gel 15 Gram(s) Oral once PRN Blood Glucose LESS THAN 70 milliGRAM(s)/deciliter  HYDROmorphone  Injectable 0.25 milliGRAM(s) IV Push every 6 hours PRN Severe Pain (7 - 10)      HOME MEDICATIONS:  acetaminophen 325 mg oral tablet (05-25)  Albuterol (Eqv-ProAir HFA) 90 mcg/inh inhalation aerosol (09-15)  Breo Ellipta 100 mcg-25 mcg/inh inhalation powder (09-15)  empagliflozin 10 mg oral tablet (09-15)  Entresto 24 mg-26 mg oral tablet (09-15)  Lasix 20 mg oral tablet (09-15)  Metoprolol Succinate ER 25 mg oral tablet, extended release (09-15)  Percocet 5 mg-325 mg oral tablet (09-15)  Xarelto 20 mg oral tablet (09-15)      PHYSICAL EXAM:  GENERAL:   CHEST/LUNG:   HEART:   ABDOMEN:   EXTREMITIES:               NIMO SARMIENTO  83y, Male  Allergy: No Known Allergies    Hospital Day: 31d    Patient seen and examined earlier today. patient is angry stated why he is here for long time ,I explained to him         course :  83 year old male patient known to have COPD. DM, atrial fibrillation, HFrEF, anemia, lymphedema, and recent left foot OM who was brought from the NH to the ED on 09/15 for evaluation of altered mental status, found to have sepsis in setting of recent left foot OM, admitted for further management. Stay was complicated by hemorrhagic shock and drop in Hb secondary to hematochezia on 09/23, Status post EGD on 09/23 revealing a bleeding duodenal ulcer s/p OVESCO placement. Stay also complicated by an episode of confusion and hypoxia on 09/26 s/p found to have possible stroke on 10/10.  Patient was found to have Acute Femoral DVT s/p IVC filter 10/6. Patient transferred to general floor for further management. no upgraed to CEU         PMH/PSH:  PAST MEDICAL & SURGICAL HISTORY:  HTN (hypertension)      COPD (chronic obstructive pulmonary disease)      High cholesterol      Mild anemia      Coffee ground emesis      Chronic diastolic heart failure      PAULA (acute kidney injury)      No significant past surgical history          LAST 24-Hr EVENTS:    VITALS:  T(F): 99 (10-16-23 @ 11:46), Max: 101.4 (10-15-23 @ 13:58)  HR: 83 (10-16-23 @ 11:46)  BP: 112/59 (10-16-23 @ 11:46) (100/71 - 128/79)  RR: 20 (10-16-23 @ 11:46)  SpO2: 96% (10-16-23 @ 11:46)          TESTS & MEASUREMENTS:  Weight/BMI      10-14-23 @ 07:01  -  10-15-23 @ 07:00  --------------------------------------------------------  IN: 2700 mL / OUT: 0 mL / NET: 2700 mL    10-15-23 @ 07:01  -  10-16-23 @ 07:00  --------------------------------------------------------  IN: 2025 mL / OUT: 0 mL / NET: 2025 mL                            9.1    20.12 )-----------( 215      ( 16 Oct 2023 05:33 )             30.1       INR: 1.42 ratio (10-12-23 @ 16:16)    10-16    143  |  107  |  57<H>  ----------------------------<  108<H>  4.4   |  26  |  2.2<H>    Ca    7.2<L>      16 Oct 2023 05:33  Phos  2.9     10-16  Mg     1.9     10-16    TPro  5.8<L>  /  Alb  2.2<L>  /  TBili  0.5  /  DBili  x   /  AST  18  /  ALT  27  /  AlkPhos  121<H>  10-16    LIVER FUNCTIONS - ( 16 Oct 2023 05:33 )  Alb: 2.2 g/dL / Pro: 5.8 g/dL / ALK PHOS: 121 U/L / ALT: 27 U/L / AST: 18 U/L / GGT: x                 Culture - Catheter (collected 10-13-23 @ 16:50)  Source: PICC Catheter Site  Preliminary Report (10-15-23 @ 14:56):    No growth    Culture - Blood (collected 10-13-23 @ 16:10)  Source: .Blood Blood  Preliminary Report (10-15-23 @ 23:01):    No growth at 48 Hours    Culture - Blood (collected 10-12-23 @ 16:16)  Source: .Blood None  Preliminary Report (10-16-23 @ 04:01):    No growth at 72 Hours      Urinalysis Basic - ( 16 Oct 2023 05:33 )    Color: x / Appearance: x / SG: x / pH: x  Gluc: 108 mg/dL / Ketone: x  / Bili: x / Urobili: x   Blood: x / Protein: x / Nitrite: x   Leuk Esterase: x / RBC: x / WBC x   Sq Epi: x / Non Sq Epi: x / Bacteria: x          Ferritin: 960 ng/mL (10-07-23 @ 18:15)          Vancomycin Level, Trough: 17.3 ug/mL (10-15-23 @ 01:56)    A1C with Estimated Average Glucose Result: 6.4 % (09-16-23 @ 07:18)          RADIOLOGY, ECG, & ADDITIONAL TESTS:  12 Lead ECG:   Ventricular Rate 98 BPM    Atrial Rate 101 BPM    QRS Duration 100 ms    Q-T Interval 372 ms    QTC Calculation(Bazett) 474 ms    R Axis -61 degrees    T Axis 68 degrees    Diagnosis Line Atrial fibrillation  Left axis deviation  Low voltage QRS  Cannot rule out Anterior infarct , age undetermined  Abnormal ECG    Confirmed by austen colbert ([id number]) on 10/4/2023 7:03:14 PM (10-04-23 @ 15:40)    CT Chest No Cont:   ACC: [id number] EXAM:  CT CHEST   ORDERED BY: ALLISON MARCIAL     PROCEDURE DATE:  10/12/2023          INTERPRETATION:  REASON FOR STUDY: Fever. Parenchymal opacities.    TECHNIQUE: : Noncontrast CT scan of the thorax was performed from lung   apices to adrenal glands.  Sagittal and coronal reformatted images were generated.      COMPARISON: CT chest-5/22/2019. Chest x-ray 10/11/2023      FINDINGS:      Breathing artifact limits assessment.  TUBES/LINES/DEVICES: NG tube, satisfactory position. Right sided central   venous catheter satisfactory position.  MEDIASTINUM/HILUM : Likely reactive mediastinal lymph nodes.  CHEST WALL/ BREASTS: Unremarkable    HEART/ GREAT VESSELS:No pericardial effusions. Aortic and coronary artery   calcifications. Mitral annular calcifications.    ABDOMEN: Unremarkable nonenhanced upper abdominal organs.    BONES/ SOFT TISSUES: Degenerative changes/disc syndrome thoracic spine    LUNGS/PLEURA/AIRWAYS:   Patent central tracheobronchial tree. Bilateral moderate sized pleural   effusions with adjacent lower lobe atelectasis. No pneumothorax or   parenchymal mass.        PULMONARY NODULES:  Multiple right apical nodules including new 7 mm solid nodule  (Series   4/85)    IMPRESSION:    Since  5/22/2019    Multiple right apical nodules including new 7 mm solid nodule  (Series   4/85). Follow-up CT scan 6 months time.    Bilateral moderate sized pleural effusions with adjacent lower lobe   atelectasis.     No pneumothorax or parenchymal mass.        --- End of Report ---            BENITO BUSTAMANTE MD; Attending Radiologist  This document has been electronically signed. Oct 13 2023  1:19PM (10-12-23 @ 19:59)  CT Head No Cont:   ACC: [id number] EXAM:  CT BRAIN   ORDERED BY: JANNETH COOK     PROCEDURE DATE:  10/10/2023          INTERPRETATION:  CLINICAL INDICATION: Evaluation for stroke.    TECHNIQUE: CT of the head was performed without contrast. Multiple   contiguous axial images were acquired from the skullbase to the vertex   without the administration of intravenous contrast. Coronal and sagittal   reformations were made.    COMPARISON: CT head 9/15/2023.    FINDINGS:    Ventricles and sulci are unremarkable. There is no hydrocephalus.    There is a focal hypodensity noted within the left subinsular region,   series 3 image 17.    There is no intracranial hematoma, mass effect, or midline shift. There   is no abnormal extra-axial fluid collection.    The calvarium is intact. The visualized intraorbital compartments,   paranasal sinuses, and mastoid complexes are unremarkable.    IMPRESSION:  No intracranial hemorrhage, mass effect or midline shift.    A focal hypodensity is noted within the left subinsular region, not   previously seen potentially reflecting age-indeterminate infarct. An MRI   of the brain can be obtained for further evaluation if not clinically   contraindicated.    Mild chronic microvascular type changes.    --- End of Report ---          LYDIA GYU MD; Resident Radiologist  This document has been electronically signed.  EDWIGE RAMOS MD; Attending Radiologist  This document has been electronically signed. Oct 11 2023  9:55AM (10-10-23 @ 16:44)    RECENT DIAGNOSTIC ORDERS:  Procalcitonin, Serum: 11:00 (10-16-23 @ 09:13)      MEDICATIONS:  MEDICATIONS  (STANDING):  budesonide 160 MICROgram(s)/formoterol 4.5 MICROgram(s) Inhaler 2 Puff(s) Inhalation two times a day  buMETAnide 2 milliGRAM(s) Oral every 12 hours  chlorhexidine 2% Cloths 1 Application(s) Topical <User Schedule>  collagenase Ointment 1 Application(s) Topical two times a day  darbepoetin Injectable Syringe 25 MICROGram(s) IV Push <User Schedule>  dextrose 5%. 1000 milliLiter(s) (100 mL/Hr) IV Continuous <Continuous>  dextrose 50% Injectable 25 Gram(s) IV Push once  dextrose 50% Injectable 25 Gram(s) IV Push once  dextrose 50% Injectable 12.5 Gram(s) IV Push once  glucagon  Injectable 1 milliGRAM(s) IntraMuscular once  heparin   Injectable 5000 Unit(s) SubCutaneous every 8 hours  insulin lispro (ADMELOG) corrective regimen sliding scale   SubCutaneous three times a day before meals  meropenem  IVPB      meropenem  IVPB 500 milliGRAM(s) IV Intermittent every 24 hours  midodrine. 10 milliGRAM(s) Oral every 8 hours  norepinephrine Infusion 0.05 MICROgram(s)/kG/Min (8.63 mL/Hr) IV Continuous <Continuous>  pantoprazole   Suspension 40 milliGRAM(s) Oral two times a day    MEDICATIONS  (PRN):  acetaminophen     Tablet .. 650 milliGRAM(s) Oral every 6 hours PRN Temp greater or equal to 38C (100.4F), Mild Pain (1 - 3), Moderate Pain (4 - 6)  albuterol    90 MICROgram(s) HFA Inhaler 1 Puff(s) Inhalation every 6 hours PRN for shortness of breath and/or wheezing  atropine Injectable 1 milliGRAM(s) IntraMuscular once PRN HR < 30  dextrose Oral Gel 15 Gram(s) Oral once PRN Blood Glucose LESS THAN 70 milliGRAM(s)/deciliter  HYDROmorphone  Injectable 0.25 milliGRAM(s) IV Push every 6 hours PRN Severe Pain (7 - 10)      HOME MEDICATIONS:  acetaminophen 325 mg oral tablet (05-25)  Albuterol (Eqv-ProAir HFA) 90 mcg/inh inhalation aerosol (09-15)  Breo Ellipta 100 mcg-25 mcg/inh inhalation powder (09-15)  empagliflozin 10 mg oral tablet (09-15)  Entresto 24 mg-26 mg oral tablet (09-15)  Lasix 20 mg oral tablet (09-15)  Metoprolol Succinate ER 25 mg oral tablet, extended release (09-15)  Percocet 5 mg-325 mg oral tablet (09-15)  Xarelto 20 mg oral tablet (09-15)      PHYSICAL EXAM   exam  General: awake, alert, NAD , chronic ill appearance NGT , follows simple commands   Lungs:  decrease breath sound  b/l, normal resp effort, chest access   Heart: regular ryhthm   Abdomen: soft, mild tenderness generalized   Ext: + edema,

## 2023-10-16 NOTE — PROGRESS NOTE ADULT - SUBJECTIVE AND OBJECTIVE BOX
Gastroenterology Follow Up Note      Location: [facility] 2A 009 A [facility]  Patient Name: NIMO SARMIENTO  Age: 83y  Gender: Male      Chief Complaint  Patient is a 83y old Male who presents with a chief complaint of AMS (16 Oct 2023 07:47)  Primary diagnosis of Altered mental status        Reason for Consult  Dysphagia: For PEG tube placement  Hematochezia      Progress Note  This morning patient was seen and examined at bedside.    Today is hospital day 31d.  He is on pressors and being treated for sepsis.  He is tolerating NG tube feeds.  He denies abdominal pain, nausea, or vomiting.   No melena per nurses.        Vital Signs in the last 24 hours   Vitals Summary T(C): 37.3 (10-16-23 @ 08:02), Max: 38.6 (10-15-23 @ 13:58)  HR: 84 (10-16-23 @ 08:02) (68 - 91)  BP: 117/55 (10-16-23 @ 08:02) (90/53 - 128/79)  RR: 20 (10-16-23 @ 08:02) (18 - 20)  SpO2: 98% (10-16-23 @ 08:02) (96% - 98%)  Vent Data   Intake/ Output   10-15-23 @ 07:01  -  10-16-23 @ 07:00  --------------------------------------------------------  IN: 2025 mL / OUT: 0 mL / NET: 2025 mL        Physical Exam  * General Appearance: AOx 1-2, interactive but difficult to understand, oriented to place and person/ not to time, in no acute distress  * Lungs: Good bilateral air entry, audible crackles  * Heart: Regular Rate and Rhythm, normal S1 and S2, no audible murmur, rub, or gallop  * Abdomen: Symmetric, non-distended, soft, non-tender, bowel sounds active all four quadrants      Investigations   Laboratory Workup      - CBC:                        9.1    20.12 )-----------( 215      ( 16 Oct 2023 05:33 )             30.1       - Hgb Trend:  9.1  10-16-23 @ 05:33  8.4  10-15-23 @ 17:47  8.5  10-15-23 @ 05:35  8.5  10-14-23 @ 08:02  9.0  10-13-23 @ 11:11          - Chemistry:  10-16    143  |  107  |  57<H>  ----------------------------<  108<H>  4.4   |  26  |  2.2<H>    Ca    7.2<L>      16 Oct 2023 05:33  Phos  2.9     10-16  Mg     1.9     10-16    TPro  5.8<L>  /  Alb  2.2<L>  /  TBili  0.5  /  DBili  x   /  AST  18  /  ALT  27  /  AlkPhos  121<H>  10-16    Liver panel trend:  TBili 0.5   /   AST 18   /   ALT 27   /   AlkP 121   /   Tptn 5.8   /   Alb 2.2    /   DBili --      10-16  TBili 0.5   /   AST 18   /   ALT 29   /   AlkP 112   /   Tptn 5.6   /   Alb 2.2    /   DBili --      10-15  TBili 0.5   /   AST 19   /   ALT 32   /   AlkP 105   /   Tptn 5.5   /   Alb 2.1    /   DBili --      10-15  TBili 0.6   /   AST 18   /   ALT 42   /   AlkP 98   /   Tptn 5.5   /   Alb 2.3    /   DBili --      10-14  TBili 0.7   /   AST 26   /   ALT 88   /   AlkP 78   /   Tptn 5.8   /   Alb 2.6    /   DBili --      10-12  TBili 0.8   /      /      /   AlkP 89   /   Tptn 6.0   /   Alb 2.5    /   DBili --      10-09  TBili 0.8   /      /      /   AlkP 87   /   Tptn 5.8   /   Alb 2.8    /   DBili --      10-08  TBili 0.7   /      /      /   AlkP 92   /   Tptn 5.6   /   Alb 2.7    /   DBili --      10-07  TBili 0.7   /   AST 1175   /      /   AlkP 83   /   Tptn 5.5   /   Alb 2.8    /   DBili --      10-07      - Coagulation Studies:      - ABG:  ABG - ( 15 Oct 2023 11:18 )  pH, Arterial: 7.63  pH, Blood: x     /  pCO2: 24    /  pO2: 147   / HCO3: 25    / Base Excess: 4.3   /  SaO2: 98.9          Microbiological Workup  Urinalysis Basic - ( 16 Oct 2023 05:33 )    Color: x / Appearance: x / SG: x / pH: x  Gluc: 108 mg/dL / Ketone: x  / Bili: x / Urobili: x   Blood: x / Protein: x / Nitrite: x   Leuk Esterase: x / RBC: x / WBC x   Sq Epi: x / Non Sq Epi: x / Bacteria: x        Culture - Catheter (collected 13 Oct 2023 16:50)  Source: PICC Catheter Site  Preliminary Report (15 Oct 2023 14:56):    No growth    Culture - Blood (collected 13 Oct 2023 16:10)  Source: .Blood Blood  Preliminary Report (15 Oct 2023 23:01):    No growth at 48 Hours        Current Medications  Standing Medications  budesonide 160 MICROgram(s)/formoterol 4.5 MICROgram(s) Inhaler 2 Puff(s) Inhalation two times a day  buMETAnide 2 milliGRAM(s) Oral every 12 hours  chlorhexidine 2% Cloths 1 Application(s) Topical <User Schedule>  collagenase Ointment 1 Application(s) Topical two times a day  darbepoetin Injectable Syringe 25 MICROGram(s) IV Push <User Schedule>  dextrose 5%. 1000 milliLiter(s) (100 mL/Hr) IV Continuous <Continuous>  dextrose 50% Injectable 25 Gram(s) IV Push once  dextrose 50% Injectable 25 Gram(s) IV Push once  dextrose 50% Injectable 12.5 Gram(s) IV Push once  glucagon  Injectable 1 milliGRAM(s) IntraMuscular once  heparin   Injectable 5000 Unit(s) SubCutaneous every 8 hours  insulin lispro (ADMELOG) corrective regimen sliding scale   SubCutaneous three times a day before meals  meropenem  IVPB 500 milliGRAM(s) IV Intermittent every 24 hours  meropenem  IVPB      midodrine. 10 milliGRAM(s) Oral every 8 hours  norepinephrine Infusion 0.05 MICROgram(s)/kG/Min (8.63 mL/Hr) IV Continuous <Continuous>  pantoprazole   Suspension 40 milliGRAM(s) Oral two times a day  polyethylene glycol 3350 17 Gram(s) Oral every 12 hours  senna 2 Tablet(s) Oral at bedtime    PRN Medications  acetaminophen     Tablet .. 650 milliGRAM(s) Oral every 6 hours PRN Temp greater or equal to 38C (100.4F), Mild Pain (1 - 3), Moderate Pain (4 - 6)  albuterol    90 MICROgram(s) HFA Inhaler 1 Puff(s) Inhalation every 6 hours PRN for shortness of breath and/or wheezing  atropine Injectable 1 milliGRAM(s) IntraMuscular once PRN HR < 30  dextrose Oral Gel 15 Gram(s) Oral once PRN Blood Glucose LESS THAN 70 milliGRAM(s)/deciliter  HYDROmorphone  Injectable 0.25 milliGRAM(s) IV Push every 6 hours PRN Severe Pain (7 - 10)    Singles Doses Administered  (ADM OVERRIDE) 1 each &lt;see task&gt; GiveOnce  (ADM OVERRIDE) 1 each &lt;see task&gt; GiveOnce  (ADM OVERRIDE) 1 each &lt;see task&gt; GiveOnce  (ADM OVERRIDE) 1 each &lt;see task&gt; GiveOnce  (ADM OVERRIDE) 1 each &lt;see task&gt; GiveOnce  (ADM OVERRIDE) 1 each &lt;see task&gt; GiveOnce  (ADM OVERRIDE) 1 each &lt;see task&gt; GiveOnce  (ADM OVERRIDE) 1 each &lt;see task&gt; GiveOnce  (ADM OVERRIDE) 1 each &lt;see task&gt; GiveOnce  (ADM OVERRIDE) 1 each &lt;see task&gt; GiveOnce  (ADM OVERRIDE) 1 each &lt;see task&gt; GiveOnce  (ADM OVERRIDE) 1 each &lt;see task&gt; GiveOnce  (ADM OVERRIDE) 1 each &lt;see task&gt; GiveOnce  (ADM OVERRIDE) 5 each &lt;see task&gt; GiveOnce  (ADM OVERRIDE) 1 each &lt;see task&gt; GiveOnce  (ADM OVERRIDE) 1 each &lt;see task&gt; GiveOnce  (ADM OVERRIDE) 1 each &lt;see task&gt; GiveOnce  buMETAnide Injectable 2 milliGRAM(s) IV Push once  caspofungin IVPB 70 milliGRAM(s) IV Intermittent once  cefepime   IVPB 2000 milliGRAM(s) IV Intermittent once  cefepime   IVPB      cefepime   IVPB 2000 milliGRAM(s) IV Intermittent every 8 hours  chlorhexidine 2% Cloths 1 Application(s) Topical every 12 hours  diphenhydrAMINE Injectable 50 milliGRAM(s) IntraMuscular once  fentaNYL    Injectable 50 MICROGram(s) IV Push every 10 minutes PRN  haloperidol    Injectable 5 milliGRAM(s) IntraMuscular Once  haloperidol    Injectable 0.5 milliGRAM(s) IntraMuscular once  heparin   Injectable 8000 Unit(s) IV Push once  lactated ringers Bolus 250 milliLiter(s) IV Bolus once  lactated ringers Bolus 500 milliLiter(s) IV Bolus once  lactated ringers Bolus 500 milliLiter(s) IV Bolus once  lactated ringers Bolus 250 milliLiter(s) IV Bolus once  lactated ringers Bolus 500 milliLiter(s) IV Bolus once  lactated ringers Bolus 500 milliLiter(s) IV Bolus once  lactated ringers Bolus 1000 milliLiter(s) IV Bolus once  lactated ringers Bolus 250 milliLiter(s) IV Bolus once  lactated ringers Bolus 1000 milliLiter(s) IV Bolus once  lactated ringers Bolus 500 milliLiter(s) IV Bolus once  lactated ringers Bolus 250 milliLiter(s) IV Bolus once  lactated ringers Bolus 250 milliLiter(s) IV Bolus once  LORazepam   Injectable 1 milliGRAM(s) IntraMuscular once  magnesium sulfate  IVPB 2 Gram(s) IV Intermittent once  magnesium sulfate  IVPB 2 Gram(s) IV Intermittent once  magnesium sulfate  IVPB 2 Gram(s) IV Intermittent every 2 hours  magnesium sulfate  IVPB 2 Gram(s) IV Intermittent once  magnesium sulfate  IVPB 2 Gram(s) IV Intermittent once  magnesium sulfate  IVPB 1 Gram(s) IV Intermittent once  magnesium sulfate  IVPB 2 Gram(s) IV Intermittent once  meropenem  IVPB 500 milliGRAM(s) IV Intermittent once  midazolam  1 mG/mL Injectable (Rx Quick Charge) 2 milliGRAM(s) IV Push   midodrine. 10 milliGRAM(s) Oral once  mupirocin 2% Ointment 1 Application(s) Both Nostrils two times a day  potassium chloride    Tablet ER 40 milliEquivalent(s) Oral once  potassium chloride   Powder 40 milliEquivalent(s) Oral once  potassium chloride   Powder 20 milliEquivalent(s) Oral every 2 hours  potassium chloride   Powder 40 milliEquivalent(s) Oral once  potassium chloride   Powder 40 milliEquivalent(s) Oral once  potassium chloride   Powder 40 milliEquivalent(s) Oral once  potassium chloride  20 mEq/100 mL IVPB 20 milliEquivalent(s) IV Intermittent every 2 hours  potassium chloride  20 mEq/100 mL IVPB 20 milliEquivalent(s) IV Intermittent once  protamine  IVPB 36 milliGRAM(s) IV Intermittent once  rocuronium 10 mG/mL Injectable (Rx Quick Charge) 50 milliGRAM(s) IV Push   sodium chloride 0.9% Bolus 750 milliLiter(s) IV Bolus once  sodium chloride 0.9% Bolus 250 milliLiter(s) IV Bolus once  succinylcholine 20 mG/mL Injectable (Rx Quick Charge) 120 milliGRAM(s) IV Push   vancomycin  IVPB 1750 milliGRAM(s) IV Intermittent once  vancomycin  IVPB 1250 milliGRAM(s) IV Intermittent once  vancomycin  IVPB 1000 milliGRAM(s) IV Intermittent once  vancomycin  IVPB 1500 milliGRAM(s) IV Intermittent once  vancomycin  IVPB. 1000 milliGRAM(s) IV Intermittent once     Gastroenterology Follow Up Note      [facility] 2A 009 A [facility]  Patient Name: NIMO SARMIENTO  Age: 83y  Gender: Male      Chief Complaint  Patient is a 83y old Male who presents with a chief complaint of AMS (16 Oct 2023 07:47)  Primary diagnosis of Altered mental status        Reason for Consult  Dysphagia: For PEG tube placement  Hematochezia      Progress Note  This morning patient was seen and examined at bedside.    Today is hospital day 31d.  He is on pressors and being treated for sepsis.  He is tolerating NG tube feeds.  He denies abdominal pain, nausea, or vomiting.   No melena per nurses.        Vital Signs in the last 24 hours   Vitals Summary T(C): 37.3 (10-16-23 @ 08:02), Max: 38.6 (10-15-23 @ 13:58)  HR: 84 (10-16-23 @ 08:02) (68 - 91)  BP: 117/55 (10-16-23 @ 08:02) (90/53 - 128/79)  RR: 20 (10-16-23 @ 08:02) (18 - 20)  SpO2: 98% (10-16-23 @ 08:02) (96% - 98%)  Vent Data   Intake/ Output   10-15-23 @ 07:01  -  10-16-23 @ 07:00  --------------------------------------------------------  IN: 2025 mL / OUT: 0 mL / NET: 2025 mL        Physical Exam  * General Appearance: AOx 1-2, interactive but difficult to understand, oriented to place and person/ not to time, in no acute distress  * Lungs: Good bilateral air entry, audible crackles  * Heart: Regular Rate and Rhythm, normal S1 and S2, no audible murmur, rub, or gallop  * Abdomen: Symmetric, non-distended, soft, non-tender, bowel sounds active all four quadrants      Investigations   Laboratory Workup      - CBC:                        9.1    20.12 )-----------( 215      ( 16 Oct 2023 05:33 )             30.1       - Hgb Trend:  9.1  10-16-23 @ 05:33  8.4  10-15-23 @ 17:47  8.5  10-15-23 @ 05:35  8.5  10-14-23 @ 08:02  9.0  10-13-23 @ 11:11          - Chemistry:  10-16    143  |  107  |  57<H>  ----------------------------<  108<H>  4.4   |  26  |  2.2<H>    Ca    7.2<L>      16 Oct 2023 05:33  Phos  2.9     10-16  Mg     1.9     10-16    TPro  5.8<L>  /  Alb  2.2<L>  /  TBili  0.5  /  DBili  x   /  AST  18  /  ALT  27  /  AlkPhos  121<H>  10-16    Liver panel trend:  TBili 0.5   /   AST 18   /   ALT 27   /   AlkP 121   /   Tptn 5.8   /   Alb 2.2    /   DBili --      10-16  TBili 0.5   /   AST 18   /   ALT 29   /   AlkP 112   /   Tptn 5.6   /   Alb 2.2    /   DBili --      10-15  TBili 0.5   /   AST 19   /   ALT 32   /   AlkP 105   /   Tptn 5.5   /   Alb 2.1    /   DBili --      10-15  TBili 0.6   /   AST 18   /   ALT 42   /   AlkP 98   /   Tptn 5.5   /   Alb 2.3    /   DBili --      10-14  TBili 0.7   /   AST 26   /   ALT 88   /   AlkP 78   /   Tptn 5.8   /   Alb 2.6    /   DBili --      10-12  TBili 0.8   /      /      /   AlkP 89   /   Tptn 6.0   /   Alb 2.5    /   DBili --      10-09  TBili 0.8   /      /      /   AlkP 87   /   Tptn 5.8   /   Alb 2.8    /   DBili --      10-08  TBili 0.7   /      /      /   AlkP 92   /   Tptn 5.6   /   Alb 2.7    /   DBili --      10-07  TBili 0.7   /   AST 1175   /      /   AlkP 83   /   Tptn 5.5   /   Alb 2.8    /   DBili --      10-07      - Coagulation Studies:      - ABG:  ABG - ( 15 Oct 2023 11:18 )  pH, Arterial: 7.63  pH, Blood: x     /  pCO2: 24    /  pO2: 147   / HCO3: 25    / Base Excess: 4.3   /  SaO2: 98.9          Microbiological Workup  Urinalysis Basic - ( 16 Oct 2023 05:33 )    Color: x / Appearance: x / SG: x / pH: x  Gluc: 108 mg/dL / Ketone: x  / Bili: x / Urobili: x   Blood: x / Protein: x / Nitrite: x   Leuk Esterase: x / RBC: x / WBC x   Sq Epi: x / Non Sq Epi: x / Bacteria: x        Culture - Catheter (collected 13 Oct 2023 16:50)  Source: PICC Catheter Site  Preliminary Report (15 Oct 2023 14:56):    No growth    Culture - Blood (collected 13 Oct 2023 16:10)  Source: .Blood Blood  Preliminary Report (15 Oct 2023 23:01):    No growth at 48 Hours        Current Medications  Standing Medications  budesonide 160 MICROgram(s)/formoterol 4.5 MICROgram(s) Inhaler 2 Puff(s) Inhalation two times a day  buMETAnide 2 milliGRAM(s) Oral every 12 hours  chlorhexidine 2% Cloths 1 Application(s) Topical <User Schedule>  collagenase Ointment 1 Application(s) Topical two times a day  darbepoetin Injectable Syringe 25 MICROGram(s) IV Push <User Schedule>  dextrose 5%. 1000 milliLiter(s) (100 mL/Hr) IV Continuous <Continuous>  dextrose 50% Injectable 25 Gram(s) IV Push once  dextrose 50% Injectable 25 Gram(s) IV Push once  dextrose 50% Injectable 12.5 Gram(s) IV Push once  glucagon  Injectable 1 milliGRAM(s) IntraMuscular once  heparin   Injectable 5000 Unit(s) SubCutaneous every 8 hours  insulin lispro (ADMELOG) corrective regimen sliding scale   SubCutaneous three times a day before meals  meropenem  IVPB 500 milliGRAM(s) IV Intermittent every 24 hours  meropenem  IVPB      midodrine. 10 milliGRAM(s) Oral every 8 hours  norepinephrine Infusion 0.05 MICROgram(s)/kG/Min (8.63 mL/Hr) IV Continuous <Continuous>  pantoprazole   Suspension 40 milliGRAM(s) Oral two times a day  polyethylene glycol 3350 17 Gram(s) Oral every 12 hours  senna 2 Tablet(s) Oral at bedtime    PRN Medications  acetaminophen     Tablet .. 650 milliGRAM(s) Oral every 6 hours PRN Temp greater or equal to 38C (100.4F), Mild Pain (1 - 3), Moderate Pain (4 - 6)  albuterol    90 MICROgram(s) HFA Inhaler 1 Puff(s) Inhalation every 6 hours PRN for shortness of breath and/or wheezing  atropine Injectable 1 milliGRAM(s) IntraMuscular once PRN HR < 30  dextrose Oral Gel 15 Gram(s) Oral once PRN Blood Glucose LESS THAN 70 milliGRAM(s)/deciliter  HYDROmorphone  Injectable 0.25 milliGRAM(s) IV Push every 6 hours PRN Severe Pain (7 - 10)    Singles Doses Administered  (ADM OVERRIDE) 1 each &lt;see task&gt; GiveOnce  (ADM OVERRIDE) 1 each &lt;see task&gt; GiveOnce  (ADM OVERRIDE) 1 each &lt;see task&gt; GiveOnce  (ADM OVERRIDE) 1 each &lt;see task&gt; GiveOnce  (ADM OVERRIDE) 1 each &lt;see task&gt; GiveOnce  (ADM OVERRIDE) 1 each &lt;see task&gt; GiveOnce  (ADM OVERRIDE) 1 each &lt;see task&gt; GiveOnce  (ADM OVERRIDE) 1 each &lt;see task&gt; GiveOnce  (ADM OVERRIDE) 1 each &lt;see task&gt; GiveOnce  (ADM OVERRIDE) 1 each &lt;see task&gt; GiveOnce  (ADM OVERRIDE) 1 each &lt;see task&gt; GiveOnce  (ADM OVERRIDE) 1 each &lt;see task&gt; GiveOnce  (ADM OVERRIDE) 1 each &lt;see task&gt; GiveOnce  (ADM OVERRIDE) 5 each &lt;see task&gt; GiveOnce  (ADM OVERRIDE) 1 each &lt;see task&gt; GiveOnce  (ADM OVERRIDE) 1 each &lt;see task&gt; GiveOnce  (ADM OVERRIDE) 1 each &lt;see task&gt; GiveOnce  buMETAnide Injectable 2 milliGRAM(s) IV Push once  caspofungin IVPB 70 milliGRAM(s) IV Intermittent once  cefepime   IVPB 2000 milliGRAM(s) IV Intermittent once  cefepime   IVPB      cefepime   IVPB 2000 milliGRAM(s) IV Intermittent every 8 hours  chlorhexidine 2% Cloths 1 Application(s) Topical every 12 hours  diphenhydrAMINE Injectable 50 milliGRAM(s) IntraMuscular once  fentaNYL    Injectable 50 MICROGram(s) IV Push every 10 minutes PRN  haloperidol    Injectable 5 milliGRAM(s) IntraMuscular Once  haloperidol    Injectable 0.5 milliGRAM(s) IntraMuscular once  heparin   Injectable 8000 Unit(s) IV Push once  lactated ringers Bolus 250 milliLiter(s) IV Bolus once  lactated ringers Bolus 500 milliLiter(s) IV Bolus once  lactated ringers Bolus 500 milliLiter(s) IV Bolus once  lactated ringers Bolus 250 milliLiter(s) IV Bolus once  lactated ringers Bolus 500 milliLiter(s) IV Bolus once  lactated ringers Bolus 500 milliLiter(s) IV Bolus once  lactated ringers Bolus 1000 milliLiter(s) IV Bolus once  lactated ringers Bolus 250 milliLiter(s) IV Bolus once  lactated ringers Bolus 1000 milliLiter(s) IV Bolus once  lactated ringers Bolus 500 milliLiter(s) IV Bolus once  lactated ringers Bolus 250 milliLiter(s) IV Bolus once  lactated ringers Bolus 250 milliLiter(s) IV Bolus once  LORazepam   Injectable 1 milliGRAM(s) IntraMuscular once  magnesium sulfate  IVPB 2 Gram(s) IV Intermittent once  magnesium sulfate  IVPB 2 Gram(s) IV Intermittent once  magnesium sulfate  IVPB 2 Gram(s) IV Intermittent every 2 hours  magnesium sulfate  IVPB 2 Gram(s) IV Intermittent once  magnesium sulfate  IVPB 2 Gram(s) IV Intermittent once  magnesium sulfate  IVPB 1 Gram(s) IV Intermittent once  magnesium sulfate  IVPB 2 Gram(s) IV Intermittent once  meropenem  IVPB 500 milliGRAM(s) IV Intermittent once  midazolam  1 mG/mL Injectable (Rx Quick Charge) 2 milliGRAM(s) IV Push   midodrine. 10 milliGRAM(s) Oral once  mupirocin 2% Ointment 1 Application(s) Both Nostrils two times a day  potassium chloride    Tablet ER 40 milliEquivalent(s) Oral once  potassium chloride   Powder 40 milliEquivalent(s) Oral once  potassium chloride   Powder 20 milliEquivalent(s) Oral every 2 hours  potassium chloride   Powder 40 milliEquivalent(s) Oral once  potassium chloride   Powder 40 milliEquivalent(s) Oral once  potassium chloride   Powder 40 milliEquivalent(s) Oral once  potassium chloride  20 mEq/100 mL IVPB 20 milliEquivalent(s) IV Intermittent every 2 hours  potassium chloride  20 mEq/100 mL IVPB 20 milliEquivalent(s) IV Intermittent once  protamine  IVPB 36 milliGRAM(s) IV Intermittent once  rocuronium 10 mG/mL Injectable (Rx Quick Charge) 50 milliGRAM(s) IV Push   sodium chloride 0.9% Bolus 750 milliLiter(s) IV Bolus once  sodium chloride 0.9% Bolus 250 milliLiter(s) IV Bolus once  succinylcholine 20 mG/mL Injectable (Rx Quick Charge) 120 milliGRAM(s) IV Push   vancomycin  IVPB 1750 milliGRAM(s) IV Intermittent once  vancomycin  IVPB 1250 milliGRAM(s) IV Intermittent once  vancomycin  IVPB 1000 milliGRAM(s) IV Intermittent once  vancomycin  IVPB 1500 milliGRAM(s) IV Intermittent once  vancomycin  IVPB. 1000 milliGRAM(s) IV Intermittent once

## 2023-10-17 LAB
ALBUMIN SERPL ELPH-MCNC: 2.3 G/DL — LOW (ref 3.5–5.2)
ALBUMIN SERPL ELPH-MCNC: 2.3 G/DL — LOW (ref 3.5–5.2)
ALP SERPL-CCNC: 104 U/L — SIGNIFICANT CHANGE UP (ref 30–115)
ALP SERPL-CCNC: 104 U/L — SIGNIFICANT CHANGE UP (ref 30–115)
ALT FLD-CCNC: 21 U/L — SIGNIFICANT CHANGE UP (ref 0–41)
ALT FLD-CCNC: 21 U/L — SIGNIFICANT CHANGE UP (ref 0–41)
ANION GAP SERPL CALC-SCNC: 11 MMOL/L — SIGNIFICANT CHANGE UP (ref 7–14)
ANION GAP SERPL CALC-SCNC: 11 MMOL/L — SIGNIFICANT CHANGE UP (ref 7–14)
ANION GAP SERPL CALC-SCNC: 16 MMOL/L — HIGH (ref 7–14)
ANION GAP SERPL CALC-SCNC: 16 MMOL/L — HIGH (ref 7–14)
AST SERPL-CCNC: 19 U/L — SIGNIFICANT CHANGE UP (ref 0–41)
AST SERPL-CCNC: 19 U/L — SIGNIFICANT CHANGE UP (ref 0–41)
BASOPHILS # BLD AUTO: 0.05 K/UL — SIGNIFICANT CHANGE UP (ref 0–0.2)
BASOPHILS # BLD AUTO: 0.05 K/UL — SIGNIFICANT CHANGE UP (ref 0–0.2)
BASOPHILS NFR BLD AUTO: 0.4 % — SIGNIFICANT CHANGE UP (ref 0–1)
BASOPHILS NFR BLD AUTO: 0.4 % — SIGNIFICANT CHANGE UP (ref 0–1)
BILIRUB SERPL-MCNC: 0.5 MG/DL — SIGNIFICANT CHANGE UP (ref 0.2–1.2)
BILIRUB SERPL-MCNC: 0.5 MG/DL — SIGNIFICANT CHANGE UP (ref 0.2–1.2)
BUN SERPL-MCNC: 23 MG/DL — HIGH (ref 10–20)
BUN SERPL-MCNC: 23 MG/DL — HIGH (ref 10–20)
BUN SERPL-MCNC: 53 MG/DL — HIGH (ref 10–20)
BUN SERPL-MCNC: 53 MG/DL — HIGH (ref 10–20)
CALCIUM SERPL-MCNC: 7.2 MG/DL — LOW (ref 8.4–10.5)
CALCIUM SERPL-MCNC: 7.2 MG/DL — LOW (ref 8.4–10.5)
CALCIUM SERPL-MCNC: 7.4 MG/DL — LOW (ref 8.4–10.5)
CALCIUM SERPL-MCNC: 7.4 MG/DL — LOW (ref 8.4–10.5)
CHLORIDE SERPL-SCNC: 104 MMOL/L — SIGNIFICANT CHANGE UP (ref 98–110)
CHLORIDE SERPL-SCNC: 104 MMOL/L — SIGNIFICANT CHANGE UP (ref 98–110)
CHLORIDE SERPL-SCNC: 110 MMOL/L — SIGNIFICANT CHANGE UP (ref 98–110)
CHLORIDE SERPL-SCNC: 110 MMOL/L — SIGNIFICANT CHANGE UP (ref 98–110)
CO2 SERPL-SCNC: 21 MMOL/L — SIGNIFICANT CHANGE UP (ref 17–32)
CO2 SERPL-SCNC: 21 MMOL/L — SIGNIFICANT CHANGE UP (ref 17–32)
CO2 SERPL-SCNC: 27 MMOL/L — SIGNIFICANT CHANGE UP (ref 17–32)
CO2 SERPL-SCNC: 27 MMOL/L — SIGNIFICANT CHANGE UP (ref 17–32)
CREAT SERPL-MCNC: 1.8 MG/DL — HIGH (ref 0.7–1.5)
CREAT SERPL-MCNC: 1.8 MG/DL — HIGH (ref 0.7–1.5)
CREAT SERPL-MCNC: 1.9 MG/DL — HIGH (ref 0.7–1.5)
CREAT SERPL-MCNC: 1.9 MG/DL — HIGH (ref 0.7–1.5)
EGFR: 35 ML/MIN/1.73M2 — LOW
EGFR: 35 ML/MIN/1.73M2 — LOW
EGFR: 37 ML/MIN/1.73M2 — LOW
EGFR: 37 ML/MIN/1.73M2 — LOW
EOSINOPHIL # BLD AUTO: 0.33 K/UL — SIGNIFICANT CHANGE UP (ref 0–0.7)
EOSINOPHIL # BLD AUTO: 0.33 K/UL — SIGNIFICANT CHANGE UP (ref 0–0.7)
EOSINOPHIL NFR BLD AUTO: 2.3 % — SIGNIFICANT CHANGE UP (ref 0–8)
EOSINOPHIL NFR BLD AUTO: 2.3 % — SIGNIFICANT CHANGE UP (ref 0–8)
GLUCOSE BLDC GLUCOMTR-MCNC: 101 MG/DL — HIGH (ref 70–99)
GLUCOSE BLDC GLUCOMTR-MCNC: 101 MG/DL — HIGH (ref 70–99)
GLUCOSE BLDC GLUCOMTR-MCNC: 89 MG/DL — SIGNIFICANT CHANGE UP (ref 70–99)
GLUCOSE BLDC GLUCOMTR-MCNC: 89 MG/DL — SIGNIFICANT CHANGE UP (ref 70–99)
GLUCOSE BLDC GLUCOMTR-MCNC: 97 MG/DL — SIGNIFICANT CHANGE UP (ref 70–99)
GLUCOSE BLDC GLUCOMTR-MCNC: 97 MG/DL — SIGNIFICANT CHANGE UP (ref 70–99)
GLUCOSE SERPL-MCNC: 120 MG/DL — HIGH (ref 70–99)
GLUCOSE SERPL-MCNC: 120 MG/DL — HIGH (ref 70–99)
GLUCOSE SERPL-MCNC: 87 MG/DL — SIGNIFICANT CHANGE UP (ref 70–99)
GLUCOSE SERPL-MCNC: 87 MG/DL — SIGNIFICANT CHANGE UP (ref 70–99)
HCT VFR BLD CALC: 30.3 % — LOW (ref 42–52)
HCT VFR BLD CALC: 30.3 % — LOW (ref 42–52)
HGB BLD-MCNC: 9.1 G/DL — LOW (ref 14–18)
HGB BLD-MCNC: 9.1 G/DL — LOW (ref 14–18)
IMM GRANULOCYTES NFR BLD AUTO: 0.5 % — HIGH (ref 0.1–0.3)
IMM GRANULOCYTES NFR BLD AUTO: 0.5 % — HIGH (ref 0.1–0.3)
LYMPHOCYTES # BLD AUTO: 19.3 % — LOW (ref 20.5–51.1)
LYMPHOCYTES # BLD AUTO: 19.3 % — LOW (ref 20.5–51.1)
LYMPHOCYTES # BLD AUTO: 2.74 K/UL — SIGNIFICANT CHANGE UP (ref 1.2–3.4)
LYMPHOCYTES # BLD AUTO: 2.74 K/UL — SIGNIFICANT CHANGE UP (ref 1.2–3.4)
MAGNESIUM SERPL-MCNC: 1.7 MG/DL — LOW (ref 1.8–2.4)
MAGNESIUM SERPL-MCNC: 1.7 MG/DL — LOW (ref 1.8–2.4)
MCHC RBC-ENTMCNC: 28.3 PG — SIGNIFICANT CHANGE UP (ref 27–31)
MCHC RBC-ENTMCNC: 28.3 PG — SIGNIFICANT CHANGE UP (ref 27–31)
MCHC RBC-ENTMCNC: 30 G/DL — LOW (ref 32–37)
MCHC RBC-ENTMCNC: 30 G/DL — LOW (ref 32–37)
MCV RBC AUTO: 94.1 FL — HIGH (ref 80–94)
MCV RBC AUTO: 94.1 FL — HIGH (ref 80–94)
MONOCYTES # BLD AUTO: 0.89 K/UL — HIGH (ref 0.1–0.6)
MONOCYTES # BLD AUTO: 0.89 K/UL — HIGH (ref 0.1–0.6)
MONOCYTES NFR BLD AUTO: 6.3 % — SIGNIFICANT CHANGE UP (ref 1.7–9.3)
MONOCYTES NFR BLD AUTO: 6.3 % — SIGNIFICANT CHANGE UP (ref 1.7–9.3)
NEUTROPHILS # BLD AUTO: 10.14 K/UL — HIGH (ref 1.4–6.5)
NEUTROPHILS # BLD AUTO: 10.14 K/UL — HIGH (ref 1.4–6.5)
NEUTROPHILS NFR BLD AUTO: 71.2 % — SIGNIFICANT CHANGE UP (ref 42.2–75.2)
NEUTROPHILS NFR BLD AUTO: 71.2 % — SIGNIFICANT CHANGE UP (ref 42.2–75.2)
NRBC # BLD: 0 /100 WBCS — SIGNIFICANT CHANGE UP (ref 0–0)
NRBC # BLD: 0 /100 WBCS — SIGNIFICANT CHANGE UP (ref 0–0)
PLATELET # BLD AUTO: 226 K/UL — SIGNIFICANT CHANGE UP (ref 130–400)
PLATELET # BLD AUTO: 226 K/UL — SIGNIFICANT CHANGE UP (ref 130–400)
PMV BLD: 10.8 FL — HIGH (ref 7.4–10.4)
PMV BLD: 10.8 FL — HIGH (ref 7.4–10.4)
POTASSIUM SERPL-MCNC: 4.2 MMOL/L — SIGNIFICANT CHANGE UP (ref 3.5–5)
POTASSIUM SERPL-MCNC: 4.2 MMOL/L — SIGNIFICANT CHANGE UP (ref 3.5–5)
POTASSIUM SERPL-MCNC: 5 MMOL/L — SIGNIFICANT CHANGE UP (ref 3.5–5)
POTASSIUM SERPL-MCNC: 5 MMOL/L — SIGNIFICANT CHANGE UP (ref 3.5–5)
POTASSIUM SERPL-SCNC: 4.2 MMOL/L — SIGNIFICANT CHANGE UP (ref 3.5–5)
POTASSIUM SERPL-SCNC: 4.2 MMOL/L — SIGNIFICANT CHANGE UP (ref 3.5–5)
POTASSIUM SERPL-SCNC: 5 MMOL/L — SIGNIFICANT CHANGE UP (ref 3.5–5)
POTASSIUM SERPL-SCNC: 5 MMOL/L — SIGNIFICANT CHANGE UP (ref 3.5–5)
PROCALCITONIN SERPL-MCNC: 0.66 NG/ML — HIGH (ref 0.02–0.1)
PROCALCITONIN SERPL-MCNC: 0.66 NG/ML — HIGH (ref 0.02–0.1)
PROT SERPL-MCNC: 5.6 G/DL — LOW (ref 6–8)
PROT SERPL-MCNC: 5.6 G/DL — LOW (ref 6–8)
RBC # BLD: 3.22 M/UL — LOW (ref 4.7–6.1)
RBC # BLD: 3.22 M/UL — LOW (ref 4.7–6.1)
RBC # FLD: 19.7 % — HIGH (ref 11.5–14.5)
RBC # FLD: 19.7 % — HIGH (ref 11.5–14.5)
SODIUM SERPL-SCNC: 142 MMOL/L — SIGNIFICANT CHANGE UP (ref 135–146)
SODIUM SERPL-SCNC: 142 MMOL/L — SIGNIFICANT CHANGE UP (ref 135–146)
SODIUM SERPL-SCNC: 147 MMOL/L — HIGH (ref 135–146)
SODIUM SERPL-SCNC: 147 MMOL/L — HIGH (ref 135–146)
WBC # BLD: 14.22 K/UL — HIGH (ref 4.8–10.8)
WBC # BLD: 14.22 K/UL — HIGH (ref 4.8–10.8)
WBC # FLD AUTO: 14.22 K/UL — HIGH (ref 4.8–10.8)
WBC # FLD AUTO: 14.22 K/UL — HIGH (ref 4.8–10.8)

## 2023-10-17 PROCEDURE — 99233 SBSQ HOSP IP/OBS HIGH 50: CPT

## 2023-10-17 PROCEDURE — 99232 SBSQ HOSP IP/OBS MODERATE 35: CPT

## 2023-10-17 RX ORDER — BUMETANIDE 0.25 MG/ML
2 INJECTION INTRAMUSCULAR; INTRAVENOUS EVERY 12 HOURS
Refills: 0 | Status: DISCONTINUED | OUTPATIENT
Start: 2023-10-17 | End: 2023-10-23

## 2023-10-17 RX ORDER — MAGNESIUM SULFATE 500 MG/ML
2 VIAL (ML) INJECTION ONCE
Refills: 0 | Status: COMPLETED | OUTPATIENT
Start: 2023-10-17 | End: 2023-10-17

## 2023-10-17 RX ORDER — INSULIN LISPRO 100/ML
VIAL (ML) SUBCUTANEOUS EVERY 6 HOURS
Refills: 0 | Status: DISCONTINUED | OUTPATIENT
Start: 2023-10-17 | End: 2023-10-25

## 2023-10-17 RX ADMIN — Medication 1 APPLICATION(S): at 17:33

## 2023-10-17 RX ADMIN — BUDESONIDE AND FORMOTEROL FUMARATE DIHYDRATE 2 PUFF(S): 160; 4.5 AEROSOL RESPIRATORY (INHALATION) at 21:28

## 2023-10-17 RX ADMIN — CHLORHEXIDINE GLUCONATE 1 APPLICATION(S): 213 SOLUTION TOPICAL at 05:36

## 2023-10-17 RX ADMIN — BUMETANIDE 2 MILLIGRAM(S): 0.25 INJECTION INTRAMUSCULAR; INTRAVENOUS at 17:34

## 2023-10-17 RX ADMIN — HEPARIN SODIUM 5000 UNIT(S): 5000 INJECTION INTRAVENOUS; SUBCUTANEOUS at 16:02

## 2023-10-17 RX ADMIN — Medication 1 APPLICATION(S): at 05:44

## 2023-10-17 RX ADMIN — BUMETANIDE 2 MILLIGRAM(S): 0.25 INJECTION INTRAMUSCULAR; INTRAVENOUS at 05:36

## 2023-10-17 RX ADMIN — HEPARIN SODIUM 5000 UNIT(S): 5000 INJECTION INTRAVENOUS; SUBCUTANEOUS at 21:05

## 2023-10-17 RX ADMIN — MEROPENEM 100 MILLIGRAM(S): 1 INJECTION INTRAVENOUS at 10:31

## 2023-10-17 RX ADMIN — PANTOPRAZOLE SODIUM 40 MILLIGRAM(S): 20 TABLET, DELAYED RELEASE ORAL at 17:33

## 2023-10-17 RX ADMIN — PANTOPRAZOLE SODIUM 40 MILLIGRAM(S): 20 TABLET, DELAYED RELEASE ORAL at 05:39

## 2023-10-17 RX ADMIN — BUDESONIDE AND FORMOTEROL FUMARATE DIHYDRATE 2 PUFF(S): 160; 4.5 AEROSOL RESPIRATORY (INHALATION) at 09:15

## 2023-10-17 RX ADMIN — Medication 25 GRAM(S): at 09:08

## 2023-10-17 RX ADMIN — MIDODRINE HYDROCHLORIDE 10 MILLIGRAM(S): 2.5 TABLET ORAL at 21:05

## 2023-10-17 RX ADMIN — HEPARIN SODIUM 5000 UNIT(S): 5000 INJECTION INTRAVENOUS; SUBCUTANEOUS at 05:39

## 2023-10-17 NOTE — PROGRESS NOTE ADULT - SUBJECTIVE AND OBJECTIVE BOX
seen and examined  24 h events noted  no distress         PAST HISTORY  --------------------------------------------------------------------------------  No significant changes to PMH, PSH, FHx, SHx, unless otherwise noted    ALLERGIES & MEDICATIONS  --------------------------------------------------------------------------------  Allergies    No Known Allergies    Intolerances      Standing Inpatient Medications  budesonide 160 MICROgram(s)/formoterol 4.5 MICROgram(s) Inhaler 2 Puff(s) Inhalation two times a day  buMETAnide 2 milliGRAM(s) Oral every 12 hours  chlorhexidine 2% Cloths 1 Application(s) Topical <User Schedule>  collagenase Ointment 1 Application(s) Topical two times a day  darbepoetin Injectable Syringe 25 MICROGram(s) IV Push <User Schedule>  dextrose 5%. 1000 milliLiter(s) IV Continuous <Continuous>  dextrose 50% Injectable 25 Gram(s) IV Push once  dextrose 50% Injectable 12.5 Gram(s) IV Push once  dextrose 50% Injectable 25 Gram(s) IV Push once  glucagon  Injectable 1 milliGRAM(s) IntraMuscular once  heparin   Injectable 5000 Unit(s) SubCutaneous every 8 hours  insulin lispro (ADMELOG) corrective regimen sliding scale   SubCutaneous three times a day before meals  meropenem  IVPB 500 milliGRAM(s) IV Intermittent every 24 hours  meropenem  IVPB      midodrine. 10 milliGRAM(s) Oral every 8 hours  norepinephrine Infusion 0.05 MICROgram(s)/kG/Min IV Continuous <Continuous>  pantoprazole   Suspension 40 milliGRAM(s) Oral two times a day    PRN Inpatient Medications  acetaminophen     Tablet .. 650 milliGRAM(s) Oral every 6 hours PRN  albuterol    90 MICROgram(s) HFA Inhaler 1 Puff(s) Inhalation every 6 hours PRN  atropine Injectable 1 milliGRAM(s) IntraMuscular once PRN  dextrose Oral Gel 15 Gram(s) Oral once PRN        VITALS/PHYSICAL EXAM  --------------------------------------------------------------------------------  T(C): 36.7 (10-17-23 @ 04:00), Max: 37.3 (10-16-23 @ 08:02)  HR: 77 (10-17-23 @ 04:00) (75 - 84)  BP: 121/65 (10-17-23 @ 04:00) (105/56 - 123/63)  RR: 18 (10-17-23 @ 04:00) (18 - 20)  SpO2: 100% (10-17-23 @ 04:00) (96% - 100%)  Wt(kg): --        10-16-23 @ 07:01  -  10-17-23 @ 07:00  --------------------------------------------------------  IN: 730 mL / OUT: 700 mL / NET: 30 mL      Physical Exam:  	Gen: NAD, NGT   	Pulm:B/L fredi   	CV: S1S2; no rub  	Abd: +distended  	LE: dressing  	Vascular access: tdc     LABS/STUDIES  --------------------------------------------------------------------------------              9.1    14.22 >-----------<  226      [10-17-23 @ 00:18]              30.3     142  |  104  |  23  ----------------------------<  120      [10-17-23 @ 00:18]  4.2   |  27  |  1.9        Ca     7.2     [10-17-23 @ 00:18]      Mg     1.7     [10-17-23 @ 00:18]      Phos  2.9     [10-16-23 @ 05:33]    TPro  5.6  /  Alb  2.3  /  TBili  0.5  /  DBili  x   /  AST  19  /  ALT  21  /  AlkPhos  104  [10-17-23 @ 00:18]      Creatinine Trend:  SCr 1.9 [10-17 @ 00:18]  SCr 2.2 [10-16 @ 05:33]  SCr 2.4 [10-15 @ 17:47]  SCr 2.4 [10-15 @ 05:35]  SCr 2.4 [10-14 @ 08:02]    Urinalysis - [10-17-23 @ 00:18]      Color  / Appearance  / SG  / pH       Gluc 120 / Ketone   / Bili  / Urobili        Blood  / Protein  / Leuk Est  / Nitrite       RBC  / WBC  / Hyaline  / Gran  / Sq Epi  / Non Sq Epi  / Bacteria       Iron 31, TIBC 137, %sat 23      [10-07-23 @ 18:15]  Ferritin 960      [10-07-23 @ 18:15]    HBsAb <3.0      [09-27-23 @ 17:44]  HBsAb Nonreact      [09-27-23 @ 17:44]  HBsAg Nonreact      [09-27-23 @ 17:44]  HBcAb Nonreact      [09-27-23 @ 17:44]

## 2023-10-17 NOTE — PROGRESS NOTE ADULT - ASSESSMENT
84 yo m ho DM, COPD, anemia, lymphedemia, afib on xarelto, HFrEF, DM coming in from nursing home for AMS x 2 days admitted for septic shock with hospital course with multiple complications.     #Fever, concern for aspiration pneumonitis vs aspiration PNA, r/o PICC line infection  #Acute hypoxemic respiratory failure   - Patient with new onset of Fever and hypotension since 10/12. It started after starting NGT feeding. BP improves 10/13 and pt is less lethargic  - Of note, Patient has PICC line since August 2023 at [facility] -> Will remove 10/13 and send tips for culture  - wbc increases from 10k to 21k in less than 24hrs with PMN > 94% -> this is likely reactive in setting of suspected aspiration pneumonitis  - lactate 1.9 (10/12), ESR 30 (10/12),  (10/12)  - blood cultures and picc line cultures no growth   - CT chest (10/12) did not show obvious aspiration pneumonia per radiology read, but did show Bilateral moderate sized pleural effusions on CT chest  -weaned off levophed today   -midodrine q8  - started zosyn to cover aspiration pneumonia>> changed to vanco and meropenem -> discontinued vanc, and continuing with meropenem   - Pt tolerating room air   - pulm consulted for diagnostic thoracentesis given Bilateral moderate sized pleural effusions on CT chest -> chest US showed pleural effusion, so will discuss with family if to do a tap   -procal downtrending 1.7 -> 0.87 -> 0.66, continue to trend     #Metabolic Encephalopathy 2/2 septic Shock 2/2 suspected aspiration pneumonia   - rapid response after TDC due to acute change in mental status - ABG notable for elevated lactate  - pt febrile to 101.6 and elevated lactate to 8.4, rapidly became hypotensive on 10/4 -> started on levophed  - CXR (9/28) Bilateral pleural effusion/opacity unchanged. No air leak.  - cultures neg   - tolerating room air   - C/w albuterol PRN    #Severe Pharyngeal Dysphagia  - HO aspiration pneumonitis SP ABX   -  Patient was tolerating PO intake at NH prior to admission  -  After extubation on 09/24, patient became confused s/p upgrade on 09/26 for hypoxia, AMS, and pneumonia. He has been NPO since then with multiple speech swallow evaluations.  - FEES Study on 10/11: severe pharyngeal dysphagia, recommendation to keep NPO with alternate means -> now on NGT for feeding  - GI for evaluation of PEG tube placement after failing FEES study on 10/11. GI will hold off PEG tube placement for now pending sepsis workup (in setting of fever on 10/12) and pending neurology evaluation of age indeterminate infarct on CTH 10/10 + will need neurological prognostication prior to planning PEG tube placement   -evaluated by S&S on Oct 16, recommended continuing NGT     #Hemorrhagic Shock Secondary to Hematochezia from Duodenal Ulcer Bleed s/p OVESCO placement 09/23  #UGIB secondary to Duodenal ulcer SP EGD   #Hematochezia resolved   #Hemorrhagic Shock resolved   #Blood Loss Anemia  - Status Post EGD on 09/23: blood clots in fundus and antrum; 2cm actively bleeding duodenal ulcer with spurting vessel ([place] 1a) in sweep on base s/p 10cc epi and s/p 11/6 OVESCO placement for hemostasis  - Continue PO Protonix 40mg BID via NG tube for now. Was on IV pantoprazole drip (09/23-09/25)  - Anticoagulation resumed as Hgb stabilized (s/p 1 unit pRBCs 10/12)  -hemodynamically stable, no recent bloody bowel movements   - Monitor BM for recurrence of hematochezia or melena  - Please avoid any NSAIDs  - If any unstable bleed, please call GI   - Follow up with [facility] located at [address] [place] [address]. Phone Number: [phone]      #Anemia of acute blood loss   #Hemorrhagic Shock Secondary to Hematochezia from Duodenal Ulcer Bleed s/p OVESCO placement 09/23  - Trend hgb, transfuse blood PRN  - s/p IV Protamine sulfate 36mg x1 dose on 09/23  - GI f/u appreciated  - S/p IV Protonix 40mg BID -> switched to PO protonix BID   - Avoid any NSAIDs  - overall the pt risk and benefit is goes against Anticoagulation, will further discuss with family, dw brother regarding the high risk of bleeding and understands the reasons to hold anticoagulation. And understands the risk of cva with Afib.     #Acutely worsening uremia and acute kidney failure most likely 2/2 ATN - now on HD through right IJ [place]  #hypernatremia - resolved   #PAULA / CKD - improved   - possible ATN iso hypotension and possible sepsis/ vanc toxicity  - Cr stable for now -> Close F/U of BUN/Crea/ Lytes and UO  - no hydro on imaging  - udall placement 9/27/23  started on HD 9/27 but did not tolerate it with access problems  - udall changed 9/28  he received HD 9/28 and 9/30  - c/w phoslo 2/2/2   - off sodium bicarbonate   - s/p 250 cc LR bolus for hypernatremia  - s/p D5w @ 80 cc/hr  -nephro following   - TDC by IR on 10/4  -Bumex was stopped due to hypotension. Now BP is stable, bumex resumed on 10/17    -creatinine improving   -hemodialysis per nephro. Last HD session was 10/10.     #Acute Femoral DVT s/p IVC filter 10/6  #Elevated dimer  - duplex neg on 9/29  - overall the pt risk and benefit is goes against Anticoagulation, will further discuss with family , dw brother regarding the high risk of bleeding and understands to hold anticoagulation And understands the risk of cva with Afib.    -s/p IVC filter 10/6    #Possible Age/CVA  - Indeterminate Left Subinsular Stroke on 10/10    #transaminitis - resolving   - likely 2/2 liver shock 2/2 hypotension   - LFTS improving   - follow up RUQ   - hepatitis neg   - worsened today  - antibiotics dc  - trend   - if continues to worsen will get hepatology     #Asymptomatic Cholelithiasis  - Noted on CT  - LFTs noted > Trend LFTs  - Monitor for symptoms    #Paroxysmal Afib, chronic; uncontrolled rate  #bradycardia in evening on cardiac telemonitoring   -  on heparin   - hold metoprolol tartrate 12.5 mg q12h for hypotension; resumes once BP improves    #History of Left Foot OM w/ surrounding Cellulitis  #Sacral Decubitus Ulcer POA  - CT LLE (9/16): No CT evidence of osteomyelitis or necrotizing infection. No drainable collection seen, within the limitations of a noncontrast exam.  - Increased frailty and decreased physical capacity  - as per previous notes the team discussed with ID attending: patient is unlikely to benefit from prolonged therapy with cefepime+vanco (to cover possible OM) due to adverse outcomes associated kidney dysfunction and cognitive impact   - per podiatry, c/w Local wound care; offloading boots bilaterally, frequently turning and positioning, prevent pressure injury, keep skin clean, and monitor for wound changes and notify provider as per podiatry.    -DVT prophylaxis - heparin  -GI prophylaxis - protonix  -diet - NPO with tube feeds  -code - full 82 yo m ho DM, COPD, anemia, lymphedemia, afib on xarelto, HFrEF, DM coming in from nursing home for AMS x 2 days admitted for septic shock with hospital course with multiple complications.     #Fever, concern for aspiration pneumonitis vs aspiration PNA, r/o PICC line infection  #Acute hypoxemic respiratory failure   - Patient with new onset of Fever and hypotension since 10/12. It started after starting NGT feeding. BP improves 10/13 and pt is less lethargic  - Of note, Patient has PICC line since August 2023 at [facility] -> Will remove 10/13 and send tips for culture  - wbc increases from 10k to 21k in less than 24hrs with PMN > 94% -> this is likely reactive in setting of suspected aspiration pneumonitis  - lactate 1.9 (10/12), ESR 30 (10/12),  (10/12)  - blood cultures and picc line cultures no growth   - CT chest (10/12) did not show obvious aspiration pneumonia per radiology read, but did show Bilateral moderate sized pleural effusions on CT chest  -weaned off levophed today   -midodrine q8  - started zosyn to cover aspiration pneumonia>> changed to vanco and meropenem -> discontinued vanc, and continuing with meropenem   - Pt tolerating room air   - pulm consulted for diagnostic thoracentesis given Bilateral moderate sized pleural effusions on CT chest -> chest US showed pleural effusion, so will discuss with family if to do a tap   -procal downtrending 1.7 -> 0.87 -> 0.66, continue to trend     #Metabolic Encephalopathy 2/2 septic Shock 2/2 suspected aspiration pneumonia   - rapid response after TDC due to acute change in mental status - ABG notable for elevated lactate  - pt febrile to 101.6 and elevated lactate to 8.4, rapidly became hypotensive on 10/4 -> started on levophed  - CXR (9/28) Bilateral pleural effusion/opacity unchanged. No air leak.  - cultures neg   - tolerating room air   - C/w albuterol PRN    #Severe Pharyngeal Dysphagia  - HO aspiration pneumonitis SP ABX   -  Patient was tolerating PO intake at NH prior to admission  -  After extubation on 09/24, patient became confused s/p upgrade on 09/26 for hypoxia, AMS, and pneumonia. He has been NPO since then with multiple speech swallow evaluations.  - FEES Study on 10/11: severe pharyngeal dysphagia, recommendation to keep NPO with alternate means -> now on NGT for feeding  - GI for evaluation of PEG tube placement after failing FEES study on 10/11. GI will hold off PEG tube placement for now pending sepsis workup (in setting of fever on 10/12) and pending neurology evaluation of age indeterminate infarct on CTH 10/10 + will need neurological prognostication prior to planning PEG tube placement   -evaluated by S&S on Oct 16, recommended continuing NGT     #Hemorrhagic Shock Secondary to Hematochezia from Duodenal Ulcer Bleed s/p OVESCO placement 09/23  #UGIB secondary to Duodenal ulcer SP EGD   #Hematochezia resolved   #Hemorrhagic Shock resolved   #Blood Loss Anemia  - Status Post EGD on 09/23: blood clots in fundus and antrum; 2cm actively bleeding duodenal ulcer with spurting vessel ([place] 1a) in sweep on base s/p 10cc epi and s/p 11/6 OVESCO placement for hemostasis  - Continue PO Protonix 40mg BID via NG tube for now. Was on IV pantoprazole drip (09/23-09/25)  - Anticoagulation resumed as Hgb stabilized (s/p 1 unit pRBCs 10/12)  -hemodynamically stable, no recent bloody bowel movements   - Monitor BM for recurrence of hematochezia or melena  - Please avoid any NSAIDs  - If any unstable bleed, please call GI   - [facility] up with [facility] located at [address] [place] [address]. Phone Number: [phone]      #Anemia of acute blood loss   #Hemorrhagic Shock Secondary to Hematochezia from Duodenal Ulcer Bleed s/p OVESCO placement 09/23  - Trend hgb, transfuse blood PRN  - s/p IV Protamine sulfate 36mg x1 dose on 09/23  - GI f/u appreciated  - S/p IV Protonix 40mg BID -> switched to PO protonix BID   - Avoid any NSAIDs  - overall the pt risk and benefit is goes against Anticoagulation, will further discuss with family, dw brother regarding the high risk of bleeding and understands the reasons to hold anticoagulation. And understands the risk of cva with Afib.     #Acutely worsening uremia and acute kidney failure most likely 2/2 ATN - now on HD through right IJ [place]  #hypernatremia - resolved   #PAULA / CKD - improved   - possible ATN iso hypotension and possible sepsis/ vanc toxicity  - Cr stable for now -> Close F/U of BUN/Crea/ Lytes and UO  - no hydro on imaging  - udall placement 9/27/23  started on HD 9/27 but did not tolerate it with access problems  - udall changed 9/28  he received HD 9/28 and 9/30  - c/w phoslo 2/2/2   - off sodium bicarbonate   - s/p 250 cc LR bolus for hypernatremia  - s/p D5w @ 80 cc/hr  -nephro following   - TDC by IR on 10/4  -Bumex was stopped due to hypotension. Now BP is stable, bumex resumed on 10/17    -creatinine improving   -hemodialysis per nephro. Last HD session was 10/10.     #Acute Femoral DVT s/p IVC filter 10/6  #Elevated dimer  - duplex neg on 9/29  - overall the pt risk and benefit is goes against Anticoagulation, will further discuss with family , dw brother regarding the high risk of bleeding and understands to hold anticoagulation And understands the risk of cva with Afib.    -s/p IVC filter 10/6    #Possible Age/CVA  - Indeterminate Left Subinsular Stroke on 10/10    #transaminitis - resolving   - likely 2/2 liver shock 2/2 hypotension   - LFTS improving   - follow up RUQ   - hepatitis neg   - worsened today  - antibiotics dc  - trend   - if continues to worsen will get hepatology     #Asymptomatic Cholelithiasis  - Noted on CT  - LFTs noted > Trend LFTs  - Monitor for symptoms    #Paroxysmal Afib, chronic; uncontrolled rate  #bradycardia in evening on cardiac telemonitoring   - on heparin   - held metoprolol tartrate 12.5 mg q12h for hypotension; now that patient is off levophed today, monitor BP for 24 hrs and if stable, can resume     #History of Left Foot OM w/ surrounding Cellulitis  #Sacral Decubitus Ulcer POA  - CT LLE (9/16): No CT evidence of osteomyelitis or necrotizing infection. No drainable collection seen, within the limitations of a noncontrast exam.  - Increased frailty and decreased physical capacity  - as per previous notes the team discussed with ID attending: patient is unlikely to benefit from prolonged therapy with cefepime+vanco (to cover possible OM) due to adverse outcomes associated kidney dysfunction and cognitive impact   - per podiatry, c/w Local wound care; offloading boots bilaterally, frequently turning and positioning, prevent pressure injury, keep skin clean, and monitor for wound changes and notify provider as per podiatry.    -DVT prophylaxis - heparin  -GI prophylaxis - protonix  -diet - NPO with tube feeds  -code - full

## 2023-10-17 NOTE — PROGRESS NOTE ADULT - SUBJECTIVE AND OBJECTIVE BOX
Patient is a 83y old  Male who presents with a chief complaint of AMS (17 Oct 2023 07:35)        Over Night Events:  remains on Levophed.  On RA.  Refused ABG         ROS:     All ROS are negative except HPI         PHYSICAL EXAM    ICU Vital Signs Last 24 Hrs  T(C): 36.4 (17 Oct 2023 07:00), Max: 37.3 (16 Oct 2023 16:05)  T(F): 97.6 (17 Oct 2023 07:00), Max: 99.2 (16 Oct 2023 16:05)  HR: 85 (17 Oct 2023 07:00) (75 - 85)  BP: 103/65 (17 Oct 2023 07:00) (103/65 - 123/63)  BP(mean): 79 (17 Oct 2023 07:00) (78 - 87)  ABP: --  ABP(mean): --  RR: 18 (17 Oct 2023 07:00) (18 - 20)  SpO2: 99% (17 Oct 2023 07:00) (96% - 100%)    O2 Parameters below as of 17 Oct 2023 07:00  Patient On (Oxygen Delivery Method): room air            CONSTITUTIONAL:  Ill appearing In  NAD    ENT:   Airway patent,   Mouth with normal mucosa.       EYES:   Pupils equal,   Round and reactive to light.    CARDIAC:   Normal rate,   Regular rhythm.      RESPIRATORY:   No wheezing  Bilateral BS  Normal chest expansion  Not tachypneic,  No use of accessory muscles    GASTROINTESTINAL:  Abdomen soft,   Non-tender,   No guarding,   + BS    MUSCULOSKELETAL:   Range of motion is not limited,  No clubbing, cyanosis    NEUROLOGICAL:   Alert   Follows simple commands        SKIN:   Skin normal color for race,  No evidence of rash.        10-16-23 @ 07:01  -  10-17-23 @ 07:00  --------------------------------------------------------  IN:    Enteral Tube Flush: 250 mL    Glucerna: 375 mL    Norepinephrine: 105 mL  Total IN: 730 mL    OUT:    Indwelling Catheter - Urethral (mL): 300 mL    Voided (mL): 400 mL  Total OUT: 700 mL    Total NET: 30 mL          LABS:                            9.1    14.22 )-----------( 226      ( 17 Oct 2023 00:18 )             30.3                                               10-17    142  |  104  |  23<H>  ----------------------------<  120<H>  4.2   |  27  |  1.9<H>    Creatinine Trend  BUN 23, Cr 1.9, (10-17-23 @ 00:18)  Creatinine Trend  BUN 57, Cr 2.2, (10-16-23 @ 05:33)  Creatinine Trend  BUN 54, Cr 2.4, (10-15-23 @ 17:47)  Creatinine Trend  BUN 50, Cr 2.4, (10-15-23 @ 05:35)  Creatinine Trend  BUN 42, Cr 2.4, (10-14-23 @ 08:02)  Creatinine Trend  BUN 22, Cr 1.6, (10-12-23 @ 21:49)      Ca    7.2<L>      17 Oct 2023 00:18  Phos  2.9     10-16  Mg     1.7     10-17    TPro  5.6<L>  /  Alb  2.3<L>  /  TBili  0.5  /  DBili  x   /  AST  19  /  ALT  21  /  AlkPhos  104  10-17                                             Urinalysis Basic - ( 17 Oct 2023 00:18 )    Color: x / Appearance: x / SG: x / pH: x  Gluc: 120 mg/dL / Ketone: x  / Bili: x / Urobili: x   Blood: x / Protein: x / Nitrite: x   Leuk Esterase: x / RBC: x / WBC x   Sq Epi: x / Non Sq Epi: x / Bacteria: x                                                  LIVER FUNCTIONS - ( 17 Oct 2023 00:18 )  Alb: 2.3 g/dL / Pro: 5.6 g/dL / ALK PHOS: 104 U/L / ALT: 21 U/L / AST: 19 U/L / GGT: x                                                  Culture - Blood (collected 15 Oct 2023 01:56)  Source: .Blood None  Preliminary Report (16 Oct 2023 18:01):    No growth at 24 hours                                                               MEDICATIONS  (STANDING):  budesonide 160 MICROgram(s)/formoterol 4.5 MICROgram(s) Inhaler 2 Puff(s) Inhalation two times a day  buMETAnide 2 milliGRAM(s) Oral every 12 hours  chlorhexidine 2% Cloths 1 Application(s) Topical <User Schedule>  collagenase Ointment 1 Application(s) Topical two times a day  darbepoetin Injectable Syringe 25 MICROGram(s) IV Push <User Schedule>  dextrose 5%. 1000 milliLiter(s) (100 mL/Hr) IV Continuous <Continuous>  dextrose 50% Injectable 25 Gram(s) IV Push once  dextrose 50% Injectable 25 Gram(s) IV Push once  dextrose 50% Injectable 12.5 Gram(s) IV Push once  glucagon  Injectable 1 milliGRAM(s) IntraMuscular once  heparin   Injectable 5000 Unit(s) SubCutaneous every 8 hours  insulin lispro (ADMELOG) corrective regimen sliding scale   SubCutaneous three times a day before meals  magnesium sulfate  IVPB 2 Gram(s) IV Intermittent once  meropenem  IVPB 500 milliGRAM(s) IV Intermittent every 24 hours  meropenem  IVPB      midodrine. 10 milliGRAM(s) Oral every 8 hours  norepinephrine Infusion 0.05 MICROgram(s)/kG/Min (8.63 mL/Hr) IV Continuous <Continuous>  pantoprazole   Suspension 40 milliGRAM(s) Oral two times a day    MEDICATIONS  (PRN):  acetaminophen     Tablet .. 650 milliGRAM(s) Oral every 6 hours PRN Temp greater or equal to 38C (100.4F), Mild Pain (1 - 3), Moderate Pain (4 - 6)  albuterol    90 MICROgram(s) HFA Inhaler 1 Puff(s) Inhalation every 6 hours PRN for shortness of breath and/or wheezing  atropine Injectable 1 milliGRAM(s) IntraMuscular once PRN HR < 30  dextrose Oral Gel 15 Gram(s) Oral once PRN Blood Glucose LESS THAN 70 milliGRAM(s)/deciliter      New X-rays reviewed:                                                                                  ECHO    CXR interpreted by me:

## 2023-10-17 NOTE — PROGRESS NOTE ADULT - ASSESSMENT
82 yo m ho DM, COPD, anemia, paroxysmal afib on xarelto, HFrEF, DM coming in from nursing home for AMS x 2 days. Found to have toxic metabolic encephalopathy with PAULA.  PAULA/ toxic metabolic encephalopathy/ HAGMA/ HFrEF/ hypernatremia  possible ATN iso hypotension and possible sepsis/ vanc toxicity  no hydro on imaging  creat trending down   hd on hold   bumex 2 q 12   phos level noted low;  no binders   BP noted / continue   midodrine   remains on ATB / followed by ID   s/p IVC filter   iron stores noted / keep Hb >7  GI notes appreciated   if creatinine remains stable , d/c TDC next week   will follow    prognosis poor

## 2023-10-17 NOTE — PROGRESS NOTE ADULT - SUBJECTIVE AND OBJECTIVE BOX
24H events:    Patient is a 83y old Male who presents with a chief complaint of AMS (17 Oct 2023 08:59)    Primary diagnosis of Altered mental status    Today is hospital day 32d. This morning patient was seen and examined at bedside, resting comfortably in bed.    No acute or major events overnight.  Pt awake, alert and answering questions. No acute complaints.     Code Status: full     PAST MEDICAL & SURGICAL HISTORY  HTN (hypertension)    COPD (chronic obstructive pulmonary disease)    High cholesterol    Mild anemia    Coffee ground emesis    Chronic diastolic heart failure    PAULA (acute kidney injury)    No significant past surgical history      SOCIAL HISTORY:  Social History:      ALLERGIES:  No Known Allergies    MEDICATIONS:  STANDING MEDICATIONS  budesonide 160 MICROgram(s)/formoterol 4.5 MICROgram(s) Inhaler 2 Puff(s) Inhalation two times a day  buMETAnide 2 milliGRAM(s) Oral every 12 hours  chlorhexidine 2% Cloths 1 Application(s) Topical <User Schedule>  collagenase Ointment 1 Application(s) Topical two times a day  darbepoetin Injectable Syringe 25 MICROGram(s) IV Push <User Schedule>  dextrose 5%. 1000 milliLiter(s) IV Continuous <Continuous>  dextrose 50% Injectable 25 Gram(s) IV Push once  dextrose 50% Injectable 12.5 Gram(s) IV Push once  dextrose 50% Injectable 25 Gram(s) IV Push once  glucagon  Injectable 1 milliGRAM(s) IntraMuscular once  heparin   Injectable 5000 Unit(s) SubCutaneous every 8 hours  insulin lispro (ADMELOG) corrective regimen sliding scale   SubCutaneous three times a day before meals  meropenem  IVPB 500 milliGRAM(s) IV Intermittent every 24 hours  meropenem  IVPB      midodrine. 10 milliGRAM(s) Oral every 8 hours  norepinephrine Infusion 0.05 MICROgram(s)/kG/Min IV Continuous <Continuous>  pantoprazole   Suspension 40 milliGRAM(s) Oral two times a day    PRN MEDICATIONS  acetaminophen     Tablet .. 650 milliGRAM(s) Oral every 6 hours PRN  albuterol    90 MICROgram(s) HFA Inhaler 1 Puff(s) Inhalation every 6 hours PRN  atropine Injectable 1 milliGRAM(s) IntraMuscular once PRN  dextrose Oral Gel 15 Gram(s) Oral once PRN    VITALS:   T(F): 98.6  HR: 95  BP: 101/58  RR: 20  SpO2: 99%    PHYSICAL EXAM:  GENERAL:   (  x) NAD, lying in bed comfortably       HEAD:   (x  ) Atraumatic, NGT in place       HEART:  Rate -->     (x  ) normal rate     Rhythm -->     ( x ) regular       Murmurs -->     ( x ) normal s1s2       LUNGS:   ( x )Unlabored respirations     (  x) B/L air entry     ( x ) no adventitious sound        ABDOMEN:   ( x ) Soft    ( x ) nondistended     (  x) nontender        EXTREMITIES:  ( x ) Normal      NERVOUS SYSTEM:    (  x) A&Ox2     SKIN:   stage 2 ulcer BL buttocks       LABS:                        9.1    14.22 )-----------( 226      ( 17 Oct 2023 00:18 )             30.3     10-17    142  |  104  |  23<H>  ----------------------------<  120<H>  4.2   |  27  |  1.9<H>    Ca    7.2<L>      17 Oct 2023 00:18  Phos  2.9     10-16  Mg     1.7     10-17    TPro  5.6<L>  /  Alb  2.3<L>  /  TBili  0.5  /  DBili  x   /  AST  19  /  ALT  21  /  AlkPhos  104  10-17      Urinalysis Basic - ( 17 Oct 2023 00:18 )    Color: x / Appearance: x / SG: x / pH: x  Gluc: 120 mg/dL / Ketone: x  / Bili: x / Urobili: x   Blood: x / Protein: x / Nitrite: x   Leuk Esterase: x / RBC: x / WBC x   Sq Epi: x / Non Sq Epi: x / Bacteria: x    Culture - Blood (collected 15 Oct 2023 01:56)  Source: .Blood None  Preliminary Report (16 Oct 2023 18:01):    No growth at 24 hours          RADIOLOGY:

## 2023-10-17 NOTE — PROGRESS NOTE ADULT - ASSESSMENT
Impression    Septic shock   Right femoral DVT sp IVC   Right pleural effusion  HO aspiration pneumonia SP ABX   UGIB secondary to Duodenal ulcer SP EGD   Metabolic Encephalopathy   PAULA / CKD on HD  History of Left Foot OM w/ surrounding Cellulitis   Sacral DTI    Plan:    CNS: Monitor mental status.  Avoid depressants.       HEENT:  Oral care.  Aspiration precautions    CARDIOVASCULAR:  Goal directed fluid resuscitation.  Wean Levophed.  Avoid overload.  GDE fluid deplete      PULMONARY:  Albuterol PRN.  Frequent suctioning.  Incentive spirometry  keep Sao2 92 to 96%.  Right chest US with moderate anecoic effusion.  refused thora yesterday.      GASTROINTESTINAL:  Feeding per Speech and swallow.  Protonix q 12     GENITOURINARY/RENAL: Nephro following; HD per renal.  Monitor lytes and correct as needed     INFECTIOUS : ABX PER ID.  Repeat Cultures  Trend Procal.      HEMATOLOGIC: DVT prophylaxis.  Target Hb > 7.  Trend CBC     ENDOCRINE: Follow up FS.  Insulin protocol as needed.  BG goal 140-180    MSK/DERM:  PT/OT when cooperative.  Off loading.  Skin care.  Off loading.       Palliative care follow up     Poor prognosis overall     SDU    GOC full code for now.

## 2023-10-17 NOTE — PROGRESS NOTE ADULT - ASSESSMENT
83 year old male patient known to have COPD. DM, atrial fibrillation, HFrEF, anemia, lymphedema, and recent left foot OM who was brought from the NH to the ED on 09/15 for evaluation of altered mental status, found to have sepsis in setting of recent left foot OM, admitted for further management. Stay was complicated by hemorrhagic shock and drop in Hb secondary to hematochezia on 09/23, Status post EGD on 09/23 revealing a bleeding duodenal ulcer s/p OVESCO placement. Stay also complicated by an episode of confusion and hypoxia on 09/26 s/p found to have possible stroke on 10/10.  Patient was found to have Acute Femoral DVT s/p IVC filter 10/6. Patient WAS  transferred to general floor for further management. Then upgraded again to CEU        Septic shock   Right femoral DVT sp IVC   Right pleural effusion  HO aspiration pneumoniaSP ABX   UGIB secondary to Duodenal ulcer SP EGD   Metabolic Encephalopathy   PAULA / CKD on HD  History of Left Foot OM w/ surrounding Cellulitis   Sacral wounds       on levophed- wean off today     Avoid overload  he is also on midodrine - continue   c/w meropenem f/u with ID if vacno needed   f/u cultures   c/w NGT  slow feed and do aspiration precaution  - Cont oxygen supplementation.  CT chest 10/12 Multiple right apical nodules including new 7 mm solid nodule  (Series 4/85). Follow-up CT scan 6 months time.  Bilateral moderate sized pleural effusions with adjacent lower lobe atelectasis.  No pneumothorax or parenchymal mass.  Burn team input appreciated -Sacral & bilateral buttock pressure ulcers, necrotic  Wound care as per burn team - wash with soap and water. Apply santyl/hydrogel ointment, cover with allevyn pads  At this time patient does not need surgical debridement , If worsens despite santyl, please recall burn - may need debridement   frequent turning, off loading,and positioning and skin care as per protocol, Maintain pressure injury prevention, Keep skin clean, Offload heels, Monitor wound for changes and notify provider if any   Pulm cc might do thoracocentesis     []Severe Pharyngeal Dysphagia  - HO aspiration pneumonitis SP ABX   -  Patient was tolerating PO intake at NH prior to admission  -  After extubation on 09/24, patient became confused s/p upgrade on 09/26 for hypoxia, AMS, and pneumonia. He has been NPO since then with multiple speech swallow evaluations.  - FEES Study on 10/11: severe pharyngeal dysphagia, recommendation to keep NPO with alternate means -> now on NGT for feeding  - GI for evaluation of PEG tube placement after failing FEES study on 10/11.  - GI: Will hold off PEG tube placement for now pending hemodynamic stability   c/w NGT feeding for now     []age indeterminate infarct in left subinsular on CTH  neurology input appreciated -Would not pursue MR brain as won't   Continue Xarelto once medically safe    #Hemorrhagic Shock Secondary to Hematochezia from Duodenal Ulcer Bleed s/p OVESCO placement 09/23  #UGIB secondary to Duodenal ulcer SP EGD   #Hematochezia resolved   #Hemorrhagic Shock resolved   #Blood Loss Anemia  # Acute Drop in Hemoglobin- Improved  - Status Post EGD on 09/23: blood clots in fundus and antrum; 2cm actively bleeding duodenal ulcer with spurting vessel ([place] 1a) in sweep on base s/p 10cc epi and s/p 11/6 OVESCO placement for hemostasis  - Continue PO Protonix 40mg BID via NG tube for now. Was on IV pantoprazole drip (09/23-09/25)  - Anticoagulation resumption once Hgb stabilizes (s/p 1 unit pRBCs 10/12) if family agrees   - Monitor BM for recurrence of hematochezia or melena  - Please avoid any NSAIDs  - If any unstable bleed, please call GI   - Follow up with [facility] located at [address] [place] [address]. Phone Number: [phone]      #Anemia of acute blood loss   #Hemorrhagic Shock Secondary to Hematochezia from Duodenal Ulcer Bleed s/p OVESCO placement 09/23  - Trend hgb, transfuse blood PRN  - continue with holding therapeutic AC  - s/p IV Protamine sulfate 36mg x1 dose on 09/23  - GI f/u appreciated  - S/p IV Protonix 40mg BID -> Ok to switch to PO BID as per GI   - Avoid any NSAIDs  - overall the pt risk and benefit is goes against Anticoagulation,   as per previous notes - previous attending dw brother regarding the high risk of bleeding and understands the reasons to hold anticoagulation. And understands the risk of cva with Afib.     []Acutely worsening uremia and acute kidney failure - now on HD   Tunneled hemodialysis cathete by IR 10/4   - nephrology on board   HD as per nephro ( hd on hold ) as kidney function improving   bumex 2 q 12, monitor electrolytes   as per nephro if creatinine remains stable , d/c TDC next week   Creatinine Trend: 1.9<--, 2.2<--, 2.4<--, 2.4<--, 2.4<--, 1.6<--      []Acute Femoral DVT s/p IVC filter 10/6  []Elevated dimer  -va duplex:  Acute thrombus in the right proximal femoral vein.  as per previous noted -the medical attending dw brother regarding the high risk of bleeding and understands to hold anticoagulation And understands the risk of cva with Afib.    -s/p IVC filter 10/6  c/w heparin sq for now     #Asymptomatic Cholelithiasis  - Noted on CT  - LFTs noted > Trend LFTs  - Monitor for symptoms    #Paroxysmal Afib, chronic; uncontrolled rate  #angelika cardia in evening on cardiac telemonitoring   - HOLDING therapeutic AC; resume once hgb is stable  - hold  metoprolol tartrate 12.5 mg q12h for hypotension; resumes once BP improves  EP consult appreciated     #History of Left Foot OM w/ surrounding Cellulitis, left heal wound   #Sacral Decubitus Ulcer POA  - CT LLE (9/16): No CT evidence of osteomyelitis or necrotizing infection. No drainable collection seen, within the limitations of a noncontrast exam.  - Increased frailty and decreased physical capacity  - c/w Local wound care; offloading boots bilaterally, frequently turning and positioning, prevent pressure injury, keep skin clean, and monitor for wound changes and notify provider a  as per burn team sacral & bilateral buttock pressure ulcers, necrotic  Wound care - wash with soap and water. Apply santyl/hydrogel ointment, cover with allevyn pads  At this time patient does not need surgical debridement   If worsens despite santyl, please recall burn - may need debridement       over all prognosis is poor     full code - palliative team input appreciated     Pending; monitor off levophed ,  cultures, ID , GI , Nephro

## 2023-10-17 NOTE — PROGRESS NOTE ADULT - SUBJECTIVE AND OBJECTIVE BOX
T H I S   I S    N O  T   A    F I N A L I Z E D   N O T E      NIMO SARMIENTO  83y, Male  Allergy: No Known Allergies    Hospital Day: 32d    Patient seen and examined earlier today.     PMH/PSH:  PAST MEDICAL & SURGICAL HISTORY:  HTN (hypertension)      COPD (chronic obstructive pulmonary disease)      High cholesterol      Mild anemia      Coffee ground emesis      Chronic diastolic heart failure      PAULA (acute kidney injury)      No significant past surgical history          LAST 24-Hr EVENTS:    VITALS:  T(F): 97.6 (10-17-23 @ 07:00), Max: 99.2 (10-16-23 @ 16:05)  HR: 85 (10-17-23 @ 08:30)  BP: 115/59 (10-17-23 @ 08:30) (103/65 - 123/63)  RR: 18 (10-17-23 @ 08:30)  SpO2: 99% (10-17-23 @ 08:30)          TESTS & MEASUREMENTS:  Weight/BMI      10-15-23 @ 07:01  -  10-16-23 @ 07:00  --------------------------------------------------------  IN: 2025 mL / OUT: 0 mL / NET: 2025 mL    10-16-23 @ 07:01  -  10-17-23 @ 07:00  --------------------------------------------------------  IN: 730 mL / OUT: 700 mL / NET: 30 mL                            9.1    14.22 )-----------( 226      ( 17 Oct 2023 00:18 )             30.3       INR: 1.42 ratio (10-12-23 @ 16:16)    10-17    142  |  104  |  23<H>  ----------------------------<  120<H>  4.2   |  27  |  1.9<H>    Ca    7.2<L>      17 Oct 2023 00:18  Phos  2.9     10-16  Mg     1.7     10-17    TPro  5.6<L>  /  Alb  2.3<L>  /  TBili  0.5  /  DBili  x   /  AST  19  /  ALT  21  /  AlkPhos  104  10-17    LIVER FUNCTIONS - ( 17 Oct 2023 00:18 )  Alb: 2.3 g/dL / Pro: 5.6 g/dL / ALK PHOS: 104 U/L / ALT: 21 U/L / AST: 19 U/L / GGT: x                 Culture - Blood (collected 10-15-23 @ 01:56)  Source: .Blood None  Preliminary Report (10-16-23 @ 18:01):    No growth at 24 hours    Culture - Catheter (collected 10-13-23 @ 16:50)  Source: PICC Catheter Site  Final Report (10-16-23 @ 21:13):    No growth at 48 hours    Culture - Blood (collected 10-13-23 @ 16:10)  Source: .Blood Blood  Preliminary Report (10-16-23 @ 23:01):    No growth at 72 Hours    Culture - Blood (collected 10-12-23 @ 16:16)  Source: .Blood None  Preliminary Report (10-17-23 @ 04:00):    No growth at 4 days      Urinalysis Basic - ( 17 Oct 2023 00:18 )    Color: x / Appearance: x / SG: x / pH: x  Gluc: 120 mg/dL / Ketone: x  / Bili: x / Urobili: x   Blood: x / Protein: x / Nitrite: x   Leuk Esterase: x / RBC: x / WBC x   Sq Epi: x / Non Sq Epi: x / Bacteria: x      Procalcitonin, Serum: 0.66 ng/mL (10-16-23 @ 10:49)  Procalcitonin, Serum: 0.87 ng/mL (10-15-23 @ 17:47)      Ferritin: 960 ng/mL (10-07-23 @ 18:15)          Vancomycin Level, Trough: 17.3 ug/mL (10-15-23 @ 01:56)    A1C with Estimated Average Glucose Result: 6.4 % (09-16-23 @ 07:18)          RADIOLOGY, ECG, & ADDITIONAL TESTS:  12 Lead ECG:   Ventricular Rate 98 BPM    Atrial Rate 101 BPM    QRS Duration 100 ms    Q-T Interval 372 ms    QTC Calculation(Bazett) 474 ms    R Axis -61 degrees    T Axis 68 degrees    Diagnosis Line Atrial fibrillation  Left axis deviation  Low voltage QRS  Cannot rule out Anterior infarct , age undetermined  Abnormal ECG    Confirmed by austen colbert ([id number]) on 10/4/2023 7:03:14 PM (10-04-23 @ 15:40)    CT Chest No Cont:   ACC: [id number] EXAM:  CT CHEST   ORDERED BY: ALLISON MARCIAL     PROCEDURE DATE:  10/12/2023          INTERPRETATION:  REASON FOR STUDY: Fever. Parenchymal opacities.    TECHNIQUE: : Noncontrast CT scan of the thorax was performed from lung   apices to adrenal glands.  Sagittal and coronal reformatted images were generated.      COMPARISON: CT chest-5/22/2019. Chest x-ray 10/11/2023      FINDINGS:      Breathing artifact limits assessment.  TUBES/LINES/DEVICES: NG tube, satisfactory position. Right sided central   venous catheter satisfactory position.  MEDIASTINUM/HILUM : Likely reactive mediastinal lymph nodes.  CHEST WALL/ BREASTS: Unremarkable    HEART/ GREAT VESSELS:No pericardial effusions. Aortic and coronary artery   calcifications. Mitral annular calcifications.    ABDOMEN: Unremarkable nonenhanced upper abdominal organs.    BONES/ SOFT TISSUES: Degenerative changes/disc syndrome thoracic spine    LUNGS/PLEURA/AIRWAYS:   Patent central tracheobronchial tree. Bilateral moderate sized pleural   effusions with adjacent lower lobe atelectasis. No pneumothorax or   parenchymal mass.        PULMONARY NODULES:  Multiple right apical nodules including new 7 mm solid nodule  (Series   4/85)    IMPRESSION:    Since  5/22/2019    Multiple right apical nodules including new 7 mm solid nodule  (Series   4/85). Follow-up CT scan 6 months time.    Bilateral moderate sized pleural effusions with adjacent lower lobe   atelectasis.     No pneumothorax or parenchymal mass.        --- End of Report ---            BENITO BUSTAMANTE MD; Attending Radiologist  This document has been electronically signed. Oct 13 2023  1:19PM (10-12-23 @ 19:59)  CT Head No Cont:   ACC: [id number] EXAM:  CT BRAIN   ORDERED BY: JANNETH COOK     PROCEDURE DATE:  10/10/2023          INTERPRETATION:  CLINICAL INDICATION: Evaluation for stroke.    TECHNIQUE: CT of the head was performed without contrast. Multiple   contiguous axial images were acquired from the skullbase to the vertex   without the administration of intravenous contrast. Coronal and sagittal   reformations were made.    COMPARISON: CT head 9/15/2023.    FINDINGS:    Ventricles and sulci are unremarkable. There is no hydrocephalus.    There is a focal hypodensity noted within the left subinsular region,   series 3 image 17.    There is no intracranial hematoma, mass effect, or midline shift. There   is no abnormal extra-axial fluid collection.    The calvarium is intact. The visualized intraorbital compartments,   paranasal sinuses, and mastoid complexes are unremarkable.    IMPRESSION:  No intracranial hemorrhage, mass effect or midline shift.    A focal hypodensity is noted within the left subinsular region, not   previously seen potentially reflecting age-indeterminate infarct. An MRI   of the brain can be obtained for further evaluation if not clinically   contraindicated.    Mild chronic microvascular type changes.    --- End of Report ---          LYDIA GUY MD; Resident Radiologist  This document has been electronically signed.  EDWIGE RAMOS MD; Attending Radiologist  This document has been electronically signed. Oct 11 2023  9:55AM (10-10-23 @ 16:44)    RECENT DIAGNOSTIC ORDERS:      MEDICATIONS:  MEDICATIONS  (STANDING):  budesonide 160 MICROgram(s)/formoterol 4.5 MICROgram(s) Inhaler 2 Puff(s) Inhalation two times a day  chlorhexidine 2% Cloths 1 Application(s) Topical <User Schedule>  collagenase Ointment 1 Application(s) Topical two times a day  darbepoetin Injectable Syringe 25 MICROGram(s) IV Push <User Schedule>  dextrose 5%. 1000 milliLiter(s) (100 mL/Hr) IV Continuous <Continuous>  dextrose 50% Injectable 25 Gram(s) IV Push once  dextrose 50% Injectable 25 Gram(s) IV Push once  dextrose 50% Injectable 12.5 Gram(s) IV Push once  glucagon  Injectable 1 milliGRAM(s) IntraMuscular once  heparin   Injectable 5000 Unit(s) SubCutaneous every 8 hours  insulin lispro (ADMELOG) corrective regimen sliding scale   SubCutaneous three times a day before meals  magnesium sulfate  IVPB 2 Gram(s) IV Intermittent once  meropenem  IVPB 500 milliGRAM(s) IV Intermittent every 24 hours  meropenem  IVPB      midodrine. 10 milliGRAM(s) Oral every 8 hours  norepinephrine Infusion 0.05 MICROgram(s)/kG/Min (8.63 mL/Hr) IV Continuous <Continuous>  pantoprazole   Suspension 40 milliGRAM(s) Oral two times a day    MEDICATIONS  (PRN):  acetaminophen     Tablet .. 650 milliGRAM(s) Oral every 6 hours PRN Temp greater or equal to 38C (100.4F), Mild Pain (1 - 3), Moderate Pain (4 - 6)  albuterol    90 MICROgram(s) HFA Inhaler 1 Puff(s) Inhalation every 6 hours PRN for shortness of breath and/or wheezing  atropine Injectable 1 milliGRAM(s) IntraMuscular once PRN HR < 30  dextrose Oral Gel 15 Gram(s) Oral once PRN Blood Glucose LESS THAN 70 milliGRAM(s)/deciliter      HOME MEDICATIONS:  acetaminophen 325 mg oral tablet (05-25)  Albuterol (Eqv-ProAir HFA) 90 mcg/inh inhalation aerosol (09-15)  Breo Ellipta 100 mcg-25 mcg/inh inhalation powder (09-15)  empagliflozin 10 mg oral tablet (09-15)  Entresto 24 mg-26 mg oral tablet (09-15)  Lasix 20 mg oral tablet (09-15)  Metoprolol Succinate ER 25 mg oral tablet, extended release (09-15)  Percocet 5 mg-325 mg oral tablet (09-15)  Xarelto 20 mg oral tablet (09-15)      PHYSICAL EXAM:  GENERAL:   CHEST/LUNG:   HEART:   ABDOMEN:   EXTREMITIES:               GUILLERMINANIMO  83y, Male  Allergy: No Known Allergies    Hospital Day: 32d    Patient seen and examined earlier today.  he is more talkative today but confused     PMH/PSH:  PAST MEDICAL & SURGICAL HISTORY:  HTN (hypertension)      COPD (chronic obstructive pulmonary disease)      High cholesterol      Mild anemia      Coffee ground emesis      Chronic diastolic heart failure      PAULA (acute kidney injury)      No significant past surgical history          LAST 24-Hr EVENTS:    VITALS:  T(F): 97.6 (10-17-23 @ 07:00), Max: 99.2 (10-16-23 @ 16:05)  HR: 85 (10-17-23 @ 08:30)  BP: 115/59 (10-17-23 @ 08:30) (103/65 - 123/63)  RR: 18 (10-17-23 @ 08:30)  SpO2: 99% (10-17-23 @ 08:30)          TESTS & MEASUREMENTS:  Weight/BMI      10-15-23 @ 07:01  -  10-16-23 @ 07:00  --------------------------------------------------------  IN: 2025 mL / OUT: 0 mL / NET: 2025 mL    10-16-23 @ 07:01  -  10-17-23 @ 07:00  --------------------------------------------------------  IN: 730 mL / OUT: 700 mL / NET: 30 mL                            9.1    14.22 )-----------( 226      ( 17 Oct 2023 00:18 )             30.3       INR: 1.42 ratio (10-12-23 @ 16:16)    10-17    142  |  104  |  23<H>  ----------------------------<  120<H>  4.2   |  27  |  1.9<H>    Ca    7.2<L>      17 Oct 2023 00:18  Phos  2.9     10-16  Mg     1.7     10-17    TPro  5.6<L>  /  Alb  2.3<L>  /  TBili  0.5  /  DBili  x   /  AST  19  /  ALT  21  /  AlkPhos  104  10-17    LIVER FUNCTIONS - ( 17 Oct 2023 00:18 )  Alb: 2.3 g/dL / Pro: 5.6 g/dL / ALK PHOS: 104 U/L / ALT: 21 U/L / AST: 19 U/L / GGT: x                 Culture - Blood (collected 10-15-23 @ 01:56)  Source: .Blood None  Preliminary Report (10-16-23 @ 18:01):    No growth at 24 hours    Culture - Catheter (collected 10-13-23 @ 16:50)  Source: PICC Catheter Site  Final Report (10-16-23 @ 21:13):    No growth at 48 hours    Culture - Blood (collected 10-13-23 @ 16:10)  Source: .Blood Blood  Preliminary Report (10-16-23 @ 23:01):    No growth at 72 Hours    Culture - Blood (collected 10-12-23 @ 16:16)  Source: .Blood None  Preliminary Report (10-17-23 @ 04:00):    No growth at 4 days      Urinalysis Basic - ( 17 Oct 2023 00:18 )    Color: x / Appearance: x / SG: x / pH: x  Gluc: 120 mg/dL / Ketone: x  / Bili: x / Urobili: x   Blood: x / Protein: x / Nitrite: x   Leuk Esterase: x / RBC: x / WBC x   Sq Epi: x / Non Sq Epi: x / Bacteria: x      Procalcitonin, Serum: 0.66 ng/mL (10-16-23 @ 10:49)  Procalcitonin, Serum: 0.87 ng/mL (10-15-23 @ 17:47)      Ferritin: 960 ng/mL (10-07-23 @ 18:15)          Vancomycin Level, Trough: 17.3 ug/mL (10-15-23 @ 01:56)    A1C with Estimated Average Glucose Result: 6.4 % (09-16-23 @ 07:18)          RADIOLOGY, ECG, & ADDITIONAL TESTS:  12 Lead ECG:   Ventricular Rate 98 BPM    Atrial Rate 101 BPM    QRS Duration 100 ms    Q-T Interval 372 ms    QTC Calculation(Bazett) 474 ms    R Axis -61 degrees    T Axis 68 degrees    Diagnosis Line Atrial fibrillation  Left axis deviation  Low voltage QRS  Cannot rule out Anterior infarct , age undetermined  Abnormal ECG    Confirmed by austen colbert ([id number]) on 10/4/2023 7:03:14 PM (10-04-23 @ 15:40)    CT Chest No Cont:   ACC: [id number] EXAM:  CT CHEST   ORDERED BY: ALLISON MARCIAL     PROCEDURE DATE:  10/12/2023          INTERPRETATION:  REASON FOR STUDY: Fever. Parenchymal opacities.    TECHNIQUE: : Noncontrast CT scan of the thorax was performed from lung   apices to adrenal glands.  Sagittal and coronal reformatted images were generated.      COMPARISON: CT chest-5/22/2019. Chest x-ray 10/11/2023      FINDINGS:      Breathing artifact limits assessment.  TUBES/LINES/DEVICES: NG tube, satisfactory position. Right sided central   venous catheter satisfactory position.  MEDIASTINUM/HILUM : Likely reactive mediastinal lymph nodes.  CHEST WALL/ BREASTS: Unremarkable    HEART/ GREAT VESSELS:No pericardial effusions. Aortic and coronary artery   calcifications. Mitral annular calcifications.    ABDOMEN: Unremarkable nonenhanced upper abdominal organs.    BONES/ SOFT TISSUES: Degenerative changes/disc syndrome thoracic spine    LUNGS/PLEURA/AIRWAYS:   Patent central tracheobronchial tree. Bilateral moderate sized pleural   effusions with adjacent lower lobe atelectasis. No pneumothorax or   parenchymal mass.        PULMONARY NODULES:  Multiple right apical nodules including new 7 mm solid nodule  (Series   4/85)    IMPRESSION:    Since  5/22/2019    Multiple right apical nodules including new 7 mm solid nodule  (Series   4/85). Follow-up CT scan 6 months time.    Bilateral moderate sized pleural effusions with adjacent lower lobe   atelectasis.     No pneumothorax or parenchymal mass.        --- End of Report ---            BENITO BUSTAMANTE MD; Attending Radiologist  This document has been electronically signed. Oct 13 2023  1:19PM (10-12-23 @ 19:59)  CT Head No Cont:   ACC: [id number] EXAM:  CT BRAIN   ORDERED BY: JANNETH COOK     PROCEDURE DATE:  10/10/2023          INTERPRETATION:  CLINICAL INDICATION: Evaluation for stroke.    TECHNIQUE: CT of the head was performed without contrast. Multiple   contiguous axial images were acquired from the skullbase to the vertex   without the administration of intravenous contrast. Coronal and sagittal   reformations were made.    COMPARISON: CT head 9/15/2023.    FINDINGS:    Ventricles and sulci are unremarkable. There is no hydrocephalus.    There is a focal hypodensity noted within the left subinsular region,   series 3 image 17.    There is no intracranial hematoma, mass effect, or midline shift. There   is no abnormal extra-axial fluid collection.    The calvarium is intact. The visualized intraorbital compartments,   paranasal sinuses, and mastoid complexes are unremarkable.    IMPRESSION:  No intracranial hemorrhage, mass effect or midline shift.    A focal hypodensity is noted within the left subinsular region, not   previously seen potentially reflecting age-indeterminate infarct. An MRI   of the brain can be obtained for further evaluation if not clinically   contraindicated.    Mild chronic microvascular type changes.    --- End of Report ---          LYDIA GUY MD; Resident Radiologist  This document has been electronically signed.  EDWIGE RAMOS MD; Attending Radiologist  This document has been electronically signed. Oct 11 2023  9:55AM (10-10-23 @ 16:44)    RECENT DIAGNOSTIC ORDERS:      MEDICATIONS:  MEDICATIONS  (STANDING):  budesonide 160 MICROgram(s)/formoterol 4.5 MICROgram(s) Inhaler 2 Puff(s) Inhalation two times a day  chlorhexidine 2% Cloths 1 Application(s) Topical <User Schedule>  collagenase Ointment 1 Application(s) Topical two times a day  darbepoetin Injectable Syringe 25 MICROGram(s) IV Push <User Schedule>  dextrose 5%. 1000 milliLiter(s) (100 mL/Hr) IV Continuous <Continuous>  dextrose 50% Injectable 25 Gram(s) IV Push once  dextrose 50% Injectable 25 Gram(s) IV Push once  dextrose 50% Injectable 12.5 Gram(s) IV Push once  glucagon  Injectable 1 milliGRAM(s) IntraMuscular once  heparin   Injectable 5000 Unit(s) SubCutaneous every 8 hours  insulin lispro (ADMELOG) corrective regimen sliding scale   SubCutaneous three times a day before meals  magnesium sulfate  IVPB 2 Gram(s) IV Intermittent once  meropenem  IVPB 500 milliGRAM(s) IV Intermittent every 24 hours  meropenem  IVPB      midodrine. 10 milliGRAM(s) Oral every 8 hours  norepinephrine Infusion 0.05 MICROgram(s)/kG/Min (8.63 mL/Hr) IV Continuous <Continuous>  pantoprazole   Suspension 40 milliGRAM(s) Oral two times a day    MEDICATIONS  (PRN):  acetaminophen     Tablet .. 650 milliGRAM(s) Oral every 6 hours PRN Temp greater or equal to 38C (100.4F), Mild Pain (1 - 3), Moderate Pain (4 - 6)  albuterol    90 MICROgram(s) HFA Inhaler 1 Puff(s) Inhalation every 6 hours PRN for shortness of breath and/or wheezing  atropine Injectable 1 milliGRAM(s) IntraMuscular once PRN HR < 30  dextrose Oral Gel 15 Gram(s) Oral once PRN Blood Glucose LESS THAN 70 milliGRAM(s)/deciliter      HOME MEDICATIONS:  acetaminophen 325 mg oral tablet (05-25)  Albuterol (Eqv-ProAir HFA) 90 mcg/inh inhalation aerosol (09-15)  Breo Ellipta 100 mcg-25 mcg/inh inhalation powder (09-15)  empagliflozin 10 mg oral tablet (09-15)  Entresto 24 mg-26 mg oral tablet (09-15)  Lasix 20 mg oral tablet (09-15)  Metoprolol Succinate ER 25 mg oral tablet, extended release (09-15)  Percocet 5 mg-325 mg oral tablet (09-15)  Xarelto 20 mg oral tablet (09-15)      PHYSICAL EXAM:  exam  General: awake, alert, NAD , chronic ill appearance NGT , follows simple commands   Lungs:  decrease breath sound  b/l, normal resp effort, chest access   Heart: regular ryhthm   Abdomen: soft, mild tenderness generalized   Ext: + edema, ,   m[place]ipe skin superficial crusts

## 2023-10-18 LAB
1,3 BETA GLUCAN SER QL: NEGATIVE — SIGNIFICANT CHANGE UP
1,3 BETA GLUCAN SER QL: NEGATIVE — SIGNIFICANT CHANGE UP
ALBUMIN SERPL ELPH-MCNC: 2.4 G/DL — LOW (ref 3.5–5.2)
ALBUMIN SERPL ELPH-MCNC: 2.4 G/DL — LOW (ref 3.5–5.2)
ALP SERPL-CCNC: 141 U/L — HIGH (ref 30–115)
ALP SERPL-CCNC: 141 U/L — HIGH (ref 30–115)
ALT FLD-CCNC: 22 U/L — SIGNIFICANT CHANGE UP (ref 0–41)
ALT FLD-CCNC: 22 U/L — SIGNIFICANT CHANGE UP (ref 0–41)
ANION GAP SERPL CALC-SCNC: 13 MMOL/L — SIGNIFICANT CHANGE UP (ref 7–14)
ANION GAP SERPL CALC-SCNC: 13 MMOL/L — SIGNIFICANT CHANGE UP (ref 7–14)
AST SERPL-CCNC: 31 U/L — SIGNIFICANT CHANGE UP (ref 0–41)
AST SERPL-CCNC: 31 U/L — SIGNIFICANT CHANGE UP (ref 0–41)
BILIRUB SERPL-MCNC: 0.5 MG/DL — SIGNIFICANT CHANGE UP (ref 0.2–1.2)
BILIRUB SERPL-MCNC: 0.5 MG/DL — SIGNIFICANT CHANGE UP (ref 0.2–1.2)
BLD GP AB SCN SERPL QL: SIGNIFICANT CHANGE UP
BLD GP AB SCN SERPL QL: SIGNIFICANT CHANGE UP
BUN SERPL-MCNC: 54 MG/DL — HIGH (ref 10–20)
BUN SERPL-MCNC: 54 MG/DL — HIGH (ref 10–20)
CALCIUM SERPL-MCNC: 7.8 MG/DL — LOW (ref 8.4–10.5)
CALCIUM SERPL-MCNC: 7.8 MG/DL — LOW (ref 8.4–10.5)
CHLORIDE SERPL-SCNC: 107 MMOL/L — SIGNIFICANT CHANGE UP (ref 98–110)
CHLORIDE SERPL-SCNC: 107 MMOL/L — SIGNIFICANT CHANGE UP (ref 98–110)
CO2 SERPL-SCNC: 26 MMOL/L — SIGNIFICANT CHANGE UP (ref 17–32)
CO2 SERPL-SCNC: 26 MMOL/L — SIGNIFICANT CHANGE UP (ref 17–32)
CREAT SERPL-MCNC: 1.7 MG/DL — HIGH (ref 0.7–1.5)
CREAT SERPL-MCNC: 1.7 MG/DL — HIGH (ref 0.7–1.5)
CULTURE RESULTS: SIGNIFICANT CHANGE UP
EGFR: 40 ML/MIN/1.73M2 — LOW
EGFR: 40 ML/MIN/1.73M2 — LOW
FUNGITELL: <31 PG/ML — SIGNIFICANT CHANGE UP
FUNGITELL: <31 PG/ML — SIGNIFICANT CHANGE UP
GLUCOSE BLDC GLUCOMTR-MCNC: 107 MG/DL — HIGH (ref 70–99)
GLUCOSE BLDC GLUCOMTR-MCNC: 107 MG/DL — HIGH (ref 70–99)
GLUCOSE BLDC GLUCOMTR-MCNC: 123 MG/DL — HIGH (ref 70–99)
GLUCOSE BLDC GLUCOMTR-MCNC: 123 MG/DL — HIGH (ref 70–99)
GLUCOSE BLDC GLUCOMTR-MCNC: 67 MG/DL — LOW (ref 70–99)
GLUCOSE BLDC GLUCOMTR-MCNC: 67 MG/DL — LOW (ref 70–99)
GLUCOSE BLDC GLUCOMTR-MCNC: 71 MG/DL — SIGNIFICANT CHANGE UP (ref 70–99)
GLUCOSE BLDC GLUCOMTR-MCNC: 71 MG/DL — SIGNIFICANT CHANGE UP (ref 70–99)
GLUCOSE BLDC GLUCOMTR-MCNC: 98 MG/DL — SIGNIFICANT CHANGE UP (ref 70–99)
GLUCOSE BLDC GLUCOMTR-MCNC: 98 MG/DL — SIGNIFICANT CHANGE UP (ref 70–99)
GLUCOSE SERPL-MCNC: 108 MG/DL — HIGH (ref 70–99)
GLUCOSE SERPL-MCNC: 108 MG/DL — HIGH (ref 70–99)
HCT VFR BLD CALC: 29.4 % — LOW (ref 42–52)
HCT VFR BLD CALC: 29.4 % — LOW (ref 42–52)
HGB BLD-MCNC: 8.8 G/DL — LOW (ref 14–18)
HGB BLD-MCNC: 8.8 G/DL — LOW (ref 14–18)
MCHC RBC-ENTMCNC: 28.1 PG — SIGNIFICANT CHANGE UP (ref 27–31)
MCHC RBC-ENTMCNC: 28.1 PG — SIGNIFICANT CHANGE UP (ref 27–31)
MCHC RBC-ENTMCNC: 29.9 G/DL — LOW (ref 32–37)
MCHC RBC-ENTMCNC: 29.9 G/DL — LOW (ref 32–37)
MCV RBC AUTO: 93.9 FL — SIGNIFICANT CHANGE UP (ref 80–94)
MCV RBC AUTO: 93.9 FL — SIGNIFICANT CHANGE UP (ref 80–94)
NRBC # BLD: 0 /100 WBCS — SIGNIFICANT CHANGE UP (ref 0–0)
NRBC # BLD: 0 /100 WBCS — SIGNIFICANT CHANGE UP (ref 0–0)
PHOSPHATE SERPL-MCNC: 3.7 MG/DL — SIGNIFICANT CHANGE UP (ref 2.1–4.9)
PHOSPHATE SERPL-MCNC: 3.7 MG/DL — SIGNIFICANT CHANGE UP (ref 2.1–4.9)
PLATELET # BLD AUTO: 258 K/UL — SIGNIFICANT CHANGE UP (ref 130–400)
PLATELET # BLD AUTO: 258 K/UL — SIGNIFICANT CHANGE UP (ref 130–400)
PMV BLD: 10.6 FL — HIGH (ref 7.4–10.4)
PMV BLD: 10.6 FL — HIGH (ref 7.4–10.4)
POTASSIUM SERPL-MCNC: 4.3 MMOL/L — SIGNIFICANT CHANGE UP (ref 3.5–5)
POTASSIUM SERPL-MCNC: 4.3 MMOL/L — SIGNIFICANT CHANGE UP (ref 3.5–5)
POTASSIUM SERPL-SCNC: 4.3 MMOL/L — SIGNIFICANT CHANGE UP (ref 3.5–5)
POTASSIUM SERPL-SCNC: 4.3 MMOL/L — SIGNIFICANT CHANGE UP (ref 3.5–5)
PROT SERPL-MCNC: 6 G/DL — SIGNIFICANT CHANGE UP (ref 6–8)
PROT SERPL-MCNC: 6 G/DL — SIGNIFICANT CHANGE UP (ref 6–8)
RBC # BLD: 3.13 M/UL — LOW (ref 4.7–6.1)
RBC # BLD: 3.13 M/UL — LOW (ref 4.7–6.1)
RBC # FLD: 19.8 % — HIGH (ref 11.5–14.5)
RBC # FLD: 19.8 % — HIGH (ref 11.5–14.5)
SODIUM SERPL-SCNC: 146 MMOL/L — SIGNIFICANT CHANGE UP (ref 135–146)
SODIUM SERPL-SCNC: 146 MMOL/L — SIGNIFICANT CHANGE UP (ref 135–146)
SPECIMEN SOURCE: SIGNIFICANT CHANGE UP
WBC # BLD: 10.9 K/UL — HIGH (ref 4.8–10.8)
WBC # BLD: 10.9 K/UL — HIGH (ref 4.8–10.8)
WBC # FLD AUTO: 10.9 K/UL — HIGH (ref 4.8–10.8)
WBC # FLD AUTO: 10.9 K/UL — HIGH (ref 4.8–10.8)

## 2023-10-18 PROCEDURE — 99233 SBSQ HOSP IP/OBS HIGH 50: CPT

## 2023-10-18 RX ORDER — DEXTROSE 50 % IN WATER 50 %
12.5 SYRINGE (ML) INTRAVENOUS ONCE
Refills: 0 | Status: COMPLETED | OUTPATIENT
Start: 2023-10-18 | End: 2023-10-18

## 2023-10-18 RX ORDER — MIDODRINE HYDROCHLORIDE 2.5 MG/1
15 TABLET ORAL EVERY 8 HOURS
Refills: 0 | Status: DISCONTINUED | OUTPATIENT
Start: 2023-10-18 | End: 2023-10-19

## 2023-10-18 RX ORDER — METOPROLOL TARTRATE 50 MG
12.5 TABLET ORAL EVERY 12 HOURS
Refills: 0 | Status: DISCONTINUED | OUTPATIENT
Start: 2023-10-18 | End: 2023-10-25

## 2023-10-18 RX ADMIN — BUMETANIDE 2 MILLIGRAM(S): 0.25 INJECTION INTRAMUSCULAR; INTRAVENOUS at 05:06

## 2023-10-18 RX ADMIN — Medication 1 APPLICATION(S): at 05:07

## 2023-10-18 RX ADMIN — MEROPENEM 100 MILLIGRAM(S): 1 INJECTION INTRAVENOUS at 10:00

## 2023-10-18 RX ADMIN — PANTOPRAZOLE SODIUM 40 MILLIGRAM(S): 20 TABLET, DELAYED RELEASE ORAL at 17:12

## 2023-10-18 RX ADMIN — PANTOPRAZOLE SODIUM 40 MILLIGRAM(S): 20 TABLET, DELAYED RELEASE ORAL at 05:05

## 2023-10-18 RX ADMIN — HEPARIN SODIUM 5000 UNIT(S): 5000 INJECTION INTRAVENOUS; SUBCUTANEOUS at 14:17

## 2023-10-18 RX ADMIN — Medication 1 APPLICATION(S): at 17:19

## 2023-10-18 RX ADMIN — CHLORHEXIDINE GLUCONATE 1 APPLICATION(S): 213 SOLUTION TOPICAL at 05:04

## 2023-10-18 RX ADMIN — MIDODRINE HYDROCHLORIDE 15 MILLIGRAM(S): 2.5 TABLET ORAL at 22:15

## 2023-10-18 RX ADMIN — MIDODRINE HYDROCHLORIDE 10 MILLIGRAM(S): 2.5 TABLET ORAL at 05:06

## 2023-10-18 RX ADMIN — MIDODRINE HYDROCHLORIDE 15 MILLIGRAM(S): 2.5 TABLET ORAL at 14:17

## 2023-10-18 RX ADMIN — HEPARIN SODIUM 5000 UNIT(S): 5000 INJECTION INTRAVENOUS; SUBCUTANEOUS at 22:14

## 2023-10-18 RX ADMIN — Medication 12.5 MILLIGRAM(S): at 17:19

## 2023-10-18 RX ADMIN — HEPARIN SODIUM 5000 UNIT(S): 5000 INJECTION INTRAVENOUS; SUBCUTANEOUS at 05:06

## 2023-10-18 RX ADMIN — BUMETANIDE 2 MILLIGRAM(S): 0.25 INJECTION INTRAMUSCULAR; INTRAVENOUS at 18:13

## 2023-10-18 NOTE — PROGRESS NOTE ADULT - SUBJECTIVE AND OBJECTIVE BOX
24H events:    Patient is a 83y old Male who presents with a chief complaint of AMS (18 Oct 2023 10:53)    Primary diagnosis of Altered mental status    Today is hospital day 33d. This morning patient was seen and examined at bedside, resting comfortably in bed.    No acute or major events overnight.  Denies dyspnea, chest pain, fevers, abdo pain, nausea, vomiting, diarrhea, constipation.    Code Status: full      PAST MEDICAL & SURGICAL HISTORY  HTN (hypertension)    COPD (chronic obstructive pulmonary disease)    High cholesterol    Mild anemia    Coffee ground emesis    Chronic diastolic heart failure    PAULA (acute kidney injury)    No significant past surgical history      SOCIAL HISTORY:  Social History:      ALLERGIES:  No Known Allergies    MEDICATIONS:  STANDING MEDICATIONS  budesonide 160 MICROgram(s)/formoterol 4.5 MICROgram(s) Inhaler 2 Puff(s) Inhalation two times a day  buMETAnide 2 milliGRAM(s) Oral every 12 hours  chlorhexidine 2% Cloths 1 Application(s) Topical <User Schedule>  collagenase Ointment 1 Application(s) Topical two times a day  darbepoetin Injectable Syringe 25 MICROGram(s) IV Push <User Schedule>  dextrose 5%. 1000 milliLiter(s) IV Continuous <Continuous>  dextrose 50% Injectable 25 Gram(s) IV Push once  dextrose 50% Injectable 25 Gram(s) IV Push once  dextrose 50% Injectable 12.5 Gram(s) IV Push once  glucagon  Injectable 1 milliGRAM(s) IntraMuscular once  heparin   Injectable 5000 Unit(s) SubCutaneous every 8 hours  insulin lispro (ADMELOG) corrective regimen sliding scale   SubCutaneous every 6 hours  meropenem  IVPB 500 milliGRAM(s) IV Intermittent every 24 hours  meropenem  IVPB      metoprolol tartrate 12.5 milliGRAM(s) Oral every 12 hours  midodrine. 15 milliGRAM(s) Oral every 8 hours  norepinephrine Infusion 0.05 MICROgram(s)/kG/Min IV Continuous <Continuous>  pantoprazole   Suspension 40 milliGRAM(s) Oral two times a day    PRN MEDICATIONS  acetaminophen     Tablet .. 650 milliGRAM(s) Oral every 6 hours PRN  albuterol    90 MICROgram(s) HFA Inhaler 1 Puff(s) Inhalation every 6 hours PRN  atropine Injectable 1 milliGRAM(s) IntraMuscular once PRN  dextrose Oral Gel 15 Gram(s) Oral once PRN    VITALS:   T(F): 97.9  HR: 106  BP: 115/67  RR: 20  SpO2: 97%    PHYSICAL EXAM:  GENERAL:   ( x NAD, lying in bed comfortably       HEAD:   (  x) Atraumatic    NGT in place     NECK:  ( x ) Supple        HEART:  Rate -->     ( x ) normal rate    Rhythm -->     ( x ) regular     Murmurs -->     ( x ) normal s1s2         LUNGS:   (x  )Unlabored respirations      ( x ) B/L air entry      (  x) no adventitious sound       ABDOMEN:   ( x ) Soft   (x  ) nondistended      (  x) nontender           NERVOUS SYSTEM:    ( x ) A&Ox3           LABS:                        8.8    10.90 )-----------( 258      ( 18 Oct 2023 06:20 )             29.4     10-18    146  |  107  |  54<H>  ----------------------------<  108<H>  4.3   |  26  |  1.7<H>    Ca    7.8<L>      18 Oct 2023 06:20  Phos  3.7     10-18  Mg     1.7     10-17    TPro  6.0  /  Alb  2.4<L>  /  TBili  0.5  /  DBili  x   /  AST  31  /  ALT  22  /  AlkPhos  141<H>  10-18      Urinalysis Basic - ( 18 Oct 2023 06:20 )    Color: x / Appearance: x / SG: x / pH: x  Gluc: 108 mg/dL / Ketone: x  / Bili: x / Urobili: x   Blood: x / Protein: x / Nitrite: x   Leuk Esterase: x / RBC: x / WBC x   Sq Epi: x / Non Sq Epi: x / Bacteria: x                RADIOLOGY:

## 2023-10-18 NOTE — PROGRESS NOTE ADULT - SUBJECTIVE AND OBJECTIVE BOX
NIMO SARMIENTO  83y Male    CHIEF COMPLAINT:    Patient is a 83y old  Male who presents with a chief complaint of AMS (18 Oct 2023 08:13)    INTERVAL HPI/OVERNIGHT EVENTS:    Patient seen and examined. No acute events overnight. Agitated, off pressors, has NGT     ROS: All other systems are negative.    Vital Signs:    T(F): 97.3 (10-18-23 @ 07:18), Max: 98.6 (10-17-23 @ 12:00)  HR: 104 (10-18-23 @ 07:18) (83 - 104)  BP: 139/72 (10-18-23 @ 07:18) (101/58 - 139/72)  RR: 20 (10-18-23 @ 07:18) (18 - 20)  SpO2: 98% (10-18-23 @ 07:18) (98% - 100%)    17 Oct 2023 07:01  -  18 Oct 2023 07:00  --------------------------------------------------------  IN: 1896.3 mL / OUT: 1250 mL / NET: 646.3 mL    Daily Weight in k.8 (18 Oct 2023 04:07)    POCT Blood Glucose.: 123 mg/dL (18 Oct 2023 07:25)  POCT Blood Glucose.: 98 mg/dL (18 Oct 2023 05:17)  POCT Blood Glucose.: 97 mg/dL (17 Oct 2023 22:20)  POCT Blood Glucose.: 89 mg/dL (17 Oct 2023 16:36)    PHYSICAL EXAM:    GENERAL:  NAD chronically ill appearing   SKIN: No rashes or lesions  HEENT: Atraumatic. Normocephalic.   NECK: Supple, No JVD.    PULMONARY:     decreased breath sounds B/L. No wheezing.   CVS: Normal S1, S2. Rate and Rhythm are regular   ABDOMEN/GI: Soft, Nontender, Nondistended   MSK:  No clubbing or cyanosis . LE edema  NEUROLOGIC:  follows simple commands   PSYCH: Awake, able to answer simple questions     Consultant(s) Notes Reviewed:  [x ] YES  [ ] NO  Care Discussed with Consultants/Other Providers [ x] YES  [ ] NO    LABS:                        8.8    10.90 )-----------( 258      ( 18 Oct 2023 06:20 )             29.4     146  |  107  |  54<H>  ----------------------------<  108<H>  4.3   |  26  |  1.7<H>    Ca    7.8<L>      18 Oct 2023 06:20  Phos  3.7     10-18  Mg     1.7     10-17    TPro  6.0  /  Alb  2.4<L>  /  TBili  0.5  /  DBili  x   /  AST  31  /  ALT  22  /  AlkPhos  141<H>  10-18    RADIOLOGY & ADDITIONAL TESTS:  Imaging or report Personally Reviewed:  [x] YES  [ ] NO  EKG reviewed: [x] YES  [ ] NO    Medications:  Standing  budesonide 160 MICROgram(s)/formoterol 4.5 MICROgram(s) Inhaler 2 Puff(s) Inhalation two times a day  buMETAnide 2 milliGRAM(s) Oral every 12 hours  chlorhexidine 2% Cloths 1 Application(s) Topical <User Schedule>  collagenase Ointment 1 Application(s) Topical two times a day  darbepoetin Injectable Syringe 25 MICROGram(s) IV Push <User Schedule>  dextrose 5%. 1000 milliLiter(s) IV Continuous <Continuous>  dextrose 50% Injectable 25 Gram(s) IV Push once  dextrose 50% Injectable 25 Gram(s) IV Push once  dextrose 50% Injectable 12.5 Gram(s) IV Push once  glucagon  Injectable 1 milliGRAM(s) IntraMuscular once  heparin   Injectable 5000 Unit(s) SubCutaneous every 8 hours  insulin lispro (ADMELOG) corrective regimen sliding scale   SubCutaneous every 6 hours  meropenem  IVPB 500 milliGRAM(s) IV Intermittent every 24 hours  meropenem  IVPB      midodrine. 10 milliGRAM(s) Oral every 8 hours  norepinephrine Infusion 0.05 MICROgram(s)/kG/Min IV Continuous <Continuous>  pantoprazole   Suspension 40 milliGRAM(s) Oral two times a day    PRN Meds  acetaminophen     Tablet .. 650 milliGRAM(s) Oral every 6 hours PRN  albuterol    90 MICROgram(s) HFA Inhaler 1 Puff(s) Inhalation every 6 hours PRN  atropine Injectable 1 milliGRAM(s) IntraMuscular once PRN  dextrose Oral Gel 15 Gram(s) Oral once PRN

## 2023-10-18 NOTE — PROGRESS NOTE ADULT - SUBJECTIVE AND OBJECTIVE BOX
Nephrology Progress Note    NIMO SARMIENTO  MRN-[id number]  83y  Male    S:  Patient is seen and examined, events over the last 24h noted.    O:  Allergies:  No Known Allergies    Hospital Medications:   MEDICATIONS  (STANDING):  budesonide 160 MICROgram(s)/formoterol 4.5 MICROgram(s) Inhaler 2 Puff(s) Inhalation two times a day  buMETAnide 2 milliGRAM(s) Oral every 12 hours  chlorhexidine 2% Cloths 1 Application(s) Topical <User Schedule>  collagenase Ointment 1 Application(s) Topical two times a day  darbepoetin Injectable Syringe 25 MICROGram(s) IV Push <User Schedule>  dextrose 5%. 1000 milliLiter(s) (100 mL/Hr) IV Continuous <Continuous>  dextrose 50% Injectable 25 Gram(s) IV Push once  dextrose 50% Injectable 25 Gram(s) IV Push once  dextrose 50% Injectable 12.5 Gram(s) IV Push once  glucagon  Injectable 1 milliGRAM(s) IntraMuscular once  heparin   Injectable 5000 Unit(s) SubCutaneous every 8 hours  insulin lispro (ADMELOG) corrective regimen sliding scale   SubCutaneous every 6 hours  meropenem  IVPB      meropenem  IVPB 500 milliGRAM(s) IV Intermittent every 24 hours  midodrine. 15 milliGRAM(s) Oral every 8 hours  norepinephrine Infusion 0.05 MICROgram(s)/kG/Min (8.63 mL/Hr) IV Continuous <Continuous>  pantoprazole   Suspension 40 milliGRAM(s) Oral two times a day    MEDICATIONS  (PRN):  acetaminophen     Tablet .. 650 milliGRAM(s) Oral every 6 hours PRN Temp greater or equal to 38C (100.4F), Mild Pain (1 - 3), Moderate Pain (4 - 6)  albuterol    90 MICROgram(s) HFA Inhaler 1 Puff(s) Inhalation every 6 hours PRN for shortness of breath and/or wheezing  atropine Injectable 1 milliGRAM(s) IntraMuscular once PRN HR < 30  dextrose Oral Gel 15 Gram(s) Oral once PRN Blood Glucose LESS THAN 70 milliGRAM(s)/deciliter    Home Medications:  acetaminophen 325 mg oral tablet: 2 tab(s) orally every 6 hours, As needed, Mild Pain (1 - 3), Moderate Pain (4 - 6) (25 May 2019 09:25)  Albuterol (Eqv-ProAir HFA) 90 mcg/inh inhalation aerosol: 1 puff(s) inhaled every 6 hours as needed for  shortness of breath and/or wheezing (15 Sep 2023 22:56)  Breo Ellipta 100 mcg-25 mcg/inh inhalation powder: 1 puff(s) inhaled once a day (15 Sep 2023 22:56)  empagliflozin 10 mg oral tablet: 1 tab(s) orally once a day (15 Sep 2023 22:56)  Entresto 24 mg-26 mg oral tablet: 1 tab(s) orally 2 times a day (15 Sep 2023 22:57)  Lasix 20 mg oral tablet: 1 tab(s) orally once a day (15 Sep 2023 22:57)  Metoprolol Succinate ER 25 mg oral tablet, extended release: 1 tab(s) orally once a day (15 Sep 2023 22:57)  Percocet 5 mg-325 mg oral tablet: 1 tab(s) orally every 8 hours as needed for  severe pain (15 Sep 2023 22:57)  Xarelto 20 mg oral tablet: 1 tab(s) orally once a day (15 Sep 2023 22:57)      VITALS:  Daily     Daily Weight in k.8 (18 Oct 2023 04:07)  T(F): 97.3 (10-18-23 @ 07:18), Max: 98.6 (10-17-23 @ 12:00)  HR: 104 (10-18-23 @ 07:18)  BP: 139/72 (10-18-23 @ 07:18)  RR: 20 (10-18-23 @ 07:18)  SpO2: 98% (10-18-23 @ 07:18)  Wt(kg): --  I&O's Detail    17 Oct 2023 07:01  -  18 Oct 2023 07:00  --------------------------------------------------------  IN:    Enteral Tube Flush: 750 mL    Glucerna: 1125 mL    Norepinephrine: 21.3 mL  Total IN: 1896.3 mL    OUT:    Voided (mL): 1250 mL  Total OUT: 1250 mL    Total NET: 646.3 mL        I&O's Summary    17 Oct 2023 07:01  -  18 Oct 2023 07:00  --------------------------------------------------------  IN: 1896.3 mL / OUT: 1250 mL / NET: 646.3 mL          PHYSICAL EXAM:  Gen: NAD  Chest: b/l breath sounds  Abd: soft  Extremities: no edema  Vascular access: TDC      LABS:    Blood Gas Arterial - Sodium: 134 mmol/L (10-15-23 @ 11:18)  Blood Gas Arterial - Calcium, Ionized: 0.98 mmol/L (10-15-23 @ 11:18)  Blood Gas Arterial - Sodium: 141 mmol/L (10-13-23 @ 15:41)    10-18    146  |  107  |  54<H>  ----------------------------<  108<H>  4.3   |  26  |  1.7<H>    Ca    7.8<L>      18 Oct 2023 06:20  Phos  3.7     10-18  Mg     1.7     10-17    TPro  6.0  /  Alb  2.4<L>  /  TBili  0.5  /  DBili      /  AST  31  /  ALT  22  /  AlkPhos  141<H>  10-18    eGFR: 40 mL/min/1.73m2 (10-18-23 @ 06:20)  eGFR: 37 mL/min/1.73m2 (10-17-23 @ 20:00)    Phosphorus: 3.7 mg/dL (10-18-23 @ 06:20)  Phosphorus: 2.9 mg/dL (10-16-23 @ 05:33)    Intact PTH: 71 pg/mL (19 @ 06:39)  Osmolality, Serum: 296 mos/kg (19 @ 06:39)                          8.8    10.90 )-----------( 258      ( 18 Oct 2023 06:20 )             29.4     Mean Cell Volume: 93.9 fL (10-18-23 @ 06:20)    Iron Total: 31 ug/dL (10-07-23 @ 18:15)  % Saturation, Iron: 23 % (10-07-23 @ 18:15)        Culture Results:   No growth at 48 Hours (10-15 @ 01:56)  Culture Results:   No growth at 48 hours (10-13 @ 16:50)    Creatinine trend:  Creatinine: 1.7 mg/dL (10-18-23 @ 06:20)  Creatinine: 1.8 mg/dL (10-17-23 @ 20:00)  Creatinine: 1.9 mg/dL (10-17-23 @ 00:18)  Creatinine: 2.2 mg/dL (10-16-23 @ 05:33)  Creatinine: 2.4 mg/dL (10-15-23 @ 17:47)  Creatinine: 2.4 mg/dL (10-15-23 @ 05:35) Nephrology Progress Note    NIMO SARMIENTO  MRN-[id number]  83y  Male    S:  Patient is seen and examined, events over the last 24h noted.    O:  Allergies:  No Known Allergies    Hospital Medications:   MEDICATIONS  (STANDING):  budesonide 160 MICROgram(s)/formoterol 4.5 MICROgram(s) Inhaler 2 Puff(s) Inhalation two times a day  buMETAnide 2 milliGRAM(s) Oral every 12 hours  chlorhexidine 2% Cloths 1 Application(s) Topical <User Schedule>  collagenase Ointment 1 Application(s) Topical two times a day  darbepoetin Injectable Syringe 25 MICROGram(s) IV Push <User Schedule>  heparin   Injectable 5000 Unit(s) SubCutaneous every 8 hours  insulin lispro (ADMELOG) corrective regimen sliding scale   SubCutaneous every 6 hours  meropenem  IVPB 500 milliGRAM(s) IV Intermittent every 24 hours  midodrine. 15 milliGRAM(s) Oral every 8 hours  norepinephrine Infusion 0.05 MICROgram(s)/kG/Min (8.63 mL/Hr) IV Continuous <Continuous>  pantoprazole   Suspension 40 milliGRAM(s) Oral two times a day    MEDICATIONS  (PRN):  acetaminophen     Tablet .. 650 milliGRAM(s) Oral every 6 hours PRN Temp greater or equal to 38C (100.4F), Mild Pain (1 - 3), Moderate Pain (4 - 6)  albuterol    90 MICROgram(s) HFA Inhaler 1 Puff(s) Inhalation every 6 hours PRN for shortness of breath and/or wheezing  atropine Injectable 1 milliGRAM(s) IntraMuscular once PRN HR < 30  dextrose Oral Gel 15 Gram(s) Oral once PRN Blood Glucose LESS THAN 70 milliGRAM(s)/deciliter    Home Medications:  acetaminophen 325 mg oral tablet: 2 tab(s) orally every 6 hours, As needed, Mild Pain (1 - 3), Moderate Pain (4 - 6) (25 May 2019 09:25)  Albuterol (Eqv-ProAir HFA) 90 mcg/inh inhalation aerosol: 1 puff(s) inhaled every 6 hours as needed for  shortness of breath and/or wheezing (15 Sep 2023 22:56)  Breo Ellipta 100 mcg-25 mcg/inh inhalation powder: 1 puff(s) inhaled once a day (15 Sep 2023 22:56)  empagliflozin 10 mg oral tablet: 1 tab(s) orally once a day (15 Sep 2023 22:56)  Entresto 24 mg-26 mg oral tablet: 1 tab(s) orally 2 times a day (15 Sep 2023 22:57)  Lasix 20 mg oral tablet: 1 tab(s) orally once a day (15 Sep 2023 22:57)  Metoprolol Succinate ER 25 mg oral tablet, extended release: 1 tab(s) orally once a day (15 Sep 2023 22:57)  Percocet 5 mg-325 mg oral tablet: 1 tab(s) orally every 8 hours as needed for  severe pain (15 Sep 2023 22:57)  Xarelto 20 mg oral tablet: 1 tab(s) orally once a day (15 Sep 2023 22:57)      VITALS:  Daily     Daily Weight in k.8 (18 Oct 2023 04:07)  T(F): 97.3 (10-18-23 @ 07:18), Max: 98.6 (10-17-23 @ 12:00)  HR: 104 (10-18-23 @ 07:18)  BP: 139/72 (10-18-23 @ 07:18)  RR: 20 (10-18-23 @ 07:18)  SpO2: 98% (10-18-23 @ 07:18)  Wt(kg): --  I&O's Detail    17 Oct 2023 07:01  -  18 Oct 2023 07:00  --------------------------------------------------------  IN:    Enteral Tube Flush: 750 mL    Glucerna: 1125 mL    Norepinephrine: 21.3 mL  Total IN: 1896.3 mL    OUT:    Voided (mL): 1250 mL  Total OUT: 1250 mL    Total NET: 646.3 mL        I&O's Summary    17 Oct 2023 07:01  -  18 Oct 2023 07:00  --------------------------------------------------------  IN: 1896.3 mL / OUT: 1250 mL / NET: 646.3 mL          PHYSICAL EXAM:  Gen: ill appearing, in NAD, +NGT  Chest: b/l breath sounds  Abd: soft  Extremities: no edema  Vascular access: TDC      LABS:    10-18    146  |  107  |  54<H>  ----------------------------<  108<H>  4.3   |  26  |  1.7<H>    Ca    7.8<L>      18 Oct 2023 06:20  Phos  3.7     10-18  Mg     1.7     10-17    TPro  6.0  /  Alb  2.4<L>  /  TBili  0.5  /  DBili      /  AST  31  /  ALT  22  /  AlkPhos  141<H>  10-18    eGFR: 40 mL/min/1.73m2 (10-18-23 @ 06:20)  eGFR: 37 mL/min/1.73m2 (10-17-23 @ 20:00)    Phosphorus: 3.7 mg/dL (10-18-23 @ 06:20)  Phosphorus: 2.9 mg/dL (10-16-23 @ 05:33)                          8.8    10.90 )-----------( 258      ( 18 Oct 2023 06:20 )             29.4     Mean Cell Volume: 93.9 fL (10-18-23 @ 06:20)    Iron Total: 31 ug/dL (10-07-23 @ 18:15)  % Saturation, Iron: 23 % (10-07-23 @ 18:15)  Ferritin: 960 ng/mL (10.07.23 @ 18:15)    Culture Results:   No growth at 48 Hours (10-15 @ 01:56)  Culture Results:   No growth at 48 hours (10-13 @ 16:50)    Creatinine trend:  Creatinine: 1.7 mg/dL (10-18-23 @ 06:20)  Creatinine: 1.8 mg/dL (10-17-23 @ 20:00)  Creatinine: 1.9 mg/dL (10-17-23 @ 00:18)  Creatinine: 2.2 mg/dL (10-16-23 @ 05:33)  Creatinine: 2.4 mg/dL (10-15-23 @ 17:47)  Creatinine: 2.4 mg/dL (10-15-23 @ 05:35)

## 2023-10-18 NOTE — PROGRESS NOTE ADULT - ASSESSMENT
82 yo m ho DM, COPD, anemia, lymphedemia, afib on xarelto, HFrEF, DM coming in from nursing home for AMS x 2 days admitted for septic shock with hospital course with multiple complications.     Septic Shock due to suspected Aspiration PNA v Pneumonitis, not POA  Had PICC line POA from Aug 2023 - removed  Metabolic Encephalopathy - improved, s/p intubation, now on room air   Elevated Lactate - improved   - now off pressors, on room air  - all cx negative, procal downtrending 1.7--> 0.66, fungitell pending  - on Meropenem per ID. IF clinical decompensation check CT A/P and repeat LA  - off levophed, c/w midodrine      Pleural effusion - currently on room air, no planned thora per pulm    Severe Pharyngeal Dysphagia  HO aspiration pneumonitis SP ABX   -  Patient was tolerating PO intake at NH prior to admission   - FEES Study on 10/11: severe pharyngeal dysphagia, recommendation to keep NPO with alternate means -> now on NGT for feeding  - GI following for PEG tube placement, but currently on hold given recent infection and request neurological prognostication given age indeterminate infarct prior to peg placement  - c/w tube feeds    Hemorrhagic Shock Secondary to Hematochezia from Duodenal Ulcer Bleed s/p OVESCO placement 09/23  UGIB secondary to Duodenal ulcer SP EGD   Hematochezia resolved   Blood Loss Anemia s/p 1u PRBC   - Status Post EGD on 09/23: blood clots in fundus and antrum; 2cm actively bleeding duodenal ulcer with spurting vessel ([place] 1a) in sweep on base s/p 10cc epi and s/p 11/6 OVESCO placement for hemostasis  - s/p 1u PRBC and PRotamine sulfate 9/23  - Continue PO Protonix 40mg BID   - currently on DVT ppx, full AC on hold   - monitor CBC, keep active T&S    Acute kidney failure on CKD /? ATN - now on HD via TDC placed 10/4  Hypernatremia - resolved   - on HD per renal, currently on hold and may not need long term HD  - c/w Bumex, renal fxn improving  - if no HD by next week, can DC TDC    Acute Femoral DVT s/p IVC filter 10/6  Elevated dimer  - duplex neg on 9/29  - s/p IVC filter 10/6  - Full AC on hold     Age indeterminate infarct on imaging   - Indeterminate Left Subinsular Stroke on 10/10    Transaminitis - resolved  Asymptomatic Cholelithiasis    Paroxysmal Afib, chronic   - resume metoprolol 12.5 Q12H today     History of Left Foot OM w/ surrounding Cellulitis  Sacral Decubitus Ulcer POA  - CT LLE (9/16): No CT evidence of osteomyelitis or necrotizing infection. No drainable collection seen, within the limitations of a noncontrast exam.   - per podiatry, c/w Local wound care; offloading boots bilaterally, frequently turning and positioning, prevent pressure injury, keep skin clean, and monitor for wound changes and notify provider as per podiatry.    FUll code, overall prognosis is very poor, high risk of decompensation

## 2023-10-18 NOTE — PROGRESS NOTE ADULT - SUBJECTIVE AND OBJECTIVE BOX
GUILLERMINANIMO MARTIN  83y, Male  Allergy: No Known Allergies      LOS  33d    CHIEF COMPLAINT: AMS (18 Oct 2023 14:58)      INTERVAL EVENTS/HPI  - No acute events overnight  - T(F): , Max: 97.9 (10-18-23 @ 11:00)  - WBC Count: 10.90 (10-18-23 @ 06:20)  WBC Count: 14.22 (10-17-23 @ 00:18)     - Creatinine: 1.7 (10-18-23 @ 06:20)  Creatinine: 1.8 (10-17-23 @ 20:00)       ROS  unable to obtain history secondary to patient's mental status and/or sedation      VITALS:  T(F): 97.8, Max: 97.9 (10-18-23 @ 11:00)  HR: 116  BP: 136/80  RR: 20Vital Signs Last 24 Hrs  T(C): 36.6 (18 Oct 2023 17:36), Max: 36.6 (18 Oct 2023 11:00)  T(F): 97.8 (18 Oct 2023 17:36), Max: 97.9 (18 Oct 2023 11:00)  HR: 116 (18 Oct 2023 17:36) (83 - 116)  BP: 136/80 (18 Oct 2023 17:36) (113/67 - 139/72)  BP(mean): 82 (18 Oct 2023 11:00) (82 - 98)  RR: 20 (18 Oct 2023 17:36) (18 - 20)  SpO2: 99% (18 Oct 2023 17:36) (97% - 100%)    Parameters below as of 18 Oct 2023 17:36  Patient On (Oxygen Delivery Method): room air        PHYSICAL EXAM:  Gen: NAD, resting in bed  HEENT: Normocephalic, atraumatic  Neck: supple, no lymphadenopathy  CV: Regular rate & regular rhythm  Lungs: decreased BS at bases, no fremitus  Abdomen: Soft, BS present  Ext: Warm, well perfused  Neuro: non focal, awake  Skin: no rash, no erythema  Lines: no phlebitis    FH: Non-contributory  Social Hx: Non-contributory    TESTS & MEASUREMENTS:                        8.8    10.90 )-----------( 258      ( 18 Oct 2023 06:20 )             29.4     10-18    146  |  107  |  54<H>  ----------------------------<  108<H>  4.3   |  26  |  1.7<H>    Ca    7.8<L>      18 Oct 2023 06:20  Phos  3.7     10-18  Mg     1.7     10-17    TPro  6.0  /  Alb  2.4<L>  /  TBili  0.5  /  DBili  x   /  AST  31  /  ALT  22  /  AlkPhos  141<H>  10-18      LIVER FUNCTIONS - ( 18 Oct 2023 06:20 )  Alb: 2.4 g/dL / Pro: 6.0 g/dL / ALK PHOS: 141 U/L / ALT: 22 U/L / AST: 31 U/L / GGT: x           Urinalysis Basic - ( 18 Oct 2023 06:20 )    Color: x / Appearance: x / SG: x / pH: x  Gluc: 108 mg/dL / Ketone: x  / Bili: x / Urobili: x   Blood: x / Protein: x / Nitrite: x   Leuk Esterase: x / RBC: x / WBC x   Sq Epi: x / Non Sq Epi: x / Bacteria: x        Culture - Blood (collected 10-15-23 @ 01:56)  Source: .Blood None  Preliminary Report (10-17-23 @ 18:01):    No growth at 48 Hours    Culture - Catheter (collected 10-13-23 @ 16:50)  Source: PICC Catheter Site  Final Report (10-16-23 @ 21:13):    No growth at 48 hours    Culture - Blood (collected 10-13-23 @ 16:10)  Source: .Blood Blood  Preliminary Report (10-17-23 @ 23:01):    No growth at 4 days    Culture - Blood (collected 10-12-23 @ 16:16)  Source: .Blood None  Final Report (10-18-23 @ 04:01):    No growth at 5 days    Culture - Blood (collected 10-04-23 @ 23:28)  Source: .Blood Blood  Final Report (10-10-23 @ 07:00):    No growth at 5 days    Culture - Blood (collected 09-26-23 @ 12:05)  Source: .Blood Blood  Final Report (10-01-23 @ 23:00):    No growth at 5 days        Lactate, Blood: 1.4 mmol/L (10-15-23 @ 17:47)  Lactate, Blood: 3.0 mmol/L (10-15-23 @ 08:11)  Lactate, Blood: 2.2 mmol/L (10-15-23 @ 01:56)      INFECTIOUS DISEASES TESTING  Procalcitonin, Serum: 0.66 (10-16-23 @ 10:49)  Fungitell: <31 (10-15-23 @ 17:47)  Procalcitonin, Serum: 0.87 (10-15-23 @ 17:47)  Rapid RVP Result: NotDetec (10-12-23 @ 18:47)  Procalcitonin, Serum: 1.76 (10-05-23 @ 11:35)  MRSA PCR Result.: Negative (09-24-23 @ 04:32)  Procalcitonin, Serum: 0.49 (09-17-23 @ 08:20)      INFLAMMATORY MARKERS  Sedimentation Rate, Erythrocyte: 30 mm/Hr (10-12-23 @ 09:30)  C-Reactive Protein, Serum: 138.0 mg/L (10-12-23 @ 09:30)  C-Reactive Protein, Serum: 72.4 mg/L (09-17-23 @ 08:20)  Sedimentation Rate, Erythrocyte: 60 mm/Hr (09-16-23 @ 07:18)      RADIOLOGY & ADDITIONAL TESTS:  I have personally reviewed the last available Chest xray  CXR      CT      CARDIOLOGY TESTING      MEDICATIONS  budesonide 160 MICROgram(s)/formoterol 4.5 MICROgram(s) Inhaler 2 Inhalation two times a day  buMETAnide 2 Oral every 12 hours  chlorhexidine 2% Cloths 1 Topical <User Schedule>  collagenase Ointment 1 Topical two times a day  darbepoetin Injectable Syringe 25 IV Push <User Schedule>  dextrose 5%. 1000 IV Continuous <Continuous>  dextrose 50% Injectable 25 IV Push once  dextrose 50% Injectable 12.5 IV Push once  dextrose 50% Injectable 25 IV Push once  glucagon  Injectable 1 IntraMuscular once  heparin   Injectable 5000 SubCutaneous every 8 hours  insulin lispro (ADMELOG) corrective regimen sliding scale  SubCutaneous every 6 hours  meropenem  IVPB 500 IV Intermittent every 24 hours  meropenem  IVPB     metoprolol tartrate 12.5 Oral every 12 hours  midodrine. 15 Oral every 8 hours  norepinephrine Infusion 0.05 IV Continuous <Continuous>  pantoprazole   Suspension 40 Oral two times a day      WEIGHT  Weight (kg): 92 (10-09-23 @ 13:13)  Creatinine: 1.7 mg/dL (10-18-23 @ 06:20)  Creatinine: 1.8 mg/dL (10-17-23 @ 20:00)      ANTIBIOTICS:  meropenem  IVPB      meropenem  IVPB 500 milliGRAM(s) IV Intermittent every 24 hours      All available historical records have been reviewed

## 2023-10-18 NOTE — PROGRESS NOTE ADULT - ASSESSMENT
84 yo m ho DM, COPD, anemia, paroxysmal afib on xarelto, HFrEF, DM coming in from nursing home for AMS x 2 days. Found to have toxic metabolic encephalopathy with PAULA.  PAULA/ toxic metabolic encephalopathy/ HAGMA/ HFrEF/ hypernatremia  possible ATN iso hypotension and possible sepsis/ vanc toxicity  no hydro on imaging  creat trending down   hd on hold   cont bumex 2 q 12   phos level noted low;  no binders   BP noted / continue   midodrine   remains on ATB / followed by ID   s/p IVC filter   iron stores noted / keep Hb >7  GI notes appreciated   if creatinine remains stable , d/c TDC next week   will follow    prognosis poor      82 yo m ho DM, COPD, anemia, paroxysmal afib on xarelto, HFrEF, DM coming in from nursing home for AMS x 2 days. Found to have toxic metabolic encephalopathy with PAULA.  PAULA/ toxic metabolic encephalopathy/ HAGMA/ HFrEF/ hypernatremia  possible ATN iso hypotension and possible sepsis/ vanc toxicity  no hydro on imaging  creat trending down   HD on hold   cont bumex 2 q 12   phos level noted low;  no binders   BP noted / weaned off levophed;  d/c midodrine if stable  remains on ATB / followed by ID   s/p IVC filter   iron stores noted / keep Hb >7  GI notes appreciated   if creatinine remains stable , d/c TDC next week   will follow    prognosis poor

## 2023-10-18 NOTE — PROGRESS NOTE ADULT - ASSESSMENT
Impression    Septic shock   Right femoral DVT sp IVC   Right pleural effusion  HO aspiration pneumonia SP ABX   UGIB secondary to Duodenal ulcer SP EGD   Metabolic Encephalopathy   PAULA / CKD on HD  History of Left Foot OM w/ surrounding Cellulitis   Sacral DTI    Plan:    CNS: Monitor mental status.  Avoid depressants.       HEENT:  Oral care.  Aspiration precautions    CARDIOVASCULAR:  Goal directed fluid resuscitation.  Increase Midodrine to 15 mg Q8.  Wean Levophed.  Avoid overload.       PULMONARY:  Albuterol PRN.  Frequent suctioning.  Incentive spirometry  keep Sao2 92 to 96%.  Repeat Right chest US with smaller effusion.        GASTROINTESTINAL:  Feeding per Speech and swallow.  Protonix q 12     GENITOURINARY/RENAL: Nephro following; HD per renal.  Monitor lytes and correct as needed     INFECTIOUS : ABX PER ID.  Repeat Cultures  Trend Procal.      HEMATOLOGIC: DVT prophylaxis.  Target Hb > 7.  Trend CBC     ENDOCRINE: Follow up FS.  Insulin protocol as needed.  BG goal 140-180    MSK/DERM:  PT/OT when cooperative.  Off loading.  Skin care.      Palliative care follow up     Poor prognosis overall     SDU    GOC full code for now.

## 2023-10-18 NOTE — PROGRESS NOTE ADULT - ASSESSMENT
82 yo m ho DM, COPD, anemia, lymphedemia, afib on xarelto, HFrEF, DM coming in from nursing home for AMS x 2 days admitted for septic shock with hospital course with multiple complications.     #Fever, concern for aspiration pneumonitis vs aspiration PNA  #Acute hypoxemic respiratory failure   #Metabolic Encephalopathy - improved  #Elevated Lactate - improved   - Patient with new onset of Fever and hypotension since 10/12. It started after starting NGT feeding. BP improves 10/13 and pt is less lethargic  - Of note, Patient has [facility] line since August [facility] at [facility] -> Removed and cultured   - wbc increases from 10k to 21k in less than 24hrs with PMN > 94% -> this is likely reactive in setting of suspected aspiration pneumonitis  - lactate 1.9 (10/12), ESR 30 (10/12),  (10/12)  - blood cultures and picc line cultures no growth   - CT chest (10/12) did not show obvious aspiration pneumonia per radiology read, but did show Bilateral moderate sized pleural effusions on CT chest  -weaned off levophed today   -midodrine increased to 15mg q8  - started zosyn to cover aspiration pneumonia>> changed to vanco and meropenem -> discontinued vanc, and continuing with meropenem   -S/p intubation, Pt tolerating room air   - pulm consulted for diagnostic thoracentesis given Bilateral moderate sized pleural effusions on CT chest -> chest US showed pleural effusion; will not tap fluid at this time    -procal downtrending 1.7 -> 0.87 -> 0.66, continue to trend     #Metabolic Encephalopathy 2/2 septic Shock 2/2 suspected aspiration pneumonia - improved   - rapid response after TDC due to acute change in mental status - ABG notable for elevated lactate  - pt febrile to 101.6 and elevated lactate to 8.4, rapidly became hypotensive on 10/4 -> started on levophed  - CXR (9/28) Bilateral pleural effusion/opacity unchanged. No air leak.  - cultures neg   - tolerating room air   - C/w albuterol PRN    #Severe Pharyngeal Dysphagia  - HO aspiration pneumonitis SP ABX   -  Patient was tolerating PO intake at NH prior to admission  -  After extubation on 09/24, patient became confused s/p upgrade on 09/26 for hypoxia, AMS, and pneumonia. He has been NPO since then with multiple speech swallow evaluations.  - FEES Study on 10/11: severe pharyngeal dysphagia, recommendation to keep NPO with alternate means -> now on NGT for feeding  - GI for evaluation of PEG tube placement after failing FEES study on 10/11. GI will hold off PEG tube placement for now pending sepsis workup (in setting of fever on 10/12) and pending neurology evaluation of age indeterminate infarct on CTH 10/10 + will need neurological prognostication prior to planning PEG tube placement   -evaluated by S&S on Oct 16, recommended continuing NGT     #Hemorrhagic Shock Secondary to Hematochezia from Duodenal Ulcer Bleed s/p OVESCO placement 09/23 - stable   #UGIB secondary to Duodenal ulcer SP EGD   #Hematochezia resolved   #Hemorrhagic Shock resolved   #Blood Loss Anemia  - Status Post EGD on 09/23: blood clots in fundus and antrum; 2cm actively bleeding duodenal ulcer with spurting vessel (Alpha 1a) in sweep on base s/p 10cc epi and s/p 11/6 OVESCO placement for hemostasis  - Continue PO Protonix 40mg BID via NG tube for now. Was on IV pantoprazole drip (09/23-09/25)  - Anticoagulation resumed as Hgb stabilized (s/p 1 unit pRBCs 10/12)  -hemodynamically stable, no recent bloody bowel movements   - Monitor BM for recurrence of hematochezia or melena  - Please avoid any NSAIDs  - If any unstable bleed, please call GI   - Follow up with [facility] located at [address] [place] [address]. Phone Number: [phone]      #Anemia of acute blood loss - stable   #Hemorrhagic Shock Secondary to Hematochezia from Duodenal Ulcer Bleed s/p OVESCO placement 09/23  - Trend hgb, transfuse blood PRN  - s/p IV Protamine sulfate 36mg x1 dose on 09/23  - GI f/u appreciated  - S/p IV Protonix 40mg BID -> switched to PO protonix BID   - Avoid any NSAIDs    #Acutely worsening uremia and acute kidney failure most likely 2/2 ATN - now on HD through right IJ [place]  #hypernatremia - resolved   #PAULA / CKD - improved   - possible ATN iso hypotension and possible sepsis/ vanc toxicity  - Cr stable for now -> Close F/U of BUN/Crea/ Lytes and UO  - no hydro on imaging  - udall placement 9/27/23  started on HD 9/27 but did not tolerate it with access problems  - udall changed 9/28  he received HD 9/28 and 9/30  - c/w phoslo 2/2/2   - off sodium bicarbonate   - s/p 250 cc LR bolus for hypernatremia  - s/p D5w @ 80 cc/hr  -nephro following. Recs on Oct 18 appreciated.   - TDC by IR on 10/4  -Bumex was stopped due to hypotension. Resumed on Oct 17 after BP stabilized   -creatinine improving   -hemodialysis per nephro. Last HD session was 10/10.     #Acute Femoral DVT s/p IVC filter 10/6  #Elevated dimer  - duplex neg on 9/29  - overall the pt risk and benefit is goes against Anticoagulation, will further discuss with family , dw brother regarding the high risk of bleeding and understands to hold anticoagulation And understands the risk of cva with Afib.    -s/p IVC filter 10/6    #Possible Age/CVA  - Indeterminate Left Subinsular Stroke on 10/10    #transaminitis - resolving   - likely 2/2 liver shock 2/2 hypotension   - LFTS improving   - follow up RUQ   - hepatitis neg   - worsened today  - antibiotics dc  - trend   - if continues to worsen will get hepatology     #Asymptomatic Cholelithiasis  - Noted on CT  - LFTs noted > Trend LFTs  - Monitor for symptoms    #Paroxysmal Afib, chronic   - on heparin   - resumed metoprolol tartate 12.5mg BID     #History of Left Foot OM w/ surrounding Cellulitis  #Sacral Decubitus Ulcer POA  - CT LLE (9/16): No CT evidence of osteomyelitis or necrotizing infection. No drainable collection seen, within the limitations of a noncontrast exam.  - Increased frailty and decreased physical capacity  - as per previous notes the team discussed with ID attending: patient is unlikely to benefit from prolonged therapy with cefepime+vanco (to cover possible OM) due to adverse outcomes associated kidney dysfunction and cognitive impact   - per podiatry, c/w Local wound care; offloading boots bilaterally, frequently turning and positioning, prevent pressure injury, keep skin clean, and monitor for wound changes and notify provider as per podiatry.    -DVT prophylaxis - heparin  -GI prophylaxis - protonix  -diet - NGT feeds   -code - full

## 2023-10-18 NOTE — PROGRESS NOTE ADULT - ASSESSMENT
ASSESSMENT  82 yo m ho DM, COPD, anemia, lymphedemia, afib on xarelto, HFrEF, DM coming in from nursing home for AMS x 2 days. Unable to gather story from pt as he is altered and lethargic.    IMPRESSION  #Fever 10/12  - Blood Cx 10/12 NG  - Blood Cx 10/13 NG   - RVP negative  - CT Chest No Cont (10.12.23 @ 19:59): Since  5/22/2019 Multiple right apical nodules including new 7 mm solid nodule  (Series  4/85). Follow-up CT scan 6 months time. Bilateral moderate sized pleural effusions with adjacent lower lobe  atelectasis.  No pneumothorax or parenchymal mass.  - zosyn started 10/13  - PICC line 10/13 (present from admission) -- was removed     #Severe Pharyngeal Dysphagia  #Hemorrhagic Shock Secondary to Hematochezia from Duodenal Ulcer Bleed s/p OVESCO placement 09/23  #PAULA / CKD  - started on HD   #Acute Femoral DVT s/p IVC filter 10/6  #transaminitis - suspected shock liver  #History of Left Foot OM w/ surrounding Cellulitis  - CT LLE (9/16): No CT evidence of osteomyelitis or necrotizing infection. No drainable collection seen, within the limitations of a noncontrast exam.  #Sacral Decubitus Ulcer POA  #Paroxysmal Afib, chronic    Recommendations  - adjust meropenem to 1g q 12 hours as CrCl > 30   - plan for 7 day course (end date 10/22)     Please call or message on [employer] Teams if with any questions.  Spectra [phone]

## 2023-10-19 LAB
ALBUMIN SERPL ELPH-MCNC: 2.5 G/DL — LOW (ref 3.5–5.2)
ALBUMIN SERPL ELPH-MCNC: 2.5 G/DL — LOW (ref 3.5–5.2)
ALP SERPL-CCNC: 140 U/L — HIGH (ref 30–115)
ALP SERPL-CCNC: 140 U/L — HIGH (ref 30–115)
ALT FLD-CCNC: 21 U/L — SIGNIFICANT CHANGE UP (ref 0–41)
ALT FLD-CCNC: 21 U/L — SIGNIFICANT CHANGE UP (ref 0–41)
ANION GAP SERPL CALC-SCNC: 10 MMOL/L — SIGNIFICANT CHANGE UP (ref 7–14)
ANION GAP SERPL CALC-SCNC: 10 MMOL/L — SIGNIFICANT CHANGE UP (ref 7–14)
AST SERPL-CCNC: 38 U/L — SIGNIFICANT CHANGE UP (ref 0–41)
AST SERPL-CCNC: 38 U/L — SIGNIFICANT CHANGE UP (ref 0–41)
BASOPHILS # BLD AUTO: 0.04 K/UL — SIGNIFICANT CHANGE UP (ref 0–0.2)
BASOPHILS # BLD AUTO: 0.04 K/UL — SIGNIFICANT CHANGE UP (ref 0–0.2)
BASOPHILS NFR BLD AUTO: 0.5 % — SIGNIFICANT CHANGE UP (ref 0–1)
BASOPHILS NFR BLD AUTO: 0.5 % — SIGNIFICANT CHANGE UP (ref 0–1)
BILIRUB SERPL-MCNC: 0.4 MG/DL — SIGNIFICANT CHANGE UP (ref 0.2–1.2)
BILIRUB SERPL-MCNC: 0.4 MG/DL — SIGNIFICANT CHANGE UP (ref 0.2–1.2)
BUN SERPL-MCNC: 54 MG/DL — HIGH (ref 10–20)
BUN SERPL-MCNC: 54 MG/DL — HIGH (ref 10–20)
CALCIUM SERPL-MCNC: 7.9 MG/DL — LOW (ref 8.4–10.5)
CALCIUM SERPL-MCNC: 7.9 MG/DL — LOW (ref 8.4–10.5)
CHLORIDE SERPL-SCNC: 102 MMOL/L — SIGNIFICANT CHANGE UP (ref 98–110)
CHLORIDE SERPL-SCNC: 102 MMOL/L — SIGNIFICANT CHANGE UP (ref 98–110)
CO2 SERPL-SCNC: 29 MMOL/L — SIGNIFICANT CHANGE UP (ref 17–32)
CO2 SERPL-SCNC: 29 MMOL/L — SIGNIFICANT CHANGE UP (ref 17–32)
CREAT SERPL-MCNC: 1.6 MG/DL — HIGH (ref 0.7–1.5)
CREAT SERPL-MCNC: 1.6 MG/DL — HIGH (ref 0.7–1.5)
EGFR: 43 ML/MIN/1.73M2 — LOW
EGFR: 43 ML/MIN/1.73M2 — LOW
EOSINOPHIL # BLD AUTO: 0.2 K/UL — SIGNIFICANT CHANGE UP (ref 0–0.7)
EOSINOPHIL # BLD AUTO: 0.2 K/UL — SIGNIFICANT CHANGE UP (ref 0–0.7)
EOSINOPHIL NFR BLD AUTO: 2.7 % — SIGNIFICANT CHANGE UP (ref 0–8)
EOSINOPHIL NFR BLD AUTO: 2.7 % — SIGNIFICANT CHANGE UP (ref 0–8)
GLUCOSE BLDC GLUCOMTR-MCNC: 59 MG/DL — LOW (ref 70–99)
GLUCOSE BLDC GLUCOMTR-MCNC: 59 MG/DL — LOW (ref 70–99)
GLUCOSE BLDC GLUCOMTR-MCNC: 63 MG/DL — LOW (ref 70–99)
GLUCOSE BLDC GLUCOMTR-MCNC: 63 MG/DL — LOW (ref 70–99)
GLUCOSE BLDC GLUCOMTR-MCNC: 70 MG/DL — SIGNIFICANT CHANGE UP (ref 70–99)
GLUCOSE BLDC GLUCOMTR-MCNC: 70 MG/DL — SIGNIFICANT CHANGE UP (ref 70–99)
GLUCOSE BLDC GLUCOMTR-MCNC: 75 MG/DL — SIGNIFICANT CHANGE UP (ref 70–99)
GLUCOSE BLDC GLUCOMTR-MCNC: 75 MG/DL — SIGNIFICANT CHANGE UP (ref 70–99)
GLUCOSE SERPL-MCNC: 85 MG/DL — SIGNIFICANT CHANGE UP (ref 70–99)
GLUCOSE SERPL-MCNC: 85 MG/DL — SIGNIFICANT CHANGE UP (ref 70–99)
HCT VFR BLD CALC: 30.1 % — LOW (ref 42–52)
HCT VFR BLD CALC: 30.1 % — LOW (ref 42–52)
HGB BLD-MCNC: 9 G/DL — LOW (ref 14–18)
HGB BLD-MCNC: 9 G/DL — LOW (ref 14–18)
IMM GRANULOCYTES NFR BLD AUTO: 0.5 % — HIGH (ref 0.1–0.3)
IMM GRANULOCYTES NFR BLD AUTO: 0.5 % — HIGH (ref 0.1–0.3)
LYMPHOCYTES # BLD AUTO: 2.16 K/UL — SIGNIFICANT CHANGE UP (ref 1.2–3.4)
LYMPHOCYTES # BLD AUTO: 2.16 K/UL — SIGNIFICANT CHANGE UP (ref 1.2–3.4)
LYMPHOCYTES # BLD AUTO: 28.8 % — SIGNIFICANT CHANGE UP (ref 20.5–51.1)
LYMPHOCYTES # BLD AUTO: 28.8 % — SIGNIFICANT CHANGE UP (ref 20.5–51.1)
MAGNESIUM SERPL-MCNC: 1.9 MG/DL — SIGNIFICANT CHANGE UP (ref 1.8–2.4)
MAGNESIUM SERPL-MCNC: 1.9 MG/DL — SIGNIFICANT CHANGE UP (ref 1.8–2.4)
MCHC RBC-ENTMCNC: 28.2 PG — SIGNIFICANT CHANGE UP (ref 27–31)
MCHC RBC-ENTMCNC: 28.2 PG — SIGNIFICANT CHANGE UP (ref 27–31)
MCHC RBC-ENTMCNC: 29.9 G/DL — LOW (ref 32–37)
MCHC RBC-ENTMCNC: 29.9 G/DL — LOW (ref 32–37)
MCV RBC AUTO: 94.4 FL — HIGH (ref 80–94)
MCV RBC AUTO: 94.4 FL — HIGH (ref 80–94)
MONOCYTES # BLD AUTO: 0.64 K/UL — HIGH (ref 0.1–0.6)
MONOCYTES # BLD AUTO: 0.64 K/UL — HIGH (ref 0.1–0.6)
MONOCYTES NFR BLD AUTO: 8.5 % — SIGNIFICANT CHANGE UP (ref 1.7–9.3)
MONOCYTES NFR BLD AUTO: 8.5 % — SIGNIFICANT CHANGE UP (ref 1.7–9.3)
NEUTROPHILS # BLD AUTO: 4.41 K/UL — SIGNIFICANT CHANGE UP (ref 1.4–6.5)
NEUTROPHILS # BLD AUTO: 4.41 K/UL — SIGNIFICANT CHANGE UP (ref 1.4–6.5)
NEUTROPHILS NFR BLD AUTO: 59 % — SIGNIFICANT CHANGE UP (ref 42.2–75.2)
NEUTROPHILS NFR BLD AUTO: 59 % — SIGNIFICANT CHANGE UP (ref 42.2–75.2)
NRBC # BLD: 0 /100 WBCS — SIGNIFICANT CHANGE UP (ref 0–0)
NRBC # BLD: 0 /100 WBCS — SIGNIFICANT CHANGE UP (ref 0–0)
PHOSPHATE SERPL-MCNC: 4.5 MG/DL — SIGNIFICANT CHANGE UP (ref 2.1–4.9)
PHOSPHATE SERPL-MCNC: 4.5 MG/DL — SIGNIFICANT CHANGE UP (ref 2.1–4.9)
PLATELET # BLD AUTO: 179 K/UL — SIGNIFICANT CHANGE UP (ref 130–400)
PLATELET # BLD AUTO: 179 K/UL — SIGNIFICANT CHANGE UP (ref 130–400)
PMV BLD: 11.9 FL — HIGH (ref 7.4–10.4)
PMV BLD: 11.9 FL — HIGH (ref 7.4–10.4)
POTASSIUM SERPL-MCNC: 4.9 MMOL/L — SIGNIFICANT CHANGE UP (ref 3.5–5)
POTASSIUM SERPL-MCNC: 4.9 MMOL/L — SIGNIFICANT CHANGE UP (ref 3.5–5)
POTASSIUM SERPL-SCNC: 4.9 MMOL/L — SIGNIFICANT CHANGE UP (ref 3.5–5)
POTASSIUM SERPL-SCNC: 4.9 MMOL/L — SIGNIFICANT CHANGE UP (ref 3.5–5)
PROT SERPL-MCNC: 6.1 G/DL — SIGNIFICANT CHANGE UP (ref 6–8)
PROT SERPL-MCNC: 6.1 G/DL — SIGNIFICANT CHANGE UP (ref 6–8)
RBC # BLD: 3.19 M/UL — LOW (ref 4.7–6.1)
RBC # BLD: 3.19 M/UL — LOW (ref 4.7–6.1)
RBC # FLD: 19.5 % — HIGH (ref 11.5–14.5)
RBC # FLD: 19.5 % — HIGH (ref 11.5–14.5)
SODIUM SERPL-SCNC: 141 MMOL/L — SIGNIFICANT CHANGE UP (ref 135–146)
SODIUM SERPL-SCNC: 141 MMOL/L — SIGNIFICANT CHANGE UP (ref 135–146)
WBC # BLD: 7.49 K/UL — SIGNIFICANT CHANGE UP (ref 4.8–10.8)
WBC # BLD: 7.49 K/UL — SIGNIFICANT CHANGE UP (ref 4.8–10.8)
WBC # FLD AUTO: 7.49 K/UL — SIGNIFICANT CHANGE UP (ref 4.8–10.8)
WBC # FLD AUTO: 7.49 K/UL — SIGNIFICANT CHANGE UP (ref 4.8–10.8)

## 2023-10-19 PROCEDURE — 99233 SBSQ HOSP IP/OBS HIGH 50: CPT

## 2023-10-19 PROCEDURE — 93010 ELECTROCARDIOGRAM REPORT: CPT

## 2023-10-19 RX ORDER — MIDODRINE HYDROCHLORIDE 2.5 MG/1
10 TABLET ORAL EVERY 8 HOURS
Refills: 0 | Status: DISCONTINUED | OUTPATIENT
Start: 2023-10-19 | End: 2023-10-25

## 2023-10-19 RX ORDER — MEROPENEM 1 G/30ML
1000 INJECTION INTRAVENOUS EVERY 12 HOURS
Refills: 0 | Status: DISCONTINUED | OUTPATIENT
Start: 2023-10-19 | End: 2023-10-20

## 2023-10-19 RX ADMIN — Medication 12.5 MILLIGRAM(S): at 05:50

## 2023-10-19 RX ADMIN — Medication 12.5 GRAM(S): at 06:47

## 2023-10-19 RX ADMIN — Medication 12.5 MILLIGRAM(S): at 18:25

## 2023-10-19 RX ADMIN — HEPARIN SODIUM 5000 UNIT(S): 5000 INJECTION INTRAVENOUS; SUBCUTANEOUS at 21:28

## 2023-10-19 RX ADMIN — Medication 1 APPLICATION(S): at 05:49

## 2023-10-19 RX ADMIN — MIDODRINE HYDROCHLORIDE 10 MILLIGRAM(S): 2.5 TABLET ORAL at 18:23

## 2023-10-19 RX ADMIN — HEPARIN SODIUM 5000 UNIT(S): 5000 INJECTION INTRAVENOUS; SUBCUTANEOUS at 14:00

## 2023-10-19 RX ADMIN — PANTOPRAZOLE SODIUM 40 MILLIGRAM(S): 20 TABLET, DELAYED RELEASE ORAL at 05:48

## 2023-10-19 RX ADMIN — BUMETANIDE 2 MILLIGRAM(S): 0.25 INJECTION INTRAMUSCULAR; INTRAVENOUS at 18:25

## 2023-10-19 RX ADMIN — BUMETANIDE 2 MILLIGRAM(S): 0.25 INJECTION INTRAMUSCULAR; INTRAVENOUS at 05:48

## 2023-10-19 RX ADMIN — MIDODRINE HYDROCHLORIDE 15 MILLIGRAM(S): 2.5 TABLET ORAL at 05:48

## 2023-10-19 RX ADMIN — Medication 1 APPLICATION(S): at 18:24

## 2023-10-19 RX ADMIN — Medication 25 MICROGRAM(S): at 13:49

## 2023-10-19 RX ADMIN — HEPARIN SODIUM 5000 UNIT(S): 5000 INJECTION INTRAVENOUS; SUBCUTANEOUS at 05:49

## 2023-10-19 RX ADMIN — PANTOPRAZOLE SODIUM 40 MILLIGRAM(S): 20 TABLET, DELAYED RELEASE ORAL at 18:24

## 2023-10-19 RX ADMIN — MEROPENEM 100 MILLIGRAM(S): 1 INJECTION INTRAVENOUS at 18:28

## 2023-10-19 RX ADMIN — CHLORHEXIDINE GLUCONATE 1 APPLICATION(S): 213 SOLUTION TOPICAL at 05:49

## 2023-10-19 NOTE — PROGRESS NOTE ADULT - SUBJECTIVE AND OBJECTIVE BOX
Patient is a 83y old  Male who presents with a chief complaint of AMS (19 Oct 2023 07:39)        SUBJECTIVE: Off oxygen. Off pressors.      REVIEW OF SYSTEMS:    All Pertinent ROS are negative except per HPI       PHYSICAL EXAM  Vital Signs Last 24 Hrs  T(C): 36.6 (19 Oct 2023 07:21), Max: 37 (19 Oct 2023 04:00)  T(F): 97.9 (19 Oct 2023 07:21), Max: 98.6 (19 Oct 2023 04:00)  HR: 74 (19 Oct 2023 09:06) (74 - 195)  BP: 141/66 (19 Oct 2023 07:21) (115/67 - 142/68)  BP(mean): 93 (19 Oct 2023 07:21) (82 - 93)  RR: 20 (19 Oct 2023 07:21) (20 - 20)  SpO2: 97% (19 Oct 2023 07:21) (97% - 100%)    Parameters below as of 19 Oct 2023 07:21  Patient On (Oxygen Delivery Method): room air        CONSTITUTIONAL:  NAD    ENT:   Airway patent,     CARDIAC:   Normal rate,   regular rhythm.    RESPIRATORY:   NO Wheezing  Normal chest expansion  Not tachypneic,  No use of accessory muscles    GASTROINTESTINAL:  Abdomen soft,   non-tender,   no guarding,   + BS    MUSCULOSKELETAL:   range of motion is not limited,  no clubbing, cyanosis    NEUROLOGICAL:   Alert     SKIN:   Skin normal color for race,   warm, dry       10-18-23 @ 07:01  -  10-19-23 @ 07:00  --------------------------------------------------------  IN:    Enteral Tube Flush: 600 mL    Jevity 1.2: 750 mL  Total IN: 1350 mL    OUT:    Incontinent per Condom Catheter (mL): 1000 mL  Total OUT: 1000 mL    Total NET: 350 mL          LABS:                          9.0    7.49  )-----------( 179      ( 19 Oct 2023 02:12 )             30.1                                               10-19    141  |  102  |  54<H>  ----------------------------<  85  4.9   |  29  |  1.6<H>    Ca    7.9<L>      19 Oct 2023 02:12  Phos  4.5     10-19  Mg     1.9     10-19    TPro  6.1  /  Alb  2.5<L>  /  TBili  0.4  /  DBili  x   /  AST  38  /  ALT  21  /  AlkPhos  140<H>  10-19                                             Urinalysis Basic - ( 19 Oct 2023 02:12 )    Color: x / Appearance: x / SG: x / pH: x  Gluc: 85 mg/dL / Ketone: x  / Bili: x / Urobili: x   Blood: x / Protein: x / Nitrite: x   Leuk Esterase: x / RBC: x / WBC x   Sq Epi: x / Non Sq Epi: x / Bacteria: x                                                  LIVER FUNCTIONS - ( 19 Oct 2023 02:12 )  Alb: 2.5 g/dL / Pro: 6.1 g/dL / ALK PHOS: 140 U/L / ALT: 21 U/L / AST: 38 U/L / GGT: x                                                                                                MEDICATIONS  (STANDING):  budesonide 160 MICROgram(s)/formoterol 4.5 MICROgram(s) Inhaler 2 Puff(s) Inhalation two times a day  buMETAnide 2 milliGRAM(s) Oral every 12 hours  chlorhexidine 2% Cloths 1 Application(s) Topical <User Schedule>  collagenase Ointment 1 Application(s) Topical two times a day  darbepoetin Injectable Syringe 25 MICROGram(s) IV Push <User Schedule>  dextrose 5%. 1000 milliLiter(s) (100 mL/Hr) IV Continuous <Continuous>  dextrose 50% Injectable 12.5 Gram(s) IV Push once  dextrose 50% Injectable 25 Gram(s) IV Push once  dextrose 50% Injectable 25 Gram(s) IV Push once  glucagon  Injectable 1 milliGRAM(s) IntraMuscular once  heparin   Injectable 5000 Unit(s) SubCutaneous every 8 hours  insulin lispro (ADMELOG) corrective regimen sliding scale   SubCutaneous every 6 hours  meropenem  IVPB 1000 milliGRAM(s) IV Intermittent every 12 hours  metoprolol tartrate 12.5 milliGRAM(s) Oral every 12 hours  midodrine. 15 milliGRAM(s) Oral every 8 hours  norepinephrine Infusion 0.05 MICROgram(s)/kG/Min (8.63 mL/Hr) IV Continuous <Continuous>  pantoprazole   Suspension 40 milliGRAM(s) Oral two times a day    MEDICATIONS  (PRN):  acetaminophen     Tablet .. 650 milliGRAM(s) Oral every 6 hours PRN Temp greater or equal to 38C (100.4F), Mild Pain (1 - 3), Moderate Pain (4 - 6)  albuterol    90 MICROgram(s) HFA Inhaler 1 Puff(s) Inhalation every 6 hours PRN for shortness of breath and/or wheezing  atropine Injectable 1 milliGRAM(s) IntraMuscular once PRN HR < 30  dextrose Oral Gel 15 Gram(s) Oral once PRN Blood Glucose LESS THAN 70 milliGRAM(s)/deciliter      X-Rays reviewed    CXR interpreted by me:

## 2023-10-19 NOTE — CHART NOTE - NSCHARTNOTEFT_GEN_A_CORE
Registered Dietitian Follow-Up     Patient Profile Reviewed                           Yes [x]   No []     Pertinent Subjective Information:  Spoke with RN concerning EN regimen. Patient's BG was low this morning after feeds. No BM, GI s/s      Pertinent Medical Interventions:  Septic Shock due to suspected Aspiration PNA vs Pneumonitis, not POA; Had PICC line POA from Aug 2023 - removed; Metabolic Encephalopathy - improved s/p intubation, now on room air; Elevated Lactate - improved; Pleural effusion - currently on room air, no planned thora per pulm; Severe Pharyngeal Dysphagia, HO aspiration pneumonitis s/p ABX - Patient was tolerating PO intake at NH prior to admission; FEES study on 10/11: Severe pharyngeal dysphagia, recommendation to keep NPO with alternate means -> now on NGT for feeding - GI following for PEG placement, family undecided at this time. BG borderline - f/u with dietary to adjust feeds; Hemorrhagic Shock Secondary to Hematochezia from Duodenal Ulcer Bleed s/p OVESCO placement ; UGIB secondary to duodenal ulcer s/p EGD; Hematochezia resolved; Acute Kidney failure on CKD/ ? ATN now on HD via TDC place 10/4; Hypernatremia - resolved; On HD per renal, currently on hold, IR to remove TDC; Sacral Decubitus Ulcer POA     Diet order:   Diet, NPO with Tube Feed:   Tube Feeding Modality: Nasogastric  Glucerna 1.2 Ethan  Total Volume for 24 Hours (mL): 1500  Bolus  Total Volume of Bolus (mL):  375  Tube Feed Frequency: Every 6 hours   Tube Feed Start Time: 00:00  Bolus Feed Rate (mL per Hour): 375   Bolus Feed Duration (in Hours): 6  Bolus Feed Instructions:  start feeds at 125 mL and increase as tolerated until goal rate of 375 mL is met  Free Water Flush Instructions:  150 mL pre + post feeds  No Carb Prosource (1pkg = 15gms Protein)     Qty per Day:  2 (10-13-23 @ 12:23) [Active]    Anthropometrics:  Height: 193 cm   Weight: 92 kg   BMI:  24.7  IBW: 91.8 kg     Daily Weight in k.8 (10-18), Weight in k.8 (10-17), Weight in k.4 (10-16)  % Weight Change    MEDICATIONS  (STANDING):  budesonide 160 MICROgram(s)/formoterol 4.5 MICROgram(s) Inhaler 2 Puff(s) Inhalation two times a day  buMETAnide 2 milliGRAM(s) Oral every 12 hours  chlorhexidine 2% Cloths 1 Application(s) Topical <User Schedule>  collagenase Ointment 1 Application(s) Topical two times a day  darbepoetin Injectable Syringe 25 MICROGram(s) IV Push <User Schedule>  dextrose 5%. 1000 milliLiter(s) (100 mL/Hr) IV Continuous <Continuous>  dextrose 50% Injectable 25 Gram(s) IV Push once  dextrose 50% Injectable 25 Gram(s) IV Push once  dextrose 50% Injectable 12.5 Gram(s) IV Push once  glucagon  Injectable 1 milliGRAM(s) IntraMuscular once  heparin   Injectable 5000 Unit(s) SubCutaneous every 8 hours  insulin lispro (ADMELOG) corrective regimen sliding scale   SubCutaneous every 6 hours  meropenem  IVPB 1000 milliGRAM(s) IV Intermittent every 12 hours  metoprolol tartrate 12.5 milliGRAM(s) Oral every 12 hours  midodrine. 10 milliGRAM(s) Oral every 8 hours  pantoprazole   Suspension 40 milliGRAM(s) Oral two times a day    MEDICATIONS  (PRN):  acetaminophen     Tablet .. 650 milliGRAM(s) Oral every 6 hours PRN Temp greater or equal to 38C (100.4F), Mild Pain (1 - 3), Moderate Pain (4 - 6)  albuterol    90 MICROgram(s) HFA Inhaler 1 Puff(s) Inhalation every 6 hours PRN for shortness of breath and/or wheezing  atropine Injectable 1 milliGRAM(s) IntraMuscular once PRN HR < 30  dextrose Oral Gel 15 Gram(s) Oral once PRN Blood Glucose LESS THAN 70 milliGRAM(s)/deciliter    Pertinent Labs: 10-19 @ 02:12: Na 141, BUN 54<H>, Cr 1.6<H>, BG 85, K+ 4.9, Phos 4.5, Mg 1.9, Alk Phos 140<H>, ALT/SGPT 21, AST/SGOT 38, HbA1c --    Finger Sticks:  POCT Blood Glucose.: 70 mg/dL (10-19 @ 11:04)  POCT Blood Glucose.: 63 mg/dL (10-19 @ 07:57)  POCT Blood Glucose.: 59 mg/dL (10-19 @ 06:43)  POCT Blood Glucose.: 71 mg/dL (10-18 @ 23:06)  POCT Blood Glucose.: 67 mg/dL (10-18 @ 18:14)    Physical Findings:  - Appearance: disoriented x 2  - GI function: 2x BM 10/18  - Tubes: +NGT  - Oral/Mouth cavity: NPO w/ EN via NGT  - Skin: Per WOCN 10/13 - L Heel DTI     Nutrition Requirements:  Weight Used: 92 kg      Estimated Energy Needs    Continue []  Adjust [x]  ENERGY: 2300 - 2760 kcal/day (25-30 kcal/kg)    Estimated Protein Needs    Continue []  Adjust [x]  PROTEIN: 110- 138 gm/day (1.2-1.5 g/kg)    Estimated Fluid Needs        Continue []  Adjust [x]  FLUID: 2300 mL/day (25 mL/kg)     Nutrient Intake: Current EN regimen provides: 1500 mL total volume, 1920 kcal, 120 gm Protein, 1215 mL + (150 mL pre/post feeds) = 2415 mL total water     [x] Previous Nutrition Diagnosis:  Moderate malnutrition             [x] Ongoing          [] Resolved     Nutrition Intervention:  EN, coordination of care     Goal/Expected Outcome:   EN to continue to meet >85% and <105% of estimated nutrient needs in 3-5 days     Indicator/Monitoring:   EN tolerance, BM, GI s/s, labs (electrolytes, BG, renal), skin status, GOC    Recommendation:  1) In order to better meet estimated nutrient needs  and in light of CKD, and recent HD, Recommend to change feeds to Nepro with bolus feeds 375 mL (3x - 7am, 1pm, 7pm for better BG management and monitoring) which will provide: 1125 mL total volume, 2145 kcal, 121 gm Protein, 911.25 mL free water + (150 mL pre /post  feeds) and (30 mL pre/post No Carb Prosource) = 1871 mL total water    2) Monitor BM, GI s/s   3) Monitor labs (electrolytes, renal, BG)    Patient is at high nutrition risk, RD to f/u in 3-5 days or PRN

## 2023-10-19 NOTE — PROGRESS NOTE ADULT - SUBJECTIVE AND OBJECTIVE BOX
NIMO SARMIENTO  83y, Male  Allergy: No Known Allergies      LOS  34d    CHIEF COMPLAINT: AMS (19 Oct 2023 09:24)      INTERVAL EVENTS/HPI  - No acute events overnight  - T(F): , Max: 98.6 (10-19-23 @ 04:00)  - WBC Count: 7.49 (10-19-23 @ 02:12)  WBC Count: 10.90 (10-18-23 @ 06:20)     - Creatinine: 1.6 (10-19-23 @ 02:12)  Creatinine: 1.7 (10-18-23 @ 06:20)       ROS  unable to obtain history secondary to patient's mental status and/or sedation    VITALS:  T(F): 98.3, Max: 98.6 (10-19-23 @ 04:00)  HR: 80  BP: 151/52  RR: 18Vital Signs Last 24 Hrs  T(C): 36.8 (19 Oct 2023 11:00), Max: 37 (19 Oct 2023 04:00)  T(F): 98.3 (19 Oct 2023 11:00), Max: 98.6 (19 Oct 2023 04:00)  HR: 80 (19 Oct 2023 14:50) (74 - 195)  BP: 151/52 (19 Oct 2023 14:50) (124/63 - 151/52)  BP(mean): 85 (19 Oct 2023 11:00) (85 - 93)  RR: 18 (19 Oct 2023 11:45) (18 - 20)  SpO2: 99% (19 Oct 2023 11:45) (97% - 100%)    Parameters below as of 19 Oct 2023 14:50  Patient On (Oxygen Delivery Method): room air        PHYSICAL EXAM:  Gen: NAD, resting in bed  HEENT: Normocephalic, atraumatic  Neck: supple, no lymphadenopathy  CV: Regular rate & regular rhythm  Lungs: decreased BS at bases, no fremitus  Abdomen: Soft, BS present  Ext: Warm, well perfused  Neuro: non focal, awake  Skin: no rash, no erythema  Lines: no phlebitis    FH: Non-contributory  Social Hx: Non-contributory    TESTS & MEASUREMENTS:                        9.0    7.49  )-----------( 179      ( 19 Oct 2023 02:12 )             30.1     10-19    141  |  102  |  54<H>  ----------------------------<  85  4.9   |  29  |  1.6<H>    Ca    7.9<L>      19 Oct 2023 02:12  Phos  4.5     10-19  Mg     1.9     10-19    TPro  6.1  /  Alb  2.5<L>  /  TBili  0.4  /  DBili  x   /  AST  38  /  ALT  21  /  AlkPhos  140<H>  10-19      LIVER FUNCTIONS - ( 19 Oct 2023 02:12 )  Alb: 2.5 g/dL / Pro: 6.1 g/dL / ALK PHOS: 140 U/L / ALT: 21 U/L / AST: 38 U/L / GGT: x           Urinalysis Basic - ( 19 Oct 2023 02:12 )    Color: x / Appearance: x / SG: x / pH: x  Gluc: 85 mg/dL / Ketone: x  / Bili: x / Urobili: x   Blood: x / Protein: x / Nitrite: x   Leuk Esterase: x / RBC: x / WBC x   Sq Epi: x / Non Sq Epi: x / Bacteria: x        Culture - Blood (collected 10-15-23 @ 01:56)  Source: .Blood None  Preliminary Report (10-18-23 @ 18:01):    No growth at 72 Hours    Culture - Catheter (collected 10-13-23 @ 16:50)  Source: PICC Catheter Site  Final Report (10-16-23 @ 21:13):    No growth at 48 hours    Culture - Blood (collected 10-13-23 @ 16:10)  Source: .Blood Blood  Final Report (10-18-23 @ 23:00):    No growth at 5 days    Culture - Blood (collected 10-12-23 @ 16:16)  Source: .Blood None  Final Report (10-18-23 @ 04:01):    No growth at 5 days    Culture - Blood (collected 10-04-23 @ 23:28)  Source: .Blood Blood  Final Report (10-10-23 @ 07:00):    No growth at 5 days    Culture - Blood (collected 09-26-23 @ 12:05)  Source: .Blood Blood  Final Report (10-01-23 @ 23:00):    No growth at 5 days        Lactate, Blood: 1.4 mmol/L (10-15-23 @ 17:47)  Lactate, Blood: 3.0 mmol/L (10-15-23 @ 08:11)  Lactate, Blood: 2.2 mmol/L (10-15-23 @ 01:56)      INFECTIOUS DISEASES TESTING  Procalcitonin, Serum: 0.66 (10-16-23 @ 10:49)  Fungitell: <31 (10-15-23 @ 17:47)  Procalcitonin, Serum: 0.87 (10-15-23 @ 17:47)  Rapid RVP Result: NotDetec (10-12-23 @ 18:47)  Procalcitonin, Serum: 1.76 (10-05-23 @ 11:35)  MRSA PCR Result.: Negative (09-24-23 @ 04:32)  Procalcitonin, Serum: 0.49 (09-17-23 @ 08:20)      INFLAMMATORY MARKERS  Sedimentation Rate, Erythrocyte: 30 mm/Hr (10-12-23 @ 09:30)  C-Reactive Protein, Serum: 138.0 mg/L (10-12-23 @ 09:30)  C-Reactive Protein, Serum: 72.4 mg/L (09-17-23 @ 08:20)  Sedimentation Rate, Erythrocyte: 60 mm/Hr (09-16-23 @ 07:18)      RADIOLOGY & ADDITIONAL TESTS:  I have personally reviewed the last available Chest xray  CXR      CT      CARDIOLOGY TESTING  12 Lead ECG:   Ventricular Rate 98 BPM    Atrial Rate 89 BPM    QRS Duration 88 ms    Q-T Interval 438 ms    QTC Calculation(Bazett) 559 ms    R Axis 137 degrees    T Axis 127 degrees    Diagnosis Line *** Poor data quality, interpretation may be adversely affected  Atrial fibrillation  Low voltage QRS  Possible Anterolateral infarct , age undetermined  Abnormal ECG    Confirmed by Chuck Harmon ([id number]) on 10/19/2023 10:08:47 AM (10-19-23 @ 08:54)      MEDICATIONS  budesonide 160 MICROgram(s)/formoterol 4.5 MICROgram(s) Inhaler 2 Inhalation two times a day  buMETAnide 2 Oral every 12 hours  chlorhexidine 2% Cloths 1 Topical <User Schedule>  collagenase Ointment 1 Topical two times a day  darbepoetin Injectable Syringe 25 IV Push <User Schedule>  dextrose 5%. 1000 IV Continuous <Continuous>  dextrose 50% Injectable 25 IV Push once  dextrose 50% Injectable 25 IV Push once  dextrose 50% Injectable 12.5 IV Push once  glucagon  Injectable 1 IntraMuscular once  heparin   Injectable 5000 SubCutaneous every 8 hours  insulin lispro (ADMELOG) corrective regimen sliding scale  SubCutaneous every 6 hours  meropenem  IVPB 1000 IV Intermittent every 12 hours  metoprolol tartrate 12.5 Oral every 12 hours  midodrine. 10 Oral every 8 hours  pantoprazole   Suspension 40 Oral two times a day      WEIGHT  Weight (kg): 92 (10-09-23 @ 13:13)  Creatinine: 1.6 mg/dL (10-19-23 @ 02:12)      ANTIBIOTICS:  meropenem  IVPB 1000 milliGRAM(s) IV Intermittent every 12 hours      All available historical records have been reviewed

## 2023-10-19 NOTE — PROGRESS NOTE ADULT - SUBJECTIVE AND OBJECTIVE BOX
seen and examined  24 h events noted   no distress       PAST HISTORY  --------------------------------------------------------------------------------  No significant changes to PMH, PSH, FHx, SHx, unless otherwise noted    ALLERGIES & MEDICATIONS  --------------------------------------------------------------------------------  Allergies    No Known Allergies    Intolerances      Standing Inpatient Medications  budesonide 160 MICROgram(s)/formoterol 4.5 MICROgram(s) Inhaler 2 Puff(s) Inhalation two times a day  buMETAnide 2 milliGRAM(s) Oral every 12 hours  chlorhexidine 2% Cloths 1 Application(s) Topical <User Schedule>  collagenase Ointment 1 Application(s) Topical two times a day  darbepoetin Injectable Syringe 25 MICROGram(s) IV Push <User Schedule>  dextrose 5%. 1000 milliLiter(s) IV Continuous <Continuous>  dextrose 50% Injectable 25 Gram(s) IV Push once  dextrose 50% Injectable 25 Gram(s) IV Push once  dextrose 50% Injectable 12.5 Gram(s) IV Push once  glucagon  Injectable 1 milliGRAM(s) IntraMuscular once  heparin   Injectable 5000 Unit(s) SubCutaneous every 8 hours  insulin lispro (ADMELOG) corrective regimen sliding scale   SubCutaneous every 6 hours  meropenem  IVPB 1000 milliGRAM(s) IV Intermittent every 12 hours  metoprolol tartrate 12.5 milliGRAM(s) Oral every 12 hours  midodrine. 15 milliGRAM(s) Oral every 8 hours  norepinephrine Infusion 0.05 MICROgram(s)/kG/Min IV Continuous <Continuous>  pantoprazole   Suspension 40 milliGRAM(s) Oral two times a day    PRN Inpatient Medications  acetaminophen     Tablet .. 650 milliGRAM(s) Oral every 6 hours PRN  albuterol    90 MICROgram(s) HFA Inhaler 1 Puff(s) Inhalation every 6 hours PRN  atropine Injectable 1 milliGRAM(s) IntraMuscular once PRN  dextrose Oral Gel 15 Gram(s) Oral once PRN          VITALS/PHYSICAL EXAM  --------------------------------------------------------------------------------  T(C): 37 (10-19-23 @ 04:00), Max: 37 (10-19-23 @ 04:00)  HR: 85 (10-19-23 @ 04:00) (85 - 116)  BP: 142/68 (10-19-23 @ 04:00) (115/67 - 142/68)  RR: 20 (10-19-23 @ 04:00) (20 - 20)  SpO2: 100% (10-19-23 @ 04:00) (97% - 100%)  Wt(kg): --        10-18-23 @ 07:01  -  10-19-23 @ 07:00  --------------------------------------------------------  IN: 1350 mL / OUT: 1000 mL / NET: 350 mL      Physical Exam:  	Gen: NAD  	Pulm:  B/L fredi   	CV:  S1S2; no rub  	Abd: +distended  	Vascular access:    LABS/STUDIES  --------------------------------------------------------------------------------              9.0    7.49  >-----------<  179      [10-19-23 @ 02:12]              30.1     141  |  102  |  54  ----------------------------<  85      [10-19-23 @ 02:12]  4.9   |  29  |  1.6        Ca     7.9     [10-19-23 @ 02:12]      Mg     1.9     [10-19-23 @ 02:12]      Phos  4.5     [10-19-23 @ 02:12]    TPro  6.1  /  Alb  2.5  /  TBili  0.4  /  DBili  x   /  AST  38  /  ALT  21  /  AlkPhos  140  [10-19-23 @ 02:12]      Creatinine Trend:  SCr 1.6 [10-19 @ 02:12]  SCr 1.7 [10-18 @ 06:20]  SCr 1.8 [10-17 @ 20:00]  SCr 1.9 [10-17 @ 00:18]  SCr 2.2 [10-16 @ 05:33]    Urinalysis - [10-19-23 @ 02:12]      Color  / Appearance  / SG  / pH       Gluc 85 / Ketone   / Bili  / Urobili        Blood  / Protein  / Leuk Est  / Nitrite       RBC  / WBC  / Hyaline  / Gran  / Sq Epi  / Non Sq Epi  / Bacteria       Iron 31, TIBC 137, %sat 23      [10-07-23 @ 18:15]  Ferritin 960      [10-07-23 @ 18:15]    HBsAb <3.0      [09-27-23 @ 17:44]  HBsAb Nonreact      [09-27-23 @ 17:44]  HBsAg Nonreact      [09-27-23 @ 17:44]  HBcAb Nonreact      [09-27-23 @ 17:44]

## 2023-10-19 NOTE — PROGRESS NOTE ADULT - ATTENDING COMMENTS
Impression    Septic shock resolved  Right femoral DVT sp IVC   Right anechoic pleural effusion  HO aspiration pneumonia SP ABX   UGIB secondary to Duodenal ulcer SP EGD   Metabolic Encephalopathy   PAULA / CKD on HD  History of Left Foot OM w/ surrounding Cellulitis   Sacral DTI    Plan as outlined above

## 2023-10-19 NOTE — CHART NOTE - NSCHARTNOTEFT_GEN_A_CORE
TRANSFER NOTE FROM CEU TO FLOOR:  -----------------------------------------------  84 yo m ho DM, COPD, anemia, lymphedemia, afib on xarelto, HFrEF, DM coming in from nursing home for AMS x 2 days. Unable to gather story from pt as he is altered and lethargic.  As per NH was becoming more agitated x 2 days, was hypotensive yesterday and started on cefepime and vancomycin. Brought in to the ED for AMS. PICC line in place for cefepime 1q q8 until 10/5/23 and vanc 1q24 until 9/23/23 for LLE cellulitis/OM  (was at [facility] last month for LLE thick wound cellulitis/OM and sacral DTI as per call with [facility] as per collateral from [facility] DE[facility])  In the ED, vitals wnl. Labs showing wbc 11.30, Hb 9.6, MCV 79.1, INR 1.59, CO2 14, AG 21, Cr 6 (~baseline 1.2), , trops 0.09. CTH wnl, RBUS with no hydro, poor visualization of left kidney    Hospital course:  When pt arrived to ICU, he was in shock, hypotensive, with active melena. BP in 60's. GI consulted urgently for endoscopy. Procedure was done at bedside and bleeding controlled. Pt. was given 2 U of blood, 1U FFP in ICU. HgB has maintained above 8 and pt has not signs of melena. Pt was initially in 3E the patient was Hypoxic (as low as 70s). Evaluated the patient at bedside. AAOx 1. On auscultation, bilateral crackles present. Placed the patient on venturi mask and then on NRB. The Spo2 improved to 99%. Stat vitals repeated (vitals Spo2 99%, /70s, HR 95). Ordered CXR stat, ABG stat, Bumex 2mg IV x 1 stat. Labs and charts reviewed. Stat Labs drawn. Upgraded to SDU on 9/27. In the SDU, patient respiratory status was much improved on NC. Nephrology recommended dialysis for patient's PAULA. On 9/27, a [place] was placed and patient received hemo that day. However, he became hypotensive 30 mins into dialysis and was stopped. The following day, the [place] was replaced with a longer one and patient received HD again with no complications. On the evening of 9/27, patient hgb was 6.9 and he received 1 unit pRBC. There was also an episode of melena and GI was notified. Patient remained hemodynamically stable and GI recommended to just monitor. Heparin was held, but has since been restarted for DVT ppx. On 9/29, he was started on D5 @80cc for 24 hours to address free water deficit. Starting sodium was 151 with a target goal of 145 after 24 hours. Last, patient was seen by speech and swallow and put on NPO for poor swallowing. DGTF on 9/29. Pt was hypernatremic and was managed with D5W. Pt also had a worsening PAULA and nephrology consulted. Pt received HD via R IJ line and per nephro pt needs long term dialysis. On 10/4, a TDC was placed by IR for dialysis. On 15:00 10/4 a rapid response was called for acute altered mental status after the IR procedure. VSS and ABG notable for elevated lactate of 6. Pt spiked fever to 101.6 and repeat ABG notable for lactate of 8.3. Pt's BP dropped to 75/42 and he was given a 500 LR bolus, was started on levophed. Sepsis workup sent for possible aspiration pna. Pt started on vancomycin and cefepime. Pt upgraded to SDU for close monitoring and vasopressor requirements.    SDU Course FIRST TIME:  - LFTs noted to go up after abx; shock liver vs DILI  - BP stabilized 100/50's--130/90's; weaned to midodrine 5 TID  - monitoring off of ABX per ID    3A COURSE:  After coming to 3A from step down on 10/08, he was off antibiotics, getting twice HD twice weekly, Ng tube placed for feeding after discussing with his proxy (brother who doesnot want the PEG tube). Discussion regarding GOC was also done, and brother wants full code (see chart note).  Vitals were stable until 10/12, he started having fever (Tmax 101.4 in last 24 hrs), septic screening was done, started on zosyn, Bcx from 10/12 was negative. However overnight his blood pressure began to drop to 70/51, on midodrine, gave bolus of 250cc twice,  lactate increased from 2.2 -> 3.0, wbc increases to 23K from 18K, no left shift still couldn't maintain BP, started on levophed low dose.     SDU COURSE SECOND TIME TRANSFER NOTE FROM CEU TO FLOOR:  -----------------------------------------------  82 yo m ho DM, COPD, anemia, lymphedemia, afib on xarelto, HFrEF, DM coming in from nursing home for AMS x 2 days. Unable to gather story from pt as he is altered and lethargic.  As per NH was becoming more agitated x 2 days, was hypotensive yesterday and started on cefepime and vancomycin. Brought in to the ED for AMS. PICC line in place for cefepime 1q q8 until 10/5/23 and vanc 1q24 until 9/23/23 for LLE cellulitis/OM  (was at [facility] last month for LLE thick wound cellulitis/OM and sacral DTI as per call with [facility] as per collateral from [facility])  In the ED, vitals wnl. Labs showing wbc 11.30, Hb 9.6, MCV 79.1, INR 1.59, CO2 14, AG 21, Cr 6 (~baseline 1.2), , trops 0.09. CTH wnl, RBUS with no hydro, poor visualization of left kidney    Hospital course:  When pt arrived to ICU, he was in shock, hypotensive, with active melena. BP in 60's. GI consulted urgently for endoscopy. Procedure was done at bedside and bleeding controlled. Pt. was given 2 U of blood, 1U FFP in ICU. HgB has maintained above 8 and pt has not signs of melena. Pt was initially in 3E the patient was Hypoxic (as low as 70s). Evaluated the patient at bedside. AAOx 1. On auscultation, bilateral crackles present. Placed the patient on venturi mask and then on NRB. The Spo2 improved to 99%. Stat vitals repeated (vitals Spo2 99%, /70s, HR 95). Ordered CXR stat, ABG stat, Bumex 2mg IV x 1 stat. Labs and charts reviewed. Stat Labs drawn. Upgraded to SDU on 9/27. In the SDU, patient respiratory status was much improved on NC. Nephrology recommended dialysis for patient's PAULA. On 9/27, a [place] was placed and patient received hemo that day. However, he became hypotensive 30 mins into dialysis and was stopped. The following day, the [place] was replaced with a longer one and patient received HD again with no complications. On the evening of 9/27, patient hgb was 6.9 and he received 1 unit pRBC. There was also an episode of melena and GI was notified. Patient remained hemodynamically stable and GI recommended to just monitor. Heparin was held, but has since been restarted for DVT ppx. On 9/29, he was started on D5 @80cc for 24 hours to address free water deficit. Starting sodium was 151 with a target goal of 145 after 24 hours. Last, patient was seen by speech and swallow and put on NPO for poor swallowing. DGTF on 9/29. Pt was hypernatremic and was managed with D5W. Pt also had a worsening PAULA and nephrology consulted. Pt received HD via R IJ line and per nephro pt needs long term dialysis. On 10/4, a TDC was placed by IR for dialysis. On 15:00 10/4 a rapid response was called for acute altered mental status after the IR procedure. VSS and ABG notable for elevated lactate of 6. Pt spiked fever to 101.6 and repeat ABG notable for lactate of 8.3. Pt's BP dropped to 75/42 and he was given a 500 LR bolus, was started on levophed. Sepsis workup sent for possible aspiration pna. Pt started on vancomycin and cefepime. Pt upgraded to SDU for close monitoring and vasopressor requirements.    SDU Course FIRST TIME:  - LFTs noted to go up after abx; shock liver vs DILI  - BP stabilized 100/50's--130/90's; weaned to midodrine 5 TID  - monitoring off of ABX per ID    3A COURSE:  After coming to 3A from step down on 10/08, he was off antibiotics, getting twice HD twice weekly, Ng tube placed for feeding after discussing with his proxy (brother who doesnot want the PEG tube). Discussion regarding GOC was also done, and brother wants full code (see chart note).  Vitals were stable until 10/12, he started having fever (Tmax 101.4 in last 24 hrs), septic screening was done, started on zosyn, Bcx from 10/12 was negative. However overnight his blood pressure began to drop to 70/51, on midodrine, gave bolus of 250cc twice,  lactate increased from 2.2 -> 3.0, wbc increases to 23K from 18K, no left shift still couldn't maintain BP, started on levophed low dose.     SDU COURSE SECOND TIME:  - Patient was weaned off pressors and BP is controlled on Midodrine.   - his LOC improved,   - Feeding through NGT, discussions were made with the family regarding PEG tube- still waiting for the final decision.       Plan;  #Fever 10/12  - Blood Cx 10/12 NG  - Blood Cx 10/13 NG   - RVP negative  - CT Chest No Cont (10.12.23 @ 19:59): Since  5/22/2019 Multiple right apical nodules including new 7 mm solid nodule  (Series  4/85). Follow-up CT scan 6 months time. Bilateral moderate sized pleural effusions with adjacent lower lobe  atelectasis.  No pneumothorax or parenchymal mass.  - zosyn started 10/13  - PICC line 10/13 (present from admission) -- was removed   - meropenem to 1g q 24 hours  - plan for 7 day course (end date 10/22) as per ID      #Metabolic Encephalopathy 2/2 septic Shock 2/2 suspected aspiration pneumonia - improved   - rapid response after TDC due to acute change in mental status - ABG notable for elevated lactate  - pt febrile to 101.6 and elevated lactate to 8.4, rapidly became hypotensive on 10/4 -> started on levophed  - CXR (9/28) Bilateral pleural effusion/opacity unchanged. No air leak.  - cultures neg   - tolerating room air   - C/w albuterol PRN    #Severe Pharyngeal Dysphagia  - HO aspiration pneumonitis SP ABX   -  Patient was tolerating PO intake at NH prior to admission  -  After extubation on 09/24, patient became confused s/p upgrade on 09/26 for hypoxia, AMS, and pneumonia. He has been NPO since then with multiple speech swallow evaluations.  - FEES Study on 10/11: severe pharyngeal dysphagia, recommendation to keep NPO with alternate means -> now on NGT for feeding  - GI for evaluation of PEG tube placement after failing FEES study on 10/11. GI will hold off PEG tube placement for now pending sepsis workup (in setting of fever on 10/12) and pending neurology evaluation of age indeterminate infarct on CTH 10/10 + will need neurological prognostication prior to planning PEG tube placement   -evaluated by S&S on Oct 16, recommended continuing NGT     #Hemorrhagic Shock Secondary to Hematochezia from Duodenal Ulcer Bleed s/p OVESCO placement 09/23 - stable   #UGIB secondary to Duodenal ulcer SP EGD   #Hematochezia resolved   #Hemorrhagic Shock resolved   #Blood Loss Anemia  - Status Post EGD on 09/23: blood clots in fundus and antrum; 2cm actively bleeding duodenal ulcer with spurting vessel ([place] 1a) in sweep on base s/p 10cc epi and s/p 11/6 OVESCO placement for hemostasis  - Continue PO Protonix 40mg BID via NG tube for now. Was on IV pantoprazole drip (09/23-09/25)  - Anticoagulation resumed as Hgb stabilized (s/p 1 unit pRBCs 10/12)  -hemodynamically stable, no recent bloody bowel movements   - Monitor BM for recurrence of hematochezia or melena  - Please avoid any NSAIDs  - If any unstable bleed, please call GI   - Follow up with [facility] located at [address] [place] [address]. Phone Number: [phone]      #Anemia of acute blood loss - stable   #Hemorrhagic Shock Secondary to Hematochezia from Duodenal Ulcer Bleed s/p OVESCO placement 09/23  - Trend hgb, transfuse blood PRN  - s/p IV Protamine sulfate 36mg x1 dose on 09/23  - GI f/u appreciated  - S/p IV Protonix 40mg BID -> switched to PO protonix BID   - Avoid any NSAIDs    #Acutely worsening uremia and acute kidney failure most likely 2/2 ATN - now on HD through right IJ [place]  #hypernatremia - resolved   #PAULA / CKD - improved   - possible ATN iso hypotension and possible sepsis/ vanc toxicity  - Cr stable for now -> Close F/U of BUN/Crea/ Lytes and UO  - no hydro on imaging  - udall placement 9/27/23  started on HD 9/27 but did not tolerate it with access problems  - udall changed 9/28  he received HD 9/28 and 9/30  - c/w phoslo 2/2/2   - off sodium bicarbonate   - s/p 250 cc LR bolus for hypernatremia  - s/p D5w @ 80 cc/hr  -nephro following. Recs on Oct 18 appreciated.   - TDC by IR on 10/4  -Bumex was stopped due to hypotension. Resumed on Oct 17 after BP stabilized   -creatinine improving   -hemodialysis per nephro. Last HD session was 10/19 (prior to that it was on 10/10).     #Acute Femoral DVT s/p IVC filter 10/6  #Elevated dimer  - duplex neg on 9/29  - overall the pt risk and benefit is goes against Anticoagulation, will further discuss with family , dw brother regarding the high risk of bleeding and understands to hold anticoagulation And understands the risk of cva with Afib.    -s/p IVC filter 10/6    #Possible Age/CVA  - Indeterminate Left Subinsular Stroke on 10/10    #transaminitis - resolving   - likely 2/2 liver shock 2/2 hypotension   - LFTS improving   - follow up RUQ   - hepatitis neg   - trend   - if continues to worsen get hepatology     #Asymptomatic Cholelithiasis  - Noted on CT  - LFTs noted > Trend LFTs  - Monitor for symptoms    #Paroxysmal Afib, chronic   - on heparin   - resumed metoprolol tartate 12.5mg BID     #History of Left Foot OM w/ surrounding Cellulitis  #Sacral Decubitus Ulcer POA  - CT LLE (9/16): No CT evidence of osteomyelitis or necrotizing infection. No drainable collection seen, within the limitations of a noncontrast exam.  - Increased frailty and decreased physical capacity  - as per previous notes the team discussed with ID attending: patient is unlikely to benefit from prolonged therapy with cefepime+vanco (to cover possible OM) due to adverse outcomes associated kidney dysfunction and cognitive impact   - per podiatry, c/w Local wound care; offloading boots bilaterally, frequently turning and positioning, prevent pressure injury, keep skin clean, and monitor for wound changes and notify provider as per podiatry.    -DVT prophylaxis - heparin  -GI prophylaxis - protonix  -diet - NGT feeds   -code - full

## 2023-10-19 NOTE — PROGRESS NOTE ADULT - ASSESSMENT
82 yo m ho DM, COPD, anemia, lymphedemia, afib on xarelto, HFrEF, DM coming in from nursing home for AMS x 2 days admitted for septic shock with hospital course with multiple complications.     Septic Shock due to suspected Aspiration PNA v Pneumonitis, not POA  Had PICC line POA from Aug 2023 - removed  Metabolic Encephalopathy - improved, s/p intubation, now on room air   Elevated Lactate - improved   - now off pressors, on room air  - all cx negative, procal downtrending 1.7--> 0.66, fungitell pending  - on Meropenem per ID for 2 more days. IF clinical decompensation check CT A/P and repeat LA  - off levophed, c/w midodrine 10 q8H. if BP stable, wean off     Pleural effusion - currently on room air, no planned thora per pulm    Severe Pharyngeal Dysphagia  HO aspiration pneumonitis SP ABX   -  Patient was tolerating PO intake at NH prior to admission   - FEES Study on 10/11: severe pharyngeal dysphagia, recommendation to keep NPO with alternate means -> now on NGT for feeding  - GI following for PEG tube placement, family undecided at this time   - GI requesting neurology prognostication given age indeterminate infarcts   - c/w tube feeds. FS borderline, fu dietary to adjust feeds     Hemorrhagic Shock Secondary to Hematochezia from Duodenal Ulcer Bleed s/p OVESCO placement 09/23  UGIB secondary to Duodenal ulcer SP EGD   Hematochezia resolved   Blood Loss Anemia s/p 1u PRBC   - Status Post EGD on 09/23: blood clots in fundus and antrum; 2cm actively bleeding duodenal ulcer with spurting vessel ([place] 1a) in sweep on base s/p 10cc epi and s/p 11/6 OVESCO placement for hemostasis  - s/p 1u PRBC and Protamine sulfate 9/23  - Continue PO Protonix 40mg BID   - currently on DVT ppx, full AC on hold   - monitor CBC, keep active T&S    Acute kidney failure on CKD /? ATN - now on HD via TDC placed 10/4  Hypernatremia - resolved   - on HD per renal, currently on hold, IR to remove TDC   - c/w Bumex, renal fxn improving, renal signing off     Acute Femoral DVT s/p IVC filter 10/6  Elevated dimer  - duplex neg on 9/29  - s/p IVC filter 10/6  - Full AC on hold     Age indeterminate infarct on imaging   - Indeterminate Left Subinsular Stroke on 10/10    Transaminitis - resolved  Asymptomatic Cholelithiasis    Paroxysmal Afib, chronic   - resume metoprolol 12.5 Q12H as BP/HR stable     History of Left Foot OM w/ surrounding Cellulitis  Sacral Decubitus Ulcer POA  - CT LLE (9/16): No CT evidence of osteomyelitis or necrotizing infection. No drainable collection seen, within the limitations of a noncontrast exam.   - per podiatry, c/w Local wound care; offloading boots bilaterally, frequently turning and positioning, prevent pressure injury, keep skin clean, and monitor for wound changes and notify provider as per podiatry.    FUll code, overall prognosis is very poor, high risk of decompensation   Pending: s/s fu, family to decide re: PEG, complete abx

## 2023-10-19 NOTE — PROGRESS NOTE ADULT - ASSESSMENT
ASSESSMENT  82 yo m ho DM, COPD, anemia, lymphedemia, afib on xarelto, HFrEF, DM coming in from nursing home for AMS x 2 days. Unable to gather story from pt as he is altered and lethargic.    IMPRESSION  #Fever 10/12  - Blood Cx 10/12 NG  - Blood Cx 10/13 NG   - RVP negative  - CT Chest No Cont (10.12.23 @ 19:59): Since  5/22/2019 Multiple right apical nodules including new 7 mm solid nodule  (Series  4/85). Follow-up CT scan 6 months time. Bilateral moderate sized pleural effusions with adjacent lower lobe  atelectasis.  No pneumothorax or parenchymal mass.  - zosyn started 10/13  - PICC line 10/13 (present from admission) -- was removed     #Severe Pharyngeal Dysphagia  #Hemorrhagic Shock Secondary to Hematochezia from Duodenal Ulcer Bleed s/p OVESCO placement 09/23  #PAULA / CKD  - started on HD   #Acute Femoral DVT s/p IVC filter 10/6  #transaminitis - suspected shock liver  #History of Left Foot OM w/ surrounding Cellulitis  - CT LLE (9/16): No CT evidence of osteomyelitis or necrotizing infection. No drainable collection seen, within the limitations of a noncontrast exam.  #Sacral Decubitus Ulcer POA  #Paroxysmal Afib, chronic    Recommendations  - adjust meropenem to 1g q 24 hours    - plan for 7 day course (end date 10/22)     Please call or message on Microsoft Teams if with any questions.  Spectra [phone]

## 2023-10-19 NOTE — PROGRESS NOTE ADULT - ASSESSMENT
Impression    Septic shock resolved  Right femoral DVT sp IVC   Right anechoic pleural effusion  HO aspiration pneumonia SP ABX   UGIB secondary to Duodenal ulcer SP EGD   Metabolic Encephalopathy   PAULA / CKD on HD  History of Left Foot OM w/ surrounding Cellulitis   Sacral DTI    Plan:    CNS: Monitor mental status. Avoid depressants.       HEENT: Oral care. Aspiration precautions    CARDIOVASCULAR: Goal directed fluid resuscitation. Decrease Midodrine to 10 mg Q8.  Avoid overload.       PULMONARY:  Albuterol PRN.  Frequent suctioning.  Incentive spirometry keep Sao2 92 to 96%.  Repeat Right chest US with smaller effusion.        GASTROINTESTINAL: Feeding per Speech and swallow. Protonix q 12     GENITOURINARY/RENAL: Nephro following; HD per renal. Monitor lytes and correct as needed     INFECTIOUS : ABX PER ID. Repeat Cultures. Trend Procal.      HEMATOLOGIC: DVT prophylaxis.  Target Hb > 7.  Trend CBC     ENDOCRINE: Follow up FS. Insulin protocol as needed. BG goal 140-180    MSK/DERM: PT/OT when cooperative. Off loading. Skin care.      Palliative care follow up     Poor prognosis overall     DGTF    [facility] full code for now.

## 2023-10-19 NOTE — PROGRESS NOTE ADULT - ASSESSMENT
82 yo m ho DM, COPD, anemia, paroxysmal afib on xarelto, HFrEF, DM coming in from nursing home for AMS x 2 days. Found to have toxic metabolic encephalopathy with PAULA.  PAULA/ toxic metabolic encephalopathy/ HAGMA/ HFrEF/ hypernatremia  possible ATN iso hypotension and possible sepsis/ vanc toxicity  no hydro on imaging  creat trending down   HD on hold   cont bumex 2 q 12   phos level noted low;  no binders   BP noted / decrease midodrine to 10 q 8   remains on ATB / followed by ID   s/p IVC filter   iron stores noted / keep Hb >7  GI notes appreciated   creatinine  stable ,  IR to d/c TDC   will sign off recall as needed   prognosis poor

## 2023-10-19 NOTE — PROGRESS NOTE ADULT - SUBJECTIVE AND OBJECTIVE BOX
NIMO SARMIENTO  83y Male    CHIEF COMPLAINT:    Patient is a 83y old  Male who presents with a chief complaint of AMS (19 Oct 2023 07:39)    INTERVAL HPI/OVERNIGHT EVENTS:    Patient seen and examined. No acute events overnight. Overall unchanged, remains on room air     ROS: All other systems are negative.    Vital Signs:    T(F): 97.9 (10-19-23 @ 07:21), Max: 98.6 (10-19-23 @ 04:00)  HR: 74 (10-19-23 @ 09:06) (74 - 195)  BP: 141/66 (10-19-23 @ 07:21) (115/67 - 142/68)  RR: 20 (10-19-23 @ 07:21) (20 - 20)  SpO2: 97% (10-19-23 @ 07:21) (97% - 100%)    18 Oct 2023 07:01  -  19 Oct 2023 07:00  --------------------------------------------------------  IN: 1350 mL / OUT: 1000 mL / NET: 350 mL    POCT Blood Glucose.: 63 mg/dL (19 Oct 2023 07:57)  POCT Blood Glucose.: 59 mg/dL (19 Oct 2023 06:43)  POCT Blood Glucose.: 71 mg/dL (18 Oct 2023 23:06)  POCT Blood Glucose.: 67 mg/dL (18 Oct 2023 18:14)  POCT Blood Glucose.: 107 mg/dL (18 Oct 2023 11:10)    PHYSICAL EXAM:    GENERAL:  NAD chronically ill appearing   SKIN: No rashes or lesions  HEENT: Atraumatic. Normocephalic.    NECK: Supple, No JVD.    PULMONARY: decreased breath sounds B/L. No wheezing.    CVS: Normal S1, S2. Rate and Rhythm are regular.   ABDOMEN/GI: Soft, Nontender, Nondistended  MSK:  No clubbing or cyanosis   NEUROLOGIC: intermittently able to follow simple commands   PSYCH: Awake and confused     Consultant(s) Notes Reviewed:  [x ] YES  [ ] NO  Care Discussed with Consultants/Other Providers [ x] YES  [ ] NO    LABS:                        9.0    7.49  )-----------( 179      ( 19 Oct 2023 02:12 )             30.1     141  |  102  |  54<H>  ----------------------------<  85  4.9   |  29  |  1.6<H>    Ca    7.9<L>      19 Oct 2023 02:12  Phos  4.5     10-19  Mg     1.9     10-19    TPro  6.1  /  Alb  2.5<L>  /  TBili  0.4  /  DBili  x   /  AST  38  /  ALT  21  /  AlkPhos  140<H>  10-19    RADIOLOGY & ADDITIONAL TESTS:  Imaging or report Personally Reviewed:  [x] YES  [ ] NO  EKG reviewed: [x] YES  [ ] NO    Medications:  Standing  budesonide 160 MICROgram(s)/formoterol 4.5 MICROgram(s) Inhaler 2 Puff(s) Inhalation two times a day  buMETAnide 2 milliGRAM(s) Oral every 12 hours  chlorhexidine 2% Cloths 1 Application(s) Topical <User Schedule>  collagenase Ointment 1 Application(s) Topical two times a day  darbepoetin Injectable Syringe 25 MICROGram(s) IV Push <User Schedule>  dextrose 5%. 1000 milliLiter(s) IV Continuous <Continuous>  dextrose 50% Injectable 12.5 Gram(s) IV Push once  dextrose 50% Injectable 25 Gram(s) IV Push once  dextrose 50% Injectable 25 Gram(s) IV Push once  glucagon  Injectable 1 milliGRAM(s) IntraMuscular once  heparin   Injectable 5000 Unit(s) SubCutaneous every 8 hours  insulin lispro (ADMELOG) corrective regimen sliding scale   SubCutaneous every 6 hours  meropenem  IVPB 1000 milliGRAM(s) IV Intermittent every 12 hours  metoprolol tartrate 12.5 milliGRAM(s) Oral every 12 hours  midodrine. 15 milliGRAM(s) Oral every 8 hours  norepinephrine Infusion 0.05 MICROgram(s)/kG/Min IV Continuous <Continuous>  pantoprazole   Suspension 40 milliGRAM(s) Oral two times a day    PRN Meds  acetaminophen     Tablet .. 650 milliGRAM(s) Oral every 6 hours PRN  albuterol    90 MICROgram(s) HFA Inhaler 1 Puff(s) Inhalation every 6 hours PRN  atropine Injectable 1 milliGRAM(s) IntraMuscular once PRN  dextrose Oral Gel 15 Gram(s) Oral once PRN

## 2023-10-20 LAB
ANION GAP SERPL CALC-SCNC: 11 MMOL/L — SIGNIFICANT CHANGE UP (ref 7–14)
ANION GAP SERPL CALC-SCNC: 11 MMOL/L — SIGNIFICANT CHANGE UP (ref 7–14)
BUN SERPL-MCNC: 31 MG/DL — HIGH (ref 10–20)
BUN SERPL-MCNC: 31 MG/DL — HIGH (ref 10–20)
CALCIUM SERPL-MCNC: 7.3 MG/DL — LOW (ref 8.4–10.5)
CALCIUM SERPL-MCNC: 7.3 MG/DL — LOW (ref 8.4–10.5)
CHLORIDE SERPL-SCNC: 103 MMOL/L — SIGNIFICANT CHANGE UP (ref 98–110)
CHLORIDE SERPL-SCNC: 103 MMOL/L — SIGNIFICANT CHANGE UP (ref 98–110)
CO2 SERPL-SCNC: 26 MMOL/L — SIGNIFICANT CHANGE UP (ref 17–32)
CO2 SERPL-SCNC: 26 MMOL/L — SIGNIFICANT CHANGE UP (ref 17–32)
CREAT SERPL-MCNC: 1.2 MG/DL — SIGNIFICANT CHANGE UP (ref 0.7–1.5)
CREAT SERPL-MCNC: 1.2 MG/DL — SIGNIFICANT CHANGE UP (ref 0.7–1.5)
CULTURE RESULTS: SIGNIFICANT CHANGE UP
CULTURE RESULTS: SIGNIFICANT CHANGE UP
EGFR: 61 ML/MIN/1.73M2 — SIGNIFICANT CHANGE UP
EGFR: 61 ML/MIN/1.73M2 — SIGNIFICANT CHANGE UP
GLUCOSE BLDC GLUCOMTR-MCNC: 100 MG/DL — HIGH (ref 70–99)
GLUCOSE BLDC GLUCOMTR-MCNC: 100 MG/DL — HIGH (ref 70–99)
GLUCOSE BLDC GLUCOMTR-MCNC: 102 MG/DL — HIGH (ref 70–99)
GLUCOSE BLDC GLUCOMTR-MCNC: 102 MG/DL — HIGH (ref 70–99)
GLUCOSE BLDC GLUCOMTR-MCNC: 119 MG/DL — HIGH (ref 70–99)
GLUCOSE BLDC GLUCOMTR-MCNC: 119 MG/DL — HIGH (ref 70–99)
GLUCOSE SERPL-MCNC: 90 MG/DL — SIGNIFICANT CHANGE UP (ref 70–99)
GLUCOSE SERPL-MCNC: 90 MG/DL — SIGNIFICANT CHANGE UP (ref 70–99)
HCT VFR BLD CALC: 27.2 % — LOW (ref 42–52)
HCT VFR BLD CALC: 27.2 % — LOW (ref 42–52)
HGB BLD-MCNC: 8.3 G/DL — LOW (ref 14–18)
HGB BLD-MCNC: 8.3 G/DL — LOW (ref 14–18)
MAGNESIUM SERPL-MCNC: 1.7 MG/DL — LOW (ref 1.8–2.4)
MAGNESIUM SERPL-MCNC: 1.7 MG/DL — LOW (ref 1.8–2.4)
MCHC RBC-ENTMCNC: 28.3 PG — SIGNIFICANT CHANGE UP (ref 27–31)
MCHC RBC-ENTMCNC: 28.3 PG — SIGNIFICANT CHANGE UP (ref 27–31)
MCHC RBC-ENTMCNC: 30.5 G/DL — LOW (ref 32–37)
MCHC RBC-ENTMCNC: 30.5 G/DL — LOW (ref 32–37)
MCV RBC AUTO: 92.8 FL — SIGNIFICANT CHANGE UP (ref 80–94)
MCV RBC AUTO: 92.8 FL — SIGNIFICANT CHANGE UP (ref 80–94)
NRBC # BLD: 0 /100 WBCS — SIGNIFICANT CHANGE UP (ref 0–0)
NRBC # BLD: 0 /100 WBCS — SIGNIFICANT CHANGE UP (ref 0–0)
PLATELET # BLD AUTO: 146 K/UL — SIGNIFICANT CHANGE UP (ref 130–400)
PLATELET # BLD AUTO: 146 K/UL — SIGNIFICANT CHANGE UP (ref 130–400)
PMV BLD: 11.9 FL — HIGH (ref 7.4–10.4)
PMV BLD: 11.9 FL — HIGH (ref 7.4–10.4)
POTASSIUM SERPL-MCNC: 4.8 MMOL/L — SIGNIFICANT CHANGE UP (ref 3.5–5)
POTASSIUM SERPL-MCNC: 4.8 MMOL/L — SIGNIFICANT CHANGE UP (ref 3.5–5)
POTASSIUM SERPL-SCNC: 4.8 MMOL/L — SIGNIFICANT CHANGE UP (ref 3.5–5)
POTASSIUM SERPL-SCNC: 4.8 MMOL/L — SIGNIFICANT CHANGE UP (ref 3.5–5)
RBC # BLD: 2.93 M/UL — LOW (ref 4.7–6.1)
RBC # BLD: 2.93 M/UL — LOW (ref 4.7–6.1)
RBC # FLD: 19 % — HIGH (ref 11.5–14.5)
RBC # FLD: 19 % — HIGH (ref 11.5–14.5)
SODIUM SERPL-SCNC: 140 MMOL/L — SIGNIFICANT CHANGE UP (ref 135–146)
SODIUM SERPL-SCNC: 140 MMOL/L — SIGNIFICANT CHANGE UP (ref 135–146)
SPECIMEN SOURCE: SIGNIFICANT CHANGE UP
SPECIMEN SOURCE: SIGNIFICANT CHANGE UP
WBC # BLD: 9.66 K/UL — SIGNIFICANT CHANGE UP (ref 4.8–10.8)
WBC # BLD: 9.66 K/UL — SIGNIFICANT CHANGE UP (ref 4.8–10.8)
WBC # FLD AUTO: 9.66 K/UL — SIGNIFICANT CHANGE UP (ref 4.8–10.8)
WBC # FLD AUTO: 9.66 K/UL — SIGNIFICANT CHANGE UP (ref 4.8–10.8)

## 2023-10-20 PROCEDURE — 74230 X-RAY XM SWLNG FUNCJ C+: CPT | Mod: 26

## 2023-10-20 PROCEDURE — 99232 SBSQ HOSP IP/OBS MODERATE 35: CPT

## 2023-10-20 RX ORDER — MEROPENEM 1 G/30ML
1000 INJECTION INTRAVENOUS EVERY 24 HOURS
Refills: 0 | Status: COMPLETED | OUTPATIENT
Start: 2023-10-20 | End: 2023-10-21

## 2023-10-20 RX ORDER — MAGNESIUM SULFATE 500 MG/ML
1 VIAL (ML) INJECTION ONCE
Refills: 0 | Status: COMPLETED | OUTPATIENT
Start: 2023-10-20 | End: 2023-10-20

## 2023-10-20 RX ORDER — GABAPENTIN 400 MG/1
100 CAPSULE ORAL AT BEDTIME
Refills: 0 | Status: DISCONTINUED | OUTPATIENT
Start: 2023-10-20 | End: 2023-10-25

## 2023-10-20 RX ADMIN — BUDESONIDE AND FORMOTEROL FUMARATE DIHYDRATE 2 PUFF(S): 160; 4.5 AEROSOL RESPIRATORY (INHALATION) at 21:52

## 2023-10-20 RX ADMIN — BUMETANIDE 2 MILLIGRAM(S): 0.25 INJECTION INTRAMUSCULAR; INTRAVENOUS at 18:07

## 2023-10-20 RX ADMIN — Medication 100 GRAM(S): at 10:04

## 2023-10-20 RX ADMIN — HEPARIN SODIUM 5000 UNIT(S): 5000 INJECTION INTRAVENOUS; SUBCUTANEOUS at 21:53

## 2023-10-20 RX ADMIN — HEPARIN SODIUM 5000 UNIT(S): 5000 INJECTION INTRAVENOUS; SUBCUTANEOUS at 05:36

## 2023-10-20 RX ADMIN — MIDODRINE HYDROCHLORIDE 10 MILLIGRAM(S): 2.5 TABLET ORAL at 21:53

## 2023-10-20 RX ADMIN — MIDODRINE HYDROCHLORIDE 10 MILLIGRAM(S): 2.5 TABLET ORAL at 13:48

## 2023-10-20 RX ADMIN — BUMETANIDE 2 MILLIGRAM(S): 0.25 INJECTION INTRAMUSCULAR; INTRAVENOUS at 05:37

## 2023-10-20 RX ADMIN — HEPARIN SODIUM 5000 UNIT(S): 5000 INJECTION INTRAVENOUS; SUBCUTANEOUS at 13:47

## 2023-10-20 RX ADMIN — Medication 1 APPLICATION(S): at 18:58

## 2023-10-20 RX ADMIN — MEROPENEM 100 MILLIGRAM(S): 1 INJECTION INTRAVENOUS at 12:58

## 2023-10-20 RX ADMIN — GABAPENTIN 100 MILLIGRAM(S): 400 CAPSULE ORAL at 21:54

## 2023-10-20 RX ADMIN — Medication 12.5 MILLIGRAM(S): at 18:07

## 2023-10-20 RX ADMIN — MIDODRINE HYDROCHLORIDE 10 MILLIGRAM(S): 2.5 TABLET ORAL at 05:37

## 2023-10-20 RX ADMIN — CHLORHEXIDINE GLUCONATE 1 APPLICATION(S): 213 SOLUTION TOPICAL at 05:36

## 2023-10-20 RX ADMIN — Medication 1 APPLICATION(S): at 05:35

## 2023-10-20 RX ADMIN — PANTOPRAZOLE SODIUM 40 MILLIGRAM(S): 20 TABLET, DELAYED RELEASE ORAL at 05:38

## 2023-10-20 RX ADMIN — MEROPENEM 100 MILLIGRAM(S): 1 INJECTION INTRAVENOUS at 05:35

## 2023-10-20 RX ADMIN — PANTOPRAZOLE SODIUM 40 MILLIGRAM(S): 20 TABLET, DELAYED RELEASE ORAL at 18:07

## 2023-10-20 NOTE — SWALLOW BEDSIDE ASSESSMENT ADULT - SLP GENERAL OBSERVATIONS
pt received in bed awake alert requesting water. Pt w/o c/o pain. +NGT in place +room air +Extensive oral care performed, +thick dried phlegm and mucus noted t/o oral cavity, improved speech intelligibility following oral care.

## 2023-10-20 NOTE — SWALLOW VFSS/MBS ASSESSMENT ADULT - ORAL PHASE
Delayed oral transit time/Residue in oral cavity/Incomplete tongue to palate contact/Uncontrolled bolus / spillover in dieter-pharynx/Uncontrolled bolus / spillover in hypopharynx Residue in oral cavity/Incomplete tongue to palate contact/Uncontrolled bolus / spillover in dieter-pharynx/Uncontrolled bolus / spillover in hypopharynx

## 2023-10-20 NOTE — PROGRESS NOTE ADULT - ASSESSMENT
Assessment and Plan  Case of an 83 year old male patient known to have COPD. DM, atrial fibrillation, HFrEF, anemia, lymphedema, and recent left foot OM who was brought from the NH to the ED on 09/15 for evaluation of altered mental status, found to have sepsis in setting of recent left foot OM, admitted for further management. Stay was complicated by hemorrhagic shock and drop in Hb secondary to hematochezia on 09/23, Status post EGD on 09/23 revealing a bleeding duodenal ulcer s/p OVESCO placement. Stay also complicated by an episode of confusion and hypoxia on 09/26 s/p found to have possible stroke on 10/10. We are reconsulted for evaluation of PEG tube placement after failing FEES study on 10/11.      Severe Pharyngeal Dysphagia  Possible Age-Indeterminate Left Subinsular Stroke on 10/10  * Patient was tolerating PO intake at NH prior to admission  * After extubation on 09/24, patient became confused s/p upgrade on 09/26 for hypoxia, AMS, and pneumonia. He has been NPO since then with multiple speech swallow evaluations.  * FEES Study on 10/11: severe pharyngeal dysphagia, recommendation to keep NPO with alternate means  * VFSS 10/20: severe pharyngeal dysphagia for thins  * No longer spiking fevers; no longer on pressors  * Recent Labs: 10/20 WBC 9.66, Hb 8.3, Plt 146  * Abdominal imaging as below    RECOMMENDATIONS  - Will schedule for PEG tube placement early next week  - Neurology evaluation noted  - Continue NG tube feeding for now  - Palliative team evaluation and GOC discussion appreciated      Hemorrhagic Shock Secondary to Hematochezia from Duodenal Ulcer Bleed s/p OVESCO placement 09/23  Acute Drop in Hemoglobin- Improved  * Hemodynamically stable  * No melena per nurse  * No hypotension or tachycardia  * Hb stable  * Status Post EGD on 09/23: blood clots in fundus and antrum; 2cm actively bleeding duodenal ulcer with spurting vessel ([place] 1a) in sweep on base s/p 10cc epi and s/p 11/6 OVESCO placement for hemostasis    RECOMMENDATIONS  - Status post EGD on 09/23 as detailed above (s/p OVESCO placement for hemostasis)  - Continue PO Protonix 40mg BID via NG tube for now. Was on IV pantoprazole drip (09/23-09/25)  - Anticoagulation resumption per team after discussion risks of rebleed and benefits using CHADSVASC and HASBLED scores  - Diet as above  - Monitor BM for recurrence of hematochezia or melena  - Please avoid any NSAIDs  - If any unstable bleed, please call GI STAT  - Follow up with our [facility] located at [address] [place] [address]. Phone Number: [phone]        Asymptomatic Cholelithiasis  * Noted on CT  * LFTs noted    RECOMMENDATIONS  - Monitor for symptoms  - Trend LFTs      Thank you for your consult.  - Please note that plan was communicated with medical team.  - Please reach GI on [phone] during weekdays till 5pm.  - Please call the GI service line after 5pm on Weekdays and anytime on Weekends: [phone].      Rickie Jenkins MD  PGY - 4 Gastroenterology Fellow  [facility]

## 2023-10-20 NOTE — PROGRESS NOTE ADULT - ASSESSMENT
84 yo m ho DM, COPD, anemia, lymphedemia, afib on xarelto, HFrEF, DM coming in from nursing home for AMS x 2 days admitted for septic shock with hospital course with multiple complications.     Septic Shock due to suspected Aspiration PNA v Pneumonitis, not POA  Had PICC line POA from Aug 2023 - removed  Metabolic Encephalopathy - improved, s/p intubation, now on room air   Elevated Lactate - improved   - now off pressors, on room air  - all cx negative, procal downtrending 1.7--> 0.66, fungitell neg  - on Meropenem per ID for 2 more days. IF clinical decompensation check CT A/P and repeat LA  - off levophed, decrease  midodrine to 5 q8H. if BP stable, wean off     Pleural effusion - currently on room air, no planned thora per pulm    Severe Pharyngeal Dysphagia  HO aspiration pneumonitis SP ABX   -  Patient was tolerating PO intake at NH prior to admission   - FEES Study on 10/11: severe pharyngeal dysphagia, recommendation to keep NPO with alternate means -> now on NGT for feeding  - discussed with s/s, planned for modified barium swallow  - will need to discuss with family PEG tube if fails above   - GI requesting neurology prognostication given age indeterminate infarcts   - c/w tube feeds, monitor FS     Hemorrhagic Shock Secondary to Hematochezia from Duodenal Ulcer Bleed s/p OVESCO placement 09/23  UGIB secondary to Duodenal ulcer SP EGD   Hematochezia resolved   Blood Loss Anemia s/p 1u PRBC   - Status Post EGD on 09/23: blood clots in fundus and antrum; 2cm actively bleeding duodenal ulcer with spurting vessel ([place] 1a) in sweep on base s/p 10cc epi and s/p 11/6 OVESCO placement for hemostasis  - s/p 1u PRBC and Protamine sulfate 9/23  - Continue PO Protonix 40mg BID   - currently on DVT ppx, full AC on hold   - monitor CBC, keep active T&S    Acute kidney failure on CKD /? ATN - now on HD via TDC placed 10/4  Hypernatremia - resolved   - on HD per renal, currently on hold, IR to remove TDC   - c/w Bumex, renal fxn improving, renal signing off     Acute Femoral DVT s/p IVC filter 10/6  Elevated dimer  - duplex neg on 9/29  - s/p IVC filter 10/6  - Full AC on hold     Age indeterminate infarct on imaging   - Indeterminate Left Subinsular Stroke on 10/10    Transaminitis - resolved  Asymptomatic Cholelithiasis    Paroxysmal Afib, chronic   - resume metoprolol 12.5 Q12H as BP/HR stable     History of Left Foot OM w/ surrounding Cellulitis  Sacral Decubitus Ulcer POA  - CT LLE (9/16): No CT evidence of osteomyelitis or necrotizing infection. No drainable collection seen, within the limitations of a noncontrast exam.   - per podiatry, c/w Local wound care; offloading boots bilaterally, frequently turning and positioning, prevent pressure injury, keep skin clean, and monitor for wound changes and notify provider as per podiatry.    FUll code, overall prognosis is very poor, high risk of decompensation   Pending: s/s fu, possible peg, complete abx      Patient seen at bedside, total time spent to evaluate and treat the patient's acute illness and chronic medical conditions as well as time spent reviewing labs, radiology, discussing medical plan with covering medical team was more than 35_ minutes with >50% of time spent face to face with patient, discussing with patient/family as well as coordination of care               Patient seen at bedside, total time spent to evaluate and treat the patient's acute illness and chronic medical conditions as well as time spent reviewing labs, radiology, discussing medical plan with covering medical team was more than ___ minutes with >50% of time spent face to face with patient, discussing with patient/family as well as coordination of care          84 yo m ho DM, COPD, anemia, lymphedemia, afib on xarelto, HFrEF, DM coming in from nursing home for AMS x 2 days admitted for septic shock with hospital course with multiple complications.     Septic Shock due to suspected Aspiration PNA v Pneumonitis, not POA  Had PICC line POA from Aug 2023 - removed  Metabolic Encephalopathy - improved, s/p intubation, now on room air   Elevated Lactate - improved   - now off pressors, on room air  - all cx negative, procal downtrending 1.7--> 0.66, fungitell neg  - on Meropenem per ID for 2 more days. IF clinical decompensation check CT A/P and repeat LA  - off levophed, decrease  midodrine to 5 q8H. if BP stable, wean off     Pleural effusion - currently on room air, no planned thora per pulm    Severe Pharyngeal Dysphagia  HO aspiration pneumonitis SP ABX   -  Patient was tolerating PO intake at NH prior to admission   - FEES Study on 10/11: severe pharyngeal dysphagia, recommendation to keep NPO with alternate means -> now on NGT for feeding  - discussed with s/s, s/p modified barium swallow. Can advance diet to PO per recs.  - Keep NGT in for now and monitor PO intake. IF sufficient then remove NGT     Hemorrhagic Shock Secondary to Hematochezia from Duodenal Ulcer Bleed s/p OVESCO placement 09/23  UGIB secondary to Duodenal ulcer SP EGD   Hematochezia resolved   Blood Loss Anemia s/p 1u PRBC   - Status Post EGD on 09/23: blood clots in fundus and antrum; 2cm actively bleeding duodenal ulcer with spurting vessel ([place] 1a) in sweep on base s/p 10cc epi and s/p 11/6 OVESCO placement for hemostasis  - s/p 1u PRBC and Protamine sulfate 9/23  - Continue PO Protonix 40mg BID   - currently on DVT ppx, full AC on hold   - monitor CBC, keep active T&S    Acute kidney failure on CKD /? ATN - now on HD via TDC placed 10/4  Hypernatremia - resolved   - on HD per renal, currently on hold, IR to remove TDC   - c/w Bumex, renal fxn improving, renal signing off     Acute Femoral DVT s/p IVC filter 10/6  Elevated dimer  - duplex neg on 9/29  - s/p IVC filter 10/6  - Full AC on hold     Age indeterminate infarct on imaging   - Indeterminate Left Subinsular Stroke on 10/10    Transaminitis - resolved  Asymptomatic Cholelithiasis    Paroxysmal Afib, chronic   - resume metoprolol 12.5 Q12H as BP/HR stable     History of Left Foot OM w/ surrounding Cellulitis  Sacral Decubitus Ulcer POA  - CT LLE (9/16): No CT evidence of osteomyelitis or necrotizing infection. No drainable collection seen, within the limitations of a noncontrast exam.   - per podiatry, c/w Local wound care; offloading boots bilaterally, frequently turning and positioning, prevent pressure injury, keep skin clean, and monitor for wound changes and notify provider as per podiatry.    FUll code, overall prognosis is very poor, high risk of decompensation   Pending: s/s fu, monitor PO intake, complete abx      Patient seen at bedside, total time spent to evaluate and treat the patient's acute illness and chronic medical conditions as well as time spent reviewing labs, radiology, discussing medical plan with covering medical team was more than 35_ minutes with >50% of time spent face to face with patient, discussing with patient/family as well as coordination of care               Patient seen at bedside, total time spent to evaluate and treat the patient's acute illness and chronic medical conditions as well as time spent reviewing labs, radiology, discussing medical plan with covering medical team was more than ___ minutes with >50% of time spent face to face with patient, discussing with patient/family as well as coordination of care

## 2023-10-20 NOTE — PROGRESS NOTE ADULT - SUBJECTIVE AND OBJECTIVE BOX
NIMO SARMIENTO  83y Male    CHIEF COMPLAINT:    Patient is a 83y old  Male who presents with a chief complaint of AMS (19 Oct 2023 16:37)    INTERVAL HPI/OVERNIGHT EVENTS:    Patient seen and examined. No acute events overnight. Downgraded from SDU, remains with NGT     ROS: All other systems are negative.    Vital Signs:    T(F): 99.1 (10-20-23 @ 04:36), Max: 99.1 (10-20-23 @ 04:36)  HR: 87 (10-20-23 @ 04:36) (79 - 87)  BP: 118/75 (10-20-23 @ 04:36) (118/75 - 151/52)  RR: 18 (10-20-23 @ 04:36) (18 - 18)    19 Oct 2023 07:01  -  20 Oct 2023 07:00  --------------------------------------------------------  IN: 525 mL / OUT: 1000 mL / NET: -475 mL    POCT Blood Glucose.: 100 mg/dL (20 Oct 2023 11:10)  POCT Blood Glucose.: 119 mg/dL (20 Oct 2023 05:07)  POCT Blood Glucose.: 75 mg/dL (19 Oct 2023 23:47)    PHYSICAL EXAM:    GENERAL:  NAD chronically ill appearing   SKIN: No rashes or lesions  HEENT: Atraumatic. Normocephalic.    NECK: Supple, No JVD.    PULMONARY: decreased breath sounds B/L. No wheezing.   CVS: Normal S1, S2. Rate and Rhythm are regular.   ABDOMEN/GI: Soft, Nontender, Nondistended   MSK:  No clubbing or cyanosis , slurred speech   NEUROLOGIC: weak, moves all extremities   PSYCH: Awake, able to follow simple commands     Consultant(s) Notes Reviewed:  [x ] YES  [ ] NO  Care Discussed with Consultants/Other Providers [ x] YES  [ ] NO    LABS:                        8.3    9.66  )-----------( 146      ( 20 Oct 2023 01:23 )             27.2   140  |  103  |  31<H>  ----------------------------<  90  4.8   |  26  |  1.2    Ca    7.3<L>      20 Oct 2023 01:23  Phos  4.5     10-19  Mg     1.7     10-20    TPro  6.1  /  Alb  2.5<L>  /  TBili  0.4  /  DBili  x   /  AST  38  /  ALT  21  /  AlkPhos  140<H>  10-19    RADIOLOGY & ADDITIONAL TESTS:  Imaging or report Personally Reviewed:  [x] YES  [ ] NO  EKG reviewed: [x] YES  [ ] NO    Medications:  Standing  budesonide 160 MICROgram(s)/formoterol 4.5 MICROgram(s) Inhaler 2 Puff(s) Inhalation two times a day  buMETAnide 2 milliGRAM(s) Oral every 12 hours  chlorhexidine 2% Cloths 1 Application(s) Topical <User Schedule>  collagenase Ointment 1 Application(s) Topical two times a day  darbepoetin Injectable Syringe 25 MICROGram(s) IV Push <User Schedule>  dextrose 5%. 1000 milliLiter(s) IV Continuous <Continuous>  dextrose 50% Injectable 25 Gram(s) IV Push once  dextrose 50% Injectable 12.5 Gram(s) IV Push once  dextrose 50% Injectable 25 Gram(s) IV Push once  gabapentin 100 milliGRAM(s) Oral at bedtime  glucagon  Injectable 1 milliGRAM(s) IntraMuscular once  heparin   Injectable 5000 Unit(s) SubCutaneous every 8 hours  insulin lispro (ADMELOG) corrective regimen sliding scale   SubCutaneous every 6 hours  magnesium sulfate  IVPB 1 Gram(s) IV Intermittent once  meropenem  IVPB 1000 milliGRAM(s) IV Intermittent every 12 hours  metoprolol tartrate 12.5 milliGRAM(s) Oral every 12 hours  midodrine. 10 milliGRAM(s) Oral every 8 hours  pantoprazole   Suspension 40 milliGRAM(s) Oral two times a day    PRN Meds  acetaminophen     Tablet .. 650 milliGRAM(s) Oral every 6 hours PRN  albuterol    90 MICROgram(s) HFA Inhaler 1 Puff(s) Inhalation every 6 hours PRN  atropine Injectable 1 milliGRAM(s) IntraMuscular once PRN  dextrose Oral Gel 15 Gram(s) Oral once PRN

## 2023-10-20 NOTE — SWALLOW VFSS/MBS ASSESSMENT ADULT - RECOMMENDED CONSISTENCY
NPO w/ non-oral means of nutrition/hydration; recommend family meeting to clarify GOC (long-term artificial means of nutrition vs. comfort feeds)
easy to chew, mildly thick liquids

## 2023-10-20 NOTE — SWALLOW VFSS/MBS ASSESSMENT ADULT - RECOMMENDED FEEDING/EATING TECHNIQUES
oral hygiene
consecutive swallows, intermittent volitional cough/throat clear/allow for swallow between intakes/alternate food with liquid/crush medication (when feasible)/oral hygiene/position upright (90 degrees)/small sips/bites

## 2023-10-20 NOTE — SWALLOW BEDSIDE ASSESSMENT ADULT - SLP PERTINENT HISTORY OF CURRENT PROBLEM
Pt is an 84 y/o M w/ PMHx: DM, COPD, anemia, lymphedema, afib on Xarelto, HFrEF, DM, presents from NH for AMS. Pt is being treated for anemia, hemorrhagic shock 2' hematochezia from duodenal ulcer bleed s/p OVESCO placement 09/23. Course c/b acutely worsening uremia and acute kidney failure most likely 2' ATN- now on HD through right IJ [place]. +Metabolic encephalopathy, hypernatremia, AHRF- possible aspiration PNA, volume overload, R pleural effusions, hypotension, acutely worsening uremia and acute kidney failure most likely 2' ATN. CTH-> focal hypodensity is noted within the left subinsular region, previously seen potentially reflecting age-indeterminate infarct. CXR 10/15-> stable bilateral opacities and effusions. Pt s/p FEES 10/10, VFSS 10/11 w/ recs for long-term NPO and GOC discussion (PEG vs. comfort feeds). GOC ongoing, pt full code at this time.

## 2023-10-20 NOTE — SWALLOW VFSS/MBS ASSESSMENT ADULT - DIAGNOSTIC IMPRESSIONS
severe pharyngeal dysphagia
severe pharyngeal dysphagia for thin liquids; mild-moderate pharyngeal dysphagia for mildly thick liquids, puree, and easy to chew consistencies

## 2023-10-20 NOTE — PROGRESS NOTE ADULT - SUBJECTIVE AND OBJECTIVE BOX
Gastroenterology Follow Up Note    Location: [facility] 3E 007 B ([facility] 3E)  Patient Name: NIMO SARMIENTO  Age: 83y  Gender: Male      Chief Complaint  Patient is a 83y old Male who presents with a chief complaint of AMS (20 Oct 2023 17:31)  Primary diagnosis of Altered mental status      Reason for Consult  PEG tube Placement      Progress Note  This morning patient was seen and examined at bedside.    Today is hospital day 35d.  He is tolerating NG tube feeds.  He denies abdominal pain, nausea, or vomiting.   No melena per nurses.      Vital Signs in the last 24 hours   Vitals Summary T(C): 36.7 (10-20-23 @ 14:27), Max: 37.3 (10-20-23 @ 04:36)  HR: 83 (10-20-23 @ 14:27) (79 - 87)  BP: 96/52 (10-20-23 @ 14:27) (96/52 - 140/59)  RR: 18 (10-20-23 @ 14:27) (18 - 18)  SpO2: 98% (10-20-23 @ 14:27) (98% - 98%)  Vent Data   Intake/ Output   10-19-23 @ 07:01  -  10-20-23 @ 07:00  --------------------------------------------------------  IN: 525 mL / OUT: 1000 mL / NET: -475 mL      Physical Exam  * General Appearance: AOx 1-2, interactive but difficult to understand, oriented to place and person/ not to time, in no acute distress  * Lungs: Good bilateral air entry, audible crackles  * Heart: Regular Rate and Rhythm, normal S1 and S2, no audible murmur, rub, or gallop  * Abdomen: Symmetric, non-distended, soft, non-tender, bowel sounds active all four quadrants      Investigations   Laboratory Workup      - CBC:                        8.3    9.66  )-----------( 146      ( 20 Oct 2023 01:23 )             27.2       - Hgb Trend:  8.3  10-20-23 @ 01:23  9.0  10-19-23 @ 02:12  8.8  10-18-23 @ 06:20          - Chemistry:  10-20    140  |  103  |  31<H>  ----------------------------<  90  4.8   |  26  |  1.2    Ca    7.3<L>      20 Oct 2023 01:23  Phos  4.5     10-19  Mg     1.7     10-20    TPro  6.1  /  Alb  2.5<L>  /  TBili  0.4  /  DBili  x   /  AST  38  /  ALT  21  /  AlkPhos  140<H>  10-19    Liver panel trend:  TBili 0.4   /   AST 38   /   ALT 21   /   AlkP 140   /   Tptn 6.1   /   Alb 2.5    /   DBili --      10-19  TBili 0.5   /   AST 31   /   ALT 22   /   AlkP 141   /   Tptn 6.0   /   Alb 2.4    /   DBili --      10-18  TBili 0.5   /   AST 19   /   ALT 21   /   AlkP 104   /   Tptn 5.6   /   Alb 2.3    /   DBili --      10-17  TBili 0.5   /   AST 18   /   ALT 27   /   AlkP 121   /   Tptn 5.8   /   Alb 2.2    /   DBili --      10-16  TBili 0.5   /   AST 18   /   ALT 29   /   AlkP 112   /   Tptn 5.6   /   Alb 2.2    /   DBili --      10-15  TBili 0.5   /   AST 19   /   ALT 32   /   AlkP 105   /   Tptn 5.5   /   Alb 2.1    /   DBili --      10-15  TBili 0.6   /   AST 18   /   ALT 42   /   AlkP 98   /   Tptn 5.5   /   Alb 2.3    /   DBili --      10-14  TBili 0.7   /   AST 26   /   ALT 88   /   AlkP 78   /   Tptn 5.8   /   Alb 2.6    /   DBili --      10-12      Microbiological Workup  Urinalysis Basic - ( 20 Oct 2023 01:23 )    Color: x / Appearance: x / SG: x / pH: x  Gluc: 90 mg/dL / Ketone: x  / Bili: x / Urobili: x   Blood: x / Protein: x / Nitrite: x   Leuk Esterase: x / RBC: x / WBC x   Sq Epi: x / Non Sq Epi: x / Bacteria: x      Current Medications  Standing Medications  budesonide 160 MICROgram(s)/formoterol 4.5 MICROgram(s) Inhaler 2 Puff(s) Inhalation two times a day  buMETAnide 2 milliGRAM(s) Oral every 12 hours  chlorhexidine 2% Cloths 1 Application(s) Topical <User Schedule>  collagenase Ointment 1 Application(s) Topical two times a day  darbepoetin Injectable Syringe 25 MICROGram(s) IV Push <User Schedule>  dextrose 5%. 1000 milliLiter(s) (100 mL/Hr) IV Continuous <Continuous>  dextrose 50% Injectable 25 Gram(s) IV Push once  dextrose 50% Injectable 12.5 Gram(s) IV Push once  dextrose 50% Injectable 25 Gram(s) IV Push once  gabapentin 100 milliGRAM(s) Oral at bedtime  glucagon  Injectable 1 milliGRAM(s) IntraMuscular once  heparin   Injectable 5000 Unit(s) SubCutaneous every 8 hours  insulin lispro (ADMELOG) corrective regimen sliding scale   SubCutaneous every 6 hours  meropenem  IVPB 1000 milliGRAM(s) IV Intermittent every 24 hours  metoprolol tartrate 12.5 milliGRAM(s) Oral every 12 hours  midodrine. 10 milliGRAM(s) Oral every 8 hours  pantoprazole   Suspension 40 milliGRAM(s) Oral two times a day    PRN Medications  acetaminophen     Tablet .. 650 milliGRAM(s) Oral every 6 hours PRN Temp greater or equal to 38C (100.4F), Mild Pain (1 - 3), Moderate Pain (4 - 6)  albuterol    90 MICROgram(s) HFA Inhaler 1 Puff(s) Inhalation every 6 hours PRN for shortness of breath and/or wheezing  atropine Injectable 1 milliGRAM(s) IntraMuscular once PRN HR < 30  dextrose Oral Gel 15 Gram(s) Oral once PRN Blood Glucose LESS THAN 70 milliGRAM(s)/deciliter    Singles Doses Administered  (ADM OVERRIDE) 1 each &lt;see task&gt; GiveOnce  (ADM OVERRIDE) 1 each &lt;see task&gt; GiveOnce  (ADM OVERRIDE) 1 each &lt;see task&gt; GiveOnce  (ADM OVERRIDE) 1 each &lt;see task&gt; GiveOnce  (ADM OVERRIDE) 1 each &lt;see task&gt; GiveOnce  (ADM OVERRIDE) 1 each &lt;see task&gt; GiveOnce  (ADM OVERRIDE) 5 each &lt;see task&gt; GiveOnce  (ADM OVERRIDE) 1 each &lt;see task&gt; GiveOnce  (ADM OVERRIDE) 1 each &lt;see task&gt; GiveOnce  (ADM OVERRIDE) 1 each &lt;see task&gt; GiveOnce  (ADM OVERRIDE) 1 each &lt;see task&gt; GiveOnce  (ADM OVERRIDE) 1 each &lt;see task&gt; GiveOnce  (ADM OVERRIDE) 1 each &lt;see task&gt; GiveOnce  (ADM OVERRIDE) 1 each &lt;see task&gt; GiveOnce  (ADM OVERRIDE) 1 each &lt;see task&gt; GiveOnce  (ADM OVERRIDE) 1 each &lt;see task&gt; GiveOnce  (ADM OVERRIDE) 1 each &lt;see task&gt; GiveOnce  (ADM OVERRIDE) 1 each &lt;see task&gt; GiveOnce  buMETAnide Injectable 2 milliGRAM(s) IV Push once  caspofungin IVPB 70 milliGRAM(s) IV Intermittent once  cefepime   IVPB      cefepime   IVPB 2000 milliGRAM(s) IV Intermittent every 8 hours  cefepime   IVPB 2000 milliGRAM(s) IV Intermittent once  chlorhexidine 2% Cloths 1 Application(s) Topical every 12 hours  dextrose 50% Injectable 12.5 Gram(s) IV Push once  diphenhydrAMINE Injectable 50 milliGRAM(s) IntraMuscular once  fentaNYL    Injectable 50 MICROGram(s) IV Push every 10 minutes PRN  haloperidol    Injectable 5 milliGRAM(s) IntraMuscular Once  haloperidol    Injectable 0.5 milliGRAM(s) IntraMuscular once  heparin   Injectable 8000 Unit(s) IV Push once  HYDROmorphone  Injectable 0.25 milliGRAM(s) IV Push every 6 hours PRN  lactated ringers Bolus 500 milliLiter(s) IV Bolus once  lactated ringers Bolus 500 milliLiter(s) IV Bolus once  lactated ringers Bolus 250 milliLiter(s) IV Bolus once  lactated ringers Bolus 250 milliLiter(s) IV Bolus once  lactated ringers Bolus 500 milliLiter(s) IV Bolus once  lactated ringers Bolus 1000 milliLiter(s) IV Bolus once  lactated ringers Bolus 250 milliLiter(s) IV Bolus once  lactated ringers Bolus 500 milliLiter(s) IV Bolus once  lactated ringers Bolus 1000 milliLiter(s) IV Bolus once  lactated ringers Bolus 250 milliLiter(s) IV Bolus once  lactated ringers Bolus 500 milliLiter(s) IV Bolus once  lactated ringers Bolus 250 milliLiter(s) IV Bolus once  LORazepam   Injectable 1 milliGRAM(s) IntraMuscular once  magnesium sulfate  IVPB 1 Gram(s) IV Intermittent once  magnesium sulfate  IVPB 2 Gram(s) IV Intermittent every 2 hours  magnesium sulfate  IVPB 2 Gram(s) IV Intermittent once  magnesium sulfate  IVPB 2 Gram(s) IV Intermittent once  magnesium sulfate  IVPB 2 Gram(s) IV Intermittent once  magnesium sulfate  IVPB 2 Gram(s) IV Intermittent once  magnesium sulfate  IVPB 2 Gram(s) IV Intermittent once  magnesium sulfate  IVPB 2 Gram(s) IV Intermittent once  magnesium sulfate  IVPB 1 Gram(s) IV Intermittent once  meropenem  IVPB 500 milliGRAM(s) IV Intermittent once  midazolam  1 mG/mL Injectable (Rx Quick Charge) 2 milliGRAM(s) IV Push   midodrine. 10 milliGRAM(s) Oral once  mupirocin 2% Ointment 1 Application(s) Both Nostrils two times a day  potassium chloride    Tablet ER 40 milliEquivalent(s) Oral once  potassium chloride   Powder 40 milliEquivalent(s) Oral once  potassium chloride   Powder 40 milliEquivalent(s) Oral once  potassium chloride   Powder 40 milliEquivalent(s) Oral once  potassium chloride   Powder 40 milliEquivalent(s) Oral once  potassium chloride   Powder 20 milliEquivalent(s) Oral every 2 hours  potassium chloride  20 mEq/100 mL IVPB 20 milliEquivalent(s) IV Intermittent every 2 hours  potassium chloride  20 mEq/100 mL IVPB 20 milliEquivalent(s) IV Intermittent once  protamine  IVPB 36 milliGRAM(s) IV Intermittent once  rocuronium 10 mG/mL Injectable (Rx Quick Charge) 50 milliGRAM(s) IV Push   sodium chloride 0.9% Bolus 750 milliLiter(s) IV Bolus once  sodium chloride 0.9% Bolus 250 milliLiter(s) IV Bolus once  succinylcholine 20 mG/mL Injectable (Rx Quick Charge) 120 milliGRAM(s) IV Push   vancomycin  IVPB 1750 milliGRAM(s) IV Intermittent once  vancomycin  IVPB 1250 milliGRAM(s) IV Intermittent once  vancomycin  IVPB 1000 milliGRAM(s) IV Intermittent once  vancomycin  IVPB 1500 milliGRAM(s) IV Intermittent once  vancomycin  IVPB. 1000 milliGRAM(s) IV Intermittent once

## 2023-10-20 NOTE — SWALLOW VFSS/MBS ASSESSMENT ADULT - SLP GENERAL OBSERVATIONS
pt received in radiology suite awake alert w/o c/o pain. +room air +NGT in place
pt received in radiology suite awake confused w/o c/o pain. +room air

## 2023-10-20 NOTE — SWALLOW VFSS/MBS ASSESSMENT ADULT - SLP PERTINENT HISTORY OF CURRENT PROBLEM
Pt is an 82 y/o M w/ PMHx: DM, COPD, anemia, lymphedema, afib on Xarelto, HFrEF, DM, presents from NH for AMS. Pt is being treated for anemia, hemorrhagic shock 2' hematochezia from duodenal ulcer bleed s/p OVESCO placement 09/23. Course c/b acutely worsening uremia and acute kidney failure most likely 2' ATN- now on HD through right IJ [place]. +Metabolic encephalopathy, hypernatremia, AHRF- possible aspiration PNA, volume overload, R pleural effusions, hypotension, acutely worsening uremia and acute kidney failure most likely 2' ATN. CTH-> focal hypodensity is noted within the left subinsular region, previously seen potentially reflecting age-indeterminate infarct. CXR 10/15-> stable bilateral opacities and effusions. Pt s/p FEES 10/10, VFSS 10/11 w/ recs for long-term NPO and GOC discussion (PEG vs. comfort feeds). GOC ongoing, pt full code at this time.

## 2023-10-20 NOTE — PROGRESS NOTE ADULT - ASSESSMENT
82 yo m ho DM, COPD, anemia, paroxysmal afib on xarelto, HFrEF, DM coming in from nursing home for AMS x 2 days. Found to have toxic metabolic encephalopathy with PAULA.  PAULA/ toxic metabolic encephalopathy/ HAGMA/ HFrEF/ hypernatremia  possible ATN iso hypotension and possible sepsis/ vanc toxicity  no hydro on imaging  creat trending down to 1.2  HD on hold   - UO 1 liter documented  - d/c Tunneled HD cath by sx or IR   cont bumex 2 q 12   phos level noted low;  no binders   BP noted / decrease midodrine to 10 q 8   remains on ATB / followed by ID   s/p IVC filter   iron stores noted / keep Hb >7  GI notes appreciated   will sign off recall as needed   prognosis poor

## 2023-10-20 NOTE — SWALLOW BEDSIDE ASSESSMENT ADULT - ASR SWALLOW RECOMMEND DIAG
SLP to assess candidacy for repeat swallow study
SLP to assess candidacy for instrumental swallow study

## 2023-10-20 NOTE — SWALLOW VFSS/MBS ASSESSMENT ADULT - UNSUCCESSFUL STRATEGIES TRIALED DURING PROCEDURE
pt unable to follow directions for compensatory swallow strategy/nonproductive volitional cough following clinician cue
pt became agitated when cued for postural changes/productive volitional cough following clinician cue

## 2023-10-20 NOTE — PROGRESS NOTE ADULT - SUBJECTIVE AND OBJECTIVE BOX
Nephrology progress note    Patient is seen and examined, events over the last 24 h noted .    Allergies:  No Known Allergies    Hospital Medications:   MEDICATIONS  (STANDING):  budesonide 160 MICROgram(s)/formoterol 4.5 MICROgram(s) Inhaler 2 Puff(s) Inhalation two times a day  buMETAnide 2 milliGRAM(s) Oral every 12 hours  chlorhexidine 2% Cloths 1 Application(s) Topical <User Schedule>  collagenase Ointment 1 Application(s) Topical two times a day  darbepoetin Injectable Syringe 25 MICROGram(s) IV Push <User Schedule>  dextrose 5%. 1000 milliLiter(s) (100 mL/Hr) IV Continuous <Continuous>  dextrose 50% Injectable 25 Gram(s) IV Push once  dextrose 50% Injectable 25 Gram(s) IV Push once  dextrose 50% Injectable 12.5 Gram(s) IV Push once  gabapentin 100 milliGRAM(s) Oral at bedtime  glucagon  Injectable 1 milliGRAM(s) IntraMuscular once  heparin   Injectable 5000 Unit(s) SubCutaneous every 8 hours  insulin lispro (ADMELOG) corrective regimen sliding scale   SubCutaneous every 6 hours  meropenem  IVPB 1000 milliGRAM(s) IV Intermittent every 24 hours  metoprolol tartrate 12.5 milliGRAM(s) Oral every 12 hours  midodrine. 10 milliGRAM(s) Oral every 8 hours  pantoprazole   Suspension 40 milliGRAM(s) Oral two times a day        VITALS:  T(F): 98 (10-20-23 @ 14:27), Max: 99.1 (10-20-23 @ 04:36)  HR: 83 (10-20-23 @ 14:27)  BP: 96/52 (10-20-23 @ 14:27)  RR: 18 (10-20-23 @ 14:27)  SpO2: 98% (10-20-23 @ 14:27)  Wt(kg): --    10-18 @ 07:01  -  10-19 @ 07:00  --------------------------------------------------------  IN: 1350 mL / OUT: 1000 mL / NET: 350 mL    10-19 @ 07:01  -  10-20 @ 07:00  --------------------------------------------------------  IN: 525 mL / OUT: 1000 mL / NET: -475 mL          PHYSICAL EXAM:  Constitutional: NAD  Neck: No JVD  Respiratory: CTAB, no wheezes, rales or rhonchi  Cardiovascular: S1, S2, RRR  Gastrointestinal: BS+, soft, NT/ND  Extremities: . No peripheral edema  Neurological: lEthargic  : No CVA tenderness. No barth.   Skin: excoriations chest  Vascular Access: TDVC    LABS:  10-20    140  |  103  |  31<H>  ----------------------------<  90  4.8   |  26  |  1.2    Ca    7.3<L>      20 Oct 2023 01:23  Phos  4.5     10-19  Mg     1.7     10-20    TPro  6.1  /  Alb  2.5<L>  /  TBili  0.4  /  DBili      /  AST  38  /  ALT  21  /  AlkPhos  140<H>  10-19                          8.3    9.66  )-----------( 146      ( 20 Oct 2023 01:23 )             27.2       Urine Studies:  Urinalysis Basic - ( 20 Oct 2023 01:23 )    Color:  / Appearance:  / SG:  / pH:   Gluc: 90 mg/dL / Ketone:   / Bili:  / Urobili:    Blood:  / Protein:  / Nitrite:    Leuk Esterase:  / RBC:  / WBC    Sq Epi:  / Non Sq Epi:  / Bacteria:         RADIOLOGY & ADDITIONAL STUDIES:  < from: Xray Chest 1 View- PORTABLE-Urgent (Xray Chest 1 View- PORTABLE-Urgent .) (10.15.23 @ 09:03) >    Impression:  Stable bilateral opacities and effusions.    < end of copied text >

## 2023-10-21 LAB
ALBUMIN SERPL ELPH-MCNC: 2.3 G/DL — LOW (ref 3.5–5.2)
ALBUMIN SERPL ELPH-MCNC: 2.3 G/DL — LOW (ref 3.5–5.2)
ALP SERPL-CCNC: 98 U/L — SIGNIFICANT CHANGE UP (ref 30–115)
ALP SERPL-CCNC: 98 U/L — SIGNIFICANT CHANGE UP (ref 30–115)
ALT FLD-CCNC: 15 U/L — SIGNIFICANT CHANGE UP (ref 0–41)
ALT FLD-CCNC: 15 U/L — SIGNIFICANT CHANGE UP (ref 0–41)
ANION GAP SERPL CALC-SCNC: 11 MMOL/L — SIGNIFICANT CHANGE UP (ref 7–14)
ANION GAP SERPL CALC-SCNC: 11 MMOL/L — SIGNIFICANT CHANGE UP (ref 7–14)
AST SERPL-CCNC: 18 U/L — SIGNIFICANT CHANGE UP (ref 0–41)
AST SERPL-CCNC: 18 U/L — SIGNIFICANT CHANGE UP (ref 0–41)
BASOPHILS # BLD AUTO: 0.02 K/UL — SIGNIFICANT CHANGE UP (ref 0–0.2)
BASOPHILS # BLD AUTO: 0.02 K/UL — SIGNIFICANT CHANGE UP (ref 0–0.2)
BASOPHILS NFR BLD AUTO: 0.2 % — SIGNIFICANT CHANGE UP (ref 0–1)
BASOPHILS NFR BLD AUTO: 0.2 % — SIGNIFICANT CHANGE UP (ref 0–1)
BILIRUB SERPL-MCNC: 0.6 MG/DL — SIGNIFICANT CHANGE UP (ref 0.2–1.2)
BILIRUB SERPL-MCNC: 0.6 MG/DL — SIGNIFICANT CHANGE UP (ref 0.2–1.2)
BUN SERPL-MCNC: 32 MG/DL — HIGH (ref 10–20)
BUN SERPL-MCNC: 32 MG/DL — HIGH (ref 10–20)
CALCIUM SERPL-MCNC: 7.5 MG/DL — LOW (ref 8.4–10.5)
CALCIUM SERPL-MCNC: 7.5 MG/DL — LOW (ref 8.4–10.5)
CHLORIDE SERPL-SCNC: 101 MMOL/L — SIGNIFICANT CHANGE UP (ref 98–110)
CHLORIDE SERPL-SCNC: 101 MMOL/L — SIGNIFICANT CHANGE UP (ref 98–110)
CO2 SERPL-SCNC: 29 MMOL/L — SIGNIFICANT CHANGE UP (ref 17–32)
CO2 SERPL-SCNC: 29 MMOL/L — SIGNIFICANT CHANGE UP (ref 17–32)
CREAT SERPL-MCNC: 1.4 MG/DL — SIGNIFICANT CHANGE UP (ref 0.7–1.5)
CREAT SERPL-MCNC: 1.4 MG/DL — SIGNIFICANT CHANGE UP (ref 0.7–1.5)
EGFR: 50 ML/MIN/1.73M2 — LOW
EGFR: 50 ML/MIN/1.73M2 — LOW
EOSINOPHIL # BLD AUTO: 0.17 K/UL — SIGNIFICANT CHANGE UP (ref 0–0.7)
EOSINOPHIL # BLD AUTO: 0.17 K/UL — SIGNIFICANT CHANGE UP (ref 0–0.7)
EOSINOPHIL NFR BLD AUTO: 1.6 % — SIGNIFICANT CHANGE UP (ref 0–8)
EOSINOPHIL NFR BLD AUTO: 1.6 % — SIGNIFICANT CHANGE UP (ref 0–8)
GLUCOSE BLDC GLUCOMTR-MCNC: 104 MG/DL — HIGH (ref 70–99)
GLUCOSE BLDC GLUCOMTR-MCNC: 104 MG/DL — HIGH (ref 70–99)
GLUCOSE BLDC GLUCOMTR-MCNC: 94 MG/DL — SIGNIFICANT CHANGE UP (ref 70–99)
GLUCOSE BLDC GLUCOMTR-MCNC: 94 MG/DL — SIGNIFICANT CHANGE UP (ref 70–99)
GLUCOSE SERPL-MCNC: 90 MG/DL — SIGNIFICANT CHANGE UP (ref 70–99)
GLUCOSE SERPL-MCNC: 90 MG/DL — SIGNIFICANT CHANGE UP (ref 70–99)
HCT VFR BLD CALC: 26 % — LOW (ref 42–52)
HCT VFR BLD CALC: 26 % — LOW (ref 42–52)
HGB BLD-MCNC: 7.9 G/DL — LOW (ref 14–18)
HGB BLD-MCNC: 7.9 G/DL — LOW (ref 14–18)
IMM GRANULOCYTES NFR BLD AUTO: 0.5 % — HIGH (ref 0.1–0.3)
IMM GRANULOCYTES NFR BLD AUTO: 0.5 % — HIGH (ref 0.1–0.3)
LYMPHOCYTES # BLD AUTO: 3.39 K/UL — SIGNIFICANT CHANGE UP (ref 1.2–3.4)
LYMPHOCYTES # BLD AUTO: 3.39 K/UL — SIGNIFICANT CHANGE UP (ref 1.2–3.4)
LYMPHOCYTES # BLD AUTO: 30.9 % — SIGNIFICANT CHANGE UP (ref 20.5–51.1)
LYMPHOCYTES # BLD AUTO: 30.9 % — SIGNIFICANT CHANGE UP (ref 20.5–51.1)
MAGNESIUM SERPL-MCNC: 2 MG/DL — SIGNIFICANT CHANGE UP (ref 1.8–2.4)
MAGNESIUM SERPL-MCNC: 2 MG/DL — SIGNIFICANT CHANGE UP (ref 1.8–2.4)
MCHC RBC-ENTMCNC: 28.7 PG — SIGNIFICANT CHANGE UP (ref 27–31)
MCHC RBC-ENTMCNC: 28.7 PG — SIGNIFICANT CHANGE UP (ref 27–31)
MCHC RBC-ENTMCNC: 30.4 G/DL — LOW (ref 32–37)
MCHC RBC-ENTMCNC: 30.4 G/DL — LOW (ref 32–37)
MCV RBC AUTO: 94.5 FL — HIGH (ref 80–94)
MCV RBC AUTO: 94.5 FL — HIGH (ref 80–94)
MONOCYTES # BLD AUTO: 0.77 K/UL — HIGH (ref 0.1–0.6)
MONOCYTES # BLD AUTO: 0.77 K/UL — HIGH (ref 0.1–0.6)
MONOCYTES NFR BLD AUTO: 7 % — SIGNIFICANT CHANGE UP (ref 1.7–9.3)
MONOCYTES NFR BLD AUTO: 7 % — SIGNIFICANT CHANGE UP (ref 1.7–9.3)
NEUTROPHILS # BLD AUTO: 6.56 K/UL — HIGH (ref 1.4–6.5)
NEUTROPHILS # BLD AUTO: 6.56 K/UL — HIGH (ref 1.4–6.5)
NEUTROPHILS NFR BLD AUTO: 59.8 % — SIGNIFICANT CHANGE UP (ref 42.2–75.2)
NEUTROPHILS NFR BLD AUTO: 59.8 % — SIGNIFICANT CHANGE UP (ref 42.2–75.2)
NRBC # BLD: 0 /100 WBCS — SIGNIFICANT CHANGE UP (ref 0–0)
NRBC # BLD: 0 /100 WBCS — SIGNIFICANT CHANGE UP (ref 0–0)
PLATELET # BLD AUTO: 292 K/UL — SIGNIFICANT CHANGE UP (ref 130–400)
PLATELET # BLD AUTO: 292 K/UL — SIGNIFICANT CHANGE UP (ref 130–400)
PMV BLD: 10 FL — SIGNIFICANT CHANGE UP (ref 7.4–10.4)
PMV BLD: 10 FL — SIGNIFICANT CHANGE UP (ref 7.4–10.4)
POTASSIUM SERPL-MCNC: 4 MMOL/L — SIGNIFICANT CHANGE UP (ref 3.5–5)
POTASSIUM SERPL-MCNC: 4 MMOL/L — SIGNIFICANT CHANGE UP (ref 3.5–5)
POTASSIUM SERPL-SCNC: 4 MMOL/L — SIGNIFICANT CHANGE UP (ref 3.5–5)
POTASSIUM SERPL-SCNC: 4 MMOL/L — SIGNIFICANT CHANGE UP (ref 3.5–5)
PROT SERPL-MCNC: 5.8 G/DL — LOW (ref 6–8)
PROT SERPL-MCNC: 5.8 G/DL — LOW (ref 6–8)
RBC # BLD: 2.75 M/UL — LOW (ref 4.7–6.1)
RBC # BLD: 2.75 M/UL — LOW (ref 4.7–6.1)
RBC # FLD: 19.1 % — HIGH (ref 11.5–14.5)
RBC # FLD: 19.1 % — HIGH (ref 11.5–14.5)
SODIUM SERPL-SCNC: 141 MMOL/L — SIGNIFICANT CHANGE UP (ref 135–146)
SODIUM SERPL-SCNC: 141 MMOL/L — SIGNIFICANT CHANGE UP (ref 135–146)
WBC # BLD: 10.96 K/UL — HIGH (ref 4.8–10.8)
WBC # BLD: 10.96 K/UL — HIGH (ref 4.8–10.8)
WBC # FLD AUTO: 10.96 K/UL — HIGH (ref 4.8–10.8)
WBC # FLD AUTO: 10.96 K/UL — HIGH (ref 4.8–10.8)

## 2023-10-21 PROCEDURE — 99232 SBSQ HOSP IP/OBS MODERATE 35: CPT

## 2023-10-21 PROCEDURE — 99231 SBSQ HOSP IP/OBS SF/LOW 25: CPT | Mod: FS

## 2023-10-21 RX ORDER — COLLAGENASE CLOSTRIDIUM HIST. 250 UNIT/G
1 OINTMENT (GRAM) TOPICAL
Refills: 0 | Status: DISCONTINUED | OUTPATIENT
Start: 2023-10-21 | End: 2023-10-25

## 2023-10-21 RX ADMIN — GABAPENTIN 100 MILLIGRAM(S): 400 CAPSULE ORAL at 21:16

## 2023-10-21 RX ADMIN — BUMETANIDE 2 MILLIGRAM(S): 0.25 INJECTION INTRAMUSCULAR; INTRAVENOUS at 05:30

## 2023-10-21 RX ADMIN — Medication 1 APPLICATION(S): at 05:24

## 2023-10-21 RX ADMIN — MEROPENEM 100 MILLIGRAM(S): 1 INJECTION INTRAVENOUS at 15:56

## 2023-10-21 RX ADMIN — BUDESONIDE AND FORMOTEROL FUMARATE DIHYDRATE 2 PUFF(S): 160; 4.5 AEROSOL RESPIRATORY (INHALATION) at 07:46

## 2023-10-21 RX ADMIN — HEPARIN SODIUM 5000 UNIT(S): 5000 INJECTION INTRAVENOUS; SUBCUTANEOUS at 05:25

## 2023-10-21 RX ADMIN — MIDODRINE HYDROCHLORIDE 10 MILLIGRAM(S): 2.5 TABLET ORAL at 21:16

## 2023-10-21 RX ADMIN — MIDODRINE HYDROCHLORIDE 10 MILLIGRAM(S): 2.5 TABLET ORAL at 16:00

## 2023-10-21 RX ADMIN — BUMETANIDE 2 MILLIGRAM(S): 0.25 INJECTION INTRAMUSCULAR; INTRAVENOUS at 18:58

## 2023-10-21 RX ADMIN — HEPARIN SODIUM 5000 UNIT(S): 5000 INJECTION INTRAVENOUS; SUBCUTANEOUS at 15:59

## 2023-10-21 RX ADMIN — PANTOPRAZOLE SODIUM 40 MILLIGRAM(S): 20 TABLET, DELAYED RELEASE ORAL at 05:27

## 2023-10-21 RX ADMIN — Medication 12.5 MILLIGRAM(S): at 05:27

## 2023-10-21 RX ADMIN — Medication 12.5 MILLIGRAM(S): at 18:59

## 2023-10-21 RX ADMIN — Medication 1 APPLICATION(S): at 18:54

## 2023-10-21 RX ADMIN — PANTOPRAZOLE SODIUM 40 MILLIGRAM(S): 20 TABLET, DELAYED RELEASE ORAL at 18:59

## 2023-10-21 RX ADMIN — CHLORHEXIDINE GLUCONATE 1 APPLICATION(S): 213 SOLUTION TOPICAL at 05:25

## 2023-10-21 NOTE — PROGRESS NOTE ADULT - SUBJECTIVE AND OBJECTIVE BOX
24H events:    Patient is a 83y old Male who presents with a chief complaint of AMS (20 Oct 2023 18:14)    Primary diagnosis of Altered mental status        Today is 36d of hospitalization. This morning patient was seen and examined at bedside, resting comfortably in bed.    No acute or major events overnight.      PAST MEDICAL & SURGICAL HISTORY  HTN (hypertension)    COPD (chronic obstructive pulmonary disease)    High cholesterol    Mild anemia    Coffee ground emesis    Chronic diastolic heart failure    PAULA (acute kidney injury)    No significant past surgical history      SOCIAL HISTORY:  Social History:      ALLERGIES:  No Known Allergies    MEDICATIONS:  STANDING MEDICATIONS  budesonide 160 MICROgram(s)/formoterol 4.5 MICROgram(s) Inhaler 2 Puff(s) Inhalation two times a day  buMETAnide 2 milliGRAM(s) Oral every 12 hours  chlorhexidine 2% Cloths 1 Application(s) Topical <User Schedule>  collagenase Ointment 1 Application(s) Topical two times a day  darbepoetin Injectable Syringe 25 MICROGram(s) IV Push <User Schedule>  dextrose 5%. 1000 milliLiter(s) IV Continuous <Continuous>  dextrose 50% Injectable 25 Gram(s) IV Push once  dextrose 50% Injectable 12.5 Gram(s) IV Push once  dextrose 50% Injectable 25 Gram(s) IV Push once  gabapentin 100 milliGRAM(s) Oral at bedtime  glucagon  Injectable 1 milliGRAM(s) IntraMuscular once  heparin   Injectable 5000 Unit(s) SubCutaneous every 8 hours  insulin lispro (ADMELOG) corrective regimen sliding scale   SubCutaneous every 6 hours  meropenem  IVPB 1000 milliGRAM(s) IV Intermittent every 24 hours  metoprolol tartrate 12.5 milliGRAM(s) Oral every 12 hours  midodrine. 10 milliGRAM(s) Oral every 8 hours  pantoprazole   Suspension 40 milliGRAM(s) Oral two times a day    PRN MEDICATIONS  acetaminophen     Tablet .. 650 milliGRAM(s) Oral every 6 hours PRN  albuterol    90 MICROgram(s) HFA Inhaler 1 Puff(s) Inhalation every 6 hours PRN  atropine Injectable 1 milliGRAM(s) IntraMuscular once PRN  dextrose Oral Gel 15 Gram(s) Oral once PRN    VITALS:   T(F): 98.9  HR: 86  BP: 130/61  RR: 16  SpO2: 96%    PHYSICAL EXAM:  GENERAL:  NAD chronically ill appearing   SKIN: No rashes or lesions  HEENT: Atraumatic. Normocephalic.    NECK: Supple, No JVD.    PULMONARY: decreased breath sounds B/L. No wheezing.   CVS: Normal S1, S2. Rate and Rhythm are regular.   ABDOMEN/GI: Soft, Nontender, Nondistended   MSK:  No clubbing or cyanosis , slurred speech   NEUROLOGIC: weak, moves all extremities   PSYCH: Awake, able to follow simple commands           LABS:                        7.9    10.96 )-----------( 292      ( 21 Oct 2023 06:13 )             26.0     10-21    141  |  101  |  32<H>  ----------------------------<  90  4.0   |  29  |  1.4    Ca    7.5<L>      21 Oct 2023 06:13  Mg     2.0     10-21    TPro  5.8<L>  /  Alb  2.3<L>  /  TBili  0.6  /  DBili  x   /  AST  18  /  ALT  15  /  AlkPhos  98  10-21      Urinalysis Basic - ( 21 Oct 2023 06:13 )    Color: x / Appearance: x / SG: x / pH: x  Gluc: 90 mg/dL / Ketone: x  / Bili: x / Urobili: x   Blood: x / Protein: x / Nitrite: x   Leuk Esterase: x / RBC: x / WBC x   Sq Epi: x / Non Sq Epi: x / Bacteria: x

## 2023-10-21 NOTE — CONSULT NOTE ADULT - CONSULT REQUESTED DATE/TIME
12-Oct-2023 12:52
18-Sep-2023
03-Oct-2023 14:41
13-Oct-2023 15:50
15-Sep-2023 17:01
16-Sep-2023 13:52
16-Sep-2023 15:28
16-Sep-2023 10:09
07-Oct-2023 16:15
10-Oct-2023 16:41
23-Sep-2023 17:13
13-Oct-2023 17:40
21-Oct-2023 16:23
24-Sep-2023

## 2023-10-21 NOTE — PROGRESS NOTE ADULT - ASSESSMENT
84 yo m ho DM, COPD, anemia, lymphedemia, afib on xarelto, HFrEF, DM coming in from nursing home for AMS x 2 days admitted for septic shock with hospital course with multiple complications.     Septic Shock due to suspected Aspiration PNA v Pneumonitis, not POA  Had PICC line POA from Aug 2023 - removed  Metabolic Encephalopathy - improved, s/p intubation, now on room air   Elevated Lactate - improved   - now off pressors, on room air  - all cx negative, procal downtrending 1.7--> 0.66, fungitell neg  - on Meropenem per ID for 2 more days. IF clinical decompensation check CT A/P and repeat LA  - off levophed, decrease  midodrine to 5 q8H. if BP stable, wean off     Pleural effusion - currently on room air, no planned thora per pulm    Severe Pharyngeal Dysphagia  HO aspiration pneumonitis SP ABX   -  Patient was tolerating PO intake at NH prior to admission   - FEES Study on 10/11: severe pharyngeal dysphagia, recommendation to keep NPO with alternate means -> now on NGT for feeding  - discussed with s/s, s/p modified barium swallow. Can advance diet to PO per recs.  - montiro 1/1 feed, assess,   -GI consulted for PEG, potentially next week    Hemorrhagic Shock Secondary to Hematochezia from Duodenal Ulcer Bleed s/p OVESCO placement 09/23  UGIB secondary to Duodenal ulcer SP EGD   Hematochezia resolved   Blood Loss Anemia s/p 1u PRBC   - Status Post EGD on 09/23: blood clots in fundus and antrum; 2cm actively bleeding duodenal ulcer with spurting vessel ([place] 1a) in sweep on base s/p 10cc epi and s/p 11/6 OVESCO placement for hemostasis  - s/p 1u PRBC and Protamine sulfate 9/23  - Continue PO Protonix 40mg BID   - currently on DVT ppx, full AC on hold   - monitor CBC, keep active T&S    Acute kidney failure on CKD /? ATN - now on HD via TDC placed 10/4  Hypernatremia - resolved   - on HD per renal, currently on hold, IR to remove TDC   - c/w Bumex, renal fxn improving, renal signing off     Acute Femoral DVT s/p IVC filter 10/6  Elevated dimer  - duplex neg on 9/29  - s/p IVC filter 10/6  - Full AC on hold     Age indeterminate infarct on imaging   - Indeterminate Left Subinsular Stroke on 10/10    Transaminitis - resolved  Asymptomatic Cholelithiasis    Paroxysmal Afib, chronic   - resume metoprolol 12.5 Q12H as BP/HR stable     History of Left Foot OM w/ surrounding Cellulitis  Sacral Decubitus Ulcer POA  - CT LLE (9/16): No CT evidence of osteomyelitis or necrotizing infection. No drainable collection seen, within the limitations of a noncontrast exam.   - per podiatry, c/w Local wound care; offloading boots bilaterally, frequently turning and positioning, prevent pressure injury, keep skin clean, and monitor for wound changes and notify provider as per podiatry.

## 2023-10-21 NOTE — CONSULT NOTE ADULT - CONSULT REQUESTED BY NAME
MED
IM
[place]
medicine
Dr. Sam
primary team
x
Dr Hidalgo
ED
primary care team
service
hospitalist
Medicine
Coby Navarrete

## 2023-10-21 NOTE — CONSULT NOTE ADULT - ASSESSMENT
sacrum and right buttock wounds    Plan:   - Plan for bedside debridement some time next week  - LWC: sacrum: santyl vashe kerlix allevyn BID; right buttock santyl allevyn bid   - wcx taken 10/21, f/u  - Remainder of care per primary care team

## 2023-10-21 NOTE — PROGRESS NOTE ADULT - ATTENDING COMMENTS
82 yo m ho DM, COPD, anemia, lymphedemia, afib on xarelto, HFrEF, DM coming in from nursing home for AMS x 2 days admitted for septic shock with hospital course with multiple complications.     Septic Shock due to suspected Aspiration PNA v Pneumonitis, not POA -resolved  Had PICC line POA from Aug 2023 - removed  Metabolic Encephalopathy - improved, s/p intubation, now on room air   Elevated Lactate - improved   - now off pressors, on room air  - all cx negative, procal downtrending 1.7--> 0.66, fungitell neg  - on Meropenem per ID till tomorrow. IF clinical decompensation check CT A/P and repeat LA  - off levophed, c/w midodrine 5mg q8H. if BP stable, wean off.    Pleural effusion - currently on room air, no planned thora per pulm    Severe Pharyngeal Dysphagia  HO aspiration pneumonitis SP ABX   - Patient was tolerating PO intake at NH prior to admission   - FEES Study on 10/11: severe pharyngeal dysphagia, recommendation to keep NPO with alternate means -> now on NGT for feeding  - discussed with s/s, s/p modified barium swallow. Can advance diet to PO per recs.  - NGT was removed on 10/20: monitor PO intake. Will start on calorie count.  - Evaluated by GI for PEG tube: likely early next week.     Hemorrhagic Shock Secondary to Hematochezia from Duodenal Ulcer Bleed s/p OVESCO placement 09/23  UGIB secondary to Duodenal ulcer SP EGD   Hematochezia resolved   Blood Loss Anemia s/p 1u PRBC   - Status Post EGD on 09/23: blood clots in fundus and antrum; 2cm actively bleeding duodenal ulcer with spurting vessel ([place] 1a) in sweep on base s/p 10cc epi and s/p 11/6 OVESCO placement for hemostasis  - s/p 1u PRBC and Protamine sulfate 9/23  - Continue PO Protonix 40mg BID   - currently on DVT ppx, full AC on hold   - monitor CBC, keep active T&S    Acute kidney failure on CKD /? ATN - now on HD via TDC placed 10/4  Hypernatremia - resolved   - on HD per renal, currently on hold, IR to remove TDC   - c/w Bumex, renal fxn improving, renal signing off     Acute Femoral DVT s/p IVC filter 10/6  Elevated dimer  - duplex neg on 9/29  - s/p IVC filter 10/6  - Full AC on hold     Age indeterminate infarct on imaging   - Indeterminate Left Subinsular Stroke on 10/10    Transaminitis - resolved  Asymptomatic Cholelithiasis    Paroxysmal Afib, chronic   - resume metoprolol 12.5 Q12H as BP/HR stable     History of Left Foot OM w/ surrounding Cellulitis  Sacral Decubitus Ulcer POA  - CT LLE (9/16): No CT evidence of osteomyelitis or necrotizing infection. No drainable collection seen, within the limitations of a noncontrast exam.   - per podiatry, c/w Local wound care; offloading boots bilaterally, frequently turning and positioning, prevent pressure injury, keep skin clean, and monitor for wound changes and notify provider as per podiatry.    FUll code, overall prognosis is very poor, high risk of decompensation   Pending: s/s fu, monitor PO intake, complete abx, ?peg tube placement

## 2023-10-21 NOTE — CONSULT NOTE ADULT - CONSULT REASON
Risk vs benefit of resuming AC
Urology called for difficult CIC, Phimosis
TDC placement
sacral pressure ulcer
sacrum
AMS
LLE heel ulcer
Effusions
GIB
tachy angelika
PAULA
age indeterminate infarct
hemorrhagic shock
GOC

## 2023-10-21 NOTE — CONSULT NOTE ADULT - SUBJECTIVE AND OBJECTIVE BOX
Patient is a 83y old  Male who presents with a chief complaint of AMS (21 Oct 2023 09:49)      HPI:  82 yo m ho DM, COPD, anemia, lymphedemia, afib on xarelto, HFrEF, DM coming in from nursing home for AMS x 2 days. Unable to gather story from pt as he is altered and lethargic.  As per NH was becoming more agitated x 2 days, was hypotensive yesterday and started on cefepime and vancomycin. Brought in to the ED for AMS. PICC line in place for cefepime 1q q8 until 10/5/23 and vanc 1q24 until 9/23/23 for LLE cellulitis/OM  (was at [facility] last month for LLE thick wound cellulitis/OM and sacral DTI as per call with [facility] as per collateral from NH DEISY Barry)    Burn consulted for evaluation of sacrum      PAST MEDICAL & SURGICAL HISTORY:  HTN (hypertension)  COPD (chronic obstructive pulmonary disease)  High cholesterol  Mild anemia  Coffee ground emesis  Chronic diastolic heart failure  PAULA (acute kidney injury)      FAMILY HISTORY:  No pertinent family history in first degree relatives      Allergies    No Known Allergies        Vital Signs Last 24 Hrs  T(C): 36.3 (21 Oct 2023 12:54), Max: 37.2 (21 Oct 2023 04:45)  T(F): 97.3 (21 Oct 2023 12:54), Max: 98.9 (21 Oct 2023 04:45)  HR: 80 (21 Oct 2023 12:54) (80 - 86)  BP: 90/59 (21 Oct 2023 12:54) (90/59 - 130/61)  BP(mean): 88 (21 Oct 2023 04:45) (73 - 88)  ABP: --  ABP(mean): --  RR: 16 (21 Oct 2023 04:45) (16 - 16)  SpO2: 96% (21 Oct 2023 09:25) (96% - 96%)    O2 Parameters below as of 21 Oct 2023 09:25  Patient On (Oxygen Delivery Method): room air      PHYSICAL EXAM:  General: Patient laying in bed, in NAD  Wound: 4x4 cm full thickness wound noted to sacrum with mostly yellow/brown wet devitalized tissue. Slight malodor, no purulence, drainage, o rbleeding noted.   Right buttock with full thickness wound approx 4 x2cm with mostly yellow eschar over the wound bed. No drainage, purulence or bleeding.

## 2023-10-22 LAB
ALBUMIN SERPL ELPH-MCNC: 2.5 G/DL — LOW (ref 3.5–5.2)
ALBUMIN SERPL ELPH-MCNC: 2.5 G/DL — LOW (ref 3.5–5.2)
ALP SERPL-CCNC: 91 U/L — SIGNIFICANT CHANGE UP (ref 30–115)
ALP SERPL-CCNC: 91 U/L — SIGNIFICANT CHANGE UP (ref 30–115)
ALT FLD-CCNC: 13 U/L — SIGNIFICANT CHANGE UP (ref 0–41)
ALT FLD-CCNC: 13 U/L — SIGNIFICANT CHANGE UP (ref 0–41)
ANION GAP SERPL CALC-SCNC: 13 MMOL/L — SIGNIFICANT CHANGE UP (ref 7–14)
ANION GAP SERPL CALC-SCNC: 13 MMOL/L — SIGNIFICANT CHANGE UP (ref 7–14)
AST SERPL-CCNC: 18 U/L — SIGNIFICANT CHANGE UP (ref 0–41)
AST SERPL-CCNC: 18 U/L — SIGNIFICANT CHANGE UP (ref 0–41)
BASOPHILS # BLD AUTO: 0.04 K/UL — SIGNIFICANT CHANGE UP (ref 0–0.2)
BASOPHILS # BLD AUTO: 0.04 K/UL — SIGNIFICANT CHANGE UP (ref 0–0.2)
BASOPHILS NFR BLD AUTO: 0.3 % — SIGNIFICANT CHANGE UP (ref 0–1)
BASOPHILS NFR BLD AUTO: 0.3 % — SIGNIFICANT CHANGE UP (ref 0–1)
BILIRUB SERPL-MCNC: 0.6 MG/DL — SIGNIFICANT CHANGE UP (ref 0.2–1.2)
BILIRUB SERPL-MCNC: 0.6 MG/DL — SIGNIFICANT CHANGE UP (ref 0.2–1.2)
BUN SERPL-MCNC: 32 MG/DL — HIGH (ref 10–20)
BUN SERPL-MCNC: 32 MG/DL — HIGH (ref 10–20)
CALCIUM SERPL-MCNC: 7.6 MG/DL — LOW (ref 8.4–10.5)
CALCIUM SERPL-MCNC: 7.6 MG/DL — LOW (ref 8.4–10.5)
CHLORIDE SERPL-SCNC: 103 MMOL/L — SIGNIFICANT CHANGE UP (ref 98–110)
CHLORIDE SERPL-SCNC: 103 MMOL/L — SIGNIFICANT CHANGE UP (ref 98–110)
CO2 SERPL-SCNC: 25 MMOL/L — SIGNIFICANT CHANGE UP (ref 17–32)
CO2 SERPL-SCNC: 25 MMOL/L — SIGNIFICANT CHANGE UP (ref 17–32)
CREAT SERPL-MCNC: 1.6 MG/DL — HIGH (ref 0.7–1.5)
CREAT SERPL-MCNC: 1.6 MG/DL — HIGH (ref 0.7–1.5)
EGFR: 43 ML/MIN/1.73M2 — LOW
EGFR: 43 ML/MIN/1.73M2 — LOW
EOSINOPHIL # BLD AUTO: 0.21 K/UL — SIGNIFICANT CHANGE UP (ref 0–0.7)
EOSINOPHIL # BLD AUTO: 0.21 K/UL — SIGNIFICANT CHANGE UP (ref 0–0.7)
EOSINOPHIL NFR BLD AUTO: 1.7 % — SIGNIFICANT CHANGE UP (ref 0–8)
EOSINOPHIL NFR BLD AUTO: 1.7 % — SIGNIFICANT CHANGE UP (ref 0–8)
GLUCOSE SERPL-MCNC: 90 MG/DL — SIGNIFICANT CHANGE UP (ref 70–99)
GLUCOSE SERPL-MCNC: 90 MG/DL — SIGNIFICANT CHANGE UP (ref 70–99)
GRAM STN FLD: SIGNIFICANT CHANGE UP
GRAM STN FLD: SIGNIFICANT CHANGE UP
HCT VFR BLD CALC: 27.4 % — LOW (ref 42–52)
HCT VFR BLD CALC: 27.4 % — LOW (ref 42–52)
HGB BLD-MCNC: 8.2 G/DL — LOW (ref 14–18)
HGB BLD-MCNC: 8.2 G/DL — LOW (ref 14–18)
IMM GRANULOCYTES NFR BLD AUTO: 0.3 % — SIGNIFICANT CHANGE UP (ref 0.1–0.3)
IMM GRANULOCYTES NFR BLD AUTO: 0.3 % — SIGNIFICANT CHANGE UP (ref 0.1–0.3)
LYMPHOCYTES # BLD AUTO: 33.1 % — SIGNIFICANT CHANGE UP (ref 20.5–51.1)
LYMPHOCYTES # BLD AUTO: 33.1 % — SIGNIFICANT CHANGE UP (ref 20.5–51.1)
LYMPHOCYTES # BLD AUTO: 4.1 K/UL — HIGH (ref 1.2–3.4)
LYMPHOCYTES # BLD AUTO: 4.1 K/UL — HIGH (ref 1.2–3.4)
MAGNESIUM SERPL-MCNC: 1.8 MG/DL — SIGNIFICANT CHANGE UP (ref 1.8–2.4)
MAGNESIUM SERPL-MCNC: 1.8 MG/DL — SIGNIFICANT CHANGE UP (ref 1.8–2.4)
MCHC RBC-ENTMCNC: 28.5 PG — SIGNIFICANT CHANGE UP (ref 27–31)
MCHC RBC-ENTMCNC: 28.5 PG — SIGNIFICANT CHANGE UP (ref 27–31)
MCHC RBC-ENTMCNC: 29.9 G/DL — LOW (ref 32–37)
MCHC RBC-ENTMCNC: 29.9 G/DL — LOW (ref 32–37)
MCV RBC AUTO: 95.1 FL — HIGH (ref 80–94)
MCV RBC AUTO: 95.1 FL — HIGH (ref 80–94)
MONOCYTES # BLD AUTO: 0.85 K/UL — HIGH (ref 0.1–0.6)
MONOCYTES # BLD AUTO: 0.85 K/UL — HIGH (ref 0.1–0.6)
MONOCYTES NFR BLD AUTO: 6.9 % — SIGNIFICANT CHANGE UP (ref 1.7–9.3)
MONOCYTES NFR BLD AUTO: 6.9 % — SIGNIFICANT CHANGE UP (ref 1.7–9.3)
NEUTROPHILS # BLD AUTO: 7.13 K/UL — HIGH (ref 1.4–6.5)
NEUTROPHILS # BLD AUTO: 7.13 K/UL — HIGH (ref 1.4–6.5)
NEUTROPHILS NFR BLD AUTO: 57.7 % — SIGNIFICANT CHANGE UP (ref 42.2–75.2)
NEUTROPHILS NFR BLD AUTO: 57.7 % — SIGNIFICANT CHANGE UP (ref 42.2–75.2)
NRBC # BLD: 0 /100 WBCS — SIGNIFICANT CHANGE UP (ref 0–0)
NRBC # BLD: 0 /100 WBCS — SIGNIFICANT CHANGE UP (ref 0–0)
PLATELET # BLD AUTO: 297 K/UL — SIGNIFICANT CHANGE UP (ref 130–400)
PLATELET # BLD AUTO: 297 K/UL — SIGNIFICANT CHANGE UP (ref 130–400)
PMV BLD: 10.4 FL — SIGNIFICANT CHANGE UP (ref 7.4–10.4)
PMV BLD: 10.4 FL — SIGNIFICANT CHANGE UP (ref 7.4–10.4)
POTASSIUM SERPL-MCNC: 3.8 MMOL/L — SIGNIFICANT CHANGE UP (ref 3.5–5)
POTASSIUM SERPL-MCNC: 3.8 MMOL/L — SIGNIFICANT CHANGE UP (ref 3.5–5)
POTASSIUM SERPL-SCNC: 3.8 MMOL/L — SIGNIFICANT CHANGE UP (ref 3.5–5)
POTASSIUM SERPL-SCNC: 3.8 MMOL/L — SIGNIFICANT CHANGE UP (ref 3.5–5)
PROT SERPL-MCNC: 5.9 G/DL — LOW (ref 6–8)
PROT SERPL-MCNC: 5.9 G/DL — LOW (ref 6–8)
RBC # BLD: 2.88 M/UL — LOW (ref 4.7–6.1)
RBC # BLD: 2.88 M/UL — LOW (ref 4.7–6.1)
RBC # FLD: 18.7 % — HIGH (ref 11.5–14.5)
RBC # FLD: 18.7 % — HIGH (ref 11.5–14.5)
SODIUM SERPL-SCNC: 141 MMOL/L — SIGNIFICANT CHANGE UP (ref 135–146)
SODIUM SERPL-SCNC: 141 MMOL/L — SIGNIFICANT CHANGE UP (ref 135–146)
SPECIMEN SOURCE: SIGNIFICANT CHANGE UP
SPECIMEN SOURCE: SIGNIFICANT CHANGE UP
WBC # BLD: 12.37 K/UL — HIGH (ref 4.8–10.8)
WBC # BLD: 12.37 K/UL — HIGH (ref 4.8–10.8)
WBC # FLD AUTO: 12.37 K/UL — HIGH (ref 4.8–10.8)
WBC # FLD AUTO: 12.37 K/UL — HIGH (ref 4.8–10.8)

## 2023-10-22 PROCEDURE — 99232 SBSQ HOSP IP/OBS MODERATE 35: CPT

## 2023-10-22 PROCEDURE — 11046 DBRDMT MUSC&/FSCA EA ADDL: CPT

## 2023-10-22 PROCEDURE — 11043 DBRDMT MUSC&/FSCA 1ST 20/<: CPT

## 2023-10-22 RX ORDER — LIDOCAINE HYDROCHLORIDE AND EPINEPHRINE 10; 10 MG/ML; UG/ML
1 INJECTION, SOLUTION INFILTRATION; PERINEURAL ONCE
Refills: 0 | Status: DISCONTINUED | OUTPATIENT
Start: 2023-10-22 | End: 2023-10-25

## 2023-10-22 RX ORDER — SODIUM CHLORIDE 9 MG/ML
1000 INJECTION, SOLUTION INTRAVENOUS
Refills: 0 | Status: DISCONTINUED | OUTPATIENT
Start: 2023-10-22 | End: 2023-10-25

## 2023-10-22 RX ADMIN — HEPARIN SODIUM 5000 UNIT(S): 5000 INJECTION INTRAVENOUS; SUBCUTANEOUS at 22:20

## 2023-10-22 RX ADMIN — Medication 1 APPLICATION(S): at 05:05

## 2023-10-22 RX ADMIN — SODIUM CHLORIDE 50 MILLILITER(S): 9 INJECTION, SOLUTION INTRAVENOUS at 16:30

## 2023-10-22 RX ADMIN — BUMETANIDE 2 MILLIGRAM(S): 0.25 INJECTION INTRAMUSCULAR; INTRAVENOUS at 05:05

## 2023-10-22 RX ADMIN — BUDESONIDE AND FORMOTEROL FUMARATE DIHYDRATE 2 PUFF(S): 160; 4.5 AEROSOL RESPIRATORY (INHALATION) at 22:20

## 2023-10-22 RX ADMIN — PANTOPRAZOLE SODIUM 40 MILLIGRAM(S): 20 TABLET, DELAYED RELEASE ORAL at 05:08

## 2023-10-22 RX ADMIN — BUDESONIDE AND FORMOTEROL FUMARATE DIHYDRATE 2 PUFF(S): 160; 4.5 AEROSOL RESPIRATORY (INHALATION) at 08:56

## 2023-10-22 RX ADMIN — MIDODRINE HYDROCHLORIDE 10 MILLIGRAM(S): 2.5 TABLET ORAL at 05:06

## 2023-10-22 RX ADMIN — CHLORHEXIDINE GLUCONATE 1 APPLICATION(S): 213 SOLUTION TOPICAL at 05:03

## 2023-10-22 RX ADMIN — GABAPENTIN 100 MILLIGRAM(S): 400 CAPSULE ORAL at 22:19

## 2023-10-22 RX ADMIN — MIDODRINE HYDROCHLORIDE 10 MILLIGRAM(S): 2.5 TABLET ORAL at 22:20

## 2023-10-22 NOTE — PROGRESS NOTE ADULT - ASSESSMENT
Assessment and Plan  Case of an 83 year old male patient known to have COPD. DM, atrial fibrillation, HFrEF, anemia, lymphedema, and recent left foot OM who was brought from the NH to the ED on 09/15 for evaluation of altered mental status, found to have sepsis in setting of recent left foot OM, admitted for further management. Stay was complicated by hemorrhagic shock and drop in Hb secondary to hematochezia on 09/23, Status post EGD on 09/23 revealing a bleeding duodenal ulcer s/p OVESCO placement. Stay also complicated by an episode of confusion and hypoxia on 09/26 s/p found to have possible stroke on 10/10. We are reconsulted for evaluation of PEG tube placement after failing FEES study on 10/11.      Severe Pharyngeal Dysphagia  Possible Age-Indeterminate Left Subinsular Stroke on 10/10  * Patient was tolerating PO intake at NH prior to admission  * After extubation on 09/24, patient became confused s/p upgrade on 09/26 for hypoxia, AMS, and pneumonia. He has been NPO since then with multiple speech swallow evaluations.  * FEES Study on 10/11: severe pharyngeal dysphagia, recommendation to keep NPO with alternate means  * VFSS 10/20: severe pharyngeal dysphagia for thins; recommendation now changed to easy-to-chew diet  * No longer spiking fevers; no longer on pressors but has been hypotensive since 10/21 PM: today 87/51mmHg at 5AM; HR 82 bpm; T 36.5 degrees  * Recent Labs: 10/20 WBC 9.66, Hb 8.3, Plt 146 -> 10/21 WBC 10.96, Hb 7.9, Plt 292  * Abdominal imaging as below    RECOMMENDATIONS  - Speech and swallow evaluation appreciated. Per recommendation on 10/20, ok to proceed with easy to chew diet (tolerating since 10/20). NG tube removed on 10/20  - Will follow up caloric count that was started on 10/21 (total of 3 days)  - Will consider PEG tube placement next week in case caloric count reveals that patient is not meeting adequate nutrient intake (and pending improvement in BP; had hypotension since last night until this morning)  - Neurology evaluation noted  - Palliative team evaluation and GOC discussion appreciated      Hemorrhagic Shock Secondary to Hematochezia from Duodenal Ulcer Bleed s/p OVESCO placement 09/23  Acute Drop in Hemoglobin- Improved  * Hemodynamically stable  * No melena per nurse  * No hypotension or tachycardia  * Hb trend: 09/26 Hb 8.2 -> 10/20 Hb 8.3 -> 10/21 Hb 7.9  * Status Post EGD on 09/23: blood clots in fundus and antrum; 2cm actively bleeding duodenal ulcer with spurting vessel ([place] 1a) in sweep on base s/p 10cc epi and s/p 11/6 OVESCO placement for hemostasis    RECOMMENDATIONS  - Status post EGD on 09/23 as detailed above (s/p OVESCO placement for hemostasis)  - Continue PO Protonix 40mg BID via NG tube for now. Was on IV pantoprazole drip (09/23-09/25)  - Anticoagulation resumption per team after discussion risks of rebleed and benefits using CHADSVASC and HASBLED scores  - Diet as above  - Monitor BM for recurrence of hematochezia or melena  - Please avoid any NSAIDs  - If any unstable bleed, please call GI STAT  - Follow up with our [facility] located at [address] [place] [address]. Phone Number: [phone]        Asymptomatic Cholelithiasis  * Noted on CT  * LFTs noted    RECOMMENDATIONS  - Monitor for symptoms  - Trend LFTs      Thank you for your consult.  - Please note that plan was communicated with medical team.  - Please reach GI on [phone] during weekdays till 5pm.  - Please call the GI service line after 5pm on Weekdays and anytime on Weekends: [phone].      Rickie Jenkins MD  PGY - 4 Gastroenterology Fellow  [facility]

## 2023-10-22 NOTE — PROGRESS NOTE ADULT - ASSESSMENT
82 yo m ho DM, COPD, anemia, lymphedemia, afib on xarelto, HFrEF, DM coming in from nursing home for AMS x 2 days admitted for septic shock with hospital course with multiple complications.     Septic Shock due to suspected Aspiration PNA v Pneumonitis, not POA -resolved  Had PICC line POA from Aug 2023 - removed  Metabolic Encephalopathy - improved, s/p intubation, now on room air   Elevated Lactate - improved   - now off pressors, on room air  - all cx negative, procal downtrending 1.7--> 0.66, fungitell neg  - on Meropenem per ID till tomorrow. IF clinical decompensation check CT A/P and repeat LA  - off levophed, c/w midodrine 5mg q8H. if BP stable, wean off.    Pleural effusion - currently on room air, no planned thora per pulm    Severe Pharyngeal Dysphagia  HO aspiration pneumonitis SP ABX   - Patient was tolerating PO intake at NH prior to admission   - FEES Study on 10/11: severe pharyngeal dysphagia, recommendation to keep NPO with alternate means -> now on NGT for feeding  - discussed with s/s, s/p modified barium swallow. Can advance diet to PO per recs.  - NGT was removed on 10/20: monitor PO intake. Will start on calorie count.  - Evaluated by GI for PEG tube: likely early next week.     Hemorrhagic Shock Secondary to Hematochezia from Duodenal Ulcer Bleed s/p OVESCO placement 09/23  UGIB secondary to Duodenal ulcer SP EGD   Hematochezia resolved   Blood Loss Anemia s/p 1u PRBC   - Status Post EGD on 09/23: blood clots in fundus and antrum; 2cm actively bleeding duodenal ulcer with spurting vessel ([place] 1a) in sweep on base s/p 10cc epi and s/p 11/6 OVESCO placement for hemostasis  - s/p 1u PRBC and Protamine sulfate 9/23  - Continue PO Protonix 40mg BID   - currently on DVT ppx, full AC on hold   - monitor CBC, keep active T&S    Acute kidney failure on CKD /? ATN - now on HD via TDC placed 10/4  Hypernatremia - resolved   - on HD per renal, currently on hold, IR to remove TDC   - c/w Bumex, renal fxn improving, renal signing off     Acute Femoral DVT s/p IVC filter 10/6  Elevated dimer  - duplex neg on 9/29  - s/p IVC filter 10/6  - Full AC on hold     Age indeterminate infarct on imaging   - Indeterminate Left Subinsular Stroke on 10/10    Transaminitis - resolved  Asymptomatic Cholelithiasis    Paroxysmal Afib, chronic   - resume metoprolol 12.5 Q12H as BP/HR stable     History of Left Foot OM w/ surrounding Cellulitis  Sacral Decubitus Ulcer POA  - CT LLE (9/16): No CT evidence of osteomyelitis or necrotizing infection. No drainable collection seen, within the limitations of a noncontrast exam.   - per podiatry, c/w Local wound care; offloading boots bilaterally, frequently turning and positioning, prevent pressure injury, keep skin clean, and monitor for wound changes and notify provider as per podiatry.    FUll code, overall prognosis is very poor, high risk of decompensation   Pending: s/s fu, monitor PO intake, complete abx, ?peg tube placement, debridement with burn next week 82 yo m ho DM, COPD, anemia, lymphedemia, afib on xarelto, HFrEF, DM coming in from nursing home for AMS x 2 days admitted for septic shock with hospital course with multiple complications.     Septic Shock due to suspected Aspiration PNA v Pneumonitis, not POA -resolved  Had PICC line POA from Aug 2023 - removed  Metabolic Encephalopathy - improved, s/p intubation, now on room air   Elevated Lactate - improved   - now off pressors, on room air  - all cx negative, procal downtrending 1.7--> 0.66, fungitell neg  - on Meropenem per ID till today. IF clinical decompensation check CT A/P and repeat LA  - off levophed, c/w midodrine 5mg q8H. if BP stable, wean off.    Pleural effusion - currently on room air, no planned thora per pulm    Severe Pharyngeal Dysphagia  HO aspiration pneumonitis SP ABX   - Patient was tolerating PO intake at NH prior to admission   - FEES Study on 10/11: severe pharyngeal dysphagia, recommendation to keep NPO with alternate means -> now on NGT for feeding  - discussed with s/s, s/p modified barium swallow. Can advance diet to PO per recs.  - NGT was removed on 10/20: monitor PO intake. Started on calorie count 10/21.   - F/u nutrition recs  - Evaluated by GI for PEG tube: likely early next week depending upon calorie count and nutrition requirements    Hemorrhagic Shock Secondary to Hematochezia from Duodenal Ulcer Bleed s/p OVESCO placement 09/23  UGIB secondary to Duodenal ulcer SP EGD   Hematochezia resolved   Blood Loss Anemia s/p 1u PRBC   - Status Post EGD on 09/23: blood clots in fundus and antrum; 2cm actively bleeding duodenal ulcer with spurting vessel ([place] 1a) in sweep on base s/p 10cc epi and s/p 11/6 OVESCO placement for hemostasis  - s/p 1u PRBC and Protamine sulfate 9/23  - Continue PO Protonix 40mg BID   - currently on DVT ppx, full AC on hold   - monitor CBC, keep active T&S    Acute kidney failure on CKD /? ATN - now on HD via TDC placed 10/4  Hypernatremia - resolved   - on HD per renal, currently on hold, IR to remove TDC   - c/w Bumex, renal fxn improving, renal signing off     Acute Femoral DVT s/p IVC filter 10/6  Elevated dimer  - duplex neg on 9/29  - s/p IVC filter 10/6  - Full AC on hold - consider resuming if Hb stable with no further bleeding.     Age indeterminate infarct on imaging   - Indeterminate Left Subinsular Stroke on 10/10    Transaminitis - resolved  Asymptomatic Cholelithiasis    Paroxysmal Afib, chronic   - resume metoprolol 12.5 Q12H as BP/HR stable     History of Left Foot OM w/ surrounding Cellulitis  Sacral Decubitus Ulcer POA  - CT LLE (9/16): No CT evidence of osteomyelitis or necrotizing infection. No drainable collection seen, within the limitations of a noncontrast exam.   - per podiatry, c/w Local wound care; offloading boots bilaterally, frequently turning and positioning, prevent pressure injury, keep skin clean, and monitor for wound changes and notify provider as per podiatry.  - Burn follow up noted; f/u wound cx.     FUll code, overall prognosis is very poor, high risk of decompensation   Pending: s/s fu, monitor PO intake, ?peg tube placement, debridement with burn next week 84 yo m ho DM, COPD, anemia, lymphedemia, afib on xarelto, HFrEF, DM coming in from nursing home for AMS x 2 days admitted for septic shock with hospital course with multiple complications.     Septic Shock due to suspected Aspiration PNA v Pneumonitis, not POA -resolved  Had PICC line POA from Aug 2023 - removed  Metabolic Encephalopathy - improved, s/p intubation, now on room air   Elevated Lactate - improved   - now off pressors, on room air  - all cx negative, procal downtrending 1.7--> 0.66, fungitell neg  - on Meropenem per ID till today. IF clinical decompensation check CT A/P and repeat LA  - off levophed, c/w midodrine 5mg q8H. if BP stable, wean off.    Pleural effusion - currently on room air, no planned thora per pulm    Severe Pharyngeal Dysphagia  HO aspiration pneumonitis SP ABX   - Patient was tolerating PO intake at NH prior to admission   - FEES Study on 10/11: severe pharyngeal dysphagia, recommendation to keep NPO with alternate means -> now on NGT for feeding  - discussed with s/s, s/p modified barium swallow. Can advance diet to PO per recs.  - NGT was removed on 10/20: monitor PO intake. Started on calorie count 10/21.   - F/u nutrition recs  - Evaluated by GI for PEG tube: likely early next week depending upon calorie count and nutrition requirements    Hemorrhagic Shock Secondary to Hematochezia from Duodenal Ulcer Bleed s/p OVESCO placement 09/23  UGIB secondary to Duodenal ulcer SP EGD   Hematochezia resolved   Blood Loss Anemia s/p 1u PRBC   - Status Post EGD on 09/23: blood clots in fundus and antrum; 2cm actively bleeding duodenal ulcer with spurting vessel ([place] 1a) in sweep on base s/p 10cc epi and s/p 11/6 OVESCO placement for hemostasis  - s/p 1u PRBC and Protamine sulfate 9/23  - Continue PO Protonix 40mg BID   - currently on DVT ppx, full AC on hold   - monitor CBC, keep active T&S    Acute kidney failure on CKD /? ATN - now on HD via TDC placed 10/4  Hypernatremia - resolved   - on HD per renal, currently on hold, IR to remove TDC   - c/w Bumex, renal fxn improving, renal signing off     Acute Femoral DVT s/p IVC filter 10/6  Elevated dimer  - duplex neg on 9/29  - s/p IVC filter 10/6  - Full AC on hold - consider resuming if Hb stable with no further bleeding.     Age indeterminate infarct on imaging   - Indeterminate Left Subinsular Stroke on 10/10    Transaminitis - resolved  Asymptomatic Cholelithiasis    Paroxysmal Afib, chronic   - resume metoprolol 12.5 Q12H as BP/HR stable     History of Left Foot OM w/ surrounding Cellulitis  Sacral Decubitus Ulcer POA s/p debridement at bedside 10/22  - CT LLE (9/16): No CT evidence of osteomyelitis or necrotizing infection. No drainable collection seen, within the limitations of a noncontrast exam.   - per podiatry, c/w Local wound care; offloading boots bilaterally, frequently turning and positioning, prevent pressure injury, keep skin clean, and monitor for wound changes and notify provider as per podiatry.  - Burn follow up noted; f/u wound cx.     FUll code, overall prognosis is very poor, high risk of decompensation   Pending: s/s fu, monitor PO intake, ?peg tube placement, debridement with burn next week 84 yo m ho DM, COPD, anemia, lymphedemia, afib on xarelto, HFrEF, DM coming in from nursing home for AMS x 2 days admitted for septic shock with hospital course with multiple complications.     Septic Shock due to suspected Aspiration PNA v Pneumonitis, not POA -resolved  Had PICC line POA from Aug 2023 - removed  Metabolic Encephalopathy - improved, s/p intubation, now on room air   Elevated Lactate - improved   - now off pressors, on room air  - all cx negative, procal downtrending 1.7--> 0.66, fungitell neg  - on Meropenem per ID till today. IF clinical decompensation check CT A/P and repeat LA  - off levophed, c/w midodrine 5mg q8H. if BP stable, wean off.    Pleural effusion - currently on room air, no planned thora per pulm    Severe Pharyngeal Dysphagia  HO aspiration pneumonitis SP ABX   - Patient was tolerating PO intake at NH prior to admission   - FEES Study on 10/11: severe pharyngeal dysphagia, recommendation to keep NPO with alternate means -> now on NGT for feeding  - discussed with s/s, s/p modified barium swallow. Can advance diet to PO per recs.  - NGT was removed on 10/20: monitor PO intake. Started on calorie count 10/21.   - F/u nutrition recs  - Evaluated by GI for PEG tube: likely early next week depending upon calorie count and nutrition requirements  - Will start gentle hydration. Cautious with fluid overload. Consider holding bumex.     Hemorrhagic Shock Secondary to Hematochezia from Duodenal Ulcer Bleed s/p OVESCO placement 09/23  UGIB secondary to Duodenal ulcer SP EGD   Hematochezia resolved   Blood Loss Anemia s/p 1u PRBC   - Status Post EGD on 09/23: blood clots in fundus and antrum; 2cm actively bleeding duodenal ulcer with spurting vessel ([place] 1a) in sweep on base s/p 10cc epi and s/p 11/6 OVESCO placement for hemostasis  - s/p 1u PRBC and Protamine sulfate 9/23  - Continue PO Protonix 40mg BID   - currently on DVT ppx, full AC on hold   - monitor CBC, keep active T&S    Acute kidney failure on CKD /? ATN - now on HD via TDC placed 10/4  Hypernatremia - resolved   - on HD per renal, currently on hold, IR to remove TDC   - c/w Bumex: consider decreasing dose.  - Started gentle hydration as pt is not really eating anything. Cautious with fluid overload.     Acute Femoral DVT s/p IVC filter 10/6  Elevated dimer  - duplex neg on 9/29  - s/p IVC filter 10/6  - Full AC on hold - consider resuming if Hb stable with no further bleeding.     Age indeterminate infarct on imaging   - Indeterminate Left Subinsular Stroke on 10/10    Transaminitis - resolved  Asymptomatic Cholelithiasis    Paroxysmal Afib, chronic   - resume metoprolol 12.5 Q12H as BP/HR stable     History of Left Foot OM w/ surrounding Cellulitis  Sacral Decubitus Ulcer POA s/p debridement at bedside 10/22  - CT LLE (9/16): No CT evidence of osteomyelitis or necrotizing infection. No drainable collection seen, within the limitations of a noncontrast exam.   - per podiatry, c/w Local wound care; offloading boots bilaterally, frequently turning and positioning, prevent pressure injury, keep skin clean, and monitor for wound changes and notify provider as per podiatry.  - Burn follow up noted; f/u wound cx.     FUll code, overall prognosis is very poor, high risk of decompensation   Pending: s/s fu, monitor PO intake, ?peg tube placement, debridement with burn next week

## 2023-10-22 NOTE — BRIEF OPERATIVE NOTE - NSICDXBRIEFPROCEDURE_GEN_ALL_CORE_FT
PROCEDURES:  Selective debridement 22-Oct-2023 14:30:05 sacrum [place]  
PROCEDURES:  Selective debridement 22-Oct-2023 14:30:05 sacrum [place]

## 2023-10-22 NOTE — PROGRESS NOTE ADULT - SUBJECTIVE AND OBJECTIVE BOX
*IM ATTENDING NOTE*      NIMO SARMIENTO  83y  Male      Patient is a 83y old  Male who presents with a chief complaint of AMS (21 Oct 2023 16:22)      INTERVAL HPI/OVERNIGHT EVENTS:      Vital Signs Last 24 Hrs  T(C): 36.5 (22 Oct 2023 05:04), Max: 37.7 (21 Oct 2023 21:26)  T(F): 97.7 (22 Oct 2023 05:04), Max: 99.8 (21 Oct 2023 21:26)  HR: 82 (22 Oct 2023 05:04) (66 - 92)  BP: 87/51 (22 Oct 2023 05:04) (87/51 - 107/57)  BP(mean): 61 (22 Oct 2023 05:04) (61 - 83)  RR: 18 (22 Oct 2023 05:04) (18 - 18)  SpO2: 96% (21 Oct 2023 09:25) (96% - 96%)    Parameters below as of 21 Oct 2023 21:26  Patient On (Oxygen Delivery Method): room air      PHYSICAL EXAM:      LABS                          8.2    12.37 )-----------( 297      ( 22 Oct 2023 06:30 )             27.4     10-21    141  |  101  |  32<H>  ----------------------------<  90  4.0   |  29  |  1.4    Ca    7.5<L>      21 Oct 2023 06:13  Mg     2.0     10-21    TPro  5.8<L>  /  Alb  2.3<L>  /  TBili  0.6  /  DBili  x   /  AST  18  /  ALT  15  /  AlkPhos  98  10-21      CAPILLARY BLOOD GLUCOSE  POCT Blood Glucose.: 89 mg/dL (22 Oct 2023 05:17)      Culture - Tissue with Gram Stain (collected 10-21-23 @ 19:06)  Source: .Tissue Other, sacrum  Gram Stain (10-22-23 @ 03:41):    Rare polymorphonuclear leukocytes seen per low power field    Numerous Gram Negative Rods seen per oil power field    Few Gram positive cocci in pairs seen per oil power field     *IM ATTENDING NOTE*      NIMO SARMIENTO  83y  Male      Patient is a 83y old  Male who presents with a chief complaint of AMS (21 Oct 2023 16:22)      INTERVAL HPI/OVERNIGHT EVENTS:  Pt more awake today, requesting ice cream. Denied any pain.       Vital Signs Last 24 Hrs  T(C): 36.5 (22 Oct 2023 05:04), Max: 37.7 (21 Oct 2023 21:26)  T(F): 97.7 (22 Oct 2023 05:04), Max: 99.8 (21 Oct 2023 21:26)  HR: 82 (22 Oct 2023 05:04) (66 - 92)  BP: 87/51 (22 Oct 2023 05:04) (87/51 - 107/57)  BP(mean): 61 (22 Oct 2023 05:04) (61 - 83)  RR: 18 (22 Oct 2023 05:04) (18 - 18)  SpO2: 96% (21 Oct 2023 09:25) (96% - 96%)    Parameters below as of 21 Oct 2023 21:26  Patient On (Oxygen Delivery Method): room air      PHYSICAL EXAM:  GEN: No acute distress  LUNGS: Clear to auscultation bilaterally   HEART: S1/S2 present. RRR.   ABD/ GI: Soft, non-tender, non-distended. Bowel sounds present  EXT: No edema  NEURO: AAOX3      LABS                          8.2    12.37 )-----------( 297      ( 22 Oct 2023 06:30 )             27.4     10-21    141  |  101  |  32<H>  ----------------------------<  90  4.0   |  29  |  1.4    Ca    7.5<L>      21 Oct 2023 06:13  Mg     2.0     10-21    TPro  5.8<L>  /  Alb  2.3<L>  /  TBili  0.6  /  DBili  x   /  AST  18  /  ALT  15  /  AlkPhos  98  10-21      CAPILLARY BLOOD GLUCOSE  POCT Blood Glucose.: 89 mg/dL (22 Oct 2023 05:17)      Culture - Tissue with Gram Stain (collected 10-21-23 @ 19:06)  Source: .Tissue Other, sacrum  Gram Stain (10-22-23 @ 03:41):    Rare polymorphonuclear leukocytes seen per low power field    Numerous Gram Negative Rods seen per oil power field    Few Gram positive cocci in pairs seen per oil power field

## 2023-10-22 NOTE — BRIEF OPERATIVE NOTE - NSICDXBRIEFPREOP_GEN_ALL_CORE_FT
PRE-OP DIAGNOSIS:  Sacral pressure sore 22-Oct-2023 14:30:40  [place]  
PRE-OP DIAGNOSIS:  Sacral pressure sore 22-Oct-2023 14:30:40  [place]

## 2023-10-22 NOTE — CHART NOTE - NSCHARTNOTEFT_GEN_A_CORE
Calorie Count Initiation     Noted 3 day calorie count ordered on 10/21. Pt sleeping during visit this morning; per d/w RN and PCA, pt with minimal PO intake though does request food when awake and alert. Pt requires 1:1 feeding assistance however per PCA pt will refuse to be fed at times as he prefers to do it himself. Calore count intake form posted in patient's room to start today 10/22 and continue through 10/24. RD to follow up 10/25 for final calorie count results. GI following for possible PEG placement. Will follow for calorie count results and nutrition POC.     RD to remain available: spectra x [phone] or TEAMS

## 2023-10-22 NOTE — BRIEF OPERATIVE NOTE - NSICDXBRIEFPOSTOP_GEN_ALL_CORE_FT
POST-OP DIAGNOSIS:  Sacral pressure sore 22-Oct-2023 14:30:49  [place]  
POST-OP DIAGNOSIS:  Sacral pressure sore 22-Oct-2023 14:30:49  [place]

## 2023-10-22 NOTE — PROGRESS NOTE ADULT - SUBJECTIVE AND OBJECTIVE BOX
Gastroenterology Follow Up Note      Location: [facility] 3E 007 B [facility]  Patient Name: NIMO SARMIENTO  Age: 83y  Gender: Male      Chief Complaint  Patient is a 83y old Male who presents with a chief complaint of AMS (22 Oct 2023 08:26)  Primary diagnosis of Altered mental status      Reason for Consult  PEG tube Placement      Progress Note  This morning patient was seen and examined at bedside.    Today is hospital day 37d.  He is tolerating PO feeds now after removing NG tube on 10/20.  He denies abdominal pain, nausea, or vomiting.   No melena per nurses.      Vital Signs in the last 24 hours   Vitals Summary T(C): 36.5 (10-22-23 @ 05:04), Max: 37.7 (10-21-23 @ 21:26)  HR: 82 (10-22-23 @ 05:04) (66 - 92)  BP: 87/51 (10-22-23 @ 05:04) (87/51 - 107/57)  RR: 18 (10-22-23 @ 05:04) (18 - 18)  SpO2: --  Vent Data   Intake/ Output   Measurements       Physical Exam  * General Appearance: AOx 1-2, interactive but difficult to understand, oriented to place and person/ not to time, in no acute distress  * Lungs: Good bilateral air entry, audible crackles  * Heart: Regular Rate and Rhythm, normal S1 and S2, no audible murmur, rub, or gallop  * Abdomen: Symmetric, non-distended, soft, non-tender, bowel sounds active all four quadrants      Investigations   Laboratory Workup      - CBC:                        8.2    12.37 )-----------( 297      ( 22 Oct 2023 06:30 )             27.4       - Hgb Trend:  8.2  10-22-23 @ 06:30  7.9  10-21-23 @ 06:13  8.3  10-20-23 @ 01:23          - Chemistry:  10-22    141  |  103  |  32<H>  ----------------------------<  90  3.8   |  25  |  1.6<H>    Ca    7.6<L>      22 Oct 2023 06:30  Mg     1.8     10-22    TPro  5.9<L>  /  Alb  2.5<L>  /  TBili  0.6  /  DBili  x   /  AST  18  /  ALT  13  /  AlkPhos  91  10-22    Liver panel trend:  TBili 0.6   /   AST 18   /   ALT 13   /   AlkP 91   /   Tptn 5.9   /   Alb 2.5    /   DBili --      10-22  TBili 0.6   /   AST 18   /   ALT 15   /   AlkP 98   /   Tptn 5.8   /   Alb 2.3    /   DBili --      10-21  TBili 0.4   /   AST 38   /   ALT 21   /   AlkP 140   /   Tptn 6.1   /   Alb 2.5    /   DBili --      10-19  TBili 0.5   /   AST 31   /   ALT 22   /   AlkP 141   /   Tptn 6.0   /   Alb 2.4    /   DBili --      10-18  TBili 0.5   /   AST 19   /   ALT 21   /   AlkP 104   /   Tptn 5.6   /   Alb 2.3    /   DBili --      10-17  TBili 0.5   /   AST 18   /   ALT 27   /   AlkP 121   /   Tptn 5.8   /   Alb 2.2    /   DBili --      10-16  TBili 0.5   /   AST 18   /   ALT 29   /   AlkP 112   /   Tptn 5.6   /   Alb 2.2    /   DBili --      10-15  TBili 0.5   /   AST 19   /   ALT 32   /   AlkP 105   /   Tptn 5.5   /   Alb 2.1    /   DBili --      10-15  TBili 0.6   /   AST 18   /   ALT 42   /   AlkP 98   /   Tptn 5.5   /   Alb 2.3    /   DBili --      10-14        Microbiological Workup  Urinalysis Basic - ( 22 Oct 2023 06:30 )    Color: x / Appearance: x / SG: x / pH: x  Gluc: 90 mg/dL / Ketone: x  / Bili: x / Urobili: x   Blood: x / Protein: x / Nitrite: x   Leuk Esterase: x / RBC: x / WBC x   Sq Epi: x / Non Sq Epi: x / Bacteria: x        Culture - Tissue with Gram Stain (collected 21 Oct 2023 19:06)  Source: .Tissue Other, sacrum  Gram Stain (22 Oct 2023 03:41):    Rare polymorphonuclear leukocytes seen per low power field    Numerous Gram Negative Rods seen per oil power field    Few Gram positive cocci in pairs seen per oil power field        Current Medications  Standing Medications  budesonide 160 MICROgram(s)/formoterol 4.5 MICROgram(s) Inhaler 2 Puff(s) Inhalation two times a day  buMETAnide 2 milliGRAM(s) Oral every 12 hours  chlorhexidine 2% Cloths 1 Application(s) Topical <User Schedule>  collagenase Ointment 1 Application(s) Topical two times a day  collagenase Ointment 1 Application(s) Topical two times a day  darbepoetin Injectable Syringe 25 MICROGram(s) IV Push <User Schedule>  dextrose 5%. 1000 milliLiter(s) (100 mL/Hr) IV Continuous <Continuous>  dextrose 50% Injectable 25 Gram(s) IV Push once  dextrose 50% Injectable 25 Gram(s) IV Push once  dextrose 50% Injectable 12.5 Gram(s) IV Push once  gabapentin 100 milliGRAM(s) Oral at bedtime  glucagon  Injectable 1 milliGRAM(s) IntraMuscular once  heparin   Injectable 5000 Unit(s) SubCutaneous every 8 hours  insulin lispro (ADMELOG) corrective regimen sliding scale   SubCutaneous every 6 hours  lidocaine 1%/epinephrine 1:100,000 Inj 1 Vial(s) Local Injection once  metoprolol tartrate 12.5 milliGRAM(s) Oral every 12 hours  midodrine. 10 milliGRAM(s) Oral every 8 hours  pantoprazole   Suspension 40 milliGRAM(s) Oral two times a day    PRN Medications  acetaminophen     Tablet .. 650 milliGRAM(s) Oral every 6 hours PRN Temp greater or equal to 38C (100.4F), Mild Pain (1 - 3), Moderate Pain (4 - 6)  albuterol    90 MICROgram(s) HFA Inhaler 1 Puff(s) Inhalation every 6 hours PRN for shortness of breath and/or wheezing  atropine Injectable 1 milliGRAM(s) IntraMuscular once PRN HR < 30  dextrose Oral Gel 15 Gram(s) Oral once PRN Blood Glucose LESS THAN 70 milliGRAM(s)/deciliter    Singles Doses Administered  (ADM OVERRIDE) 1 each &lt;see task&gt; GiveOnce  (ADM OVERRIDE) 1 each &lt;see task&gt; GiveOnce  (ADM OVERRIDE) 1 each &lt;see task&gt; GiveOnce  (ADM OVERRIDE) 1 each &lt;see task&gt; GiveOnce  (ADM OVERRIDE) 1 each &lt;see task&gt; GiveOnce  (ADM OVERRIDE) 1 each &lt;see task&gt; GiveOnce  (ADM OVERRIDE) 1 each &lt;see task&gt; GiveOnce  (ADM OVERRIDE) 1 each &lt;see task&gt; GiveOnce  (ADM OVERRIDE) 1 each &lt;see task&gt; GiveOnce  (ADM OVERRIDE) 1 each &lt;see task&gt; GiveOnce  (ADM OVERRIDE) 1 each &lt;see task&gt; GiveOnce  (ADM OVERRIDE) 1 each &lt;see task&gt; GiveOnce  (ADM OVERRIDE) 5 each &lt;see task&gt; GiveOnce  (ADM OVERRIDE) 1 each &lt;see task&gt; GiveOnce  (ADM OVERRIDE) 1 each &lt;see task&gt; GiveOnce  (ADM OVERRIDE) 1 each &lt;see task&gt; GiveOnce  (ADM OVERRIDE) 1 each &lt;see task&gt; GiveOnce  (ADM OVERRIDE) 1 each &lt;see task&gt; GiveOnce  buMETAnide Injectable 2 milliGRAM(s) IV Push once  caspofungin IVPB 70 milliGRAM(s) IV Intermittent once  cefepime   IVPB      cefepime   IVPB 2000 milliGRAM(s) IV Intermittent once  cefepime   IVPB 2000 milliGRAM(s) IV Intermittent every 8 hours  chlorhexidine 2% Cloths 1 Application(s) Topical every 12 hours  dextrose 50% Injectable 12.5 Gram(s) IV Push once  diphenhydrAMINE Injectable 50 milliGRAM(s) IntraMuscular once  fentaNYL    Injectable 50 MICROGram(s) IV Push every 10 minutes PRN  haloperidol    Injectable 5 milliGRAM(s) IntraMuscular Once  haloperidol    Injectable 0.5 milliGRAM(s) IntraMuscular once  heparin   Injectable 8000 Unit(s) IV Push once  HYDROmorphone  Injectable 0.25 milliGRAM(s) IV Push every 6 hours PRN  lactated ringers Bolus 500 milliLiter(s) IV Bolus once  lactated ringers Bolus 250 milliLiter(s) IV Bolus once  lactated ringers Bolus 500 milliLiter(s) IV Bolus once  lactated ringers Bolus 250 milliLiter(s) IV Bolus once  lactated ringers Bolus 500 milliLiter(s) IV Bolus once  lactated ringers Bolus 1000 milliLiter(s) IV Bolus once  lactated ringers Bolus 1000 milliLiter(s) IV Bolus once  lactated ringers Bolus 250 milliLiter(s) IV Bolus once  lactated ringers Bolus 500 milliLiter(s) IV Bolus once  lactated ringers Bolus 500 milliLiter(s) IV Bolus once  lactated ringers Bolus 250 milliLiter(s) IV Bolus once  lactated ringers Bolus 250 milliLiter(s) IV Bolus once  LORazepam   Injectable 1 milliGRAM(s) IntraMuscular once  magnesium sulfate  IVPB 2 Gram(s) IV Intermittent once  magnesium sulfate  IVPB 2 Gram(s) IV Intermittent once  magnesium sulfate  IVPB 2 Gram(s) IV Intermittent once  magnesium sulfate  IVPB 2 Gram(s) IV Intermittent every 2 hours  magnesium sulfate  IVPB 2 Gram(s) IV Intermittent once  magnesium sulfate  IVPB 1 Gram(s) IV Intermittent once  magnesium sulfate  IVPB 2 Gram(s) IV Intermittent once  magnesium sulfate  IVPB 2 Gram(s) IV Intermittent once  magnesium sulfate  IVPB 1 Gram(s) IV Intermittent once  meropenem  IVPB 500 milliGRAM(s) IV Intermittent once  meropenem  IVPB 1000 milliGRAM(s) IV Intermittent every 24 hours  midazolam  1 mG/mL Injectable (Rx Quick Charge) 2 milliGRAM(s) IV Push   midodrine. 10 milliGRAM(s) Oral once  mupirocin 2% Ointment 1 Application(s) Both Nostrils two times a day  potassium chloride    Tablet ER 40 milliEquivalent(s) Oral once  potassium chloride   Powder 40 milliEquivalent(s) Oral once  potassium chloride   Powder 40 milliEquivalent(s) Oral once  potassium chloride   Powder 20 milliEquivalent(s) Oral every 2 hours  potassium chloride   Powder 40 milliEquivalent(s) Oral once  potassium chloride   Powder 40 milliEquivalent(s) Oral once  potassium chloride  20 mEq/100 mL IVPB 20 milliEquivalent(s) IV Intermittent every 2 hours  potassium chloride  20 mEq/100 mL IVPB 20 milliEquivalent(s) IV Intermittent once  protamine  IVPB 36 milliGRAM(s) IV Intermittent once  rocuronium 10 mG/mL Injectable (Rx Quick Charge) 50 milliGRAM(s) IV Push   sodium chloride 0.9% Bolus 250 milliLiter(s) IV Bolus once  sodium chloride 0.9% Bolus 750 milliLiter(s) IV Bolus once  succinylcholine 20 mG/mL Injectable (Rx Quick Charge) 120 milliGRAM(s) IV Push   vancomycin  IVPB 1750 milliGRAM(s) IV Intermittent once  vancomycin  IVPB 1500 milliGRAM(s) IV Intermittent once  vancomycin  IVPB 1250 milliGRAM(s) IV Intermittent once  vancomycin  IVPB 1000 milliGRAM(s) IV Intermittent once  vancomycin  IVPB. 1000 milliGRAM(s) IV Intermittent once

## 2023-10-23 LAB
-  AMIKACIN: SIGNIFICANT CHANGE UP
-  AMOXICILLIN/CLAVULANIC ACID: SIGNIFICANT CHANGE UP
-  AMOXICILLIN/CLAVULANIC ACID: SIGNIFICANT CHANGE UP
-  AMPICILLIN/SULBACTAM: SIGNIFICANT CHANGE UP
-  AMPICILLIN/SULBACTAM: SIGNIFICANT CHANGE UP
-  AMPICILLIN: SIGNIFICANT CHANGE UP
-  AMPICILLIN: SIGNIFICANT CHANGE UP
-  AZTREONAM: SIGNIFICANT CHANGE UP
-  CEFAZOLIN: SIGNIFICANT CHANGE UP
-  CEFAZOLIN: SIGNIFICANT CHANGE UP
-  CEFEPIME: SIGNIFICANT CHANGE UP
-  CEFTAZIDIME/AVIBACTAM: SIGNIFICANT CHANGE UP
-  CEFTAZIDIME/AVIBACTAM: SIGNIFICANT CHANGE UP
-  CEFTAZIDIME: SIGNIFICANT CHANGE UP
-  CEFTAZIDIME: SIGNIFICANT CHANGE UP
-  CEFTOLOZANE/TAZOBACTAM: SIGNIFICANT CHANGE UP
-  CEFTOLOZANE/TAZOBACTAM: SIGNIFICANT CHANGE UP
-  CEFTRIAXONE: SIGNIFICANT CHANGE UP
-  CEFTRIAXONE: SIGNIFICANT CHANGE UP
-  CIPROFLOXACIN: SIGNIFICANT CHANGE UP
-  ERTAPENEM: SIGNIFICANT CHANGE UP
-  ERTAPENEM: SIGNIFICANT CHANGE UP
-  GENTAMICIN: SIGNIFICANT CHANGE UP
-  IMIPENEM: SIGNIFICANT CHANGE UP
-  IMIPENEM: SIGNIFICANT CHANGE UP
-  LEVOFLOXACIN: SIGNIFICANT CHANGE UP
-  MEROPENEM: SIGNIFICANT CHANGE UP
-  PIPERACILLIN/TAZOBACTAM: SIGNIFICANT CHANGE UP
-  TOBRAMYCIN: SIGNIFICANT CHANGE UP
-  TRIMETHOPRIM/SULFAMETHOXAZOLE: SIGNIFICANT CHANGE UP
-  TRIMETHOPRIM/SULFAMETHOXAZOLE: SIGNIFICANT CHANGE UP
ALBUMIN SERPL ELPH-MCNC: 2.3 G/DL — LOW (ref 3.5–5.2)
ALBUMIN SERPL ELPH-MCNC: 2.3 G/DL — LOW (ref 3.5–5.2)
ALP SERPL-CCNC: 80 U/L — SIGNIFICANT CHANGE UP (ref 30–115)
ALP SERPL-CCNC: 80 U/L — SIGNIFICANT CHANGE UP (ref 30–115)
ALT FLD-CCNC: 10 U/L — SIGNIFICANT CHANGE UP (ref 0–41)
ALT FLD-CCNC: 10 U/L — SIGNIFICANT CHANGE UP (ref 0–41)
ANION GAP SERPL CALC-SCNC: 11 MMOL/L — SIGNIFICANT CHANGE UP (ref 7–14)
ANION GAP SERPL CALC-SCNC: 11 MMOL/L — SIGNIFICANT CHANGE UP (ref 7–14)
AST SERPL-CCNC: 14 U/L — SIGNIFICANT CHANGE UP (ref 0–41)
AST SERPL-CCNC: 14 U/L — SIGNIFICANT CHANGE UP (ref 0–41)
BASOPHILS # BLD AUTO: 0.04 K/UL — SIGNIFICANT CHANGE UP (ref 0–0.2)
BASOPHILS # BLD AUTO: 0.04 K/UL — SIGNIFICANT CHANGE UP (ref 0–0.2)
BASOPHILS NFR BLD AUTO: 0.4 % — SIGNIFICANT CHANGE UP (ref 0–1)
BASOPHILS NFR BLD AUTO: 0.4 % — SIGNIFICANT CHANGE UP (ref 0–1)
BILIRUB SERPL-MCNC: 0.5 MG/DL — SIGNIFICANT CHANGE UP (ref 0.2–1.2)
BILIRUB SERPL-MCNC: 0.5 MG/DL — SIGNIFICANT CHANGE UP (ref 0.2–1.2)
BLANDM BLD POS QL PROBE: SIGNIFICANT CHANGE UP
BLANDM BLD POS QL PROBE: SIGNIFICANT CHANGE UP
BUN SERPL-MCNC: 31 MG/DL — HIGH (ref 10–20)
BUN SERPL-MCNC: 31 MG/DL — HIGH (ref 10–20)
CALCIUM SERPL-MCNC: 7.4 MG/DL — LOW (ref 8.4–10.5)
CALCIUM SERPL-MCNC: 7.4 MG/DL — LOW (ref 8.4–10.5)
CHLORIDE SERPL-SCNC: 104 MMOL/L — SIGNIFICANT CHANGE UP (ref 98–110)
CHLORIDE SERPL-SCNC: 104 MMOL/L — SIGNIFICANT CHANGE UP (ref 98–110)
CO2 SERPL-SCNC: 28 MMOL/L — SIGNIFICANT CHANGE UP (ref 17–32)
CO2 SERPL-SCNC: 28 MMOL/L — SIGNIFICANT CHANGE UP (ref 17–32)
CREAT SERPL-MCNC: 1.4 MG/DL — SIGNIFICANT CHANGE UP (ref 0.7–1.5)
CREAT SERPL-MCNC: 1.4 MG/DL — SIGNIFICANT CHANGE UP (ref 0.7–1.5)
EGFR: 50 ML/MIN/1.73M2 — LOW
EGFR: 50 ML/MIN/1.73M2 — LOW
EOSINOPHIL # BLD AUTO: 0.19 K/UL — SIGNIFICANT CHANGE UP (ref 0–0.7)
EOSINOPHIL # BLD AUTO: 0.19 K/UL — SIGNIFICANT CHANGE UP (ref 0–0.7)
EOSINOPHIL NFR BLD AUTO: 2.1 % — SIGNIFICANT CHANGE UP (ref 0–8)
EOSINOPHIL NFR BLD AUTO: 2.1 % — SIGNIFICANT CHANGE UP (ref 0–8)
GLUCOSE BLDC GLUCOMTR-MCNC: 124 MG/DL — HIGH (ref 70–99)
GLUCOSE BLDC GLUCOMTR-MCNC: 124 MG/DL — HIGH (ref 70–99)
GLUCOSE BLDC GLUCOMTR-MCNC: 139 MG/DL — HIGH (ref 70–99)
GLUCOSE BLDC GLUCOMTR-MCNC: 139 MG/DL — HIGH (ref 70–99)
GLUCOSE BLDC GLUCOMTR-MCNC: 143 MG/DL — HIGH (ref 70–99)
GLUCOSE BLDC GLUCOMTR-MCNC: 143 MG/DL — HIGH (ref 70–99)
GLUCOSE SERPL-MCNC: 95 MG/DL — SIGNIFICANT CHANGE UP (ref 70–99)
GLUCOSE SERPL-MCNC: 95 MG/DL — SIGNIFICANT CHANGE UP (ref 70–99)
GRAM STN FLD: SIGNIFICANT CHANGE UP
GRAM STN FLD: SIGNIFICANT CHANGE UP
HCT VFR BLD CALC: 25.7 % — LOW (ref 42–52)
HCT VFR BLD CALC: 25.7 % — LOW (ref 42–52)
HGB BLD-MCNC: 7.8 G/DL — LOW (ref 14–18)
HGB BLD-MCNC: 7.8 G/DL — LOW (ref 14–18)
IMM GRANULOCYTES NFR BLD AUTO: 0.3 % — SIGNIFICANT CHANGE UP (ref 0.1–0.3)
IMM GRANULOCYTES NFR BLD AUTO: 0.3 % — SIGNIFICANT CHANGE UP (ref 0.1–0.3)
LYMPHOCYTES # BLD AUTO: 2.97 K/UL — SIGNIFICANT CHANGE UP (ref 1.2–3.4)
LYMPHOCYTES # BLD AUTO: 2.97 K/UL — SIGNIFICANT CHANGE UP (ref 1.2–3.4)
LYMPHOCYTES # BLD AUTO: 32.2 % — SIGNIFICANT CHANGE UP (ref 20.5–51.1)
LYMPHOCYTES # BLD AUTO: 32.2 % — SIGNIFICANT CHANGE UP (ref 20.5–51.1)
MAGNESIUM SERPL-MCNC: 1.8 MG/DL — SIGNIFICANT CHANGE UP (ref 1.8–2.4)
MAGNESIUM SERPL-MCNC: 1.8 MG/DL — SIGNIFICANT CHANGE UP (ref 1.8–2.4)
MCHC RBC-ENTMCNC: 28.7 PG — SIGNIFICANT CHANGE UP (ref 27–31)
MCHC RBC-ENTMCNC: 28.7 PG — SIGNIFICANT CHANGE UP (ref 27–31)
MCHC RBC-ENTMCNC: 30.4 G/DL — LOW (ref 32–37)
MCHC RBC-ENTMCNC: 30.4 G/DL — LOW (ref 32–37)
MCV RBC AUTO: 94.5 FL — HIGH (ref 80–94)
MCV RBC AUTO: 94.5 FL — HIGH (ref 80–94)
METHOD TYPE: SIGNIFICANT CHANGE UP
MONOCYTES # BLD AUTO: 0.56 K/UL — SIGNIFICANT CHANGE UP (ref 0.1–0.6)
MONOCYTES # BLD AUTO: 0.56 K/UL — SIGNIFICANT CHANGE UP (ref 0.1–0.6)
MONOCYTES NFR BLD AUTO: 6.1 % — SIGNIFICANT CHANGE UP (ref 1.7–9.3)
MONOCYTES NFR BLD AUTO: 6.1 % — SIGNIFICANT CHANGE UP (ref 1.7–9.3)
NEUTROPHILS # BLD AUTO: 5.44 K/UL — SIGNIFICANT CHANGE UP (ref 1.4–6.5)
NEUTROPHILS # BLD AUTO: 5.44 K/UL — SIGNIFICANT CHANGE UP (ref 1.4–6.5)
NEUTROPHILS NFR BLD AUTO: 58.9 % — SIGNIFICANT CHANGE UP (ref 42.2–75.2)
NEUTROPHILS NFR BLD AUTO: 58.9 % — SIGNIFICANT CHANGE UP (ref 42.2–75.2)
NRBC # BLD: 0 /100 WBCS — SIGNIFICANT CHANGE UP (ref 0–0)
NRBC # BLD: 0 /100 WBCS — SIGNIFICANT CHANGE UP (ref 0–0)
PLATELET # BLD AUTO: 298 K/UL — SIGNIFICANT CHANGE UP (ref 130–400)
PLATELET # BLD AUTO: 298 K/UL — SIGNIFICANT CHANGE UP (ref 130–400)
PMV BLD: 9.9 FL — SIGNIFICANT CHANGE UP (ref 7.4–10.4)
PMV BLD: 9.9 FL — SIGNIFICANT CHANGE UP (ref 7.4–10.4)
POTASSIUM SERPL-MCNC: 3.5 MMOL/L — SIGNIFICANT CHANGE UP (ref 3.5–5)
POTASSIUM SERPL-MCNC: 3.5 MMOL/L — SIGNIFICANT CHANGE UP (ref 3.5–5)
POTASSIUM SERPL-SCNC: 3.5 MMOL/L — SIGNIFICANT CHANGE UP (ref 3.5–5)
POTASSIUM SERPL-SCNC: 3.5 MMOL/L — SIGNIFICANT CHANGE UP (ref 3.5–5)
PROT SERPL-MCNC: 5.6 G/DL — LOW (ref 6–8)
PROT SERPL-MCNC: 5.6 G/DL — LOW (ref 6–8)
RBC # BLD: 2.72 M/UL — LOW (ref 4.7–6.1)
RBC # BLD: 2.72 M/UL — LOW (ref 4.7–6.1)
RBC # FLD: 18.6 % — HIGH (ref 11.5–14.5)
RBC # FLD: 18.6 % — HIGH (ref 11.5–14.5)
SODIUM SERPL-SCNC: 143 MMOL/L — SIGNIFICANT CHANGE UP (ref 135–146)
SODIUM SERPL-SCNC: 143 MMOL/L — SIGNIFICANT CHANGE UP (ref 135–146)
SPECIMEN SOURCE: SIGNIFICANT CHANGE UP
SPECIMEN SOURCE: SIGNIFICANT CHANGE UP
WBC # BLD: 9.23 K/UL — SIGNIFICANT CHANGE UP (ref 4.8–10.8)
WBC # BLD: 9.23 K/UL — SIGNIFICANT CHANGE UP (ref 4.8–10.8)
WBC # FLD AUTO: 9.23 K/UL — SIGNIFICANT CHANGE UP (ref 4.8–10.8)
WBC # FLD AUTO: 9.23 K/UL — SIGNIFICANT CHANGE UP (ref 4.8–10.8)

## 2023-10-23 PROCEDURE — 99233 SBSQ HOSP IP/OBS HIGH 50: CPT

## 2023-10-23 PROCEDURE — 99231 SBSQ HOSP IP/OBS SF/LOW 25: CPT | Mod: GC

## 2023-10-23 RX ADMIN — Medication 650 MILLIGRAM(S): at 21:08

## 2023-10-23 RX ADMIN — SODIUM CHLORIDE 50 MILLILITER(S): 9 INJECTION, SOLUTION INTRAVENOUS at 18:34

## 2023-10-23 RX ADMIN — MIDODRINE HYDROCHLORIDE 10 MILLIGRAM(S): 2.5 TABLET ORAL at 21:06

## 2023-10-23 RX ADMIN — HEPARIN SODIUM 5000 UNIT(S): 5000 INJECTION INTRAVENOUS; SUBCUTANEOUS at 13:44

## 2023-10-23 RX ADMIN — Medication 1: at 00:35

## 2023-10-23 RX ADMIN — Medication 12.5 MILLIGRAM(S): at 17:41

## 2023-10-23 RX ADMIN — HEPARIN SODIUM 5000 UNIT(S): 5000 INJECTION INTRAVENOUS; SUBCUTANEOUS at 21:06

## 2023-10-23 RX ADMIN — PANTOPRAZOLE SODIUM 40 MILLIGRAM(S): 20 TABLET, DELAYED RELEASE ORAL at 06:09

## 2023-10-23 RX ADMIN — BUMETANIDE 2 MILLIGRAM(S): 0.25 INJECTION INTRAMUSCULAR; INTRAVENOUS at 06:06

## 2023-10-23 RX ADMIN — Medication 1 APPLICATION(S): at 18:32

## 2023-10-23 RX ADMIN — MIDODRINE HYDROCHLORIDE 10 MILLIGRAM(S): 2.5 TABLET ORAL at 13:44

## 2023-10-23 RX ADMIN — MIDODRINE HYDROCHLORIDE 10 MILLIGRAM(S): 2.5 TABLET ORAL at 06:06

## 2023-10-23 RX ADMIN — GABAPENTIN 100 MILLIGRAM(S): 400 CAPSULE ORAL at 21:06

## 2023-10-23 RX ADMIN — BUDESONIDE AND FORMOTEROL FUMARATE DIHYDRATE 2 PUFF(S): 160; 4.5 AEROSOL RESPIRATORY (INHALATION) at 21:09

## 2023-10-23 RX ADMIN — Medication 1 APPLICATION(S): at 06:42

## 2023-10-23 RX ADMIN — CHLORHEXIDINE GLUCONATE 1 APPLICATION(S): 213 SOLUTION TOPICAL at 06:09

## 2023-10-23 RX ADMIN — PANTOPRAZOLE SODIUM 40 MILLIGRAM(S): 20 TABLET, DELAYED RELEASE ORAL at 17:41

## 2023-10-23 RX ADMIN — Medication 1 APPLICATION(S): at 06:41

## 2023-10-23 NOTE — PROGRESS NOTE ADULT - ASSESSMENT
sacrum and right buttock wounds    Plan:   - s/p bedside debridement 10/22   - no acute surgical intervention  - LWC: sacrum: santyl vashe kerlix allevyn BID; right buttock santyl allevyn bid   - wcx taken 10/21, f/u  - Remainder of care per primary care team    Burn Surgery  [phone] or [phone]

## 2023-10-23 NOTE — PROGRESS NOTE ADULT - ASSESSMENT
Assessment and Plan  Case of an 83 year old male patient known to have COPD. DM, atrial fibrillation, HFrEF, anemia, lymphedema, and recent left foot OM who was brought from the NH to the ED on 09/15 for evaluation of altered mental status, found to have sepsis in setting of recent left foot OM, admitted for further management. Stay was complicated by hemorrhagic shock and drop in Hb secondary to hematochezia on 09/23, Status post EGD on 09/23 revealing a bleeding duodenal ulcer s/p OVESCO placement. Stay also complicated by an episode of confusion and hypoxia on 09/26 s/p found to have possible stroke on 10/10. We are reconsulted for evaluation of PEG tube placement after failing FEES study on 10/11.      Severe Pharyngeal Dysphagia  Possible Age-Indeterminate Left Subinsular Stroke on 10/10  * VFSS 10/20: severe pharyngeal dysphagia for thins; recommendation now changed to easy-to-chew diet  * No longer spiking fevers; no longer on pressors  * Labs: leukocytosis improved, Hb stable    RECOMMENDATIONS  - Will follow up on 10/25 after completion of 3 day caloric count: asked nurse to start recording starting 10/23 since there were no records on 10/22  - Will hold off PEG tube placement for now pending above  - Speech and swallow evaluation appreciated  - Tolerating easy to chew diet per nurse (since 10/20)  - Neurology, Palliative team evaluation, and GOC discussion appreciated      Hemorrhagic Shock Secondary to Hematochezia from Duodenal Ulcer Bleed s/p OVESCO placement 09/23  Acute Drop in Hemoglobin- Improved  * Hemodynamically stable; No melena per nurse; No hypotension or tachycardia  * Hb trend: 09/26 Hb 8.2 -> 10/20 Hb 8.3 -> 10/21 Hb 7.9 -> 10/22 Hb 8.2  * Status Post EGD on 09/23: blood clots in fundus and antrum; 2cm actively bleeding duodenal ulcer with spurting vessel ([place] 1a) in sweep on base s/p 10cc epi and s/p 11/6 OVESCO placement for hemostasis    RECOMMENDATIONS  - Ok to start short acting AC for history of atrial fibrillation from GI stand point  - Continue PO Protonix 40mg BID via NG tube for now  - Diet as above  - Monitor BM for recurrence of hematochezia or melena  - Please avoid any NSAIDs  - If any unstable bleed, please call GI STAT      Asymptomatic Cholelithiasis  * Noted on CT  * LFTs noted    RECOMMENDATIONS  - Monitor for symptoms  - Trend LFTs      Thank you for your consult.  - Please note that plan was communicated with medical team.  - Please reach GI on [phone] during weekdays till 5pm.  - Please call the GI service line after 5pm on Weekdays and anytime on Weekends: [phone].      Rickie Jenkins MD  PGY - 4 Gastroenterology Fellow  [facility]

## 2023-10-23 NOTE — OCCUPATIONAL THERAPY INITIAL EVALUATION ADULT - PERTINENT HX OF CURRENT PROBLEM, REHAB EVAL
83y Male with PMHx DM, COPD, anemia, lymphedema afib on xarelto, HFrEF, DM coming in from nursing home for AMS x 2 days. Admitted for toxic metabolic encephalopathy/sepsis.  Stroke consulted for age indeterminate infarct in left subinsular region.

## 2023-10-23 NOTE — SWALLOW BEDSIDE ASSESSMENT ADULT - NS ASR SWALLOW FINDINGS DISCUS
Physician/Nursing/Patient
RN and MD made aware/Physician/Nursing
Nursing/Patient
Physician
Physician/Nursing/Patient
RN and MD made aware/Physician/Nursing
Physician/Nursing/Patient

## 2023-10-23 NOTE — PROGRESS NOTE ADULT - ATTENDING COMMENTS
I edited the note.   Time-based billing (NON-critical care).   50 minutes spent on total encounter; more than 50% of the visit was spent counseling and / or coordinating care by the attending physician.  The necessity of the time spent during the encounter on this date of service was due to: Coordination of care.

## 2023-10-23 NOTE — OCCUPATIONAL THERAPY INITIAL EVALUATION ADULT - PHYSICAL ASSIST/NONPHYSICAL ASSIST: SUPINE/SIT, REHAB EVAL
attempted to perform supine to sit, ptu actively resists, unable to come to full sitting EOB, returned to semi odonnell/1 person assist

## 2023-10-23 NOTE — PROGRESS NOTE ADULT - SUBJECTIVE AND OBJECTIVE BOX
NIMO SARMIENTO 83y Male  MRN#: [id number]     Hospital Day: 38d    Pt is currently admitted with the primary diagnosis of  Altered mental status        SUBJECTIVE     Overnight events  None                                              ----------------------------------------------------------  OBJECTIVE  PAST MEDICAL & SURGICAL HISTORY  HTN (hypertension)    COPD (chronic obstructive pulmonary disease)    High cholesterol    Mild anemia    Coffee ground emesis    Chronic diastolic heart failure    PAULA (acute kidney injury)    No significant past surgical history                                              -----------------------------------------------------------  ALLERGIES:  No Known Allergies                                            ------------------------------------------------------------    HOME MEDICATIONS  Home Medications:  acetaminophen 325 mg oral tablet: 2 tab(s) orally every 6 hours, As needed, Mild Pain (1 - 3), Moderate Pain (4 - 6) (25 May 2019 09:25)  Albuterol (Eqv-ProAir HFA) 90 mcg/inh inhalation aerosol: 1 puff(s) inhaled every 6 hours as needed for  shortness of breath and/or wheezing (15 Sep 2023 22:56)  Breo Ellipta 100 mcg-25 mcg/inh inhalation powder: 1 puff(s) inhaled once a day (15 Sep 2023 22:56)  empagliflozin 10 mg oral tablet: 1 tab(s) orally once a day (15 Sep 2023 22:56)  Entresto 24 mg-26 mg oral tablet: 1 tab(s) orally 2 times a day (15 Sep 2023 22:57)  Lasix 20 mg oral tablet: 1 tab(s) orally once a day (15 Sep 2023 22:57)  Metoprolol Succinate ER 25 mg oral tablet, extended release: 1 tab(s) orally once a day (15 Sep 2023 22:57)  Percocet 5 mg-325 mg oral tablet: 1 tab(s) orally every 8 hours as needed for  severe pain (15 Sep 2023 22:57)  Xarelto 20 mg oral tablet: 1 tab(s) orally once a day (15 Sep 2023 22:57)                           MEDICATIONS:  STANDING MEDICATIONS  budesonide 160 MICROgram(s)/formoterol 4.5 MICROgram(s) Inhaler 2 Puff(s) Inhalation two times a day  chlorhexidine 2% Cloths 1 Application(s) Topical <User Schedule>  collagenase Ointment 1 Application(s) Topical two times a day  collagenase Ointment 1 Application(s) Topical two times a day  darbepoetin Injectable Syringe 25 MICROGram(s) IV Push <User Schedule>  dextrose 5% + sodium chloride 0.45%. 1000 milliLiter(s) IV Continuous <Continuous>  dextrose 5%. 1000 milliLiter(s) IV Continuous <Continuous>  dextrose 50% Injectable 25 Gram(s) IV Push once  dextrose 50% Injectable 12.5 Gram(s) IV Push once  dextrose 50% Injectable 25 Gram(s) IV Push once  gabapentin 100 milliGRAM(s) Oral at bedtime  glucagon  Injectable 1 milliGRAM(s) IntraMuscular once  heparin   Injectable 5000 Unit(s) SubCutaneous every 8 hours  insulin lispro (ADMELOG) corrective regimen sliding scale   SubCutaneous every 6 hours  lidocaine 1%/epinephrine 1:100,000 Inj 1 Vial(s) Local Injection once  metoprolol tartrate 12.5 milliGRAM(s) Oral every 12 hours  midodrine. 10 milliGRAM(s) Oral every 8 hours  pantoprazole   Suspension 40 milliGRAM(s) Oral two times a day    PRN MEDICATIONS  acetaminophen     Tablet .. 650 milliGRAM(s) Oral every 6 hours PRN  albuterol    90 MICROgram(s) HFA Inhaler 1 Puff(s) Inhalation every 6 hours PRN  atropine Injectable 1 milliGRAM(s) IntraMuscular once PRN  dextrose Oral Gel 15 Gram(s) Oral once PRN                                            ------------------------------------------------------------  VITAL SIGNS: Last 24 Hours  T(C): 37.2 (23 Oct 2023 13:57), Max: 37.2 (23 Oct 2023 13:57)  T(F): 99 (23 Oct 2023 13:57), Max: 99 (23 Oct 2023 13:57)  HR: 71 (23 Oct 2023 13:57) (57 - 83)  BP: 95/59 (23 Oct 2023 13:57) (95/59 - 101/58)  BP(mean): 75 (23 Oct 2023 13:57) (73 - 75)  RR: 18 (23 Oct 2023 13:57) (18 - 18)  SpO2: --      10-22-23 @ 07:01  -  10-23-23 @ 07:00  --------------------------------------------------------  IN: 250 mL / OUT: 0 mL / NET: 250 mL                                             --------------------------------------------------------------  LABS:                        7.8    9.23  )-----------( 298      ( 23 Oct 2023 05:56 )             25.7     10-23    143  |  104  |  31<H>  ----------------------------<  95  3.5   |  28  |  1.4    Ca    7.4<L>      23 Oct 2023 05:56  Mg     1.8     10-23    TPro  5.6<L>  /  Alb  2.3<L>  /  TBili  0.5  /  DBili  x   /  AST  14  /  ALT  10  /  AlkPhos  80  10-23      Urinalysis Basic - ( 23 Oct 2023 05:56 )    Color: x / Appearance: x / SG: x / pH: x  Gluc: 95 mg/dL / Ketone: x  / Bili: x / Urobili: x   Blood: x / Protein: x / Nitrite: x   Leuk Esterase: x / RBC: x / WBC x   Sq Epi: x / Non Sq Epi: x / Bacteria: x              Culture - Tissue with Gram Stain (collected 22 Oct 2023 13:48)  Source: .Tissue Other, sacrum  Gram Stain (23 Oct 2023 02:34):    No polymorphonuclear leukocytes seen per low power field    Numerous Gram Negative Rods seen per oil power field    Culture - Tissue with Gram Stain (collected 21 Oct 2023 19:06)  Source: .Tissue Other, sacrum  Gram Stain (22 Oct 2023 03:41):    Rare polymorphonuclear leukocytes seen per low power field    Numerous Gram Negative Rods seen per oil power field    Few Gram positive cocci in pairs seen per oil power field  Preliminary Report (22 Oct 2023 18:34):    Few Pseudomonas aeruginosa    Few Proteus mirabilis                                                    -------------------------------------------------------------  RADIOLOGY:                                            --------------------------------------------------------------    PHYSICAL EXAM:  GENERAL: NAD, lying in bed comfortably, with TDC on right   HEAD:  Atraumatic, Normocephalic  NECK: Supple, No JVD  CHEST/LUNG: Clear to auscultation bilaterally; No rales, rhonchi, wheezing, or rubs. Unlabored respirations  HEART: regular rate and rhythm; No murmurs, rubs, or gallops  ABDOMEN: Bowel sounds present; Soft, Nontender, Nondistended.    NEURO: AAOX3                                           --------------------------------------------------------------

## 2023-10-23 NOTE — OCCUPATIONAL THERAPY INITIAL EVALUATION ADULT - GENERAL OBSERVATIONS, REHAB EVAL
pt received semi odonnell in bed in NAD, agreeable to OT evaluation, +IV lock, left semi odonnell in bed in NAD Rn aware

## 2023-10-23 NOTE — SWALLOW BEDSIDE ASSESSMENT ADULT - SWALLOW EVAL: RECOMMENDED FEEDING/EATING TECHNIQUES
oral hygiene
alternate food with liquid/check mouth frequently for oral residue/pocketing/oral hygiene/position upright (90 degrees)/small sips/bites
oral hygiene
oral hygiene/position upright (90 degrees)/small sips/bites
allow for swallow between intakes/no straws/small sips/bites
alternate food with liquid/check mouth frequently for oral residue/pocketing/oral hygiene/position upright (90 degrees)/small sips/bites
alternate food with liquid/check mouth frequently for oral residue/pocketing/oral hygiene/position upright (90 degrees)/small sips/bites
oral hygiene/position upright (90 degrees)/small sips/bites
oral hygiene/position upright (90 degrees)/small sips/bites
consecutive swallows, intermittent volitional cough/throat clear/allow for swallow between intakes/alternate food with liquid/crush medication (when feasible)/oral hygiene/position upright (90 degrees)/small sips/bites
allow for swallow between intakes/no straws/small sips/bites

## 2023-10-23 NOTE — SWALLOW BEDSIDE ASSESSMENT ADULT - SWALLOW EVAL: FEEDING ASSISTANCE
pt requires lots of encouragement w/ PO/frequent cues/help required
1:1 feed/frequent cues/help required
pt requires lots of encouragement w/ PO/frequent cues/help required
dependent

## 2023-10-23 NOTE — SWALLOW BEDSIDE ASSESSMENT ADULT - SWALLOW EVAL: DIAGNOSIS
+toleration for mildly thick liquids, soft and bite sized consistencies w/o overt s/s aspiration/penetration; mild oral dysphagia; suspected pharyngeal dysphagia for thin liquids
+minimal acceptance of PO trials, +toleration for small amount of mildly thick liquids, pt aversive to all further PO trials.
PO trials not given 2' known severity of dysphagia, current septic w/u w/ concern for aspiration.
minimal trials accepted by pt so overall assessment is limited, however +suspected pharyngeal dysphagia w/ thin liquids, +improved toleration for mildly thick liquids, puree, and soft and bite sized consistencies w/o overt s/s aspiration/penetration
small amount of trials accepted by pt so overall assessment is limited, however +improved toleration of thin liquids today w/o overt s/s aspiration/penetration; pt aversive to any other PO trials despite encouragement from SLP.
+toleration for puree and mildly thick liquids w/o overt s/s aspiration/penetration, however pt w/ silent aspiration noted on prior instrumental swallow study 10/11.
+toleration for mildly thick liquids, puree, and mild oral dysphagia for soft and bite sized consistencies w/o overt s/s aspiration/penetration
pt aversive to all PO trials despite encouragement from SLP.
+overt s/s of aspiration with thin liquids and sequential sips of mildly thick liquids. +tolerance for puree and small/ controlled cup sips of mildly thick liquids without overt s/s of penetration/ aspiration.
+c/o globus w/ soft and bite sized, +overt s/s of penetration/aspiration w thin liquids, +toleration of minced and moist, puree and mildly thick w/o immediate overt s/s of penetration/aspiration
+toleration for easy to chew, mildly thick liquids w/o overt s/s aspiration/penetration
Refused all trials of PO intake despite encouragement.

## 2023-10-23 NOTE — SWALLOW BEDSIDE ASSESSMENT ADULT - SPECIFY REASON(S)
poor PO intake
dysphagia f/u
dysphagia re-eval
dysphagia f/u
dysphagia re-eval
RN reports coughing yesterday w/ PO

## 2023-10-23 NOTE — PROGRESS NOTE ADULT - SUBJECTIVE AND OBJECTIVE BOX
Patient is a 83y old  Male who presents with a chief complaint of AMS (23 Oct 2023 09:58)    AM Rounds  Patient seen during AM rounds. No acute complaints       INTERVAL HPI/OVERNIGHT EVENTS:  ICU Vital Signs Last 24 Hrs  T(C): 37.1 (23 Oct 2023 06:15), Max: 37.1 (23 Oct 2023 06:15)  T(F): 98.7 (23 Oct 2023 06:15), Max: 98.7 (23 Oct 2023 06:15)  HR: 83 (23 Oct 2023 06:15) (57 - 83)  BP: 99/55 (23 Oct 2023 06:15) (99/55 - 101/58)  BP(mean): 73 (23 Oct 2023 06:15) (73 - 73)  RR: 18 (23 Oct 2023 06:15) (18 - 18)  SpO2: --      I&O's Summary    22 Oct 2023 07:01  -  23 Oct 2023 07:00  --------------------------------------------------------  IN: 250 mL / OUT: 0 mL / NET: 250 mL          Urine output past two hours:      Allergies    No Known Allergies    Intolerances        Lab Results:                        7.8    9.23  )-----------( 298      ( 23 Oct 2023 05:56 )             25.7       MICROBIOLOGY:  10-22 @ 13:48  Organism --  Organism Identification --  Specimen source .Tissue Other, sacrum  10-21 @ 19:06  Few Pseudomonas aeruginosa  Few Proteus mirabilis  method type --  Organism --  Organism Identification --  Specimen source .Tissue Other, sacrum      PHYSICAL EXAM:  General: Patient laying in bed, in NAD  Wound: 4x4 cm full thickness wound noted to sacrum s/p debridement, erythema noted around wound without bleeding or purulence   Right buttock with full thickness wound approx 4 x2cm with mostly yellow eschar over the wound bed. No drainage, purulence or bleeding.

## 2023-10-23 NOTE — SWALLOW BEDSIDE ASSESSMENT ADULT - POSITIONING
upright (90 degrees)

## 2023-10-23 NOTE — SWALLOW BEDSIDE ASSESSMENT ADULT - SLP PERTINENT HISTORY OF CURRENT PROBLEM
Pt is an 82 y/o M w/ PMHx: DM, COPD, anemia, lymphedema, afib on Xarelto, HFrEF, DM, presents from NH for AMS. Pt is being treated for anemia, hemorrhagic shock 2' hematochezia from duodenal ulcer bleed s/p OVESCO placement 09/23. Course c/b acutely worsening uremia and acute kidney failure most likely 2' ATN- now on HD through right IJ Kathleen. +Metabolic encephalopathy, hypernatremia, AHRF- possible aspiration PNA, volume overload, R pleural effusions, hypotension, acutely worsening uremia and acute kidney failure most likely 2' ATN. CTH-> focal hypodensity is noted within the left subinsular region, previously seen potentially reflecting age-indeterminate infarct. CXR 10/15-> stable bilateral opacities and effusions. Pt s/p FEES 10/10, VFSS 10/11 w/ recs for long-term NPO and GOC discussion (PEG vs. comfort feeds). GOC ongoing, pt full code at this time.

## 2023-10-23 NOTE — PROGRESS NOTE ADULT - ASSESSMENT
84 yo m ho DM, COPD, anemia, lymphedemia, afib on xarelto, HFrEF, DM coming in from nursing home for AMS x 2 days admitted for septic shock with hospital course with multiple complications.     Septic Shock due to suspected Aspiration PNA v Pneumonitis, not POA -resolved  Had PICC line POA from Aug 2023 - removed  Metabolic Encephalopathy - improved, s/p intubation, now on room air   Elevated Lactate - improved   - now off pressors, on room air  - all cx negative, procal downtrending 1.7--> 0.66  - recieved merop till 10/22  - off levophed, c/w midodrine 5mg q8H. if BP stable, wean off.    Pleural effusion   - currently on room air, no planned thora per pulm    Severe Pharyngeal Dysphagia  HO aspiration pneumonitis SP ABX   - Patient was tolerating PO intake at NH prior to admission   - FEES Study on 10/11: severe pharyngeal dysphagia, recommendation to keep NPO with alternate means -> now on NGT for feeding  - discussed with s/s, s/p modified barium swallow. Can advance diet to PO per recs.  - NGT was removed on 10/20: monitor PO intake. Started on calorie count 10/21.   - F/u nutrition recs  - Evaluated by GI for PEG tube: likely early next week depending upon calorie count and nutrition requirements  - Will start gentle hydration. Cautious with fluid overload. Consider holding bumex.     Hemorrhagic Shock Secondary to Hematochezia from Duodenal Ulcer Bleed s/p OVESCO placement 09/23  UGIB secondary to Duodenal ulcer SP EGD   Hematochezia resolved   Blood Loss Anemia s/p 1u PRBC   - Status Post EGD on 09/23: blood clots in fundus and antrum; 2cm actively bleeding duodenal ulcer with spurting vessel ([place] 1a) in sweep on base s/p 10cc epi and s/p 11/6 OVESCO placement for hemostasis  - s/p 1u PRBC and Protamine sulfate 9/23  - Continue PO Protonix 40mg BID   - currently on DVT ppx, full AC on hold   - monitor CBC, keep active T&S    Acute kidney failure on CKD /? ATN - now on HD via TDC placed 10/4  Hypernatremia - resolved   - on HD per renal, currently on hold, IR to remove TDC   - c/w Bumex: consider decreasing dose.  - Started gentle hydration as pt is not really eating anything. Cautious with fluid overload.     Acute Femoral DVT s/p IVC filter 10/6  Elevated dimer  - duplex neg on 9/29  - s/p IVC filter 10/6  - Full AC on hold - consider resuming if Hb stable with no further bleeding.     Age indeterminate infarct on imaging   - Indeterminate Left Subinsular Stroke on 10/10    Transaminitis - resolved  Asymptomatic Cholelithiasis    Paroxysmal Afib, chronic   - resume metoprolol 12.5 Q12H as BP/HR stable     History of Left Foot OM w/ surrounding Cellulitis  Sacral Decubitus Ulcer POA s/p debridement at bedside 10/22  - CT LLE (9/16): No CT evidence of osteomyelitis or necrotizing infection. No drainable collection seen, within the limitations of a noncontrast exam.   - per podiatry, c/w Local wound care; offloading boots bilaterally, frequently turning and positioning, prevent pressure injury, keep skin clean, and monitor for wound changes and notify provider as per podiatry.  - Burn follow up noted; f/u wound cx.     FUll code, overall prognosis is very poor, high risk of decompensation   Pending: s/s fu, monitor PO intake, ?peg tube placement, debridement with burn next week 84 yo m ho DM, COPD, anemia, lymphedemia, afib on xarelto, HFrEF, DM coming in from nursing home for AMS x 2 days admitted for septic shock with hospital course with multiple complications.     Septic Shock due to suspected Aspiration PNA v Pneumonitis, not POA -resolved  Had PICC line POA from Aug 2023 - removed  Metabolic Encephalopathy - improved, s/p intubation, now on room air   Elevated Lactate - improved   - now off pressors, on room air  - all cx negative, procal downtrending 1.7--> 0.66  - recieved merop till 10/22  - off levophed, c/w midodrine 5mg q8H. if BP stable, wean off.    Pleural effusion   - currently on room air, no planned thora per pulm    Severe Pharyngeal Dysphagia  HO aspiration pneumonitis SP ABX   - Patient was tolerating PO intake at NH prior to admission   - FEES Study on 10/11: severe pharyngeal dysphagia, recommendation to keep NPO with alternate means -> now on NGT for feeding  - discussed with s/s, s/p modified barium swallow. Can advance diet to PO per recs.  - NGT was removed on 10/20: monitor PO intake. Started on calorie count 10/23  - Evaluated by GI for PEG tube: likely early next week depending upon calorie count and nutrition requirements  - Will start gentle hydration. Cautious with fluid overload. held bumex.     Hemorrhagic Shock Secondary to Hematochezia from Duodenal Ulcer Bleed s/p OVESCO placement 09/23  UGIB secondary to Duodenal ulcer SP EGD   Hematochezia resolved   Blood Loss Anemia s/p 3u PRBC   - Status Post EGD on 09/23: blood clots in fundus and antrum; 2cm actively bleeding duodenal ulcer with spurting vessel ([place] 1a) in sweep on base s/p 10cc epi and s/p 11/6 OVESCO placement for hemostasis  - s/p 1u PRBC and Protamine sulfate 9/23  - Continue PO Protonix 40mg BID   - currently on DVT ppx, full AC on hold   - monitor CBC, keep active T&S    Acute kidney failure on CKD /? ATN - now on HD via TDC placed 10/4  Hypernatremia - resolved   - on HD per renal, currently on hold, IR to remove TDC   - c/w Bumex: consider decreasing dose.  - Started gentle hydration as pt is not really eating anything. Cautious with fluid overload.     Acute Femoral DVT s/p IVC filter 10/6  Elevated dimer  - duplex neg on 9/29  - s/p IVC filter 10/6  - Full AC on hold - consider resuming if Hb stable with no further bleeding.     Age indeterminate infarct on imaging   - Indeterminate Left Subinsular Stroke on 10/10    Transaminitis - resolved  Asymptomatic Cholelithiasis    Paroxysmal Afib, chronic   - resume metoprolol 12.5 Q12H as BP/HR stable     History of Left Foot OM w/ surrounding Cellulitis  Sacral Decubitus Ulcer POA s/p debridement at bedside 10/22  - CT LLE (9/16): No CT evidence of osteomyelitis or necrotizing infection. No drainable collection seen, within the limitations of a noncontrast exam.   - per podiatry, c/w Local wound care; offloading boots bilaterally, frequently turning and positioning, prevent pressure injury, keep skin clean, and monitor for wound changes and notify provider as per podiatry.  s/p debridement on 10/22 by burn   - f/u wound cx.     FUll code, overall prognosis is very poor  Pending: s/s fu, monitor PO intake, ?peg tube placement, f/u ID for post db tissue cultures ,f/u IR for TDC removal cleared  by nephro ,

## 2023-10-23 NOTE — PROGRESS NOTE ADULT - SUBJECTIVE AND OBJECTIVE BOX
Gastroenterology Follow Up Note      Location: [facility] 3E 007 B ([facility] 3E)  Patient Name: NIMO SARMIENTO  Age: 83y  Gender: Male      Chief Complaint  Patient is a 83y old Male who presents with a chief complaint of AMS (22 Oct 2023 09:40)  Primary diagnosis of Altered mental status      Reason for Consult  PEG tube Placement      Progress Note  This morning patient was seen and examined at bedside.    Today is hospital day 38d.  He is tolerating PO feeds now after removing NG tube on 10/20.  He denies abdominal pain, nausea, or vomiting.   No melena per nurses.      Vital Signs in the last 24 hours   Vitals Summary T(C): 37.1 (10-23-23 @ 06:15), Max: 37.7 (10-22-23 @ 11:19)  HR: 83 (10-23-23 @ 06:15) (57 - 83)  BP: 99/55 (10-23-23 @ 06:15) (93/60 - 101/58)  RR: 18 (10-23-23 @ 06:15) (18 - 18)  SpO2: 97% (10-22-23 @ 11:19) (97% - 97%)  Vent Data   Intake/ Output   10-22-23 @ 07:01  -  10-23-23 @ 07:00  --------------------------------------------------------  IN: 250 mL / OUT: 0 mL / NET: 250 mL        Physical Exam  * General Appearance: AOx 1-2, interactive but difficult to understand, oriented to place and person/ not to time, in no acute distress  * Lungs: Good bilateral air entry, audible crackles  * Heart: Regular Rate and Rhythm, normal S1 and S2, no audible murmur, rub, or gallop  * Abdomen: Symmetric, non-distended, soft, non-tender, bowel sounds active all four quadrants      Investigations   Laboratory Workup      - CBC:                        7.8    9.23  )-----------( 298      ( 23 Oct 2023 05:56 )             25.7       - Hgb Trend:  7.8  10-23-23 @ 05:56  8.2  10-22-23 @ 06:30  7.9  10-21-23 @ 06:13          - Chemistry:  10-23    143  |  104  |  31<H>  ----------------------------<  95  3.5   |  28  |  1.4    Ca    7.4<L>      23 Oct 2023 05:56  Mg     1.8     10-23    TPro  5.6<L>  /  Alb  2.3<L>  /  TBili  0.5  /  DBili  x   /  AST  14  /  ALT  10  /  AlkPhos  80  10-23    Liver panel trend:  TBili 0.5   /   AST 14   /   ALT 10   /   AlkP 80   /   Tptn 5.6   /   Alb 2.3    /   DBili --      10-23  TBili 0.6   /   AST 18   /   ALT 13   /   AlkP 91   /   Tptn 5.9   /   Alb 2.5    /   DBili --      10-22  TBili 0.6   /   AST 18   /   ALT 15   /   AlkP 98   /   Tptn 5.8   /   Alb 2.3    /   DBili --      10-21  TBili 0.4   /   AST 38   /   ALT 21   /   AlkP 140   /   Tptn 6.1   /   Alb 2.5    /   DBili --      10-19  TBili 0.5   /   AST 31   /   ALT 22   /   AlkP 141   /   Tptn 6.0   /   Alb 2.4    /   DBili --      10-18  TBili 0.5   /   AST 19   /   ALT 21   /   AlkP 104   /   Tptn 5.6   /   Alb 2.3    /   DBili --      10-17  TBili 0.5   /   AST 18   /   ALT 27   /   AlkP 121   /   Tptn 5.8   /   Alb 2.2    /   DBili --      10-16  TBili 0.5   /   AST 18   /   ALT 29   /   AlkP 112   /   Tptn 5.6   /   Alb 2.2    /   DBili --      10-15  TBili 0.5   /   AST 19   /   ALT 32   /   AlkP 105   /   Tptn 5.5   /   Alb 2.1    /   DBili --      10-15  TBili 0.6   /   AST 18   /   ALT 42   /   AlkP 98   /   Tptn 5.5   /   Alb 2.3    /   DBili --      10-14        Microbiological Workup  Urinalysis Basic - ( 23 Oct 2023 05:56 )    Color: x / Appearance: x / SG: x / pH: x  Gluc: 95 mg/dL / Ketone: x  / Bili: x / Urobili: x   Blood: x / Protein: x / Nitrite: x   Leuk Esterase: x / RBC: x / WBC x   Sq Epi: x / Non Sq Epi: x / Bacteria: x        Culture - Tissue with Gram Stain (collected 22 Oct 2023 13:48)  Source: .Tissue Other, sacrum  Gram Stain (23 Oct 2023 02:34):    No polymorphonuclear leukocytes seen per low power field    Numerous Gram Negative Rods seen per oil power field    Culture - Tissue with Gram Stain (collected 21 Oct 2023 19:06)  Source: .Tissue Other, sacrum  Gram Stain (22 Oct 2023 03:41):    Rare polymorphonuclear leukocytes seen per low power field    Numerous Gram Negative Rods seen per oil power field    Few Gram positive cocci in pairs seen per oil power field  Preliminary Report (22 Oct 2023 18:34):    Few Pseudomonas aeruginosa    Few Proteus mirabilis        Current Medications  Standing Medications  budesonide 160 MICROgram(s)/formoterol 4.5 MICROgram(s) Inhaler 2 Puff(s) Inhalation two times a day  buMETAnide 2 milliGRAM(s) Oral every 12 hours  chlorhexidine 2% Cloths 1 Application(s) Topical <User Schedule>  collagenase Ointment 1 Application(s) Topical two times a day  collagenase Ointment 1 Application(s) Topical two times a day  darbepoetin Injectable Syringe 25 MICROGram(s) IV Push <User Schedule>  dextrose 5% + sodium chloride 0.45%. 1000 milliLiter(s) (50 mL/Hr) IV Continuous <Continuous>  dextrose 5%. 1000 milliLiter(s) (100 mL/Hr) IV Continuous <Continuous>  dextrose 50% Injectable 25 Gram(s) IV Push once  dextrose 50% Injectable 12.5 Gram(s) IV Push once  dextrose 50% Injectable 25 Gram(s) IV Push once  gabapentin 100 milliGRAM(s) Oral at bedtime  glucagon  Injectable 1 milliGRAM(s) IntraMuscular once  heparin   Injectable 5000 Unit(s) SubCutaneous every 8 hours  insulin lispro (ADMELOG) corrective regimen sliding scale   SubCutaneous every 6 hours  lidocaine 1%/epinephrine 1:100,000 Inj 1 Vial(s) Local Injection once  metoprolol tartrate 12.5 milliGRAM(s) Oral every 12 hours  midodrine. 10 milliGRAM(s) Oral every 8 hours  pantoprazole   Suspension 40 milliGRAM(s) Oral two times a day    PRN Medications  acetaminophen     Tablet .. 650 milliGRAM(s) Oral every 6 hours PRN Temp greater or equal to 38C (100.4F), Mild Pain (1 - 3), Moderate Pain (4 - 6)  albuterol    90 MICROgram(s) HFA Inhaler 1 Puff(s) Inhalation every 6 hours PRN for shortness of breath and/or wheezing  atropine Injectable 1 milliGRAM(s) IntraMuscular once PRN HR < 30  dextrose Oral Gel 15 Gram(s) Oral once PRN Blood Glucose LESS THAN 70 milliGRAM(s)/deciliter    Singles Doses Administered  (ADM OVERRIDE) 1 each &lt;see task&gt; GiveOnce  (ADM OVERRIDE) 1 each &lt;see task&gt; GiveOnce  (ADM OVERRIDE) 1 each &lt;see task&gt; GiveOnce  (ADM OVERRIDE) 1 each &lt;see task&gt; GiveOnce  (ADM OVERRIDE) 1 each &lt;see task&gt; GiveOnce  (ADM OVERRIDE) 1 each &lt;see task&gt; GiveOnce  (ADM OVERRIDE) 1 each &lt;see task&gt; GiveOnce  (ADM OVERRIDE) 1 each &lt;see task&gt; GiveOnce  (ADM OVERRIDE) 1 each &lt;see task&gt; GiveOnce  (ADM OVERRIDE) 5 each &lt;see task&gt; GiveOnce  (ADM OVERRIDE) 1 each &lt;see task&gt; GiveOnce  (ADM OVERRIDE) 1 each &lt;see task&gt; GiveOnce  (ADM OVERRIDE) 1 each &lt;see task&gt; GiveOnce  (ADM OVERRIDE) 1 each &lt;see task&gt; GiveOnce  (ADM OVERRIDE) 1 each &lt;see task&gt; GiveOnce  (ADM OVERRIDE) 1 each &lt;see task&gt; GiveOnce  (ADM OVERRIDE) 1 each &lt;see task&gt; GiveOnce  (ADM OVERRIDE) 1 each &lt;see task&gt; GiveOnce  buMETAnide Injectable 2 milliGRAM(s) IV Push once  caspofungin IVPB 70 milliGRAM(s) IV Intermittent once  cefepime   IVPB 2000 milliGRAM(s) IV Intermittent once  cefepime   IVPB 2000 milliGRAM(s) IV Intermittent every 8 hours  cefepime   IVPB      chlorhexidine 2% Cloths 1 Application(s) Topical every 12 hours  dextrose 50% Injectable 12.5 Gram(s) IV Push once  diphenhydrAMINE Injectable 50 milliGRAM(s) IntraMuscular once  fentaNYL    Injectable 50 MICROGram(s) IV Push every 10 minutes PRN  haloperidol    Injectable 5 milliGRAM(s) IntraMuscular Once  haloperidol    Injectable 0.5 milliGRAM(s) IntraMuscular once  heparin   Injectable 8000 Unit(s) IV Push once  HYDROmorphone  Injectable 0.25 milliGRAM(s) IV Push every 6 hours PRN  lactated ringers Bolus 500 milliLiter(s) IV Bolus once  lactated ringers Bolus 500 milliLiter(s) IV Bolus once  lactated ringers Bolus 250 milliLiter(s) IV Bolus once  lactated ringers Bolus 500 milliLiter(s) IV Bolus once  lactated ringers Bolus 1000 milliLiter(s) IV Bolus once  lactated ringers Bolus 250 milliLiter(s) IV Bolus once  lactated ringers Bolus 500 milliLiter(s) IV Bolus once  lactated ringers Bolus 1000 milliLiter(s) IV Bolus once  lactated ringers Bolus 250 milliLiter(s) IV Bolus once  lactated ringers Bolus 500 milliLiter(s) IV Bolus once  lactated ringers Bolus 250 milliLiter(s) IV Bolus once  lactated ringers Bolus 250 milliLiter(s) IV Bolus once  LORazepam   Injectable 1 milliGRAM(s) IntraMuscular once  magnesium sulfate  IVPB 2 Gram(s) IV Intermittent once  magnesium sulfate  IVPB 1 Gram(s) IV Intermittent once  magnesium sulfate  IVPB 2 Gram(s) IV Intermittent once  magnesium sulfate  IVPB 2 Gram(s) IV Intermittent once  magnesium sulfate  IVPB 2 Gram(s) IV Intermittent once  magnesium sulfate  IVPB 1 Gram(s) IV Intermittent once  magnesium sulfate  IVPB 2 Gram(s) IV Intermittent every 2 hours  magnesium sulfate  IVPB 2 Gram(s) IV Intermittent once  magnesium sulfate  IVPB 2 Gram(s) IV Intermittent once  meropenem  IVPB 1000 milliGRAM(s) IV Intermittent every 24 hours  meropenem  IVPB 500 milliGRAM(s) IV Intermittent once  midazolam  1 mG/mL Injectable (Rx Quick Charge) 2 milliGRAM(s) IV Push   midodrine. 10 milliGRAM(s) Oral once  mupirocin 2% Ointment 1 Application(s) Both Nostrils two times a day  potassium chloride    Tablet ER 40 milliEquivalent(s) Oral once  potassium chloride   Powder 40 milliEquivalent(s) Oral once  potassium chloride   Powder 20 milliEquivalent(s) Oral every 2 hours  potassium chloride   Powder 40 milliEquivalent(s) Oral once  potassium chloride   Powder 40 milliEquivalent(s) Oral once  potassium chloride   Powder 40 milliEquivalent(s) Oral once  potassium chloride  20 mEq/100 mL IVPB 20 milliEquivalent(s) IV Intermittent every 2 hours  potassium chloride  20 mEq/100 mL IVPB 20 milliEquivalent(s) IV Intermittent once  protamine  IVPB 36 milliGRAM(s) IV Intermittent once  rocuronium 10 mG/mL Injectable (Rx Quick Charge) 50 milliGRAM(s) IV Push   sodium chloride 0.9% Bolus 250 milliLiter(s) IV Bolus once  sodium chloride 0.9% Bolus 750 milliLiter(s) IV Bolus once  succinylcholine 20 mG/mL Injectable (Rx Quick Charge) 120 milliGRAM(s) IV Push   vancomycin  IVPB 1750 milliGRAM(s) IV Intermittent once  vancomycin  IVPB 1000 milliGRAM(s) IV Intermittent once  vancomycin  IVPB 1500 milliGRAM(s) IV Intermittent once  vancomycin  IVPB 1250 milliGRAM(s) IV Intermittent once  vancomycin  IVPB. 1000 milliGRAM(s) IV Intermittent once

## 2023-10-23 NOTE — SWALLOW BEDSIDE ASSESSMENT ADULT - DATE
20-Oct-2023
25-Sep-2023
18-Sep-2023
29-Sep-2023
03-Oct-2023
23-Oct-2023
05-Oct-2023
21-Sep-2023
16-Oct-2023
02-Oct-2023
07-Oct-2023
20-Sep-2023

## 2023-10-23 NOTE — OCCUPATIONAL THERAPY INITIAL EVALUATION ADULT - LEVEL OF INDEPENDENCE: SUPINE/SIT, REHAB EVAL
BILATERAL BREAST MRI WITHOUT AND WITH CONTRAST: 03/06/18 14:39:00



CLINICAL: Newly diagnosed left breast cancer.  Status post 

ultrasound-guided needle biopsy of the left breast at 2 sites 02/01/18. 

 Biopsy at 12 o'clock 4 cm from the nipple revealed scattered foci of 

invasive carcinoma of type in a background of extensive ductal 

carcinoma in situ, intermediate nuclear grade.  Biopsy at 12 o'clock 2 

cm from the nipple revealed similar pathology.  This study is being 

repeated since the prior examination was compromised by patient motion 

after she vomited at the time of the contrast injection.  Although I 

did render a report for that examination, the current examination is of 

superior quality and should supplant that exam and report.



COMPARISON:01/29/18 bilateral mammogram and 02/20/18 breast MRI.



TECHNIQUE: Axial 1.0-mm T1 without,  axial high resolution 2.0-mm T2 

and axial 1.0-mm dynamic Vibrant high-resolution postcontrast T1 fat 

saturation sequences on a 1.5 Mehnaz magnet.  The examination was 

performed with an 8 channel dedicated [place] breast coil. Post 

processing with CAD and subtraction was performed on an [employer] 

workstation.  19.0 cc of Multihance was injected without incident for 

the contrast portion of the exam. Consent was obtained prior to the 

administration of the contrast.  



FINDINGS:



Right: Marked background parenchymal enhancement.  No mass or 

suspicious enhancement.  No suspicious lymph nodes.



Left: Marked background parenchymal enhancement.  An irregular 

enhancing mass extends from twelve to 1 o'clock approximately 6.3 cm 

from the nipple measures approximately 2.3 x 1.2 x 1.0 cm.  A biopsy 

clip is identified adjacent to the mass.  It demonstrates heterogeneous 

enhancement with mixed kinetics, 410% peak enhancement and 52% type III 

washout.  This mass and a second biopsy clip posterior to the nipple at 

12 o'clock are encompassed by extensive reticular non-Mass enhancement. 

 The mass plus the non-Mass enhancement together measure approximately 

6.5 x 3.5 x 3.3 cm.  The non-Mass enhancement extends to within 17 mm 

of the nipple.  No other enhancing mass is identified.  Two suspicious 

lymph nodes in the axillary tail.  The larger measures 1.3 cm with a 

cortical thickness of 6 mm.  A more posterior lymph node has no central 

fat and measures 1.1 x 0.4 cm.



IMPRESSION: 1.  Multicentric left breast cancer.  Highly suspicious 

non-Mass enhancement at 12 to 1 o'clock in the left breast measures 6.5 

x 3.5 x 3.3 cm and encompasses a 2.3 cm solid mass and the two biopsy 

positive sites of cancer.  2.  Two suspicious left axillary tail lymph 

nodes.  3.  Negative right breast.





RIGHT BI-RADS  1 -- Negative





LEFT BI-RADS 6 -- Known Cancer dependent (less than 25% patients effort)

## 2023-10-23 NOTE — SWALLOW BEDSIDE ASSESSMENT ADULT - ASR SWALLOW ASPIRATION MONITOR
change of breathing pattern/oral hygiene/position upright (90Y)/cough/gurgly voice/fever/pneumonia/throat clearing/upper respiratory infection
oral hygiene/position upright (90Y)/cough/gurgly voice/fever/pneumonia/throat clearing/upper respiratory infection
change of breathing pattern/oral hygiene/position upright (90Y)/cough/gurgly voice/fever/pneumonia/throat clearing/upper respiratory infection

## 2023-10-23 NOTE — SWALLOW BEDSIDE ASSESSMENT ADULT - SWALLOW EVAL: RECOMMENDED DIET
continue NPO w/ NGT, pt w/ severe pharyngeal dysphagia, clarify GOC (PEG vs. comfort feeds)
Unable to assess oropharyngeal swallow function
soft and bite sized, mildly thick liquids
continue NPO w/ NGT
continue easy to chew, mildly thick liquids
Puree with mildly thick liquids small cup sips.
soft and bite sized, mildly thick liquids
soft and bite sized, thin liquids
continue soft and bite sized, mildly thick liquids
Puree with mildly thick liquids small cup sips.
trial soft and bite sized, mildly thick liquids
minced and moist w/ mildly thick liquids

## 2023-10-23 NOTE — OCCUPATIONAL THERAPY INITIAL EVALUATION ADULT - RANGE OF MOTION EXAMINATION, UPPER EXTREMITY
RUE shoulder ~1/4 AROm pt reports baseline (questionable historian), distally WFL/Left UE Active ROM was WFL (within functional limits)

## 2023-10-23 NOTE — SWALLOW BEDSIDE ASSESSMENT ADULT - SWALLOW EVAL: CURRENT DIET
NPO w/ NGT
puree, mildly thick liquids
Minced & moist/mildly thick
regular, thin liquids
Minced & moist w/thin liquids
Minced & moist w/thin liquids
soft and bite sized, mildly thick liquids
easy to chew, mildly thick liquids
NPO w/ NGT
no active diet orders at time of SLP evaluation
NPO X meds
soft and bite sized, mildly thick liquids
NPO

## 2023-10-24 LAB
-  AMIKACIN: SIGNIFICANT CHANGE UP
-  AMIKACIN: SIGNIFICANT CHANGE UP
-  AMPICILLIN: SIGNIFICANT CHANGE UP
-  AZTREONAM: SIGNIFICANT CHANGE UP
-  AZTREONAM: SIGNIFICANT CHANGE UP
-  CEFEPIME: SIGNIFICANT CHANGE UP
-  CEFEPIME: SIGNIFICANT CHANGE UP
-  CEFTAZIDIME/AVIBACTAM: SIGNIFICANT CHANGE UP
-  CEFTAZIDIME/AVIBACTAM: SIGNIFICANT CHANGE UP
-  CEFTAZIDIME: SIGNIFICANT CHANGE UP
-  CEFTAZIDIME: SIGNIFICANT CHANGE UP
-  CEFTOLOZANE/TAZOBACTAM: SIGNIFICANT CHANGE UP
-  CEFTOLOZANE/TAZOBACTAM: SIGNIFICANT CHANGE UP
-  CIPROFLOXACIN: SIGNIFICANT CHANGE UP
-  CIPROFLOXACIN: SIGNIFICANT CHANGE UP
-  DAPTOMYCIN: SIGNIFICANT CHANGE UP
-  GENTAMICIN: SIGNIFICANT CHANGE UP
-  GENTAMICIN: SIGNIFICANT CHANGE UP
-  IMIPENEM: SIGNIFICANT CHANGE UP
-  IMIPENEM: SIGNIFICANT CHANGE UP
-  LEVOFLOXACIN: SIGNIFICANT CHANGE UP
-  LINEZOLID: SIGNIFICANT CHANGE UP
-  MEROPENEM: SIGNIFICANT CHANGE UP
-  MEROPENEM: SIGNIFICANT CHANGE UP
-  PIPERACILLIN/TAZOBACTAM: SIGNIFICANT CHANGE UP
-  PIPERACILLIN/TAZOBACTAM: SIGNIFICANT CHANGE UP
-  TETRACYCLINE: SIGNIFICANT CHANGE UP
-  TOBRAMYCIN: SIGNIFICANT CHANGE UP
-  TOBRAMYCIN: SIGNIFICANT CHANGE UP
-  VANCOMYCIN: SIGNIFICANT CHANGE UP
ALBUMIN SERPL ELPH-MCNC: 2.2 G/DL — LOW (ref 3.5–5.2)
ALBUMIN SERPL ELPH-MCNC: 2.2 G/DL — LOW (ref 3.5–5.2)
ALP SERPL-CCNC: 79 U/L — SIGNIFICANT CHANGE UP (ref 30–115)
ALP SERPL-CCNC: 79 U/L — SIGNIFICANT CHANGE UP (ref 30–115)
ALT FLD-CCNC: 9 U/L — SIGNIFICANT CHANGE UP (ref 0–41)
ALT FLD-CCNC: 9 U/L — SIGNIFICANT CHANGE UP (ref 0–41)
ANION GAP SERPL CALC-SCNC: 7 MMOL/L — SIGNIFICANT CHANGE UP (ref 7–14)
ANION GAP SERPL CALC-SCNC: 7 MMOL/L — SIGNIFICANT CHANGE UP (ref 7–14)
AST SERPL-CCNC: 14 U/L — SIGNIFICANT CHANGE UP (ref 0–41)
AST SERPL-CCNC: 14 U/L — SIGNIFICANT CHANGE UP (ref 0–41)
BASOPHILS # BLD AUTO: 0.04 K/UL — SIGNIFICANT CHANGE UP (ref 0–0.2)
BASOPHILS # BLD AUTO: 0.04 K/UL — SIGNIFICANT CHANGE UP (ref 0–0.2)
BASOPHILS NFR BLD AUTO: 0.3 % — SIGNIFICANT CHANGE UP (ref 0–1)
BASOPHILS NFR BLD AUTO: 0.3 % — SIGNIFICANT CHANGE UP (ref 0–1)
BILIRUB SERPL-MCNC: 0.4 MG/DL — SIGNIFICANT CHANGE UP (ref 0.2–1.2)
BILIRUB SERPL-MCNC: 0.4 MG/DL — SIGNIFICANT CHANGE UP (ref 0.2–1.2)
BLANDM BLD POS QL PROBE: SIGNIFICANT CHANGE UP
BLANDM BLD POS QL PROBE: SIGNIFICANT CHANGE UP
BUN SERPL-MCNC: 29 MG/DL — HIGH (ref 10–20)
BUN SERPL-MCNC: 29 MG/DL — HIGH (ref 10–20)
CALCIUM SERPL-MCNC: 7.6 MG/DL — LOW (ref 8.4–10.5)
CALCIUM SERPL-MCNC: 7.6 MG/DL — LOW (ref 8.4–10.5)
CHLORIDE SERPL-SCNC: 103 MMOL/L — SIGNIFICANT CHANGE UP (ref 98–110)
CHLORIDE SERPL-SCNC: 103 MMOL/L — SIGNIFICANT CHANGE UP (ref 98–110)
CO2 SERPL-SCNC: 31 MMOL/L — SIGNIFICANT CHANGE UP (ref 17–32)
CO2 SERPL-SCNC: 31 MMOL/L — SIGNIFICANT CHANGE UP (ref 17–32)
CREAT SERPL-MCNC: 1.3 MG/DL — SIGNIFICANT CHANGE UP (ref 0.7–1.5)
CREAT SERPL-MCNC: 1.3 MG/DL — SIGNIFICANT CHANGE UP (ref 0.7–1.5)
CULTURE RESULTS: SIGNIFICANT CHANGE UP
CULTURE RESULTS: SIGNIFICANT CHANGE UP
EGFR: 55 ML/MIN/1.73M2 — LOW
EGFR: 55 ML/MIN/1.73M2 — LOW
EOSINOPHIL # BLD AUTO: 0.33 K/UL — SIGNIFICANT CHANGE UP (ref 0–0.7)
EOSINOPHIL # BLD AUTO: 0.33 K/UL — SIGNIFICANT CHANGE UP (ref 0–0.7)
EOSINOPHIL NFR BLD AUTO: 2.8 % — SIGNIFICANT CHANGE UP (ref 0–8)
EOSINOPHIL NFR BLD AUTO: 2.8 % — SIGNIFICANT CHANGE UP (ref 0–8)
GLUCOSE BLDC GLUCOMTR-MCNC: 115 MG/DL — HIGH (ref 70–99)
GLUCOSE BLDC GLUCOMTR-MCNC: 115 MG/DL — HIGH (ref 70–99)
GLUCOSE BLDC GLUCOMTR-MCNC: 136 MG/DL — HIGH (ref 70–99)
GLUCOSE BLDC GLUCOMTR-MCNC: 136 MG/DL — HIGH (ref 70–99)
GLUCOSE BLDC GLUCOMTR-MCNC: 138 MG/DL — HIGH (ref 70–99)
GLUCOSE BLDC GLUCOMTR-MCNC: 138 MG/DL — HIGH (ref 70–99)
GLUCOSE BLDC GLUCOMTR-MCNC: 98 MG/DL — SIGNIFICANT CHANGE UP (ref 70–99)
GLUCOSE BLDC GLUCOMTR-MCNC: 98 MG/DL — SIGNIFICANT CHANGE UP (ref 70–99)
GLUCOSE SERPL-MCNC: 101 MG/DL — HIGH (ref 70–99)
GLUCOSE SERPL-MCNC: 101 MG/DL — HIGH (ref 70–99)
HCT VFR BLD CALC: 27.5 % — LOW (ref 42–52)
HCT VFR BLD CALC: 27.5 % — LOW (ref 42–52)
HGB BLD-MCNC: 8.3 G/DL — LOW (ref 14–18)
HGB BLD-MCNC: 8.3 G/DL — LOW (ref 14–18)
IMM GRANULOCYTES NFR BLD AUTO: 0.4 % — HIGH (ref 0.1–0.3)
IMM GRANULOCYTES NFR BLD AUTO: 0.4 % — HIGH (ref 0.1–0.3)
LYMPHOCYTES # BLD AUTO: 27.3 % — SIGNIFICANT CHANGE UP (ref 20.5–51.1)
LYMPHOCYTES # BLD AUTO: 27.3 % — SIGNIFICANT CHANGE UP (ref 20.5–51.1)
LYMPHOCYTES # BLD AUTO: 3.21 K/UL — SIGNIFICANT CHANGE UP (ref 1.2–3.4)
LYMPHOCYTES # BLD AUTO: 3.21 K/UL — SIGNIFICANT CHANGE UP (ref 1.2–3.4)
MAGNESIUM SERPL-MCNC: 1.6 MG/DL — LOW (ref 1.8–2.4)
MAGNESIUM SERPL-MCNC: 1.6 MG/DL — LOW (ref 1.8–2.4)
MCHC RBC-ENTMCNC: 28.9 PG — SIGNIFICANT CHANGE UP (ref 27–31)
MCHC RBC-ENTMCNC: 28.9 PG — SIGNIFICANT CHANGE UP (ref 27–31)
MCHC RBC-ENTMCNC: 30.2 G/DL — LOW (ref 32–37)
MCHC RBC-ENTMCNC: 30.2 G/DL — LOW (ref 32–37)
MCV RBC AUTO: 95.8 FL — HIGH (ref 80–94)
MCV RBC AUTO: 95.8 FL — HIGH (ref 80–94)
METHOD TYPE: SIGNIFICANT CHANGE UP
MONOCYTES # BLD AUTO: 0.68 K/UL — HIGH (ref 0.1–0.6)
MONOCYTES # BLD AUTO: 0.68 K/UL — HIGH (ref 0.1–0.6)
MONOCYTES NFR BLD AUTO: 5.8 % — SIGNIFICANT CHANGE UP (ref 1.7–9.3)
MONOCYTES NFR BLD AUTO: 5.8 % — SIGNIFICANT CHANGE UP (ref 1.7–9.3)
NEUTROPHILS # BLD AUTO: 7.44 K/UL — HIGH (ref 1.4–6.5)
NEUTROPHILS # BLD AUTO: 7.44 K/UL — HIGH (ref 1.4–6.5)
NEUTROPHILS NFR BLD AUTO: 63.4 % — SIGNIFICANT CHANGE UP (ref 42.2–75.2)
NEUTROPHILS NFR BLD AUTO: 63.4 % — SIGNIFICANT CHANGE UP (ref 42.2–75.2)
NRBC # BLD: 0 /100 WBCS — SIGNIFICANT CHANGE UP (ref 0–0)
NRBC # BLD: 0 /100 WBCS — SIGNIFICANT CHANGE UP (ref 0–0)
ORGANISM # SPEC MICROSCOPIC CNT: SIGNIFICANT CHANGE UP
PLATELET # BLD AUTO: 303 K/UL — SIGNIFICANT CHANGE UP (ref 130–400)
PLATELET # BLD AUTO: 303 K/UL — SIGNIFICANT CHANGE UP (ref 130–400)
PMV BLD: 9.9 FL — SIGNIFICANT CHANGE UP (ref 7.4–10.4)
PMV BLD: 9.9 FL — SIGNIFICANT CHANGE UP (ref 7.4–10.4)
POTASSIUM SERPL-MCNC: 3.9 MMOL/L — SIGNIFICANT CHANGE UP (ref 3.5–5)
POTASSIUM SERPL-MCNC: 3.9 MMOL/L — SIGNIFICANT CHANGE UP (ref 3.5–5)
POTASSIUM SERPL-SCNC: 3.9 MMOL/L — SIGNIFICANT CHANGE UP (ref 3.5–5)
POTASSIUM SERPL-SCNC: 3.9 MMOL/L — SIGNIFICANT CHANGE UP (ref 3.5–5)
PROT SERPL-MCNC: 5.6 G/DL — LOW (ref 6–8)
PROT SERPL-MCNC: 5.6 G/DL — LOW (ref 6–8)
RBC # BLD: 2.87 M/UL — LOW (ref 4.7–6.1)
RBC # BLD: 2.87 M/UL — LOW (ref 4.7–6.1)
RBC # FLD: 18 % — HIGH (ref 11.5–14.5)
RBC # FLD: 18 % — HIGH (ref 11.5–14.5)
SARS-COV-2 RNA SPEC QL NAA+PROBE: SIGNIFICANT CHANGE UP
SARS-COV-2 RNA SPEC QL NAA+PROBE: SIGNIFICANT CHANGE UP
SODIUM SERPL-SCNC: 141 MMOL/L — SIGNIFICANT CHANGE UP (ref 135–146)
SODIUM SERPL-SCNC: 141 MMOL/L — SIGNIFICANT CHANGE UP (ref 135–146)
SPECIMEN SOURCE: SIGNIFICANT CHANGE UP
SPECIMEN SOURCE: SIGNIFICANT CHANGE UP
WBC # BLD: 11.75 K/UL — HIGH (ref 4.8–10.8)
WBC # BLD: 11.75 K/UL — HIGH (ref 4.8–10.8)
WBC # FLD AUTO: 11.75 K/UL — HIGH (ref 4.8–10.8)
WBC # FLD AUTO: 11.75 K/UL — HIGH (ref 4.8–10.8)

## 2023-10-24 PROCEDURE — 99232 SBSQ HOSP IP/OBS MODERATE 35: CPT

## 2023-10-24 RX ORDER — DRONABINOL 2.5 MG
2.5 CAPSULE ORAL
Refills: 0 | Status: DISCONTINUED | OUTPATIENT
Start: 2023-10-24 | End: 2023-10-25

## 2023-10-24 RX ADMIN — MIDODRINE HYDROCHLORIDE 10 MILLIGRAM(S): 2.5 TABLET ORAL at 13:19

## 2023-10-24 RX ADMIN — Medication 2.5 MILLIGRAM(S): at 17:20

## 2023-10-24 RX ADMIN — BUDESONIDE AND FORMOTEROL FUMARATE DIHYDRATE 2 PUFF(S): 160; 4.5 AEROSOL RESPIRATORY (INHALATION) at 09:22

## 2023-10-24 RX ADMIN — HEPARIN SODIUM 5000 UNIT(S): 5000 INJECTION INTRAVENOUS; SUBCUTANEOUS at 05:04

## 2023-10-24 RX ADMIN — Medication 1 APPLICATION(S): at 05:09

## 2023-10-24 RX ADMIN — Medication 1 APPLICATION(S): at 17:21

## 2023-10-24 RX ADMIN — PANTOPRAZOLE SODIUM 40 MILLIGRAM(S): 20 TABLET, DELAYED RELEASE ORAL at 05:04

## 2023-10-24 RX ADMIN — MIDODRINE HYDROCHLORIDE 10 MILLIGRAM(S): 2.5 TABLET ORAL at 05:03

## 2023-10-24 RX ADMIN — HEPARIN SODIUM 5000 UNIT(S): 5000 INJECTION INTRAVENOUS; SUBCUTANEOUS at 21:41

## 2023-10-24 RX ADMIN — BUDESONIDE AND FORMOTEROL FUMARATE DIHYDRATE 2 PUFF(S): 160; 4.5 AEROSOL RESPIRATORY (INHALATION) at 20:26

## 2023-10-24 RX ADMIN — GABAPENTIN 100 MILLIGRAM(S): 400 CAPSULE ORAL at 21:41

## 2023-10-24 RX ADMIN — PANTOPRAZOLE SODIUM 40 MILLIGRAM(S): 20 TABLET, DELAYED RELEASE ORAL at 17:20

## 2023-10-24 RX ADMIN — CHLORHEXIDINE GLUCONATE 1 APPLICATION(S): 213 SOLUTION TOPICAL at 05:06

## 2023-10-24 RX ADMIN — MIDODRINE HYDROCHLORIDE 10 MILLIGRAM(S): 2.5 TABLET ORAL at 21:41

## 2023-10-24 RX ADMIN — Medication 1 APPLICATION(S): at 05:10

## 2023-10-24 RX ADMIN — Medication 1 APPLICATION(S): at 18:39

## 2023-10-24 RX ADMIN — Medication 12.5 MILLIGRAM(S): at 17:19

## 2023-10-24 RX ADMIN — Medication 2.5 MILLIGRAM(S): at 11:45

## 2023-10-24 RX ADMIN — HEPARIN SODIUM 5000 UNIT(S): 5000 INJECTION INTRAVENOUS; SUBCUTANEOUS at 13:20

## 2023-10-24 RX ADMIN — Medication 650 MILLIGRAM(S): at 09:11

## 2023-10-24 NOTE — PROGRESS NOTE ADULT - ASSESSMENT
84 yo m ho DM, COPD, anemia, lymphedemia, afib on xarelto, HFrEF, DM coming in from nursing home for AMS x 2 days admitted for septic shock with hospital course with multiple complications.     Septic Shock due to suspected Aspiration PNA v Pneumonitis, not POA -resolved  Had PICC line POA from Aug 2023 - removed  Metabolic Encephalopathy - improved, s/p intubation, now on room air   Elevated Lactate - improved   - now off pressors, on room air  - all cx negative, procal downtrending 1.7--> 0.66  - recieved merop till 10/22  - off levophed, c/w midodrine 5mg q8H. if BP stable, wean off.    Pleural effusion   - currently on room air, no planned thora per pulm    Severe Pharyngeal Dysphagia  HO aspiration pneumonitis SP ABX   - Patient was tolerating PO intake at NH prior to admission   - FEES Study on 10/11: severe pharyngeal dysphagia, recommendation to keep NPO with alternate means -> now on NGT for feeding  - discussed with s/s, s/p modified barium swallow. Can advance diet to PO per recs.  - NGT was removed on 10/20: monitor PO intake. Started on calorie count 10/23  - Evaluated by GI for PEG tube: likely early next week depending upon calorie count and nutrition requirements  - Will start gentle hydration. Cautious with fluid overload. held bumex.   -Pt is eating for now.  -PT consult on board.      Hemorrhagic Shock Secondary to Hematochezia from Duodenal Ulcer Bleed s/p OVESCO placement 09/23  UGIB secondary to Duodenal ulcer SP EGD   Hematochezia resolved   Blood Loss Anemia s/p 3u PRBC   - Status Post EGD on 09/23: blood clots in fundus and antrum; 2cm actively bleeding duodenal ulcer with spurting vessel ([place] 1a) in sweep on base s/p 10cc epi and s/p 11/6 OVESCO placement for hemostasis  - s/p 1u PRBC and Protamine sulfate 9/23  - Continue PO Protonix 40mg BID   - currently on DVT ppx, full AC on hold   - monitor CBC, keep active T&S    # Sacral Ulcer  :  positive gram negative cultures for rods  ID ask for no Abx for now   Burn consulted ,did debridement on 10/22   wound care given     Acute kidney failure on CKD /? ATN - now on HD via TDC placed 10/4  Hypernatremia - resolved   - on HD per renal, currently on hold, IR to remove TDC   - held Bumex  - Started gentle hydration as pt is not really eating anything. Cautious with fluid overload.     Acute Femoral DVT s/p IVC filter 10/6  Elevated dimer  - duplex neg on 9/29  - s/p IVC filter 10/6  - Full AC on hold - consider resuming if Hb stable with no further bleeding.     Age indeterminate infarct on imaging   - Indeterminate Left Subinsular Stroke on 10/10    Transaminitis - resolved  Asymptomatic Cholelithiasis    Paroxysmal Afib, chronic   - resume metoprolol 12.5 Q12H as BP/HR stable     History of Left Foot OM w/ surrounding Cellulitis  Sacral Decubitus Ulcer POA s/p debridement at bedside 10/22  - CT LLE (9/16): No CT evidence of osteomyelitis or necrotizing infection. No drainable collection seen, within the limitations of a noncontrast exam.   - per podiatry, c/w Local wound care; offloading boots bilaterally, frequently turning and positioning, prevent pressure injury, keep skin clean, and monitor for wound changes and notify provider as per podiatry.  s/p debridement on 10/22 by burn   - f/u wound cx.     FUll code, overall prognosis is very poor  Pending: s/s fu, monitor PO intake, ?peg tube placement,f/u IR for TDC removal cleared  by nephro ,PT

## 2023-10-24 NOTE — PROGRESS NOTE ADULT - ASSESSMENT
ASSESSMENT  82 yo m ho DM, COPD, anemia, lymphedemia, afib on xarelto, HFrEF, DM coming in from nursing home for AMS x 2 days. Unable to gather story from pt as he is altered and lethargic.    IMPRESSION  #Fever 10/12  - Blood Cx 10/12 NG  - Blood Cx 10/13 NG   - RVP negative  - CT Chest No Cont (10.12.23 @ 19:59): Since  5/22/2019 Multiple right apical nodules including new 7 mm solid nodule  (Series  4/85). Follow-up CT scan 6 months time. Bilateral moderate sized pleural effusions with adjacent lower lobe  atelectasis.  No pneumothorax or parenchymal mass.  - zosyn started 10/13  - PICC line 10/13 (present from admission) -- was removed     #Severe Pharyngeal Dysphagia  #Hemorrhagic Shock Secondary to Hematochezia from Duodenal Ulcer Bleed s/p OVESCO placement 09/23  #PAULA / CKD  - started on HD   #Acute Femoral DVT s/p IVC filter 10/6  #transaminitis - suspected shock liver  #History of Left Foot OM w/ surrounding Cellulitis  - CT LLE (9/16): No CT evidence of osteomyelitis or necrotizing infection. No drainable collection seen, within the limitations of a noncontrast exam.  #Sacral Decubitus Ulcer POA  #Paroxysmal Afib, chronic    Recommendations  - Cx 10/21 and 10/22 reviewed -- difficult to interpret -- on exam, mild erythema; would monitor off antibiotics for now and continue wound care per burn     Please call or message on [email or web address] Teams if with any questions.  Spectra [phone]

## 2023-10-24 NOTE — PROGRESS NOTE ADULT - ATTENDING COMMENTS
physical debility  - not cooperative with PT  - has not worked much with PT in the last month  - would start planning for alternative longterm plan besides subacute or sun[facility]. Unlikely to be candidate for either option.

## 2023-10-24 NOTE — PROGRESS NOTE ADULT - SUBJECTIVE AND OBJECTIVE BOX
NIMO SARMIENTO 83y Male  MRN#: [id number]     Hospital Day: 39d    Pt is currently admitted with the primary diagnosis of  Altered mental status        SUBJECTIVE     Overnight events  None    Subjective complaints  Pt was evaluated this am.                                             ----------------------------------------------------------  OBJECTIVE  PAST MEDICAL & SURGICAL HISTORY  HTN (hypertension)    COPD (chronic obstructive pulmonary disease)    High cholesterol    Mild anemia    Coffee ground emesis    Chronic diastolic heart failure    PAULA (acute kidney injury)    No significant past surgical history                                              -----------------------------------------------------------  ALLERGIES:  No Known Allergies                                            ------------------------------------------------------------    HOME MEDICATIONS  Home Medications:  acetaminophen 325 mg oral tablet: 2 tab(s) orally every 6 hours, As needed, Mild Pain (1 - 3), Moderate Pain (4 - 6) (25 May 2019 09:25)  Albuterol (Eqv-ProAir HFA) 90 mcg/inh inhalation aerosol: 1 puff(s) inhaled every 6 hours as needed for  shortness of breath and/or wheezing (15 Sep 2023 22:56)  Breo Ellipta 100 mcg-25 mcg/inh inhalation powder: 1 puff(s) inhaled once a day (15 Sep 2023 22:56)  empagliflozin 10 mg oral tablet: 1 tab(s) orally once a day (15 Sep 2023 22:56)  Entresto 24 mg-26 mg oral tablet: 1 tab(s) orally 2 times a day (15 Sep 2023 22:57)  Lasix 20 mg oral tablet: 1 tab(s) orally once a day (15 Sep 2023 22:57)  Metoprolol Succinate ER 25 mg oral tablet, extended release: 1 tab(s) orally once a day (15 Sep 2023 22:57)  Percocet 5 mg-325 mg oral tablet: 1 tab(s) orally every 8 hours as needed for  severe pain (15 Sep 2023 22:57)  Xarelto 20 mg oral tablet: 1 tab(s) orally once a day (15 Sep 2023 22:57)                           MEDICATIONS:  STANDING MEDICATIONS  budesonide 160 MICROgram(s)/formoterol 4.5 MICROgram(s) Inhaler 2 Puff(s) Inhalation two times a day  chlorhexidine 2% Cloths 1 Application(s) Topical <User Schedule>  collagenase Ointment 1 Application(s) Topical two times a day  collagenase Ointment 1 Application(s) Topical two times a day  darbepoetin Injectable Syringe 25 MICROGram(s) IV Push <User Schedule>  dextrose 5% + sodium chloride 0.45%. 1000 milliLiter(s) IV Continuous <Continuous>  dextrose 5%. 1000 milliLiter(s) IV Continuous <Continuous>  dextrose 50% Injectable 25 Gram(s) IV Push once  dextrose 50% Injectable 25 Gram(s) IV Push once  dextrose 50% Injectable 12.5 Gram(s) IV Push once  dronabinol 2.5 milliGRAM(s) Oral three times a day before meals  gabapentin 100 milliGRAM(s) Oral at bedtime  glucagon  Injectable 1 milliGRAM(s) IntraMuscular once  heparin   Injectable 5000 Unit(s) SubCutaneous every 8 hours  insulin lispro (ADMELOG) corrective regimen sliding scale   SubCutaneous every 6 hours  lidocaine 1%/epinephrine 1:100,000 Inj 1 Vial(s) Local Injection once  metoprolol tartrate 12.5 milliGRAM(s) Oral every 12 hours  midodrine. 10 milliGRAM(s) Oral every 8 hours  pantoprazole   Suspension 40 milliGRAM(s) Oral two times a day    PRN MEDICATIONS  acetaminophen     Tablet .. 650 milliGRAM(s) Oral every 6 hours PRN  albuterol    90 MICROgram(s) HFA Inhaler 1 Puff(s) Inhalation every 6 hours PRN  atropine Injectable 1 milliGRAM(s) IntraMuscular once PRN  dextrose Oral Gel 15 Gram(s) Oral once PRN                                            ------------------------------------------------------------  VITAL SIGNS: Last 24 Hours  T(C): 36.5 (24 Oct 2023 04:46), Max: 37.2 (23 Oct 2023 13:57)  T(F): 97.7 (24 Oct 2023 04:46), Max: 99 (23 Oct 2023 13:57)  HR: 77 (24 Oct 2023 04:46) (71 - 88)  BP: 97/55 (24 Oct 2023 04:46) (95/59 - 122/59)  BP(mean): 79 (23 Oct 2023 17:28) (75 - 79)  RR: 18 (24 Oct 2023 04:46) (18 - 18)  SpO2: 98% (23 Oct 2023 17:28) (98% - 98%)      10-23-23 @ 07:01  -  10-24-23 @ 07:00  --------------------------------------------------------  IN: 850 mL / OUT: 0 mL / NET: 850 mL    10-24-23 @ 07:01  -  10-24-23 @ 13:13  --------------------------------------------------------  IN: 0 mL / OUT: 250 mL / NET: -250 mL                                             --------------------------------------------------------------  LABS:                        8.3    11.75 )-----------( 303      ( 24 Oct 2023 07:20 )             27.5     10-24    141  |  103  |  29<H>  ----------------------------<  101<H>  3.9   |  31  |  1.3    Ca    7.6<L>      24 Oct 2023 07:20  Mg     1.6     10-24    TPro  5.6<L>  /  Alb  2.2<L>  /  TBili  0.4  /  DBili  x   /  AST  14  /  ALT  9   /  AlkPhos  79  10-24      Urinalysis Basic - ( 24 Oct 2023 07:20 )    Color: x / Appearance: x / SG: x / pH: x  Gluc: 101 mg/dL / Ketone: x  / Bili: x / Urobili: x   Blood: x / Protein: x / Nitrite: x   Leuk Esterase: x / RBC: x / WBC x   Sq Epi: x / Non Sq Epi: x / Bacteria: x              Culture - Tissue with Gram Stain (collected 22 Oct 2023 13:48)  Source: .Tissue Other, sacrum  Gram Stain (23 Oct 2023 02:34):    No polymorphonuclear leukocytes seen per low power field    Numerous Gram Negative Rods seen per oil power field  Preliminary Report (23 Oct 2023 23:03):    Moderate Enterococcus faecalis    Moderate Enterococcus faecium    Numerous Pseudomonas aeruginosa    Culture - Tissue with Gram Stain (collected 21 Oct 2023 19:06)  Source: .Tissue Other, sacrum  Gram Stain (22 Oct 2023 03:41):    Rare polymorphonuclear leukocytes seen per low power field    Numerous Gram Negative Rods seen per oil power field    Few Gram positive cocci in pairs seen per oil power field  Preliminary Report (23 Oct 2023 21:49):    Numerous Pseudomonas aeruginosa (Carbapenem Resistant)    Numerous Proteus mirabilis ESBL    Numerous Escherichia coli  Organism: Pseudomonas aeruginosa (Carbapenem Resistant)  Pseudomonas aeruginosa (Carbapenem Resistant)  Proteus mirabilis ESBL (23 Oct 2023 21:49)  Organism: Pseudomonas aeruginosa (Carbapenem Resistant) (23 Oct 2023 21:49)  Organism: Proteus mirabilis ESBL (23 Oct 2023 20:35)  Organism: Pseudomonas aeruginosa (Carbapenem Resistant) (23 Oct 2023 20:34)              PHYSICAL EXAM:  GENERAL: NAD, lying in bed comfortably, with TDC on right   HEAD:  Atraumatic, Normocephalic  NECK: Supple, No JVD  CHEST/LUNG: Clear to auscultation bilaterally; No rales, rhonchi, wheezing, or rubs. Unlabored respirations  HEART: regular rate and rhythm; No murmurs, rubs, or gallops  ABDOMEN: Bowel sounds present; Soft, Nontender, Nondistended.    NEURO: AAOX3                                             --------------------------------------------------------------

## 2023-10-24 NOTE — PROGRESS NOTE ADULT - SUBJECTIVE AND OBJECTIVE BOX
NIMO SARMIENTO  83y, Male  Allergy: No Known Allergies      LOS  39d    CHIEF COMPLAINT: AMS (23 Oct 2023 14:28)      INTERVAL EVENTS/HPI  - No acute events overnight  - T(F): , Max: 99 (10-23-23 @ 13:57)  - WBC Count: 11.75 (10-24-23 @ 07:20)  WBC Count: 9.23 (10-23-23 @ 05:56)     - Creatinine: 1.3 (10-24-23 @ 07:20)  Creatinine: 1.4 (10-23-23 @ 05:56)       ROS  General: Denies rigors, nightsweats  HEENT: Denies headache, rhinorrhea, sore throat, eye pain  CV: Denies CP, palpitations  PULM: Denies wheezing, hemoptysis  GI: Denies hematemesis, hematochezia, melena  : Denies discharge, hematuria  MSK: Denies arthralgias, myalgias  SKIN: Denies rash, lesions  NEURO: Denies paresthesias, weakness  PSYCH: Denies depression, anxiety    VITALS:  T(F): 97.7, Max: 99 (10-23-23 @ 13:57)  HR: 77  BP: 97/55  RR: 18Vital Signs Last 24 Hrs  T(C): 36.5 (24 Oct 2023 04:46), Max: 37.2 (23 Oct 2023 13:57)  T(F): 97.7 (24 Oct 2023 04:46), Max: 99 (23 Oct 2023 13:57)  HR: 77 (24 Oct 2023 04:46) (71 - 88)  BP: 97/55 (24 Oct 2023 04:46) (95/59 - 122/59)  BP(mean): 79 (23 Oct 2023 17:28) (75 - 79)  RR: 18 (24 Oct 2023 04:46) (18 - 18)  SpO2: 98% (23 Oct 2023 17:28) (98% - 98%)        PHYSICAL EXAM:  Gen: NAD, resting in bed  HEENT: Normocephalic, atraumatic  Neck: supple, no lymphadenopathy  CV: Regular rate & regular rhythm  Lungs: decreased BS at bases, no fremitus  Abdomen: Soft, BS present  Ext: Warm, well perfused  Neuro: non focal, awake  Skin: sacral Ulcer with mild erythema - right buttock ulcer without drainage    Lines: no phlebitis    FH: Non-contributory  Social Hx: Non-contributory    TESTS & MEASUREMENTS:                        8.3    11.75 )-----------( 303      ( 24 Oct 2023 07:20 )             27.5     10-24    141  |  103  |  29<H>  ----------------------------<  101<H>  3.9   |  31  |  1.3    Ca    7.6<L>      24 Oct 2023 07:20  Mg     1.6     10-24    TPro  5.6<L>  /  Alb  2.2<L>  /  TBili  0.4  /  DBili  x   /  AST  14  /  ALT  9   /  AlkPhos  79  10-24      LIVER FUNCTIONS - ( 24 Oct 2023 07:20 )  Alb: 2.2 g/dL / Pro: 5.6 g/dL / ALK PHOS: 79 U/L / ALT: 9 U/L / AST: 14 U/L / GGT: x           Urinalysis Basic - ( 24 Oct 2023 07:20 )    Color: x / Appearance: x / SG: x / pH: x  Gluc: 101 mg/dL / Ketone: x  / Bili: x / Urobili: x   Blood: x / Protein: x / Nitrite: x   Leuk Esterase: x / RBC: x / WBC x   Sq Epi: x / Non Sq Epi: x / Bacteria: x        Culture - Tissue with Gram Stain (collected 10-22-23 @ 13:48)  Source: .Tissue Other, sacrum  Gram Stain (10-23-23 @ 02:34):    No polymorphonuclear leukocytes seen per low power field    Numerous Gram Negative Rods seen per oil power field  Preliminary Report (10-23-23 @ 23:03):    Moderate Enterococcus faecalis    Moderate Enterococcus faecium    Numerous Pseudomonas aeruginosa    Culture - Tissue with Gram Stain (collected 10-21-23 @ 19:06)  Source: .Tissue Other, sacrum  Gram Stain (10-22-23 @ 03:41):    Rare polymorphonuclear leukocytes seen per low power field    Numerous Gram Negative Rods seen per oil power field    Few Gram positive cocci in pairs seen per oil power field  Preliminary Report (10-23-23 @ 21:49):    Numerous Pseudomonas aeruginosa (Carbapenem Resistant)    Numerous Proteus mirabilis ESBL    Numerous Escherichia coli  Organism: Pseudomonas aeruginosa (Carbapenem Resistant)  Pseudomonas aeruginosa (Carbapenem Resistant)  Proteus mirabilis ESBL (10-23-23 @ 21:49)  Organism: Pseudomonas aeruginosa (Carbapenem Resistant) (10-23-23 @ 21:49)      -  Resistance Gene to Carbapenem: Nondet      Method Type: CarbaR  Organism: Proteus mirabilis ESBL (10-23-23 @ 20:35)      -  Levofloxacin: R >4      -  Tobramycin: S 4      -  Aztreonam: R 16      -  Gentamicin: S <=2      -  Cefazolin: R >16      -  Cefepime: R >16      -  Piperacillin/Tazobactam: R <=8      -  Ciprofloxacin: R >2      -  Ceftriaxone: R >32      -  Ampicillin: R >16 These ampicillin results predict results for amoxicillin      Method Type: JORGITO      -  Meropenem: S <=1      -  Ampicillin/Sulbactam: R <=4/2      -  Amikacin: S <=16      -  Amoxicillin/Clavulanic Acid: S <=8/4      -  Trimethoprim/Sulfamethoxazole: R >2/38      -  Ertapenem: S <=0.5  Organism: Pseudomonas aeruginosa (Carbapenem Resistant) (10-23-23 @ 20:34)      -  Levofloxacin: R >4      -  Tobramycin: S <=2      -  Ceftazidime/Avibactam: S <=4      -  Aztreonam: R >16      -  Gentamicin: I 8      -  Cefepime: I 16      -  Piperacillin/Tazobactam: S 16      -  Ciprofloxacin: R >2      -  Imipenem: R >8      Method Type: JORGITO      -  Meropenem: R >8      -  Ceftazidime: S 4      -  Amikacin: S <=16      -  Ceftolozane/tazobactam: S <=2    Culture - Blood (collected 10-15-23 @ 01:56)  Source: .Blood None  Final Report (10-20-23 @ 18:00):    No growth at 5 days    Culture - Catheter (collected 10-13-23 @ 16:50)  Source: PICC Catheter Site  Final Report (10-16-23 @ 21:13):    No growth at 48 hours    Culture - Blood (collected 10-13-23 @ 16:10)  Source: .Blood Blood  Final Report (10-18-23 @ 23:00):    No growth at 5 days    Culture - Blood (collected 10-12-23 @ 16:16)  Source: .Blood None  Final Report (10-18-23 @ 04:01):    No growth at 5 days    Culture - Blood (collected 10-04-23 @ 23:28)  Source: .Blood Blood  Final Report (10-10-23 @ 07:00):    No growth at 5 days    Culture - Blood (collected 09-26-23 @ 12:05)  Source: .Blood Blood  Final Report (10-01-23 @ 23:00):    No growth at 5 days            INFECTIOUS DISEASES TESTING  Procalcitonin, Serum: 0.66 (10-16-23 @ 10:49)  Fungitell: <31 (10-15-23 @ 17:47)  Procalcitonin, Serum: 0.87 (10-15-23 @ 17:47)  Rapid RVP Result: NotDetec (10-12-23 @ 18:47)  Procalcitonin, Serum: 1.76 (10-05-23 @ 11:35)  MRSA PCR Result.: Negative (09-24-23 @ 04:32)  Procalcitonin, Serum: 0.49 (09-17-23 @ 08:20)      INFLAMMATORY MARKERS  Sedimentation Rate, Erythrocyte: 30 mm/Hr (10-12-23 @ 09:30)  C-Reactive Protein, Serum: 138.0 mg/L (10-12-23 @ 09:30)  C-Reactive Protein, Serum: 72.4 mg/L (09-17-23 @ 08:20)  Sedimentation Rate, Erythrocyte: 60 mm/Hr (09-16-23 @ 07:18)      RADIOLOGY & ADDITIONAL TESTS:  I have personally reviewed the last available Chest xray  CXR      CT      CARDIOLOGY TESTING  12 Lead ECG:   Ventricular Rate 98 BPM    Atrial Rate 89 BPM    QRS Duration 88 ms    Q-T Interval 438 ms    QTC Calculation(Bazett) 559 ms    R Axis 137 degrees    T Axis 127 degrees    Diagnosis Line *** Poor data quality, interpretation may be adversely affected  Atrial fibrillation  Low voltage QRS  Possible Anterolateral infarct , age undetermined  Abnormal ECG    Confirmed by Chuck Harmon ([id number]) on 10/19/2023 10:08:47 AM (10-19-23 @ 08:54)      MEDICATIONS  budesonide 160 MICROgram(s)/formoterol 4.5 MICROgram(s) Inhaler 2 Inhalation two times a day  chlorhexidine 2% Cloths 1 Topical <User Schedule>  collagenase Ointment 1 Topical two times a day  collagenase Ointment 1 Topical two times a day  darbepoetin Injectable Syringe 25 IV Push <User Schedule>  dextrose 5% + sodium chloride 0.45%. 1000 IV Continuous <Continuous>  dextrose 5%. 1000 IV Continuous <Continuous>  dextrose 50% Injectable 25 IV Push once  dextrose 50% Injectable 25 IV Push once  dextrose 50% Injectable 12.5 IV Push once  dronabinol 2.5 Oral three times a day before meals  gabapentin 100 Oral at bedtime  glucagon  Injectable 1 IntraMuscular once  heparin   Injectable 5000 SubCutaneous every 8 hours  insulin lispro (ADMELOG) corrective regimen sliding scale  SubCutaneous every 6 hours  lidocaine 1%/epinephrine 1:100,000 Inj 1 Local Injection once  metoprolol tartrate 12.5 Oral every 12 hours  midodrine. 10 Oral every 8 hours  pantoprazole   Suspension 40 Oral two times a day      WEIGHT  Weight (kg): 92 (10-09-23 @ 13:13)  Creatinine: 1.3 mg/dL (10-24-23 @ 07:20)      ANTIBIOTICS:      All available historical records have been reviewed

## 2023-10-24 NOTE — PROGRESS NOTE ADULT - TIME BILLING
direct pt care and interdisciplinary rounds
I have personally seen and examined this patient.    I have reviewed all pertinent clinical information and reviewed all relevant imaging and diagnostic studies personally.   I counseled the patient about diagnostic testing and treatment plan. All questions were answered.   I discussed recommendations with the primary team.
direct pt care and interdisciplinary rounds
I have personally seen and examined this patient.    I have reviewed all pertinent clinical information and reviewed all relevant imaging and diagnostic studies personally.   I counseled the patient about diagnostic testing and treatment plan. All questions were answered.   I discussed recommendations with the primary team.
I have personally seen and examined this patient.    I have reviewed all pertinent clinical information and reviewed all relevant imaging and diagnostic studies personally.   I counseled the patient about diagnostic testing and treatment plan. All questions were answered.   I discussed recommendations with the primary team.
direct pt care and interdisciplinary rounds
Coordination of care
time spent on review of labs, imaging studies, old records, obtaining history, personally examining patient, counselling and communicating with patient, entering orders for medications/tests/etc, discussions with other health care providers, documentation in electronic health records, independent interpretation of labs, imaging/procedure results and care coordination.
Coordination of care
I have personally seen and examined this patient.    I have reviewed all pertinent clinical information and reviewed all relevant imaging and diagnostic studies personally.   I counseled the patient about diagnostic testing and treatment plan. All questions were answered.   I discussed recommendations with the primary team.
I have personally seen and examined this patient.    I have reviewed all pertinent clinical information and reviewed all relevant imaging and diagnostic studies personally.   I counseled the patient about diagnostic testing and treatment plan. All questions were answered.   I discussed recommendations with the primary team.
direct pt care and interdisciplinary rounds
time spent on review of labs, imaging studies, old records, obtaining history, personally examining patient, counselling and communicating with patient, entering orders for medications/tests/etc, discussions with other health care providers, documentation in electronic health records, independent interpretation of labs, imaging/procedure results and care coordination.
Coordination of care
time spent on review of labs, imaging studies, old records, obtaining history, personally examining patient, , entering orders for medications/tests/etc, discussions with other health care providers, documentation in electronic health records, independent interpretation of labs, imaging/procedure results and care coordination.
time spent on review of labs, imaging studies, old records, obtaining history, personally examining patient, counselling and communicating with patient, entering orders for medications/tests/etc, discussions with other health care providers, documentation in electronic health records, independent interpretation of labs, imaging/procedure results and care coordination.
Coordination of care
time spent on review of labs, imaging studies, old records, obtaining history, personally examining patient, and communicating with patient, entering orders for medications/tests/etc, discussions with other health care providers, documentation in electronic health records, independent interpretation of labs, imaging/procedure results and care coordination.
Total time spent caring for the patient includes time spent before the visit reviewing the chart, time spent during the visit, time spent after the visit on documentation, and time spent communicating with patient/family, and other providers.

## 2023-10-25 VITALS — DIASTOLIC BLOOD PRESSURE: 60 MMHG | HEART RATE: 74 BPM | SYSTOLIC BLOOD PRESSURE: 131 MMHG | TEMPERATURE: 97 F

## 2023-10-25 LAB
-  AMIKACIN: SIGNIFICANT CHANGE UP
-  AMIKACIN: SIGNIFICANT CHANGE UP
-  AMOXICILLIN/CLAVULANIC ACID: SIGNIFICANT CHANGE UP
-  AMOXICILLIN/CLAVULANIC ACID: SIGNIFICANT CHANGE UP
-  AMPICILLIN/SULBACTAM: SIGNIFICANT CHANGE UP
-  AMPICILLIN/SULBACTAM: SIGNIFICANT CHANGE UP
-  AMPICILLIN: SIGNIFICANT CHANGE UP
-  AMPICILLIN: SIGNIFICANT CHANGE UP
-  AZTREONAM: SIGNIFICANT CHANGE UP
-  AZTREONAM: SIGNIFICANT CHANGE UP
-  CEFAZOLIN: SIGNIFICANT CHANGE UP
-  CEFAZOLIN: SIGNIFICANT CHANGE UP
-  CEFEPIME: SIGNIFICANT CHANGE UP
-  CEFEPIME: SIGNIFICANT CHANGE UP
-  CEFTRIAXONE: SIGNIFICANT CHANGE UP
-  CEFTRIAXONE: SIGNIFICANT CHANGE UP
-  CIPROFLOXACIN: SIGNIFICANT CHANGE UP
-  CIPROFLOXACIN: SIGNIFICANT CHANGE UP
-  ERTAPENEM: SIGNIFICANT CHANGE UP
-  ERTAPENEM: SIGNIFICANT CHANGE UP
-  GENTAMICIN: SIGNIFICANT CHANGE UP
-  GENTAMICIN: SIGNIFICANT CHANGE UP
-  IMIPENEM: SIGNIFICANT CHANGE UP
-  IMIPENEM: SIGNIFICANT CHANGE UP
-  LEVOFLOXACIN: SIGNIFICANT CHANGE UP
-  LEVOFLOXACIN: SIGNIFICANT CHANGE UP
-  MEROPENEM: SIGNIFICANT CHANGE UP
-  MEROPENEM: SIGNIFICANT CHANGE UP
-  PIPERACILLIN/TAZOBACTAM: SIGNIFICANT CHANGE UP
-  PIPERACILLIN/TAZOBACTAM: SIGNIFICANT CHANGE UP
-  TOBRAMYCIN: SIGNIFICANT CHANGE UP
-  TOBRAMYCIN: SIGNIFICANT CHANGE UP
-  TRIMETHOPRIM/SULFAMETHOXAZOLE: SIGNIFICANT CHANGE UP
-  TRIMETHOPRIM/SULFAMETHOXAZOLE: SIGNIFICANT CHANGE UP
CULTURE RESULTS: ABNORMAL
CULTURE RESULTS: ABNORMAL
GLUCOSE BLDC GLUCOMTR-MCNC: 101 MG/DL — HIGH (ref 70–99)
GLUCOSE BLDC GLUCOMTR-MCNC: 101 MG/DL — HIGH (ref 70–99)
GLUCOSE BLDC GLUCOMTR-MCNC: 102 MG/DL — HIGH (ref 70–99)
GLUCOSE BLDC GLUCOMTR-MCNC: 102 MG/DL — HIGH (ref 70–99)
GLUCOSE BLDC GLUCOMTR-MCNC: 108 MG/DL — HIGH (ref 70–99)
GLUCOSE BLDC GLUCOMTR-MCNC: 108 MG/DL — HIGH (ref 70–99)
GLUCOSE BLDC GLUCOMTR-MCNC: 115 MG/DL — HIGH (ref 70–99)
GLUCOSE BLDC GLUCOMTR-MCNC: 115 MG/DL — HIGH (ref 70–99)
GRAM STN FLD: ABNORMAL
METHOD TYPE: SIGNIFICANT CHANGE UP
METHOD TYPE: SIGNIFICANT CHANGE UP
ORGANISM # SPEC MICROSCOPIC CNT: ABNORMAL

## 2023-10-25 PROCEDURE — 99233 SBSQ HOSP IP/OBS HIGH 50: CPT

## 2023-10-25 PROCEDURE — 99239 HOSP IP/OBS DSCHRG MGMT >30: CPT

## 2023-10-25 RX ORDER — MIDODRINE HYDROCHLORIDE 2.5 MG/1
1 TABLET ORAL
Qty: 0 | Refills: 0 | DISCHARGE
Start: 2023-10-25

## 2023-10-25 RX ORDER — COLLAGENASE CLOSTRIDIUM HIST. 250 UNIT/G
1 OINTMENT (GRAM) TOPICAL
Qty: 0 | Refills: 0 | DISCHARGE
Start: 2023-10-25

## 2023-10-25 RX ORDER — GABAPENTIN 400 MG/1
1 CAPSULE ORAL
Qty: 0 | Refills: 0 | DISCHARGE
Start: 2023-10-25

## 2023-10-25 RX ORDER — DRONABINOL 2.5 MG
1 CAPSULE ORAL
Qty: 0 | Refills: 0 | DISCHARGE
Start: 2023-10-25

## 2023-10-25 RX ADMIN — MIDODRINE HYDROCHLORIDE 10 MILLIGRAM(S): 2.5 TABLET ORAL at 13:07

## 2023-10-25 RX ADMIN — Medication 1 APPLICATION(S): at 06:24

## 2023-10-25 RX ADMIN — Medication 2.5 MILLIGRAM(S): at 12:45

## 2023-10-25 RX ADMIN — MIDODRINE HYDROCHLORIDE 10 MILLIGRAM(S): 2.5 TABLET ORAL at 05:18

## 2023-10-25 RX ADMIN — SODIUM CHLORIDE 50 MILLILITER(S): 9 INJECTION, SOLUTION INTRAVENOUS at 12:47

## 2023-10-25 RX ADMIN — Medication 12.5 MILLIGRAM(S): at 18:01

## 2023-10-25 RX ADMIN — Medication 2.5 MILLIGRAM(S): at 18:01

## 2023-10-25 RX ADMIN — Medication 12.5 MILLIGRAM(S): at 05:18

## 2023-10-25 RX ADMIN — PANTOPRAZOLE SODIUM 40 MILLIGRAM(S): 20 TABLET, DELAYED RELEASE ORAL at 18:02

## 2023-10-25 RX ADMIN — BUDESONIDE AND FORMOTEROL FUMARATE DIHYDRATE 2 PUFF(S): 160; 4.5 AEROSOL RESPIRATORY (INHALATION) at 08:45

## 2023-10-25 RX ADMIN — Medication 2.5 MILLIGRAM(S): at 06:24

## 2023-10-25 RX ADMIN — Medication 1 APPLICATION(S): at 18:10

## 2023-10-25 RX ADMIN — CHLORHEXIDINE GLUCONATE 1 APPLICATION(S): 213 SOLUTION TOPICAL at 05:20

## 2023-10-25 RX ADMIN — HEPARIN SODIUM 5000 UNIT(S): 5000 INJECTION INTRAVENOUS; SUBCUTANEOUS at 13:07

## 2023-10-25 RX ADMIN — HEPARIN SODIUM 5000 UNIT(S): 5000 INJECTION INTRAVENOUS; SUBCUTANEOUS at 05:18

## 2023-10-25 RX ADMIN — PANTOPRAZOLE SODIUM 40 MILLIGRAM(S): 20 TABLET, DELAYED RELEASE ORAL at 05:19

## 2023-10-25 NOTE — CHART NOTE - NSCHARTNOTESELECT_GEN_ALL_CORE
Event Note
Event Note
Follow Up/Nutrition Services
Follow-up/Nutrition Services
GI/Event Note
GI/Event Note
Speech Pathology/Event Note
Transfer Note
ANESTHESIA to PACU NOTE/Transfer Note
CALORIE COUNT/Nutrition Services
Event Note
Feeding/AC/Event Note
Follow Up/Nutrition Services
Nutrition - RD Follow Up/Event Note
Nutrition Services
RD/Nutrition Services
Speech Pathology/Event Note
Transfer Note
Transfer Note
family update/Event Note

## 2023-10-25 NOTE — PROGRESS NOTE ADULT - ASSESSMENT
Assessment and Plan  Case of an 83 year old male patient known to have COPD. DM, atrial fibrillation, HFrEF, anemia, lymphedema, and recent left foot OM who was brought from the NH to the ED on 09/15 for evaluation of altered mental status, found to have sepsis in setting of recent left foot OM, admitted for further management. Stay was complicated by hemorrhagic shock and drop in Hb secondary to hematochezia on 09/23, Status post EGD on 09/23 revealing a bleeding duodenal ulcer s/p OVESCO placement. Stay also complicated by an episode of confusion and hypoxia on 09/26 s/p found to have possible stroke on 10/10. We are reconsulted for evaluation of PEG tube placement after failing FEES study on 10/11.      Severe Pharyngeal Dysphagia  Possible Age-Indeterminate Left Subinsular Stroke on 10/10  * VFSS 10/20: severe pharyngeal dysphagia for thins; recommendation now changed to easy-to-chew diet  * No longer spiking fevers; no longer on pressors  * Labs: leukocytosis improved, Hb stable  * Caloric count from 10/23-10/25 noted: patient tolerating mainly soft diets; not regular food    RECOMMENDATIONS  - Recommend follow up with nutrition team to assess whether patient is meeting nutrition requirements  - Will hold off PEG tube placement for now pending above  - Speech and swallow evaluation appreciated  - Tolerating easy to chew diet. Caloric count from 10/23-10/25 noted  - Neurology, Palliative team evaluation, and GOC discussion appreciated      Hemorrhagic Shock Secondary to Hematochezia from Duodenal Ulcer Bleed s/p OVESCO placement 09/23  Acute Drop in Hemoglobin- Improved  * Hemodynamically stable; No melena per nurse; No hypotension or tachycardia  * Hb trend: 09/26 Hb 8.2 -> 10/20 Hb 8.3 -> 10/21 Hb 7.9 -> 10/22 Hb 8.2  * Status Post EGD on 09/23: blood clots in fundus and antrum; 2cm actively bleeding duodenal ulcer with spurting vessel ([place] 1a) in sweep on base s/p 10cc epi and s/p 11/6 OVESCO placement for hemostasis    RECOMMENDATIONS  - Ok to start short acting AC for history of atrial fibrillation from GI stand point  - Continue PO Protonix 40mg BID via NG tube for now  - Diet as above  - Monitor BM for recurrence of hematochezia or melena  - Please avoid any NSAIDs  - If any unstable bleed, please call GI STAT      Asymptomatic Cholelithiasis  * Noted on CT  * LFTs noted    RECOMMENDATIONS  - Monitor for symptoms  - Trend LFTs      Thank you for your consult.  - Please note that plan was communicated with medical team.  - Please reach GI on [phone] during weekdays till 5pm.  - Please call the GI service line after 5pm on Weekdays and anytime on Weekends: [phone].      Rickie Jenkins MD  PGY - 4 Gastroenterology Fellow  [facility]

## 2023-10-25 NOTE — PROGRESS NOTE ADULT - NUTRITIONAL ASSESSMENT
This patient has been assessed with a concern for Malnutrition and has been determined to have a diagnosis/diagnoses of Moderate protein-calorie malnutrition.    This patient is being managed with:   Diet NPO after Midnight-     NPO Start Date: 03-Oct-2023   NPO Start Time: 23:59  Except Medications  Entered: Oct  4 2023  4:53AM    Diet Pureed-  Mildly Thick Liquids (MILDTHICKLIQS)  Free Water Flush Instructions:  *** please add 2 packets of thickener per NEPRO carton ***  Supplement Feeding Modality:  Oral  Nepro Cans or Servings Per Day:  3       Frequency:  Daily  Entered: Oct  3 2023  2:33PM    Diet NPO after Midnight-     NPO Start Date: 03-Oct-2023   NPO Start Time: 23:59  Entered: Oct  3 2023  1:26PM    Diet Pureed-  Mildly Thick Liquids (MILDTHICKLIQS)  Entered: Sep 29 2023 11:50AM    The following pending diet order is being considered for treatment of Moderate protein-calorie malnutrition:  Diet Minced and Moist-  Mildly Thick Liquids (MILDTHICKLIQS)  Low Sodium     Qty per Day:  magic cup 2x/day  Supplement Feeding Modality:  Oral  Ensure Plus High Protein Cans or Servings Per Day:  1       Frequency:  Three Times a day  Entered: Sep 25 2023  3:29PM  
This patient has been assessed with a concern for Malnutrition and has been determined to have a diagnosis/diagnoses of Moderate protein-calorie malnutrition.    This patient is being managed with:   Diet NPO with Tube Feed-  Tube Feeding Modality: Nasogastric  Glucerna 1.2 Ethan  Total Volume for 24 Hours (mL): 1500  Bolus  Total Volume of Bolus (mL):  375  Tube Feed Frequency: Every 6 hours   Tube Feed Start Time: 00:00  Bolus Feed Rate (mL per Hour): 375   Bolus Feed Duration (in Hours): 6  Bolus Feed Instructions:  start feeds at 125 mL and increase as tolerated until goal rate of 375 mL is met  Free Water Flush Instructions:  150 mL pre + post feeds  No Carb Prosource (1pkg = 15gms Protein)     Qty per Day:  2  Entered: Oct 13 2023 12:23PM    The following pending diet order is being considered for treatment of Moderate protein-calorie malnutrition:  Diet Soft and Bite Sized-  DASH/TLC {Sodium & Cholesterol Restricted}  Mildly Thick Liquids (MILDTHICKLIQS)  Free Water Flush Instructions:  please thicken each Ensure with 2 packs of thickeners     Qty per Day:  Magic cup 2x/day  Supplement Feeding Modality:  Oral  Ensure Plus High Protein Cans or Servings Per Day:  1       Frequency:  Two Times a day  Entered: Oct  9 2023  1:42PM    Diet Pureed-  Mildly Thick Liquids (MILDTHICKLIQS)  Free Water Flush Instructions:  *** please add 2 packets of thickener per NEPRO carton ***  Supplement Feeding Modality:  Oral  Nepro Cans or Servings Per Day:  3       Frequency:  Daily  Entered: Oct  3 2023  2:33PM    Diet Minced and Moist-  Mildly Thick Liquids (MILDTHICKLIQS)  Low Sodium     Qty per Day:  magic cup 2x/day  Supplement Feeding Modality:  Oral  Ensure Plus High Protein Cans or Servings Per Day:  1       Frequency:  Three Times a day  Entered: Sep 25 2023  3:29PM  
This patient has been assessed with a concern for Malnutrition and has been determined to have a diagnosis/diagnoses of Moderate protein-calorie malnutrition.    This patient is being managed with:   Diet NPO-  Entered: Sep 27 2023  4:49PM    The following pending diet order is being considered for treatment of Moderate protein-calorie malnutrition:  Diet Minced and Moist-  Mildly Thick Liquids (MILDTHICKLIQS)  Low Sodium     Qty per Day:  magic cup 2x/day  Supplement Feeding Modality:  Oral  Ensure Plus High Protein Cans or Servings Per Day:  1       Frequency:  Three Times a day  Entered: Sep 25 2023  3:29PM  
This patient has been assessed with a concern for Malnutrition and has been determined to have a diagnosis/diagnoses of Moderate protein-calorie malnutrition.    This patient is being managed with:   Diet NPO-  Except Medications  Entered: Oct  6 2023  8:44AM    Diet NPO after Midnight-     NPO Start Date: 03-Oct-2023   NPO Start Time: 23:59  Except Medications  Entered: Oct  4 2023  4:53AM    Diet NPO after Midnight-     NPO Start Date: 03-Oct-2023   NPO Start Time: 23:59  Entered: Oct  3 2023  1:26PM    The following pending diet order is being considered for treatment of Moderate protein-calorie malnutrition:  Diet Pureed-  Mildly Thick Liquids (MILDTHICKLIQS)  Free Water Flush Instructions:  *** please add 2 packets of thickener per NEPRO carton ***  Supplement Feeding Modality:  Oral  Nepro Cans or Servings Per Day:  3       Frequency:  Daily  Entered: Oct  3 2023  2:33PM    Diet Minced and Moist-  Mildly Thick Liquids (MILDTHICKLIQS)  Low Sodium     Qty per Day:  magic cup 2x/day  Supplement Feeding Modality:  Oral  Ensure Plus High Protein Cans or Servings Per Day:  1       Frequency:  Three Times a day  Entered: Sep 25 2023  3:29PM  
This patient has been assessed with a concern for Malnutrition and has been determined to have a diagnosis/diagnoses of Moderate protein-calorie malnutrition.    This patient is being managed with:   Diet Pureed-  Mildly Thick Liquids (MILDTHICKLIQS)  Entered: Sep 29 2023 11:50AM    The following pending diet order is being considered for treatment of Moderate protein-calorie malnutrition:  Diet Minced and Moist-  Mildly Thick Liquids (MILDTHICKLIQS)  Low Sodium     Qty per Day:  magic cup 2x/day  Supplement Feeding Modality:  Oral  Ensure Plus High Protein Cans or Servings Per Day:  1       Frequency:  Three Times a day  Entered: Sep 25 2023  3:29PM  
This patient has been assessed with a concern for Malnutrition and has been determined to have a diagnosis/diagnoses of Moderate protein-calorie malnutrition.    This patient is being managed with:   Diet Soft and Bite Sized-  DASH/TLC {Sodium & Cholesterol Restricted}  Mildly Thick Liquids (MILDTHICKLIQS)  Free Water Flush Instructions:  please thicken each Ensure with 2 packs of thickeners     Qty per Day:  Magic cup 2x/day  Supplement Feeding Modality:  Oral  Ensure Plus High Protein Cans or Servings Per Day:  1       Frequency:  Two Times a day  Entered: Oct  9 2023  1:42PM    Diet Soft and Bite Sized-  DASH/TLC {Sodium & Cholesterol Restricted}  Mildly Thick Liquids (MILDTHICKLIQS)  Entered: Oct  7 2023  1:03PM    Diet NPO after Midnight-     NPO Start Date: 03-Oct-2023   NPO Start Time: 23:59  Except Medications  Entered: Oct  4 2023  4:53AM    Diet NPO after Midnight-     NPO Start Date: 03-Oct-2023   NPO Start Time: 23:59  Entered: Oct  3 2023  1:26PM    The following pending diet order is being considered for treatment of Moderate protein-calorie malnutrition:  Diet Pureed-  Mildly Thick Liquids (MILDTHICKLIQS)  Free Water Flush Instructions:  *** please add 2 packets of thickener per NEPRO carton ***  Supplement Feeding Modality:  Oral  Nepro Cans or Servings Per Day:  3       Frequency:  Daily  Entered: Oct  3 2023  2:33PM    Diet Minced and Moist-  Mildly Thick Liquids (MILDTHICKLIQS)  Low Sodium     Qty per Day:  magic cup 2x/day  Supplement Feeding Modality:  Oral  Ensure Plus High Protein Cans or Servings Per Day:  1       Frequency:  Three Times a day  Entered: Sep 25 2023  3:29PM  
This patient has been assessed with a concern for Malnutrition and has been determined to have a diagnosis/diagnoses of Moderate protein-calorie malnutrition.    This patient is being managed with:   Diet Soft and Bite Sized-  DASH/TLC {Sodium & Cholesterol Restricted}  Mildly Thick Liquids (MILDTHICKLIQS)  Free Water Flush Instructions:  please thicken each Ensure with 2 packs of thickeners     Qty per Day:  Magic cup 2x/day  Supplement Feeding Modality:  Oral  Ensure Plus High Protein Cans or Servings Per Day:  1       Frequency:  Two Times a day  Entered: Oct  9 2023  1:42PM    Diet Soft and Bite Sized-  DASH/TLC {Sodium & Cholesterol Restricted}  Mildly Thick Liquids (MILDTHICKLIQS)  Entered: Oct  7 2023  1:03PM    Diet NPO after Midnight-     NPO Start Date: 03-Oct-2023   NPO Start Time: 23:59  Except Medications  Entered: Oct  4 2023  4:53AM    Diet NPO after Midnight-     NPO Start Date: 03-Oct-2023   NPO Start Time: 23:59  Entered: Oct  3 2023  1:26PM    The following pending diet order is being considered for treatment of Moderate protein-calorie malnutrition:  Diet Pureed-  Mildly Thick Liquids (MILDTHICKLIQS)  Free Water Flush Instructions:  *** please add 2 packets of thickener per NEPRO carton ***  Supplement Feeding Modality:  Oral  Nepro Cans or Servings Per Day:  3       Frequency:  Daily  Entered: Oct  3 2023  2:33PM    Diet Minced and Moist-  Mildly Thick Liquids (MILDTHICKLIQS)  Low Sodium     Qty per Day:  magic cup 2x/day  Supplement Feeding Modality:  Oral  Ensure Plus High Protein Cans or Servings Per Day:  1       Frequency:  Three Times a day  Entered: Sep 25 2023  3:29PM  
This patient has been assessed with a concern for Malnutrition and has been determined to have a diagnosis/diagnoses of Moderate protein-calorie malnutrition.    This patient is being managed with:   Diet Easy to Chew-  Mildly Thick Liquids (MILDTHICKLIQS)  Entered: Oct 20 2023  2:38PM    The following pending diet order is being considered for treatment of Moderate protein-calorie malnutrition:  Diet Soft and Bite Sized-  DASH/TLC {Sodium & Cholesterol Restricted}  Mildly Thick Liquids (MILDTHICKLIQS)  Free Water Flush Instructions:  please thicken each Ensure with 2 packs of thickeners     Qty per Day:  Magic cup 2x/day  Supplement Feeding Modality:  Oral  Ensure Plus High Protein Cans or Servings Per Day:  1       Frequency:  Two Times a day  Entered: Oct  9 2023  1:42PM    Diet Pureed-  Mildly Thick Liquids (MILDTHICKLIQS)  Free Water Flush Instructions:  *** please add 2 packets of thickener per NEPRO carton ***  Supplement Feeding Modality:  Oral  Nepro Cans or Servings Per Day:  3       Frequency:  Daily  Entered: Oct  3 2023  2:33PM    Diet Minced and Moist-  Mildly Thick Liquids (MILDTHICKLIQS)  Low Sodium     Qty per Day:  magic cup 2x/day  Supplement Feeding Modality:  Oral  Ensure Plus High Protein Cans or Servings Per Day:  1       Frequency:  Three Times a day  Entered: Sep 25 2023  3:29PM  
This patient has been assessed with a concern for Malnutrition and has been determined to have a diagnosis/diagnoses of Moderate protein-calorie malnutrition.    This patient is being managed with:   Diet Easy to Chew-  Mildly Thick Liquids (MILDTHICKLIQS)  Entered: Oct 20 2023  2:38PM    The following pending diet order is being considered for treatment of Moderate protein-calorie malnutrition:  Diet Soft and Bite Sized-  DASH/TLC {Sodium & Cholesterol Restricted}  Mildly Thick Liquids (MILDTHICKLIQS)  Free Water Flush Instructions:  please thicken each Ensure with 2 packs of thickeners     Qty per Day:  Magic cup 2x/day  Supplement Feeding Modality:  Oral  Ensure Plus High Protein Cans or Servings Per Day:  1       Frequency:  Two Times a day  Entered: Oct  9 2023  1:42PM    Diet Pureed-  Mildly Thick Liquids (MILDTHICKLIQS)  Free Water Flush Instructions:  *** please add 2 packets of thickener per NEPRO carton ***  Supplement Feeding Modality:  Oral  Nepro Cans or Servings Per Day:  3       Frequency:  Daily  Entered: Oct  3 2023  2:33PM    Diet Minced and Moist-  Mildly Thick Liquids (MILDTHICKLIQS)  Low Sodium     Qty per Day:  magic cup 2x/day  Supplement Feeding Modality:  Oral  Ensure Plus High Protein Cans or Servings Per Day:  1       Frequency:  Three Times a day  Entered: Sep 25 2023  3:29PM  
This patient has been assessed with a concern for Malnutrition and has been determined to have a diagnosis/diagnoses of Moderate protein-calorie malnutrition.    This patient is being managed with:   Diet NPO with Tube Feed-  Tube Feeding Modality: Nasogastric  Glucerna 1.2 Ethan  Total Volume for 24 Hours (mL): 1500  Bolus  Total Volume of Bolus (mL):  375  Tube Feed Frequency: Every 6 hours   Tube Feed Start Time: 00:00  Bolus Feed Rate (mL per Hour): 375   Bolus Feed Duration (in Hours): 6  Bolus Feed Instructions:  start feeds at 125 mL and increase as tolerated until goal rate of 375 mL is met  Free Water Flush Instructions:  150 mL pre + post feeds  No Carb Prosource (1pkg = 15gms Protein)     Qty per Day:  2  Entered: Oct 13 2023 12:23PM    The following pending diet order is being considered for treatment of Moderate protein-calorie malnutrition:  Diet Soft and Bite Sized-  DASH/TLC {Sodium & Cholesterol Restricted}  Mildly Thick Liquids (MILDTHICKLIQS)  Free Water Flush Instructions:  please thicken each Ensure with 2 packs of thickeners     Qty per Day:  Magic cup 2x/day  Supplement Feeding Modality:  Oral  Ensure Plus High Protein Cans or Servings Per Day:  1       Frequency:  Two Times a day  Entered: Oct  9 2023  1:42PM    Diet Pureed-  Mildly Thick Liquids (MILDTHICKLIQS)  Free Water Flush Instructions:  *** please add 2 packets of thickener per NEPRO carton ***  Supplement Feeding Modality:  Oral  Nepro Cans or Servings Per Day:  3       Frequency:  Daily  Entered: Oct  3 2023  2:33PM    Diet Minced and Moist-  Mildly Thick Liquids (MILDTHICKLIQS)  Low Sodium     Qty per Day:  magic cup 2x/day  Supplement Feeding Modality:  Oral  Ensure Plus High Protein Cans or Servings Per Day:  1       Frequency:  Three Times a day  Entered: Sep 25 2023  3:29PM  
This patient has been assessed with a concern for Malnutrition and has been determined to have a diagnosis/diagnoses of Moderate protein-calorie malnutrition.    This patient is being managed with:   Diet NPO-  Entered: Oct 11 2023 11:36AM    The following pending diet order is being considered for treatment of Moderate protein-calorie malnutrition:  Diet Soft and Bite Sized-  DASH/TLC {Sodium & Cholesterol Restricted}  Mildly Thick Liquids (MILDTHICKLIQS)  Free Water Flush Instructions:  please thicken each Ensure with 2 packs of thickeners     Qty per Day:  Magic cup 2x/day  Supplement Feeding Modality:  Oral  Ensure Plus High Protein Cans or Servings Per Day:  1       Frequency:  Two Times a day  Entered: Oct  9 2023  1:42PM    Diet Pureed-  Mildly Thick Liquids (MILDTHICKLIQS)  Free Water Flush Instructions:  *** please add 2 packets of thickener per NEPRO carton ***  Supplement Feeding Modality:  Oral  Nepro Cans or Servings Per Day:  3       Frequency:  Daily  Entered: Oct  3 2023  2:33PM    Diet Minced and Moist-  Mildly Thick Liquids (MILDTHICKLIQS)  Low Sodium     Qty per Day:  magic cup 2x/day  Supplement Feeding Modality:  Oral  Ensure Plus High Protein Cans or Servings Per Day:  1       Frequency:  Three Times a day  Entered: Sep 25 2023  3:29PM  
This patient has been assessed with a concern for Malnutrition and has been determined to have a diagnosis/diagnoses of Moderate protein-calorie malnutrition.    This patient is being managed with:   Diet NPO-  Entered: Sep 27 2023  4:49PM    The following pending diet order is being considered for treatment of Moderate protein-calorie malnutrition:  Diet Minced and Moist-  Mildly Thick Liquids (MILDTHICKLIQS)  Low Sodium     Qty per Day:  magic cup 2x/day  Supplement Feeding Modality:  Oral  Ensure Plus High Protein Cans or Servings Per Day:  1       Frequency:  Three Times a day  Entered: Sep 25 2023  3:29PM  
This patient has been assessed with a concern for Malnutrition and has been determined to have a diagnosis/diagnoses of Moderate protein-calorie malnutrition.    This patient is being managed with:   Diet NPO-  Except Medications  Entered: Oct  6 2023  8:44AM    Diet NPO after Midnight-     NPO Start Date: 03-Oct-2023   NPO Start Time: 23:59  Except Medications  Entered: Oct  4 2023  4:53AM    Diet NPO after Midnight-     NPO Start Date: 03-Oct-2023   NPO Start Time: 23:59  Entered: Oct  3 2023  1:26PM    The following pending diet order is being considered for treatment of Moderate protein-calorie malnutrition:  Diet Pureed-  Mildly Thick Liquids (MILDTHICKLIQS)  Free Water Flush Instructions:  *** please add 2 packets of thickener per NEPRO carton ***  Supplement Feeding Modality:  Oral  Nepro Cans or Servings Per Day:  3       Frequency:  Daily  Entered: Oct  3 2023  2:33PM    Diet Minced and Moist-  Mildly Thick Liquids (MILDTHICKLIQS)  Low Sodium     Qty per Day:  magic cup 2x/day  Supplement Feeding Modality:  Oral  Ensure Plus High Protein Cans or Servings Per Day:  1       Frequency:  Three Times a day  Entered: Sep 25 2023  3:29PM  
This patient has been assessed with a concern for Malnutrition and has been determined to have a diagnosis/diagnoses of Moderate protein-calorie malnutrition.    This patient is being managed with:   Diet Pureed-  Mildly Thick Liquids (MILDTHICKLIQS)  Entered: Sep 29 2023 11:50AM    The following pending diet order is being considered for treatment of Moderate protein-calorie malnutrition:  Diet Minced and Moist-  Mildly Thick Liquids (MILDTHICKLIQS)  Low Sodium     Qty per Day:  magic cup 2x/day  Supplement Feeding Modality:  Oral  Ensure Plus High Protein Cans or Servings Per Day:  1       Frequency:  Three Times a day  Entered: Sep 25 2023  3:29PM  
This patient has been assessed with a concern for Malnutrition and has been determined to have a diagnosis/diagnoses of Moderate protein-calorie malnutrition.    This patient is being managed with:   Diet Pureed-  Mildly Thick Liquids (MILDTHICKLIQS)  Entered: Sep 29 2023 11:50AM    The following pending diet order is being considered for treatment of Moderate protein-calorie malnutrition:  Diet Minced and Moist-  Mildly Thick Liquids (MILDTHICKLIQS)  Low Sodium     Qty per Day:  magic cup 2x/day  Supplement Feeding Modality:  Oral  Ensure Plus High Protein Cans or Servings Per Day:  1       Frequency:  Three Times a day  Entered: Sep 25 2023  3:29PM  
This patient has been assessed with a concern for Malnutrition and has been determined to have a diagnosis/diagnoses of Moderate protein-calorie malnutrition.    This patient is being managed with:   Diet Minced and Moist-  Entered: Sep 28 2023  2:51PM    Diet Minced and Moist-  Mildly Thick Liquids (MILDTHICKLIQS)  Low Sodium     Qty per Day:  magic cup 2x/day  Supplement Feeding Modality:  Oral  Ensure Plus High Protein Cans or Servings Per Day:  1       Frequency:  Three Times a day  Entered: Sep 25 2023  3:29PM    The following pending diet order is being considered for treatment of Moderate protein-calorie malnutrition:null
This patient has been assessed with a concern for Malnutrition and has been determined to have a diagnosis/diagnoses of Moderate protein-calorie malnutrition.    This patient is being managed with:   Diet NPO with Tube Feed-  Tube Feeding Modality: Nasogastric  Glucerna 1.2 Ethan  Total Volume for 24 Hours (mL): 1500  Bolus  Total Volume of Bolus (mL):  375  Tube Feed Frequency: Every 6 hours   Tube Feed Start Time: 00:00  Bolus Feed Rate (mL per Hour): 375   Bolus Feed Duration (in Hours): 6  Bolus Feed Instructions:  start feeds at 125 mL and increase as tolerated until goal rate of 375 mL is met  Free Water Flush Instructions:  150 mL pre + post feeds  No Carb Prosource (1pkg = 15gms Protein)     Qty per Day:  2  Entered: Oct 13 2023 12:23PM    The following pending diet order is being considered for treatment of Moderate protein-calorie malnutrition:  Diet Soft and Bite Sized-  DASH/TLC {Sodium & Cholesterol Restricted}  Mildly Thick Liquids (MILDTHICKLIQS)  Free Water Flush Instructions:  please thicken each Ensure with 2 packs of thickeners     Qty per Day:  Magic cup 2x/day  Supplement Feeding Modality:  Oral  Ensure Plus High Protein Cans or Servings Per Day:  1       Frequency:  Two Times a day  Entered: Oct  9 2023  1:42PM    Diet Pureed-  Mildly Thick Liquids (MILDTHICKLIQS)  Free Water Flush Instructions:  *** please add 2 packets of thickener per NEPRO carton ***  Supplement Feeding Modality:  Oral  Nepro Cans or Servings Per Day:  3       Frequency:  Daily  Entered: Oct  3 2023  2:33PM    Diet Minced and Moist-  Mildly Thick Liquids (MILDTHICKLIQS)  Low Sodium     Qty per Day:  magic cup 2x/day  Supplement Feeding Modality:  Oral  Ensure Plus High Protein Cans or Servings Per Day:  1       Frequency:  Three Times a day  Entered: Sep 25 2023  3:29PM  
This patient has been assessed with a concern for Malnutrition and has been determined to have a diagnosis/diagnoses of Moderate protein-calorie malnutrition.    This patient is being managed with:   Diet NPO-  Entered: Sep 26 2023  1:35PM    The following pending diet order is being considered for treatment of Moderate protein-calorie malnutrition:  Diet Minced and Moist-  Mildly Thick Liquids (MILDTHICKLIQS)  Low Sodium     Qty per Day:  magic cup 2x/day  Supplement Feeding Modality:  Oral  Ensure Plus High Protein Cans or Servings Per Day:  1       Frequency:  Three Times a day  Entered: Sep 25 2023  3:29PM  
This patient has been assessed with a concern for Malnutrition and has been determined to have a diagnosis/diagnoses of Moderate protein-calorie malnutrition.    This patient is being managed with:   Diet NPO-  Except Medications  Entered: Oct 12 2023 11:03AM    The following pending diet order is being considered for treatment of Moderate protein-calorie malnutrition:  Diet Soft and Bite Sized-  DASH/TLC {Sodium & Cholesterol Restricted}  Mildly Thick Liquids (MILDTHICKLIQS)  Free Water Flush Instructions:  please thicken each Ensure with 2 packs of thickeners     Qty per Day:  Magic cup 2x/day  Supplement Feeding Modality:  Oral  Ensure Plus High Protein Cans or Servings Per Day:  1       Frequency:  Two Times a day  Entered: Oct  9 2023  1:42PM    Diet Pureed-  Mildly Thick Liquids (MILDTHICKLIQS)  Free Water Flush Instructions:  *** please add 2 packets of thickener per NEPRO carton ***  Supplement Feeding Modality:  Oral  Nepro Cans or Servings Per Day:  3       Frequency:  Daily  Entered: Oct  3 2023  2:33PM    Diet Minced and Moist-  Mildly Thick Liquids (MILDTHICKLIQS)  Low Sodium     Qty per Day:  magic cup 2x/day  Supplement Feeding Modality:  Oral  Ensure Plus High Protein Cans or Servings Per Day:  1       Frequency:  Three Times a day  Entered: Sep 25 2023  3:29PM  
This patient has been assessed with a concern for Malnutrition and has been determined to have a diagnosis/diagnoses of Moderate protein-calorie malnutrition.    This patient is being managed with:   Diet Pureed-  Mildly Thick Liquids (MILDTHICKLIQS)  Entered: Sep 29 2023 11:50AM    The following pending diet order is being considered for treatment of Moderate protein-calorie malnutrition:  Diet Minced and Moist-  Mildly Thick Liquids (MILDTHICKLIQS)  Low Sodium     Qty per Day:  magic cup 2x/day  Supplement Feeding Modality:  Oral  Ensure Plus High Protein Cans or Servings Per Day:  1       Frequency:  Three Times a day  Entered: Sep 25 2023  3:29PM  
This patient has been assessed with a concern for Malnutrition and has been determined to have a diagnosis/diagnoses of Moderate protein-calorie malnutrition.    This patient is being managed with:   Diet Easy to Chew-  Mildly Thick Liquids (MILDTHICKLIQS)  Entered: Oct 20 2023  2:38PM    The following pending diet order is being considered for treatment of Moderate protein-calorie malnutrition:  Diet Soft and Bite Sized-  DASH/TLC {Sodium & Cholesterol Restricted}  Mildly Thick Liquids (MILDTHICKLIQS)  Free Water Flush Instructions:  please thicken each Ensure with 2 packs of thickeners     Qty per Day:  Magic cup 2x/day  Supplement Feeding Modality:  Oral  Ensure Plus High Protein Cans or Servings Per Day:  1       Frequency:  Two Times a day  Entered: Oct  9 2023  1:42PM    Diet Pureed-  Mildly Thick Liquids (MILDTHICKLIQS)  Free Water Flush Instructions:  *** please add 2 packets of thickener per NEPRO carton ***  Supplement Feeding Modality:  Oral  Nepro Cans or Servings Per Day:  3       Frequency:  Daily  Entered: Oct  3 2023  2:33PM    Diet Minced and Moist-  Mildly Thick Liquids (MILDTHICKLIQS)  Low Sodium     Qty per Day:  magic cup 2x/day  Supplement Feeding Modality:  Oral  Ensure Plus High Protein Cans or Servings Per Day:  1       Frequency:  Three Times a day  Entered: Sep 25 2023  3:29PM  
This patient has been assessed with a concern for Malnutrition and has been determined to have a diagnosis/diagnoses of Moderate protein-calorie malnutrition.    This patient is being managed with:   Diet Minced and Moist-  Entered: Sep 27 2023  8:38AM    Diet Minced and Moist-  Mildly Thick Liquids (MILDTHICKLIQS)  Low Sodium     Qty per Day:  magic cup 2x/day  Supplement Feeding Modality:  Oral  Ensure Plus High Protein Cans or Servings Per Day:  1       Frequency:  Three Times a day  Entered: Sep 25 2023  3:29PM    The following pending diet order is being considered for treatment of Moderate protein-calorie malnutrition:null
This patient has been assessed with a concern for Malnutrition and has been determined to have a diagnosis/diagnoses of Moderate protein-calorie malnutrition.    This patient is being managed with:   Diet Minced and Moist-  Entered: Sep 28 2023  2:51PM    Diet Minced and Moist-  Mildly Thick Liquids (MILDTHICKLIQS)  Low Sodium     Qty per Day:  magic cup 2x/day  Supplement Feeding Modality:  Oral  Ensure Plus High Protein Cans or Servings Per Day:  1       Frequency:  Three Times a day  Entered: Sep 25 2023  3:29PM    The following pending diet order is being considered for treatment of Moderate protein-calorie malnutrition:null
This patient has been assessed with a concern for Malnutrition and has been determined to have a diagnosis/diagnoses of Moderate protein-calorie malnutrition.    This patient is being managed with:   Diet NPO with Tube Feed-  Tube Feeding Modality: Nasogastric  Glucerna 1.2 Ethan  Total Volume for 24 Hours (mL): 1500  Bolus  Total Volume of Bolus (mL):  375  Tube Feed Frequency: Every 6 hours   Tube Feed Start Time: 00:00  Bolus Feed Rate (mL per Hour): 375   Bolus Feed Duration (in Hours): 6  Bolus Feed Instructions:  start feeds at 125 mL and increase as tolerated until goal rate of 375 mL is met  Free Water Flush Instructions:  150 mL pre + post feeds  No Carb Prosource (1pkg = 15gms Protein)     Qty per Day:  2  Entered: Oct 13 2023 12:23PM    The following pending diet order is being considered for treatment of Moderate protein-calorie malnutrition:  Diet Soft and Bite Sized-  DASH/TLC {Sodium & Cholesterol Restricted}  Mildly Thick Liquids (MILDTHICKLIQS)  Free Water Flush Instructions:  please thicken each Ensure with 2 packs of thickeners     Qty per Day:  Magic cup 2x/day  Supplement Feeding Modality:  Oral  Ensure Plus High Protein Cans or Servings Per Day:  1       Frequency:  Two Times a day  Entered: Oct  9 2023  1:42PM    Diet Pureed-  Mildly Thick Liquids (MILDTHICKLIQS)  Free Water Flush Instructions:  *** please add 2 packets of thickener per NEPRO carton ***  Supplement Feeding Modality:  Oral  Nepro Cans or Servings Per Day:  3       Frequency:  Daily  Entered: Oct  3 2023  2:33PM    Diet Minced and Moist-  Mildly Thick Liquids (MILDTHICKLIQS)  Low Sodium     Qty per Day:  magic cup 2x/day  Supplement Feeding Modality:  Oral  Ensure Plus High Protein Cans or Servings Per Day:  1       Frequency:  Three Times a day  Entered: Sep 25 2023  3:29PM  
This patient has been assessed with a concern for Malnutrition and has been determined to have a diagnosis/diagnoses of Moderate protein-calorie malnutrition.    This patient is being managed with:   Diet NPO with Tube Feed-  Tube Feeding Modality: Nasogastric  Nepro with Carb Steady  Total Volume for 24 Hours (mL): 1125  Bolus  Total Volume of Bolus (mL):  375  Total # of Feeds: 3  Tube Feed Frequency: Every 8 hours   Tube Feed Start Time: 19:00  Bolus Feed Rate (mL per Hour): 375   Bolus Feed Duration (in Hours): 0.45  Bolus Feed Instructions:  Give feeds 3x/day - 7am 1pm 7pm (meal time pattern - to aid in better management and monitoring of BG)  Free Water Flush Instructions:  150 mL pre + post feeds**** 30 mL Flushes pre/post  No Carb Prosource  No Carb Prosource TF     Qty per Day:  2  Entered: Oct 19 2023 12:16PM    The following pending diet order is being considered for treatment of Moderate protein-calorie malnutrition:  Diet Soft and Bite Sized-  DASH/TLC {Sodium & Cholesterol Restricted}  Mildly Thick Liquids (MILDTHICKLIQS)  Free Water Flush Instructions:  please thicken each Ensure with 2 packs of thickeners     Qty per Day:  Magic cup 2x/day  Supplement Feeding Modality:  Oral  Ensure Plus High Protein Cans or Servings Per Day:  1       Frequency:  Two Times a day  Entered: Oct  9 2023  1:42PM    Diet Pureed-  Mildly Thick Liquids (MILDTHICKLIQS)  Free Water Flush Instructions:  *** please add 2 packets of thickener per NEPRO carton ***  Supplement Feeding Modality:  Oral  Nepro Cans or Servings Per Day:  3       Frequency:  Daily  Entered: Oct  3 2023  2:33PM    Diet Minced and Moist-  Mildly Thick Liquids (MILDTHICKLIQS)  Low Sodium     Qty per Day:  magic cup 2x/day  Supplement Feeding Modality:  Oral  Ensure Plus High Protein Cans or Servings Per Day:  1       Frequency:  Three Times a day  Entered: Sep 25 2023  3:29PM  
This patient has been assessed with a concern for Malnutrition and has been determined to have a diagnosis/diagnoses of Moderate protein-calorie malnutrition.    This patient is being managed with:   Diet NPO-  Entered: Sep 26 2023  1:35PM    The following pending diet order is being considered for treatment of Moderate protein-calorie malnutrition:  Diet Minced and Moist-  Mildly Thick Liquids (MILDTHICKLIQS)  Low Sodium     Qty per Day:  magic cup 2x/day  Supplement Feeding Modality:  Oral  Ensure Plus High Protein Cans or Servings Per Day:  1       Frequency:  Three Times a day  Entered: Sep 25 2023  3:29PM  
This patient has been assessed with a concern for Malnutrition and has been determined to have a diagnosis/diagnoses of Moderate protein-calorie malnutrition.    This patient is being managed with:   Diet Pureed-  Mildly Thick Liquids (MILDTHICKLIQS)  Entered: Sep 29 2023 11:50AM    The following pending diet order is being considered for treatment of Moderate protein-calorie malnutrition:  Diet Minced and Moist-  Mildly Thick Liquids (MILDTHICKLIQS)  Low Sodium     Qty per Day:  magic cup 2x/day  Supplement Feeding Modality:  Oral  Ensure Plus High Protein Cans or Servings Per Day:  1       Frequency:  Three Times a day  Entered: Sep 25 2023  3:29PM  
This patient has been assessed with a concern for Malnutrition and has been determined to have a diagnosis/diagnoses of Moderate protein-calorie malnutrition.    This patient is being managed with:   Diet Soft and Bite Sized-  DASH/TLC {Sodium & Cholesterol Restricted}  Mildly Thick Liquids (MILDTHICKLIQS)  Entered: Oct  7 2023  1:03PM    Diet NPO after Midnight-     NPO Start Date: 03-Oct-2023   NPO Start Time: 23:59  Except Medications  Entered: Oct  4 2023  4:53AM    Diet NPO after Midnight-     NPO Start Date: 03-Oct-2023   NPO Start Time: 23:59  Entered: Oct  3 2023  1:26PM    The following pending diet order is being considered for treatment of Moderate protein-calorie malnutrition:  Diet Pureed-  Mildly Thick Liquids (MILDTHICKLIQS)  Free Water Flush Instructions:  *** please add 2 packets of thickener per NEPRO carton ***  Supplement Feeding Modality:  Oral  Nepro Cans or Servings Per Day:  3       Frequency:  Daily  Entered: Oct  3 2023  2:33PM    Diet Minced and Moist-  Mildly Thick Liquids (MILDTHICKLIQS)  Low Sodium     Qty per Day:  magic cup 2x/day  Supplement Feeding Modality:  Oral  Ensure Plus High Protein Cans or Servings Per Day:  1       Frequency:  Three Times a day  Entered: Sep 25 2023  3:29PM  
This patient has been assessed with a concern for Malnutrition and has been determined to have a diagnosis/diagnoses of Moderate protein-calorie malnutrition.    This patient is being managed with:   Diet Soft and Bite Sized-  DASH/TLC {Sodium & Cholesterol Restricted}  Mildly Thick Liquids (MILDTHICKLIQS)  Entered: Oct  7 2023  1:03PM    Diet NPO after Midnight-     NPO Start Date: 03-Oct-2023   NPO Start Time: 23:59  Except Medications  Entered: Oct  4 2023  4:53AM    Diet NPO after Midnight-     NPO Start Date: 03-Oct-2023   NPO Start Time: 23:59  Entered: Oct  3 2023  1:26PM    The following pending diet order is being considered for treatment of Moderate protein-calorie malnutrition:  Diet Pureed-  Mildly Thick Liquids (MILDTHICKLIQS)  Free Water Flush Instructions:  *** please add 2 packets of thickener per NEPRO carton ***  Supplement Feeding Modality:  Oral  Nepro Cans or Servings Per Day:  3       Frequency:  Daily  Entered: Oct  3 2023  2:33PM    Diet Minced and Moist-  Mildly Thick Liquids (MILDTHICKLIQS)  Low Sodium     Qty per Day:  magic cup 2x/day  Supplement Feeding Modality:  Oral  Ensure Plus High Protein Cans or Servings Per Day:  1       Frequency:  Three Times a day  Entered: Sep 25 2023  3:29PM

## 2023-10-25 NOTE — DISCHARGE NOTE PROVIDER - CARE PROVIDER_API CALL
Mehnaz Mai  Internal Medicine  [address] [place] [address]  Phone: [phone]  Fax: [phone]  Follow Up Time: 2 weeks    Ronaldo Ruiz  Gastroenterology  [address] [place] [address]  Phone: [phone]  Fax: [phone]  Follow Up Time: 1 week

## 2023-10-25 NOTE — DISCHARGE NOTE PROVIDER - NSDCCPCAREPLAN_GEN_ALL_CORE_FT
PRINCIPAL DISCHARGE DIAGNOSIS  Diagnosis: Altered mental status  Assessment and Plan of Treatment: you came here with Aletred mental status   We did all the workup and did all the required management.  Kindly take all the prescribed medications on time and If you experience any symptoms immediately contact doctor.  Follow up with the doctor on time.  If you have any questions and concerns Don't hesittate to communicate.        SECONDARY DISCHARGE DIAGNOSES  Diagnosis: PAULA (acute kidney injury)  Assessment and Plan of Treatment:     Diagnosis: Sacral ulcer  Assessment and Plan of Treatment: For you sacral ulcer we consulted Burn who did the required management.  Keep pressure off the sore and keep it clean.  Take all medicatiosna s prescribed and follow wound care .  Take pain medications as required but not more than 3 times day.

## 2023-10-25 NOTE — PROGRESS NOTE ADULT - ASSESSMENT
· Assessment    82 yo m ho DM, COPD, anemia, lymphedemia, afib on xarelto, HFrEF, DM coming in from nursing home for AMS x 2 days admitted for septic shock with hospital course with multiple complications.     Septic Shock due to suspected Aspiration PNA v Pneumonitis, not POA -resolved  Had PICC line POA from Aug 2023 - removed  Metabolic Encephalopathy - improved, s/p intubation, now on room air   Elevated Lactate - improved   - now off pressors, on room air  - all cx negative, procal downtrending 1.7--> 0.66  - recieved merop till 10/22  - off levophed, c/w midodrine 5mg q8H. if BP stable, wean off.    Pleural effusion   - currently on room air, no planned thora per pulm    Severe Pharyngeal Dysphagia  HO aspiration pneumonitis SP ABX   - Patient was tolerating PO intake at NH prior to admission   - FEES Study on 10/11: severe pharyngeal dysphagia, recommendation to keep NPO with alternate means -> now on NGT for feeding  - discussed with s/s, s/p modified barium swallow. Can advance diet to PO per recs.  - NGT was removed on 10/20: monitor PO intake. Started on calorie count 10/23  - Evaluated by GI for PEG tube: likely early next week depending upon calorie count and nutrition requirements  - held bumex.   -Pt is eating for now.  -PT consult on board.      Decrease oral intake   Hemorrhagic Shock Secondary to Hematochezia from Duodenal Ulcer Bleed s/p OVESCO placement 09/23  UGIB secondary to Duodenal ulcer SP EGD   Hematochezia resolved   Blood Loss Anemia s/p 3u PRBC   - Status Post EGD on 09/23: blood clots in fundus and antrum; 2cm actively bleeding duodenal ulcer with spurting vessel ([place] 1a) in sweep on base s/p 10cc epi and s/p 11/6 OVESCO placement for hemostasis  -doing calory count since 3 days   - s/p 1u PRBC and Protamine sulfate 9/23  - Continue PO Protonix 40mg BID   - currently on DVT ppx, full AC on hold   - monitor CBC, keep active T&S      # Sacral Ulcer  :  positive gram negative cultures for rods  ID ask for no Abx for now   Burn consulted ,did debridement on 10/22   wound care given     Acute kidney failure on CKD /? ATN - now on HD via TDC placed 10/4  Hypernatremia - resolved   - on HD per renal, currently on hold, IR to remove TDC   - held Bumex  - Started gentle hydration as pt is not really eating anything. Cautious with fluid overload.     Acute Femoral DVT s/p IVC filter 10/6  Elevated dimer  - duplex neg on 9/29  - s/p IVC filter 10/6  - Full AC on hold - consider resuming if Hb stable with no further bleeding.     Age indeterminate infarct on imaging   - Indeterminate Left Subinsular Stroke on 10/10    Transaminitis - resolved  Asymptomatic Cholelithiasis    Paroxysmal Afib, chronic   - resume metoprolol 12.5 Q12H as BP/HR stable     History of Left Foot OM w/ surrounding Cellulitis  Sacral Decubitus Ulcer POA s/p debridement at bedside 10/22  - CT LLE (9/16): No CT evidence of osteomyelitis or necrotizing infection. No drainable collection seen, within the limitations of a noncontrast exam.   - per podiatry, c/w Local wound care; offloading boots bilaterally, frequently turning and positioning, prevent pressure injury, keep skin clean, and monitor for wound changes and notify provider as per podiatry.  s/p debridement on 10/22 by burn   - f/u wound cx.     FUll code, overall prognosis is very poor    Pending:    monitor PO intake  f/u with GI for PEG tube plan  f/u daily labs

## 2023-10-25 NOTE — DISCHARGE NOTE PROVIDER - PROVIDER TOKENS
PROVIDER:[TOKEN:[[id number]],FOLLOWUP:[2 weeks]],PROVIDER:[TOKEN:[[id number]],FOLLOWUP:[1 week]]

## 2023-10-25 NOTE — CHART NOTE - NSCHARTNOTEFT_GEN_A_CORE
Calorie count observed hanging in room -- still in progress. Initiated for days 10/23, 10/24, 10/25. Staff aware. RD to collect and post results on 10/26.     RD to remain available: [place] [phone] or TEAMS

## 2023-10-25 NOTE — DISCHARGE NOTE NURSING/CASE MANAGEMENT/SOCIAL WORK - PATIENT PORTAL LINK FT
You can access the FollowMyHealth Patient Portal offered by [facility] by registering at the following website: [email or web address]. By joining [facility]’s FollowMyHealth portal, you will also be able to view your health information using other applications (apps) compatible with our system.

## 2023-10-25 NOTE — DISCHARGE NOTE PROVIDER - HOSPITAL COURSE
84 yo m ho DM, COPD, anemia, lymphedemia, afib on xarelto, HFrEF, DM coming in from nursing home for AMS x 2 days admitted for septic shock with hospital course with multiple complications. Had Septic Shock due to suspected Aspiration PNA v Pneumonitis, not POA -resolved and Metabolic Encephalopathy - improved, s/p intubation, now on room air   Elevated Lactate - improved with cx negative, procal downtrending 1.7--> 0.66 and recieved merop till 10/22  Pt have Severe Pharyngeal Dysphagia and  FEES Study on 10/11: severe pharyngeal dysphagia, recommendation to keep NPO with alternate means -> now on NGT for feeding  So discussed with s/s, s/p modified barium swallow. Can advance diet to PO per recs. and NGT was removed on 10/20: monitor PO intake. Started on calorie count 10/23  Evaluated by GI for PEG tube: likely early next week depending upon calorie count and nutrition requirements  Hematochezia resolved   Blood Loss Anemia s/p 3u PRBC   Pt Status Post EGD on 09/23: blood clots in fundus and antrum; 2cm actively bleeding duodenal ulcer with spurting vessel ([place] 1a) in sweep on base s/p 10cc epi and s/p 11/6 OVESCO placement for hemostasis  For Sacral Ulcer  had positive gram negative cultures for rods and ID ask for no Abx for now   Burn consulted ,did debridement on 10/22   wound care given     A

## 2023-10-25 NOTE — DISCHARGE NOTE PROVIDER - NSDCMRMEDTOKEN_GEN_ALL_CORE_FT
acetaminophen 325 mg oral tablet: 2 tab(s) orally every 6 hours, As needed, Mild Pain (1 - 3), Moderate Pain (4 - 6)  Albuterol (Eqv-ProAir HFA) 90 mcg/inh inhalation aerosol: 1 puff(s) inhaled every 6 hours as needed for  shortness of breath and/or wheezing  Breo Ellipta 100 mcg-25 mcg/inh inhalation powder: 1 puff(s) inhaled once a day  empagliflozin 10 mg oral tablet: 1 tab(s) orally once a day  Entresto 24 mg-26 mg oral tablet: 1 tab(s) orally 2 times a day  Lasix 20 mg oral tablet: 1 tab(s) orally once a day  Metoprolol Succinate ER 25 mg oral tablet, extended release: 1 tab(s) orally once a day  Percocet 5 mg-325 mg oral tablet: 1 tab(s) orally every 8 hours as needed for  severe pain  Xarelto 20 mg oral tablet: 1 tab(s) orally once a day   acetaminophen 325 mg oral tablet: 2 tab(s) orally every 6 hours, As needed, Mild Pain (1 - 3), Moderate Pain (4 - 6)  Albuterol (Eqv-ProAir HFA) 90 mcg/inh inhalation aerosol: 1 puff(s) inhaled every 6 hours as needed for  shortness of breath and/or wheezing  Breo Ellipta 100 mcg-25 mcg/inh inhalation powder: 1 puff(s) inhaled once a day  collagenase 250 units/g topical ointment: 1 Apply topically to affected area 2 times a day  collagenase 250 units/g topical ointment: 1 Apply topically to affected area 2 times a day  droNABinol 2.5 mg oral capsule: 1 cap(s) orally 3 times a day (before meals)  empagliflozin 10 mg oral tablet: 1 tab(s) orally once a day  Entresto 24 mg-26 mg oral tablet: 1 tab(s) orally 2 times a day  gabapentin 100 mg oral capsule: 1 cap(s) orally once a day (at bedtime)  Lasix 20 mg oral tablet: 1 tab(s) orally once a day  Metoprolol Succinate ER 25 mg oral tablet, extended release: 1 tab(s) orally once a day  midodrine 10 mg oral tablet: 1 tab(s) orally every 8 hours  Percocet 5 mg-325 mg oral tablet: 1 tab(s) orally every 8 hours as needed for  severe pain  Xarelto 20 mg oral tablet: 1 tab(s) orally once a day

## 2023-10-25 NOTE — PROGRESS NOTE ADULT - SUBJECTIVE AND OBJECTIVE BOX
NIMO SARMIENTO 83y Male  MRN#: [id number]     Hospital Day: 40d    Pt is currently admitted with the primary diagnosis of  Altered mental status        SUBJECTIVE     Overnight events  None    Subjective complaints  Pt was evaluated this am.                                             ----------------------------------------------------------  OBJECTIVE  PAST MEDICAL & SURGICAL HISTORY  HTN (hypertension)    COPD (chronic obstructive pulmonary disease)    High cholesterol    Mild anemia    Coffee ground emesis    Chronic diastolic heart failure    PAULA (acute kidney injury)    No significant past surgical history                                              -----------------------------------------------------------  ALLERGIES:  No Known Allergies                                            ------------------------------------------------------------    HOME MEDICATIONS  Home Medications:  acetaminophen 325 mg oral tablet: 2 tab(s) orally every 6 hours, As needed, Mild Pain (1 - 3), Moderate Pain (4 - 6) (25 May 2019 09:25)  Albuterol (Eqv-ProAir HFA) 90 mcg/inh inhalation aerosol: 1 puff(s) inhaled every 6 hours as needed for  shortness of breath and/or wheezing (15 Sep 2023 22:56)  Breo Ellipta 100 mcg-25 mcg/inh inhalation powder: 1 puff(s) inhaled once a day (15 Sep 2023 22:56)  empagliflozin 10 mg oral tablet: 1 tab(s) orally once a day (15 Sep 2023 22:56)  Entresto 24 mg-26 mg oral tablet: 1 tab(s) orally 2 times a day (15 Sep 2023 22:57)  Lasix 20 mg oral tablet: 1 tab(s) orally once a day (15 Sep 2023 22:57)  Metoprolol Succinate ER 25 mg oral tablet, extended release: 1 tab(s) orally once a day (15 Sep 2023 22:57)  Percocet 5 mg-325 mg oral tablet: 1 tab(s) orally every 8 hours as needed for  severe pain (15 Sep 2023 22:57)  Xarelto 20 mg oral tablet: 1 tab(s) orally once a day (15 Sep 2023 22:57)                           MEDICATIONS:  STANDING MEDICATIONS  budesonide 160 MICROgram(s)/formoterol 4.5 MICROgram(s) Inhaler 2 Puff(s) Inhalation two times a day  chlorhexidine 2% Cloths 1 Application(s) Topical <User Schedule>  collagenase Ointment 1 Application(s) Topical two times a day  collagenase Ointment 1 Application(s) Topical two times a day  darbepoetin Injectable Syringe 25 MICROGram(s) IV Push <User Schedule>  dextrose 5% + sodium chloride 0.45%. 1000 milliLiter(s) IV Continuous <Continuous>  dextrose 5%. 1000 milliLiter(s) IV Continuous <Continuous>  dextrose 50% Injectable 25 Gram(s) IV Push once  dextrose 50% Injectable 25 Gram(s) IV Push once  dextrose 50% Injectable 12.5 Gram(s) IV Push once  dronabinol 2.5 milliGRAM(s) Oral three times a day before meals  gabapentin 100 milliGRAM(s) Oral at bedtime  glucagon  Injectable 1 milliGRAM(s) IntraMuscular once  heparin   Injectable 5000 Unit(s) SubCutaneous every 8 hours  insulin lispro (ADMELOG) corrective regimen sliding scale   SubCutaneous every 6 hours  lidocaine 1%/epinephrine 1:100,000 Inj 1 Vial(s) Local Injection once  metoprolol tartrate 12.5 milliGRAM(s) Oral every 12 hours  midodrine. 10 milliGRAM(s) Oral every 8 hours  pantoprazole   Suspension 40 milliGRAM(s) Oral two times a day    PRN MEDICATIONS  acetaminophen     Tablet .. 650 milliGRAM(s) Oral every 6 hours PRN  albuterol    90 MICROgram(s) HFA Inhaler 1 Puff(s) Inhalation every 6 hours PRN  atropine Injectable 1 milliGRAM(s) IntraMuscular once PRN  dextrose Oral Gel 15 Gram(s) Oral once PRN                                            ------------------------------------------------------------  VITAL SIGNS: Last 24 Hours  T(C): 36.7 (25 Oct 2023 04:51), Max: 36.8 (24 Oct 2023 13:00)  T(F): 98 (25 Oct 2023 04:51), Max: 98.3 (24 Oct 2023 13:00)  HR: 75 (25 Oct 2023 04:51) (66 - 92)  BP: 113/59 (25 Oct 2023 04:51) (106/58 - 113/59)  BP(mean): 73 (24 Oct 2023 20:59) (73 - 76)  RR: 18 (25 Oct 2023 04:51) (16 - 18)  SpO2: 96% (24 Oct 2023 13:00) (96% - 96%)      10-24-23 @ 07:01  -  10-25-23 @ 07:00  --------------------------------------------------------  IN: 0 mL / OUT: 250 mL / NET: -250 mL                                             --------------------------------------------------------------  LABS:                        8.3    11.75 )-----------( 303      ( 24 Oct 2023 07:20 )             27.5     10-24    141  |  103  |  29<H>  ----------------------------<  101<H>  3.9   |  31  |  1.3    Ca    7.6<L>      24 Oct 2023 07:20  Mg     1.6     10-24    TPro  5.6<L>  /  Alb  2.2<L>  /  TBili  0.4  /  DBili  x   /  AST  14  /  ALT  9   /  AlkPhos  79  10-24      Urinalysis Basic - ( 24 Oct 2023 07:20 )    Color: x / Appearance: x / SG: x / pH: x  Gluc: 101 mg/dL / Ketone: x  / Bili: x / Urobili: x   Blood: x / Protein: x / Nitrite: x   Leuk Esterase: x / RBC: x / WBC x   Sq Epi: x / Non Sq Epi: x / Bacteria: x              Culture - Tissue with Gram Stain (collected 22 Oct 2023 13:48)  Source: .Tissue Other, sacrum  Gram Stain (23 Oct 2023 02:34):    No polymorphonuclear leukocytes seen per low power field    Numerous Gram Negative Rods seen per oil power field  Final Report (24 Oct 2023 21:05):    Moderate Enterococcus faecalis (vancomycin resistant) Multiple    Morphological Strains    Numerous Pseudomonas aeruginosa (Carbapenem Resistant)    Few Bacteroides fragilis "Susceptibilities not performed"  Organism: Enterococcus faecalis (vancomycin resistant)  Enterococcus faecium (vancomycin resistant)  Pseudomonas aeruginosa (Carbapenem Resistant)  Pseudomonas aeruginosa (Carbapenem Resistant) (24 Oct 2023 21:05)  Organism: Pseudomonas aeruginosa (Carbapenem Resistant) (24 Oct 2023 21:05)  Organism: Pseudomonas aeruginosa (Carbapenem Resistant) (24 Oct 2023 21:05)  Organism: Enterococcus faecium (vancomycin resistant) (24 Oct 2023 21:05)  Organism: Enterococcus faecalis (vancomycin resistant) (24 Oct 2023 21:05)                                                    -------------------------------------------------------------  RADIOLOGY:                                            --------------------------------------------------------------    PHYSICAL EXAM:  PHYSICAL EXAM:  GENERAL: NAD, lying in bed comfortably, with TDC on right   HEAD:  Atraumatic, Normocephalic  NECK: Supple, No JVD  CHEST/LUNG: Clear to auscultation bilaterally; No rales, rhonchi, wheezing, or rubs. Unlabored respirations  HEART: regular rate and rhythm; No murmurs, rubs, or gallops  ABDOMEN: Bowel sounds present; Soft, Nontender, Nondistended.    NEURO: Pt was sleeping and dint want me to wake up                                              --------------------------------------------------------------

## 2023-10-25 NOTE — PROGRESS NOTE ADULT - REASON FOR ADMISSION
AMS
Encephalopathy
AMS

## 2023-10-25 NOTE — PROGRESS NOTE ADULT - SUBJECTIVE AND OBJECTIVE BOX
Gastroenterology Follow Up Note      Location: [facility] 3E 007 B ([facility] 3E)  Patient Name: NIMO SARMIENTO  Age: 83y  Gender: Male      Chief Complaint  Patient is a 83y old Male who presents with a chief complaint of AMS (25 Oct 2023 14:51)  Primary diagnosis of Altered mental status      Reason for Consult  PEG tube Placement      Progress Note  This morning patient was seen and examined at bedside.    Today is hospital day 40d.  He is tolerating PO feeds now after removing NG tube on 10/20.  He denies abdominal pain, nausea, or vomiting.   No melena per nurses.      Vital Signs in the last 24 hours   Vitals Summary T(C): 35.6 (10-25-23 @ 13:27), Max: 36.7 (10-24-23 @ 20:59)  HR: 77 (10-25-23 @ 13:27) (66 - 77)  BP: 111/53 (10-25-23 @ 13:27) (107/55 - 113/59)  RR: 18 (10-25-23 @ 13:27) (16 - 18)  SpO2: --  Vent Data   Intake/ Output   10-24-23 @ 07:01  -  10-25-23 @ 07:00  --------------------------------------------------------  IN: 0 mL / OUT: 250 mL / NET: -250 mL        Physical Exam  * General Appearance: AOx 1-2, interactive but difficult to understand, oriented to place and person/ not to time, in no acute distress  * Lungs: Good bilateral air entry, audible crackles  * Heart: Regular Rate and Rhythm, normal S1 and S2, no audible murmur, rub, or gallop  * Abdomen: Symmetric, non-distended, soft, non-tender, bowel sounds active all four quadrants      Investigations   Laboratory Workup      - CBC:                        8.3    11.75 )-----------( 303      ( 24 Oct 2023 07:20 )             27.5       - Hgb Trend:  8.3  10-24-23 @ 07:20  7.8  10-23-23 @ 05:56          - Chemistry:  10-24    141  |  103  |  29<H>  ----------------------------<  101<H>  3.9   |  31  |  1.3    Ca    7.6<L>      24 Oct 2023 07:20  Mg     1.6     10-24    TPro  5.6<L>  /  Alb  2.2<L>  /  TBili  0.4  /  DBili  x   /  AST  14  /  ALT  9   /  AlkPhos  79  10-24    Liver panel trend:  TBili 0.4   /   AST 14   /   ALT 9   /   AlkP 79   /   Tptn 5.6   /   Alb 2.2    /   DBili --      10-24  TBili 0.5   /   AST 14   /   ALT 10   /   AlkP 80   /   Tptn 5.6   /   Alb 2.3    /   DBili --      10-23  TBili 0.6   /   AST 18   /   ALT 13   /   AlkP 91   /   Tptn 5.9   /   Alb 2.5    /   DBili --      10-22  TBili 0.6   /   AST 18   /   ALT 15   /   AlkP 98   /   Tptn 5.8   /   Alb 2.3    /   DBili --      10-21  TBili 0.4   /   AST 38   /   ALT 21   /   AlkP 140   /   Tptn 6.1   /   Alb 2.5    /   DBili --      10-19  TBili 0.5   /   AST 31   /   ALT 22   /   AlkP 141   /   Tptn 6.0   /   Alb 2.4    /   DBili --      10-18  TBili 0.5   /   AST 19   /   ALT 21   /   AlkP 104   /   Tptn 5.6   /   Alb 2.3    /   DBili --      10-17  TBili 0.5   /   AST 18   /   ALT 27   /   AlkP 121   /   Tptn 5.8   /   Alb 2.2    /   DBili --      10-16      Microbiological Workup  Urinalysis Basic - ( 24 Oct 2023 07:20 )    Color: x / Appearance: x / SG: x / pH: x  Gluc: 101 mg/dL / Ketone: x  / Bili: x / Urobili: x   Blood: x / Protein: x / Nitrite: x   Leuk Esterase: x / RBC: x / WBC x   Sq Epi: x / Non Sq Epi: x / Bacteria: x      Current Medications  Standing Medications  budesonide 160 MICROgram(s)/formoterol 4.5 MICROgram(s) Inhaler 2 Puff(s) Inhalation two times a day  chlorhexidine 2% Cloths 1 Application(s) Topical <User Schedule>  collagenase Ointment 1 Application(s) Topical two times a day  collagenase Ointment 1 Application(s) Topical two times a day  darbepoetin Injectable Syringe 25 MICROGram(s) IV Push <User Schedule>  dextrose 5% + sodium chloride 0.45%. 1000 milliLiter(s) (50 mL/Hr) IV Continuous <Continuous>  dextrose 5%. 1000 milliLiter(s) (100 mL/Hr) IV Continuous <Continuous>  dextrose 50% Injectable 25 Gram(s) IV Push once  dextrose 50% Injectable 25 Gram(s) IV Push once  dextrose 50% Injectable 12.5 Gram(s) IV Push once  dronabinol 2.5 milliGRAM(s) Oral three times a day before meals  gabapentin 100 milliGRAM(s) Oral at bedtime  glucagon  Injectable 1 milliGRAM(s) IntraMuscular once  heparin   Injectable 5000 Unit(s) SubCutaneous every 8 hours  insulin lispro (ADMELOG) corrective regimen sliding scale   SubCutaneous every 6 hours  lidocaine 1%/epinephrine 1:100,000 Inj 1 Vial(s) Local Injection once  metoprolol tartrate 12.5 milliGRAM(s) Oral every 12 hours  midodrine. 10 milliGRAM(s) Oral every 8 hours  pantoprazole   Suspension 40 milliGRAM(s) Oral two times a day    PRN Medications  acetaminophen     Tablet .. 650 milliGRAM(s) Oral every 6 hours PRN Temp greater or equal to 38C (100.4F), Mild Pain (1 - 3), Moderate Pain (4 - 6)  albuterol    90 MICROgram(s) HFA Inhaler 1 Puff(s) Inhalation every 6 hours PRN for shortness of breath and/or wheezing  atropine Injectable 1 milliGRAM(s) IntraMuscular once PRN HR < 30  dextrose Oral Gel 15 Gram(s) Oral once PRN Blood Glucose LESS THAN 70 milliGRAM(s)/deciliter    Singles Doses Administered  (ADM OVERRIDE) 1 each &lt;see task&gt; GiveOnce  (ADM OVERRIDE) 1 each &lt;see task&gt; GiveOnce  (ADM OVERRIDE) 1 each &lt;see task&gt; GiveOnce  (ADM OVERRIDE) 1 each &lt;see task&gt; GiveOnce  (ADM OVERRIDE) 1 each &lt;see task&gt; GiveOnce  (ADM OVERRIDE) 1 each &lt;see task&gt; GiveOnce  (ADM OVERRIDE) 1 each &lt;see task&gt; GiveOnce  (ADM OVERRIDE) 1 each &lt;see task&gt; GiveOnce  (ADM OVERRIDE) 1 each &lt;see task&gt; GiveOnce  (ADM OVERRIDE) 1 each &lt;see task&gt; GiveOnce  (ADM OVERRIDE) 1 each &lt;see task&gt; GiveOnce  (ADM OVERRIDE) 1 each &lt;see task&gt; GiveOnce  (ADM OVERRIDE) 1 each &lt;see task&gt; GiveOnce  (ADM OVERRIDE) 1 each &lt;see task&gt; GiveOnce  (ADM OVERRIDE) 5 each &lt;see task&gt; GiveOnce  (ADM OVERRIDE) 1 each &lt;see task&gt; GiveOnce  (ADM OVERRIDE) 1 each &lt;see task&gt; GiveOnce  (ADM OVERRIDE) 1 each &lt;see task&gt; GiveOnce  buMETAnide Injectable 2 milliGRAM(s) IV Push once  caspofungin IVPB 70 milliGRAM(s) IV Intermittent once  cefepime   IVPB 2000 milliGRAM(s) IV Intermittent every 8 hours  cefepime   IVPB 2000 milliGRAM(s) IV Intermittent once  cefepime   IVPB      chlorhexidine 2% Cloths 1 Application(s) Topical every 12 hours  dextrose 50% Injectable 12.5 Gram(s) IV Push once  diphenhydrAMINE Injectable 50 milliGRAM(s) IntraMuscular once  fentaNYL    Injectable 50 MICROGram(s) IV Push every 10 minutes PRN  haloperidol    Injectable 5 milliGRAM(s) IntraMuscular Once  haloperidol    Injectable 0.5 milliGRAM(s) IntraMuscular once  heparin   Injectable 8000 Unit(s) IV Push once  HYDROmorphone  Injectable 0.25 milliGRAM(s) IV Push every 6 hours PRN  lactated ringers Bolus 250 milliLiter(s) IV Bolus once  lactated ringers Bolus 500 milliLiter(s) IV Bolus once  lactated ringers Bolus 250 milliLiter(s) IV Bolus once  lactated ringers Bolus 1000 milliLiter(s) IV Bolus once  lactated ringers Bolus 250 milliLiter(s) IV Bolus once  lactated ringers Bolus 500 milliLiter(s) IV Bolus once  lactated ringers Bolus 500 milliLiter(s) IV Bolus once  lactated ringers Bolus 1000 milliLiter(s) IV Bolus once  lactated ringers Bolus 500 milliLiter(s) IV Bolus once  lactated ringers Bolus 250 milliLiter(s) IV Bolus once  lactated ringers Bolus 500 milliLiter(s) IV Bolus once  lactated ringers Bolus 250 milliLiter(s) IV Bolus once  LORazepam   Injectable 1 milliGRAM(s) IntraMuscular once  magnesium sulfate  IVPB 2 Gram(s) IV Intermittent once  magnesium sulfate  IVPB 2 Gram(s) IV Intermittent once  magnesium sulfate  IVPB 2 Gram(s) IV Intermittent once  magnesium sulfate  IVPB 1 Gram(s) IV Intermittent once  magnesium sulfate  IVPB 2 Gram(s) IV Intermittent once  magnesium sulfate  IVPB 2 Gram(s) IV Intermittent once  magnesium sulfate  IVPB 2 Gram(s) IV Intermittent every 2 hours  magnesium sulfate  IVPB 1 Gram(s) IV Intermittent once  magnesium sulfate  IVPB 2 Gram(s) IV Intermittent once  meropenem  IVPB 500 milliGRAM(s) IV Intermittent once  meropenem  IVPB 1000 milliGRAM(s) IV Intermittent every 24 hours  midazolam  1 mG/mL Injectable (Rx Quick Charge) 2 milliGRAM(s) IV Push   midodrine. 10 milliGRAM(s) Oral once  mupirocin 2% Ointment 1 Application(s) Both Nostrils two times a day  potassium chloride    Tablet ER 40 milliEquivalent(s) Oral once  potassium chloride   Powder 20 milliEquivalent(s) Oral every 2 hours  potassium chloride   Powder 40 milliEquivalent(s) Oral once  potassium chloride   Powder 40 milliEquivalent(s) Oral once  potassium chloride   Powder 40 milliEquivalent(s) Oral once  potassium chloride   Powder 40 milliEquivalent(s) Oral once  potassium chloride  20 mEq/100 mL IVPB 20 milliEquivalent(s) IV Intermittent once  potassium chloride  20 mEq/100 mL IVPB 20 milliEquivalent(s) IV Intermittent every 2 hours  protamine  IVPB 36 milliGRAM(s) IV Intermittent once  rocuronium 10 mG/mL Injectable (Rx Quick Charge) 50 milliGRAM(s) IV Push   sodium chloride 0.9% Bolus 750 milliLiter(s) IV Bolus once  sodium chloride 0.9% Bolus 250 milliLiter(s) IV Bolus once  succinylcholine 20 mG/mL Injectable (Rx Quick Charge) 120 milliGRAM(s) IV Push   vancomycin  IVPB 1750 milliGRAM(s) IV Intermittent once  vancomycin  IVPB 1250 milliGRAM(s) IV Intermittent once  vancomycin  IVPB 1000 milliGRAM(s) IV Intermittent once  vancomycin  IVPB 1500 milliGRAM(s) IV Intermittent once  vancomycin  IVPB. 1000 milliGRAM(s) IV Intermittent once

## 2023-10-25 NOTE — PROGRESS NOTE ADULT - ATTENDING SUPERVISION STATEMENT
Fellow
Resident
Fellow
Resident
Fellow
Fellow
Resident
Fellow
Fellow

## 2023-10-25 NOTE — PROGRESS NOTE ADULT - PROVIDER SPECIALTY LIST ADULT
Critical Care
Gastroenterology
Hospitalist
Hospitalist
Infectious Disease
Internal Medicine
Intervent Radiology
Nephrology
Pulmonology
Urology
Critical Care
Gastroenterology
Hospitalist
Internal Medicine
Nephrology
Critical Care
Gastroenterology
Hospitalist
Infectious Disease
Internal Medicine
Intervent Radiology
Nephrology
Urology
Burn
Critical Care
Gastroenterology
Gastroenterology
Hospitalist
Infectious Disease
Infectious Disease
Internal Medicine
MICU
Nephrology
Palliative Care
Critical Care
Hospitalist
Hospitalist
Internal Medicine
Pulmonology
Hospitalist
Infectious Disease
Hospitalist
Infectious Disease
Palliative Care

## 2023-10-25 NOTE — DISCHARGE NOTE PROVIDER - ATTENDING DISCHARGE PHYSICAL EXAMINATION:
T(F): , Max: 98.1 (10-24-23 @ 20:59)  HR: 77 (10-25-23 @ 13:27) (66 - 77)  BP: 111/53 (10-25-23 @ 13:27)  RR: 18 (10-25-23 @ 13:27)  SpO2: --  IN: 0 mL / OUT: 250 mL / NET: -250 mL      General: No apparent distress  Cardiovascular: S1, S2  Gastrointestinal: Soft, Non-tender, Non-distended  Respiratory: Good air entry bilaterally  Musculoskeletal: Moves all extremities  Lymphatic: No edema  Neurologic: No gross motor deficit  Dermatologic: Skin dry                          8.3    11.75 )-----------( 303      ( 24 Oct 2023 07:20 )             27.5     10-24    141  |  103  |  29<H>  ----------------------------<  101<H>  3.9   |  31  |  1.3    Ca    7.6<L>      24 Oct 2023 07:20  Mg     1.6     10-24    TPro  5.6<L>  /  Alb  2.2<L>  /  TBili  0.4  /  DBili  x   /  AST  14  /  ALT  9   /  AlkPhos  79  10-24

## 2023-10-26 LAB
CULTURE RESULTS: ABNORMAL
CULTURE RESULTS: ABNORMAL
ORGANISM # SPEC MICROSCOPIC CNT: ABNORMAL
ORGANISM # SPEC MICROSCOPIC CNT: SIGNIFICANT CHANGE UP
ORGANISM # SPEC MICROSCOPIC CNT: SIGNIFICANT CHANGE UP
SPECIMEN SOURCE: SIGNIFICANT CHANGE UP
SPECIMEN SOURCE: SIGNIFICANT CHANGE UP

## 2023-11-01 DIAGNOSIS — Z79.01 LONG TERM (CURRENT) USE OF ANTICOAGULANTS: ICD-10-CM

## 2023-11-01 DIAGNOSIS — R57.8 OTHER SHOCK: ICD-10-CM

## 2023-11-01 DIAGNOSIS — J96.01 ACUTE RESPIRATORY FAILURE WITH HYPOXIA: ICD-10-CM

## 2023-11-01 DIAGNOSIS — L89.626 PRESSURE-INDUCED DEEP TISSUE DAMAGE OF LEFT HEEL: ICD-10-CM

## 2023-11-01 DIAGNOSIS — R65.21 SEVERE SEPSIS WITH SEPTIC SHOCK: ICD-10-CM

## 2023-11-01 DIAGNOSIS — J69.0 PNEUMONITIS DUE TO INHALATION OF FOOD AND VOMIT: ICD-10-CM

## 2023-11-01 DIAGNOSIS — K26.4 CHRONIC OR UNSPECIFIED DUODENAL ULCER WITH HEMORRHAGE: ICD-10-CM

## 2023-11-01 DIAGNOSIS — I82.411 ACUTE EMBOLISM AND THROMBOSIS OF RIGHT FEMORAL VEIN: ICD-10-CM

## 2023-11-01 DIAGNOSIS — G92.8 OTHER TOXIC ENCEPHALOPATHY: ICD-10-CM

## 2023-11-01 DIAGNOSIS — D62 ACUTE POSTHEMORRHAGIC ANEMIA: ICD-10-CM

## 2023-11-01 DIAGNOSIS — I80.8 PHLEBITIS AND THROMBOPHLEBITIS OF OTHER SITES: ICD-10-CM

## 2023-11-01 DIAGNOSIS — K80.20 CALCULUS OF GALLBLADDER WITHOUT CHOLECYSTITIS WITHOUT OBSTRUCTION: ICD-10-CM

## 2023-11-01 DIAGNOSIS — I13.0 HYPERTENSIVE HEART AND CHRONIC KIDNEY DISEASE WITH HEART FAILURE AND STAGE 1 THROUGH STAGE 4 CHRONIC KIDNEY DISEASE, OR UNSPECIFIED CHRONIC KIDNEY DISEASE: ICD-10-CM

## 2023-11-01 DIAGNOSIS — A41.9 SEPSIS, UNSPECIFIED ORGANISM: ICD-10-CM

## 2023-11-01 DIAGNOSIS — E87.20 ACIDOSIS, UNSPECIFIED: ICD-10-CM

## 2023-11-01 DIAGNOSIS — E87.0 HYPEROSMOLALITY AND HYPERNATREMIA: ICD-10-CM

## 2023-11-01 DIAGNOSIS — E44.0 MODERATE PROTEIN-CALORIE MALNUTRITION: ICD-10-CM

## 2023-11-01 DIAGNOSIS — I50.22 CHRONIC SYSTOLIC (CONGESTIVE) HEART FAILURE: ICD-10-CM

## 2023-11-01 DIAGNOSIS — R74.01 ELEVATION OF LEVELS OF LIVER TRANSAMINASE LEVELS: ICD-10-CM

## 2023-11-01 DIAGNOSIS — I48.0 PAROXYSMAL ATRIAL FIBRILLATION: ICD-10-CM

## 2023-11-01 DIAGNOSIS — N18.2 CHRONIC KIDNEY DISEASE, STAGE 2 (MILD): ICD-10-CM

## 2023-11-01 DIAGNOSIS — Z79.82 LONG TERM (CURRENT) USE OF ASPIRIN: ICD-10-CM

## 2023-11-01 DIAGNOSIS — J44.9 CHRONIC OBSTRUCTIVE PULMONARY DISEASE, UNSPECIFIED: ICD-10-CM

## 2023-11-01 DIAGNOSIS — R13.13 DYSPHAGIA, PHARYNGEAL PHASE: ICD-10-CM

## 2023-11-01 DIAGNOSIS — N17.0 ACUTE KIDNEY FAILURE WITH TUBULAR NECROSIS: ICD-10-CM

## 2023-11-01 DIAGNOSIS — L89.156 PRESSURE-INDUCED DEEP TISSUE DAMAGE OF SACRAL REGION: ICD-10-CM

## 2023-11-01 DIAGNOSIS — E11.22 TYPE 2 DIABETES MELLITUS WITH DIABETIC CHRONIC KIDNEY DISEASE: ICD-10-CM

## 2023-11-16 NOTE — OCCUPATIONAL THERAPY INITIAL EVALUATION ADULT - TRANSFER TRAINING, PT EVAL
No
Patient will perform bed <> chair transfers with moderate assistance with appropriate assistive device by discharge.

## 2023-11-17 NOTE — ED ADULT NURSE NOTE - DOES PATIENT HAVE ADVANCE DIRECTIVE
I did not discontinue the famotidine. It may be typho in medical records. thanks
Pt requests a call back to know why  discontinued famotidine (PEPCID) 20 MG for him.    Contact Information   [phone] (Home Phone)    
Spoke to patient- it appears it was inadvertantly taken off by cardiology at last visit. Patient states he takes famotidine 20mg - 1 tab daily. Updated med list.   
No

## 2024-04-30 NOTE — PATIENT PROFILE ADULT - PRIMARY ROLES/RESPONSIBILITIES
[place] of PPI, discussed life style modifications, to let me know in the next few weeks if symptoms are not improving, will send for a consult for an EGD    none

## 2025-01-14 NOTE — DISCHARGE NOTE NURSING/CASE MANAGEMENT/SOCIAL WORK - NSDCPEXARELTOFU_GEN_ALL_CORE
has been approved for a quantity of 30 , day supply 30    Prior Authorization reference number:  [id number]  Insurance: [employer]  Effective dates: 01/14/2025 to 01/14/2026      
Go for blood tests as directed. Your doctor will do lab tests at regular visits to monitor the effects of this medicine. Please follow up with your doctor and keep your health care provider appointments.

## 2025-07-02 NOTE — PRE-OP CHECKLIST - TAMPON REMOVED
n/a
Class I (easy) - visualization of the soft palate, fauces, uvula, and both anterior and posterior pillars
n/a